# Patient Record
Sex: FEMALE | Race: WHITE | NOT HISPANIC OR LATINO | Employment: OTHER | URBAN - METROPOLITAN AREA
[De-identification: names, ages, dates, MRNs, and addresses within clinical notes are randomized per-mention and may not be internally consistent; named-entity substitution may affect disease eponyms.]

---

## 2017-01-03 ENCOUNTER — PATIENT MESSAGE (OUTPATIENT)
Dept: GASTROENTEROLOGY | Facility: CLINIC | Age: 82
End: 2017-01-03

## 2017-01-04 ENCOUNTER — OFFICE VISIT (OUTPATIENT)
Dept: PULMONOLOGY | Facility: CLINIC | Age: 82
End: 2017-01-04
Payer: MEDICARE

## 2017-01-04 VITALS
DIASTOLIC BLOOD PRESSURE: 87 MMHG | OXYGEN SATURATION: 95 % | WEIGHT: 158.06 LBS | SYSTOLIC BLOOD PRESSURE: 136 MMHG | HEIGHT: 63 IN | HEART RATE: 81 BPM | BODY MASS INDEX: 28 KG/M2

## 2017-01-04 DIAGNOSIS — J70.1 RADIATION FIBROSIS OF LUNG: ICD-10-CM

## 2017-01-04 DIAGNOSIS — R06.09 DOE (DYSPNEA ON EXERTION): Primary | ICD-10-CM

## 2017-01-04 DIAGNOSIS — J47.9 BRONCHIECTASIS WITHOUT COMPLICATION: ICD-10-CM

## 2017-01-04 DIAGNOSIS — J44.89 COPD (CHRONIC OBSTRUCTIVE PULMONARY DISEASE) WITH CHRONIC BRONCHITIS: ICD-10-CM

## 2017-01-04 DIAGNOSIS — J45.20 INTERMITTENT ASTHMA, UNCOMPLICATED: ICD-10-CM

## 2017-01-04 DIAGNOSIS — R91.8 MULTIPLE LUNG NODULES: ICD-10-CM

## 2017-01-04 PROCEDURE — 99499 UNLISTED E&M SERVICE: CPT | Mod: S$GLB,,, | Performed by: INTERNAL MEDICINE

## 2017-01-04 PROCEDURE — 99999 PR PBB SHADOW E&M-EST. PATIENT-LVL III: CPT | Mod: PBBFAC,,, | Performed by: INTERNAL MEDICINE

## 2017-01-04 PROCEDURE — 1159F MED LIST DOCD IN RCRD: CPT | Mod: S$GLB,,, | Performed by: INTERNAL MEDICINE

## 2017-01-04 PROCEDURE — 1160F RVW MEDS BY RX/DR IN RCRD: CPT | Mod: S$GLB,,, | Performed by: INTERNAL MEDICINE

## 2017-01-04 PROCEDURE — 3075F SYST BP GE 130 - 139MM HG: CPT | Mod: S$GLB,,, | Performed by: INTERNAL MEDICINE

## 2017-01-04 PROCEDURE — 1125F AMNT PAIN NOTED PAIN PRSNT: CPT | Mod: S$GLB,,, | Performed by: INTERNAL MEDICINE

## 2017-01-04 PROCEDURE — 3079F DIAST BP 80-89 MM HG: CPT | Mod: S$GLB,,, | Performed by: INTERNAL MEDICINE

## 2017-01-04 PROCEDURE — 99214 OFFICE O/P EST MOD 30 MIN: CPT | Mod: S$GLB,,, | Performed by: INTERNAL MEDICINE

## 2017-01-04 PROCEDURE — 1157F ADVNC CARE PLAN IN RCRD: CPT | Mod: S$GLB,,, | Performed by: INTERNAL MEDICINE

## 2017-01-04 RX ORDER — FLUTICASONE FUROATE AND VILANTEROL 200; 25 UG/1; UG/1
1 POWDER RESPIRATORY (INHALATION) DAILY
Qty: 1 EACH | Refills: 11 | Status: SHIPPED | OUTPATIENT
Start: 2017-01-04 | End: 2017-01-04 | Stop reason: SDUPTHER

## 2017-01-04 RX ORDER — FLUTICASONE FUROATE AND VILANTEROL 200; 25 UG/1; UG/1
1 POWDER RESPIRATORY (INHALATION) DAILY
Qty: 3 EACH | Refills: 3 | Status: SHIPPED | OUTPATIENT
Start: 2017-01-04 | End: 2018-03-28 | Stop reason: SDUPTHER

## 2017-01-04 RX ORDER — PREDNISONE 20 MG/1
TABLET ORAL
Qty: 12 TABLET | Refills: 2 | Status: SHIPPED | OUTPATIENT
Start: 2017-01-04 | End: 2017-04-07 | Stop reason: SDUPTHER

## 2017-01-04 RX ORDER — ALBUTEROL SULFATE 90 UG/1
1-2 AEROSOL, METERED RESPIRATORY (INHALATION) EVERY 4 HOURS PRN
Qty: 3 INHALER | Refills: 3 | Status: SHIPPED | OUTPATIENT
Start: 2017-01-04 | End: 2017-01-23 | Stop reason: CLARIF

## 2017-01-04 NOTE — PROGRESS NOTES
1/4/2017    Olga Aguiar  office    Chief Complaint   Patient presents with    Follow-up     3 month     Asthma     Jan 4, 2017, cough is better, less mucous, wheezes with stairs and night air, took prednisone last month and after 3 days improved a bit, uses singulair only on regular basis, advair used til out- was causing a hoarseness with reg use.  Albuterol ppt nervousness.      Sept 28, 2016HPI: minimal ex smoker with h/o breast ca rx xrt to anterior right chest with chr vol loss- ct with fibrotic right upper with bronchiectasis.    Chr cough and no mucous progressively worse,     Had had cough and sob related irratents.  occ wheezes last yrs,     Clarke flight of stairs varies. Inhalers no help.    Will have occasoion of cough and mucous chr , copious.    Yrs back would have cough and occ wheeze withe irratents.    The chief compliant  problem is new to me   PFSH:  Past Medical History   Diagnosis Date    Adrenal adenoma: 2.8 X 2.1CM 2010 5/16/2014    Anticoagulant long-term use       mg    Aortic atherosclerosis: see CT scan 2016 10/28/2016    Aortic stenosis 8/19/2014    Asthma     Cancer      breast, both sides, right arm restriction    Cataracts, both eyes     Chronic back pain     Chronic bronchitis     CKD (chronic kidney disease) stage 3, GFR 30-59 ml/min 8/19/2014     no dialysis    COPD (chronic obstructive pulmonary disease)      occasional inhaler use, no oxygen    DDD (degenerative disc disease), lumbar 7/30/2013    Diabetes mellitus      diet controlled    Ectatic abdominal aorta: see CT scan 10/16 10/28/2016    Gallbladder polyp 8/19/2014    GERD (gastroesophageal reflux disease)     Gout     Herpes simplex      Fever Blisters and Styes in right eye    High blood cholesterol     Hypertension      holding medication when B/P low    Insomnia     Lumbar spinal stenosis     Lymphedema of arm      Right    Multiple lung nodules 9/21/2015    Renal cyst, right:  stable per CT 2016 10/28/2016    Stroke 2/2012     TIA, no residual effects    Stye 12/5/2013    Thyroid disease      hypothyroidism    Unspecified hypothyroidism 7/20/2015         Past Surgical History   Procedure Laterality Date    Hysterectomy      Cataract extraction       bilateral PHACO with IOL    Tonsillectomy      Axillary node dissection       both sides    Epidural steroid injection       Pain mangement    Rectal surgery       Fissure repair    Nerve block injection       Pain management, Times 2    Radiofrequency ablation       Nerve, Pain management    Hysterectomy      Breast surgery       Bilateral lumpectomy     Social History   Substance Use Topics    Smoking status: Former Smoker     Packs/day: 1.00     Years: 18.00     Start date: 2/20/1952     Quit date: 8/6/1970    Smokeless tobacco: Never Used    Alcohol use Yes      Comment: rare     Family History   Problem Relation Age of Onset    Cancer Mother      Breast    COPD Mother     Hearing loss Mother     Hypertension Mother     Diabetes Father     Heart disease Father     Heart attack Father     Cancer Daughter      Breast    Cancer Sister      Breast    Amblyopia Neg Hx     Blindness Neg Hx     Cataracts Neg Hx     Glaucoma Neg Hx     Macular degeneration Neg Hx     Retinal detachment Neg Hx     Strabismus Neg Hx     Stroke Neg Hx     Thyroid disease Neg Hx      Review of patient's allergies indicates:   Allergen Reactions    Allopurinol analogues Other (See Comments)     Headache.  This is more of a side effect than it is an allergic reaction.       Performance Status:The patient's activity level is functions out of house.      Review of Systems:  a review of eleven systems covering constitutional, Eye, HEENT, Psych, Respiratory, Cardiac, GI, , Musculoskeletal, Endocrine, Dermatologic was negative except for pertinent findings as listed ABOVE and below: as, no swallow problems with xrt, no clear dm,  "    Exam:Comprehensive exam done.   Visit Vitals    /87 (BP Location: Left arm, Patient Position: Sitting, BP Method: Automatic)    Pulse 81    Ht 5' 3" (1.6 m)    Wt 71.7 kg (158 lb 1.1 oz)    SpO2 95%    BMI 28 kg/m2     Exam included Vitals as listed, and patient's appearance and affect and alertness and mood, oral exam for yeast and hygiene and pharynx lesions and Mallapatti (M) score, neck with inspection for jvd and masses and thyroid abnormalities and lymph nodes (supraclavicular and infraclavicular nodes and axillary also examined and noted if abn), chest exam included symmetry and effort and fremitus and percussion and auscultation, cardiac exam included rhythm and gallops and murmur and rubs and jvd and edema, abdominal exam for mass and hepatosplenomegaly and tenderness and hernias and bowel sounds, Musculoskeletal exam with muscle tone and posture and mobility/gait and  strength, and skin for rashes and cyanosis and pallor and turgor, extremity for clubbing.  Findings were normal except for pertinent findings listed below:  M2, good bs, looks good    Radiographs reviewed: view by direct vision focal fibrotic dx right upper with mainly bronchiectasis.    Labs reviewed      PFT  Pulmonary Functions, including spirometry and bronchodilator response and lung volumes and diffusion, study was done oct 7, 2016.  Spirometry shows severe obstruction, loss vital capacity and obstruction and bronchodilator response.   FEV1 is 42% irma 0.81 liters liters.  Lung volumes show  loss of TLC with restriction 63%.  Diffusion shows reduced to 38% uncorrected for anemia if present..     Pulmonary functions show severe restriction and good bronchodilator response and low dlco. Clinical correlation recommended.      Saul Heller M.D.      Plan:  Clinical impression is resonably certain and repeated evaluation prn +/- follow up will be needed as below.    Olga was seen today for follow-up and " asthma.    Diagnoses and all orders for this visit:    FISCHER (dyspnea on exertion)    Multiple lung nodules: stable CT 10/2016    COPD (chronic obstructive pulmonary disease) with chronic bronchitis, 2013  -     Discontinue: fluticasone-vilanterol (BREO ELLIPTA) 200-25 mcg/dose DsDv diskus inhaler; Inhale 1 puff into the lungs once daily.  -     Discontinue: umeclidinium (INCRUSE ELLIPTA) 62.5 mcg/actuation DsDv; Inhale 1 Inhaler into the lungs once daily.  -     albuterol 90 mcg/actuation inhaler; Inhale 1-2 puffs into the lungs every 4 (four) hours as needed.  -     fluticasone-vilanterol (BREO ELLIPTA) 200-25 mcg/dose DsDv diskus inhaler; Inhale 1 puff into the lungs once daily.  -     umeclidinium (INCRUSE ELLIPTA) 62.5 mcg/actuation DsDv; Inhale 1 Inhaler into the lungs once daily.    Intermittent asthma, uncomplicated  -     Discontinue: fluticasone-vilanterol (BREO ELLIPTA) 200-25 mcg/dose DsDv diskus inhaler; Inhale 1 puff into the lungs once daily.  -     Discontinue: umeclidinium (INCRUSE ELLIPTA) 62.5 mcg/actuation DsDv; Inhale 1 Inhaler into the lungs once daily.  -     albuterol 90 mcg/actuation inhaler; Inhale 1-2 puffs into the lungs every 4 (four) hours as needed.  -     predniSONE (DELTASONE) 20 MG tablet; One daily for   3 days and repeat as needed.  -     fluticasone-vilanterol (BREO ELLIPTA) 200-25 mcg/dose DsDv diskus inhaler; Inhale 1 puff into the lungs once daily.  -     umeclidinium (INCRUSE ELLIPTA) 62.5 mcg/actuation DsDv; Inhale 1 Inhaler into the lungs once daily.    Radiation fibrosis of lung    Bronchiectasis without complication  -     Discontinue: fluticasone-vilanterol (BREO ELLIPTA) 200-25 mcg/dose DsDv diskus inhaler; Inhale 1 puff into the lungs once daily.  -     Discontinue: umeclidinium (INCRUSE ELLIPTA) 62.5 mcg/actuation DsDv; Inhale 1 Inhaler into the lungs once daily.  -     albuterol 90 mcg/actuation inhaler; Inhale 1-2 puffs into the lungs every 4 (four) hours as  needed.  -     predniSONE (DELTASONE) 20 MG tablet; One daily for   3 days and repeat as needed.  -     fluticasone-vilanterol (BREO ELLIPTA) 200-25 mcg/dose DsDv diskus inhaler; Inhale 1 puff into the lungs once daily.  -     umeclidinium (INCRUSE ELLIPTA) 62.5 mcg/actuation DsDv; Inhale 1 Inhaler into the lungs once daily.        Return in about 3 months (around 4/4/2017).    Discussed with patient above for education the following:        Asthma and copd with radiation damage (fibrosis and bronchiectasis)     Preventive breo one a day, and singulair    Try incruse one a day- continue if dramatic response     Rescue     Albuterol as needed - use as little or as much as needed.  Makes jitters.     Action plan will optimize    One a day for 3 or 6 or 9 or 12 days?

## 2017-01-04 NOTE — MR AVS SNAPSHOT
[place] [facility]  [address]  [place] [address]  Phone: [phone]                  Olga Aguiar   2017 10:00 AM   Office Visit    Description:  Female : 1935   Provider:  Saul Heller MD   Department:  [place] [facility]           Reason for Visit     Follow-up     Asthma           Diagnoses this Visit        Comments    FISCHER (dyspnea on exertion)    -  Primary     Multiple lung nodules         COPD (chronic obstructive pulmonary disease) with chronic bronchitis         Intermittent asthma, uncomplicated         Radiation fibrosis of lung         Bronchiectasis without complication                To Do List           Future Appointments        Provider Department Dept Phone    2017 10:15 AM [facility] [phone]    2017 2:30 PM MD [place] [facility] - Cardiology [phone]    3/1/2017 11:30 AM [facility] [place] - Pain Management [phone]    3/29/2017 8:00 AM [facility], [facility] [phone]    2017 1:20 PM [facility] [phone]      Goals (5 Years of Data)     None      Follow-Up and Disposition     Return in about 3 months (around 2017).    Follow-up and Disposition History       These Medications        Disp Refills Start End    albuterol 90 mcg/actuation inhaler 3 Inhaler 3 2017     Inhale 1-2 puffs into the lungs every 4 (four) hours as needed. - Inhalation    Pharmacy: [facility] [place] [address]. Ph #: [phone]       predniSONE (DELTASONE) 20 MG tablet 12 tablet 2 2017     One daily for   3 days and repeat as needed.    Pharmacy: [facility] [place] [address]. Ph #: [phone]       fluticasone-vilanterol (BREO ELLIPTA) 200-25 mcg/dose DsDv diskus inhaler 3 each 3 2017     Inhale 1 puff into the lungs once daily. - Inhalation     Pharmacy: [facility] [address] [place] [address]. Ph #: [phone]       umeclidinium (INCRUSE ELLIPTA) 62.5 mcg/actuation DsDv 3 each 3 1/4/2017     Inhale 1 Inhaler into the lungs once daily. - Inhalation    Pharmacy: [facility] [address] [place] [address]. Ph #: [phone]         [facility]     [facility] On Call Nurse [facility] - 24/7 Assistance  Registered nurses in the [facility] On Call Center provide clinical advisement, health education, appointment booking, and other advisory services.  Call for this free service at [phone].             Medications           Message regarding Medications     Verify the changes and/or additions to your medication regime listed below are the same as discussed with your clinician today.  If any of these changes or additions are incorrect, please notify your healthcare provider.        START taking these NEW medications        Refills    albuterol 90 mcg/actuation inhaler 3    Sig: Inhale 1-2 puffs into the lungs every 4 (four) hours as needed.    Class: Normal    Route: Inhalation    fluticasone-vilanterol (BREO ELLIPTA) 200-25 mcg/dose DsDv diskus inhaler 3    Sig: Inhale 1 puff into the lungs once daily.    Class: Normal    Route: Inhalation    umeclidinium (INCRUSE ELLIPTA) 62.5 mcg/actuation DsDv 3    Sig: Inhale 1 Inhaler into the lungs once daily.    Class: Print    Route: Inhalation      STOP taking these medications     fluticasone-salmeterol 250-50 mcg/dose (ADVAIR) 250-50 mcg/dose diskus inhaler Inhale 1 puff into the lungs 2 (two) times daily.    ALBUTEROL INHL Inhale into the lungs.           Verify that the below list of medications is an accurate representation of the medications you are currently taking.  If none reported, the list may be blank. If incorrect, please contact your healthcare provider. Carry this list with you in case of emergency.           Current Medications     acetaminophen (TYLENOL) 500  "MG tablet Take 1,000 mg by mouth every 6 (six) hours as needed.    albuterol 90 mcg/actuation inhaler Inhale 1-2 puffs into the lungs every 4 (four) hours as needed.    aspirin 325 MG tablet Take 1 tablet by mouth Daily.     cholecalciferol, vitamin D3, 2,000 unit Cap Take 2,000 Units by mouth Daily.     levothyroxine (SYNTHROID) 75 MCG tablet Take 75 mcg by mouth once daily.    montelukast (SINGULAIR) 10 mg tablet Take 1 tablet (10 mg total) by mouth every evening.    omeprazole (PRILOSEC) 40 MG capsule Take 1 capsule (40 mg total) by mouth every morning.    simvastatin (ZOCOR) 40 MG tablet Take 1 tablet (40 mg total) by mouth every evening.    sucralfate (CARAFATE) 1 gram tablet Take 1 tablet (1 g total) by mouth 2 (two) times daily.    tramadol (ULTRAM) 50 mg tablet Take 1 tablet (50 mg total) by mouth 2 (two) times daily as needed for Pain.    triamterene-hydrochlorothiazide 37.5-25 mg (DYAZIDE) 37.5-25 mg per capsule Take 1 capsule by mouth every morning.    zolpidem (AMBIEN) 10 mg Tab Take 1 tablet (10 mg total) by mouth nightly as needed.    fluticasone-vilanterol (BREO ELLIPTA) 200-25 mcg/dose DsDv diskus inhaler Inhale 1 puff into the lungs once daily.    predniSONE (DELTASONE) 20 MG tablet One daily for   3 days and repeat as needed.    umeclidinium (INCRUSE ELLIPTA) 62.5 mcg/actuation DsDv Inhale 1 Inhaler into the lungs once daily.           Clinical Reference Information           Vital Signs - Last Recorded  Most recent update: 1/4/2017 10:14 AM by Miya Gutierrez MA    BP Pulse Ht Wt SpO2 BMI    136/87 (BP Location: Left arm, Patient Position: Sitting, BP Method: Automatic) 81 5' 3" (1.6 m) 71.7 kg (158 lb 1.1 oz) 95% 28 kg/m2      Blood Pressure          Most Recent Value    BP  136/87      Allergies as of 1/4/2017     Allopurinol Analogues      Immunizations Administered on Date of Encounter - 1/4/2017     None      Instructions    Asthma and copd with radiation damage (fibrosis and " bronchiectasis)     Preventive breo one a day, and singulair    Try incruse one a day- continue if dramatic response     Rescue     Albuterol as needed - use as little or as much as needed.  Makes jitters.     Action plan will optimize    One a day for 3 or 6 or 9 or 12 days?

## 2017-01-04 NOTE — PATIENT INSTRUCTIONS
Asthma and copd with radiation damage (fibrosis and bronchiectasis)     Preventive breo one a day, and singulair    Try incruse one a day- continue if dramatic response     Rescue     Albuterol as needed - use as little or as much as needed.  Makes jitters.     Action plan will optimize    One a day for 3 or 6 or 9 or 12 days?

## 2017-01-05 ENCOUNTER — PATIENT MESSAGE (OUTPATIENT)
Dept: CARDIOLOGY | Facility: CLINIC | Age: 82
End: 2017-01-05

## 2017-01-16 ENCOUNTER — OFFICE VISIT (OUTPATIENT)
Dept: OPTOMETRY | Facility: CLINIC | Age: 82
End: 2017-01-16
Payer: MEDICARE

## 2017-01-16 DIAGNOSIS — E11.9 DIABETES MELLITUS WITHOUT OPHTHALMIC MANIFESTATIONS: Primary | ICD-10-CM

## 2017-01-16 DIAGNOSIS — Z96.1 PSEUDOPHAKIA OF BOTH EYES: ICD-10-CM

## 2017-01-16 DIAGNOSIS — H52.7 REFRACTIVE ERROR: ICD-10-CM

## 2017-01-16 DIAGNOSIS — D31.32 CHOROIDAL NEVUS OF LEFT EYE: ICD-10-CM

## 2017-01-16 PROCEDURE — 99999 PR PBB SHADOW E&M-EST. PATIENT-LVL II: CPT | Mod: PBBFAC,,, | Performed by: OPTOMETRIST

## 2017-01-16 PROCEDURE — 92014 COMPRE OPH EXAM EST PT 1/>: CPT | Mod: S$GLB,,, | Performed by: OPTOMETRIST

## 2017-01-16 NOTE — PROGRESS NOTES
HPI     CC: Pt is here today for her annual diabetic eye exam.  She states that   although she has not had any major vision changes since her last exam, she   had a fall about 6 wks ago and her right eye was badly bruised and later   developed a stye which she feels is still a little swollen.  She says that   she doesn't know what her last blood sugar was.    (-) pain / (-) discomfort  (-) headaches  (-) diplopia   (-) flashes / (-) floaters    Hemoglobin A1C       Date                     Value               Ref Range             Status                09/21/2016               6.6 (H)             4.5 - 6.2 %           Final                 03/16/2016               6.8 (H)             4.5 - 6.2 %           Final                 12/09/2015               6.9 (H)             4.5 - 6.2 %           Final            ----------       Last edited by Ashwin Snowden, OD on 1/16/2017 10:31 AM.     ROS     Positive for: Endocrine (DM type 2, controlled with diet and exercise),   Cardiovascular (HTN), Eyes    Negative for: Constitutional, Gastrointestinal, Neurological, Skin,   Genitourinary, Musculoskeletal, HENT, Respiratory, Psychiatric,   Allergic/Imm, Heme/Lymph    Last edited by Ashwin Snowden, OD on 1/16/2017 10:39 AM. (History)        Assessment /Plan     For exam results, see Encounter Report.    Diabetes mellitus without ophthalmic manifestations    Choroidal nevus of left eye    Pseudophakia of both eyes    Refractive error      Borderline DM type 2, controlled with diet and exercise, without ocular retinopathy ou. Discussed possible ocular affects of uncontrolled blood sugar with patient. Recommended continued strong blood sugar control and continued care with PCP. Monitor yearly.     Choroidal nevus temporal OS, stable from previous. Flat, distinct borders, negative SRF. Monitor yearly.    S/p cataract extraction with good results. Pt happy with uncorrected distance and readers prn for near, denies refraction. Return prn  for spec Rx.       RTC in 1 year for comprehensive eye exam, or sooner prn.

## 2017-01-23 ENCOUNTER — TELEPHONE (OUTPATIENT)
Dept: ADMINISTRATIVE | Facility: HOSPITAL | Age: 82
End: 2017-01-23

## 2017-01-23 RX ORDER — LEVALBUTEROL TARTRATE 45 UG/1
2 AEROSOL, METERED ORAL EVERY 8 HOURS PRN
Qty: 1 INHALER | Refills: 12 | Status: SHIPPED | OUTPATIENT
Start: 2017-01-23 | End: 2018-03-28

## 2017-01-23 NOTE — TELEPHONE ENCOUNTER
This patient has a prescription for Proventil. Per [facility] navigator- Proventil is no longer on their formulary effective 1/1/17. Suggested alternatives to prescribe are Ventolin & Xopenex. Thanks.

## 2017-01-30 ENCOUNTER — TELEPHONE (OUTPATIENT)
Dept: PULMONOLOGY | Facility: CLINIC | Age: 82
End: 2017-01-30

## 2017-02-06 ENCOUNTER — OFFICE VISIT (OUTPATIENT)
Dept: CARDIOLOGY | Facility: CLINIC | Age: 82
End: 2017-02-06
Payer: MEDICARE

## 2017-02-06 VITALS
BODY MASS INDEX: 28.32 KG/M2 | WEIGHT: 159.81 LBS | RESPIRATION RATE: 18 BRPM | HEART RATE: 74 BPM | HEIGHT: 63 IN | OXYGEN SATURATION: 97 %

## 2017-02-06 DIAGNOSIS — R06.09 DOE (DYSPNEA ON EXERTION): Primary | ICD-10-CM

## 2017-02-06 DIAGNOSIS — I51.7 LVH (LEFT VENTRICULAR HYPERTROPHY): ICD-10-CM

## 2017-02-06 DIAGNOSIS — R94.31 ABNORMAL ECG: ICD-10-CM

## 2017-02-06 DIAGNOSIS — E11.59 HYPERTENSION ASSOCIATED WITH DIABETES: ICD-10-CM

## 2017-02-06 DIAGNOSIS — I35.0 AORTIC VALVE STENOSIS, MODERATE: ICD-10-CM

## 2017-02-06 DIAGNOSIS — I15.2 HYPERTENSION ASSOCIATED WITH DIABETES: ICD-10-CM

## 2017-02-06 DIAGNOSIS — J44.89 COPD (CHRONIC OBSTRUCTIVE PULMONARY DISEASE) WITH CHRONIC BRONCHITIS: ICD-10-CM

## 2017-02-06 PROCEDURE — 99999 PR PBB SHADOW E&M-EST. PATIENT-LVL II: CPT | Mod: PBBFAC,,, | Performed by: INTERNAL MEDICINE

## 2017-02-06 PROCEDURE — 99499 UNLISTED E&M SERVICE: CPT | Mod: S$GLB,,, | Performed by: INTERNAL MEDICINE

## 2017-02-06 PROCEDURE — 1159F MED LIST DOCD IN RCRD: CPT | Mod: S$GLB,,, | Performed by: INTERNAL MEDICINE

## 2017-02-06 PROCEDURE — 99214 OFFICE O/P EST MOD 30 MIN: CPT | Mod: S$GLB,,, | Performed by: INTERNAL MEDICINE

## 2017-02-06 PROCEDURE — 1160F RVW MEDS BY RX/DR IN RCRD: CPT | Mod: S$GLB,,, | Performed by: INTERNAL MEDICINE

## 2017-02-06 PROCEDURE — 1125F AMNT PAIN NOTED PAIN PRSNT: CPT | Mod: S$GLB,,, | Performed by: INTERNAL MEDICINE

## 2017-02-06 PROCEDURE — 1157F ADVNC CARE PLAN IN RCRD: CPT | Mod: S$GLB,,, | Performed by: INTERNAL MEDICINE

## 2017-02-06 RX ORDER — TRAMADOL HYDROCHLORIDE 50 MG/1
50 TABLET ORAL 2 TIMES DAILY PRN
Qty: 60 TABLET | Refills: 2 | Status: SHIPPED | OUTPATIENT
Start: 2017-02-06 | End: 2017-05-02 | Stop reason: SDUPTHER

## 2017-02-06 NOTE — MR AVS SNAPSHOT
[place] [facility] - Cardiology  [address]  [place] [address]  Phone: [phone]                  Olga Aguiar   2017 11:00 AM   Office Visit    Description:  Female : 1935   Provider:  Harley Lucas MD   Department:  [place] [facility]           Diagnoses this Visit        Comments    FISCHER (dyspnea on exertion)    -  Primary     Hypertension associated with diabetes         BMI 29.0-29.9,adult         Aortic valve stenosis, moderate         LVH (left ventricular hypertrophy)         COPD (chronic obstructive pulmonary disease) with chronic bronchitis         Abnormal ECG                To Do List           Future Appointments        Provider Department Dept Phone    3/1/2017 11:30 AM [facility] [place] - Pain Management [phone]    3/29/2017 8[facility]00 [facility] [phone]    2017 1:20 PM MD [place] - Pulmonary [phone]    2017 1[facility]30 [facility] [phone]      Goals (5 Years of Data)     None      Follow-Up and Disposition     Return in about 1 year (around 2018).    Follow-up and Disposition History      [facility]     [facility] On Call Nurse Care Line -  Assistance  Registered nurses in the [facility] On Call Center provide clinical advisement, health education, appointment booking, and other advisory services.  Call for this free service at [phone].             Medications           Message regarding Medications     Verify the changes and/or additions to your medication regime listed below are the same as discussed with your clinician today.  If any of these changes or additions are incorrect, please notify your healthcare provider.             Verify that the below list of medications is an accurate representation of the medications you are currently taking.  If none reported, the list may be blank. If incorrect, please contact  "your healthcare provider. Carry this list with you in case of emergency.           Current Medications     acetaminophen (TYLENOL) 500 MG tablet Take 1,000 mg by mouth every 6 (six) hours as needed.    aspirin 325 MG tablet Take 1 tablet by mouth Daily.     cholecalciferol, vitamin D3, 2,000 unit Cap Take 2,000 Units by mouth Daily.     fluticasone-vilanterol (BREO ELLIPTA) 200-25 mcg/dose DsDv diskus inhaler Inhale 1 puff into the lungs once daily.    levalbuterol (XOPENEX HFA) 45 mcg/actuation inhaler Inhale 2 puffs into the lungs every 8 (eight) hours as needed for Wheezing.    levothyroxine (SYNTHROID) 75 MCG tablet Take 75 mcg by mouth once daily.    montelukast (SINGULAIR) 10 mg tablet Take 1 tablet (10 mg total) by mouth every evening.    omeprazole (PRILOSEC) 40 MG capsule Take 1 capsule (40 mg total) by mouth every morning.    predniSONE (DELTASONE) 20 MG tablet One daily for   3 days and repeat as needed.    simvastatin (ZOCOR) 40 MG tablet Take 1 tablet (40 mg total) by mouth every evening.    sucralfate (CARAFATE) 1 gram tablet Take 1 tablet (1 g total) by mouth 2 (two) times daily.    tramadol (ULTRAM) 50 mg tablet Take 1 tablet (50 mg total) by mouth 2 (two) times daily as needed for Pain.    triamterene-hydrochlorothiazide 37.5-25 mg (DYAZIDE) 37.5-25 mg per capsule Take 1 capsule by mouth every morning.    umeclidinium (INCRUSE ELLIPTA) 62.5 mcg/actuation DsDv Inhale 1 Inhaler into the lungs once daily.    zolpidem (AMBIEN) 10 mg Tab Take 1 tablet (10 mg total) by mouth nightly as needed.           Clinical Reference Information           Your Vitals Were     Pulse Resp Height Weight SpO2 BMI    74 18 5' 3" (1.6 m) 72.5 kg (159 lb 13.3 oz) 97% 28.31 kg/m2      Blood Pressure          Most Recent Value    Left Arm BP - Sitting  146/74    Reason for not completing BP on both arms  mastectomy      Allergies as of 2/6/2017     Allopurinol Analogues      Immunizations Administered on Date of Encounter - " 2/6/2017     None      Orders Placed During Today's Visit     Future Labs/Procedures Expected by Expires    IN OFFICE EKG 12-LEAD (to Muse)  As directed 2/6/2018    Pharmacological stress test w/ Color Haris  As directed 2/6/2018      Language Assistance Services     ATTENTION: Language assistance services are available, free of charge. Please call [phone].      ATENCIÓN: Si habla español, tiene a butt disposición servicios gratuitos de asistencia lingüística. Llame al [phone].     CHÚ Ý: N?u b?n nói Ti?ng Vi?t, có các d?ch v? h? tr? ngôn ng? mi?n phí dành cho b?n. G?i s? [phone].         [facility] complies with applicable Federal civil rights laws and does not discriminate on the basis of race, color, national origin, age, disability, or sex.

## 2017-02-06 NOTE — PROGRESS NOTES
Subjective:    Patient ID:  Olga Aguiar is a 81 y.o. female who presents for evaluation of No chief complaint on file.  For recurrent cold sweat with near syncope related to anxiety, aortic stenosis, HTN  PCP and referred by: Dr. Barger  Podiatrist: Dr. Rivero  Pain: Dr. Ware  GI: Dr. Castro  Pulmonary: Dr. Heller  Eye: Dr. Snowden  Lives with , Pino, here with patient, non-smoker  Housewife    HPI Comments: White female with recurrent bouts of sudden onset cold sweat, with nausea, no vomiting, and drop in BP to as low as 80 SBP with near-syncope. Denies any feeling of pain prior to onset.  noted that several times occurred about 40 minutes after meal, always with some nausea or feeling of weakness. Started about 6 months ago, at one time it was several times daily, up to 4 times. Can last up to 5 minutes. No fall but have to sit down. No change in medications, or diet or exercise. Had acute gouty attack also over the past 6 months. Now the third bout. No able to tolerate colchicine with severe upset stomach. Eventually had to be placed on steroid pack and have completed one round of Rx with improvement in the stomach, back pains, and these cold spells. ECG on 7/24/2014 was normal, rate of 71. No prior history of cardiac problem except for TIA in 2012. No recurrence. Have several cardiac risk factors - see Problems List.    Since visit of 8/18/2014, continue with night and day sweats, weakness with standing, easy fatigue, and FISCHER especially with stair climbing. Seems progressive over the past 2 years. Try to do housework, avoid vacuuming and feels very limited due to chronic back pains. Sitting a lot of time. Have not been on any regular exercise routine.  Holter, 8/2014: PREDOMINANT RHYTHM  1. Sinus rhythm with heart rates varying between 44 and 145 bpm with an average of 85 bpm.     2. Some sinus arrhythmia noted.    VENTRICULAR ARRHYTHMIAS  1. There were very rare mostly monomorphic PVCs  recorded totalling 17 and averaging less than 1 per hour.     2. There were no episodes of ventricular tachycardia.    SUPRA VENTRICULAR ARRHYTHMIAS  1. There were very rare PACs totalling 57 and averaging 2 per hour.     2. There was a single (11 beat) run of EAT. The high rate was 140 bpm.     SINUS NODE FUNCTION  1. There was no evidence of high grade SA kylie block.     AV CONDUCTION  1. There was no evidence of high grade AV block.     DIARY  1. The diary was returned, but not completed    MISCELLANEOUS  1. This was a tape of adequate length (24 hrs).     ECHO CONCLUSIONS     1 - Concentric hypertrophy. Septal wall thickness is upper limit of normal in size, and posterior wall thickness is increased, with the septum measuring 1.0 cm and the posterior wall measuring 1.2 cm across.    2 - Normal left ventricular systolic function (EF 60-65%).     3 - Moderate aortic stenosis, MARI = 1.13 cm2.     4 - Normal left ventricular diastolic function.     5 - Normal right ventricular systolic function .     6 - The aortic valve now appears stenotic, change from Echo on 2/8/2012.    Since visit of 12/8/2014, no new problem, less of dizziness. More active to gym with , twice weekly for 1:15. No problem, denies any CP, near-syncope, but does have FISCHER, due to chronic bronchitis. No recent change. ECG shows NSR, rate 71, low voltage. Recent labs reviewed from 9/2014 - CMP showed , eGFR 38.8, A1C 7.1%, Lipid with LDL 87, non-, normal CBC. Reviewed TIA in 1/2012 with transient dysphasia, left sided weakness, leg first, then fingers, numbness of the left side of tongue and left facial droop. All resolved over an hour. Carotid US at the time - No significant carotid artery stenosis bilaterally    In 2/2017, still with FISCHER, fairly limiting, reviewed with Dr. Heller, being treated with intermittent steroid for 3-12 days per month for the past 2 months with benefit. No CP. Compliant with medications. ECG in 4/2016  suggest prior anterior MI.     Review of Systems   Constitution: Positive for diaphoresis and malaise/fatigue. Negative for fever, weakness, night sweats and have weight loss due to decrease calories.   HENT: Negative for headaches, nosebleeds and tinnitus.    Eyes: Negative for visual disturbance.   Cardiovascular: Positive for dyspnea on exertion and near-syncope. Negative for chest pain, claudication, cyanosis, irregular heartbeat, leg swelling, orthopnea, palpitations and paroxysmal nocturnal dyspnia.   Respiratory: Positive for shortness of breath. Negative for cough, sleep disturbances due to breathing, snoring and wheezing. Argos score 3 down to 2   Endocrine: Positive for cold intolerance and heat intolerance. Negative for polydipsia and polyuria.   Hematologic/Lymphatic: Does not bruise/bleed easily.   Skin: Positive for dry skin. Negative for nail changes, color change, flushing, poor wound healing and suspicious lesions.   Musculoskeletal: Positive for arthritis, back pain, gout, joint pain, joint swelling and stiffness. Negative for falls, muscle cramps, muscle weakness and myalgias.   Gastrointestinal: Positive for bloating, diarrhea, heartburn, nausea and vomiting during meals. Negative for hematemesis, hematochezia and melena.   Neurological: Positive for loss of balance. Negative for disturbances in coordination, excessive daytime sleepiness, dizziness, focal weakness, light-headedness, numbness and vertigo.   Psychiatric/Behavioral: Negative for depression and substance abuse. The patient has insomnia. The patient is not nervous/anxious.         Objective:    Physical Exam   Constitutional: She is oriented to person, place, and time. She appears well-developed and well-nourished.   HENT:   Head: Normocephalic.   Eyes: Conjunctivae and EOM are normal. Pupils are equal, round, and reactive to light.   Neck: Normal range of motion. Neck supple. No JVD present. No thyromegaly present. Circumference  "16.75"  Cardiovascular: Normal rate, regular rhythm and intact distal pulses.  Exam reveals distant heart sounds. Exam reveals no gallop and no friction rub.    Soft 2/6 murmur heard.  Pulses:       Posterior tibial pulses are 1+ on the right side, and 1+ on the left side.   Lymph edema of the right arm   Pulmonary/Chest: Effort normal and breath sounds normal. She has no rales. She exhibits no tenderness.   Abdominal: Soft. Bowel sounds are normal. There is no tenderness.   Waist 38", hip 42.5"   Musculoskeletal: Normal range of motion. She exhibits no edema.   Lymphadenopathy:     She has no cervical adenopathy.   Neurological: She is alert and oriented to person, place, and time.   Skin: Skin is warm and dry. No rash noted.         Assessment:       1. FISCHER (dyspnea on exertion)    2. Hypertension associated with diabetes    3. BMI 29.9,adult down to 28.3    4. Aortic valve stenosis, mild- moderate, MARI 1.44 1/16    5. LVH (left ventricular hypertrophy)    6. COPD (chronic obstructive pulmonary disease) with chronic bronchitis, 2013    7. Abnormal ECG         Plan:       Olga was seen today for consult.    Diagnoses and associated orders for this visit:    FISCHER (dyspnea on exertion)  -     IN OFFICE EKG 12-LEAD (to Muse); Future  -     Pharmacological stress test w/ [facility]; Future    Hypertension associated with diabetes    BMI 29.9,adult down to 28.3    Aortic valve stenosis, mild- moderate, MARI 1.44 1/16  -     Pharmacological stress test w/ [facility]; Future    LVH (left ventricular hypertrophy)  -     Pharmacological stress test w/ [facility]; Future    COPD (chronic obstructive pulmonary disease) with chronic bronchitis, 2013    Abnormal ECG  -     Pharmacological stress test w/ [facility]; Future    - Doing well from CV standpoint, continue current RX.  - Will recheck Echo, phone review / [facility]  - Need good exercise program, 4 key elements: 1. Aerobic (walking, swimming, dancing, jogging, biking, etc, " 2. Muscle strengthening / resistance exercise, need to do 2-3 times weekly, 3. Stretching daily, good stretch takes a whole  total minute. 4. Balance exercise daily.  - Recommend at least annual cardiovascular evaluation in view of her significant risk factors.    Chronic pain syndrome - improved    Fatigue - improved    DM type 2 (diabetes mellitus, type 2)    Near syncope - greatly improved    CKD (chronic kidney disease) stage 3, GFR 30-59 ml/min    Near syncope - suspect vasovagal, improved after steroid Rx  - Maintain good hydration, especially while on diuretics   - Demonstrated the hugging manuvers     Abdominal obesity    Patient Active Problem List   Diagnosis    Hypertension associated with diabetes    Mixed hyperlipidemia, baseline     Lumbar stenosis    Chronic pain syndrome    Lumbosacral spondylosis without myelopathy    Adrenal adenoma: 2.8 X 2.1CM 2010; stable 2016    Gout attack - Right Foot    Intercritical gout - Left Foot    BMI 29.9,adult down to 28.3    Abdominal obesity    Lymph edema, right arm    Peripheral edema    Gallbladder polyp    CKD (chronic kidney disease) stage 3, GFR 30-59 ml/min    Aortic valve stenosis, mild- moderate, MARI 1.44 1/16    LVH (left ventricular hypertrophy)    Pancreatic cyst    FISCHER (dyspnea on exertion)    Degeneration of lumbar or lumbosacral intervertebral disc    DDD (degenerative disc disease), cervical    Cervical spinal stenosis    Type 2 diabetes mellitus without complication, without long-term current use of insulin    Acquired hypothyroidism    Transient cerebral ischemia: 2012    Primary insomnia    Gastroesophageal reflux disease without esophagitis    Multiple lung nodules: stable CT 10/2016    Former smoker: 1 pack daily for < 20 years, quit 1973    COPD (chronic obstructive pulmonary disease) with chronic bronchitis, 2013    Diverticulitis of large intestine    Essential hypertension    Hypokalemia     Tachycardia    Facet hypertrophy of lumbar region    History of breast cancer    Intermittent asthma    Radiation fibrosis of lung    Bronchiectasis without complication    Ectatic abdominal aorta: see CT scan 10/16    Aortic atherosclerosis: see CT scan 2016    Renal cyst, right: stable per CT 2016    Lumbar radiculopathy    Abnormal ECG     Total face-to-face time with the patient was 25 minutes and greater than 50% was spent in counseling and coordination of care. The above assessment and plan have been discussed at length. Referring physician's note reviewed. Labs and procedure over the last 6 months reviewed. Problem List updated. Asked to bring in all active medications / pills bottles with next visit.

## 2017-02-13 ENCOUNTER — OFFICE VISIT (OUTPATIENT)
Dept: FAMILY MEDICINE | Facility: CLINIC | Age: 82
End: 2017-02-13
Payer: MEDICARE

## 2017-02-13 VITALS
BODY MASS INDEX: 28.39 KG/M2 | OXYGEN SATURATION: 96 % | TEMPERATURE: 98 F | WEIGHT: 160.25 LBS | DIASTOLIC BLOOD PRESSURE: 92 MMHG | HEIGHT: 63 IN | SYSTOLIC BLOOD PRESSURE: 152 MMHG | HEART RATE: 83 BPM

## 2017-02-13 DIAGNOSIS — B02.9 HERPES ZOSTER WITHOUT COMPLICATION: Primary | ICD-10-CM

## 2017-02-13 PROCEDURE — 3080F DIAST BP >= 90 MM HG: CPT | Mod: S$GLB,,, | Performed by: FAMILY MEDICINE

## 2017-02-13 PROCEDURE — 99214 OFFICE O/P EST MOD 30 MIN: CPT | Mod: S$GLB,,, | Performed by: FAMILY MEDICINE

## 2017-02-13 PROCEDURE — 1157F ADVNC CARE PLAN IN RCRD: CPT | Mod: S$GLB,,, | Performed by: FAMILY MEDICINE

## 2017-02-13 PROCEDURE — 99999 PR PBB SHADOW E&M-EST. PATIENT-LVL III: CPT | Mod: PBBFAC,,, | Performed by: FAMILY MEDICINE

## 2017-02-13 PROCEDURE — 1160F RVW MEDS BY RX/DR IN RCRD: CPT | Mod: S$GLB,,, | Performed by: FAMILY MEDICINE

## 2017-02-13 PROCEDURE — 1159F MED LIST DOCD IN RCRD: CPT | Mod: S$GLB,,, | Performed by: FAMILY MEDICINE

## 2017-02-13 PROCEDURE — 3077F SYST BP >= 140 MM HG: CPT | Mod: S$GLB,,, | Performed by: FAMILY MEDICINE

## 2017-02-13 PROCEDURE — 1126F AMNT PAIN NOTED NONE PRSNT: CPT | Mod: S$GLB,,, | Performed by: FAMILY MEDICINE

## 2017-02-13 RX ORDER — VALACYCLOVIR HYDROCHLORIDE 1 G/1
1000 TABLET, FILM COATED ORAL 3 TIMES DAILY
Qty: 21 TABLET | Refills: 0 | Status: SHIPPED | OUTPATIENT
Start: 2017-02-13 | End: 2017-08-01

## 2017-02-13 NOTE — PROGRESS NOTES
Subjective:       Patient ID: Olga Aguiar is a 81 y.o. female.    Chief Complaint: Rash    Rash   This is a new problem. Episode onset: 2 days ago. The problem is unchanged. The affected locations include the abdomen. The rash is characterized by blistering, pain and redness. Pertinent negatives include no cough, fever or shortness of breath. Past treatments include nothing. Her past medical history is significant for varicella.     Review of Systems   Constitutional: Negative for fever.   Respiratory: Negative for cough and shortness of breath.    Cardiovascular: Negative for chest pain.   Gastrointestinal: Negative for abdominal pain and nausea.   Skin: Positive for rash.   All other systems reviewed and are negative.      Objective:      Physical Exam   Constitutional: She appears well-developed. No distress.   Eyes: Conjunctivae are normal. Pupils are equal, round, and reactive to light.   Cardiovascular: Normal rate and regular rhythm.    No murmur heard.  Pulmonary/Chest: Effort normal and breath sounds normal.   Neurological: She is alert.   Interactive   Skin: Skin is warm and dry.            Assessment:       1. Herpes zoster without complication        Plan:         Olga was seen today for rash.    Diagnoses and all orders for this visit:    Herpes zoster without complication  -     valacyclovir (VALTREX) 1000 MG tablet; Take 1 tablet (1,000 mg total) by mouth 3 (three) times daily.

## 2017-03-01 ENCOUNTER — OFFICE VISIT (OUTPATIENT)
Dept: PAIN MEDICINE | Facility: CLINIC | Age: 82
End: 2017-03-01
Payer: MEDICARE

## 2017-03-01 ENCOUNTER — TELEPHONE (OUTPATIENT)
Dept: PAIN MEDICINE | Facility: CLINIC | Age: 82
End: 2017-03-01

## 2017-03-01 VITALS
BODY MASS INDEX: 28.94 KG/M2 | HEART RATE: 74 BPM | RESPIRATION RATE: 18 BRPM | WEIGHT: 163.38 LBS | SYSTOLIC BLOOD PRESSURE: 130 MMHG | DIASTOLIC BLOOD PRESSURE: 70 MMHG | TEMPERATURE: 98 F

## 2017-03-01 DIAGNOSIS — R26.81 GAIT INSTABILITY: ICD-10-CM

## 2017-03-01 DIAGNOSIS — M47.816 FACET HYPERTROPHY OF LUMBAR REGION: ICD-10-CM

## 2017-03-01 DIAGNOSIS — M54.16 LUMBAR RADICULOPATHY: Primary | ICD-10-CM

## 2017-03-01 DIAGNOSIS — M54.16 LUMBAR RADICULOPATHY: ICD-10-CM

## 2017-03-01 DIAGNOSIS — M48.061 LUMBAR STENOSIS: Primary | ICD-10-CM

## 2017-03-01 DIAGNOSIS — M51.36 DDD (DEGENERATIVE DISC DISEASE), LUMBAR: ICD-10-CM

## 2017-03-01 PROCEDURE — 1159F MED LIST DOCD IN RCRD: CPT | Mod: S$GLB,,, | Performed by: PHYSICIAN ASSISTANT

## 2017-03-01 PROCEDURE — 99999 PR PBB SHADOW E&M-EST. PATIENT-LVL IV: CPT | Mod: PBBFAC,,, | Performed by: PHYSICIAN ASSISTANT

## 2017-03-01 PROCEDURE — 3078F DIAST BP <80 MM HG: CPT | Mod: S$GLB,,, | Performed by: PHYSICIAN ASSISTANT

## 2017-03-01 PROCEDURE — 1157F ADVNC CARE PLAN IN RCRD: CPT | Mod: S$GLB,,, | Performed by: PHYSICIAN ASSISTANT

## 2017-03-01 PROCEDURE — 3075F SYST BP GE 130 - 139MM HG: CPT | Mod: S$GLB,,, | Performed by: PHYSICIAN ASSISTANT

## 2017-03-01 PROCEDURE — 1125F AMNT PAIN NOTED PAIN PRSNT: CPT | Mod: S$GLB,,, | Performed by: PHYSICIAN ASSISTANT

## 2017-03-01 PROCEDURE — 99214 OFFICE O/P EST MOD 30 MIN: CPT | Mod: S$GLB,,, | Performed by: PHYSICIAN ASSISTANT

## 2017-03-01 PROCEDURE — 1160F RVW MEDS BY RX/DR IN RCRD: CPT | Mod: S$GLB,,, | Performed by: PHYSICIAN ASSISTANT

## 2017-03-01 RX ORDER — ALPRAZOLAM 0.5 MG/1
1 TABLET, ORALLY DISINTEGRATING ORAL ONCE AS NEEDED
Status: CANCELLED | OUTPATIENT
Start: 2017-03-22 | End: 2017-03-22

## 2017-03-01 NOTE — TELEPHONE ENCOUNTER
Patient is scheduled for Transforaminal epidural steroid injection on 3/22 and will need to stop aspirin 7 days prior. Please advise.

## 2017-03-03 NOTE — PROGRESS NOTES
CC: Back and leg pain    HPI: The patient is a 81-year-old woman with a history of diabetes, previous breast CA, CVA and spinal stenosis who presents in referral from her primary care physician, Dr. Barger for back pain and leg pain.  She returns in follow-up today with worsening pain.  She states that she fell on December and feels like this has made her last injections wear off more quickly.  She complains of right greater than left low back pain, worse with activity and improved with rest.  She reports numbness in her right lateral thigh.  She denies weakness, bladder or bowel incontinence.  She reports that her pulmonologist has now put her on prednisone and she is taking anywhere from  mg per month.    Pain intervention history: She tried physical therapy about 1 year ago and it seemed to make her pain worse. Patient is status post right L3 and L4 transforaminal injection on 8/9/13 with 50% relief of low back and right leg pain.  She is status post bilateral L4 transforaminal epidural steroid injections on 9/27/13 and 11/1/13 with 10-20% relief.  She is status post radiofrequency ablation of the left L3, 4, 5 medial branch nerves on 5/5/14 with 50% relief of her left low back pain.  She is status post radiofrequency ablation of the right L3, 4 and 5 medial branch nerves on 6/16/14 with mild relief, however she reported significant more relief at a later visit.  She is status post bilateral L4 transforaminal epidural steroid injections on 9/24/14 with moderate relief.  She is status post bilateral L4 transforaminal epidural steroid injections on 1/7/15 with complete relief of her posterior thigh pain, moderate relief of her low back pain and minimal relief of her right lateral hip pain.  She is status post C7-T1 cervical interlaminar epidural steroid injection on 2/27/15 with greater than 50% relief.  She is status post bilateral L3, 4 and 5 medial branch radio frequency ablation on 7/29/16 with 0% relief,  however in the past it has taken longer than one month for this procedure to provide relief.  She is status post bilateral L4 transforaminal epidural steroid injections on 11/11/16 with not quite 50% relief.    ROS:She reports easy bruising, back pain and difficulty sleeping.  Balance of review of systems is negative.    Medical, surgical, family and social history reviewed elsewhere in record.    Medications/Allergies: See med card    Vitals:    03/01/17 1113   BP: 130/70   Pulse: 74   Resp: 18   Temp: 98.1 °F (36.7 °C)   TempSrc: Oral   Weight: 74.1 kg (163 lb 5.8 oz)   PainSc:   4   PainLoc: Back         Physical exam:  Gen: A and O x3, pleasant, well-groomed  Skin: No rashes or obvious lesions  HEENT: PERRLA  CVS: Regular rate and rhythm, normal S1 and S2, no murmurs.  Resp: Clear to auscultation bilaterally, no wheezes or rales.  Abdomen: Soft, NT/ND, normal bowel sounds present.  Musculoskeletal: able to stand and walk short distances without assistance, however uses a walker when out of the house.  She has disuse atrophy of the lumbar paraspinous muscles.    Neuro:  Upper extremities: 5/5 strength bilaterally   Lower extremities: 5/5 strength bilaterally  Reflexes: Brachioradialis 2+, Bicep 2+, Tricep 2+. Patellar 2+, Achilles 2+.  Sensory: Intact and symmetrical to light touch and pinprick in C2-T1 dermatomes bilaterally.  Intact and symmetrical to light touch and pinprick in L2-S1 dermatomes bilaterally, except for a small patch over her left lateral shin decreased with light touch and pinprick.    Lumbar spine:  Lumbar spine: ROM is moderately reduced with flexion extension and oblique extension with mild increased pain in low back with extension.  Terry's test is deferred.  Supine straight leg raise is negative bilaterally.   Internal and external rotation of the hip causes no increased pain on either side.  Myofascial exam: No tenderness to palpation across lumbar paraspinous  muscles.      Imagin13 MRI L-spine  T10-T11: There is severe disk desiccation and a moderate diffuse disk bulge and ligamentous hypertrophy. This is not complete evaluation of the thoracic spine, but there does appear to be mild central canal stenosis.  T11-T12: Moderate disk desiccation and mild diffuse disk bulge. Mild narrowing of the thecal sac. Neural foraminal regions difficult to assess due to the scoliosis.  T12-L1: Severe disk degenerative disease with marked desiccation, diffuse disk bulge, and spurring of vertebral bodies. Mild central canal stenosis. Neural foraminal narrowing bilaterally, difficult to grade due to scoliosis.  L1-L2: Advanced disk desiccation with moderate diffuse disk bulge and mild spurring of vertebral bodies. Small superimposed central to right paracentral disk extrusion. Mild central canal stenosis. Mild to moderate facet arthropathy and ligamentous hypertrophy. Bilateral foraminal stenosis.  L2 - L3: There is also disk desiccation and diffuse disk bulge and small annular fissure posteriorly. facet arthropathy and hypertrophy ligamentum flavum. Mild central canal stenosis. Moderate right and mild left neuroforaminal stenosis.  L3-L4: Disk desiccation and moderate diffuse disk bulge. Small right paracentral ascending disk extrusion and moderate facet arthropathy present bilaterally and hypertrophy of ligamentum flavum. In combination, the findings result in severe central canal stenosis. For example see axial image 22, central image 7. There is mild to moderate neural foraminal stenosis bilaterally.  L4 - L5: Mild anterolisthesis with disk desiccation and uncovering of the disk, with moderate right and severe left facet arthropathy and hypertrophy of ligamentum flavum. Severe central canal stenosis. Mild neural foraminal narrowing, right worse than left.  L5-S1: Disk desiccation and mild diffuse disk bulge. A small ascending disk extrusion centrally in the midline. No  significant central canal stenosis. Advanced facet arthropathy bilaterally, left worse than right. Mild left neural foraminal narrowing. No significant right neural foraminal stenosis.      Assessment:   The patient is a 81-year-old woman with a history of diabetes, previous breast CA, CVA and spinal stenosis who presents in referral from her primary care physician, Dr. Barger for back pain and leg pain.     1. Lumbar stenosis     2. Lumbar radiculopathy     3. Facet hypertrophy of lumbar region     4. DDD (degenerative disc disease), lumbar     5. Gait instability         Plan:  1.  Since her pain has been getting worse, I will schedule her to repeat bilateral L4 TF ANUEL's.  We discussed that her fall likely affected the longevity of the previous injections.  We had a discussion regarding steroids and she is now taking anywhere from  mg of steroids per month from her pulmonologist.  We discussed that an additional 80 mg of steroid with the injections is relatively a small amount compared to the total amount that she will have for the year.  She is aware of the risks of large amount of steroids and would like to have the injections.  2.  She will continue to take tramadol, currently does not need a prescription.  She also takes Tylenol with relief.  3.  Follow-up in 4 weeks post procedure or sooner as needed.

## 2017-03-15 ENCOUNTER — HOSPITAL ENCOUNTER (OUTPATIENT)
Dept: CARDIOLOGY | Facility: HOSPITAL | Age: 82
Discharge: HOME | End: 2017-03-15
Attending: INTERNAL MEDICINE
Payer: MEDICARE

## 2017-03-15 DIAGNOSIS — I35.0 AORTIC VALVE STENOSIS, MODERATE: ICD-10-CM

## 2017-03-15 DIAGNOSIS — R94.31 ABNORMAL ECG: ICD-10-CM

## 2017-03-15 DIAGNOSIS — I51.7 LVH (LEFT VENTRICULAR HYPERTROPHY): ICD-10-CM

## 2017-03-15 DIAGNOSIS — R06.09 DOE (DYSPNEA ON EXERTION): ICD-10-CM

## 2017-03-15 LAB
AORTIC VALVE REGURGITATION: ABNORMAL
DIASTOLIC DYSFUNCTION: NO
ESTIMATED PA SYSTOLIC PRESSURE: 11.56
GLOBAL PERICARDIAL EFFUSION: ABNORMAL
RETIRED EF AND QEF - SEE NOTES: 62 (ref 55–65)
TRICUSPID VALVE REGURGITATION: ABNORMAL

## 2017-03-15 PROCEDURE — 93320 DOPPLER ECHO COMPLETE: CPT | Mod: 26,,, | Performed by: INTERNAL MEDICINE

## 2017-03-15 PROCEDURE — 93351 STRESS TTE COMPLETE: CPT

## 2017-03-15 PROCEDURE — 93351 STRESS TTE COMPLETE: CPT | Mod: 26,,, | Performed by: INTERNAL MEDICINE

## 2017-03-15 PROCEDURE — 93325 DOPPLER ECHO COLOR FLOW MAPG: CPT | Mod: 26,,, | Performed by: INTERNAL MEDICINE

## 2017-03-15 PROCEDURE — 25000003 PHARM REV CODE 250

## 2017-03-15 PROCEDURE — 63600175 PHARM REV CODE 636 W HCPCS

## 2017-03-15 RX ORDER — ATROPINE SULFATE 0.1 MG/ML
INJECTION INTRAVENOUS
Status: DISCONTINUED
Start: 2017-03-15 | End: 2017-03-15 | Stop reason: WASHOUT

## 2017-03-15 RX ORDER — DOBUTAMINE HYDROCHLORIDE 200 MG/100ML
INJECTION INTRAVENOUS
Status: DISPENSED
Start: 2017-03-15 | End: 2017-03-15

## 2017-03-15 RX ORDER — METOPROLOL TARTRATE 1 MG/ML
INJECTION, SOLUTION INTRAVENOUS
Status: DISPENSED
Start: 2017-03-15 | End: 2017-03-16

## 2017-03-20 ENCOUNTER — TELEPHONE (OUTPATIENT)
Dept: PAIN MEDICINE | Facility: CLINIC | Age: 82
End: 2017-03-20

## 2017-03-20 NOTE — TELEPHONE ENCOUNTER
Spoke with patient's . He stated patient thought her procedure was next month. Last dose of  mg was 3/17. Procedure date is 3/22. Please advise if patient needs to reschedule.

## 2017-03-20 NOTE — TELEPHONE ENCOUNTER
Spoke with patient's . Patient rescheduled to 4/4 with 4 week f/u rescheduled. Patient advised to stop asa on 3/27.

## 2017-03-20 NOTE — TELEPHONE ENCOUNTER
----- Message from Allyson Gonzales sent at 3/20/2017 11:55 AM CDT -----  Contact: isabel Villagran on dates of injections  Stopped Asprin  Friday 03 17 2017 PM.   Please advise   Call back  or her cell    [phone]

## 2017-03-29 ENCOUNTER — LAB VISIT (OUTPATIENT)
Dept: LAB | Facility: HOSPITAL | Age: 82
End: 2017-03-29
Attending: INTERNAL MEDICINE
Payer: MEDICARE

## 2017-03-29 DIAGNOSIS — E03.9 ACQUIRED HYPOTHYROIDISM: ICD-10-CM

## 2017-03-29 DIAGNOSIS — E11.9 TYPE 2 DIABETES MELLITUS WITHOUT COMPLICATION, WITHOUT LONG-TERM CURRENT USE OF INSULIN: ICD-10-CM

## 2017-03-29 DIAGNOSIS — N18.30 CKD (CHRONIC KIDNEY DISEASE) STAGE 3, GFR 30-59 ML/MIN: ICD-10-CM

## 2017-03-29 LAB
ALBUMIN SERPL BCP-MCNC: 3.4 G/DL
ALP SERPL-CCNC: 67 U/L
ALT SERPL W/O P-5'-P-CCNC: 23 U/L
ANION GAP SERPL CALC-SCNC: 12 MMOL/L
AST SERPL-CCNC: 27 U/L
BILIRUB SERPL-MCNC: 0.4 MG/DL
BUN SERPL-MCNC: 23 MG/DL
CALCIUM SERPL-MCNC: 9.7 MG/DL
CHLORIDE SERPL-SCNC: 100 MMOL/L
CHOLEST/HDLC SERPL: 4.5 {RATIO}
CO2 SERPL-SCNC: 27 MMOL/L
CREAT SERPL-MCNC: 1.3 MG/DL
EST. GFR  (AFRICAN AMERICAN): 44.1 ML/MIN/1.73 M^2
EST. GFR  (NON AFRICAN AMERICAN): 38.3 ML/MIN/1.73 M^2
GLUCOSE SERPL-MCNC: 123 MG/DL
HDL/CHOLESTEROL RATIO: 22.3 %
HDLC SERPL-MCNC: 188 MG/DL
HDLC SERPL-MCNC: 42 MG/DL
LDLC SERPL CALC-MCNC: 100.4 MG/DL
NONHDLC SERPL-MCNC: 146 MG/DL
POTASSIUM SERPL-SCNC: 3.9 MMOL/L
PROT SERPL-MCNC: 6.7 G/DL
SODIUM SERPL-SCNC: 139 MMOL/L
TRIGL SERPL-MCNC: 228 MG/DL
TSH SERPL DL<=0.005 MIU/L-ACNC: 1.7 UIU/ML

## 2017-03-29 PROCEDURE — 36415 COLL VENOUS BLD VENIPUNCTURE: CPT | Mod: PO

## 2017-03-29 PROCEDURE — 80053 COMPREHEN METABOLIC PANEL: CPT

## 2017-03-29 PROCEDURE — 80061 LIPID PANEL: CPT

## 2017-03-29 PROCEDURE — 83036 HEMOGLOBIN GLYCOSYLATED A1C: CPT

## 2017-03-29 PROCEDURE — 84443 ASSAY THYROID STIM HORMONE: CPT

## 2017-03-30 LAB
ESTIMATED AVG GLUCOSE: 163 MG/DL
HBA1C MFR BLD HPLC: 7.3 %

## 2017-03-31 ENCOUNTER — TELEPHONE (OUTPATIENT)
Dept: INTERNAL MEDICINE | Facility: CLINIC | Age: 82
End: 2017-03-31

## 2017-03-31 ENCOUNTER — PATIENT MESSAGE (OUTPATIENT)
Dept: INTERNAL MEDICINE | Facility: CLINIC | Age: 82
End: 2017-03-31

## 2017-04-04 ENCOUNTER — HOSPITAL ENCOUNTER (OUTPATIENT)
Facility: HOSPITAL | Age: 82
Discharge: HOME | End: 2017-04-04
Attending: ANESTHESIOLOGY | Admitting: ANESTHESIOLOGY
Payer: MEDICARE

## 2017-04-04 ENCOUNTER — SURGERY (OUTPATIENT)
Age: 82
End: 2017-04-04

## 2017-04-04 ENCOUNTER — HOSPITAL ENCOUNTER (OUTPATIENT)
Dept: RADIOLOGY | Facility: HOSPITAL | Age: 82
Discharge: HOME | End: 2017-04-04
Attending: ANESTHESIOLOGY | Admitting: ANESTHESIOLOGY
Payer: MEDICARE

## 2017-04-04 VITALS
BODY MASS INDEX: 27.46 KG/M2 | DIASTOLIC BLOOD PRESSURE: 65 MMHG | TEMPERATURE: 98 F | HEIGHT: 63 IN | RESPIRATION RATE: 18 BRPM | WEIGHT: 155 LBS | OXYGEN SATURATION: 96 % | SYSTOLIC BLOOD PRESSURE: 133 MMHG | HEART RATE: 60 BPM

## 2017-04-04 DIAGNOSIS — M54.16 LUMBAR RADICULOPATHY: ICD-10-CM

## 2017-04-04 DIAGNOSIS — M51.36 DDD (DEGENERATIVE DISC DISEASE), LUMBAR: ICD-10-CM

## 2017-04-04 PROCEDURE — 25500020 PHARM REV CODE 255: Mod: PO | Performed by: ANESTHESIOLOGY

## 2017-04-04 PROCEDURE — 25000003 PHARM REV CODE 250: Mod: PO | Performed by: ANESTHESIOLOGY

## 2017-04-04 PROCEDURE — 64483 NJX AA&/STRD TFRM EPI L/S 1: CPT | Mod: 50,,, | Performed by: ANESTHESIOLOGY

## 2017-04-04 PROCEDURE — 64483 NJX AA&/STRD TFRM EPI L/S 1: CPT | Mod: 50,PO | Performed by: ANESTHESIOLOGY

## 2017-04-04 PROCEDURE — 63600175 PHARM REV CODE 636 W HCPCS: Mod: PO | Performed by: ANESTHESIOLOGY

## 2017-04-04 RX ORDER — ALPRAZOLAM 0.5 MG/1
1 TABLET, ORALLY DISINTEGRATING ORAL ONCE AS NEEDED
Status: COMPLETED | OUTPATIENT
Start: 2017-04-04 | End: 2017-04-04

## 2017-04-04 RX ORDER — METHYLPREDNISOLONE ACETATE 80 MG/ML
INJECTION, SUSPENSION INTRA-ARTICULAR; INTRALESIONAL; INTRAMUSCULAR; SOFT TISSUE
Status: DISCONTINUED | OUTPATIENT
Start: 2017-04-04 | End: 2017-04-04 | Stop reason: HOSPADM

## 2017-04-04 RX ORDER — BUPIVACAINE HYDROCHLORIDE 2.5 MG/ML
INJECTION, SOLUTION EPIDURAL; INFILTRATION; INTRACAUDAL
Status: DISCONTINUED | OUTPATIENT
Start: 2017-04-04 | End: 2017-04-04 | Stop reason: HOSPADM

## 2017-04-04 RX ORDER — LIDOCAINE HYDROCHLORIDE 10 MG/ML
INJECTION INFILTRATION; PERINEURAL
Status: DISCONTINUED | OUTPATIENT
Start: 2017-04-04 | End: 2017-04-04 | Stop reason: HOSPADM

## 2017-04-04 RX ADMIN — IOHEXOL 3 ML: 300 INJECTION, SOLUTION INTRAVENOUS at 01:04

## 2017-04-04 RX ADMIN — BUPIVACAINE HYDROCHLORIDE 3 ML: 2.5 INJECTION, SOLUTION EPIDURAL; INFILTRATION; INTRACAUDAL; PERINEURAL at 01:04

## 2017-04-04 RX ADMIN — ALPRAZOLAM 1 MG: 0.5 TABLET, ORALLY DISINTEGRATING ORAL at 12:04

## 2017-04-04 RX ADMIN — METHYLPREDNISOLONE ACETATE 80 MG: 80 INJECTION, SUSPENSION INTRA-ARTICULAR; INTRALESIONAL; INTRAMUSCULAR; SOFT TISSUE at 01:04

## 2017-04-04 RX ADMIN — LIDOCAINE HYDROCHLORIDE 10 ML: 10 INJECTION, SOLUTION INFILTRATION; PERINEURAL at 01:04

## 2017-04-04 NOTE — DISCHARGE SUMMARY
[facility]  Discharge Note  Short Stay    Admit Date: 4/4/2017    Discharge Date: 4/4/2017    Attending Physician: Rohan Ware MD     Discharge Provider: Rohan Ware    Diagnoses:  Active Hospital Problems    Diagnosis  POA    *Lumbar radiculopathy [M54.16]  Yes      Resolved Hospital Problems    Diagnosis Date Resolved POA   No resolved problems to display.       Discharged Condition: good    Final Diagnoses: Lumbar radiculopathy [M54.16]    Disposition: Home or Self Care    Hospital Course: no complications, uneventful    Outcome of Hospitalization, Treatment, Procedure, or Surgery:  Patient was admitted for outpatient procedure. The patient underwent procedure without complications and are discharged home    Follow up/Patient Instructions:  Follow up as scheduled/Patient has received instructions and follow up date    Medications:  Continue previous medications      Discharge Procedure Orders  Diet general     Diet general     Activity as tolerated     Call MD for:  temperature >100.4     Call MD for:  severe uncontrolled pain     Call MD for:  redness, tenderness, or signs of infection (pain, swelling, redness, odor or green/yellow discharge around incision site)     Call MD for:  severe persistent headache     No dressing needed     Activity as tolerated     Call MD for:  temperature >100.4     Call MD for:  severe uncontrolled pain     Call MD for:  redness, tenderness, or signs of infection (pain, swelling, redness, odor or green/yellow discharge around incision site)     Call MD for:  severe persistent headache     No dressing needed           Discharge Procedure Orders (must include Diet, Follow-up, Activity):    Discharge Procedure Orders (must include Diet, Follow-up, Activity)  Diet general     Diet general     Activity as tolerated     Call MD for:  temperature >100.4     Call MD for:  severe uncontrolled pain     Call MD for:  redness, tenderness, or signs of infection (pain,  swelling, redness, odor or green/yellow discharge around incision site)     Call MD for:  severe persistent headache     No dressing needed     Activity as tolerated     Call MD for:  temperature >100.4     Call MD for:  severe uncontrolled pain     Call MD for:  redness, tenderness, or signs of infection (pain, swelling, redness, odor or green/yellow discharge around incision site)     Call MD for:  severe persistent headache     No dressing needed

## 2017-04-04 NOTE — PLAN OF CARE
Problem: Patient Care Overview  Goal: Individualization & Mutuality  Outcome: Outcome(s) achieved Date Met:  04/04/17  Has met unit/department guidelines for discharge from each phase of the post procedure continuum

## 2017-04-04 NOTE — OP NOTE
PROCEDURE DATE: 4/4/2017    PROCEDURE: Bilateral L4/5 transforaminal epidural steroid injection under fluoroscopy    DIAGNOSIS: Lumbar  Radiculopathy    Post op diagnosis: Same    PHYSICIAN: Rohan Ware MD    MEDICATIONS INJECTED:  Methylprednisolone 40mg (0.5ml) and 1.5ml 0.25% bupivicaine at each nerve root.     LOCAL ANESTHETIC INJECTED:  Lidocaine 1%. 4 ml per site.    SEDATION MEDICATIONS: none    ESTIMATED BLOOD LOSS:  none    COMPLICATIONS:  none    TECHNIQUE:   A time-out was taken to identify patient and procedure side prior to starting the procedure. The patient was placed in a prone position, prepped and draped in the usual sterile fashion using ChloraPrep and sterile towels.  The area to be injected was determined under fluoroscopic guidance in AP and oblique view.  Local anesthetic was given by raising a wheal and going down to the hub of a 25-gauge 1.5 inch needle.  In oblique view, a 5 inch 22-gauge bent-tip spinal needle was introduced towards 6 oclock position of the pedicle of each above named nerve root level.  The needle was walked medially then hinged into the neural foramen and position was confirmed in AP and lateral views.  Omnipaque contrast dye was injected to confirm appropriate placement and that there was no vascular uptake.  After negative aspiration for blood or CSF, the medication was then injected. This was performed at the right and then left L4/5 level(s). The patient tolerated the procedure well.    The patient was monitored after the procedure.  Patient was given post procedure and discharge instructions to follow at home. The patient was discharged in a stable condition.

## 2017-04-04 NOTE — IP AVS SNAPSHOT
[facility]  [address]  [place] [address]  Phone: [phone]           Patient Discharge Instructions   Our goal is to set you up for success. This packet includes information on your condition, medications, and your home care.  It will help you care for yourself to prevent having to return to the hospital.     Please ask your nurse if you have any questions.      There are many details to remember when preparing to leave the hospital. Here is what you will need to do:    1. Take your medicine. If you are prescribed medications, review your Medication List on the following pages. You may have new medications to  at the pharmacy and others that you'll need to stop taking. Review the instructions for how and when to take your medications. Talk with your doctor or nurses if you are unsure of what to do.     2. Go to your follow-up appointments. Specific follow-up information is listed in the following pages. Your may be contacted by a nurse or clinical provider about future appointments. Be sure we have all of the phone numbers to reach you. Please contact your provider's office if you are unable to make an appointment.     3. Watch for warning signs. Your doctor or nurse will give you detailed warning signs to watch for and when to call for assistance. These instructions may also include educational information about your condition. If you experience any of warning signs to your health, call your doctor.           [facility]  Unless otherwise directed by your provider, please   contact [facility], our nurse care line   that is available for 24/7 assistance.     [phone] (toll-free)     Registered nurses in the [facility]   provide: appointment scheduling, clinical advisement, health education, and other advisory services.                  ** Verify the list of medication(s) below is accurate and up to date. Carry this with you in case of  emergency. If your medications have changed, please notify your healthcare provider.             Medication List      CONTINUE taking these medications        Additional Info                      acetaminophen 500 MG tablet   Commonly known as:  TYLENOL   Refills:  0   Dose:  1000 mg    Instructions:  Take 1,000 mg by mouth every 6 (six) hours as needed.     Begin Date    AM    Noon    PM    Bedtime       aspirin 325 MG tablet   Refills:  0   Dose:  1 tablet    Instructions:  Take 1 tablet by mouth Daily.     Begin Date    AM    Noon    PM    Bedtime       cholecalciferol (vitamin D3) 2,000 unit Cap   Refills:  0   Dose:  2000 Units    Instructions:  Take 2,000 Units by mouth Daily.     Begin Date    AM    Noon    PM    Bedtime       fluticasone-vilanterol 200-25 mcg/dose Dsdv diskus inhaler   Commonly known as:  BREO ELLIPTA   Quantity:  3 each   Refills:  3   Dose:  1 puff    Instructions:  Inhale 1 puff into the lungs once daily.     Begin Date    AM    Noon    PM    Bedtime       levalbuterol 45 mcg/actuation inhaler   Commonly known as:  XOPENEX HFA   Quantity:  1 Inhaler   Refills:  12   Dose:  2 puff    Instructions:  Inhale 2 puffs into the lungs every 8 (eight) hours as needed for Wheezing.     Begin Date    AM    Noon    PM    Bedtime       levothyroxine 75 MCG tablet   Commonly known as:  SYNTHROID   Refills:  0   Dose:  75 mcg    Instructions:  Take 75 mcg by mouth once daily.     Begin Date    AM    Noon    PM    Bedtime       montelukast 10 mg tablet   Commonly known as:  SINGULAIR   Quantity:  30 tablet   Refills:  5   Dose:  10 mg    Instructions:  Take 1 tablet (10 mg total) by mouth every evening.     Begin Date    AM    Noon    PM    Bedtime       omeprazole 40 MG capsule   Commonly known as:  PRILOSEC   Quantity:  90 capsule   Refills:  3   Dose:  40 mg    Instructions:  Take 1 capsule (40 mg total) by mouth every morning.     Begin Date    AM    Noon    PM    Bedtime       predniSONE 20 MG  tablet   Commonly known as:  DELTASONE   Quantity:  12 tablet   Refills:  2    Instructions:  One daily for   3 days and repeat as needed.     Begin Date    AM    Noon    PM    Bedtime       simvastatin 40 MG tablet   Commonly known as:  ZOCOR   Quantity:  90 tablet   Refills:  3   Dose:  40 mg    Instructions:  Take 1 tablet (40 mg total) by mouth every evening.     Begin Date    AM    Noon    PM    Bedtime       sucralfate 1 gram tablet   Commonly known as:  CARAFATE   Quantity:  180 tablet   Refills:  3   Dose:  1 g    Instructions:  Take 1 tablet (1 g total) by mouth 2 (two) times daily.     Begin Date    AM    Noon    PM    Bedtime       tramadol 50 mg tablet   Commonly known as:  ULTRAM   Quantity:  60 tablet   Refills:  2   Dose:  50 mg    Instructions:  Take 1 tablet (50 mg total) by mouth 2 (two) times daily as needed for Pain.     Begin Date    AM    Noon    PM    Bedtime       triamterene-hydrochlorothiazide 37.5-25 mg 37.5-25 mg per capsule   Commonly known as:  DYAZIDE   Refills:  0   Dose:  1 capsule    Instructions:  Take 1 capsule by mouth every morning.     Begin Date    AM    Noon    PM    Bedtime       umeclidinium 62.5 mcg/actuation Dsdv   Commonly known as:  INCRUSE ELLIPTA   Quantity:  3 each   Refills:  3   Dose:  1 Inhaler    Instructions:  Inhale 1 Inhaler into the lungs once daily.     Begin Date    AM    Noon    PM    Bedtime       valacyclovir 1000 MG tablet   Commonly known as:  VALTREX   Quantity:  21 tablet   Refills:  0   Dose:  1000 mg    Instructions:  Take 1 tablet (1,000 mg total) by mouth 3 (three) times daily.     Begin Date    AM    Noon    PM    Bedtime       zolpidem 10 mg Tab   Commonly known as:  AMBIEN   Quantity:  90 tablet   Refills:  0   Dose:  10 mg    Instructions:  Take 1 tablet (10 mg total) by mouth nightly as needed.     Begin Date    AM    Noon    PM    Bedtime                  Please bring to all follow up appointments:    1. A copy of your discharge  instructions.  2. All medicines you are currently taking in their original bottles.  3. Identification and insurance card.    Please arrive 15 minutes ahead of scheduled appointment time.    Please call 24 hours in advance if you must reschedule your appointment and/or time.        Your Scheduled Appointments     Apr 04, 2017  3:00 PM CDT   Fl For Pain Management with [facility]   [facility] [facility] [place]    [address]  [place] [address]   [phone]            Apr 07, 2017  1:20 PM CDT   Established Patient Visit with MD Carl England - Pulmonary ([facility] [place] [facility])    [address]  [place] [address]   [phone]            Apr 12, 2017  1:30 PM CDT   Physical with MD Carl Bey - Gastroenterology ([facility] [place] [facility])    [address]  [place] [address]   [phone]            May 02, 2017 11:30 AM CDT   Established Patient Visit with EVERETT Lott   [place] - Pain Management ([facility] [place])    [address] [place] [address]   [phone]                Discharge Instructions     Future Orders    Activity as tolerated     Activity as tolerated     Call MD for:  redness, tenderness, or signs of infection (pain, swelling, redness, odor or green/yellow discharge around incision site)     Call MD for:  redness, tenderness, or signs of infection (pain, swelling, redness, odor or green/yellow discharge around incision site)     Call MD for:  severe persistent headache     Call MD for:  severe persistent headache     Call MD for:  severe uncontrolled pain     Call MD for:  severe uncontrolled pain     Call MD for:  temperature >100.4     Call MD for:  temperature >100.4     Diet general     Questions:    Total calories:      Fat restriction, if any:      Protein restriction, if any:      Na restriction, if any:      Fluid restriction:      Additional restrictions:       "Diet general     Questions:    Total calories:      Fat restriction, if any:      Protein restriction, if any:      Na restriction, if any:      Fluid restriction:      Additional restrictions:      No dressing needed     No dressing needed         Discharge Instructions       Home care instructions  Apply ice pack to the injection site for 20 minutes periods for the first 24 hrs for soreness/discomfort at injection site DO NOT USE HEAT FOR 24 HOURS  Keep site clean and dry for 24 hours, remove bandaid when desired  Do not drive until tomorrow  Take care when walking after a lumbar injection  Avoid strenuous activities for 2 days  Make take 2 weeks to feel the full effects   Resume home medication as prescribed today  Resume Aspirin, Plavix, or Coumadin the day after the procedure unless otherwise instructed.    SEE IMMEDIATE MEDICAL HELP FOR:  Severe increase in your usual pain or appearance of new pain  Prolonged or increasing weakness or numbness in the legs or arms  Drainage, redness, active bleeding, or increased swelling at the injection site  Temperature over 100.0 degrees F.  Headache that increases when your head is upright and decreases when you lie flat    CALL 911 OR GO DIRECTLY TO EMERGENCY DEPARTMENT FOR:  Shortness of breath, chest pain, or problems breathing      Primary Diagnosis     Your primary diagnosis was:  Lumbar Nerve Root Disorder      Admission Information     Date & Time Provider Department [facility]    4/4/2017 12:16 PM Rohan Ware MD [facility] [id number]      Care Providers     Provider Role Specialty Primary office phone    Rohan Ware MD Attending Provider Pain Medicine [phone]    Rohan Ware MD Surgeon  Pain Medicine [phone]      Your Vitals Were     BP Pulse Temp Resp Height Weight    133/65 (BP Location: Left arm, Patient Position: Lying, BP Method: Automatic) 60 97.8 °F (36.6 °C) (Skin) 18 5' 3" (1.6 m) 70.3 kg (155 lb)    SpO2 BMI    "          96% 27.46 kg/m2         Recent Lab Values        9/30/2013 3/31/2014 7/23/2014 9/2/2015 12/9/2015 3/16/2016 9/21/2016 3/29/2017      9:20 AM  8:57 AM  9:47 AM  9:42 AM  9:13 AM  8:57 AM 11:28 AM  9:15 AM    A1C 6.6 (H) 6.9 (H) 6.6 (H) 7.1 (H) 6.9 (H) 6.8 (H) 6.6 (H) 7.3 (H)        7.1 (H)        Comment for A1C at 11:28 AM on 9/21/2016:  According to ADA guidelines, hemoglobin A1C <7.0% represents  optimal control in non-pregnant diabetic patients.  Different  metrics may apply to specific populations.   Standards of Medical Care in Diabetes - 2016.  For the purpose of screening for the presence of diabetes:  <5.7%     Consistent with the absence of diabetes  5.7-6.4%  Consistent with increasing risk for diabetes   (prediabetes)  >or=6.5%  Consistent with diabetes  Currently no consensus exists for use of hemoglobin A1C  for diagnosis of diabetes for children.      Comment for A1C at  9:15 AM on 3/29/2017:  According to ADA guidelines, hemoglobin A1C <7.0% represents  optimal control in non-pregnant diabetic patients.  Different  metrics may apply to specific populations.   Standards of Medical Care in Diabetes - 2016.  For the purpose of screening for the presence of diabetes:  <5.7%     Consistent with the absence of diabetes  5.7-6.4%  Consistent with increasing risk for diabetes   (prediabetes)  >or=6.5%  Consistent with diabetes  Currently no consensus exists for use of hemoglobin A1C  for diagnosis of diabetes for children.        Allergies as of 4/4/2017        Reactions    Allopurinol Analogues Other (See Comments)    Headache.  This is more of a side effect than it is an allergic reaction.      Advance Directives     An advance directive is a document which, in the event you are no longer able to make decisions for yourself, tells your healthcare team what kind of treatment you do or do not want to receive, or who you would like to make those decisions for you.  If you do not currently have an  advance directive, [facility] encourages you to create one.  For more information call:  [phone]), [phone] ([phone]),  or log on to [email or web address].        Smoking Cessation     If you would like to quit smoking:   You may be eligible for free services if you are a [place] resident and started smoking cigarettes before September 1, 1988.  Call the Smoking Cessation Trust (SCT) toll free at (076) 788-6362 or [phone].   Call 0-759-QUIT-NOW if you do not meet the above criteria.   Contact us via email: [email or web address]   View our website for more information: [email or web address]        Language Assistance Services     ATTENTION: Language assistance services are available, free of charge. Please call [phone].      ATENCIÓN: Si habla español, tiene a butt disposición servicios gratuitos de asistencia lingüística. Llame al [phone].     CHÚ Ý: N?u b?n nói Ti?ng Vi?t, có các d?ch v? h? tr? ngôn ng? mi?n phí dành cho b?n. G?i s? [phone].         [facility] complies with applicable Federal civil rights laws and does not discriminate on the basis of race, color, national origin, age, disability, or sex.

## 2017-04-04 NOTE — H&P
CC: Back and leg apin    HPI: The patient is a 81yo with a history of bilateral lumbar radiculopathy here for bilateral L4 TFESI. There are no major changes in history and physical from 3/1/17.    Past Medical History:   Diagnosis Date    Adrenal adenoma: 2.8 X 2.1CM 2010 5/16/2014    Anticoagulant long-term use      mg    Aortic atherosclerosis: see CT scan 2016 10/28/2016    Aortic stenosis 8/19/2014    Asthma     Cancer     breast, both sides, right arm restriction    Cataracts, both eyes     Chronic back pain     Chronic bronchitis     CKD (chronic kidney disease) stage 3, GFR 30-59 ml/min 8/19/2014    no dialysis    COPD (chronic obstructive pulmonary disease)     occasional inhaler use, no oxygen    DDD (degenerative disc disease), lumbar 7/30/2013    Diabetes mellitus     diet controlled    Ectatic abdominal aorta: see CT scan 10/16 10/28/2016    Gallbladder polyp 8/19/2014    GERD (gastroesophageal reflux disease)     Gout     Herpes simplex     Fever Blisters and Styes in right eye    High blood cholesterol     Hypertension     holding medication when B/P low    Insomnia     Lumbar spinal stenosis     Lymphedema of arm     Right    Multiple lung nodules 9/21/2015    Renal cyst, right: stable per CT 2016 10/28/2016    Stroke 2/2012    TIA, no residual effects    Stye 12/5/2013    Thyroid disease     hypothyroidism    Unspecified hypothyroidism 7/20/2015       Past Surgical History:   Procedure Laterality Date    AXILLARY NODE DISSECTION      both sides    BREAST SURGERY      Bilateral lumpectomy    CATARACT EXTRACTION      bilateral PHACO with IOL    Epidural steroid injection      Pain mangement    HYSTERECTOMY      HYSTERECTOMY      Nerve Block Injection      Pain management, Times 2    RADIOFREQUENCY ABLATION      Nerve, Pain management    RECTAL SURGERY      Fissure repair    TONSILLECTOMY         Family History   Problem Relation Age of Onset    Cancer  "Mother      Breast    COPD Mother     Hearing loss Mother     Hypertension Mother     Diabetes Father     Heart disease Father     Heart attack Father     Cancer Daughter      Breast    Cancer Sister      Breast    Amblyopia Neg Hx     Blindness Neg Hx     Cataracts Neg Hx     Glaucoma Neg Hx     Macular degeneration Neg Hx     Retinal detachment Neg Hx     Strabismus Neg Hx     Stroke Neg Hx     Thyroid disease Neg Hx        Social History     Social History    Marital status:      Spouse name: N/A    Number of children: N/A    Years of education: N/A     Social History Main Topics    Smoking status: Former Smoker     Packs/day: 1.00     Years: 18.00     Start date: 2/20/1952     Quit date: 8/6/1970    Smokeless tobacco: Never Used    Alcohol use Yes      Comment: rare    Drug use: No    Sexual activity: Not Currently     Other Topics Concern    None     Social History Narrative       No current facility-administered medications for this encounter.        Review of patient's allergies indicates:   Allergen Reactions    Allopurinol analogues Other (See Comments)     Headache.  This is more of a side effect than it is an allergic reaction.       Vitals:    03/30/17 1624   Weight: 70.3 kg (155 lb)   Height: 5' 3" (1.6 m)       REVIEW OF SYSTEMS:     GENERAL: No weight loss, malaise or fevers.  HEENT:  No recent changes in vision or hearing  NECK: Negative for lumps, no difficulty with swallowing.  RESPIRATORY: Negative for cough, wheezing or shortness of breath, patient denies any recent URI.  CARDIOVASCULAR: Negative for chest pain, leg swelling or palpitations.  GI: Negative for abdominal discomfort, blood in stools or black stools or change in bowel habits.  MUSCULOSKELETAL: See HPI.  SKIN: Negative for lesions, rash, and itching.  PSYCH: No suicidal or homicidal ideations, no current mood disturbances.  HEMATOLOGY/LYMPHOLOGY: Negative for prolonged bleeding, bruising easily or " swollen nodes. Patient is not currently taking any anti-coagulants  ENDO: No history of diabetes or thyroid dysfunction  NEURO: No history of syncope, paralysis, seizures or tremors.All other reviewed and negative other than HPI.    Physical exam:  Gen: A and O x3, pleasant, well-groomed  Skin: No rashes or obvious lesions  HEENT: PERRLA, no obvious deformities on ears or in canals. No thyroid masses, trachea midline, no palpable lymph nodes in neck, axilla.  CVS: Regular rate and rhythm, normal S1 and S2, no murmurs.  Resp: Clear to auscultation bilaterally.  Abdomen: Soft, NT/ND, normal bowel sounds present.  Musculoskeletal/Neuro: Moving all extremities    Assessment:  Lumbar radiculopathy  -     Case Request Operating Room: ANUEL-TRANSFORAMINAL L4  -     Activity as tolerated; Standing  -     Place in Outpatient; Standing  -     Diet NPO; Standing  -     alprazolam ODT dissolvable tablet 1 mg; Take 2 tablets (1 mg total) by mouth once as needed for Anxiety.  -     Notify physician ; Standing  -     Notify physician ; Standing  -     Notify physician (specify); Standing  -     Verify informed consent; Standing  -     Vital signs; Standing

## 2017-04-07 ENCOUNTER — OFFICE VISIT (OUTPATIENT)
Dept: PULMONOLOGY | Facility: CLINIC | Age: 82
End: 2017-04-07
Payer: MEDICARE

## 2017-04-07 VITALS
HEIGHT: 63 IN | OXYGEN SATURATION: 96 % | SYSTOLIC BLOOD PRESSURE: 156 MMHG | DIASTOLIC BLOOD PRESSURE: 84 MMHG | BODY MASS INDEX: 28.82 KG/M2 | WEIGHT: 162.69 LBS | HEART RATE: 67 BPM

## 2017-04-07 DIAGNOSIS — J47.9 BRONCHIECTASIS WITHOUT COMPLICATION: ICD-10-CM

## 2017-04-07 DIAGNOSIS — J45.20 INTERMITTENT ASTHMA, UNCOMPLICATED: ICD-10-CM

## 2017-04-07 PROCEDURE — 99499 UNLISTED E&M SERVICE: CPT | Mod: S$GLB,,, | Performed by: INTERNAL MEDICINE

## 2017-04-07 PROCEDURE — 99213 OFFICE O/P EST LOW 20 MIN: CPT | Mod: S$GLB,,, | Performed by: INTERNAL MEDICINE

## 2017-04-07 PROCEDURE — 1160F RVW MEDS BY RX/DR IN RCRD: CPT | Mod: S$GLB,,, | Performed by: INTERNAL MEDICINE

## 2017-04-07 PROCEDURE — 1157F ADVNC CARE PLAN IN RCRD: CPT | Mod: S$GLB,,, | Performed by: INTERNAL MEDICINE

## 2017-04-07 PROCEDURE — 1159F MED LIST DOCD IN RCRD: CPT | Mod: S$GLB,,, | Performed by: INTERNAL MEDICINE

## 2017-04-07 PROCEDURE — 1126F AMNT PAIN NOTED NONE PRSNT: CPT | Mod: S$GLB,,, | Performed by: INTERNAL MEDICINE

## 2017-04-07 PROCEDURE — 99999 PR PBB SHADOW E&M-EST. PATIENT-LVL III: CPT | Mod: PBBFAC,,, | Performed by: INTERNAL MEDICINE

## 2017-04-07 PROCEDURE — 3077F SYST BP >= 140 MM HG: CPT | Mod: S$GLB,,, | Performed by: INTERNAL MEDICINE

## 2017-04-07 PROCEDURE — 3079F DIAST BP 80-89 MM HG: CPT | Mod: S$GLB,,, | Performed by: INTERNAL MEDICINE

## 2017-04-07 RX ORDER — PREDNISONE 20 MG/1
TABLET ORAL
Qty: 12 TABLET | Refills: 2 | Status: SHIPPED | OUTPATIENT
Start: 2017-04-07 | End: 2018-03-28

## 2017-04-07 NOTE — MR AVS SNAPSHOT
[place] [facility]  [address]  [place] [address]  Phone: [phone]                  Olga Aguiar   2017 1:20 PM   Office Visit    Description:  Female : 1935   Provider:  Saul Heller MD   Department:  [place] [facility]           Reason for Visit     Follow-up     Asthma     Shortness of Breath           Diagnoses this Visit        Comments    Intermittent asthma, uncomplicated         Bronchiectasis without complication                To Do List           Future Appointments        Provider Department Dept Phone    2017 1:30 PM [facility] [phone]    2017 11:30 AM [facility] - Pain Management [phone]      Goals (5 Years of Data)     None      Follow-Up and Disposition     Return in about 1 year (around 2018).    Follow-up and Disposition History       These Medications        Disp Refills Start End    predniSONE (DELTASONE) 20 MG tablet 12 tablet 2 2017     One daily for   3 days and repeat as needed.    Pharmacy: [facility] [id number] Ph #: [phone]         [facility]     [facility] On Call Nurse Care Line -  Assistance  Unless otherwise directed by your provider, please contact [facility], our nurse care line that is available for  assistance.     Registered nurses in the [facility] On Call Center provide: appointment scheduling, clinical advisement, health education, and other advisory services.  Call: [phone] (toll free)               Medications           Message regarding Medications     Verify the changes and/or additions to your medication regime listed below are the same as discussed with your clinician today.  If any of these changes or additions are incorrect, please notify your healthcare provider.        STOP taking these medications     umeclidinium (INCRUSE ELLIPTA) 62.5  mcg/actuation DsDv Inhale 1 Inhaler into the lungs once daily.           Verify that the below list of medications is an accurate representation of the medications you are currently taking.  If none reported, the list may be blank. If incorrect, please contact your healthcare provider. Carry this list with you in case of emergency.           Current Medications     acetaminophen (TYLENOL) 500 MG tablet Take 1,000 mg by mouth every 6 (six) hours as needed.    aspirin 325 MG tablet Take 1 tablet by mouth Daily.     cholecalciferol, vitamin D3, 2,000 unit Cap Take 2,000 Units by mouth Daily.     fluticasone-vilanterol (BREO ELLIPTA) 200-25 mcg/dose DsDv diskus inhaler Inhale 1 puff into the lungs once daily.    levalbuterol (XOPENEX HFA) 45 mcg/actuation inhaler Inhale 2 puffs into the lungs every 8 (eight) hours as needed for Wheezing.    levothyroxine (SYNTHROID) 75 MCG tablet Take 75 mcg by mouth once daily.    montelukast (SINGULAIR) 10 mg tablet Take 1 tablet (10 mg total) by mouth every evening.    omeprazole (PRILOSEC) 40 MG capsule Take 1 capsule (40 mg total) by mouth every morning.    simvastatin (ZOCOR) 40 MG tablet Take 1 tablet (40 mg total) by mouth every evening.    sucralfate (CARAFATE) 1 gram tablet Take 1 tablet (1 g total) by mouth 2 (two) times daily.    tramadol (ULTRAM) 50 mg tablet Take 1 tablet (50 mg total) by mouth 2 (two) times daily as needed for Pain.    triamterene-hydrochlorothiazide 37.5-25 mg (DYAZIDE) 37.5-25 mg per capsule Take 1 capsule by mouth every morning.    zolpidem (AMBIEN) 10 mg Tab Take 1 tablet (10 mg total) by mouth nightly as needed.    predniSONE (DELTASONE) 20 MG tablet One daily for   3 days and repeat as needed.    valacyclovir (VALTREX) 1000 MG tablet Take 1 tablet (1,000 mg total) by mouth 3 (three) times daily.           Clinical Reference Information           Your Vitals Were     BP Pulse Height Weight SpO2 BMI    156/84 (BP Location: Left arm, Patient Position:  "Sitting, BP Method: Automatic) 67 5' 3" (1.6 m) 73.8 kg (162 lb 11.2 oz) 96% 28.82 kg/m2      Blood Pressure          Most Recent Value    BP  (!)  156/84      Allergies as of 4/7/2017     Allopurinol Analogues      Immunizations Administered on Date of Encounter - 4/7/2017     None      Instructions    All seems to be going well.  Continue as you have been, use prednisone minimally and as needed. Use breo and singulair daily, call if worsens, recheck yr, ct reviewed - i do not see where you need more ct chest.       Language Assistance Services     ATTENTION: Language assistance services are available, free of charge. Please call [phone].      ATENCIÓN: Si habla kierra, tiene a butt disposición servicios gratuitos de asistencia lingüística. Llame al [phone].     BRIDGET Ý: N?u b?n nói Ti?ng Vi?t, có các d?ch v? h? tr? ngôn ng? mi?n phí dành cho b?n. G?i s? [phone].         [facility] complies with applicable Federal civil rights laws and does not discriminate on the basis of race, color, national origin, age, disability, or sex.        "

## 2017-04-07 NOTE — PROGRESS NOTES
4/7/2017    Ol[place] Aguiar  office    Chief Complaint   Patient presents with    Follow-up     3 month    Asthma    Shortness of Breath   April 7, 2017 - steroids help breathing and mobility- used mainly 4 times, once for 6 days.  Mucous and breathing doing well and does even better with steroids.        Jan 4, 2017, cough is better, less mucous, wheezes with stairs and night air, took prednisone last month and after 3 days improved a bit, uses singulair only on regular basis, advair used til out- was causing a hoarseness with reg use.  Albuterol ppt nervousness.      Sept 28, 2016HPI: minimal ex smoker with h/o breast ca rx xrt to anterior right chest with chr vol loss- ct with fibrotic right upper with bronchiectasis.    Chr cough and no mucous progressively worse,     Had had cough and sob related irratents.  occ wheezes last yrs,     [place]ke flight of stairs varies. Inhalers no help.    Will have occasoion of cough and mucous chr , copious.    Yrs back would have cough and occ wheeze withe irratents.    The chief compliant  problem is new to me   PFSH:  Past Medical History:   Diagnosis Date    Adrenal adenoma: 2.8 X 2.1CM 2010 5/16/2014    Anticoagulant long-term use      mg    Aortic atherosclerosis: see CT scan 2016 10/28/2016    Aortic stenosis 8/19/2014    Asthma     Cancer     breast, both sides, right arm restriction    Cataracts, both eyes     Chronic back pain     Chronic bronchitis     CKD (chronic kidney disease) stage 3, GFR 30-59 ml/min 8/19/2014    no dialysis    COPD (chronic obstructive pulmonary disease)     occasional inhaler use, no oxygen    DDD (degenerative disc disease), lumbar 7/30/2013    Diabetes mellitus     diet controlled    Ectatic abdominal aorta: see CT scan 10/16 10/28/2016    Gallbladder polyp 8/19/2014    GERD (gastroesophageal reflux disease)     Gout     Herpes simplex     Fever Blisters and Styes in right eye    High blood cholesterol      Hypertension     holding medication when B/P low    Insomnia     Lumbar spinal stenosis     Lymphedema of arm     Right    Multiple lung nodules 9/21/2015    Renal cyst, right: stable per CT 2016 10/28/2016    Stroke 2/2012    TIA, no residual effects    Stye 12/5/2013    Thyroid disease     hypothyroidism    Unspecified hypothyroidism 7/20/2015         Past Surgical History:   Procedure Laterality Date    AXILLARY NODE DISSECTION      both sides    BREAST SURGERY      Bilateral lumpectomy    CATARACT EXTRACTION      bilateral PHACO with IOL    Epidural steroid injection      Pain mangement    HYSTERECTOMY      HYSTERECTOMY      Nerve Block Injection      Pain management, Times 2    RADIOFREQUENCY ABLATION      Nerve, Pain management    RECTAL SURGERY      Fissure repair    TONSILLECTOMY       Social History   Substance Use Topics    Smoking status: Former Smoker     Packs/day: 1.00     Years: 18.00     Start date: 2/20/1952     Quit date: 8/6/1970    Smokeless tobacco: Never Used    Alcohol use Yes      Comment: rare     Family History   Problem Relation Age of Onset    Cancer Mother      Breast    COPD Mother     Hearing loss Mother     Hypertension Mother     Diabetes Father     Heart disease Father     Heart attack Father     Cancer Daughter      Breast    Cancer Sister      Breast    Amblyopia Neg Hx     Blindness Neg Hx     Cataracts Neg Hx     Glaucoma Neg Hx     Macular degeneration Neg Hx     Retinal detachment Neg Hx     Strabismus Neg Hx     Stroke Neg Hx     Thyroid disease Neg Hx      Review of patient's allergies indicates:   Allergen Reactions    Allopurinol analogues Other (See Comments)     Headache.  This is more of a side effect than it is an allergic reaction.       Performance Status:The patient's activity level is functions out of house.      Review of Systems:  a review of eleven systems covering constitutional, Eye, HEENT, Psych, Respiratory, Cardiac,  "GI, , Musculoskeletal, Endocrine, Dermatologic was negative except for pertinent findings as listed ABOVE and below: as, no swallow problems with xrt, no clear dm,     Exam:Comprehensive exam done.   BP (!) 156/84 (BP Location: Left arm, Patient Position: Sitting, BP Method: Automatic)  Pulse 67  Ht 5' 3" (1.6 m)  Wt 73.8 kg (162 lb 11.2 oz)  SpO2 96%  BMI 28.82 kg/m2  Exam included Vitals as listed, and patient's appearance and affect and alertness and mood, oral exam for yeast and hygiene and pharynx lesions and Mallapatti (M) score, neck with inspection for jvd and masses and thyroid abnormalities and lymph nodes (supraclavicular and infraclavicular nodes and axillary also examined and noted if abn), chest exam included symmetry and effort and fremitus and percussion and auscultation, cardiac exam included rhythm and gallops and murmur and rubs and jvd and edema, abdominal exam for mass and hepatosplenomegaly and tenderness and hernias and bowel sounds, Musculoskeletal exam with muscle tone and posture and mobility/gait and  strength, and skin for rashes and cyanosis and pallor and turgor, extremity for clubbing.  Findings were normal except for pertinent findings listed below:  M2, good bs, looks good    Radiographs reviewed: view by direct vision focal fibrotic dx right upper with mainly bronchiectasis.    10 mm nodule in the lingula not significant change compared to earlier study dated back to 1/5/2010.  Few scattered 4 mm or smaller nodules although not definitely seen on the 2010 exam the difference could be technical do not appear significantly change compared to 1/13/2016.  Mildly prominent lymph node anterior mediastinal fat representing a change compared to 2010 but not significantly change compared to 1/13/2016.  Fibrotic change right lung consistent with radiation change and also not appearing significantly changed compared to the prior exam 1/13/ 2016      Additional findings as detailed " above including stable hypodense masses in the liver consistent with cysts, stable left adrenal nodule, right renal cyst    ______________________________________     Electronically signed by: MINE BURROUGHS MD  Date: 09/23/16  Time: 10:35   Labs reviewed      PFT  Pulmonary Functions, including spirometry and bronchodilator response and lung volumes and diffusion, study was done oct 7, 2016.  Spirometry shows severe obstruction, loss vital capacity and obstruction and bronchodilator response.   FEV1 is 42% irma 0.81 liters liters.  Lung volumes show  loss of TLC with restriction 63%.  Diffusion shows reduced to 38% uncorrected for anemia if present..     Pulmonary functions show severe restriction and good bronchodilator response and low dlco. Clinical correlation recommended.      Saul Heller M.D.      Plan:  Clinical impression is resonably certain and repeated evaluation prn +/- follow up will be needed as below.    Olga was seen today for follow-up, asthma and shortness of breath.    Diagnoses and all orders for this visit:    Intermittent asthma, uncomplicated  -     predniSONE (DELTASONE) 20 MG tablet; One daily for   3 days and repeat as needed.    Bronchiectasis without complication  -     predniSONE (DELTASONE) 20 MG tablet; One daily for   3 days and repeat as needed.      Return in about 1 year (around 4/7/2018).    Discussed with patient above for education the following:       All seems to be going well.  Continue as you have been, use prednisone minimally and as needed. Use breo and singulair daily, call if worsens, recheck yr, ct reviewed - i do not see where you need more ct chest.

## 2017-04-07 NOTE — PATIENT INSTRUCTIONS
All seems to be going well.  Continue as you have been, use prednisone minimally and as needed. Use breo and singulair daily, call if worsens, recheck yr, ct reviewed - i do not see where you need more ct chest.

## 2017-04-10 DIAGNOSIS — J45.20 INTERMITTENT ASTHMA, UNCOMPLICATED: ICD-10-CM

## 2017-04-10 RX ORDER — MONTELUKAST SODIUM 10 MG/1
10 TABLET ORAL NIGHTLY
Qty: 30 TABLET | Refills: 5 | Status: ON HOLD | OUTPATIENT
Start: 2017-04-10 | End: 2017-08-23

## 2017-04-24 DIAGNOSIS — E11.9 TYPE 2 DIABETES MELLITUS WITHOUT COMPLICATION: ICD-10-CM

## 2017-05-01 ENCOUNTER — PATIENT MESSAGE (OUTPATIENT)
Dept: INTERNAL MEDICINE | Facility: CLINIC | Age: 82
End: 2017-05-01

## 2017-05-01 DIAGNOSIS — M81.8 OTHER OSTEOPOROSIS: Primary | ICD-10-CM

## 2017-05-01 RX ORDER — TRIAMTERENE AND HYDROCHLOROTHIAZIDE 37.5; 25 MG/1; MG/1
1 CAPSULE ORAL EVERY MORNING
Qty: 90 CAPSULE | Refills: 1 | Status: SHIPPED | OUTPATIENT
Start: 2017-05-01 | End: 2017-08-01 | Stop reason: SDUPTHER

## 2017-05-01 NOTE — TELEPHONE ENCOUNTER
Please contact her    I did send her prescription in.  She needs to do a urine and her bone density.  Orders are in for both.  Thank you

## 2017-05-02 ENCOUNTER — OFFICE VISIT (OUTPATIENT)
Dept: PAIN MEDICINE | Facility: CLINIC | Age: 82
End: 2017-05-02
Payer: MEDICARE

## 2017-05-02 VITALS
WEIGHT: 164.25 LBS | TEMPERATURE: 98 F | SYSTOLIC BLOOD PRESSURE: 128 MMHG | BODY MASS INDEX: 29.09 KG/M2 | HEART RATE: 70 BPM | RESPIRATION RATE: 18 BRPM | DIASTOLIC BLOOD PRESSURE: 74 MMHG

## 2017-05-02 DIAGNOSIS — M54.16 LUMBAR RADICULOPATHY: Primary | ICD-10-CM

## 2017-05-02 DIAGNOSIS — M51.36 DDD (DEGENERATIVE DISC DISEASE), LUMBAR: ICD-10-CM

## 2017-05-02 DIAGNOSIS — M48.061 LUMBAR STENOSIS: ICD-10-CM

## 2017-05-02 DIAGNOSIS — R26.81 GAIT INSTABILITY: ICD-10-CM

## 2017-05-02 DIAGNOSIS — M47.816 FACET HYPERTROPHY OF LUMBAR REGION: ICD-10-CM

## 2017-05-02 PROCEDURE — 1160F RVW MEDS BY RX/DR IN RCRD: CPT | Mod: S$GLB,,, | Performed by: PHYSICIAN ASSISTANT

## 2017-05-02 PROCEDURE — 3074F SYST BP LT 130 MM HG: CPT | Mod: S$GLB,,, | Performed by: PHYSICIAN ASSISTANT

## 2017-05-02 PROCEDURE — 99213 OFFICE O/P EST LOW 20 MIN: CPT | Mod: S$GLB,,, | Performed by: PHYSICIAN ASSISTANT

## 2017-05-02 PROCEDURE — 1125F AMNT PAIN NOTED PAIN PRSNT: CPT | Mod: S$GLB,,, | Performed by: PHYSICIAN ASSISTANT

## 2017-05-02 PROCEDURE — 3078F DIAST BP <80 MM HG: CPT | Mod: S$GLB,,, | Performed by: PHYSICIAN ASSISTANT

## 2017-05-02 PROCEDURE — 99999 PR PBB SHADOW E&M-EST. PATIENT-LVL IV: CPT | Mod: PBBFAC,,, | Performed by: PHYSICIAN ASSISTANT

## 2017-05-02 PROCEDURE — 1159F MED LIST DOCD IN RCRD: CPT | Mod: S$GLB,,, | Performed by: PHYSICIAN ASSISTANT

## 2017-05-02 RX ORDER — TRAMADOL HYDROCHLORIDE 50 MG/1
50 TABLET ORAL 2 TIMES DAILY PRN
Qty: 60 TABLET | Refills: 2 | Status: SHIPPED | OUTPATIENT
Start: 2017-05-02 | End: 2017-08-02 | Stop reason: SDUPTHER

## 2017-05-02 RX ORDER — GABAPENTIN 300 MG/1
300 CAPSULE ORAL NIGHTLY
Qty: 30 CAPSULE | Refills: 2 | Status: SHIPPED | OUTPATIENT
Start: 2017-05-02 | End: 2017-07-31 | Stop reason: SDUPTHER

## 2017-05-03 NOTE — PROGRESS NOTES
CC: Back and leg pain    HPI: The patient is a 82-year-old woman with a history of diabetes, previous breast CA, CVA and spinal stenosis who presents in referral from her primary care physician, Dr. Barger for back pain and leg pain.  She is status post bilateral L4 transforaminal epidural steroid injections on 4/4/17 with 50% relief.  She continues to have low back pain that is worse with standing or walking greater than a half hour.  She has improvement with sitting.  She has very bothersome numbness throughout her right lateral thigh, lateral shin and foot.  This is worse towards the end of the day.  She denies weakness, bladder or bowel incontinence.    Pain intervention history: She tried physical therapy about 1 year ago and it seemed to make her pain worse. Patient is status post right L3 and L4 transforaminal injection on 8/9/13 with 50% relief of low back and right leg pain.  She is status post bilateral L4 transforaminal epidural steroid injections on 9/27/13 and 11/1/13 with 10-20% relief.  She is status post radiofrequency ablation of the left L3, 4, 5 medial branch nerves on 5/5/14 with 50% relief of her left low back pain.  She is status post radiofrequency ablation of the right L3, 4 and 5 medial branch nerves on 6/16/14 with mild relief, however she reported significant more relief at a later visit.  She is status post bilateral L4 transforaminal epidural steroid injections on 9/24/14 with moderate relief.  She is status post bilateral L4 transforaminal epidural steroid injections on 1/7/15 with complete relief of her posterior thigh pain, moderate relief of her low back pain and minimal relief of her right lateral hip pain.  She is status post C7-T1 cervical interlaminar epidural steroid injection on 2/27/15 with greater than 50% relief.  She is status post bilateral L3, 4 and 5 medial branch radio frequency ablation on 7/29/16 with 0% relief, however in the past it has taken longer than one month  for this procedure to provide relief.  She is status post bilateral L4 transforaminal epidural steroid injections on 11/11/16 with not quite 50% relief.  She is status post bilateral L4 transforaminal epidural steroid injections on 4/4/17 with 50% relief.    ROS:She reports easy bruising, back pain and difficulty sleeping.  Balance of review of systems is negative.    Medical, surgical, family and social history reviewed elsewhere in record.    Medications/Allergies: See med card    Vitals:    05/02/17 1126   BP: 128/74   Pulse: 70   Resp: 18   Temp: 97.8 °F (36.6 °C)   TempSrc: Oral   Weight: 74.5 kg (164 lb 3.9 oz)   PainSc:   4   PainLoc: Back         Physical exam:  Gen: A and O x3, pleasant, well-groomed  Skin: No rashes or obvious lesions  HEENT: PERRLA  CVS: Regular rate and rhythm, normal S1 and S2, no murmurs.  Resp: Clear to auscultation bilaterally, no wheezes or rales.  Abdomen: Soft, NT/ND, normal bowel sounds present.  Musculoskeletal: able to stand and walk short distances without assistance, however uses a walker when out of the house.  She has disuse atrophy of the lumbar paraspinous muscles.    Neuro:  Upper extremities: 5/5 strength bilaterally   Lower extremities: 5/5 strength bilaterally  Reflexes: Brachioradialis 2+, Bicep 2+, Tricep 2+. Patellar 2+, Achilles 2+.  Sensory: Intact and symmetrical to light touch and pinprick in C2-T1 dermatomes bilaterally.  Intact and symmetrical to light touch and pinprick in L2-S1 dermatomes bilaterally, except for a small patch over her left lateral shin decreased with light touch and pinprick.    Lumbar spine:  Lumbar spine: ROM is moderately reduced with flexion extension and oblique extension with mild increased pain in low back with extension.  Terry's test is deferred.  Supine straight leg raise is negative bilaterally.   Internal and external rotation of the hip causes no increased pain on either side.  Myofascial exam: No tenderness to palpation  across lumbar paraspinous muscles.      Imagin13 MRI L-spine  T10-T11: There is severe disk desiccation and a moderate diffuse disk bulge and ligamentous hypertrophy. This is not complete evaluation of the thoracic spine, but there does appear to be mild central canal stenosis.  T11-T12: Moderate disk desiccation and mild diffuse disk bulge. Mild narrowing of the thecal sac. Neural foraminal regions difficult to assess due to the scoliosis.  T12-L1: Severe disk degenerative disease with marked desiccation, diffuse disk bulge, and spurring of vertebral bodies. Mild central canal stenosis. Neural foraminal narrowing bilaterally, difficult to grade due to scoliosis.  L1-L2: Advanced disk desiccation with moderate diffuse disk bulge and mild spurring of vertebral bodies. Small superimposed central to right paracentral disk extrusion. Mild central canal stenosis. Mild to moderate facet arthropathy and ligamentous hypertrophy. Bilateral foraminal stenosis.  L2 - L3: There is also disk desiccation and diffuse disk bulge and small annular fissure posteriorly. facet arthropathy and hypertrophy ligamentum flavum. Mild central canal stenosis. Moderate right and mild left neuroforaminal stenosis.  L3-L4: Disk desiccation and moderate diffuse disk bulge. Small right paracentral ascending disk extrusion and moderate facet arthropathy present bilaterally and hypertrophy of ligamentum flavum. In combination, the findings result in severe central canal stenosis. For example see axial image 22, central image 7. There is mild to moderate neural foraminal stenosis bilaterally.  L4 - L5: Mild anterolisthesis with disk desiccation and uncovering of the disk, with moderate right and severe left facet arthropathy and hypertrophy of ligamentum flavum. Severe central canal stenosis. Mild neural foraminal narrowing, right worse than left.  L5-S1: Disk desiccation and mild diffuse disk bulge. A small ascending disk extrusion centrally  in the midline. No significant central canal stenosis. Advanced facet arthropathy bilaterally, left worse than right. Mild left neural foraminal narrowing. No significant right neural foraminal stenosis.      Assessment:   The patient is a 82-year-old woman with a history of diabetes, previous breast CA, CVA and spinal stenosis who presents in referral from her primary care physician, Dr. Barger for back pain and leg pain.     1. Lumbar radiculopathy     2. Lumbar stenosis     3. Facet hypertrophy of lumbar region     4. DDD (degenerative disc disease), lumbar     5. Gait instability         Plan:  1.  We discussed options to help with her pain and numbness.  She reports that overall she feels she is doing well following the epidural steroid injection and this can be repeated in the future if necessary.  Her biggest concern is the numbness down her right leg and she would like to try gabapentin again.  I provided a prescription for gabapentin 300 mg nightly, increasing to twice a day if tolerated.  We briefly discussed  neurosurgery as a last resort if she is not having sufficient relief with injections and medicine.  2.  Dr. Ware provided prescriptions for tramadol 50 mg twice a day as needed for pain.  3.  Follow-up in 3 months or sooner as needed.

## 2017-05-10 DIAGNOSIS — F51.01 PRIMARY INSOMNIA: ICD-10-CM

## 2017-05-10 RX ORDER — ZOLPIDEM TARTRATE 10 MG/1
10 TABLET ORAL NIGHTLY PRN
Qty: 90 TABLET | Refills: 0 | Status: SHIPPED | OUTPATIENT
Start: 2017-05-10 | End: 2017-08-01 | Stop reason: SDUPTHER

## 2017-05-10 NOTE — TELEPHONE ENCOUNTER
Please contact her.    She needs to do a urine for microalbumin as well as a DEXA scan.  Orders are in for both.  I did refill her medication today.  Thank you    She will need EPP October (hold list)

## 2017-05-17 ENCOUNTER — HOSPITAL ENCOUNTER (OUTPATIENT)
Dept: RADIOLOGY | Facility: HOSPITAL | Age: 82
Discharge: HOME | End: 2017-05-17
Attending: INTERNAL MEDICINE
Payer: MEDICARE

## 2017-05-17 ENCOUNTER — PATIENT MESSAGE (OUTPATIENT)
Dept: INTERNAL MEDICINE | Facility: CLINIC | Age: 82
End: 2017-05-17

## 2017-05-17 DIAGNOSIS — M81.8 OTHER OSTEOPOROSIS: ICD-10-CM

## 2017-05-17 PROCEDURE — 77080 DXA BONE DENSITY AXIAL: CPT | Mod: TC

## 2017-05-17 PROCEDURE — 77080 DXA BONE DENSITY AXIAL: CPT | Mod: 26,,, | Performed by: RADIOLOGY

## 2017-05-18 ENCOUNTER — PATIENT MESSAGE (OUTPATIENT)
Dept: INTERNAL MEDICINE | Facility: CLINIC | Age: 82
End: 2017-05-18

## 2017-05-18 ENCOUNTER — TELEPHONE (OUTPATIENT)
Dept: INTERNAL MEDICINE | Facility: CLINIC | Age: 82
End: 2017-05-18

## 2017-05-18 DIAGNOSIS — E78.2 MIXED HYPERLIPIDEMIA: ICD-10-CM

## 2017-05-18 DIAGNOSIS — E11.9 TYPE 2 DIABETES MELLITUS WITHOUT COMPLICATION, WITHOUT LONG-TERM CURRENT USE OF INSULIN: ICD-10-CM

## 2017-05-18 DIAGNOSIS — M10.9 INTERCRITICAL GOUT: ICD-10-CM

## 2017-05-18 DIAGNOSIS — Z79.899 ENCOUNTER FOR MONITORING PROTON PUMP INHIBITOR THERAPY: ICD-10-CM

## 2017-05-18 DIAGNOSIS — K21.9 GASTROESOPHAGEAL REFLUX DISEASE WITHOUT ESOPHAGITIS: ICD-10-CM

## 2017-05-18 DIAGNOSIS — N18.30 CKD (CHRONIC KIDNEY DISEASE) STAGE 3, GFR 30-59 ML/MIN: Primary | ICD-10-CM

## 2017-05-18 DIAGNOSIS — Z51.81 ENCOUNTER FOR MONITORING PROTON PUMP INHIBITOR THERAPY: ICD-10-CM

## 2017-05-18 DIAGNOSIS — E03.9 ACQUIRED HYPOTHYROIDISM: ICD-10-CM

## 2017-05-23 ENCOUNTER — PATIENT MESSAGE (OUTPATIENT)
Dept: INTERNAL MEDICINE | Facility: CLINIC | Age: 82
End: 2017-05-23

## 2017-05-23 DIAGNOSIS — K21.9 GASTROESOPHAGEAL REFLUX DISEASE WITHOUT ESOPHAGITIS: ICD-10-CM

## 2017-05-23 DIAGNOSIS — E78.5 HYPERLIPIDEMIA, UNSPECIFIED HYPERLIPIDEMIA TYPE: ICD-10-CM

## 2017-05-24 RX ORDER — SUCRALFATE 1 G/1
1 TABLET ORAL 2 TIMES DAILY
Qty: 180 TABLET | Refills: 0 | Status: SHIPPED | OUTPATIENT
Start: 2017-05-24 | End: 2017-08-25 | Stop reason: SDUPTHER

## 2017-05-24 RX ORDER — LEVOTHYROXINE SODIUM 75 UG/1
75 TABLET ORAL DAILY
Qty: 30 TABLET | Refills: 0 | Status: SHIPPED | OUTPATIENT
Start: 2017-05-24 | End: 2017-06-20 | Stop reason: SDUPTHER

## 2017-05-24 RX ORDER — SIMVASTATIN 40 MG/1
40 TABLET, FILM COATED ORAL NIGHTLY
Qty: 90 TABLET | Refills: 0 | Status: SHIPPED | OUTPATIENT
Start: 2017-05-24 | End: 2017-08-01 | Stop reason: SDUPTHER

## 2017-05-25 ENCOUNTER — PATIENT MESSAGE (OUTPATIENT)
Dept: INTERNAL MEDICINE | Facility: CLINIC | Age: 82
End: 2017-05-25

## 2017-06-20 ENCOUNTER — PATIENT MESSAGE (OUTPATIENT)
Dept: INTERNAL MEDICINE | Facility: CLINIC | Age: 82
End: 2017-06-20

## 2017-06-20 RX ORDER — LEVOTHYROXINE SODIUM 75 UG/1
75 TABLET ORAL DAILY
Qty: 90 TABLET | Refills: 0 | Status: SHIPPED | OUTPATIENT
Start: 2017-06-20 | End: 2017-08-01 | Stop reason: SDUPTHER

## 2017-06-27 ENCOUNTER — PATIENT MESSAGE (OUTPATIENT)
Dept: INTERNAL MEDICINE | Facility: CLINIC | Age: 82
End: 2017-06-27

## 2017-06-27 DIAGNOSIS — Z85.3 HISTORY OF BREAST CANCER: Primary | ICD-10-CM

## 2017-07-25 ENCOUNTER — LAB VISIT (OUTPATIENT)
Dept: LAB | Facility: HOSPITAL | Age: 82
End: 2017-07-25
Attending: INTERNAL MEDICINE
Payer: MEDICARE

## 2017-07-25 DIAGNOSIS — Z51.81 ENCOUNTER FOR MONITORING PROTON PUMP INHIBITOR THERAPY: ICD-10-CM

## 2017-07-25 DIAGNOSIS — Z79.899 ENCOUNTER FOR MONITORING PROTON PUMP INHIBITOR THERAPY: ICD-10-CM

## 2017-07-25 DIAGNOSIS — E03.9 ACQUIRED HYPOTHYROIDISM: ICD-10-CM

## 2017-07-25 DIAGNOSIS — E11.9 TYPE 2 DIABETES MELLITUS WITHOUT COMPLICATION, WITHOUT LONG-TERM CURRENT USE OF INSULIN: ICD-10-CM

## 2017-07-25 DIAGNOSIS — E78.2 MIXED HYPERLIPIDEMIA: ICD-10-CM

## 2017-07-25 DIAGNOSIS — M10.9 INTERCRITICAL GOUT: ICD-10-CM

## 2017-07-25 DIAGNOSIS — N18.30 CKD (CHRONIC KIDNEY DISEASE) STAGE 3, GFR 30-59 ML/MIN: ICD-10-CM

## 2017-07-25 LAB
25(OH)D3+25(OH)D2 SERPL-MCNC: 37 NG/ML
ALBUMIN SERPL BCP-MCNC: 3.3 G/DL
ALP SERPL-CCNC: 62 U/L
ALT SERPL W/O P-5'-P-CCNC: 18 U/L
ANION GAP SERPL CALC-SCNC: 10 MMOL/L
AST SERPL-CCNC: 20 U/L
BASOPHILS # BLD AUTO: 0.02 K/UL
BASOPHILS NFR BLD: 0.4 %
BILIRUB SERPL-MCNC: 0.5 MG/DL
BUN SERPL-MCNC: 14 MG/DL
CALCIUM SERPL-MCNC: 9.6 MG/DL
CHLORIDE SERPL-SCNC: 100 MMOL/L
CHOLEST/HDLC SERPL: 4.3 {RATIO}
CO2 SERPL-SCNC: 28 MMOL/L
CREAT SERPL-MCNC: 1.2 MG/DL
DIFFERENTIAL METHOD: NORMAL
EOSINOPHIL # BLD AUTO: 0.2 K/UL
EOSINOPHIL NFR BLD: 3.4 %
ERYTHROCYTE [DISTWIDTH] IN BLOOD BY AUTOMATED COUNT: 12.7 %
EST. GFR  (AFRICAN AMERICAN): 48.6 ML/MIN/1.73 M^2
EST. GFR  (NON AFRICAN AMERICAN): 42.2 ML/MIN/1.73 M^2
ESTIMATED AVG GLUCOSE: 163 MG/DL
FERRITIN SERPL-MCNC: 58 NG/ML
GLUCOSE SERPL-MCNC: 145 MG/DL
HBA1C MFR BLD HPLC: 7.3 %
HCT VFR BLD AUTO: 41.9 %
HDL/CHOLESTEROL RATIO: 23.1 %
HDLC SERPL-MCNC: 182 MG/DL
HDLC SERPL-MCNC: 42 MG/DL
HGB BLD-MCNC: 13.9 G/DL
IRON SERPL-MCNC: 69 UG/DL
LDLC SERPL CALC-MCNC: 92 MG/DL
LYMPHOCYTES # BLD AUTO: 1.3 K/UL
LYMPHOCYTES NFR BLD: 28.2 %
MAGNESIUM SERPL-MCNC: 1.3 MG/DL
MCH RBC QN AUTO: 29.4 PG
MCHC RBC AUTO-ENTMCNC: 33.2 G/DL
MCV RBC AUTO: 89 FL
MONOCYTES # BLD AUTO: 0.5 K/UL
MONOCYTES NFR BLD: 10.1 %
NEUTROPHILS # BLD AUTO: 2.7 K/UL
NEUTROPHILS NFR BLD: 57.7 %
NONHDLC SERPL-MCNC: 140 MG/DL
PLATELET # BLD AUTO: 194 K/UL
PMV BLD AUTO: 10.1 FL
POTASSIUM SERPL-SCNC: 4.3 MMOL/L
PROT SERPL-MCNC: 6.4 G/DL
RBC # BLD AUTO: 4.73 M/UL
SATURATED IRON: 17 %
SODIUM SERPL-SCNC: 138 MMOL/L
TOTAL IRON BINDING CAPACITY: 401 UG/DL
TRANSFERRIN SERPL-MCNC: 271 MG/DL
TRIGL SERPL-MCNC: 240 MG/DL
TSH SERPL DL<=0.005 MIU/L-ACNC: 1.15 UIU/ML
URATE SERPL-MCNC: 7.2 MG/DL
VIT B12 SERPL-MCNC: >2000 PG/ML
WBC # BLD AUTO: 4.75 K/UL

## 2017-07-25 PROCEDURE — 85025 COMPLETE CBC W/AUTO DIFF WBC: CPT

## 2017-07-25 PROCEDURE — 80061 LIPID PANEL: CPT

## 2017-07-25 PROCEDURE — 83036 HEMOGLOBIN GLYCOSYLATED A1C: CPT

## 2017-07-25 PROCEDURE — 83735 ASSAY OF MAGNESIUM: CPT

## 2017-07-25 PROCEDURE — 36415 COLL VENOUS BLD VENIPUNCTURE: CPT | Mod: PO

## 2017-07-25 PROCEDURE — 82306 VITAMIN D 25 HYDROXY: CPT

## 2017-07-25 PROCEDURE — 84443 ASSAY THYROID STIM HORMONE: CPT

## 2017-07-25 PROCEDURE — 83540 ASSAY OF IRON: CPT

## 2017-07-25 PROCEDURE — 80053 COMPREHEN METABOLIC PANEL: CPT

## 2017-07-25 PROCEDURE — 84550 ASSAY OF BLOOD/URIC ACID: CPT

## 2017-07-25 PROCEDURE — 82607 VITAMIN B-12: CPT

## 2017-07-25 PROCEDURE — 82728 ASSAY OF FERRITIN: CPT

## 2017-07-26 ENCOUNTER — PATIENT MESSAGE (OUTPATIENT)
Dept: INTERNAL MEDICINE | Facility: CLINIC | Age: 82
End: 2017-07-26

## 2017-07-31 RX ORDER — GABAPENTIN 300 MG/1
CAPSULE ORAL
Qty: 30 CAPSULE | Refills: 0 | Status: SHIPPED | OUTPATIENT
Start: 2017-07-31 | End: 2017-08-02 | Stop reason: SDUPTHER

## 2017-08-01 ENCOUNTER — TELEPHONE (OUTPATIENT)
Dept: INTERNAL MEDICINE | Facility: CLINIC | Age: 82
End: 2017-08-01

## 2017-08-01 ENCOUNTER — OFFICE VISIT (OUTPATIENT)
Dept: INTERNAL MEDICINE | Facility: CLINIC | Age: 82
End: 2017-08-01
Payer: MEDICARE

## 2017-08-01 VITALS — WEIGHT: 167 LBS | BODY MASS INDEX: 29.58 KG/M2 | SYSTOLIC BLOOD PRESSURE: 120 MMHG | DIASTOLIC BLOOD PRESSURE: 60 MMHG

## 2017-08-01 DIAGNOSIS — J44.89 COPD (CHRONIC OBSTRUCTIVE PULMONARY DISEASE) WITH CHRONIC BRONCHITIS: ICD-10-CM

## 2017-08-01 DIAGNOSIS — I15.2 HYPERTENSION ASSOCIATED WITH DIABETES: ICD-10-CM

## 2017-08-01 DIAGNOSIS — K21.9 GASTROESOPHAGEAL REFLUX DISEASE WITHOUT ESOPHAGITIS: ICD-10-CM

## 2017-08-01 DIAGNOSIS — J47.9 BRONCHIECTASIS WITHOUT COMPLICATION: ICD-10-CM

## 2017-08-01 DIAGNOSIS — K57.30 DIVERTICULOSIS OF LARGE INTESTINE WITHOUT HEMORRHAGE: ICD-10-CM

## 2017-08-01 DIAGNOSIS — E11.9 TYPE 2 DIABETES MELLITUS WITHOUT COMPLICATION, WITHOUT LONG-TERM CURRENT USE OF INSULIN: Primary | ICD-10-CM

## 2017-08-01 DIAGNOSIS — I89.0 LYMPH EDEMA: ICD-10-CM

## 2017-08-01 DIAGNOSIS — I10 ESSENTIAL HYPERTENSION: ICD-10-CM

## 2017-08-01 DIAGNOSIS — Z51.81 ENCOUNTER FOR MONITORING PROTON PUMP INHIBITOR THERAPY: ICD-10-CM

## 2017-08-01 DIAGNOSIS — E03.9 ACQUIRED HYPOTHYROIDISM: ICD-10-CM

## 2017-08-01 DIAGNOSIS — E78.5 HYPERLIPIDEMIA, UNSPECIFIED HYPERLIPIDEMIA TYPE: ICD-10-CM

## 2017-08-01 DIAGNOSIS — Z23 NEED FOR DIPHTHERIA-TETANUS-PERTUSSIS (TDAP) VACCINE: ICD-10-CM

## 2017-08-01 DIAGNOSIS — E11.59 HYPERTENSION ASSOCIATED WITH DIABETES: ICD-10-CM

## 2017-08-01 DIAGNOSIS — R91.8 MULTIPLE LUNG NODULES: ICD-10-CM

## 2017-08-01 DIAGNOSIS — I35.0 AORTIC VALVE STENOSIS, MODERATE: ICD-10-CM

## 2017-08-01 DIAGNOSIS — Z85.3 HISTORY OF BREAST CANCER: ICD-10-CM

## 2017-08-01 DIAGNOSIS — N18.30 CKD (CHRONIC KIDNEY DISEASE) STAGE 3, GFR 30-59 ML/MIN: ICD-10-CM

## 2017-08-01 DIAGNOSIS — Z79.899 ENCOUNTER FOR MONITORING PROTON PUMP INHIBITOR THERAPY: ICD-10-CM

## 2017-08-01 DIAGNOSIS — F51.01 PRIMARY INSOMNIA: ICD-10-CM

## 2017-08-01 DIAGNOSIS — I77.811 ECTATIC ABDOMINAL AORTA: ICD-10-CM

## 2017-08-01 DIAGNOSIS — K86.2 PANCREATIC CYST: ICD-10-CM

## 2017-08-01 PROCEDURE — 99999 PR PBB SHADOW E&M-EST. PATIENT-LVL III: CPT | Mod: PBBFAC,,, | Performed by: INTERNAL MEDICINE

## 2017-08-01 PROCEDURE — 90471 IMMUNIZATION ADMIN: CPT | Mod: S$GLB,,, | Performed by: INTERNAL MEDICINE

## 2017-08-01 PROCEDURE — 99214 OFFICE O/P EST MOD 30 MIN: CPT | Mod: 25,S$GLB,, | Performed by: INTERNAL MEDICINE

## 2017-08-01 PROCEDURE — 99499 UNLISTED E&M SERVICE: CPT | Mod: S$GLB,,, | Performed by: INTERNAL MEDICINE

## 2017-08-01 PROCEDURE — 90715 TDAP VACCINE 7 YRS/> IM: CPT | Mod: S$GLB,,, | Performed by: INTERNAL MEDICINE

## 2017-08-01 RX ORDER — TRIAMTERENE AND HYDROCHLOROTHIAZIDE 37.5; 25 MG/1; MG/1
1 CAPSULE ORAL EVERY MORNING
Qty: 90 CAPSULE | Refills: 1 | Status: SHIPPED | OUTPATIENT
Start: 2017-08-01 | End: 2018-04-30

## 2017-08-01 RX ORDER — LEVOTHYROXINE SODIUM 75 UG/1
75 TABLET ORAL DAILY
Qty: 90 TABLET | Refills: 0 | Status: SHIPPED | OUTPATIENT
Start: 2017-08-01 | End: 2017-12-14 | Stop reason: SDUPTHER

## 2017-08-01 RX ORDER — SIMVASTATIN 40 MG/1
40 TABLET, FILM COATED ORAL NIGHTLY
Qty: 90 TABLET | Refills: 0 | Status: SHIPPED | OUTPATIENT
Start: 2017-08-01 | End: 2017-11-21 | Stop reason: SDUPTHER

## 2017-08-01 RX ORDER — ZOLPIDEM TARTRATE 10 MG/1
10 TABLET ORAL NIGHTLY PRN
Qty: 90 TABLET | Refills: 0 | Status: SHIPPED | OUTPATIENT
Start: 2017-08-01 | End: 2018-02-14 | Stop reason: SDUPTHER

## 2017-08-01 RX ORDER — GLIMEPIRIDE 2 MG/1
2 TABLET ORAL
Qty: 90 TABLET | Refills: 3 | Status: SHIPPED | OUTPATIENT
Start: 2017-08-01 | End: 2018-04-27

## 2017-08-01 RX ORDER — ZOLPIDEM TARTRATE 10 MG/1
10 TABLET ORAL NIGHTLY PRN
Qty: 90 TABLET | Refills: 0 | Status: CANCELLED | OUTPATIENT
Start: 2017-08-01

## 2017-08-01 NOTE — TELEPHONE ENCOUNTER
----- Message from [place] sent at 8/1/2017  2:36 PM CDT -----  Contact: [facility] [phone]  Requesting a call back in regards to the order you send over, advice    Thanks

## 2017-08-01 NOTE — PATIENT INSTRUCTIONS
Glimepiride tablets  What is this medicine?  GLIMEPIRIDE (GLYE me joseph ride) helps to treat type 2 diabetes. Treatment is combined with diet and exercise. This medicine helps your body use insulin better.  How should I use this medicine?  Take this medicine by mouth. Swallow with a drink of water. Follow the directions on the prescription label. Take your dose at the same time each day, with breakfast or your first large meal. Do not take more often than directed.  Talk to your pediatrician regarding the use of this medicine in children. Special care may be needed.  Elderly patients over 65 years old can have a stronger reaction and need a smaller dose.  What side effects may I notice from receiving this medicine?  Side effects that you should report to your doctor or health care professional as soon as possible:  · allergic reactions like skin rash, itching or hives, swelling of the face, lips, or tongue  · breathing problems  · dark urine  · fever, chills, sore throat  · signs and symptoms of low blood sugar such as feeling anxious, confusion, dizziness, increased hunger, unusually weak or tired, sweating, shakiness, cold, irritable, headache, blurred vision, fast heartbeat, loss of consciousness  · unusual bleeding or bruising  · yellowing of the eyes or skin  Side effects that usually do not require medical attention (report to your doctor or health care professional if they continue or are bothersome):  · diarrhea  · dizziness  · headache  · heartburn  · nausea  · stomach gas  What may interact with this medicine?  · bosentan  · chloramphenicol  · cisapride  · clarithromycin  · medicines for fungal or yeast infections  · metoclopramide  · probenecid  · warfarin  Many medications may cause an increase or decrease in blood sugar, these include:  · alcohol containing beverages  · aspirin and aspirin-like drugs  · chloramphenicol  · chromium  · female hormones, like estrogens or progestins and birth control  pills  · fluoxetine  · heart medicines like disopyramide  · isoniazid  · male hormones or anabolic steroids  · medicines called MAO Inhibitors like Nardil, Parnate, Marplan, Eldepryl  · medicines for allergies, asthma, cold, or cough  · medicines for mental problems  · medicines for weight loss  · niacin  · NSAIDs, medicines for pain and inflammation, like ibuprofen or naproxen  · pentamidine  · phenytoin  · probenecid  · quinolone antibiotics like ciprofloxacin, levofloxacin, ofloxacin  · some herbal dietary supplements  · steroid medicines like prednisone or cortisone  · thyroid medicine  · water pills or diuretics  What if I miss a dose?  If you miss a dose, take it as soon as you can. If it is almost time for your next dose, take only that dose. Do not take double or extra doses.  Where should I keep my medicine?  Keep out of the reach of children.  Store at room temperature below 30 degrees C (86 degrees F). Throw away any unused medicine after the expiration date.  What should I tell my health care provider before I take this medicine?  They need to know if you have any of these conditions:  · diabetic ketoacidosis  · glucose-6-phosphate dehydrogenase deficiency  · heart disease  · kidney disease  · liver disease  · severe infection or injury  · thyroid disease  · an unusual or allergic reaction to glimepiride, sulfa drugs, other medicines, foods, dyes, or preservatives  · pregnancy or recent attempts to get pregnant  · breast-feeding  What should I watch for while using this medicine?  Visit your doctor or health care professional for regular checks on your progress.  A test called the HbA1C (A1C) will be monitored. This is a simple blood test. It measures your blood sugar control over the last 2 to 3 months. You will receive this test every 3 to 6 months.  Learn how to check your blood sugar. Learn the symptoms of low and high blood sugar and how to manage them.  Always carry a quick-source of sugar with you  in case you have symptoms of low blood sugar. Examples include hard sugar candy or glucose tablets. Make sure others know that you can choke if you eat or drink when you develop serious symptoms of low blood sugar, such as seizures or unconsciousness. They must get medical help at once.  Tell your doctor or health care professional if you have high blood sugar. You might need to change the dose of your medicine. If you are sick or exercising more than usual, you might need to change the dose of your medicine.  Do not skip meals. Ask your doctor or health care professional if you should avoid alcohol. Many nonprescription cough and cold products contain sugar or alcohol. These can affect blood sugar.  This medicine can make you more sensitive to the sun. Keep out of the sun. If you cannot avoid being in the sun, wear protective clothing and use sunscreen. Do not use sun lamps or tanning beds/booths.  Wear a medical ID bracelet or chain, and carry a card that describes your disease and details of your medicine and dosage times.  Date Last Reviewed:   NOTE:This sheet is a summary. It may not cover all possible information. If you have questions about this medicine, talk to your doctor, pharmacist, or health care provider. Copyright© 2016 [employer]

## 2017-08-01 NOTE — PROGRESS NOTES
Subjective:       Patient ID: Olga Aguiar is a 82 y.o. female.    Chief Complaint: Follow-up    Multiple issues,  with her.    Worsening lymphedema R arm, now even arm is swollen, hand too.  Used to get fitted for pump in [facility] and sleeve, now no help.  Would like to get a lymphedema pump.    Recall that she has seen pulmonary.  She has COPD and medications have been adjusted per she's had to take stair with a couple of times.  She still has cough and a little bit of shortness of breath but overall symptoms are slightly better stable.  Lung nodule- stable on CT 9/16.     Blood pressure is stable.  Labs are also stable.  She has diabetes but is not taking any medications currently, and A1c now above 7 again- reviewed; will start tx.     She has some CKD- stable.  Her gastroenterologist had her do an MRI of the abdomen 11/16- all stable, 2 year follow up recommended (pancreatic cyst).     She has mild stable GERD symptoms.     She has seen her cardiologist 2/17.  She has aortic stenosis.  She denies syncope, chest pain, pressure, tightness.  Chest chronic stable shortness of breath.  She has trace chronic edema, but no PND or orthopnea.    DEXA 5/17 osteopenia- reviewed.    Patient Active Problem List:     Hypertension associated with diabetes     Mixed hyperlipidemia, baseline      Lumbar stenosis     Chronic pain syndrome     Lumbosacral spondylosis without myelopathy     Adrenal adenoma: 2.8 X 2.1CM 2010; stable 2016     Gout attack - Right Foot     Intercritical gout - Left Foot     BMI 29.9,adult down to 28.3     Abdominal obesity     Lymph edema, right arm     Peripheral edema     Gallbladder polyp     CKD (chronic kidney disease) stage 3, GFR 30-59 ml/min     Aortic valve stenosis, mild- moderate, MARI 1.44 1/16     LVH (left ventricular hypertrophy)     Pancreatic cyst     FISCHER (dyspnea on exertion)     Degeneration of lumbar or lumbosacral intervertebral disc     DDD (degenerative  disc disease), cervical     Cervical spinal stenosis     Type 2 diabetes mellitus without complication, without long-term current use of insulin     Acquired hypothyroidism     Transient cerebral ischemia: 2012     Primary insomnia     Gastroesophageal reflux disease without esophagitis     Multiple lung nodules: stable CT 10/2016     Former smoker: 1 pack daily for < 20 years, quit 1973     COPD (chronic obstructive pulmonary disease) with chronic bronchitis, 2013     Essential hypertension     Hypokalemia     Tachycardia     Facet hypertrophy of lumbar region     History of breast cancer     Intermittent asthma     Radiation fibrosis of lung     Bronchiectasis without complication     Ectatic abdominal aorta: see CT scan 10/16     Aortic atherosclerosis: see CT scan 2016     Renal cyst, right: stable per CT 2016     Lumbar radiculopathy     Abnormal ECG     Encounter for monitoring proton pump inhibitor therapy     Diverticulosis of large intestine without hemorrhage: see CT 10/14           Review of Systems   Constitutional: Negative for activity change, appetite change, chills, fatigue and fever.   HENT: Negative for congestion, hearing loss, sinus pressure and sore throat.    Eyes: Negative for visual disturbance.   Respiratory: Positive for shortness of breath. Negative for apnea, cough and wheezing.    Cardiovascular: Negative for chest pain, palpitations and leg swelling.   Gastrointestinal: Negative for abdominal distention, abdominal pain, constipation, diarrhea, nausea and vomiting.        Minimal if any GERD   Endocrine: Negative for polydipsia, polyphagia and polyuria.   Genitourinary: Negative for dysuria, frequency, hematuria and vaginal bleeding.   Musculoskeletal: Positive for arthralgias and back pain. Negative for gait problem, joint swelling and myalgias.        Lymphedema R arm   Skin: Negative for rash.   Neurological: Negative for dizziness, weakness, light-headedness and headaches.    Hematological: Negative for adenopathy. Does not bruise/bleed easily.   Psychiatric/Behavioral: Negative for confusion, hallucinations, sleep disturbance and suicidal ideas.       Objective:      Physical Exam   Constitutional: She is oriented to person, place, and time. She appears well-developed and well-nourished.   HENT:   Head: Normocephalic and atraumatic.   Right Ear: External ear normal.   Left Ear: External ear normal.   Nose: Nose normal.   Mouth/Throat: Oropharynx is clear and moist. No oropharyngeal exudate.   Eyes: Conjunctivae and EOM are normal. No scleral icterus.   Neck: Normal range of motion. Neck supple. No JVD present. No thyromegaly present.   Cardiovascular: Normal rate, regular rhythm and intact distal pulses.  Exam reveals no gallop.    Murmur heard.  Pulses:       Dorsalis pedis pulses are 2+ on the right side, and 2+ on the left side.        Posterior tibial pulses are 2+ on the right side, and 2+ on the left side.   Pulmonary/Chest: Effort normal and breath sounds normal. No respiratory distress. She has no wheezes.   Abdominal: Soft. Bowel sounds are normal. She exhibits no distension and no mass. There is no tenderness. There is no rebound and no guarding.   Musculoskeletal: Normal range of motion. She exhibits edema. She exhibits no tenderness.        Right foot: There is normal range of motion and no deformity.        Left foot: There is normal range of motion and no deformity.   Lymphedema R arm  Slight edema both LE   Feet:   Right Foot:   Protective Sensation: 6 sites tested. 6 sites sensed.   Skin Integrity: Negative for ulcer, blister, skin breakdown, erythema or warmth.   Left Foot:   Protective Sensation: 6 sites tested. 6 sites sensed.   Skin Integrity: Negative for ulcer, blister, skin breakdown, erythema or warmth.   Lymphadenopathy:     She has no cervical adenopathy.   Neurological: She is alert and oriented to person, place, and time. She displays normal reflexes. No  cranial nerve deficit. Coordination normal.   Skin: Skin is warm. No rash noted. No erythema.   Psychiatric: She has a normal mood and affect. Her behavior is normal. Judgment and thought content normal.   Nursing note and vitals reviewed.      Assessment:       1. Type 2 diabetes mellitus without complication, without long-term current use of insulin    2. Hyperlipidemia, unspecified hyperlipidemia type    3. Primary insomnia    4. COPD (chronic obstructive pulmonary disease) with chronic bronchitis, 2013    5. Hypertension associated with diabetes    6. Essential hypertension    7. Ectatic abdominal aorta: see CT scan 10/16    8. Aortic valve stenosis, mild- moderate, MARI 1.44 1/16    9. Acquired hypothyroidism    10. Gastroesophageal reflux disease without esophagitis    11. History of breast cancer    12. Encounter for monitoring proton pump inhibitor therapy    13. Lymph edema, right arm    14. CKD (chronic kidney disease) stage 3, GFR 30-59 ml/min    15. Diverticulosis of large intestine without hemorrhage: see CT 10/14    16. Need for diphtheria-tetanus-pertussis (Tdap) vaccine    17. Bronchiectasis without complication    18. Multiple lung nodules: stable CT 10/2016    19. Pancreatic cyst: stable on MRI 11/16 per GI repeat 2018        Plan:         Olga was seen today for follow-up.    Diagnoses and all orders for this visit:    Type 2 diabetes mellitus without complication, without long-term current use of insulin: trial of amaryl.  Diet and exercise.  S/e and cautions reviewed.    -     Comprehensive metabolic panel; Future  -     CBC auto differential; Future  -     Hemoglobin A1c; Future    Hyperlipidemia, unspecified hyperlipidemia type  -     simvastatin (ZOCOR) 40 MG tablet; Take 1 tablet (40 mg total) by mouth every evening.    Primary insomnia  -     zolpidem (AMBIEN) 10 mg Tab; Take 1 tablet (10 mg total) by mouth nightly as needed.    COPD (chronic obstructive pulmonary disease) with chronic  bronchitis, 2013    Hypertension associated with diabetes    Essential hypertension    Ectatic abdominal aorta: see CT scan 10/16    Aortic valve stenosis, mild- moderate, MARI 1.44 1/16: no issues or sx; keep Cardiology folow up    Acquired hypothyroidism: continue meds    Gastroesophageal reflux disease without esophagitis: no lalarm sx    History of breast cancer  -     PNEUMATIC PUMP FOR HOME USE  -     COMPRESSION SLEEVE/SOCK FOR HOME USE    Encounter for monitoring proton pump inhibitor therapy    Lymph edema, right arm  -     PNEUMATIC PUMP FOR HOME USE  -     COMPRESSION SLEEVE/SOCK FOR HOME USE    CKD (chronic kidney disease) stage 3, GFR 30-59 ml/min: hydration; avoid nephrotoxins    Diverticulosis of large intestine without hemorrhage: see CT 10/14    Need for diphtheria-tetanus-pertussis (Tdap) vaccine  -     Tdap Vaccine    Bronchiectasis without complication: no alarm sx    Multiple lung nodules: stable CT 10/2016- to see Pulmonary 2018; last seen 4/17- not thought to need further CTs    Pancreatic cyst: stable on MRI 11/16 per GI repeat 2018    Other orders  -     triamterene-hydrochlorothiazide 37.5-25 mg (DYAZIDE) 37.5-25 mg per capsule; Take 1 capsule by mouth every morning.  -     Zolpidem (AMBIEN) 10 mg Tab; Take 1 tablet (10 mg total) by mouth nightly as needed.  SHE ACTUALLY ONLY USES 1/2 SEVERAL TIMES WEEKLY  -     levothyroxine (SYNTHROID) 75 MCG tablet; Take 1 tablet (75 mcg total) by mouth once daily.  -     glimepiride (AMARYL) 2 MG tablet; Take 1 tablet (2 mg total) by mouth before breakfast.    I will review all studies and determine further tx depending on findings

## 2017-08-02 ENCOUNTER — TELEPHONE (OUTPATIENT)
Dept: PAIN MEDICINE | Facility: CLINIC | Age: 82
End: 2017-08-02

## 2017-08-02 ENCOUNTER — OFFICE VISIT (OUTPATIENT)
Dept: PAIN MEDICINE | Facility: CLINIC | Age: 82
End: 2017-08-02
Payer: MEDICARE

## 2017-08-02 VITALS
HEART RATE: 78 BPM | TEMPERATURE: 98 F | DIASTOLIC BLOOD PRESSURE: 80 MMHG | SYSTOLIC BLOOD PRESSURE: 130 MMHG | BODY MASS INDEX: 30.11 KG/M2 | RESPIRATION RATE: 18 BRPM | WEIGHT: 170 LBS

## 2017-08-02 DIAGNOSIS — M54.16 LUMBAR RADICULOPATHY: Primary | ICD-10-CM

## 2017-08-02 DIAGNOSIS — R26.81 GAIT INSTABILITY: ICD-10-CM

## 2017-08-02 DIAGNOSIS — M48.061 LUMBAR STENOSIS: ICD-10-CM

## 2017-08-02 DIAGNOSIS — M51.36 DDD (DEGENERATIVE DISC DISEASE), LUMBAR: ICD-10-CM

## 2017-08-02 DIAGNOSIS — M47.816 FACET HYPERTROPHY OF LUMBAR REGION: ICD-10-CM

## 2017-08-02 PROCEDURE — 99999 PR PBB SHADOW E&M-EST. PATIENT-LVL V: CPT | Mod: PBBFAC,,, | Performed by: PHYSICIAN ASSISTANT

## 2017-08-02 PROCEDURE — 1125F AMNT PAIN NOTED PAIN PRSNT: CPT | Mod: S$GLB,,, | Performed by: PHYSICIAN ASSISTANT

## 2017-08-02 PROCEDURE — 1159F MED LIST DOCD IN RCRD: CPT | Mod: S$GLB,,, | Performed by: PHYSICIAN ASSISTANT

## 2017-08-02 PROCEDURE — 99213 OFFICE O/P EST LOW 20 MIN: CPT | Mod: S$GLB,,, | Performed by: PHYSICIAN ASSISTANT

## 2017-08-02 RX ORDER — TRAMADOL HYDROCHLORIDE 50 MG/1
50 TABLET ORAL 2 TIMES DAILY PRN
Qty: 60 TABLET | Refills: 2 | Status: SHIPPED | OUTPATIENT
Start: 2017-08-02 | End: 2018-01-03 | Stop reason: SDUPTHER

## 2017-08-02 RX ORDER — ALPRAZOLAM 0.5 MG/1
1 TABLET, ORALLY DISINTEGRATING ORAL ONCE AS NEEDED
Status: CANCELLED | OUTPATIENT
Start: 2017-08-23 | End: 2017-08-23

## 2017-08-02 RX ORDER — GABAPENTIN 300 MG/1
300 CAPSULE ORAL NIGHTLY
Qty: 90 CAPSULE | Refills: 3 | Status: SHIPPED | OUTPATIENT
Start: 2017-08-02 | End: 2017-08-25 | Stop reason: SDUPTHER

## 2017-08-02 NOTE — TELEPHONE ENCOUNTER
MD Anastasiia Muñoz LPN Cc: Olga Foley MA; Marialuisa Jerez LPN   Caller: Unspecified (Today,  1:32 PM)             Saw patient in 2/2017, can hold ASA for 7 days if clinically asymptomatic from cardiovascular standpoint.     Dr. Lucas

## 2017-08-02 NOTE — TELEPHONE ENCOUNTER
Patient saw Alexey Dupont this morning and has been scheduled for a lumbar steroid injection. The patient takes aspirin and will need to stop 7 days before the injection. Injection is tentatively scheduled for 8/23, pending medication approval. Please advise

## 2017-08-06 ENCOUNTER — PATIENT MESSAGE (OUTPATIENT)
Dept: INTERNAL MEDICINE | Facility: CLINIC | Age: 82
End: 2017-08-06

## 2017-08-06 DIAGNOSIS — E79.0 ELEVATED URIC ACID IN BLOOD: Primary | ICD-10-CM

## 2017-08-06 NOTE — PROGRESS NOTES
CC: Back and leg pain    HPI: The patient is a 82-year-old woman with a history of diabetes, previous breast CA, CVA and spinal stenosis who presents in referral from her primary care physician, Dr. Barger for back pain and leg pain.  She returns in follow-up today with back pain.  She complains of bilateral low back pain radiating to the upper gluteal regions.  This is worse with sitting and walking, improved at night.  She no longer has numbness in her legs ever since taking gabapentin 300 mg nightly and is very happy with these results.  She denies weakness, bladder or bowel incontinence.    Pain intervention history: She tried physical therapy about 1 year ago and it seemed to make her pain worse. Patient is status post right L3 and L4 transforaminal injection on 8/9/13 with 50% relief of low back and right leg pain.  She is status post bilateral L4 transforaminal epidural steroid injections on 9/27/13 and 11/1/13 with 10-20% relief.  She is status post radiofrequency ablation of the left L3, 4, 5 medial branch nerves on 5/5/14 with 50% relief of her left low back pain.  She is status post radiofrequency ablation of the right L3, 4 and 5 medial branch nerves on 6/16/14 with mild relief, however she reported significant more relief at a later visit.  She is status post bilateral L4 transforaminal epidural steroid injections on 9/24/14 with moderate relief.  She is status post bilateral L4 transforaminal epidural steroid injections on 1/7/15 with complete relief of her posterior thigh pain, moderate relief of her low back pain and minimal relief of her right lateral hip pain.  She is status post C7-T1 cervical interlaminar epidural steroid injection on 2/27/15 with greater than 50% relief.  She is status post bilateral L3, 4 and 5 medial branch radio frequency ablation on 7/29/16 with 0% relief, however in the past it has taken longer than one month for this procedure to provide relief.  She is status post  bilateral L4 transforaminal epidural steroid injections on 16 with not quite 50% relief.  She is status post bilateral L4 transforaminal epidural steroid injections on 17 with 50% relief.    ROS:She reports easy bruising, back pain and difficulty sleeping.  Balance of review of systems is negative.    Medical, surgical, family and social history reviewed elsewhere in record.    Medications/Allergies: See med card    Vitals:    17 1119   BP: 130/80   Pulse: 78   Resp: 18   Temp: 98.2 °F (36.8 °C)   TempSrc: Oral   Weight: 77.1 kg (170 lb)   PainSc:   4   PainLoc: Back         Physical exam:  Gen: A and O x3, pleasant, well-groomed  Skin: No rashes or obvious lesions  HEENT: PERRLA  CVS: Regular rate and rhythm, normal S1 and S2, no murmurs.  Resp: Clear to auscultation bilaterally, no wheezes or rales.  Abdomen: Soft, NT/ND, normal bowel sounds present.  Musculoskeletal: able to stand and walk short distances without assistance, however uses a walker when out of the house.  She has disuse atrophy of the lumbar paraspinous muscles.    Neuro:  Upper extremities: 5/5 strength bilaterally   Lower extremities: 5/5 strength bilaterally  Reflexes: Brachioradialis 2+, Bicep 2+, Tricep 2+. Patellar 2+, Achilles 2+.  Sensory: Intact and symmetrical to light touch and pinprick in C2-T1 dermatomes bilaterally.  Intact and symmetrical to light touch and pinprick in L2-S1 dermatomes bilaterally, except for a small patch over her left lateral shin decreased with light touch and pinprick.    Lumbar spine:  Lumbar spine: ROM is moderately reduced with flexion extension and oblique extension with mild increased pain in low back with extension.  Terry's test is deferred.  Supine straight leg raise is negative bilaterally.   Internal and external rotation of the hip causes no increased pain on either side.  Myofascial exam: No tenderness to palpation across lumbar paraspinous muscles.      Imagin13 MRI  L-spine  T10-T11: There is severe disk desiccation and a moderate diffuse disk bulge and ligamentous hypertrophy. This is not complete evaluation of the thoracic spine, but there does appear to be mild central canal stenosis.  T11-T12: Moderate disk desiccation and mild diffuse disk bulge. Mild narrowing of the thecal sac. Neural foraminal regions difficult to assess due to the scoliosis.  T12-L1: Severe disk degenerative disease with marked desiccation, diffuse disk bulge, and spurring of vertebral bodies. Mild central canal stenosis. Neural foraminal narrowing bilaterally, difficult to grade due to scoliosis.  L1-L2: Advanced disk desiccation with moderate diffuse disk bulge and mild spurring of vertebral bodies. Small superimposed central to right paracentral disk extrusion. Mild central canal stenosis. Mild to moderate facet arthropathy and ligamentous hypertrophy. Bilateral foraminal stenosis.  L2 - L3: There is also disk desiccation and diffuse disk bulge and small annular fissure posteriorly. facet arthropathy and hypertrophy ligamentum flavum. Mild central canal stenosis. Moderate right and mild left neuroforaminal stenosis.  L3-L4: Disk desiccation and moderate diffuse disk bulge. Small right paracentral ascending disk extrusion and moderate facet arthropathy present bilaterally and hypertrophy of ligamentum flavum. In combination, the findings result in severe central canal stenosis. For example see axial image 22, central image 7. There is mild to moderate neural foraminal stenosis bilaterally.  L4 - L5: Mild anterolisthesis with disk desiccation and uncovering of the disk, with moderate right and severe left facet arthropathy and hypertrophy of ligamentum flavum. Severe central canal stenosis. Mild neural foraminal narrowing, right worse than left.  L5-S1: Disk desiccation and mild diffuse disk bulge. A small ascending disk extrusion centrally in the midline. No significant central canal stenosis.  Advanced facet arthropathy bilaterally, left worse than right. Mild left neural foraminal narrowing. No significant right neural foraminal stenosis.      Assessment:   The patient is a 82-year-old woman with a history of diabetes, previous breast CA, CVA and spinal stenosis who presents in referral from her primary care physician, Dr. Barger for back pain and leg pain.     1. Lumbar radiculopathy  Place in Outpatient    Vital signs    Activity as tolerated    Verify informed consent    Notify physician     Notify physician     Notify physician (specify)    Diet NPO    alprazolam ODT dissolvable tablet 1 mg    Case Request Operating Room: ANUEL-TRANSFORAMINAL L4   2. Lumbar stenosis     3. Facet hypertrophy of lumbar region     4. DDD (degenerative disc disease), lumbar     5. Gait instability         Plan:  1.  I will set the patient up to repeat bilateral L4 transforaminal epidural steroid injections.  We also discussed possibly repeating RFA in the future if necessary.  2.  Dr. Ware provided a prescription for tramadol 50 mg up to 2 times a day as needed for pain.  She typically takes this once or twice a day.  3.  Follow-up in 4 weeks post procedure or sooner as needed.

## 2017-08-14 ENCOUNTER — PATIENT MESSAGE (OUTPATIENT)
Dept: INTERNAL MEDICINE | Facility: CLINIC | Age: 82
End: 2017-08-14

## 2017-08-14 NOTE — TELEPHONE ENCOUNTER
Sherlyn, can you forward that information to [facility]?  I know you were helping with this when we sent in the order a couple of weeks ago    Please let me (and her) know thanks

## 2017-08-15 ENCOUNTER — TELEPHONE (OUTPATIENT)
Dept: INTERNAL MEDICINE | Facility: CLINIC | Age: 82
End: 2017-08-15

## 2017-08-15 NOTE — TELEPHONE ENCOUNTER
----- Message from Carolina Gleason sent at 8/15/2017  2:20 PM CDT -----  Contact: call pt [phone]  Patient is calling to let you know that she cannot access the message that was sent to her on her [facility] account and would like to receive a phone call to let her know about message

## 2017-08-21 DIAGNOSIS — M51.36 DDD (DEGENERATIVE DISC DISEASE), LUMBAR: Primary | ICD-10-CM

## 2017-08-22 ENCOUNTER — TELEPHONE (OUTPATIENT)
Dept: INTERNAL MEDICINE | Facility: CLINIC | Age: 82
End: 2017-08-22

## 2017-08-22 NOTE — TELEPHONE ENCOUNTER
----- Message from Roseann Kumar sent at 8/22/2017  4:42 PM CDT -----  Contact: [facility]- [phone]  [facility] would like a call from the nurse in regards to the pt,they need more information for the order that was sent in regards to the pumps for the pt arms,please advise

## 2017-08-22 NOTE — TELEPHONE ENCOUNTER
----- Message from Candice Mcmillan sent at 8/22/2017  4:38 PM CDT -----  Contact: Pino/ [place]/ [phone]   Pino is calling to receive a referral. Pt want to speak with someone to give them the information. Please call and advise     Thank you

## 2017-08-22 NOTE — TELEPHONE ENCOUNTER
Spoke to pt's . They would like a referral placed for pt to be seen by lymphedema clinic in [place].     [facility] ([facility])  Lymphedema Clinic  Phone# [phone]  Fax# [phone]    Pino advised that if anything further was needed he can be reached at [phone] (they will not be home tomorrow)

## 2017-08-23 ENCOUNTER — SURGERY (OUTPATIENT)
Age: 82
End: 2017-08-23

## 2017-08-23 ENCOUNTER — HOSPITAL ENCOUNTER (OUTPATIENT)
Facility: HOSPITAL | Age: 82
Discharge: HOME | End: 2017-08-23
Attending: ANESTHESIOLOGY | Admitting: ANESTHESIOLOGY
Payer: MEDICARE

## 2017-08-23 ENCOUNTER — HOSPITAL ENCOUNTER (OUTPATIENT)
Dept: RADIOLOGY | Facility: HOSPITAL | Age: 82
Discharge: HOME | End: 2017-08-23
Attending: ANESTHESIOLOGY | Admitting: ANESTHESIOLOGY
Payer: MEDICARE

## 2017-08-23 VITALS
RESPIRATION RATE: 18 BRPM | TEMPERATURE: 97 F | HEART RATE: 62 BPM | OXYGEN SATURATION: 95 % | SYSTOLIC BLOOD PRESSURE: 137 MMHG | HEIGHT: 63 IN | DIASTOLIC BLOOD PRESSURE: 70 MMHG | WEIGHT: 165 LBS | BODY MASS INDEX: 29.23 KG/M2

## 2017-08-23 DIAGNOSIS — M54.16 LUMBAR RADICULOPATHY: Primary | ICD-10-CM

## 2017-08-23 DIAGNOSIS — M51.36 DDD (DEGENERATIVE DISC DISEASE), LUMBAR: ICD-10-CM

## 2017-08-23 PROCEDURE — 63600175 PHARM REV CODE 636 W HCPCS: Mod: PO | Performed by: ANESTHESIOLOGY

## 2017-08-23 PROCEDURE — 76000 FLUOROSCOPY <1 HR PHYS/QHP: CPT | Mod: TC,PO

## 2017-08-23 PROCEDURE — 64483 NJX AA&/STRD TFRM EPI L/S 1: CPT | Mod: 50,PO | Performed by: ANESTHESIOLOGY

## 2017-08-23 PROCEDURE — 25500020 PHARM REV CODE 255: Mod: PO | Performed by: ANESTHESIOLOGY

## 2017-08-23 PROCEDURE — 64483 NJX AA&/STRD TFRM EPI L/S 1: CPT | Mod: 50,,, | Performed by: ANESTHESIOLOGY

## 2017-08-23 PROCEDURE — 25000003 PHARM REV CODE 250: Mod: PO | Performed by: ANESTHESIOLOGY

## 2017-08-23 RX ORDER — BUPIVACAINE HYDROCHLORIDE 2.5 MG/ML
INJECTION, SOLUTION EPIDURAL; INFILTRATION; INTRACAUDAL
Status: DISCONTINUED | OUTPATIENT
Start: 2017-08-23 | End: 2017-08-23 | Stop reason: HOSPADM

## 2017-08-23 RX ORDER — LIDOCAINE HYDROCHLORIDE 10 MG/ML
INJECTION, SOLUTION EPIDURAL; INFILTRATION; INTRACAUDAL; PERINEURAL
Status: DISCONTINUED | OUTPATIENT
Start: 2017-08-23 | End: 2017-08-23 | Stop reason: HOSPADM

## 2017-08-23 RX ORDER — METHYLPREDNISOLONE ACETATE 80 MG/ML
INJECTION, SUSPENSION INTRA-ARTICULAR; INTRALESIONAL; INTRAMUSCULAR; SOFT TISSUE
Status: DISCONTINUED | OUTPATIENT
Start: 2017-08-23 | End: 2017-08-23 | Stop reason: HOSPADM

## 2017-08-23 RX ORDER — ALPRAZOLAM 0.5 MG/1
1 TABLET, ORALLY DISINTEGRATING ORAL ONCE AS NEEDED
Status: COMPLETED | OUTPATIENT
Start: 2017-08-23 | End: 2017-08-23

## 2017-08-23 RX ORDER — MONTELUKAST SODIUM 10 MG/1
10 TABLET ORAL NIGHTLY
COMMUNITY
End: 2018-03-28 | Stop reason: SDUPTHER

## 2017-08-23 RX ADMIN — LIDOCAINE HYDROCHLORIDE 10 ML: 10 INJECTION, SOLUTION EPIDURAL; INFILTRATION; INTRACAUDAL; PERINEURAL at 02:08

## 2017-08-23 RX ADMIN — BUPIVACAINE HYDROCHLORIDE 3 ML: 2.5 INJECTION, SOLUTION EPIDURAL; INFILTRATION; INTRACAUDAL; PERINEURAL at 02:08

## 2017-08-23 RX ADMIN — IOHEXOL 3 ML: 300 INJECTION, SOLUTION INTRAVENOUS at 02:08

## 2017-08-23 RX ADMIN — METHYLPREDNISOLONE ACETATE 80 MG: 80 INJECTION, SUSPENSION INTRA-ARTICULAR; INTRALESIONAL; INTRAMUSCULAR; SOFT TISSUE at 02:08

## 2017-08-23 RX ADMIN — ALPRAZOLAM 0.5 MG: 0.5 TABLET, ORALLY DISINTEGRATING ORAL at 01:08

## 2017-08-23 NOTE — OP NOTE
PROCEDURE DATE: 8/23/2017    PROCEDURE: Bilateral L4/5 transforaminal epidural steroid injection under fluoroscopy    DIAGNOSIS: Lumbar  Radiculopathy    Post op diagnosis: Same    PHYSICIAN: Rohan Ware MD    MEDICATIONS INJECTED:  Methylprednisolone 40mg (0.5ml) and 1.5ml 0.25% bupivicaine at each nerve root.     LOCAL ANESTHETIC INJECTED:  Lidocaine 1%. 4 ml per site.    SEDATION MEDICATIONS: none    ESTIMATED BLOOD LOSS:  none    COMPLICATIONS:  none    TECHNIQUE:   A time-out was taken to identify patient and procedure side prior to starting the procedure. The patient was placed in a prone position, prepped and draped in the usual sterile fashion using ChloraPrep and sterile towels.  The area to be injected was determined under fluoroscopic guidance in AP and oblique view.  Local anesthetic was given by raising a wheal and going down to the hub of a 25-gauge 1.5 inch needle.  In oblique view, a 3.5 inch 22-gauge bent-tip spinal needle was introduced towards 6 oclock position of the pedicle of each above named nerve root level.  The needle was walked medially then hinged into the neural foramen and position was confirmed in AP and lateral views.  Omnipaque contrast dye was injected to confirm appropriate placement and that there was no vascular uptake.  After negative aspiration for blood or CSF, the medication was then injected. This was performed at the right and then left L4/5 level(s). The patient tolerated the procedure well.    The patient was monitored after the procedure.  Patient was given post procedure and discharge instructions to follow at home. The patient was discharged in a stable condition.

## 2017-08-23 NOTE — DISCHARGE SUMMARY
[facility]  Discharge Note  Short Stay    Admit Date: 8/23/2017    Discharge Date: 8/23/2017    Attending Physician: Rohan Ware MD     Discharge Provider: Rohan Ware    Diagnoses:  Active Hospital Problems    Diagnosis  POA    *Lumbar radiculopathy [M54.16]  Yes      Resolved Hospital Problems    Diagnosis Date Resolved POA   No resolved problems to display.       Discharged Condition: good    Final Diagnoses: Lumbar radiculopathy [M54.16]    Disposition: Home or Self Care    Hospital Course: no complications, uneventful    Outcome of Hospitalization, Treatment, Procedure, or Surgery:  Patient was admitted for outpatient procedure. The patient underwent procedure without complications and are discharged home    Follow up/Patient Instructions:  Follow up as scheduled/Patient has received instructions and follow up date    Medications:  Continue previous medications      Discharge Procedure Orders  Diet general     Activity as tolerated     Call MD for:  temperature >100.4     Call MD for:  severe uncontrolled pain     Call MD for:  redness, tenderness, or signs of infection (pain, swelling, redness, odor or green/yellow discharge around incision site)     Call MD for:  severe persistent headache     No dressing needed           Discharge Procedure Orders (must include Diet, Follow-up, Activity):    Discharge Procedure Orders (must include Diet, Follow-up, Activity)  Diet general     Activity as tolerated     Call MD for:  temperature >100.4     Call MD for:  severe uncontrolled pain     Call MD for:  redness, tenderness, or signs of infection (pain, swelling, redness, odor or green/yellow discharge around incision site)     Call MD for:  severe persistent headache     No dressing needed

## 2017-08-24 ENCOUNTER — PATIENT MESSAGE (OUTPATIENT)
Dept: INTERNAL MEDICINE | Facility: CLINIC | Age: 82
End: 2017-08-24

## 2017-08-25 ENCOUNTER — PATIENT MESSAGE (OUTPATIENT)
Dept: INTERNAL MEDICINE | Facility: CLINIC | Age: 82
End: 2017-08-25

## 2017-08-25 DIAGNOSIS — I89.0 LYMPH EDEMA: Primary | ICD-10-CM

## 2017-08-25 DIAGNOSIS — K21.9 GASTROESOPHAGEAL REFLUX DISEASE WITHOUT ESOPHAGITIS: ICD-10-CM

## 2017-08-25 RX ORDER — GABAPENTIN 300 MG/1
300 CAPSULE ORAL NIGHTLY
Qty: 90 CAPSULE | Refills: 3 | Status: SHIPPED | OUTPATIENT
Start: 2017-08-25 | End: 2018-11-21 | Stop reason: SDUPTHER

## 2017-08-25 RX ORDER — SUCRALFATE 1 G/1
1 TABLET ORAL 2 TIMES DAILY
Qty: 180 TABLET | Refills: 0 | Status: SHIPPED | OUTPATIENT
Start: 2017-08-25 | End: 2017-08-28 | Stop reason: SDUPTHER

## 2017-08-26 ENCOUNTER — PATIENT MESSAGE (OUTPATIENT)
Dept: INTERNAL MEDICINE | Facility: CLINIC | Age: 82
End: 2017-08-26

## 2017-08-28 RX ORDER — SUCRALFATE 1 G/1
1 TABLET ORAL 2 TIMES DAILY
Qty: 180 TABLET | Refills: 0 | Status: SHIPPED | OUTPATIENT
Start: 2017-08-28 | End: 2018-01-11 | Stop reason: SDUPTHER

## 2017-09-11 ENCOUNTER — TELEPHONE (OUTPATIENT)
Dept: PAIN MEDICINE | Facility: CLINIC | Age: 82
End: 2017-09-11

## 2017-09-11 NOTE — TELEPHONE ENCOUNTER
Spoke with the . Follow up appointment date changed.    Is This A New Presentation, Or A Follow-Up?: Follow Up Skin Lesions How Severe Is Your Skin Lesion?: moderate Have Your Skin Lesions Been Treated?: been treated

## 2017-09-11 NOTE — TELEPHONE ENCOUNTER
----- Message from Allyson Gonzales sent at 9/11/2017 10:42 AM CDT -----  Contact: , kyung   Wants to reschedule to later, same day   Call back

## 2017-09-16 ENCOUNTER — PATIENT MESSAGE (OUTPATIENT)
Dept: INTERNAL MEDICINE | Facility: CLINIC | Age: 82
End: 2017-09-16

## 2017-09-25 ENCOUNTER — OFFICE VISIT (OUTPATIENT)
Dept: PAIN MEDICINE | Facility: CLINIC | Age: 82
End: 2017-09-25
Payer: MEDICARE

## 2017-09-25 VITALS
RESPIRATION RATE: 20 BRPM | BODY MASS INDEX: 29.6 KG/M2 | DIASTOLIC BLOOD PRESSURE: 64 MMHG | HEART RATE: 74 BPM | WEIGHT: 167.13 LBS | SYSTOLIC BLOOD PRESSURE: 132 MMHG | TEMPERATURE: 97 F

## 2017-09-25 DIAGNOSIS — R26.81 GAIT INSTABILITY: ICD-10-CM

## 2017-09-25 DIAGNOSIS — M51.36 DDD (DEGENERATIVE DISC DISEASE), LUMBAR: ICD-10-CM

## 2017-09-25 DIAGNOSIS — M48.061 LUMBAR STENOSIS: ICD-10-CM

## 2017-09-25 DIAGNOSIS — M47.816 FACET HYPERTROPHY OF LUMBAR REGION: Primary | ICD-10-CM

## 2017-09-25 PROCEDURE — 99213 OFFICE O/P EST LOW 20 MIN: CPT | Mod: S$GLB,,, | Performed by: PHYSICIAN ASSISTANT

## 2017-09-25 PROCEDURE — 3008F BODY MASS INDEX DOCD: CPT | Mod: S$GLB,,, | Performed by: PHYSICIAN ASSISTANT

## 2017-09-25 PROCEDURE — 1125F AMNT PAIN NOTED PAIN PRSNT: CPT | Mod: S$GLB,,, | Performed by: PHYSICIAN ASSISTANT

## 2017-09-25 PROCEDURE — 1159F MED LIST DOCD IN RCRD: CPT | Mod: S$GLB,,, | Performed by: PHYSICIAN ASSISTANT

## 2017-09-25 PROCEDURE — 99999 PR PBB SHADOW E&M-EST. PATIENT-LVL IV: CPT | Mod: PBBFAC,,, | Performed by: PHYSICIAN ASSISTANT

## 2017-09-25 PROCEDURE — 3078F DIAST BP <80 MM HG: CPT | Mod: S$GLB,,, | Performed by: PHYSICIAN ASSISTANT

## 2017-09-25 PROCEDURE — 3075F SYST BP GE 130 - 139MM HG: CPT | Mod: S$GLB,,, | Performed by: PHYSICIAN ASSISTANT

## 2017-09-26 NOTE — PROGRESS NOTES
CC: Back and leg pain    HPI: The patient is a 82-year-old woman with a history of diabetes, previous breast CA, CVA and spinal stenosis who presents in referral from her primary care physician, Dr. Barger for back pain and leg pain.  She is status post bilateral L4 transforaminal epidural steroid injections on 8/23/17 with greater than 80% relief.  She states that this was the best epidural steroid injections that she had.  Her pain is now a little higher located across the lower lumbar region.  She feels that the pain was mainly in the buttocks prior to the epidurals.  Her current pain is described as aching, stiff, worse with walking and improved with tramadol once or twice a day.  She continues to use a walker to ambulate.  She denies numbness, weakness, bladder or bowel incontinence.    Pain intervention history: She tried physical therapy about 1 year ago and it seemed to make her pain worse. Patient is status post right L3 and L4 transforaminal injection on 8/9/13 with 50% relief of low back and right leg pain.  She is status post bilateral L4 transforaminal epidural steroid injections on 9/27/13 and 11/1/13 with 10-20% relief.  She is status post radiofrequency ablation of the left L3, 4, 5 medial branch nerves on 5/5/14 with 50% relief of her left low back pain.  She is status post radiofrequency ablation of the right L3, 4 and 5 medial branch nerves on 6/16/14 with mild relief, however she reported significant more relief at a later visit.  She is status post bilateral L4 transforaminal epidural steroid injections on 9/24/14 with moderate relief.  She is status post bilateral L4 transforaminal epidural steroid injections on 1/7/15 with complete relief of her posterior thigh pain, moderate relief of her low back pain and minimal relief of her right lateral hip pain.  She is status post C7-T1 cervical interlaminar epidural steroid injection on 2/27/15 with greater than 50% relief.  She is status post  bilateral L3, 4 and 5 medial branch radio frequency ablation on 7/29/16 with 0% relief, however in the past it has taken longer than one month for this procedure to provide relief.  She is status post bilateral L4 transforaminal epidural steroid injections on 11/11/16 with not quite 50% relief.  She is status post bilateral L4 transforaminal epidural steroid injections on 4/4/17 with 50% relief.  She is status post bilateral L4 transforaminal epidural steroid injections on 8/23/17 with greater than 80% relief.    ROS:She reports easy bruising, back pain and difficulty sleeping.  Balance of review of systems is negative.    Medical, surgical, family and social history reviewed elsewhere in record.    Medications/Allergies: See med card    Vitals:    09/25/17 1426   BP: 132/64   Pulse: 74   Resp: 20   Temp: 97.4 °F (36.3 °C)   TempSrc: Oral   Weight: 75.8 kg (167 lb 1.7 oz)   PainSc:   6   PainLoc: Back         Physical exam:  Gen: A and O x3, pleasant, well-groomed  Skin: No rashes or obvious lesions  HEENT: PERRLA  CVS: Regular rate and rhythm, normal S1 and S2, no murmurs.  Resp: Clear to auscultation bilaterally, no wheezes or rales.  Abdomen: Soft, NT/ND, normal bowel sounds present.  Musculoskeletal: able to stand and walk short distances without assistance, however uses a walker when out of the house.  She has disuse atrophy of the lumbar paraspinous muscles.    Neuro:  Upper extremities: 5/5 strength bilaterally   Lower extremities: 5/5 strength bilaterally  Reflexes: Brachioradialis 2+, Bicep 2+, Tricep 2+. Patellar 2+, Achilles 2+.  Sensory: Intact and symmetrical to light touch and pinprick in C2-T1 dermatomes bilaterally.  Intact and symmetrical to light touch and pinprick in L2-S1 dermatomes bilaterally, except for a small patch over her left lateral shin decreased with light touch and pinprick.    Lumbar spine:  Lumbar spine: ROM is moderately reduced with flexion extension and oblique extension with  mild increased pain in low back with extension.  Terry's test is deferred.  Supine straight leg raise is negative bilaterally.   Internal and external rotation of the hip causes no increased pain on either side.  Myofascial exam: No tenderness to palpation across lumbar paraspinous muscles.      Imagin13 MRI L-spine  T10-T11: There is severe disk desiccation and a moderate diffuse disk bulge and ligamentous hypertrophy. This is not complete evaluation of the thoracic spine, but there does appear to be mild central canal stenosis.  T11-T12: Moderate disk desiccation and mild diffuse disk bulge. Mild narrowing of the thecal sac. Neural foraminal regions difficult to assess due to the scoliosis.  T12-L1: Severe disk degenerative disease with marked desiccation, diffuse disk bulge, and spurring of vertebral bodies. Mild central canal stenosis. Neural foraminal narrowing bilaterally, difficult to grade due to scoliosis.  L1-L2: Advanced disk desiccation with moderate diffuse disk bulge and mild spurring of vertebral bodies. Small superimposed central to right paracentral disk extrusion. Mild central canal stenosis. Mild to moderate facet arthropathy and ligamentous hypertrophy. Bilateral foraminal stenosis.  L2 - L3: There is also disk desiccation and diffuse disk bulge and small annular fissure posteriorly. facet arthropathy and hypertrophy ligamentum flavum. Mild central canal stenosis. Moderate right and mild left neuroforaminal stenosis.  L3-L4: Disk desiccation and moderate diffuse disk bulge. Small right paracentral ascending disk extrusion and moderate facet arthropathy present bilaterally and hypertrophy of ligamentum flavum. In combination, the findings result in severe central canal stenosis. For example see axial image 22, central image 7. There is mild to moderate neural foraminal stenosis bilaterally.  L4 - L5: Mild anterolisthesis with disk desiccation and uncovering of the disk, with moderate  right and severe left facet arthropathy and hypertrophy of ligamentum flavum. Severe central canal stenosis. Mild neural foraminal narrowing, right worse than left.  L5-S1: Disk desiccation and mild diffuse disk bulge. A small ascending disk extrusion centrally in the midline. No significant central canal stenosis. Advanced facet arthropathy bilaterally, left worse than right. Mild left neural foraminal narrowing. No significant right neural foraminal stenosis.      Assessment:   The patient is a 82-year-old woman with a history of diabetes, previous breast CA, CVA and spinal stenosis who presents in referral from her primary care physician, Dr. Barger for back pain and leg pain.     1. Facet hypertrophy of lumbar region     2. Lumbar stenosis     3. DDD (degenerative disc disease), lumbar     4. Gait instability         Plan:  1.  The patient well following the bilateral L4 transforaminal epidural steroid injections.  This can be repeated in the future if necessary.  I believe her remaining pain is likely due to facet arthropathy and we can repeat bilateral L3, 4 and 5 medial branch RFA in the future if necessary.  2.  She'll continue to take tramadol and will call when she needs refills.  She is currently taking this once or twice a day.  I have reviewed the [place] [employer] website and there are no abberancies.    3.  Follow-up in 6 months or sooner as needed.

## 2017-10-10 ENCOUNTER — LAB VISIT (OUTPATIENT)
Dept: LAB | Facility: HOSPITAL | Age: 82
End: 2017-10-10
Attending: INTERNAL MEDICINE
Payer: MEDICARE

## 2017-10-10 ENCOUNTER — PATIENT MESSAGE (OUTPATIENT)
Dept: INTERNAL MEDICINE | Facility: CLINIC | Age: 82
End: 2017-10-10

## 2017-10-10 DIAGNOSIS — E03.9 ACQUIRED HYPOTHYROIDISM: ICD-10-CM

## 2017-10-10 DIAGNOSIS — E79.0 ELEVATED URIC ACID IN BLOOD: ICD-10-CM

## 2017-10-10 DIAGNOSIS — E11.9 TYPE 2 DIABETES MELLITUS WITHOUT COMPLICATION: ICD-10-CM

## 2017-10-10 DIAGNOSIS — E11.9 TYPE 2 DIABETES MELLITUS WITHOUT COMPLICATION, WITHOUT LONG-TERM CURRENT USE OF INSULIN: ICD-10-CM

## 2017-10-10 LAB
ALBUMIN SERPL BCP-MCNC: 3.2 G/DL
ALP SERPL-CCNC: 63 U/L
ALT SERPL W/O P-5'-P-CCNC: 25 U/L
ANION GAP SERPL CALC-SCNC: 11 MMOL/L
AST SERPL-CCNC: 22 U/L
BASOPHILS # BLD AUTO: 0.03 K/UL
BASOPHILS NFR BLD: 0.6 %
BILIRUB SERPL-MCNC: 0.5 MG/DL
BUN SERPL-MCNC: 17 MG/DL
CALCIUM SERPL-MCNC: 9.7 MG/DL
CHLORIDE SERPL-SCNC: 98 MMOL/L
CHOLEST SERPL-MCNC: 166 MG/DL
CHOLEST/HDLC SERPL: 3.3 {RATIO}
CO2 SERPL-SCNC: 31 MMOL/L
CREAT SERPL-MCNC: 1.2 MG/DL
DIFFERENTIAL METHOD: NORMAL
EOSINOPHIL # BLD AUTO: 0.1 K/UL
EOSINOPHIL NFR BLD: 1.4 %
ERYTHROCYTE [DISTWIDTH] IN BLOOD BY AUTOMATED COUNT: 13.5 %
EST. GFR  (AFRICAN AMERICAN): 48.6 ML/MIN/1.73 M^2
EST. GFR  (NON AFRICAN AMERICAN): 42.2 ML/MIN/1.73 M^2
ESTIMATED AVG GLUCOSE: 128 MG/DL
GLUCOSE SERPL-MCNC: 76 MG/DL
HBA1C MFR BLD HPLC: 6.1 %
HCT VFR BLD AUTO: 42.8 %
HDLC SERPL-MCNC: 50 MG/DL
HDLC SERPL: 30.1 %
HGB BLD-MCNC: 14.2 G/DL
LDLC SERPL CALC-MCNC: 82 MG/DL
LYMPHOCYTES # BLD AUTO: 2 K/UL
LYMPHOCYTES NFR BLD: 41.6 %
MCH RBC QN AUTO: 28.6 PG
MCHC RBC AUTO-ENTMCNC: 33.2 G/DL
MCV RBC AUTO: 86 FL
MONOCYTES # BLD AUTO: 0.4 K/UL
MONOCYTES NFR BLD: 8.8 %
NEUTROPHILS # BLD AUTO: 2.3 K/UL
NEUTROPHILS NFR BLD: 47.2 %
NONHDLC SERPL-MCNC: 116 MG/DL
PLATELET # BLD AUTO: 179 K/UL
PMV BLD AUTO: 9.6 FL
POTASSIUM SERPL-SCNC: 4.1 MMOL/L
PROT SERPL-MCNC: 6.4 G/DL
RBC # BLD AUTO: 4.96 M/UL
SODIUM SERPL-SCNC: 140 MMOL/L
TRIGL SERPL-MCNC: 170 MG/DL
TSH SERPL DL<=0.005 MIU/L-ACNC: 1.05 UIU/ML
URATE SERPL-MCNC: 7.8 MG/DL
WBC # BLD AUTO: 4.9 K/UL

## 2017-10-10 PROCEDURE — 83036 HEMOGLOBIN GLYCOSYLATED A1C: CPT

## 2017-10-10 PROCEDURE — 85025 COMPLETE CBC W/AUTO DIFF WBC: CPT

## 2017-10-10 PROCEDURE — 84550 ASSAY OF BLOOD/URIC ACID: CPT

## 2017-10-10 PROCEDURE — 80061 LIPID PANEL: CPT

## 2017-10-10 PROCEDURE — 80053 COMPREHEN METABOLIC PANEL: CPT

## 2017-10-10 PROCEDURE — 36415 COLL VENOUS BLD VENIPUNCTURE: CPT | Mod: PO

## 2017-10-10 PROCEDURE — 84443 ASSAY THYROID STIM HORMONE: CPT

## 2017-10-11 ENCOUNTER — PATIENT MESSAGE (OUTPATIENT)
Dept: PULMONOLOGY | Facility: CLINIC | Age: 82
End: 2017-10-11

## 2017-10-17 ENCOUNTER — TELEPHONE (OUTPATIENT)
Dept: INTERNAL MEDICINE | Facility: CLINIC | Age: 82
End: 2017-10-17

## 2017-10-17 ENCOUNTER — IMMUNIZATION (OUTPATIENT)
Dept: INTERNAL MEDICINE | Facility: CLINIC | Age: 82
End: 2017-10-17
Payer: MEDICARE

## 2017-10-17 ENCOUNTER — OFFICE VISIT (OUTPATIENT)
Dept: INTERNAL MEDICINE | Facility: CLINIC | Age: 82
End: 2017-10-17
Payer: MEDICARE

## 2017-10-17 VITALS
DIASTOLIC BLOOD PRESSURE: 80 MMHG | WEIGHT: 165.38 LBS | SYSTOLIC BLOOD PRESSURE: 125 MMHG | BODY MASS INDEX: 29.29 KG/M2

## 2017-10-17 DIAGNOSIS — E03.9 ACQUIRED HYPOTHYROIDISM: ICD-10-CM

## 2017-10-17 DIAGNOSIS — J70.1 RADIATION FIBROSIS OF LUNG: ICD-10-CM

## 2017-10-17 DIAGNOSIS — E78.2 MIXED HYPERLIPIDEMIA: ICD-10-CM

## 2017-10-17 DIAGNOSIS — M47.817 LUMBOSACRAL SPONDYLOSIS WITHOUT MYELOPATHY: ICD-10-CM

## 2017-10-17 DIAGNOSIS — J44.89 COPD (CHRONIC OBSTRUCTIVE PULMONARY DISEASE) WITH CHRONIC BRONCHITIS: ICD-10-CM

## 2017-10-17 DIAGNOSIS — K82.4 GALLBLADDER POLYP: ICD-10-CM

## 2017-10-17 DIAGNOSIS — Z00.00 ANNUAL PHYSICAL EXAM: Primary | ICD-10-CM

## 2017-10-17 DIAGNOSIS — M10.00 IDIOPATHIC GOUT, UNSPECIFIED CHRONICITY, UNSPECIFIED SITE: ICD-10-CM

## 2017-10-17 DIAGNOSIS — R60.0 LOCALIZED EDEMA: ICD-10-CM

## 2017-10-17 DIAGNOSIS — I77.811 ECTATIC ABDOMINAL AORTA: ICD-10-CM

## 2017-10-17 DIAGNOSIS — E11.59 HYPERTENSION ASSOCIATED WITH DIABETES: ICD-10-CM

## 2017-10-17 DIAGNOSIS — I35.0 AORTIC VALVE STENOSIS, MODERATE: ICD-10-CM

## 2017-10-17 DIAGNOSIS — N18.30 CKD (CHRONIC KIDNEY DISEASE) STAGE 3, GFR 30-59 ML/MIN: ICD-10-CM

## 2017-10-17 DIAGNOSIS — K21.9 GASTROESOPHAGEAL REFLUX DISEASE WITHOUT ESOPHAGITIS: ICD-10-CM

## 2017-10-17 DIAGNOSIS — R91.8 MULTIPLE LUNG NODULES: ICD-10-CM

## 2017-10-17 DIAGNOSIS — K86.2 PANCREATIC CYST: ICD-10-CM

## 2017-10-17 DIAGNOSIS — I15.2 HYPERTENSION ASSOCIATED WITH DIABETES: ICD-10-CM

## 2017-10-17 DIAGNOSIS — E11.9 TYPE 2 DIABETES MELLITUS WITHOUT COMPLICATION, WITHOUT LONG-TERM CURRENT USE OF INSULIN: ICD-10-CM

## 2017-10-17 DIAGNOSIS — Z85.3 HISTORY OF BREAST CANCER: ICD-10-CM

## 2017-10-17 DIAGNOSIS — I89.0 LYMPH EDEMA: ICD-10-CM

## 2017-10-17 PROCEDURE — 99499 UNLISTED E&M SERVICE: CPT | Mod: S$GLB,,, | Performed by: INTERNAL MEDICINE

## 2017-10-17 PROCEDURE — 99397 PER PM REEVAL EST PAT 65+ YR: CPT | Mod: S$GLB,,, | Performed by: INTERNAL MEDICINE

## 2017-10-17 PROCEDURE — G0008 ADMIN INFLUENZA VIRUS VAC: HCPCS | Mod: S$GLB,,, | Performed by: INTERNAL MEDICINE

## 2017-10-17 PROCEDURE — 90662 IIV NO PRSV INCREASED AG IM: CPT | Mod: S$GLB,,, | Performed by: INTERNAL MEDICINE

## 2017-10-17 PROCEDURE — 99999 PR PBB SHADOW E&M-EST. PATIENT-LVL III: CPT | Mod: PBBFAC,,, | Performed by: INTERNAL MEDICINE

## 2017-10-17 RX ORDER — ALLOPURINOL 100 MG/1
100 TABLET ORAL DAILY
Qty: 30 TABLET | Refills: 6 | Status: SHIPPED | OUTPATIENT
Start: 2017-10-17 | End: 2018-05-14 | Stop reason: SDUPTHER

## 2017-10-17 RX ORDER — FEBUXOSTAT 40 MG/1
40 TABLET, FILM COATED ORAL DAILY
Qty: 90 TABLET | Refills: 3 | Status: SHIPPED | OUTPATIENT
Start: 2017-10-17 | End: 2017-10-17

## 2017-10-17 NOTE — PATIENT INSTRUCTIONS
Gout    Gout is an inflammation of a joint due to a build-up of gout crystals in the joint fluid. This occurs when there is an excess of uric acid (a normal waste product) in the body. Uric acid builds up in the body when the kidneys are unable to filter enough of it from the blood. This may occur with age. It is also associated with kidney disease. Gout occurs more often in people with obesity, diabetes, high blood pressure, or high levels of fats in the blood. It may run in families. Gout tends to come and go. A flare up of gout is called an attack. Drinking alcohol or eating certain foods (such as shellfish or foods with additives such as high-fructose corn syrup) may increase uric acid levels in the blood and cause a gout attack.  During a gout attack, the affected joint may become a hot, red, swollen and painful. If you have had one attack of gout, you are likely to have another. An attack of gout can be treated with medicine. If these attacks become frequent, a daily medicine may be prescribed to help the kidneys remove uric acid from the body.  Home care  During a gout attack:  · Rest painful joints. If gout affects the joints of your foot or leg, you may want to use crutches for the first few days to keep from bearing weight on the affected joint.  · When sitting or lying down, raise the painful joint to a level higher than your heart.  · Apply an ice pack (ice cubes in a plastic bag wrapped in a thin towel) over the injured area for 20 minutes every 1 to 2 hours the first day for pain relief. Continue this 3 to 4 times a day for swelling and pain.  · Avoid alcohol and foods listed below (see Preventing attacks) during a gout attack. Drink extra fluid to help flush the uric acid through your kidneys.  · If you were prescribed a medicine to treat gout, take it as your healthcare provider has instructed. Don't skip doses.  · Take anti-inflammatory medicine as directed.   · If pain medicines have been  prescribed, take them exactly as directed.    Preventing attacks  · Minimize or avoid alcohol use. Excess alcohol intake can cause a gout attack.  · Limit these foods and beverages:  ¨ Organ meats, such as kidneys and liver  ¨ Certain seafoods (anchovies, sardines, shrimp, scallops, herring, mackerel)  ¨ Wild game, meat extracts and meat gravies  ¨ Foods and beverages sweetened with high-fructose corn syrup, such as sodas  · Eat a healthy diet including low-fat and nonfat dairy, whole grains, and vegetables.  · If you are overweight, talk to your healthcare provider about a weight reduction plan. Avoid fasting or extreme low calorie diets (less than 900 calories per day). This will increase uric acid levels in the body.  · If you have diabetes or high blood pressure, work with your doctor to manage these conditions.  · Protect the joint from injury. Trauma can trigger a gout attack.  Follow-up care  Follow up with your healthcare provider, or as advised.   When to seek medical advice  Call your healthcare provider if you have any of the following:  · Fever over 100.4°F (38.ºC) with worsening joint pain  · Increasing redness around the joint  · Pain developing in another joint  · Repeated vomiting, abdominal pain, or blood in the vomit or stool (black or red color)  Date Last Reviewed: 3/1/2017  © 2982-1521 [employer]. [address] [place] [address]. All rights reserved. This information is not intended as a substitute for professional medical care. Always follow your healthcare professional's instructions.        Gout Diet  Gout is a painful condition caused by an excess of uric acid, a waste product made by the body. Uric acid forms crystals that collect in the joints. The immune response to these crystals brings on symptoms of joint pain and swelling. This is called a gout attack. Often, medications and diet changes are combined to manage gout. Below are some guidelines for changing your  diet to help you manage gout and prevent attacks. Your health care provider will help you determine the best eating plan for you.     Eating to manage gout  Weight loss for those who are overweight may help reduce gout attacks.  Eat less of these foods  Eating too many foods containing purines may raise the levels of uric acid in your body. This raises your risk for a gout attack. Try to limit these foods and drinks:  · Alcohol, such as beer and red wine. You may be told to avoid alcohol completely.  · Soft drinks that contain sugar or high fructose corn syrup  · Certain fish, including anchovies, sardines, fish eggs, and herring  · Shellfish  · Certain meats, such as red meat, hot dogs, luncheon meats, and turkey  · Organ meats, such as liver, kidneys, and sweetbreads  · Legumes, such as dried beans and peas  · Other high fat foods such as gravy, whole milk, and high fat cheeses  · Vegetables such as asparagus, cauliflower, spinach, and mushrooms used to be thought to contribute to an increased risk for a gout attack, but recent studies show that high purine vegetables don't increase the risk for a gout attack.  Eat more of these foods  Other foods may be helpful for people with gout. Add some of these foods to your diet:  · Cherries contain chemicals that may lower uric acid.  · Omega fatty acids. These are found in some fatty fish such as salmon, certain oils (flax, olive, or nut), and nuts themselves. Omega fatty acids may help prevent inflammation due to gout.  · Dairy products that are low-fat or fat-free, such as cheese and yogurt  · Complex carbohydrate foods, including whole grains, brown rice, oats, and beans  · Coffee, in moderation  · Water, approximately 64 ounces per day  Follow-up care  Follow up with your healthcare provider as advised.  When to seek medical advice  Call your healthcare provider right away if any of these occur:  · Return of gout symptoms, usually at night:  · Severe pain, swelling,  and heat in a joint, especially the base of the big toe  · Affected joint is hard to move  · Skin of the affected joint is purple or red  · Fever of 100.4°F (38°C) or higher  · Pain that doesn't get better even with prescribed medicine   Date Last Reviewed: 1/12/2016  © 2279-7713 [employer]. [address] [place] [address]. All rights reserved. This information is not intended as a substitute for professional medical care. Always follow your healthcare professional's instructions.        Eating to Prevent Gout  Gout is a painful form of arthritis caused by an excess of uric acid. This is a waste product made by the body. It builds up in the body and forms crystals that collect in the joints, bringing on a gout attack. Alcohol and certain foods can trigger a gout attack. Below are some guidelines for changing your diet to help you manage gout. Your healthcare provider can work with you to determine the best eating plan for you. Know that diet is only one part of managing gout. Take your medicines as prescribed and follow the other guidelines your healthcare provider has given you.  Foods to limit  Eating too many foods containing purines may increase the levels of uric acid in your body and increase your risk for a gout attack. It may be best to limit these high-purine foods:  · Alcohol (beer, red wine). You may be told to avoid alcohol completely.  · Certain fish (anchovies, sardines, fish roes, herring, tuna, mussels, codfish, scallops, trout, and ozzy)  · Certain meats (red meat, processed meat, villalta, turkey, wild game, and goose)  · Sauces and gravies made with meat  · Organ meats (such as liver, kidneys, sweetbreads, and tripe)  · Legumes (such as dried beans, peas)  · Mushrooms, spinach, asparagus, and cauliflower  · Yeast and yeast extract supplements  Foods to try  Some foods may be helpful for people with gout. You may want to try adding some of the following foods to your  diet:  · Dark berries: These include blueberries, blackberries, and cherries. These berries contain chemicals that may lower uric acid.  · Tofu: Tofu, which is made from soy, is a good source of protein. Studies have shown that it may be a better choice than meat for people with gout.  · Omega fatty acids: These acids are found in fatty fish (such as salmon), certain oils (such as flax, olive, or nut oils), or nuts. They may help prevent inflammation due to gout.  The following guidelines are recommended by the American Medical Association for people with gout. Your diet should be:  · High in fiber, whole grains, fruits, and vegetables.  · Low in protein (15% of calories should come from protein. Choose lean sources such as soy, lean meats, and poultry).  · Low in fat (no more than 30% of calories should come from fat, with only 10% coming from animal fat).   Date Last Reviewed: 6/17/2015 © 2000-2017 [employer]. [address] [place] [address]. All rights reserved. This information is not intended as a substitute for professional medical care. Always follow your healthcare professional's instructions.        What Is Gout?  Gout is a disease that affects the joints. Left untreated, it can lead to painful foot and joint deformities and even kidney problems. But, by treating gout early, you can relieve pain and help prevent future problems. Gout can usually be treated with medication and proper diet. In severe cases, surgery may be needed.  What causes gout?  Gout is caused by an excess of uric acid (a waste product made by the body). Uric acid is excreted by the kidneys. If the uric acid level in your blood rises too high, the uric acid may form crystals that collect in the joints, bringing on a gout attack. If you have many gout attacks, crystals may form large deposits called tophi. Tophi can damage joints and cause deformity.  Who is at risk for gout?  Men are more likely to have gout than  "women. But women can also be affected, mostly after menopause. Some health problems, such as obesity and high cholesterol, make gout more likely. And some medications, such as diuretics (water pills), can trigger a gout attack. People who drink a lot of alcohol are at high risk for gout. Certain foods can also trigger a gout attack.  Substances that may trigger a gout attack  To help prevent a gout attack, avoid these foods:  · Alcohol (particularly beer, but also red wine and spirits)  · Certain meats (red meat, processed meat, turkey)  · Organ meats (kidney, liver, sweetbread)  · Shellfish (lobster, crab, shrimp, scallop, mussel)  · Certain fish (anchovy, sardine, herring, mackerel)   Treatment  · Lifestyle changes, including weight loss, exercise, and quitting tobacco use  · Reducing consumption of the food groups above as well as high fructose corn syrup, found in many foods including sodas and energy drinks  · Changing non-essential medications that may contribute to gout (such as thiazide diuretics--"water pills")  · Medications to reduce the amount of uric acid in the blood, such as allopurinol or others  · Medications to treat acute gout attacks, including NSAIDs (nonsteroidal anti-inflammatory medicines), steroids, and colchicine  Date Last Reviewed: 2/1/2016  © 2606-5430 [employer]. [address] [place] [address], [place] [address]. All rights reserved. This information is not intended as a substitute for professional medical care. Always follow your healthcare professional's instructions.        Uric Acid (Blood)  Does this test have other names?  Serum uric acid  What is this test?  This test measures the amount of uric acid in your blood.  Uric acid is a normal bodily waste product. It forms when chemicals called purines break down. Purines are a natural substance found in the body and are also found in many foods such as liver, shellfish, and alcohol. They can also be formed in the body when " DNA is broken down.   When purines are broken down to uric acid in the blood, the body gets rid of it when you urinate or have a bowel movement. But if your body makes too much uric acid, or if your kidneys aren't working properly, uric acid can build up in the blood. Uric acid levels can also increase when you eat too many high-purine foods or take certain medicines like diuretics, aspirin, and niacin. Then crystals of uric acid can form and collect in the joints, causing painful inflammation. This condition is called gout.  Why do I need this test?  You might need this test if your healthcare provider wants to see whether you have high levels of uric acid in your blood. Your provider may recommend this test if you have symptoms of gout, although most people with hyperuricemia don't develop gout. Symptoms of gout include:  · Joint pain or tenderness  · Swelling in a joint or reddened skin around a joint  · Swelling and pain in the big toe, ankle, or knee  · Joints that are hot to the touch  · Swelling and pain that affects only one joint in the body  · Skin that looks shiny and is red or purple  You may also need this test if you have symptoms of kidney stones. Symptoms include:  · Severe pain along your lower back. This may repeatedly get worse and then ease up. The pain may also travel to your genitals.  · Nausea  · Vomiting  · Urgent need to urinate  · Blood in your urine  What other tests might I have along with this test?  Your healthcare provider may also order other tests to diagnose gout, include looking at a sample of joint fluid drawn out with a needle.  Your provider may also order a urinalysis if he or she suspects that you have a kidney stone. The urinalysis looks for blood, white blood cells, and crystals.  Your provider may also order tests of your blood and urine to find out what's causing the high levels uric acid.  What do my test results mean?  Many things may affect your lab test results. These  include the method each lab uses to do the test. Even if your test results are different from the normal value, you may not have a problem. To learn what the results mean for you, talk with your healthcare provider.  Results are given in milligrams per deciliter (mg/dL). Here are results that may mean you have hyperuricemia:  · For females: higher than 6 mg/dL  · For males: higher than 7 mg/dL  Many health conditions can cause high levels of uric acid. These include cancer, kidney disease, hypothyroidism, hyperparathyroidism, and sarcoidosis.  Your uric acid levels may be high if you eat foods high in purines, such as organ meats, dried beans and peas, and certain fish - anchovies, herring, sardines, and mackerel. High levels can also be caused by a low-salt diet.  How is this test done?  The test requires a blood sample, which is drawn through a needle from a vein in your arm.  Does this test pose any risks?  Taking a blood sample with a needle carries risks that include bleeding, infection, bruising, or feeling dizzy. When the needle pricks your arm, you may feel a slight stinging sensation or pain. Afterward, the site may be slightly sore.  What might affect my test results?  Certain medicines may affect your test results. These include:  · Aspirin and other medicines that contain salicylate  · Cyclosporine, a medicine sometimes used for autoimmune diseases  · Levodopa, a medicine used to treat Parkinson disease  · Certain diuretic medicines such as hydrochlorothiazide  · Vitamin B-3 (niacin)  Other things that may affect your test results include:  · Vigorous exercise  · Chemotherapy or radiation therapy to treat cancer  · Foods high in purines, including organ meats, mushrooms, some types of fish and seafood, and dried peas and beans   How do I get ready for this test?  Ask your healthcare provider if you should avoid any foods, beverages, or medications before the test.  Be sure your provider knows about all  medicines, herbs, vitamins, and supplements you are taking. This includes medicines that don't need a prescription and any illicit drugs you may use.      © 3159-6771 [employer]. [address] [place] [address], [place] [address]. All rights reserved. This information is not intended as a substitute for professional medical care. Always follow your healthcare professional's instructions.

## 2017-10-17 NOTE — TELEPHONE ENCOUNTER
Pt wants to know if  she can jocelynn back to allopurinol instead of uloric because it will be cheaper for her to get it , she will give it a try again to see if it works for her this time   Please advise

## 2017-10-17 NOTE — TELEPHONE ENCOUNTER
Okay, I sent the allopurinol in    I also recommend that she make an appointment to see a nephrologist for her kidneys, we had briefly mentioned it but I did not schedule a before she left.  Referral is in.  Thank you

## 2017-10-17 NOTE — PROGRESS NOTES
Subjective:       Patient ID: Olga Aguiar is a 82 y.o. female.    Chief Complaint: Annual Exam    Annual exam, short follow up    Multiple issues,  with her.    DM doing a lot better.  A1c 6.1.     Worsening lymphedema R arm, now even arm is swollen, hand too.  Used to get fitted for pump in [facility] and sleeve, now no help.  Would like to get a lymphedema pump.  Has been doing PT and this is better.     Recall that she has seen pulmonary 4/17.  She has COPD and medications have been adjusted per she's had to take steroids a couple of times, but not recently.  She still has cough and a little bit of shortness of breath but overall symptoms are slightly better stable.  Lung nodule- stable on CT 9/16.  Follow-up in April 2018 was recommended.     Blood pressure is stable.  Labs are also stable.       She has some CKD- stable.  Recall that she was supposed to see nephrology in 2015 but declined.  I still recommend this.  She will consider.      Her gastroenterologist had her do an MRI of the abdomen 11/16- all stable, 2 year follow up recommended (pancreatic cyst).     She has mild stable GERD symptoms.     She has seen her cardiologist 2/17.  She has aortic stenosis.  She denies syncope, chest pain, pressure, tightness.  Chest chronic stable shortness of breath.  She has trace chronic edema, but no PND or orthopnea.     DEXA 5/17 osteopenia- reviewed.    Due for mammogram.      Gout - elevated uric acid.   No new sx.  Used to take uloric; apparently did not tolerate allopurinol; will resume.    Ectatic aorta seen on CT 2016. Abdominal- will get u/s.    Some swelling both legs L > R, worse at end of day.    Patient Active Problem List:     Hypertension associated with diabetes     Mixed hyperlipidemia, baseline      Chronic pain syndrome     Lumbosacral spondylosis without myelopathy     Adrenal adenoma: 2.8 X 2.1CM 2010; stable 2016     Gout attack - Right Foot     Intercritical gout - Left  Foot     Abdominal obesity     Lymph edema, right arm     Peripheral edema     Gallbladder polyp     CKD (chronic kidney disease) stage 3, GFR 30-59 ml/min     Aortic valve stenosis, mild- moderate, MARI 1.44 1/16     LVH (left ventricular hypertrophy)     Pancreatic cyst: stable on MRI 11/16 per GI repeat 2018     Cervical spinal stenosis     Type 2 diabetes mellitus without complication, without long-term current use of insulin     Acquired hypothyroidism     Transient cerebral ischemia: 2012     Primary insomnia     Gastroesophageal reflux disease without esophagitis     Multiple lung nodules: stable CT 10/2016     Former smoker: 1 pack daily for < 20 years, quit 1973     COPD (chronic obstructive pulmonary disease) with chronic bronchitis, 2013     Hypokalemia     Facet hypertrophy of lumbar region     History of breast cancer     Intermittent asthma     Radiation fibrosis of lung     Bronchiectasis without complication     Ectatic abdominal aorta: see CT scan 10/16     Aortic atherosclerosis: see CT scan 2016     Renal cyst, right: stable per CT 2016     Lumbar radiculopathy     Abnormal ECG     Encounter for monitoring proton pump inhibitor therapy     Diverticulosis of large intestine without hemorrhage: see CT 10/14        Review of Systems   Constitutional: Negative for activity change, appetite change, chills, fatigue and fever.   HENT: Negative for congestion, hearing loss, sinus pressure and sore throat.    Eyes: Negative for visual disturbance.   Respiratory: Positive for shortness of breath. Negative for apnea, cough and wheezing.    Cardiovascular: Positive for leg swelling. Negative for chest pain and palpitations.   Gastrointestinal: Negative for abdominal distention, abdominal pain, constipation, diarrhea, nausea and vomiting.   Genitourinary: Negative for dysuria, frequency, hematuria and vaginal bleeding.   Musculoskeletal: Positive for arthralgias and back pain. Negative for gait problem, joint  swelling and myalgias.        Lymphedema   Skin: Negative for rash.   Neurological: Negative for dizziness, weakness, light-headedness and headaches.   Hematological: Negative for adenopathy. Does not bruise/bleed easily.   Psychiatric/Behavioral: Negative for confusion, hallucinations, sleep disturbance and suicidal ideas.       Objective:      Physical Exam   Constitutional: She is oriented to person, place, and time. She appears well-developed and well-nourished.   HENT:   Head: Normocephalic and atraumatic.   Right Ear: External ear normal.   Left Ear: External ear normal.   Nose: Nose normal.   Mouth/Throat: Oropharynx is clear and moist. No oropharyngeal exudate.   Eyes: Conjunctivae and EOM are normal. No scleral icterus.   Neck: Normal range of motion. Neck supple. No JVD present. No thyromegaly present.   Cardiovascular: Normal rate, regular rhythm and intact distal pulses.  Exam reveals no gallop.    Murmur heard.  Pulmonary/Chest: Effort normal and breath sounds normal. No respiratory distress. She has no wheezes.   Abdominal: Soft. Bowel sounds are normal. She exhibits no distension and no mass. There is no tenderness. There is no rebound and no guarding.   Musculoskeletal: Normal range of motion. She exhibits no edema or tenderness.   Lymphedema R arm  Trace edema L > R  No cords or Homans sign   Lymphadenopathy:     She has no cervical adenopathy.   Neurological: She is alert and oriented to person, place, and time. She displays normal reflexes. No cranial nerve deficit. Coordination normal.   Skin: Skin is warm. No rash noted. No erythema.   Psychiatric: She has a normal mood and affect. Her behavior is normal. Judgment and thought content normal.   Nursing note and vitals reviewed.      Assessment:       1. Annual physical exam    2. Lumbosacral spondylosis without myelopathy    3. COPD (chronic obstructive pulmonary disease) with chronic bronchitis, 2013    4. Radiation fibrosis of lung    5.  Multiple lung nodules: stable CT 10/2016    6. Hypertension associated with diabetes    7. Ectatic abdominal aorta: see CT scan 10/16    8. Mixed hyperlipidemia, baseline     9. Aortic valve stenosis, mild- moderate, MARI 1.44 1/16    10. CKD (chronic kidney disease) stage 3, GFR 30-59 ml/min    11. History of breast cancer    12. Type 2 diabetes mellitus without complication, without long-term current use of insulin    13. Acquired hypothyroidism    14. Gallbladder polyp    15. Pancreatic cyst: stable on MRI 11/16 per GI repeat 2018    16. Lymph edema, right arm    17. Ectatic abdominal aorta    18. Idiopathic gout, unspecified chronicity, unspecified site    19. Localized edema    20. Gastroesophageal reflux disease without esophagitis        Plan:         Annual physical exam    Lumbosacral spondylosis without myelopathy    COPD (chronic obstructive pulmonary disease) with chronic bronchitis, 2013: keep Pulmonary follow up    Radiation fibrosis of lung: keep Pulmonary follow up    Multiple lung nodules: stable CT 10/2016: keep Pulmonary follow up    Hypertension associated with diabetes: continue on meds    Ectatic abdominal aorta: see CT scan 10/16  -     Cardiology Lab Abdominal Aorta Evaluation; Future    Mixed hyperlipidemia, baseline : improved on tx    Aortic valve stenosis, mild- moderate, MARI 1.44 1/16    CKD (chronic kidney disease) stage 3, GFR 30-59 ml/min: Hydration, avoid nephrotoxins.  Recommended nephrology follow-up; she will consider    History of breast cancer  -     Mammo Digital Diagnostic Bilateral With CAD; Future; Expected date: 10/17/2017    Type 2 diabetes mellitus without complication, without long-term current use of insulin: Improved on treatment  -     Comprehensive metabolic panel; Future; Expected date: 10/17/2017  -     Hemoglobin A1c; Future; Expected date: 10/17/2017    Acquired hypothyroidism: Stable on regimen    Gallbladder polyp: Discussed, anticipate follow-up  in the next year    Pancreatic cyst: stable on MRI 11/16 per GI repeat 2018    Lymph edema, right arm: Continue with treatment and PT    Ectatic abdominal aorta  -     Cardiology Lab Abdominal Aorta Evaluation; Future    Idiopathic gout, unspecified chronicity, unspecified site: Diet information and handouts given; repeat labs in 2 months  -     febuxostat (ULORIC) 40 mg Tab; Take 1 tablet (40 mg total) by mouth once daily.  Dispense: 90 tablet; Refill: 3  -     Uric acid; Future; Expected date: 10/17/2017    Localized edema  -     CAR Ultrasound doppler venous legs bilat; Future    Gastroesophageal reflux disease without esophagitis: No alarm symptoms    Flu vaccine today    I will review all studies and determine further tx depending on findings    Screenings up to date  No falls, no fall risk  No dementia or depression  Immunizations up to date  Advance directives to be discussed

## 2017-10-25 ENCOUNTER — PATIENT MESSAGE (OUTPATIENT)
Dept: INTERNAL MEDICINE | Facility: CLINIC | Age: 82
End: 2017-10-25

## 2017-10-25 ENCOUNTER — CLINICAL SUPPORT (OUTPATIENT)
Dept: CARDIOLOGY | Facility: CLINIC | Age: 82
End: 2017-10-25
Payer: MEDICARE

## 2017-10-25 DIAGNOSIS — R60.0 LOCALIZED EDEMA: ICD-10-CM

## 2017-10-25 DIAGNOSIS — I77.811 ECTATIC ABDOMINAL AORTA: ICD-10-CM

## 2017-10-25 LAB — VASCULAR ABDOMINAL AORTIC ANEURYSM (AAA): 2.22

## 2017-10-25 PROCEDURE — 93978 VASCULAR STUDY: CPT | Mod: S$GLB,,, | Performed by: INTERNAL MEDICINE

## 2017-10-25 PROCEDURE — 93970 EXTREMITY STUDY: CPT | Mod: S$GLB,,, | Performed by: INTERNAL MEDICINE

## 2017-11-21 DIAGNOSIS — E78.5 HYPERLIPIDEMIA, UNSPECIFIED HYPERLIPIDEMIA TYPE: ICD-10-CM

## 2017-11-21 RX ORDER — SIMVASTATIN 40 MG/1
TABLET, FILM COATED ORAL
Qty: 90 TABLET | Refills: 0 | Status: SHIPPED | OUTPATIENT
Start: 2017-11-21 | End: 2018-02-14 | Stop reason: SDUPTHER

## 2017-12-11 ENCOUNTER — DOCUMENTATION ONLY (OUTPATIENT)
Dept: FAMILY MEDICINE | Facility: CLINIC | Age: 82
End: 2017-12-11

## 2017-12-11 ENCOUNTER — OFFICE VISIT (OUTPATIENT)
Dept: FAMILY MEDICINE | Facility: CLINIC | Age: 82
End: 2017-12-11
Payer: MEDICARE

## 2017-12-11 VITALS
WEIGHT: 166.25 LBS | BODY MASS INDEX: 29.46 KG/M2 | SYSTOLIC BLOOD PRESSURE: 126 MMHG | HEART RATE: 94 BPM | TEMPERATURE: 98 F | DIASTOLIC BLOOD PRESSURE: 73 MMHG | HEIGHT: 63 IN

## 2017-12-11 DIAGNOSIS — I15.2 HYPERTENSION ASSOCIATED WITH DIABETES: ICD-10-CM

## 2017-12-11 DIAGNOSIS — E11.59 HYPERTENSION ASSOCIATED WITH DIABETES: ICD-10-CM

## 2017-12-11 DIAGNOSIS — J44.89 COPD (CHRONIC OBSTRUCTIVE PULMONARY DISEASE) WITH CHRONIC BRONCHITIS: Primary | ICD-10-CM

## 2017-12-11 DIAGNOSIS — E11.9 TYPE 2 DIABETES MELLITUS WITHOUT COMPLICATION, WITHOUT LONG-TERM CURRENT USE OF INSULIN: ICD-10-CM

## 2017-12-11 PROCEDURE — 99214 OFFICE O/P EST MOD 30 MIN: CPT | Mod: S$GLB,,, | Performed by: PHYSICIAN ASSISTANT

## 2017-12-11 PROCEDURE — 99999 PR PBB SHADOW E&M-EST. PATIENT-LVL III: CPT | Mod: PBBFAC,,, | Performed by: PHYSICIAN ASSISTANT

## 2017-12-11 PROCEDURE — 99499 UNLISTED E&M SERVICE: CPT | Mod: S$GLB,,, | Performed by: PHYSICIAN ASSISTANT

## 2017-12-11 RX ORDER — OMEPRAZOLE 20 MG/1
20 CAPSULE, DELAYED RELEASE ORAL DAILY
COMMUNITY
Start: 2017-10-30 | End: 2018-05-29 | Stop reason: SDUPTHER

## 2017-12-11 RX ORDER — DOXYCYCLINE HYCLATE 100 MG
100 TABLET ORAL 2 TIMES DAILY
Qty: 20 TABLET | Refills: 0 | Status: SHIPPED | OUTPATIENT
Start: 2017-12-11 | End: 2018-03-05

## 2017-12-11 NOTE — PROGRESS NOTES
Pre-Visit Chart Review  For Appointment Scheduled on   //  Health Maintenance Due   Topic Date Due    Mammogram  10/28/2017

## 2017-12-11 NOTE — PROGRESS NOTES
Subjective:       Patient ID: Olga Aguiar is a 82 y.o. female.    Chief Complaint: Cough and Otalgia    URI    This is a new problem. The current episode started in the past 7 days. The problem has been waxing and waning. Maximum temperature: subjective fever. Associated symptoms include congestion, coughing, ear pain (left sided), joint pain, a plugged ear sensation, rhinorrhea, a sore throat and wheezing. Pertinent negatives include no abdominal pain, chest pain, diarrhea, dysuria, joint swelling, nausea, neck pain, sinus pain, sneezing, swollen glands or vomiting. She has tried acetaminophen (coricidin) for the symptoms. The treatment provided no relief.     Review of Systems   HENT: Positive for congestion, ear pain (left sided), rhinorrhea and sore throat. Negative for ear discharge, nosebleeds, postnasal drip, sinus pain, sinus pressure, sneezing and trouble swallowing.    Respiratory: Positive for cough, shortness of breath and wheezing. Negative for choking and stridor.    Cardiovascular: Negative for chest pain, palpitations and leg swelling.   Gastrointestinal: Negative for abdominal pain, diarrhea, nausea and vomiting.   Genitourinary: Negative for dysuria.   Musculoskeletal: Positive for joint pain. Negative for neck pain.       Objective:      Physical Exam   Constitutional: Vital signs are normal. She appears well-developed and well-nourished. No distress.   HENT:   Head: Normocephalic and atraumatic.   Right Ear: Hearing, tympanic membrane, external ear and ear canal normal.   Left Ear: Hearing, tympanic membrane, external ear and ear canal normal.   Nose: Mucosal edema and rhinorrhea present.   Mouth/Throat: Uvula is midline, oropharynx is clear and moist and mucous membranes are normal.   Cardiovascular: Normal rate, regular rhythm, S1 normal, S2 normal and normal heart sounds.  Exam reveals no gallop.    No murmur heard.  Pulses:       Radial pulses are 2+ on the right side, and 2+ on the  left side.   <2sec cap refill fingers bilat     Pulmonary/Chest: Effort normal and breath sounds normal. No respiratory distress. She has no wheezes. She has no rhonchi.   Lymphadenopathy:        Head (right side): No submental, no submandibular, no tonsillar, no preauricular, no posterior auricular and no occipital adenopathy present.        Head (left side): No submental, no submandibular, no tonsillar, no preauricular, no posterior auricular and no occipital adenopathy present.   Skin: Skin is warm and dry. She is not diaphoretic.   Appropriate skin turgor   Psychiatric: She has a normal mood and affect. Her speech is normal and behavior is normal. Judgment and thought content normal. Cognition and memory are normal.       Assessment:       1. COPD (chronic obstructive pulmonary disease) with chronic bronchitis, 2013    2. Hypertension associated with diabetes    3. Type 2 diabetes mellitus without complication, without long-term current use of insulin        Plan:   Olga was seen today for cough and otalgia.    Diagnoses and all orders for this visit:    COPD (chronic obstructive pulmonary disease) with chronic bronchitis, 2013  Current exacerbation  Start -     doxycycline (VIBRA-TABS) 100 MG tablet; Take 1 tablet (100 mg total) by mouth 2 (two) times daily.  Start short course of prednisone for 3-6 days prescribed by Dr. Heller.  Amanda to use OTC Coricidin as directed for cold symptoms  Take antibiotics with food.  Increase fluid intake.  Call the clinic if symptoms worsen, new symptoms develop or if you are not any better after completion of your antibiotics.        Hypertension associated with diabetes  Well controlled; no changes needed    Type 2 diabetes mellitus without complication, without long-term current use of insulin  Well controlled  No changes needed    Patient readiness: acceptance and barriers:none    During the course of the visit the patient was educated and counseled about the following:      Diabetes:  Discussed general issues about diabetes pathophysiology and management.  Hypertension:   Medication: no change.    Goals: Diabetes: Maintain Hemoglobin A1C below 7 and Hypertension: Reduce Blood Pressure    Did patient meet goals/outcomes: No    The following self management tools provided: declined    Patient Instructions (the written plan) was given to the patient/family.     Time spent with patient: 20  minutes    Barriers to medications present (no )    Adverse reactions to current medications (no)    Over the counter medications reviewed (Yes)

## 2017-12-14 RX ORDER — LEVOTHYROXINE SODIUM 75 UG/1
TABLET ORAL
Qty: 90 TABLET | Refills: 0 | Status: SHIPPED | OUTPATIENT
Start: 2017-12-14 | End: 2018-03-20 | Stop reason: SDUPTHER

## 2017-12-22 ENCOUNTER — DOCUMENTATION ONLY (OUTPATIENT)
Dept: FAMILY MEDICINE | Facility: CLINIC | Age: 82
End: 2017-12-22

## 2017-12-22 NOTE — PROGRESS NOTES
Pre-Visit Chart Review  For Appointment Scheduled on (date)    Health Maintenance Due   Topic Date Due    Mammogram  10/28/2017    Eye Exam  01/16/2018

## 2017-12-27 DIAGNOSIS — L03.113 CELLULITIS OF RIGHT UPPER EXTREMITY: ICD-10-CM

## 2017-12-27 RX ORDER — CEPHALEXIN 500 MG/1
CAPSULE ORAL
Qty: 30 CAPSULE | Refills: 3 | Status: SHIPPED | OUTPATIENT
Start: 2017-12-27 | End: 2018-03-05

## 2018-01-03 RX ORDER — TRAMADOL HYDROCHLORIDE 50 MG/1
TABLET ORAL
Qty: 60 TABLET | Refills: 2 | Status: SHIPPED | OUTPATIENT
Start: 2018-01-03 | End: 2018-06-04 | Stop reason: SDUPTHER

## 2018-01-10 ENCOUNTER — HOSPITAL ENCOUNTER (OUTPATIENT)
Dept: RADIOLOGY | Facility: HOSPITAL | Age: 83
Discharge: HOME | End: 2018-01-10
Attending: INTERNAL MEDICINE
Payer: MEDICARE

## 2018-01-10 VITALS — WEIGHT: 166 LBS | HEIGHT: 63 IN | BODY MASS INDEX: 29.41 KG/M2

## 2018-01-10 DIAGNOSIS — Z85.3 HISTORY OF BREAST CANCER: ICD-10-CM

## 2018-01-10 DIAGNOSIS — K21.9 GASTROESOPHAGEAL REFLUX DISEASE WITHOUT ESOPHAGITIS: ICD-10-CM

## 2018-01-10 PROCEDURE — 77062 BREAST TOMOSYNTHESIS BI: CPT | Mod: 26,,, | Performed by: RADIOLOGY

## 2018-01-10 PROCEDURE — 77066 DX MAMMO INCL CAD BI: CPT | Mod: TC,PO

## 2018-01-10 PROCEDURE — 77066 DX MAMMO INCL CAD BI: CPT | Mod: 26,,, | Performed by: RADIOLOGY

## 2018-01-10 NOTE — TELEPHONE ENCOUNTER
Spoke to pt regarding dm eye exam. She would like to schedule with the same provider she saw last year in [place]. Please schedule and advise pt. Thanks.

## 2018-01-11 RX ORDER — SUCRALFATE 1 G/1
1 TABLET ORAL 2 TIMES DAILY
Qty: 180 TABLET | Refills: 0 | Status: SHIPPED | OUTPATIENT
Start: 2018-01-11 | End: 2018-05-29 | Stop reason: SDUPTHER

## 2018-01-24 ENCOUNTER — PATIENT MESSAGE (OUTPATIENT)
Dept: INTERNAL MEDICINE | Facility: CLINIC | Age: 83
End: 2018-01-24

## 2018-01-24 ENCOUNTER — LAB VISIT (OUTPATIENT)
Dept: LAB | Facility: HOSPITAL | Age: 83
End: 2018-01-24
Attending: INTERNAL MEDICINE
Payer: MEDICARE

## 2018-01-24 DIAGNOSIS — E11.9 TYPE 2 DIABETES MELLITUS WITHOUT COMPLICATION, WITHOUT LONG-TERM CURRENT USE OF INSULIN: ICD-10-CM

## 2018-01-24 DIAGNOSIS — M10.00 IDIOPATHIC GOUT, UNSPECIFIED CHRONICITY, UNSPECIFIED SITE: ICD-10-CM

## 2018-01-24 LAB
ALBUMIN SERPL BCP-MCNC: 3.2 G/DL
ALP SERPL-CCNC: 76 U/L
ALT SERPL W/O P-5'-P-CCNC: 18 U/L
ANION GAP SERPL CALC-SCNC: 8 MMOL/L
AST SERPL-CCNC: 22 U/L
BILIRUB SERPL-MCNC: 0.5 MG/DL
BUN SERPL-MCNC: 14 MG/DL
CALCIUM SERPL-MCNC: 9.5 MG/DL
CHLORIDE SERPL-SCNC: 101 MMOL/L
CO2 SERPL-SCNC: 31 MMOL/L
CREAT SERPL-MCNC: 1.1 MG/DL
EST. GFR  (AFRICAN AMERICAN): 54 ML/MIN/1.73 M^2
EST. GFR  (NON AFRICAN AMERICAN): 46.9 ML/MIN/1.73 M^2
ESTIMATED AVG GLUCOSE: 114 MG/DL
GLUCOSE SERPL-MCNC: 89 MG/DL
HBA1C MFR BLD HPLC: 5.6 %
POTASSIUM SERPL-SCNC: 3.4 MMOL/L
PROT SERPL-MCNC: 6.7 G/DL
SODIUM SERPL-SCNC: 140 MMOL/L
URATE SERPL-MCNC: 5.6 MG/DL

## 2018-01-24 PROCEDURE — 80053 COMPREHEN METABOLIC PANEL: CPT

## 2018-01-24 PROCEDURE — 83036 HEMOGLOBIN GLYCOSYLATED A1C: CPT

## 2018-01-24 PROCEDURE — 84550 ASSAY OF BLOOD/URIC ACID: CPT

## 2018-01-24 PROCEDURE — 36415 COLL VENOUS BLD VENIPUNCTURE: CPT | Mod: PO

## 2018-01-27 ENCOUNTER — PATIENT MESSAGE (OUTPATIENT)
Dept: PAIN MEDICINE | Facility: CLINIC | Age: 83
End: 2018-01-27

## 2018-01-29 ENCOUNTER — PATIENT MESSAGE (OUTPATIENT)
Dept: OPTOMETRY | Facility: CLINIC | Age: 83
End: 2018-01-29

## 2018-01-29 NOTE — TELEPHONE ENCOUNTER
Spoke with the patient and follow up appointment scheduled with a reminder mailed to home address.

## 2018-02-09 ENCOUNTER — PATIENT MESSAGE (OUTPATIENT)
Dept: INTERNAL MEDICINE | Facility: CLINIC | Age: 83
End: 2018-02-09

## 2018-02-09 DIAGNOSIS — Z00.00 PHYSICAL EXAM, ANNUAL: Primary | ICD-10-CM

## 2018-02-12 NOTE — TELEPHONE ENCOUNTER
OK labs signed thanks    She is also due for an eye exam, please let her know and ask her if she wants us to schedule that for her.  Thank you

## 2018-02-14 DIAGNOSIS — E78.5 HYPERLIPIDEMIA, UNSPECIFIED HYPERLIPIDEMIA TYPE: ICD-10-CM

## 2018-02-14 DIAGNOSIS — F51.01 PRIMARY INSOMNIA: ICD-10-CM

## 2018-02-14 RX ORDER — SIMVASTATIN 40 MG/1
40 TABLET, FILM COATED ORAL NIGHTLY
Qty: 90 TABLET | Refills: 0 | Status: SHIPPED | OUTPATIENT
Start: 2018-02-14 | End: 2018-05-29 | Stop reason: SDUPTHER

## 2018-02-14 RX ORDER — ZOLPIDEM TARTRATE 5 MG/1
5 TABLET ORAL NIGHTLY PRN
Qty: 30 TABLET | Refills: 0 | Status: SHIPPED | OUTPATIENT
Start: 2018-02-14 | End: 2018-03-20 | Stop reason: SDUPTHER

## 2018-02-16 ENCOUNTER — OFFICE VISIT (OUTPATIENT)
Dept: PAIN MEDICINE | Facility: CLINIC | Age: 83
End: 2018-02-16
Payer: MEDICARE

## 2018-02-16 VITALS
WEIGHT: 169 LBS | BODY MASS INDEX: 29.93 KG/M2 | DIASTOLIC BLOOD PRESSURE: 75 MMHG | TEMPERATURE: 98 F | HEART RATE: 77 BPM | SYSTOLIC BLOOD PRESSURE: 169 MMHG | OXYGEN SATURATION: 95 % | RESPIRATION RATE: 18 BRPM

## 2018-02-16 DIAGNOSIS — M51.36 DDD (DEGENERATIVE DISC DISEASE), LUMBAR: ICD-10-CM

## 2018-02-16 DIAGNOSIS — M54.16 LUMBAR RADICULOPATHY: ICD-10-CM

## 2018-02-16 DIAGNOSIS — M47.816 LUMBAR SPONDYLOSIS: Primary | ICD-10-CM

## 2018-02-16 PROCEDURE — 1159F MED LIST DOCD IN RCRD: CPT | Mod: S$GLB,,, | Performed by: PHYSICIAN ASSISTANT

## 2018-02-16 PROCEDURE — 3008F BODY MASS INDEX DOCD: CPT | Mod: S$GLB,,, | Performed by: PHYSICIAN ASSISTANT

## 2018-02-16 PROCEDURE — 99213 OFFICE O/P EST LOW 20 MIN: CPT | Mod: S$GLB,,, | Performed by: PHYSICIAN ASSISTANT

## 2018-02-16 PROCEDURE — 99999 PR PBB SHADOW E&M-EST. PATIENT-LVL V: CPT | Mod: PBBFAC,,, | Performed by: PHYSICIAN ASSISTANT

## 2018-02-16 PROCEDURE — 1125F AMNT PAIN NOTED PAIN PRSNT: CPT | Mod: S$GLB,,, | Performed by: PHYSICIAN ASSISTANT

## 2018-02-16 RX ORDER — SODIUM CHLORIDE, SODIUM LACTATE, POTASSIUM CHLORIDE, CALCIUM CHLORIDE 600; 310; 30; 20 MG/100ML; MG/100ML; MG/100ML; MG/100ML
INJECTION, SOLUTION INTRAVENOUS CONTINUOUS
Status: CANCELLED | OUTPATIENT
Start: 2018-03-06

## 2018-02-20 NOTE — PROGRESS NOTES
CC: Back and leg pain    HPI: The patient is a 82-year-old woman with a history of diabetes, previous breast CA, CVA and spinal stenosis who presents in referral from her primary care physician, Dr. Barger for back pain and leg pain.  She returns in follow-up today with back pain with intermittent radiation into her bilateral buttocks.  She describes her back pain as aching, worse with standing and improved with sitting.  She also has some improvement when she takes oral steroids typically once a month for her lungs.  She leans of intermittent numbness in her legs but not on a daily basis.  She reports weakness in her legs.  She denies bladder or bowel incontinence.    Pain intervention history: She tried physical therapy about 1 year ago and it seemed to make her pain worse. Patient is status post right L3 and L4 transforaminal injection on 8/9/13 with 50% relief of low back and right leg pain.  She is status post bilateral L4 transforaminal epidural steroid injections on 9/27/13 and 11/1/13 with 10-20% relief.  She is status post radiofrequency ablation of the left L3, 4, 5 medial branch nerves on 5/5/14 with 50% relief of her left low back pain.  She is status post radiofrequency ablation of the right L3, 4 and 5 medial branch nerves on 6/16/14 with mild relief, however she reported significant more relief at a later visit.  She is status post bilateral L4 transforaminal epidural steroid injections on 9/24/14 with moderate relief.  She is status post bilateral L4 transforaminal epidural steroid injections on 1/7/15 with complete relief of her posterior thigh pain, moderate relief of her low back pain and minimal relief of her right lateral hip pain.  She is status post C7-T1 cervical interlaminar epidural steroid injection on 2/27/15 with greater than 50% relief.  She is status post bilateral L3, 4 and 5 medial branch radio frequency ablation on 7/29/16 reporting 0% relief initially at 4 weeks but then reporting  90% relief after 6-7 weeks.  She is status post bilateral L4 transforaminal epidural steroid injections on 11/11/16 with not quite 50% relief.  She is status post bilateral L4 transforaminal epidural steroid injections on 4/4/17 with 50% relief.  She is status post bilateral L4 transforaminal epidural steroid injections on 8/23/17 with greater than 80% relief.    ROS:She reports easy bruising, back pain and difficulty sleeping.  Balance of review of systems is negative.    Medical, surgical, family and social history reviewed elsewhere in record.    Medications/Allergies: See med card    Vitals:    02/16/18 1457   BP: (!) 169/75   Pulse: 77   Resp: 18   Temp: 98.1 °F (36.7 °C)   TempSrc: Oral   SpO2: 95%   Weight: 76.6 kg (168 lb 15.7 oz)   PainSc:   6   PainLoc: Back         Physical exam:  Gen: A and O x3, pleasant, well-groomed  Skin: No rashes or obvious lesions  HEENT: PERRLA  CVS: Regular rate and rhythm, normal S1 and S2, no murmurs.  Resp: Clear to auscultation bilaterally, no wheezes or rales.  Abdomen: Soft, NT/ND, normal bowel sounds present.  Musculoskeletal: able to stand and walk short distances without assistance, however uses a walker when out of the house.  She has disuse atrophy of the lumbar paraspinous muscles.    Neuro:  Upper extremities: 5/5 strength bilaterally   Lower extremities: 5/5 strength bilaterally  Reflexes: Brachioradialis 2+, Bicep 2+, Tricep 2+. Patellar 2+, Achilles 2+.  Sensory: Intact and symmetrical to light touch and pinprick in C2-T1 dermatomes bilaterally.  Intact and symmetrical to light touch and pinprick in L2-S1 dermatomes bilaterally, except for a small patch over her left lateral shin decreased with light touch and pinprick.    Lumbar spine:  Lumbar spine: ROM is moderately reduced with flexion extension and oblique extension with mild increased pain in the low back with extension.  Terry's test is deferred.  Supine straight leg raise is negative bilaterally.    Internal and external rotation of the hip causes no increased pain on either side.  Myofascial exam: No tenderness to palpation across lumbar paraspinous muscles.      Imagin13 MRI L-spine  T10-T11: There is severe disk desiccation and a moderate diffuse disk bulge and ligamentous hypertrophy. This is not complete evaluation of the thoracic spine, but there does appear to be mild central canal stenosis.  T11-T12: Moderate disk desiccation and mild diffuse disk bulge. Mild narrowing of the thecal sac. Neural foraminal regions difficult to assess due to the scoliosis.  T12-L1: Severe disk degenerative disease with marked desiccation, diffuse disk bulge, and spurring of vertebral bodies. Mild central canal stenosis. Neural foraminal narrowing bilaterally, difficult to grade due to scoliosis.  L1-L2: Advanced disk desiccation with moderate diffuse disk bulge and mild spurring of vertebral bodies. Small superimposed central to right paracentral disk extrusion. Mild central canal stenosis. Mild to moderate facet arthropathy and ligamentous hypertrophy. Bilateral foraminal stenosis.  L2 - L3: There is also disk desiccation and diffuse disk bulge and small annular fissure posteriorly. facet arthropathy and hypertrophy ligamentum flavum. Mild central canal stenosis. Moderate right and mild left neuroforaminal stenosis.  L3-L4: Disk desiccation and moderate diffuse disk bulge. Small right paracentral ascending disk extrusion and moderate facet arthropathy present bilaterally and hypertrophy of ligamentum flavum. In combination, the findings result in severe central canal stenosis. For example see axial image 22, central image 7. There is mild to moderate neural foraminal stenosis bilaterally.  L4 - L5: Mild anterolisthesis with disk desiccation and uncovering of the disk, with moderate right and severe left facet arthropathy and hypertrophy of ligamentum flavum. Severe central canal stenosis. Mild neural foraminal  narrowing, right worse than left.  L5-S1: Disk desiccation and mild diffuse disk bulge. A small ascending disk extrusion centrally in the midline. No significant central canal stenosis. Advanced facet arthropathy bilaterally, left worse than right. Mild left neural foraminal narrowing. No significant right neural foraminal stenosis.      Assessment:   The patient is a 82-year-old woman with a history of diabetes, previous breast CA, CVA and spinal stenosis who presents in referral from her primary care physician, Dr. Barger for back pain and leg pain.     1. Lumbar spondylosis  Vital signs    Activity as tolerated    Place 18-22 [facility] [place]     Verify informed consent    Notify physician     Notify physician     Notify physician (specify)    Diet NPO    Case Request Operating Room: ABLATION, RADIOFREQUENCY L3,4,5    Place in Outpatient    lactated ringers infusion   2. Lumbar radiculopathy     3. DDD (degenerative disc disease), lumbar         Plan:  1.  I will set the patient up the repeat bilateral L3, 4, 5 medial branch RFA.  She has done well with this in the past.  It typically takes about 6 weeks for her RFA to take full effect so we will have her follow-up 6 weeks after.  She reports receiving 90% relief with her most recent RFA.  This will also be done without steroids because she takes oral steroids usually once a month from her pulmonologist.  We may consider repeating bilateral L4/5 TF ANUEL's in the future if necessary.

## 2018-03-03 DIAGNOSIS — M51.36 DDD (DEGENERATIVE DISC DISEASE), LUMBAR: Primary | ICD-10-CM

## 2018-03-05 RX ORDER — ACYCLOVIR 400 MG/1
400 TABLET ORAL 2 TIMES DAILY PRN
Status: ON HOLD | COMMUNITY
End: 2023-11-30 | Stop reason: HOSPADM

## 2018-03-06 ENCOUNTER — HOSPITAL ENCOUNTER (OUTPATIENT)
Facility: HOSPITAL | Age: 83
Discharge: HOME | End: 2018-03-06
Attending: ANESTHESIOLOGY | Admitting: ANESTHESIOLOGY
Payer: MEDICARE

## 2018-03-06 ENCOUNTER — SURGERY (OUTPATIENT)
Age: 83
End: 2018-03-06

## 2018-03-06 ENCOUNTER — HOSPITAL ENCOUNTER (OUTPATIENT)
Dept: RADIOLOGY | Facility: HOSPITAL | Age: 83
Discharge: HOME | End: 2018-03-06
Attending: ANESTHESIOLOGY | Admitting: ANESTHESIOLOGY
Payer: MEDICARE

## 2018-03-06 VITALS
RESPIRATION RATE: 18 BRPM | WEIGHT: 160 LBS | DIASTOLIC BLOOD PRESSURE: 74 MMHG | HEART RATE: 68 BPM | TEMPERATURE: 97 F | BODY MASS INDEX: 28.35 KG/M2 | HEIGHT: 63 IN | SYSTOLIC BLOOD PRESSURE: 168 MMHG | OXYGEN SATURATION: 96 %

## 2018-03-06 DIAGNOSIS — M51.36 DDD (DEGENERATIVE DISC DISEASE), LUMBAR: ICD-10-CM

## 2018-03-06 DIAGNOSIS — M47.816 LUMBAR SPONDYLOSIS: Primary | ICD-10-CM

## 2018-03-06 LAB
GLUCOSE SERPL-MCNC: 60 MG/DL (ref 70–110)
GLUCOSE SERPL-MCNC: 60 MG/DL (ref 70–110)

## 2018-03-06 PROCEDURE — 63600175 PHARM REV CODE 636 W HCPCS: Mod: PO | Performed by: ANESTHESIOLOGY

## 2018-03-06 PROCEDURE — 76000 FLUOROSCOPY <1 HR PHYS/QHP: CPT | Mod: TC,PO

## 2018-03-06 PROCEDURE — 64636 DESTROY L/S FACET JNT ADDL: CPT | Mod: 50,PO | Performed by: ANESTHESIOLOGY

## 2018-03-06 PROCEDURE — 64635 DESTROY LUMB/SAC FACET JNT: CPT | Mod: 50,,, | Performed by: ANESTHESIOLOGY

## 2018-03-06 PROCEDURE — 99152 MOD SED SAME PHYS/QHP 5/>YRS: CPT | Mod: ,,, | Performed by: ANESTHESIOLOGY

## 2018-03-06 PROCEDURE — 64636 DESTROY L/S FACET JNT ADDL: CPT | Mod: 50,,, | Performed by: ANESTHESIOLOGY

## 2018-03-06 PROCEDURE — 82962 GLUCOSE BLOOD TEST: CPT | Mod: PO | Performed by: ANESTHESIOLOGY

## 2018-03-06 PROCEDURE — 64635 DESTROY LUMB/SAC FACET JNT: CPT | Mod: 50,PO | Performed by: ANESTHESIOLOGY

## 2018-03-06 PROCEDURE — 25000003 PHARM REV CODE 250: Mod: PO | Performed by: ANESTHESIOLOGY

## 2018-03-06 RX ORDER — SODIUM CHLORIDE, SODIUM LACTATE, POTASSIUM CHLORIDE, CALCIUM CHLORIDE 600; 310; 30; 20 MG/100ML; MG/100ML; MG/100ML; MG/100ML
INJECTION, SOLUTION INTRAVENOUS CONTINUOUS
Status: DISCONTINUED | OUTPATIENT
Start: 2018-03-06 | End: 2018-03-06 | Stop reason: HOSPADM

## 2018-03-06 RX ORDER — MIDAZOLAM HYDROCHLORIDE 2 MG/2ML
INJECTION, SOLUTION INTRAMUSCULAR; INTRAVENOUS
Status: DISCONTINUED | OUTPATIENT
Start: 2018-03-06 | End: 2018-03-06 | Stop reason: HOSPADM

## 2018-03-06 RX ORDER — FENTANYL CITRATE 50 UG/ML
INJECTION, SOLUTION INTRAMUSCULAR; INTRAVENOUS
Status: DISCONTINUED | OUTPATIENT
Start: 2018-03-06 | End: 2018-03-06 | Stop reason: HOSPADM

## 2018-03-06 RX ORDER — DEXTROSE MONOHYDRATE 50 MG/ML
INJECTION, SOLUTION INTRAVENOUS CONTINUOUS
Status: DISCONTINUED | OUTPATIENT
Start: 2018-03-06 | End: 2018-03-06 | Stop reason: HOSPADM

## 2018-03-06 RX ORDER — LIDOCAINE HYDROCHLORIDE 20 MG/ML
INJECTION, SOLUTION INFILTRATION; PERINEURAL
Status: DISCONTINUED | OUTPATIENT
Start: 2018-03-06 | End: 2018-03-06 | Stop reason: HOSPADM

## 2018-03-06 RX ORDER — LIDOCAINE HYDROCHLORIDE 10 MG/ML
INJECTION INFILTRATION; PERINEURAL
Status: DISCONTINUED | OUTPATIENT
Start: 2018-03-06 | End: 2018-03-06 | Stop reason: HOSPADM

## 2018-03-06 RX ADMIN — LIDOCAINE HYDROCHLORIDE 4 ML: 20 INJECTION, SOLUTION INFILTRATION; PERINEURAL at 03:03

## 2018-03-06 RX ADMIN — FENTANYL CITRATE 25 MCG: 50 INJECTION, SOLUTION INTRAMUSCULAR; INTRAVENOUS at 03:03

## 2018-03-06 RX ADMIN — SODIUM CHLORIDE, SODIUM LACTATE, POTASSIUM CHLORIDE, CALCIUM CHLORIDE: 600; 310; 30; 20 INJECTION, SOLUTION INTRAVENOUS at 02:03

## 2018-03-06 RX ADMIN — MIDAZOLAM HYDROCHLORIDE 1 MG: 1 INJECTION, SOLUTION INTRAMUSCULAR; INTRAVENOUS at 03:03

## 2018-03-06 RX ADMIN — DEXTROSE: 5 SOLUTION INTRAVENOUS at 02:03

## 2018-03-06 RX ADMIN — LIDOCAINE HYDROCHLORIDE 6.5 ML: 10 INJECTION, SOLUTION INFILTRATION; PERINEURAL at 03:03

## 2018-03-06 NOTE — DISCHARGE INSTRUCTIONS
Home care instructions  Apply ice pack to the injection site for 20 minutes periods for the first 24 hrs for soreness/discomfort at injection site DO NOT USE HEAT FOR 24 HOURS  Keep site clean and dry for 24 hours, remove bandaid when desired  Do not drive until tomorrow  Take care when walking after a lumbar injection  Avoid strenuous activities for 2 days  Make take 2 weeks to feel the full effects   Resume home medication as prescribed today  Resume Aspirin, Plavix, or Coumadin the day after the procedure unless otherwise instructed.    SEE IMMEDIATE MEDICAL HELP FOR:  Severe increase in your usual pain or appearance of new pain  Prolonged or increasing weakness or numbness in the legs or arms  Drainage, redness, active bleeding, or increased swelling at the injection site  Temperature over 100.0 degrees F.  Headache that increases when your head is upright and decreases when you lie flat    CALL 911 OR GO DIRECTLY TO EMERGENCY DEPARTMENT FOR:  Shortness of breath, chest pain, or problems breathing      Recovery After Procedural Sedation (Adult)  You have been given medicine by vein to make you sleep during your surgery. This may have included both a pain medicine and sleeping medicine. Most of the effects have worn off. But you may still have some drowsiness for the next 6 to 8 hours.  Home care  Follow these guidelines when you get home:  · For the next 8 hours, you should be watched by a responsible adult. This person should make sure your condition is not getting worse.  · Don't drink any alcohol for the next 24 hours.  · Don't drive, operate dangerous machinery, or make important business or personal decisions during the next 24 hours.  Note: Your healthcare provider may tell you not to take any medicine by mouth for pain or sleep in the next 4 hours. These medicines may react with the medicines you were given in the hospital. This could cause a much stronger response than usual.  Follow-up care  Follow up  with your healthcare provider if you are not alert and back to your usual level of activity within 12 hours.  When to seek medical advice  Call your healthcare provider right away if any of these occur:  · Drowsiness gets worse  · Weakness or dizziness gets worse  · Repeated vomiting  · You can't be awakened   Date Last Reviewed: 10/18/2016  © 5350-0401 [employer]. [address] [place] [address], [place] [address]. All rights reserved. This information is not intended as a substitute for professional medical care. Always follow your healthcare professional's instructions.

## 2018-03-06 NOTE — OP NOTE
PROCEDURE DATE: 3/6/2018    PROCEDURE:  Radiofrequency ablation of the bilateral L3,4,5 medial branch nerves on the bilateral-side utilizing fluoroscopy    DIAGNOSIS:  Lumbar spondylosis    Post op Diagnosis: Same    PHYSICIAN: Rohan Ware MD    MEDICATIONS INJECTED:  From a mixture of 4ml of 2% lidocaine and 40mg of methylprednisone, 1ml of this solution was injected at each level.    LOCAL ANESTHETIC USED: Lidocaine 1%, 2 ml given at each site.    SEDATION MEDICATIONS: 2mg versed, 25mcg fentanyl    ESTIMATED BLOOD LOSS:  none    COMPLICATIONS:  none    TECHNIQUE:  A time out was taken to identify patient and procedure side prior to starting the procedure. Laying in a prone position, the patient was prepped and draped in the usual sterile fashion using ChloraPrep and sterile towels.  The levels were determined under fluoroscopic guidance and then marked.  Local anesthetic was given by raising a wheal at the skin over each site and then infiltrated approximately 2cm deeper.  A 20-gauge  100 mm [employer] RF needle was introduced to the anatomic location of the right and then left L3,4,5 medial branch nerves.  Motor stimulation up to 2 Volts at each level confirmed no motor nerve involvement.  Impedance was less than 800 ohms at each level. The above noted medication was then injected slowly.  Ablation was performed per level utilizing Marley radiofrequency generator 80°C for 90 seconds. The patient tolerated the procedure well.     The patient was monitored after the procedure.  Patient was given post procedure and discharge instructions to follow at home.  The patient was discharged in a stable condition

## 2018-03-06 NOTE — H&P (VIEW-ONLY)
CC: Back and leg pain    HPI: The patient is a 82-year-old woman with a history of diabetes, previous breast CA, CVA and spinal stenosis who presents in referral from her primary care physician, Dr. Barger for back pain and leg pain.  She returns in follow-up today with back pain with intermittent radiation into her bilateral buttocks.  She describes her back pain as aching, worse with standing and improved with sitting.  She also has some improvement when she takes oral steroids typically once a month for her lungs.  She leans of intermittent numbness in her legs but not on a daily basis.  She reports weakness in her legs.  She denies bladder or bowel incontinence.    Pain intervention history: She tried physical therapy about 1 year ago and it seemed to make her pain worse. Patient is status post right L3 and L4 transforaminal injection on 8/9/13 with 50% relief of low back and right leg pain.  She is status post bilateral L4 transforaminal epidural steroid injections on 9/27/13 and 11/1/13 with 10-20% relief.  She is status post radiofrequency ablation of the left L3, 4, 5 medial branch nerves on 5/5/14 with 50% relief of her left low back pain.  She is status post radiofrequency ablation of the right L3, 4 and 5 medial branch nerves on 6/16/14 with mild relief, however she reported significant more relief at a later visit.  She is status post bilateral L4 transforaminal epidural steroid injections on 9/24/14 with moderate relief.  She is status post bilateral L4 transforaminal epidural steroid injections on 1/7/15 with complete relief of her posterior thigh pain, moderate relief of her low back pain and minimal relief of her right lateral hip pain.  She is status post C7-T1 cervical interlaminar epidural steroid injection on 2/27/15 with greater than 50% relief.  She is status post bilateral L3, 4 and 5 medial branch radio frequency ablation on 7/29/16 reporting 0% relief initially at 4 weeks but then reporting  90% relief after 6-7 weeks.  She is status post bilateral L4 transforaminal epidural steroid injections on 11/11/16 with not quite 50% relief.  She is status post bilateral L4 transforaminal epidural steroid injections on 4/4/17 with 50% relief.  She is status post bilateral L4 transforaminal epidural steroid injections on 8/23/17 with greater than 80% relief.    ROS:She reports easy bruising, back pain and difficulty sleeping.  Balance of review of systems is negative.    Medical, surgical, family and social history reviewed elsewhere in record.    Medications/Allergies: See med card    Vitals:    02/16/18 1457   BP: (!) 169/75   Pulse: 77   Resp: 18   Temp: 98.1 °F (36.7 °C)   TempSrc: Oral   SpO2: 95%   Weight: 76.6 kg (168 lb 15.7 oz)   PainSc:   6   PainLoc: Back         Physical exam:  Gen: A and O x3, pleasant, well-groomed  Skin: No rashes or obvious lesions  HEENT: PERRLA  CVS: Regular rate and rhythm, normal S1 and S2, no murmurs.  Resp: Clear to auscultation bilaterally, no wheezes or rales.  Abdomen: Soft, NT/ND, normal bowel sounds present.  Musculoskeletal: able to stand and walk short distances without assistance, however uses a walker when out of the house.  She has disuse atrophy of the lumbar paraspinous muscles.    Neuro:  Upper extremities: 5/5 strength bilaterally   Lower extremities: 5/5 strength bilaterally  Reflexes: Brachioradialis 2+, Bicep 2+, Tricep 2+. Patellar 2+, Achilles 2+.  Sensory: Intact and symmetrical to light touch and pinprick in C2-T1 dermatomes bilaterally.  Intact and symmetrical to light touch and pinprick in L2-S1 dermatomes bilaterally, except for a small patch over her left lateral shin decreased with light touch and pinprick.    Lumbar spine:  Lumbar spine: ROM is moderately reduced with flexion extension and oblique extension with mild increased pain in the low back with extension.  Terry's test is deferred.  Supine straight leg raise is negative bilaterally.    Internal and external rotation of the hip causes no increased pain on either side.  Myofascial exam: No tenderness to palpation across lumbar paraspinous muscles.      Imagin13 MRI L-spine  T10-T11: There is severe disk desiccation and a moderate diffuse disk bulge and ligamentous hypertrophy. This is not complete evaluation of the thoracic spine, but there does appear to be mild central canal stenosis.  T11-T12: Moderate disk desiccation and mild diffuse disk bulge. Mild narrowing of the thecal sac. Neural foraminal regions difficult to assess due to the scoliosis.  T12-L1: Severe disk degenerative disease with marked desiccation, diffuse disk bulge, and spurring of vertebral bodies. Mild central canal stenosis. Neural foraminal narrowing bilaterally, difficult to grade due to scoliosis.  L1-L2: Advanced disk desiccation with moderate diffuse disk bulge and mild spurring of vertebral bodies. Small superimposed central to right paracentral disk extrusion. Mild central canal stenosis. Mild to moderate facet arthropathy and ligamentous hypertrophy. Bilateral foraminal stenosis.  L2 - L3: There is also disk desiccation and diffuse disk bulge and small annular fissure posteriorly. facet arthropathy and hypertrophy ligamentum flavum. Mild central canal stenosis. Moderate right and mild left neuroforaminal stenosis.  L3-L4: Disk desiccation and moderate diffuse disk bulge. Small right paracentral ascending disk extrusion and moderate facet arthropathy present bilaterally and hypertrophy of ligamentum flavum. In combination, the findings result in severe central canal stenosis. For example see axial image 22, central image 7. There is mild to moderate neural foraminal stenosis bilaterally.  L4 - L5: Mild anterolisthesis with disk desiccation and uncovering of the disk, with moderate right and severe left facet arthropathy and hypertrophy of ligamentum flavum. Severe central canal stenosis. Mild neural foraminal  narrowing, right worse than left.  L5-S1: Disk desiccation and mild diffuse disk bulge. A small ascending disk extrusion centrally in the midline. No significant central canal stenosis. Advanced facet arthropathy bilaterally, left worse than right. Mild left neural foraminal narrowing. No significant right neural foraminal stenosis.      Assessment:   The patient is a 82-year-old woman with a history of diabetes, previous breast CA, CVA and spinal stenosis who presents in referral from her primary care physician, Dr. Barger for back pain and leg pain.     1. Lumbar spondylosis  Vital signs    Activity as tolerated    Place 18-22 [facility] peripheral IV     Verify informed consent    Notify physician     Notify physician     Notify physician (specify)    Diet NPO    Case Request Operating Room: ABLATION, RADIOFREQUENCY L3,4,5    Place in Outpatient    lactated ringers infusion   2. Lumbar radiculopathy     3. DDD (degenerative disc disease), lumbar         Plan:  1.  I will set the patient up the repeat bilateral L3, 4, 5 medial branch RFA.  She has done well with this in the past.  It typically takes about 6 weeks for her RFA to take full effect so we will have her follow-up 6 weeks after.  She reports receiving 90% relief with her most recent RFA.  This will also be done without steroids because she takes oral steroids usually once a month from her pulmonologist.  We may consider repeating bilateral L4/5 TF ANUEL's in the future if necessary.

## 2018-03-06 NOTE — PLAN OF CARE
Patient tolerating oral liquids without difficulty. No apparent s&s of distress noted at this time, no complaints voiced at this time. Injection site free of drainage.  Discharge instructions reviewed with patient/family/friend with good verbal feedback received. Patient ready for discharge

## 2018-03-06 NOTE — DISCHARGE SUMMARY
[facility]  Discharge Note  Short Stay    Admit Date: 3/6/2018    Discharge Date: 3/6/2018    Attending Physician: Rohan Ware MD     Discharge Provider: Rohan Ware    Diagnoses:  Active Hospital Problems    Diagnosis  POA    *Lumbar spondylosis [M47.816]  Yes      Resolved Hospital Problems    Diagnosis Date Resolved POA   No resolved problems to display.       Discharged Condition: good    Final Diagnoses: Lumbar spondylosis [M47.816]    Disposition: Home or Self Care    Hospital Course: no complications, uneventful    Outcome of Hospitalization, Treatment, Procedure, or Surgery:  Patient was admitted for outpatient procedure. The patient underwent procedure without complications and are discharged home    Follow up/Patient Instructions:  Follow up as scheduled/Patient has received instructions and follow up date    Medications:  Continue previous medications      Discharge Procedure Orders  Call MD for:  temperature >100.4     Call MD for:  severe uncontrolled pain     Call MD for:  redness, tenderness, or signs of infection (pain, swelling, redness, odor or green/yellow discharge around incision site)     Call MD for:  severe persistent headache     No dressing needed           Discharge Procedure Orders (must include Diet, Follow-up, Activity):    Discharge Procedure Orders (must include Diet, Follow-up, Activity)  Call MD for:  temperature >100.4     Call MD for:  severe uncontrolled pain     Call MD for:  redness, tenderness, or signs of infection (pain, swelling, redness, odor or green/yellow discharge around incision site)     Call MD for:  severe persistent headache     No dressing needed

## 2018-03-20 DIAGNOSIS — F51.01 PRIMARY INSOMNIA: ICD-10-CM

## 2018-03-20 RX ORDER — ZOLPIDEM TARTRATE 5 MG/1
TABLET ORAL
Qty: 30 TABLET | Refills: 0 | Status: SHIPPED | OUTPATIENT
Start: 2018-03-20 | End: 2018-04-30 | Stop reason: SDUPTHER

## 2018-03-20 RX ORDER — LEVOTHYROXINE SODIUM 75 UG/1
TABLET ORAL
Qty: 90 TABLET | Refills: 0 | Status: SHIPPED | OUTPATIENT
Start: 2018-03-20 | End: 2018-06-18 | Stop reason: SDUPTHER

## 2018-03-28 ENCOUNTER — OFFICE VISIT (OUTPATIENT)
Dept: PULMONOLOGY | Facility: CLINIC | Age: 83
End: 2018-03-28
Payer: MEDICARE

## 2018-03-28 VITALS
HEIGHT: 63 IN | BODY MASS INDEX: 29.06 KG/M2 | HEART RATE: 83 BPM | SYSTOLIC BLOOD PRESSURE: 143 MMHG | WEIGHT: 164 LBS | OXYGEN SATURATION: 97 % | DIASTOLIC BLOOD PRESSURE: 84 MMHG

## 2018-03-28 DIAGNOSIS — J44.89 COPD (CHRONIC OBSTRUCTIVE PULMONARY DISEASE) WITH CHRONIC BRONCHITIS: ICD-10-CM

## 2018-03-28 DIAGNOSIS — J45.20 INTERMITTENT ASTHMA, UNCOMPLICATED: ICD-10-CM

## 2018-03-28 DIAGNOSIS — J47.9 BRONCHIECTASIS WITHOUT COMPLICATION: ICD-10-CM

## 2018-03-28 PROCEDURE — 99499 UNLISTED E&M SERVICE: CPT | Mod: S$GLB,,, | Performed by: INTERNAL MEDICINE

## 2018-03-28 PROCEDURE — 99999 PR PBB SHADOW E&M-EST. PATIENT-LVL IV: CPT | Mod: PBBFAC,,, | Performed by: INTERNAL MEDICINE

## 2018-03-28 PROCEDURE — 3077F SYST BP >= 140 MM HG: CPT | Mod: CPTII,S$GLB,, | Performed by: INTERNAL MEDICINE

## 2018-03-28 PROCEDURE — 3079F DIAST BP 80-89 MM HG: CPT | Mod: CPTII,S$GLB,, | Performed by: INTERNAL MEDICINE

## 2018-03-28 PROCEDURE — 99214 OFFICE O/P EST MOD 30 MIN: CPT | Mod: S$GLB,,, | Performed by: INTERNAL MEDICINE

## 2018-03-28 RX ORDER — PREDNISONE 10 MG/1
20 TABLET ORAL DAILY
Qty: 36 TABLET | Refills: 2 | Status: SHIPPED | OUTPATIENT
Start: 2018-03-28 | End: 2019-01-29 | Stop reason: ALTCHOICE

## 2018-03-28 RX ORDER — MONTELUKAST SODIUM 10 MG/1
10 TABLET ORAL NIGHTLY
Qty: 90 TABLET | Refills: 3 | Status: SHIPPED | OUTPATIENT
Start: 2018-03-28 | End: 2019-04-01 | Stop reason: SDUPTHER

## 2018-03-28 RX ORDER — FLUTICASONE FUROATE AND VILANTEROL 200; 25 UG/1; UG/1
1 POWDER RESPIRATORY (INHALATION) DAILY
Qty: 3 EACH | Refills: 3 | Status: SHIPPED | OUTPATIENT
Start: 2018-03-28 | End: 2019-06-10 | Stop reason: SDUPTHER

## 2018-03-28 RX ORDER — LEVALBUTEROL TARTRATE 45 UG/1
1-2 AEROSOL, METERED ORAL EVERY 4 HOURS PRN
Qty: 3 INHALER | Refills: 3 | Status: SHIPPED | OUTPATIENT
Start: 2018-03-28 | End: 2020-06-26 | Stop reason: SDUPTHER

## 2018-03-28 NOTE — PROGRESS NOTES
3/28/2018    Olga Aguiar  office    Chief Complaint   Patient presents with    Follow-up    Shortness of Breath    Asthma   March 28, 2018-- moving to [facility], fell 2-4 over last yr- has gotten  Therapy.  Breathing still an issue. No cough or wheezes, took prednisone for breathing 5x/yr- usually for 3 days.  Very clarke.  Was in gym but now out due to back pain issues.  April 7, 2017 - steroids help breathing and mobility- used mainly 4 times, once for 6 days.  Mucous and breathing doing well and does even better with steroids.  Jan 4, 2017, cough is better, less mucous, wheezes with stairs and night air, took prednisone last month and after 3 days improved a bit, uses singulair only on regular basis, advair used til out- was causing a hoarseness with reg use.  Albuterol ppt nervousness.  Sept 28, 2016HPI: minimal ex smoker with h/o breast ca rx xrt to anterior right chest with chr vol loss- ct with fibrotic right upper with bronchiectasis.  Chr cough and no mucous progressively worse,   Had had cough and sob related irratents.  occ wheezes last yrs,   [place]ke flight of stairs varies. Inhalers no help.  Will have occasoion of cough and mucous chr , copious.  Yrs back would have cough and occ wheeze withe irratents.    The chief compliant  problem is new to me   PFSH:  Past Medical History:   Diagnosis Date    Adrenal adenoma: 2.8 X 2.1CM 2010 5/16/2014    Anticoagulant long-term use      mg    Aortic atherosclerosis: see CT scan 2016 10/28/2016    Aortic stenosis 8/19/2014    Asthma     Breast cancer     Cancer     breast, both sides, right arm restriction    Cataracts, both eyes     Chronic back pain     Chronic bronchitis     CKD (chronic kidney disease) stage 3, GFR 30-59 ml/min 8/19/2014    no dialysis    COPD (chronic obstructive pulmonary disease)     occasional inhaler use, no oxygen    DDD (degenerative disc disease), lumbar 7/30/2013    Diabetes mellitus     diet controlled     Ectatic abdominal aorta: see CT scan 10/16 10/28/2016    Gallbladder polyp 8/19/2014    GERD (gastroesophageal reflux disease)     Gout     Herpes simplex     Fever Blisters and Styes in right eye    High blood cholesterol     Hypertension     holding medication when B/P low    Insomnia     Lumbar spinal stenosis     Lymphedema of arm     Right    Multiple lung nodules 9/21/2015    Renal cyst, right: stable per CT 2016 10/28/2016    Stroke 2/2012    TIA, no residual effects    Stye 12/5/2013    Thyroid disease     hypothyroidism    Unspecified hypothyroidism 7/20/2015         Past Surgical History:   Procedure Laterality Date    AXILLARY NODE DISSECTION      both sides    BREAST BIOPSY      BREAST LUMPECTOMY Bilateral     lymph nodes on right    CATARACT EXTRACTION      bilateral PHACO with IOL    Epidural steroid injection      Pain mangement    HYSTERECTOMY      HYSTERECTOMY      Nerve Block Injection      Pain management, Times 2    RADIOFREQUENCY ABLATION      Nerve, Pain management    RECTAL SURGERY      Fissure repair    TONSILLECTOMY       Social History   Substance Use Topics    Smoking status: Former Smoker     Packs/day: 1.00     Years: 18.00     Start date: 2/20/1952     Quit date: 8/6/1970    Smokeless tobacco: Never Used    Alcohol use Yes      Comment: rare     Family History   Problem Relation Age of Onset    Cancer Mother      Breast    COPD Mother     Hearing loss Mother     Hypertension Mother     Breast cancer Mother     Diabetes Father     Heart disease Father     Heart attack Father     Cancer Daughter      Breast    Breast cancer Daughter     Cancer Sister      Breast    Breast cancer Sister     Amblyopia Neg Hx     Blindness Neg Hx     Cataracts Neg Hx     Glaucoma Neg Hx     Macular degeneration Neg Hx     Retinal detachment Neg Hx     Strabismus Neg Hx     Stroke Neg Hx     Thyroid disease Neg Hx      Review of patient's allergies indicates:  "  Allergen Reactions    Allopurinol analogues Other (See Comments)     Headache.  This is more of a side effect than it is an allergic reaction.       Performance Status:The patient's activity level is functions out of house.      Review of Systems:  a review of eleven systems covering constitutional, Eye, HEENT, Psych, Respiratory, Cardiac, GI, , Musculoskeletal, Endocrine, Dermatologic was negative except for pertinent findings as listed ABOVE and below: as, no swallow problems with xrt, no clear dm,     Exam:Comprehensive exam done.   BP (!) 143/84 (BP Location: Left arm, Patient Position: Sitting)   Pulse 83   Ht 5' 3" (1.6 m)   Wt 74.4 kg (164 lb 0.4 oz)   SpO2 97%   BMI 29.06 kg/m²   Exam included Vitals as listed, and patient's appearance and affect and alertness and mood, oral exam for yeast and hygiene and pharynx lesions and Mallapatti (M) score, neck with inspection for jvd and masses and thyroid abnormalities and lymph nodes (supraclavicular and infraclavicular nodes and axillary also examined and noted if abn), chest exam included symmetry and effort and fremitus and percussion and auscultation, cardiac exam included rhythm and gallops and murmur and rubs and jvd and edema, abdominal exam for mass and hepatosplenomegaly and tenderness and hernias and bowel sounds, Musculoskeletal exam with muscle tone and posture and mobility/gait and  strength, and skin for rashes and cyanosis and pallor and turgor, extremity for clubbing.  Findings were normal except for pertinent findings listed below:  M2, good bs, looks good    Radiographs reviewed: view by direct vision focal fibrotic dx right upper with mainly bronchiectasis.  Reviewed 3/28    10 mm nodule in the lingula not significant change compared to earlier study dated back to 1/5/2010.  Few scattered 4 mm or smaller nodules although not definitely seen on the 2010 exam the difference could be technical do not appear significantly change " compared to 1/13/2016.  Mildly prominent lymph node anterior mediastinal fat representing a change compared to 2010 but not significantly change compared to 1/13/2016.  Fibrotic change right lung consistent with radiation change and also not appearing significantly changed compared to the prior exam 1/13/ 2016      Additional findings as detailed above including stable hypodense masses in the liver consistent with cysts, stable left adrenal nodule, right renal cyst    ______________________________________     Electronically signed by: MINE BURROUGHS MD  Date: 09/23/16  Time: 10:35   Labs reviewed      PFT  Pulmonary Functions, including spirometry and bronchodilator response and lung volumes and diffusion, study was done oct 7, 2016.  Spirometry shows severe obstruction, loss vital capacity and obstruction and bronchodilator response.   FEV1 is 42% irma 0.81 liters liters.  Lung volumes show  loss of TLC with restriction 63%.  Diffusion shows reduced to 38% uncorrected for anemia if present..     Pulmonary functions show severe restriction and good bronchodilator response and low dlco. Clinical correlation recommended.      Saul Heller M.D.      Plan:  Clinical impression is resonably certain and repeated evaluation prn +/- follow up will be needed as below.    Olga was seen today for follow-up, shortness of breath and asthma.    Diagnoses and all orders for this visit:    COPD (chronic obstructive pulmonary disease) with chronic bronchitis, 2013  -     fluticasone-vilanterol (BREO ELLIPTA) 200-25 mcg/dose DsDv diskus inhaler; Inhale 1 puff into the lungs once daily.    Intermittent asthma, uncomplicated  -     fluticasone-vilanterol (BREO ELLIPTA) 200-25 mcg/dose DsDv diskus inhaler; Inhale 1 puff into the lungs once daily.    Bronchiectasis without complication  -     fluticasone-vilanterol (BREO ELLIPTA) 200-25 mcg/dose DsDv diskus inhaler; Inhale 1 puff into the lungs once daily.    Other orders  -      predniSONE (DELTASONE) 10 MG tablet; Take 2 tablets (20 mg total) by mouth once daily. Take 2 daily for 3 days and repeat for short breath.  -     fluticasone-umeclidin-vilanter (TRELEGY ELLIPTA) 100-62.5-25 mcg DsDv; Inhale 1 puff into the lungs once daily.  -     montelukast (SINGULAIR) 10 mg tablet; Take 1 tablet (10 mg total) by mouth every evening.  -     levalbuterol (XOPENEX HFA) 45 mcg/actuation inhaler; Inhale 1-2 puffs into the lungs every 4 (four) hours as needed for Wheezing or Shortness of Breath.        Follow-up in about 1 year (around 3/28/2019), or if symptoms worsen or fail to improve.    Discussed with patient above for education the following:    Use breo or trelegy at once daily- use most effective but not both.    Use xopenex or albuterol before activity - may be reasonable to use before activity or more to assure optimal breathing.    Skip singulair/montelukast- resume for sinuses or worse lungs- if helps clearly would use regular.      May use prednisone 10 mg 2 daily for 3 days and repeat if needed.    Exercise program would be good once settled with recent falls and likely deconditioning.

## 2018-04-18 ENCOUNTER — TELEPHONE (OUTPATIENT)
Dept: PAIN MEDICINE | Facility: CLINIC | Age: 83
End: 2018-04-18

## 2018-04-18 ENCOUNTER — LAB VISIT (OUTPATIENT)
Dept: LAB | Facility: HOSPITAL | Age: 83
End: 2018-04-18
Attending: INTERNAL MEDICINE
Payer: MEDICARE

## 2018-04-18 ENCOUNTER — OFFICE VISIT (OUTPATIENT)
Dept: PAIN MEDICINE | Facility: CLINIC | Age: 83
End: 2018-04-18
Payer: MEDICARE

## 2018-04-18 VITALS
DIASTOLIC BLOOD PRESSURE: 76 MMHG | BODY MASS INDEX: 29.15 KG/M2 | HEART RATE: 74 BPM | SYSTOLIC BLOOD PRESSURE: 128 MMHG | TEMPERATURE: 98 F | RESPIRATION RATE: 18 BRPM | WEIGHT: 164.56 LBS

## 2018-04-18 DIAGNOSIS — Z00.00 PHYSICAL EXAM, ANNUAL: ICD-10-CM

## 2018-04-18 DIAGNOSIS — M54.16 LUMBAR RADICULOPATHY: Primary | ICD-10-CM

## 2018-04-18 DIAGNOSIS — M51.36 DDD (DEGENERATIVE DISC DISEASE), LUMBAR: ICD-10-CM

## 2018-04-18 DIAGNOSIS — M47.816 LUMBAR SPONDYLOSIS: ICD-10-CM

## 2018-04-18 LAB
ALBUMIN SERPL BCP-MCNC: 3.4 G/DL
ALP SERPL-CCNC: 68 U/L
ALT SERPL W/O P-5'-P-CCNC: 19 U/L
ANION GAP SERPL CALC-SCNC: 11 MMOL/L
AST SERPL-CCNC: 22 U/L
BASOPHILS # BLD AUTO: 0.05 K/UL
BASOPHILS NFR BLD: 1.2 %
BILIRUB SERPL-MCNC: 0.4 MG/DL
BUN SERPL-MCNC: 15 MG/DL
CALCIUM SERPL-MCNC: 9.9 MG/DL
CHLORIDE SERPL-SCNC: 98 MMOL/L
CHOLEST SERPL-MCNC: 173 MG/DL
CHOLEST/HDLC SERPL: 3.3 {RATIO}
CO2 SERPL-SCNC: 31 MMOL/L
CREAT SERPL-MCNC: 1.1 MG/DL
DIFFERENTIAL METHOD: NORMAL
EOSINOPHIL # BLD AUTO: 0.1 K/UL
EOSINOPHIL NFR BLD: 2.2 %
ERYTHROCYTE [DISTWIDTH] IN BLOOD BY AUTOMATED COUNT: 13.8 %
EST. GFR  (AFRICAN AMERICAN): 53.7 ML/MIN/1.73 M^2
EST. GFR  (NON AFRICAN AMERICAN): 46.5 ML/MIN/1.73 M^2
ESTIMATED AVG GLUCOSE: 131 MG/DL
GLUCOSE SERPL-MCNC: 111 MG/DL
HBA1C MFR BLD HPLC: 6.2 %
HCT VFR BLD AUTO: 42.4 %
HDLC SERPL-MCNC: 53 MG/DL
HDLC SERPL: 30.6 %
HGB BLD-MCNC: 13.8 G/DL
IMM GRANULOCYTES # BLD AUTO: 0.01 K/UL
IMM GRANULOCYTES NFR BLD AUTO: 0.2 %
LDLC SERPL CALC-MCNC: 90.6 MG/DL
LYMPHOCYTES # BLD AUTO: 1.3 K/UL
LYMPHOCYTES NFR BLD: 30.5 %
MCH RBC QN AUTO: 29.4 PG
MCHC RBC AUTO-ENTMCNC: 32.5 G/DL
MCV RBC AUTO: 90 FL
MONOCYTES # BLD AUTO: 0.4 K/UL
MONOCYTES NFR BLD: 9.6 %
NEUTROPHILS # BLD AUTO: 2.3 K/UL
NEUTROPHILS NFR BLD: 56.3 %
NONHDLC SERPL-MCNC: 120 MG/DL
NRBC BLD-RTO: 0 /100 WBC
PLATELET # BLD AUTO: 226 K/UL
PMV BLD AUTO: 10.2 FL
POTASSIUM SERPL-SCNC: 4 MMOL/L
PROT SERPL-MCNC: 6.6 G/DL
RBC # BLD AUTO: 4.7 M/UL
SODIUM SERPL-SCNC: 140 MMOL/L
TRIGL SERPL-MCNC: 147 MG/DL
TSH SERPL DL<=0.005 MIU/L-ACNC: 1.43 UIU/ML
WBC # BLD AUTO: 4.16 K/UL

## 2018-04-18 PROCEDURE — 84443 ASSAY THYROID STIM HORMONE: CPT

## 2018-04-18 PROCEDURE — 99999 PR PBB SHADOW E&M-EST. PATIENT-LVL IV: CPT | Mod: PBBFAC,,, | Performed by: PHYSICIAN ASSISTANT

## 2018-04-18 PROCEDURE — 85025 COMPLETE CBC W/AUTO DIFF WBC: CPT

## 2018-04-18 PROCEDURE — 99213 OFFICE O/P EST LOW 20 MIN: CPT | Mod: S$GLB,,, | Performed by: PHYSICIAN ASSISTANT

## 2018-04-18 PROCEDURE — 83036 HEMOGLOBIN GLYCOSYLATED A1C: CPT

## 2018-04-18 PROCEDURE — 36415 COLL VENOUS BLD VENIPUNCTURE: CPT | Mod: PO

## 2018-04-18 PROCEDURE — 80061 LIPID PANEL: CPT

## 2018-04-18 PROCEDURE — 3078F DIAST BP <80 MM HG: CPT | Mod: CPTII,S$GLB,, | Performed by: PHYSICIAN ASSISTANT

## 2018-04-18 PROCEDURE — 80053 COMPREHEN METABOLIC PANEL: CPT

## 2018-04-18 PROCEDURE — 3074F SYST BP LT 130 MM HG: CPT | Mod: CPTII,S$GLB,, | Performed by: PHYSICIAN ASSISTANT

## 2018-04-18 RX ORDER — ALPRAZOLAM 0.5 MG/1
1 TABLET, ORALLY DISINTEGRATING ORAL ONCE AS NEEDED
Status: CANCELLED | OUTPATIENT
Start: 2018-06-25 | End: 2018-06-25

## 2018-04-18 NOTE — PROGRESS NOTES
CC: Back and leg pain    HPI: The patient is a 83-year-old woman with a history of diabetes, previous breast CA, CVA and spinal stenosis who presents in referral from her primary care physician, Dr. Barger for back pain and leg pain.  She is status post bilateral L3, 4 and 5 medial branch radiofrequency ablation on 3/6/18 with moderate relief.  She complains of low back pain radiating to the right buttock and lateral hip while walking.  She gets relief with sitting.  She currently denies numbness but does report some intermittent weakness in her legs.  She denies bladder or bowel incontinence.    Pain intervention history: She tried physical therapy about 1 year ago and it seemed to make her pain worse. Patient is status post right L3 and L4 transforaminal injection on 8/9/13 with 50% relief of low back and right leg pain.  She is status post bilateral L4 transforaminal epidural steroid injections on 9/27/13 and 11/1/13 with 10-20% relief.  She is status post radiofrequency ablation of the left L3, 4, 5 medial branch nerves on 5/5/14 with 50% relief of her left low back pain.  She is status post radiofrequency ablation of the right L3, 4 and 5 medial branch nerves on 6/16/14 with mild relief, however she reported significant more relief at a later visit.  She is status post bilateral L4 transforaminal epidural steroid injections on 9/24/14 with moderate relief.  She is status post bilateral L4 transforaminal epidural steroid injections on 1/7/15 with complete relief of her posterior thigh pain, moderate relief of her low back pain and minimal relief of her right lateral hip pain.  She is status post C7-T1 cervical interlaminar epidural steroid injection on 2/27/15 with greater than 50% relief.  She is status post bilateral L3, 4 and 5 medial branch radio frequency ablation on 7/29/16 reporting 0% relief initially at 4 weeks but then reporting 90% relief after 6-7 weeks.  She is status post bilateral L4 transforaminal  epidural steroid injections on 11/11/16 with not quite 50% relief.  She is status post bilateral L4 transforaminal epidural steroid injections on 4/4/17 with 50% relief.  She is status post bilateral L4 transforaminal epidural steroid injections on 8/23/17 with greater than 80% relief.  She is status post bilateral L3, 4 and 5 medial branch radiofrequency ablation on 3/6/18 with moderate relief.     ROS:She reports easy bruising, back pain and difficulty sleeping.  Balance of review of systems is negative.    Medical, surgical, family and social history reviewed elsewhere in record.    Medications/Allergies: See med card    Vitals:    04/18/18 1303   BP: 128/76   Pulse: 74   Resp: 18   Temp: 97.8 °F (36.6 °C)   TempSrc: Oral   Weight: 74.6 kg (164 lb 9.2 oz)   PainSc:   4   PainLoc: Back         Physical exam:  Gen: A and O x3, pleasant, well-groomed  Skin: No rashes or obvious lesions  HEENT: PERRLA  CVS: Regular rate and rhythm, normal S1 and S2, no murmurs.  Resp: Clear to auscultation bilaterally, no wheezes or rales.  Abdomen: Soft, NT/ND, normal bowel sounds present.  Musculoskeletal: able to stand and walk short distances without assistance, however uses a walker when out of the house.  She has disuse atrophy of the lumbar paraspinous muscles.    Neuro:  Upper extremities: 5/5 strength bilaterally   Lower extremities: 5/5 strength bilaterally  Reflexes: Brachioradialis 2+, Bicep 2+, Tricep 2+. Patellar 2+, Achilles 2+.  Sensory: Intact and symmetrical to light touch and pinprick in C2-T1 dermatomes bilaterally.  Intact and symmetrical to light touch and pinprick in L2-S1 dermatomes bilaterally, except for a small patch over her left lateral shin decreased with light touch and pinprick.    Lumbar spine:  Lumbar spine: ROM is moderately reduced with flexion extension and oblique extension with mild increased pain in the low back with extension.  Terry's test is deferred.  Supine straight leg raise is negative  bilaterally.   Internal and external rotation of the hip causes no increased pain on either side.  Myofascial exam: No tenderness to palpation across lumbar paraspinous muscles.      Imagin13 MRI L-spine  T10-T11: There is severe disk desiccation and a moderate diffuse disk bulge and ligamentous hypertrophy. This is not complete evaluation of the thoracic spine, but there does appear to be mild central canal stenosis.  T11-T12: Moderate disk desiccation and mild diffuse disk bulge. Mild narrowing of the thecal sac. Neural foraminal regions difficult to assess due to the scoliosis.  T12-L1: Severe disk degenerative disease with marked desiccation, diffuse disk bulge, and spurring of vertebral bodies. Mild central canal stenosis. Neural foraminal narrowing bilaterally, difficult to grade due to scoliosis.  L1-L2: Advanced disk desiccation with moderate diffuse disk bulge and mild spurring of vertebral bodies. Small superimposed central to right paracentral disk extrusion. Mild central canal stenosis. Mild to moderate facet arthropathy and ligamentous hypertrophy. Bilateral foraminal stenosis.  L2 - L3: There is also disk desiccation and diffuse disk bulge and small annular fissure posteriorly. facet arthropathy and hypertrophy ligamentum flavum. Mild central canal stenosis. Moderate right and mild left neuroforaminal stenosis.  L3-L4: Disk desiccation and moderate diffuse disk bulge. Small right paracentral ascending disk extrusion and moderate facet arthropathy present bilaterally and hypertrophy of ligamentum flavum. In combination, the findings result in severe central canal stenosis. For example see axial image 22, central image 7. There is mild to moderate neural foraminal stenosis bilaterally.  L4 - L5: Mild anterolisthesis with disk desiccation and uncovering of the disk, with moderate right and severe left facet arthropathy and hypertrophy of ligamentum flavum. Severe central canal stenosis. Mild  neural foraminal narrowing, right worse than left.  L5-S1: Disk desiccation and mild diffuse disk bulge. A small ascending disk extrusion centrally in the midline. No significant central canal stenosis. Advanced facet arthropathy bilaterally, left worse than right. Mild left neural foraminal narrowing. No significant right neural foraminal stenosis.      Assessment:   The patient is a 83-year-old woman with a history of diabetes, previous breast CA, CVA and spinal stenosis who presents in referral from her primary care physician, Dr. Barger for back pain and leg pain.     1. Lumbar radiculopathy     2. Lumbar spondylosis     3. DDD (degenerative disc disease), lumbar         Plan:  1.  The patient did well following the bilateral L3, 4 and 5 medial branch RFA.  She is currently in the process of moving and still has some pain.  I will set her up to repeat bilateral L4/5 TF ANUEL at the end of June.  2.  Follow-up in 4 weeks post procedure sooner as needed.

## 2018-04-18 NOTE — TELEPHONE ENCOUNTER
Patient is scheduled for a Transforaminal epidural steroid injection on June 25th and will need to stop aspirin 7 days before. Please advise if this is okay. Thanks.

## 2018-04-26 ENCOUNTER — TELEPHONE (OUTPATIENT)
Dept: PAIN MEDICINE | Facility: CLINIC | Age: 83
End: 2018-04-26

## 2018-04-26 NOTE — TELEPHONE ENCOUNTER
----- Message from Marialuisa Connolly sent at 4/25/2018 12:04 PM CDT -----  Contact: Naveed [facility] [employer] [phone] is calling/has questions on Tramadol that was prescribed/please call

## 2018-04-27 ENCOUNTER — OFFICE VISIT (OUTPATIENT)
Dept: INTERNAL MEDICINE | Facility: CLINIC | Age: 83
End: 2018-04-27
Payer: MEDICARE

## 2018-04-27 VITALS
HEART RATE: 64 BPM | DIASTOLIC BLOOD PRESSURE: 80 MMHG | SYSTOLIC BLOOD PRESSURE: 134 MMHG | OXYGEN SATURATION: 98 % | HEIGHT: 63 IN | WEIGHT: 161.63 LBS | BODY MASS INDEX: 28.64 KG/M2

## 2018-04-27 DIAGNOSIS — G45.8 OTHER SPECIFIED TRANSIENT CEREBRAL ISCHEMIAS: ICD-10-CM

## 2018-04-27 DIAGNOSIS — D35.00 ADRENAL ADENOMA, UNSPECIFIED LATERALITY: ICD-10-CM

## 2018-04-27 DIAGNOSIS — I70.0 AORTIC ATHEROSCLEROSIS: ICD-10-CM

## 2018-04-27 DIAGNOSIS — E11.59 HYPERTENSION ASSOCIATED WITH DIABETES: ICD-10-CM

## 2018-04-27 DIAGNOSIS — K21.9 GASTROESOPHAGEAL REFLUX DISEASE WITHOUT ESOPHAGITIS: ICD-10-CM

## 2018-04-27 DIAGNOSIS — E03.9 ACQUIRED HYPOTHYROIDISM: ICD-10-CM

## 2018-04-27 DIAGNOSIS — N18.30 CKD (CHRONIC KIDNEY DISEASE) STAGE 3, GFR 30-59 ML/MIN: ICD-10-CM

## 2018-04-27 DIAGNOSIS — I35.0 AORTIC VALVE STENOSIS, MODERATE: ICD-10-CM

## 2018-04-27 DIAGNOSIS — I77.811 ECTATIC ABDOMINAL AORTA: ICD-10-CM

## 2018-04-27 DIAGNOSIS — Z00.00 ANNUAL PHYSICAL EXAM: Primary | ICD-10-CM

## 2018-04-27 DIAGNOSIS — N28.1 RENAL CYST, RIGHT: ICD-10-CM

## 2018-04-27 DIAGNOSIS — E78.2 MIXED HYPERLIPIDEMIA: ICD-10-CM

## 2018-04-27 DIAGNOSIS — J47.9 BRONCHIECTASIS WITHOUT COMPLICATION: ICD-10-CM

## 2018-04-27 DIAGNOSIS — R91.8 MULTIPLE LUNG NODULES: ICD-10-CM

## 2018-04-27 DIAGNOSIS — K86.2 PANCREATIC CYST: ICD-10-CM

## 2018-04-27 DIAGNOSIS — M10.00 IDIOPATHIC GOUT, UNSPECIFIED CHRONICITY, UNSPECIFIED SITE: ICD-10-CM

## 2018-04-27 DIAGNOSIS — E11.9 TYPE 2 DIABETES MELLITUS WITHOUT COMPLICATION, WITHOUT LONG-TERM CURRENT USE OF INSULIN: ICD-10-CM

## 2018-04-27 DIAGNOSIS — J44.89 COPD (CHRONIC OBSTRUCTIVE PULMONARY DISEASE) WITH CHRONIC BRONCHITIS: ICD-10-CM

## 2018-04-27 DIAGNOSIS — I15.2 HYPERTENSION ASSOCIATED WITH DIABETES: ICD-10-CM

## 2018-04-27 LAB
CREAT UR-MCNC: 180 MG/DL
MICROALBUMIN UR DL<=1MG/L-MCNC: 15 UG/ML
MICROALBUMIN/CREATININE RATIO: 8.3 UG/MG

## 2018-04-27 PROCEDURE — 99499 UNLISTED E&M SERVICE: CPT | Mod: S$GLB,,, | Performed by: INTERNAL MEDICINE

## 2018-04-27 PROCEDURE — 99999 PR PBB SHADOW E&M-EST. PATIENT-LVL V: CPT | Mod: PBBFAC,,, | Performed by: INTERNAL MEDICINE

## 2018-04-27 PROCEDURE — 82043 UR ALBUMIN QUANTITATIVE: CPT

## 2018-04-27 PROCEDURE — 3075F SYST BP GE 130 - 139MM HG: CPT | Mod: CPTII,S$GLB,, | Performed by: INTERNAL MEDICINE

## 2018-04-27 PROCEDURE — 99397 PER PM REEVAL EST PAT 65+ YR: CPT | Mod: S$GLB,,, | Performed by: INTERNAL MEDICINE

## 2018-04-27 PROCEDURE — 3079F DIAST BP 80-89 MM HG: CPT | Mod: CPTII,S$GLB,, | Performed by: INTERNAL MEDICINE

## 2018-04-27 RX ORDER — BUSPIRONE HYDROCHLORIDE 5 MG/1
5 TABLET ORAL 2 TIMES DAILY PRN
Qty: 60 TABLET | Refills: 2 | Status: SHIPPED | OUTPATIENT
Start: 2018-04-27 | End: 2020-04-15 | Stop reason: SDUPTHER

## 2018-04-27 NOTE — PROGRESS NOTES
Subjective:       Patient ID: Olga Aguiar is a 83 y.o. female.    Chief Complaint: Annual Exam    Annual exam?  Just seen for same 6 months ago.  Follow up.     Multiple issues,  with her.     DM doing a lot better.  A1c 6.2.  Had to stop medication due to low readings.     Chronic lymphedema R arm, stable.  Has been doing PT and this is better.     Recall that she has seen pulmonary 4/17 then again 2/18.  Meds adjusted- stable.  She has COPD and medications have been adjusted per she's had to take steroids a couple of times, but not recently.  She still has cough and a little bit of shortness of breath but overall symptoms are slightly better stable.  Lung nodule- stable on CT 9/16.  Follow-up in 1 year was recommended.     Blood pressure is stable.  Labs are also stable.    She will see Cardiology later this month.     She has some CKD- stable.  Actually better.  Recall that she was supposed to see nephrology in 2015 but declined.  She will consider.       Her gastroenterologist had her do an MRI of the abdomen 11/16- all stable, 2 year follow up recommended (pancreatic cyst).     She has mild stable GERD symptoms.     She has seen her cardiologist 2/17.  She has aortic stenosis.  She denies syncope, chest pain, pressure, tightness.  Chest chronic stable shortness of breath.  She has trace chronic edema, but no PND or orthopnea.     DEXA 5/17 osteopenia- reviewed.     She had mammogram 1/18.     Gout - elevated uric acid; stable in January.   No new sx.  Used to take uloric; aow doing well on allopurinol.     Ectatic aorta seen on CT 2016. Abdominal u/s 10/17 showed no aneurysm.    Patient Active Problem List:     Hypertension associated with diabetes     Mixed hyperlipidemia, baseline      Chronic pain syndrome     Lumbosacral spondylosis without myelopathy     Adrenal adenoma: 2.8 X 2.1CM 2010; stable 2016     Abdominal obesity     Lymph edema, right arm     Peripheral edema     Gallbladder  polyp     CKD (chronic kidney disease) stage 3, GFR 30-59 ml/min     Aortic valve stenosis, mild- moderate, MARI 1.44 1/16     LVH (left ventricular hypertrophy)     Pancreatic cyst: stable on MRI 11/16 per GI repeat 2018     Cervical spinal stenosis     Type 2 diabetes mellitus without complication, without long-term current use of insulin     Acquired hypothyroidism     Transient cerebral ischemia: 2012     Primary insomnia     Gastroesophageal reflux disease without esophagitis     Multiple lung nodules: stable CT 10/2016     Former smoker: 1 pack daily for < 20 years, quit 1973     COPD (chronic obstructive pulmonary disease) with chronic bronchitis, 2013     Hypokalemia     Facet hypertrophy of lumbar region     History of breast cancer     Intermittent asthma     Radiation fibrosis of lung     Bronchiectasis without complication     Ectatic abdominal aorta: see CT scan 10/16; no aneurysm 10/17     Aortic atherosclerosis: see CT scan 2016     Renal cyst, right: stable per CT 2016     Lumbar radiculopathy     Abnormal ECG     Encounter for monitoring proton pump inhibitor therapy     Diverticulosis of large intestine without hemorrhage: see CT 10/14     Localized edema     Idiopathic gout     Lumbar spondylosis         Review of Systems   Constitutional: Negative for activity change and unexpected weight change.   HENT: Negative for hearing loss, rhinorrhea and trouble swallowing.    Eyes: Negative for discharge and visual disturbance.   Respiratory: Negative for chest tightness and wheezing.    Cardiovascular: Negative for chest pain and palpitations.   Gastrointestinal: Negative for blood in stool, constipation, diarrhea and vomiting.   Endocrine: Negative for polydipsia and polyuria.   Genitourinary: Negative for difficulty urinating, dysuria, hematuria and menstrual problem.   Musculoskeletal: Positive for back pain. Negative for arthralgias, joint swelling and neck pain.   Neurological: Negative for weakness  and headaches.   Psychiatric/Behavioral: Negative for confusion and dysphoric mood.        Anxiety when her  has issues/illness  No depression       Objective:      Physical Exam   Constitutional: She is oriented to person, place, and time. She appears well-developed and well-nourished.   HENT:   Head: Normocephalic and atraumatic.   Right Ear: External ear normal.   Left Ear: External ear normal.   Nose: Nose normal.   Mouth/Throat: Oropharynx is clear and moist. No oropharyngeal exudate.   Eyes: Conjunctivae and EOM are normal. No scleral icterus.   Neck: Normal range of motion. Neck supple. No JVD present. No thyromegaly present.   Cardiovascular: Normal rate, regular rhythm and intact distal pulses.  Exam reveals no gallop.    Murmur heard.  Pulmonary/Chest: Effort normal and breath sounds normal. No respiratory distress. She has no wheezes.   Abdominal: Soft. Bowel sounds are normal. She exhibits no distension and no mass. There is no tenderness. There is no rebound and no guarding.   Musculoskeletal: Normal range of motion. She exhibits edema. She exhibits no tenderness.   Lymphedema R arm   Lymphadenopathy:     She has no cervical adenopathy.   Neurological: She is alert and oriented to person, place, and time. She displays normal reflexes. No cranial nerve deficit. Coordination normal.   Skin: Skin is warm. No rash noted. No erythema.   Psychiatric: She has a normal mood and affect. Her behavior is normal. Judgment and thought content normal.   Nursing note and vitals reviewed.      Assessment:       1. Annual physical exam    2. Bronchiectasis without complication    3. COPD (chronic obstructive pulmonary disease) with chronic bronchitis, 2013    4. Multiple lung nodules: stable CT 10/2016    5. Aortic atherosclerosis: see CT scan 2016    6. Aortic valve stenosis, mild- moderate, MARI 1.44 1/16    7. Ectatic abdominal aorta: see CT scan 10/16; no aneurysm 10/17    8. Hypertension associated with  diabetes    9. CKD (chronic kidney disease) stage 3, GFR 30-59 ml/min    10. Renal cyst, right: stable per CT 2016    11. Adrenal adenoma, unspecified laterality    12. Type 2 diabetes mellitus without complication, without long-term current use of insulin    13. Acquired hypothyroidism    14. Idiopathic gout, unspecified chronicity, unspecified site        Plan:         Annual physical exam    Bronchiectasis without complication  -     Cancel: Ambulatory referral to Pulmonology    COPD (chronic obstructive pulmonary disease) with chronic bronchitis, 2013  -     Cancel: Ambulatory referral to Pulmonology    Multiple lung nodules: stable CT 10/2016  -     Keep Pulmonology follow up; sx minimal at this time    Aortic atherosclerosis: see CT scan 2016: Continue regimen.  Keep cardiology follow-up    Aortic valve stenosis, mild- moderate, MARI 1.44 1/16: Continue regimen.  Keep cardiology follow-up    Ectatic abdominal aorta: see CT scan 10/16; no aneurysm 10/17    Hypertension associated with diabetes: Continue regimen.  Keep cardiology follow-up    -     CBC auto differential; Future; Expected date: 10/28/2018    CKD (chronic kidney disease) stage 3, GFR 30-59 ml/min  -     Vitamin D; Future; Expected date: 10/24/2018    Renal cyst, right: stable per CT 2016    Adrenal adenoma, unspecified laterality: stable x years without sx    Type 2 diabetes mellitus without complication, without long-term current use of insulin  -     Microalbumin/creatinine urine ratio  -     Ambulatory consult to Optometry  -     Hemoglobin A1c; Future; Expected date: 10/24/2018  -     Comprehensive metabolic panel; Future; Expected date: 10/28/2018    Acquired hypothyroidism  -     TSH; Future; Expected date: 10/28/2018    Idiopathic gout, unspecified chronicity, unspecified site  -     Uric acid; Future; Expected date: 10/28/2018    Pancreatic cyst: stable on MRI 11/16 per GI repeat 2018  -     Ambulatory consult to Gastroenterology  -     MRI  Abdomen W WO Contrast; Future; Expected date: 10/27/2018    Gastroesophageal reflux disease without esophagitis  -     Ambulatory consult to Gastroenterology    Mixed hyperlipidemia  -     Lipid panel; Future; Expected date: 10/28/2018    Other specified transient cerebral ischemias    Other orders  -     busPIRone (BUSPAR) 5 MG Tab; Take 1 tablet (5 mg total) by mouth 2 (two) times daily as needed (anxiety).  Dispense: 60 tablet; Refill: 2.  Exercise, stress reduction reviewed.  Cautions and side effects discussed  -     (In Office Administered) Zoster Recombinant Vaccine    I will review all studies and determine further tx depending on findings

## 2018-04-27 NOTE — PATIENT INSTRUCTIONS
Buspirone tablets  What is this medicine?  BUSPIRONE (bykyle KAT[place]) is used to treat anxiety disorders.  How should I use this medicine?  Take this medicine by mouth with a glass of water. Follow the directions on the prescription label. You may take this medicine with or without food. To ensure that this medicine always works the same way for you, you should take it either always with or always without food. Take your doses at regular intervals. Do not take your medicine more often than directed. Do not stop taking except on the advice of your doctor or health care professional.  Talk to your pediatrician regarding the use of this medicine in children. Special care may be needed.  What side effects may I notice from receiving this medicine?  Side effects that you should report to your doctor or health care professional as soon as possible:  · blurred vision or other vision changes  · chest pain  · confusion  · difficulty breathing  · feelings of hostility or anger  · muscle aches and pains  · numbness or tingling in hands or feet  · ringing in the ears  · skin rash and itching  · vomiting  · weakness  Side effects that usually do not require medical attention (report to your doctor or health care professional if they continue or are bothersome):  · disturbed dreams, nightmares  · headache  · nausea  · restlessness or nervousness  · sore throat and nasal congestion  · stomach upset  What may interact with this medicine?  Do not take this medicine with any of the following medications:  · linezolid  · MAOIs like Carbex, Eldepryl, Marplan, Nardil, and Parnate  · methylene blue  · procarbazine  This medicine may also interact with the following medications:  · diazepam  · digoxin  · diltiazem  · erythromycin  · grapefruit juice  · haloperidol  · medicines for mental depression or mood problems  · medicines for seizures like carbamazepine, phenobarbital and phenytoin  · nefazodone  · other medications for  anxiety  · rifampin  · ritonavir  · some antifungal medicines like itraconazole, ketoconazole, and voriconazole  · verapamil  · warfarin  What if I miss a dose?  If you miss a dose, take it as soon as you can. If it is almost time for your next dose, take only that dose. Do not take double or extra doses.  Where should I keep my medicine?  Keep out of the reach of children.  Store at room temperature below 30 degrees C (86 degrees F). Protect from light. Keep container tightly closed. Throw away any unused medicine after the expiration date.  What should I tell my health care provider before I take this medicine?  They need to know if you have any of these conditions:  · kidney or liver disease  · an unusual or allergic reaction to buspirone, other medicines, foods, dyes, or preservatives  · pregnant or trying to get pregnant  · breast-feeding  What should I watch for while using this medicine?  Visit your doctor or health care professional for regular checks on your progress. It may take 1 to 2 weeks before your anxiety gets better.  You may get drowsy or dizzy. Do not drive, use machinery, or do anything that needs mental alertness until you know how this drug affects you. Do not stand or sit up quickly, especially if you are an older patient. This reduces the risk of dizzy or fainting spells. Alcohol can make you more drowsy and dizzy. Avoid alcoholic drinks.  NOTE:This sheet is a summary. It may not cover all possible information. If you have questions about this medicine, talk to your doctor, pharmacist, or health care provider. Copyright© 2017 [employer]

## 2018-04-29 ENCOUNTER — PATIENT MESSAGE (OUTPATIENT)
Dept: INTERNAL MEDICINE | Facility: CLINIC | Age: 83
End: 2018-04-29

## 2018-04-30 DIAGNOSIS — F51.01 PRIMARY INSOMNIA: ICD-10-CM

## 2018-04-30 RX ORDER — TRIAMTERENE AND HYDROCHLOROTHIAZIDE 37.5; 25 MG/1; MG/1
CAPSULE ORAL
Qty: 90 CAPSULE | Refills: 1 | Status: SHIPPED | OUTPATIENT
Start: 2018-04-30 | End: 2018-10-26 | Stop reason: SDUPTHER

## 2018-04-30 RX ORDER — ZOLPIDEM TARTRATE 5 MG/1
TABLET ORAL
Qty: 30 TABLET | Refills: 0 | Status: SHIPPED | OUTPATIENT
Start: 2018-04-30 | End: 2018-05-29 | Stop reason: SDUPTHER

## 2018-05-08 ENCOUNTER — TELEPHONE (OUTPATIENT)
Dept: PAIN MEDICINE | Facility: CLINIC | Age: 83
End: 2018-05-08

## 2018-05-09 ENCOUNTER — PATIENT MESSAGE (OUTPATIENT)
Dept: ADMINISTRATIVE | Facility: OTHER | Age: 83
End: 2018-05-09

## 2018-05-10 ENCOUNTER — TELEPHONE (OUTPATIENT)
Dept: PAIN MEDICINE | Facility: CLINIC | Age: 83
End: 2018-05-10

## 2018-05-14 RX ORDER — ALLOPURINOL 100 MG/1
TABLET ORAL
Qty: 30 TABLET | Refills: 6 | Status: SHIPPED | OUTPATIENT
Start: 2018-05-14 | End: 2019-01-05 | Stop reason: SDUPTHER

## 2018-05-21 ENCOUNTER — PATIENT MESSAGE (OUTPATIENT)
Dept: PAIN MEDICINE | Facility: CLINIC | Age: 83
End: 2018-05-21

## 2018-05-22 ENCOUNTER — OFFICE VISIT (OUTPATIENT)
Dept: CARDIOLOGY | Facility: CLINIC | Age: 83
End: 2018-05-22
Payer: MEDICARE

## 2018-05-22 VITALS
BODY MASS INDEX: 28.44 KG/M2 | OXYGEN SATURATION: 96 % | HEART RATE: 72 BPM | SYSTOLIC BLOOD PRESSURE: 138 MMHG | HEIGHT: 63 IN | DIASTOLIC BLOOD PRESSURE: 82 MMHG | WEIGHT: 160.5 LBS

## 2018-05-22 DIAGNOSIS — I15.2 HYPERTENSION ASSOCIATED WITH DIABETES: Primary | ICD-10-CM

## 2018-05-22 DIAGNOSIS — N18.30 CKD (CHRONIC KIDNEY DISEASE) STAGE 3, GFR 30-59 ML/MIN: ICD-10-CM

## 2018-05-22 DIAGNOSIS — R60.0 PERIPHERAL EDEMA: ICD-10-CM

## 2018-05-22 DIAGNOSIS — G45.8 OTHER SPECIFIED TRANSIENT CEREBRAL ISCHEMIAS: ICD-10-CM

## 2018-05-22 DIAGNOSIS — I35.0 AORTIC VALVE STENOSIS, MODERATE: ICD-10-CM

## 2018-05-22 DIAGNOSIS — E11.9 TYPE 2 DIABETES MELLITUS WITHOUT COMPLICATION, WITHOUT LONG-TERM CURRENT USE OF INSULIN: ICD-10-CM

## 2018-05-22 DIAGNOSIS — I51.7 LVH (LEFT VENTRICULAR HYPERTROPHY): ICD-10-CM

## 2018-05-22 DIAGNOSIS — E78.2 MIXED HYPERLIPIDEMIA: ICD-10-CM

## 2018-05-22 DIAGNOSIS — I10 HYPERTENSION, UNSPECIFIED TYPE: ICD-10-CM

## 2018-05-22 DIAGNOSIS — I10 HYPERTENSION, UNSPECIFIED TYPE: Primary | ICD-10-CM

## 2018-05-22 DIAGNOSIS — J44.89 COPD (CHRONIC OBSTRUCTIVE PULMONARY DISEASE) WITH CHRONIC BRONCHITIS: ICD-10-CM

## 2018-05-22 DIAGNOSIS — M54.16 LUMBAR RADICULOPATHY: ICD-10-CM

## 2018-05-22 DIAGNOSIS — J70.1 RADIATION FIBROSIS OF LUNG: ICD-10-CM

## 2018-05-22 DIAGNOSIS — E11.59 HYPERTENSION ASSOCIATED WITH DIABETES: Primary | ICD-10-CM

## 2018-05-22 PROCEDURE — 99999 PR PBB SHADOW E&M-EST. PATIENT-LVL IV: CPT | Mod: PBBFAC,,, | Performed by: INTERNAL MEDICINE

## 2018-05-22 PROCEDURE — 99215 OFFICE O/P EST HI 40 MIN: CPT | Mod: 25,S$GLB,, | Performed by: INTERNAL MEDICINE

## 2018-05-22 PROCEDURE — 3075F SYST BP GE 130 - 139MM HG: CPT | Mod: CPTII,S$GLB,, | Performed by: INTERNAL MEDICINE

## 2018-05-22 PROCEDURE — 93000 ELECTROCARDIOGRAM COMPLETE: CPT | Mod: S$GLB,,, | Performed by: INTERNAL MEDICINE

## 2018-05-22 PROCEDURE — 99499 UNLISTED E&M SERVICE: CPT | Mod: S$GLB,,, | Performed by: INTERNAL MEDICINE

## 2018-05-22 PROCEDURE — 3079F DIAST BP 80-89 MM HG: CPT | Mod: CPTII,S$GLB,, | Performed by: INTERNAL MEDICINE

## 2018-05-22 RX ORDER — HYDROCODONE BITARTRATE AND ACETAMINOPHEN 5; 325 MG/1; MG/1
1 TABLET ORAL EVERY 8 HOURS PRN
Qty: 20 TABLET | Refills: 0 | Status: SHIPPED | OUTPATIENT
Start: 2018-05-22 | End: 2018-06-01

## 2018-05-22 NOTE — TELEPHONE ENCOUNTER
Spoke with the patient and she said she has in the past but has been a long time ago. Patient aware that we are sending to the pharmacy.

## 2018-05-22 NOTE — PROGRESS NOTES
Subjective:    Patient ID:  Olga Aguiar is a 83 y.o. female who presents for evaluation of Annual Exam (LOV 2/2017)  For recurrent cold sweat with near syncope related to anxiety, aortic stenosis, HTN  PCP and referred by: Dr. Barger  Podiatrist: Dr. Rivero  Pain: Dr. Ware, was to hold ASA for 7 days prior to epidural injection.  GI: Dr. Castro  Pulmonary: Dr. Heller  Eye: Dr. Snowden  Lives with , Pino, here with patient, non-smoker  Housewife    White female with recurrent bouts of sudden onset cold sweat, with nausea, no vomiting, and drop in BP to as low as 80 SBP with near-syncope. Denies any feeling of pain prior to onset.  noted that several times occurred about 40 minutes after meal, always with some nausea or feeling of weakness. Started about 6 months ago, at one time it was several times daily, up to 4 times. Can last up to 5 minutes. No fall but have to sit down. No change in medications, or diet or exercise. Had acute gouty attack also over the past 6 months. Now the third bout. No able to tolerate colchicine with severe upset stomach. Eventually had to be placed on steroid pack and have completed one round of Rx with improvement in the stomach, back pains, and these cold spells. ECG on 7/24/2014 was normal, rate of 71. No prior history of cardiac problem except for TIA in 2012. No recurrence. Have several cardiac risk factors - see Problems List.    Since visit of 8/18/2014, continue with night and day sweats, weakness with standing, easy fatigue, and FISCHER especially with stair climbing. Seems progressive over the past 2 years. Try to do housework, avoid vacuuming and feels very limited due to chronic back pains. Sitting a lot of time. Have not been on any regular exercise routine.  Holter, 8/2014: PREDOMINANT RHYTHM  1. Sinus rhythm with heart rates varying between 44 and 145 bpm with an average of 85 bpm.     2. Some sinus arrhythmia noted.    VENTRICULAR ARRHYTHMIAS  1. There  were very rare mostly monomorphic PVCs recorded totalling 17 and averaging less than 1 per hour.     2. There were no episodes of ventricular tachycardia.    SUPRA VENTRICULAR ARRHYTHMIAS  1. There were very rare PACs totalling 57 and averaging 2 per hour.     2. There was a single (11 beat) run of EAT. The high rate was 140 bpm.     SINUS NODE FUNCTION  1. There was no evidence of high grade SA kylie block.     AV CONDUCTION  1. There was no evidence of high grade AV block.     DIARY  1. The diary was returned, but not completed    MISCELLANEOUS  1. This was a tape of adequate length (24 hrs).     ECHO CONCLUSIONS     1 - Concentric hypertrophy. Septal wall thickness is upper limit of normal in size, and posterior wall thickness is increased, with the septum measuring 1.0 cm and the posterior wall measuring 1.2 cm across.    2 - Normal left ventricular systolic function (EF 60-65%).     3 - Moderate aortic stenosis, MARI = 1.13 cm2.     4 - Normal left ventricular diastolic function.     5 - Normal right ventricular systolic function .     6 - The aortic valve now appears stenotic, change from Echo on 2/8/2012.    Since visit of 12/8/2014, no new problem, less of dizziness. More active to gym with , twice weekly for 1:15. No problem, denies any CP, near-syncope, but does have FISCHER, due to chronic bronchitis. No recent change. ECG shows NSR, rate 71, low voltage. Recent labs reviewed from 9/2014 - CMP showed , eGFR 38.8, A1C 7.1%, Lipid with LDL 87, non-, normal CBC. Reviewed TIA in 1/2012 with transient dysphasia, left sided weakness, leg first, then fingers, numbness of the left side of tongue and left facial droop. All resolved over an hour. Carotid US at the time - No significant carotid artery stenosis bilaterally    In 2/2017, still with FISCHER, fairly limiting, reviewed with Dr. Heller, being treated with intermittent steroid for 3-12 days per month for the past 2 months with benefit. No CP.  Compliant with medications. ECG in 4/2016 suggest prior anterior MI.     HPI Comments: in 5/2018, annual visit also anticipating epidural steroid injection. Otherwise no heart worries, no cardiac symptoms of CP nor SOB. Limited due to CLBP, able to do own housework and plays cards. Discuss symptoms of severe AS. LDL in 4/2018 90.6, baseline 166.  DSE 3/2017 - EKG Conclusions:    1. The EKG portion of this study is negative for ischemia at a peak heart rate of 129 bpm (96% of predicted).   2. Blood pressure remained stable throughout the protocol  (Presenting BP: 112/88 Peak BP: 185/83).   3. The following arrhythmias were present: PVCs.   4. The patient reported significant dyspnea during the protocol which resolved in recovery.     Echo - CONCLUSIONS     1 - Concentric remodeling.     2 - Normal left ventricular systolic function (EF 60-65%).     3 - Mild to moderate aortic stenosis, MARI = 1.44 cm2, mean gradient = 7.45 mmHg.     4 - Normal left ventricular diastolic function.     5 - Normal right ventricular systolic function .     6 - The estimated PA systolic pressure is 15 mmHg.     7 - Small pericardial effusion.     8 - No significant change from Echo on 8/2014..     Venous US 10/2017 per Dr. Craven - RIGHT:  No evidence of Right lower extremity DVT.      LEFT:  No evidence of Left lower extremity DVT.      US of abdominal aorta - There is no evidence of an Abdominal Aortic Aneurysm.    Review of Systems   Constitution: Positive for malaise/fatigue and no further diaphoresis. Negative for fever, weakness, night sweats and have weight stability.  HENT: Negative for headaches, nosebleeds and tinnitus.    Eyes: Negative for visual disturbance.   Cardiovascular: Positive for dyspnea on exertion and no near-syncope. Negative for chest pain, claudication, cyanosis, irregular heartbeat, leg swelling, orthopnea, palpitations and paroxysmal nocturnal dyspnia.   Respiratory: Positive for shortness of breath and some  "wheezing. Negative for cough, sleep disturbances due to breathing, snoring. [place] score 3   Endocrine: Positive for cold intolerance and heat intolerance. Negative for polydipsia and polyuria.   Hematologic/Lymphatic: Does not bruise/bleed easily.   Skin: Positive for dry skin. Negative for nail changes, color change, flushing, poor wound healing and suspicious lesions.   Musculoskeletal: Positive for arthritis, back pain, gout, joint pain, joint swelling and stiffness. Negative for falls, muscle cramps, muscle weakness and myalgias.   Gastrointestinal: Positive for bloating, diarrhea, heartburn, nausea and vomiting during meals. Negative for hematemesis, hematochezia and melena.   Neurological: Positive for loss of balance and memory loss. Negative for disturbances in coordination, excessive daytime sleepiness, dizziness, focal weakness, light-headedness, numbness and vertigo.   Psychiatric/Behavioral: Negative for depression and substance abuse. The patient has insomnia. The patient is not nervous/anxious.         Objective:    Physical Exam   Constitutional: She is oriented to person, place, and time. She appears well-developed and well-nourished.   HENT:   Head: Normocephalic.   Eyes: Conjunctivae and EOM are normal. Pupils are equal, round, and reactive to light.   Neck: Normal range of motion. Neck supple. No JVD present. No thyromegaly present. Circumference 16.75", deep carotid pulses  Cardiovascular: Normal rate, regular rhythm and intact distal pulses.  Exam reveals distant heart sounds. Exam reveals no gallop and no friction rub.    Soft 2/6 murmur with preserved S2 heard.  Pulses:       Posterior tibial pulses are 1+ on the right side, and 1+ on the left side.   Lymph edema of the right arm   Pulmonary/Chest: Effort normal and breath sounds normal. She has no rales. She exhibits no tenderness.   Abdominal: Soft. Bowel sounds are normal. There is no tenderness.   Waist 39.5", hip 42.5" "   Musculoskeletal: Normal range of motion. She exhibits no edema.   Lymphadenopathy:     She has no cervical adenopathy.   Neurological: She is alert and oriented to person, place, and time.   Skin: Skin is warm and dry. No rash noted.         Assessment:       1. Hypertension associated with diabetes    2. Mixed hyperlipidemia, baseline     3. Peripheral edema    4. CKD (chronic kidney disease) stage 3, GFR 30-59 ml/min    5. Aortic valve stenosis, mild- moderate, MARI 1.44 1/16    6. LVH (left ventricular hypertrophy)    7. Type 2 diabetes mellitus without complication, without long-term current use of insulin    8. Other specified transient cerebral ischemias    9. COPD (chronic obstructive pulmonary disease) with chronic bronchitis, 2013    10. Radiation fibrosis of lung    11. Lumbar radiculopathy         Plan:       Olga was seen today for consult.    Diagnoses and associated orders for this visit:    Hypertension associated with diabetes    Mixed hyperlipidemia, baseline     Peripheral edema    CKD (chronic kidney disease) stage 3, GFR 30-59 ml/min    Aortic valve stenosis, mild- moderate, MARI 1.44 1/16    LVH (left ventricular hypertrophy)    Type 2 diabetes mellitus without complication, without long-term current use of insulin    Other specified transient cerebral ischemias    COPD (chronic obstructive pulmonary disease) with chronic bronchitis, 2013    Radiation fibrosis of lung    Lumbar radiculopathy    - All medical issues reviewed, continue current Rx.  - Doing well from CV standpoint, continue current RX.  - Need good exercise program, 4 key elements: 1. Aerobic (walking, swimming, dancing, jogging, biking, etc, 2. Muscle strengthening / resistance exercise, need to do 2-3 times weekly, 3. Stretching daily, good stretch takes a whole  total minute. 4. Balance exercise daily.  - Highly recommend 30 minutes of exercise daily, can have Sunday off, with 2-3 sessions of muscle  strengthening weekly. A  would be very helpful.  - Check home blood pressure, 2 days weekly, do 2 readings within 5 minutes in AM and PM, keep log for review.  - Instruction for Mediterranean diet and heart healthy exercise given.  - Recommend at least annual cardiovascular evaluation in view of her significant risk factors.    Chronic pain syndrome - improved    Fatigue - improved    Near syncope - suspect vasovagal, improved after steroid Rx  - Maintain good hydration, especially while on diuretics   - Demonstrated the hugging manuvers     Abdominal obesity    Patient Active Problem List   Diagnosis    Hypertension associated with diabetes    Mixed hyperlipidemia, baseline     Chronic pain syndrome    Lumbosacral spondylosis without myelopathy    Adrenal adenoma: 2.8 X 2.1CM 2010; stable 2016    Abdominal obesity    Lymph edema, right arm    Peripheral edema    Gallbladder polyp    CKD (chronic kidney disease) stage 3, GFR 30-59 ml/min    Aortic valve stenosis, mild- moderate, MARI 1.44 1/16    LVH (left ventricular hypertrophy)    Pancreatic cyst: stable on MRI 11/16 per GI repeat 2018    Cervical spinal stenosis    Type 2 diabetes mellitus without complication, without long-term current use of insulin    Acquired hypothyroidism    Transient cerebral ischemia: 2012    Primary insomnia    Gastroesophageal reflux disease without esophagitis    Multiple lung nodules: stable CT 10/2016    Former smoker: 1 pack daily for < 20 years, quit 1973    COPD (chronic obstructive pulmonary disease) with chronic bronchitis, 2013    Hypokalemia    Facet hypertrophy of lumbar region    History of breast cancer    Intermittent asthma    Radiation fibrosis of lung    Bronchiectasis without complication    Ectatic abdominal aorta: see CT scan 10/16; no aneurysm 10/17    Aortic atherosclerosis: see CT scan 2016    Renal cyst, right: stable per CT 2016    Lumbar radiculopathy     Abnormal ECG    Encounter for monitoring proton pump inhibitor therapy    Diverticulosis of large intestine without hemorrhage: see CT 10/14    Localized edema    Idiopathic gout    Lumbar spondylosis     Total face-to-face time with the patient was 20 minutes and greater than 50% was spent in counseling and coordination of care. The above assessment and plan have been discussed at length. Labs and procedure over the last 6 months reviewed. Problem List updated. Asked to bring in all active medications / pills bottles with next visit.

## 2018-05-28 ENCOUNTER — OFFICE VISIT (OUTPATIENT)
Dept: PAIN MEDICINE | Facility: CLINIC | Age: 83
End: 2018-05-28
Payer: MEDICARE

## 2018-05-28 ENCOUNTER — TELEPHONE (OUTPATIENT)
Dept: PAIN MEDICINE | Facility: CLINIC | Age: 83
End: 2018-05-28

## 2018-05-28 ENCOUNTER — HOSPITAL ENCOUNTER (OUTPATIENT)
Dept: RADIOLOGY | Facility: HOSPITAL | Age: 83
Discharge: HOME | End: 2018-05-28
Attending: PHYSICIAN ASSISTANT
Payer: MEDICARE

## 2018-05-28 VITALS
DIASTOLIC BLOOD PRESSURE: 67 MMHG | SYSTOLIC BLOOD PRESSURE: 151 MMHG | TEMPERATURE: 97 F | RESPIRATION RATE: 20 BRPM | BODY MASS INDEX: 28.55 KG/M2 | OXYGEN SATURATION: 97 % | WEIGHT: 161.19 LBS | HEART RATE: 75 BPM

## 2018-05-28 DIAGNOSIS — M51.36 DDD (DEGENERATIVE DISC DISEASE), LUMBAR: ICD-10-CM

## 2018-05-28 DIAGNOSIS — R26.81 GAIT INSTABILITY: ICD-10-CM

## 2018-05-28 DIAGNOSIS — M25.551 PAIN OF RIGHT HIP JOINT: ICD-10-CM

## 2018-05-28 DIAGNOSIS — M47.816 LUMBAR SPONDYLOSIS: ICD-10-CM

## 2018-05-28 DIAGNOSIS — M54.16 LUMBAR RADICULOPATHY: Primary | ICD-10-CM

## 2018-05-28 DIAGNOSIS — M48.062 SPINAL STENOSIS OF LUMBAR REGION WITH NEUROGENIC CLAUDICATION: ICD-10-CM

## 2018-05-28 PROCEDURE — 73521 X-RAY EXAM HIPS BI 2 VIEWS: CPT | Mod: 26,,, | Performed by: RADIOLOGY

## 2018-05-28 PROCEDURE — 99999 PR PBB SHADOW E&M-EST. PATIENT-LVL V: CPT | Mod: PBBFAC,,, | Performed by: PHYSICIAN ASSISTANT

## 2018-05-28 PROCEDURE — 73521 X-RAY EXAM HIPS BI 2 VIEWS: CPT | Mod: TC,PO

## 2018-05-28 PROCEDURE — 3077F SYST BP >= 140 MM HG: CPT | Mod: CPTII,S$GLB,, | Performed by: PHYSICIAN ASSISTANT

## 2018-05-28 PROCEDURE — 3078F DIAST BP <80 MM HG: CPT | Mod: CPTII,S$GLB,, | Performed by: PHYSICIAN ASSISTANT

## 2018-05-28 PROCEDURE — 72148 MRI LUMBAR SPINE W/O DYE: CPT | Mod: 26,,, | Performed by: RADIOLOGY

## 2018-05-28 PROCEDURE — 72148 MRI LUMBAR SPINE W/O DYE: CPT | Mod: TC

## 2018-05-28 PROCEDURE — 99213 OFFICE O/P EST LOW 20 MIN: CPT | Mod: S$GLB,,, | Performed by: PHYSICIAN ASSISTANT

## 2018-05-28 RX ORDER — ALPRAZOLAM 1 MG/1
1 TABLET ORAL
Qty: 1 TABLET | Refills: 0 | Status: ON HOLD | OUTPATIENT
Start: 2018-05-28 | End: 2018-06-04 | Stop reason: HOSPADM

## 2018-05-28 NOTE — PROGRESS NOTES
CC: Back and leg pain    HPI: The patient is a 83-year-old woman with a history of diabetes, previous breast CA, CVA and spinal stenosis who presents in referral from her primary care physician, Dr. Barger for back pain and leg pain.  She returns for an early follow-up today due to increasing low back pain and worsening weakness.  She is currently scheduled for epidural steroid injections on 6/4/18.  She states that about 2-3 weeks ago she had difficulty walking when she got out of bed in the morning.  She attributes this to worsening pain and weakness.  This has been consistently worse for the past 2-3 weeks.  Her pain is located in the bilateral buttocks, right lateral hip and lateral thighs.  She describes her pain as burning, worse with walking and improved with lying down in bed.  She denies bladder or bowel incontinence.    Pain intervention history: She tried physical therapy about 1 year ago and it seemed to make her pain worse. Patient is status post right L3 and L4 transforaminal injection on 8/9/13 with 50% relief of low back and right leg pain.  She is status post bilateral L4 transforaminal epidural steroid injections on 9/27/13 and 11/1/13 with 10-20% relief.  She is status post radiofrequency ablation of the left L3, 4, 5 medial branch nerves on 5/5/14 with 50% relief of her left low back pain.  She is status post radiofrequency ablation of the right L3, 4 and 5 medial branch nerves on 6/16/14 with mild relief, however she reported significant more relief at a later visit.  She is status post bilateral L4 transforaminal epidural steroid injections on 9/24/14 with moderate relief.  She is status post bilateral L4 transforaminal epidural steroid injections on 1/7/15 with complete relief of her posterior thigh pain, moderate relief of her low back pain and minimal relief of her right lateral hip pain.  She is status post C7-T1 cervical interlaminar epidural steroid injection on 2/27/15 with greater than  50% relief.  She is status post bilateral L3, 4 and 5 medial branch radio frequency ablation on 7/29/16 reporting 0% relief initially at 4 weeks but then reporting 90% relief after 6-7 weeks.  She is status post bilateral L4 transforaminal epidural steroid injections on 11/11/16 with not quite 50% relief.  She is status post bilateral L4 transforaminal epidural steroid injections on 4/4/17 with 50% relief.  She is status post bilateral L4 transforaminal epidural steroid injections on 8/23/17 with greater than 80% relief.  She is status post bilateral L3, 4 and 5 medial branch radiofrequency ablation on 3/6/18 with moderate relief.     ROS:She reports easy bruising, back pain and difficulty sleeping.  Balance of review of systems is negative.    Medical, surgical, family and social history reviewed elsewhere in record.    Medications/Allergies: See med card    Vitals:    05/28/18 0851   BP: (!) 151/67   Pulse: 75   Resp: 20   Temp: 96.5 °F (35.8 °C)   TempSrc: Oral   SpO2: 97%   Weight: 73.1 kg (161 lb 2.5 oz)   PainSc:   4   PainLoc: Back         Physical exam:  Gen: A and O x3, pleasant, well-groomed  Skin: No rashes or obvious lesions  HEENT: PERRLA  CVS: Regular rate and rhythm, normal S1 and S2, no murmurs.  Resp: Clear to auscultation bilaterally, no wheezes or rales.  Abdomen: Soft, NT/ND, normal bowel sounds present.  Musculoskeletal: Able to stand and walk short distances without assistance, however uses a walker when out of the house.  She has disuse atrophy of the lumbar paraspinous muscles.  Slow to go from seated to standing position.    Neuro:  Upper extremities: 5/5 strength bilaterally   Lower extremities: 5/5 strength bilaterally  Reflexes: Brachioradialis 2+, Bicep 2+, Tricep 2+. Patellar 2+, Achilles 2+.  Sensory: Intact and symmetrical to light touch and pinprick in C2-T1 dermatomes bilaterally.  Intact and symmetrical to light touch and pinprick in L2-S1 dermatomes bilaterally, except for a small  patch over her left lateral shin decreased with light touch and pinprick.    Lumbar spine:  Lumbar spine: ROM is moderately reduced with flexion extension and oblique extension with mild increased pain in the bilateral buttocks and right lateral hip with extension.  Terry's test is deferred.  Supine straight leg raise causes right buttock and lateral hip pain.  Internal and external rotation of the hip causes no increased pain on either side.  Myofascial exam: No tenderness to palpation across lumbar paraspinous muscles.      Imagin13 MRI L-spine  T10-T11: There is severe disk desiccation and a moderate diffuse disk bulge and ligamentous hypertrophy. This is not complete evaluation of the thoracic spine, but there does appear to be mild central canal stenosis.  T11-T12: Moderate disk desiccation and mild diffuse disk bulge. Mild narrowing of the thecal sac. Neural foraminal regions difficult to assess due to the scoliosis.  T12-L1: Severe disk degenerative disease with marked desiccation, diffuse disk bulge, and spurring of vertebral bodies. Mild central canal stenosis. Neural foraminal narrowing bilaterally, difficult to grade due to scoliosis.  L1-L2: Advanced disk desiccation with moderate diffuse disk bulge and mild spurring of vertebral bodies. Small superimposed central to right paracentral disk extrusion. Mild central canal stenosis. Mild to moderate facet arthropathy and ligamentous hypertrophy. Bilateral foraminal stenosis.  L2 - L3: There is also disk desiccation and diffuse disk bulge and small annular fissure posteriorly. facet arthropathy and hypertrophy ligamentum flavum. Mild central canal stenosis. Moderate right and mild left neuroforaminal stenosis.  L3-L4: Disk desiccation and moderate diffuse disk bulge. Small right paracentral ascending disk extrusion and moderate facet arthropathy present bilaterally and hypertrophy of ligamentum flavum. In combination, the findings result in severe  central canal stenosis. For example see axial image 22, central image 7. There is mild to moderate neural foraminal stenosis bilaterally.  L4 - L5: Mild anterolisthesis with disk desiccation and uncovering of the disk, with moderate right and severe left facet arthropathy and hypertrophy of ligamentum flavum. Severe central canal stenosis. Mild neural foraminal narrowing, right worse than left.  L5-S1: Disk desiccation and mild diffuse disk bulge. A small ascending disk extrusion centrally in the midline. No significant central canal stenosis. Advanced facet arthropathy bilaterally, left worse than right. Mild left neural foraminal narrowing. No significant right neural foraminal stenosis.      Assessment:   The patient is a 83-year-old woman with a history of diabetes, previous breast CA, CVA and spinal stenosis who presents in referral from her primary care physician, Dr. Barger for back pain and leg pain.     1. Lumbar radiculopathy     2. DDD (degenerative disc disease), lumbar  MRI Lumbar Spine Without Contrast   3. Pain of right hip joint  X-Ray Hips Bilateral 2 View Incl AP Pelvis   4. Spinal stenosis of lumbar region with neurogenic claudication     5. Lumbar spondylosis     6. Gait instability         Plan:  1.  We discussed her worsening symptoms and I will update her lumbar spine MRI and obtain hip x-rays prior to her injection on 6/4.  I suspect that she may have worsening stenosis at L4/5.  We will call her with results and keep her scheduled for her bilateral L4/5 TF ANUEL's on 6/4/18.

## 2018-05-28 NOTE — TELEPHONE ENCOUNTER
Xanax called in as ordered but called to [facility] pharmacy for  on the way out. MRI scheduled for 12:30 PM today.

## 2018-05-28 NOTE — TELEPHONE ENCOUNTER
Please let the patient know I reviewed her x-ray results and she has some mild degeneration in her hips but I believe her symptoms are coming from her back.  We will call her once we have the MRI results.

## 2018-05-28 NOTE — TELEPHONE ENCOUNTER
Spoke with the . Had the MRI and is back home sleeping now. Advised we will contact them as soon as we get the report.

## 2018-05-29 ENCOUNTER — OFFICE VISIT (OUTPATIENT)
Dept: GASTROENTEROLOGY | Facility: CLINIC | Age: 83
End: 2018-05-29
Payer: MEDICARE

## 2018-05-29 ENCOUNTER — TELEPHONE (OUTPATIENT)
Dept: PAIN MEDICINE | Facility: CLINIC | Age: 83
End: 2018-05-29

## 2018-05-29 VITALS
BODY MASS INDEX: 28.44 KG/M2 | HEIGHT: 63 IN | DIASTOLIC BLOOD PRESSURE: 67 MMHG | HEART RATE: 75 BPM | WEIGHT: 160.5 LBS | SYSTOLIC BLOOD PRESSURE: 146 MMHG

## 2018-05-29 DIAGNOSIS — K59.00 CONSTIPATION, UNSPECIFIED CONSTIPATION TYPE: ICD-10-CM

## 2018-05-29 DIAGNOSIS — R11.2 NON-INTRACTABLE VOMITING WITH NAUSEA, UNSPECIFIED VOMITING TYPE: ICD-10-CM

## 2018-05-29 DIAGNOSIS — K21.9 GASTROESOPHAGEAL REFLUX DISEASE WITHOUT ESOPHAGITIS: ICD-10-CM

## 2018-05-29 DIAGNOSIS — K44.9 HIATAL HERNIA: ICD-10-CM

## 2018-05-29 DIAGNOSIS — Z51.81 ENCOUNTER FOR MONITORING LONG-TERM PROTON PUMP INHIBITOR THERAPY: Primary | ICD-10-CM

## 2018-05-29 DIAGNOSIS — E78.5 HYPERLIPIDEMIA, UNSPECIFIED HYPERLIPIDEMIA TYPE: ICD-10-CM

## 2018-05-29 DIAGNOSIS — Z79.899 ENCOUNTER FOR MONITORING LONG-TERM PROTON PUMP INHIBITOR THERAPY: Primary | ICD-10-CM

## 2018-05-29 DIAGNOSIS — M54.16 LUMBAR RADICULOPATHY: Primary | ICD-10-CM

## 2018-05-29 DIAGNOSIS — F51.01 PRIMARY INSOMNIA: ICD-10-CM

## 2018-05-29 DIAGNOSIS — K86.2 PANCREATIC CYST: ICD-10-CM

## 2018-05-29 PROCEDURE — 99999 PR PBB SHADOW E&M-EST. PATIENT-LVL V: CPT | Mod: PBBFAC,,, | Performed by: NURSE PRACTITIONER

## 2018-05-29 PROCEDURE — 3077F SYST BP >= 140 MM HG: CPT | Mod: CPTII,S$GLB,, | Performed by: NURSE PRACTITIONER

## 2018-05-29 PROCEDURE — 99214 OFFICE O/P EST MOD 30 MIN: CPT | Mod: S$GLB,,, | Performed by: NURSE PRACTITIONER

## 2018-05-29 PROCEDURE — 3078F DIAST BP <80 MM HG: CPT | Mod: CPTII,S$GLB,, | Performed by: NURSE PRACTITIONER

## 2018-05-29 RX ORDER — ASPIRIN 81 MG/1
81 TABLET ORAL DAILY
Status: ON HOLD | COMMUNITY
End: 2023-11-30 | Stop reason: SDUPTHER

## 2018-05-29 RX ORDER — SUCRALFATE 1 G/1
1 TABLET ORAL 2 TIMES DAILY
Qty: 180 TABLET | Refills: 3 | Status: SHIPPED | OUTPATIENT
Start: 2018-05-29 | End: 2018-10-24

## 2018-05-29 RX ORDER — ZOLPIDEM TARTRATE 5 MG/1
TABLET ORAL
Qty: 30 TABLET | Refills: 0 | Status: SHIPPED | OUTPATIENT
Start: 2018-05-29 | End: 2018-06-29 | Stop reason: SDUPTHER

## 2018-05-29 RX ORDER — OMEPRAZOLE 40 MG/1
40 CAPSULE, DELAYED RELEASE ORAL
Qty: 90 CAPSULE | Refills: 3 | Status: SHIPPED | OUTPATIENT
Start: 2018-05-29 | End: 2018-08-31

## 2018-05-29 RX ORDER — ALPRAZOLAM 0.5 MG/1
1 TABLET, ORALLY DISINTEGRATING ORAL ONCE AS NEEDED
Status: CANCELLED | OUTPATIENT
Start: 2018-06-04 | End: 2018-06-04

## 2018-05-29 RX ORDER — SIMVASTATIN 40 MG/1
TABLET, FILM COATED ORAL
Qty: 90 TABLET | Refills: 0 | Status: SHIPPED | OUTPATIENT
Start: 2018-05-29 | End: 2018-08-20 | Stop reason: SDUPTHER

## 2018-05-29 NOTE — TELEPHONE ENCOUNTER
Spoke with the patient and procedure changed. She was also advised to contact her PCP to review the kidney cyst.

## 2018-05-29 NOTE — LETTER
May 31, 2018      Sera Barger MD  [address]  [facility] [address]           [facility] - Gastroenterology  [address]  [facility] [address]  Phone: [phone]          Patient: Olga Aguiar   MR Number: [id number]   YOB: 1935   Date of Visit: 5/29/2018       Dear Dr. Sera Barger:    Thank you for referring Olga Aguiar to me for evaluation. Attached you will find relevant portions of my assessment and plan of care.    If you have questions, please do not hesitate to call me. I look forward to following Olga Aguiar along with you.    Sincerely,    Judi Lozano, [facility]    Enclosure  CC:  No Recipients    If you would like to receive this communication electronically, please contact [email or web address] or [phone] to request more information on [employer] access.    For providers and/or their staff who would like to refer a patient to [facility], please contact us through our one-stop-shop provider referral line, [facility], at [phone].    If you feel you have received this communication in error or would no longer like to receive these types of communications, please e-mail [email or web address]

## 2018-05-29 NOTE — TELEPHONE ENCOUNTER
Please let the patient know that I reviewed her lumbar spine MRI results and she has some progression of stenosis at several levels.  The most severe areas of stenosis are L3-4 and L4-5.  She is currently scheduled for transforaminal injections on 6/4.  Please change this to an L5/S1 interlaminar ANUEL to cover both levels.  Also, an incidental finding was an increase in size of a right kidney cyst from 1.5 cm to 2.2 cm.  Please have her discuss this with her primary care physician.

## 2018-05-29 NOTE — PROGRESS NOTES
Subjective:       Patient ID: Olga Aguiar is a 83 y.o. female Body mass index is 28.43 kg/m².    Chief Complaint: Follow-up ( med refills)    This patient is new to me.  Referring Provider:  Dr. Barger for pancreatic cyst and GERD.  Established patient of Dr. Baltazar & Dr. Castro.    Gastroesophageal Reflux   She complains of belching (occasional), early satiety, heartburn (rarely), a hoarse voice (occasional when she is fatigued) and nausea (occasional). She reports no abdominal pain, no chest pain, no choking, no coughing, no dysphagia, no globus sensation or no sore throat. This is a chronic problem. The current episode started more than 1 year ago. The problem occurs rarely. The problem has been rapidly improving. Exacerbated by: overeating. Pertinent negatives include no fatigue, melena or weight loss. Risk factors include hiatal hernia. She has tried a PPI (prilosec 20 mg once daily; carafate 1 gram bid) for the symptoms. The treatment provided significant relief. Past procedures include an EGD.     Review of Systems   Constitutional: Negative for appetite change, chills, fatigue, fever, unexpected weight change and weight loss.   HENT: Positive for hoarse voice (occasional when she is fatigued). Negative for sore throat and trouble swallowing.    Respiratory: Negative for cough, choking and shortness of breath.    Cardiovascular: Negative for chest pain.   Gastrointestinal: Positive for constipation (since taking ultram BID; bowel movements once every 2 days), heartburn (rarely), nausea (occasional) and vomiting (has episodes of 2-3 times of emesis every 3-4 months; emesis of food; denies bright red blood or coffee ground emesis; last occurred 3 weeks ago). Negative for abdominal pain, anal bleeding, blood in stool, diarrhea, dysphagia, melena and rectal pain.   Genitourinary: Negative for difficulty urinating, dysuria and flank pain.   Musculoskeletal: Positive for back pain (takes ultram bid; norco  prn which is rare).   Neurological: Negative for weakness.       Past Medical History:   Diagnosis Date    Adrenal adenoma: 2.8 X 2.1CM 2010 5/16/2014    Anticoagulant long-term use      mg    Aortic atherosclerosis: see CT scan 2016 10/28/2016    Aortic stenosis 8/19/2014    Asthma     Breast cancer     Cancer     breast, both sides, right arm restriction    Cataracts, both eyes     Chronic back pain     Chronic bronchitis     CKD (chronic kidney disease) stage 3, GFR 30-59 ml/min 8/19/2014    no dialysis    COPD (chronic obstructive pulmonary disease)     occasional inhaler use, no oxygen    DDD (degenerative disc disease), lumbar 7/30/2013    Diabetes mellitus     diet controlled    Diverticulosis     Ectatic abdominal aorta: see CT scan 10/16 10/28/2016    Gallbladder polyp 8/19/2014    GERD (gastroesophageal reflux disease)     Gout     Herpes simplex     Fever Blisters and Styes in right eye    High blood cholesterol     Hypertension     holding medication when B/P low    Insomnia     Lumbar spinal stenosis     Lymphedema of arm     Right    Multiple lung nodules 9/21/2015    Renal cyst, right: stable per CT 2016 10/28/2016    Stroke 2/2012    TIA, no residual effects    Stye 12/5/2013    Thyroid disease     hypothyroidism    Unspecified hypothyroidism 7/20/2015     Past Surgical History:   Procedure Laterality Date    AXILLARY NODE DISSECTION      both sides    BREAST BIOPSY      BREAST LUMPECTOMY Bilateral     lymph nodes on right    CATARACT EXTRACTION      bilateral PHACO with IOL    COLONOSCOPY  01/12/2010    in legacy    Epidural steroid injection      Pain mangement    HYSTERECTOMY      HYSTERECTOMY      Nerve Block Injection      Pain management, Times 2    RADIOFREQUENCY ABLATION      Nerve, Pain management    RECTAL SURGERY      Fissure repair    TONSILLECTOMY      UPPER GASTROINTESTINAL ENDOSCOPY  08/29/2014    Dr. Castro     Family History    Problem Relation Age of Onset    Cancer Mother         Breast    COPD Mother     Hearing loss Mother     Hypertension Mother     Breast cancer Mother     Diabetes Father     Heart disease Father     Heart attack Father     Cancer Daughter         Breast    Breast cancer Daughter     Cancer Sister         Breast    Breast cancer Sister     Amblyopia Neg Hx     Blindness Neg Hx     Cataracts Neg Hx     Glaucoma Neg Hx     Macular degeneration Neg Hx     Retinal detachment Neg Hx     Strabismus Neg Hx     Stroke Neg Hx     Thyroid disease Neg Hx     Colon cancer Neg Hx     Stomach cancer Neg Hx     Esophageal cancer Neg Hx      Wt Readings from Last 10 Encounters:   05/29/18 72.8 kg (160 lb 7.9 oz)   05/28/18 73.1 kg (161 lb 2.5 oz)   05/22/18 72.8 kg (160 lb 7.9 oz)   04/27/18 73.3 kg (161 lb 9.6 oz)   04/18/18 74.6 kg (164 lb 9.2 oz)   03/28/18 74.4 kg (164 lb 0.4 oz)   03/05/18 72.6 kg (160 lb)   02/16/18 76.6 kg (168 lb 15.7 oz)   01/10/18 75.3 kg (166 lb)   12/11/17 75.4 kg (166 lb 3.6 oz)     Lab Results   Component Value Date    WBC 4.16 04/18/2018    HGB 13.8 04/18/2018    HCT 42.4 04/18/2018    MCV 90 04/18/2018     04/18/2018     CMP  Sodium   Date Value Ref Range Status   04/18/2018 140 136 - 145 mmol/L Final     Potassium   Date Value Ref Range Status   04/18/2018 4.0 3.5 - 5.1 mmol/L Final     Chloride   Date Value Ref Range Status   04/18/2018 98 95 - 110 mmol/L Final     CO2   Date Value Ref Range Status   04/18/2018 31 (H) 23 - 29 mmol/L Final     Glucose   Date Value Ref Range Status   04/18/2018 111 (H) 70 - 110 mg/dL Final     BUN, Bld   Date Value Ref Range Status   04/18/2018 15 8 - 23 mg/dL Final     Creatinine   Date Value Ref Range Status   04/18/2018 1.1 0.5 - 1.4 mg/dL Final   06/07/2013 1.3 0.5 - 1.4 mg/dL Final     Calcium   Date Value Ref Range Status   04/18/2018 9.9 8.7 - 10.5 mg/dL Final   06/07/2013 10.0 8.7 - 10.5 mg/dL Final     Total Protein   Date  "Value Ref Range Status   04/18/2018 6.6 6.0 - 8.4 g/dL Final     Albumin   Date Value Ref Range Status   04/18/2018 3.4 (L) 3.5 - 5.2 g/dL Final     Total Bilirubin   Date Value Ref Range Status   04/18/2018 0.4 0.1 - 1.0 mg/dL Final     Comment:     For infants and newborns, interpretation of results should be based  on gestational age, weight and in agreement with clinical  observations.  Premature Infant recommended reference ranges:  Up to 24 hours.............<8.0 mg/dL  Up to 48 hours............<12.0 mg/dL  3-5 days..................<15.0 mg/dL  6-29 days.................<15.0 mg/dL       Alkaline Phosphatase   Date Value Ref Range Status   04/18/2018 68 55 - 135 U/L Final   02/07/2012 76 23 - 119 UNIT/L Final     AST   Date Value Ref Range Status   04/18/2018 22 10 - 40 U/L Final   02/07/2012 26 10 - 30 UNIT/L Final     ALT   Date Value Ref Range Status   04/18/2018 19 10 - 44 U/L Final     Anion Gap   Date Value Ref Range Status   04/18/2018 11 8 - 16 mmol/L Final   06/07/2013 13 5 - 15 meq/L Final     eGFR if    Date Value Ref Range Status   04/18/2018 53.7 (A) >60 mL/min/1.73 m^2 Final     eGFR if non    Date Value Ref Range Status   04/18/2018 46.5 (A) >60 mL/min/1.73 m^2 Final     Comment:     Calculation used to obtain the estimated glomerular filtration  rate (eGFR) is the CKD-EPI equation.        Lab Results   Component Value Date    AMYLASE 100 10/10/2014     Lab Results   Component Value Date    LIPASE 20 10/10/2014     Lab Results   Component Value Date    TSH 1.429 04/18/2018     Reviewed prior medical records including radiology report of 11/9/2016 MRI abdomen; 10/14/14 CT ABDOMEN PELVIS; & endoscopy history (see surgical history).    10/23/14 EUS was reviewed and procedure report states:   " Impression:           - A 16 mm by 11 mm cystic lesion was seen in the                         genu of the pancreas and in the uncinate process of                         the " "pancreas.                        - There was no evidence of significant pathology in                         the entire examined liver.                        - There was no sign of significant pathology in the                         common bile duct and in the gallbladder.                        - The cystic lesion reveal no worrisome features                         and therefore continued surveillance is recommended                         in 2 years with MRI.  Recommendation:       - Discharge patient to home (ambulatory).                        - Continue present medications.                        - Observe patient's clinical course.                        - Return to referring physician.                        - The findings and recommendations were discussed                         with the patient.                        - The findings and recommendations were discussed                         with the patient's family.                        - Perform magnetic resonance ".    8/29/14 EGD was reviewed and procedure report states:   " Findings:       A small hiatus hernia was present. Biopsies were taken with a cold        forceps for histology. Estimated blood loss was minimal.       The Z-line was irregular and was found 35 cm from the incisors.       Patchy mildly erythematous mucosa without bleeding was found in the        stomach. Biopsies were taken with a cold forceps for histology.        Estimated blood loss was minimal.       Patchy mildly erythematous mucosa without active bleeding and with        no stigmata of bleeding was found in the duodenal bulb.  Impression:          - Hiatus hernia. Biopsied.                       - Z-line irregular, 35 cm from the incisors.                       - Erythematous mucosa in the stomach. Biopsied.                       - Erythematous duodenopathy.  Recommendation:      - Follow an antireflux regimen indefinitely.                       - Use Prilosec " "(omeprazole) 40 mg PO daily.                       - Await pathology results.".  Biopsy results:   "FINAL PATHOLOGIC DIAGNOSIS  1. Antrum and body; biopsy:  Mild chronic non-active gastritis  No Helicobacter on H&E stain  2. Distal esophagus; biopsy:  Squamous mucosa with mild reactive change  No glandular components sampled  No eosinophils"    Objective:      Physical Exam   Constitutional: She is oriented to person, place, and time. She appears well-developed and well-nourished. No distress.   HENT:   Mouth/Throat: Oropharynx is clear and moist and mucous membranes are normal. No oral lesions. No oropharyngeal exudate.   Eyes: Conjunctivae are normal. Pupils are equal, round, and reactive to light. No scleral icterus.   Cardiovascular: Normal rate.    Pulmonary/Chest: Effort normal and breath sounds normal. No respiratory distress. She has no wheezes.   Abdominal: Soft. Normal appearance and bowel sounds are normal. She exhibits no distension, no abdominal bruit and no mass. There is no hepatosplenomegaly. There is no tenderness. There is no rigidity, no rebound, no guarding, no tenderness at McBurney's point and negative Palumbo's sign. No hernia.   Neurological: She is alert and oriented to person, place, and time.   Skin: Skin is warm and dry. No rash noted. She is not diaphoretic. No erythema. No pallor.   Non-jaundiced   Psychiatric: She has a normal mood and affect. Her behavior is normal. Judgment and thought content normal.   Nursing note and vitals reviewed.      Assessment:       1. Encounter for monitoring long-term proton pump inhibitor therapy    2. Pancreatic cyst    3. Non-intractable vomiting with nausea, unspecified vomiting type    4. Hiatal hernia    5. Gastroesophageal reflux disease without esophagitis    6. Constipation, unspecified constipation type        Plan:       Encounter for monitoring long-term proton pump inhibitor therapy  -     Magnesium; Future; Expected date: 05/29/2018  -     " Vitamin B12; Future; Expected date: 05/29/2018  - discussed with patient about long term use of reflux medications (preference to use lowest effective dose or discontinuing if possible), the risk and benefits of using these medications long term, the risk of untreated GERD such as guaman's esophagus, and recommend a diet high in calcium and/or taking OTC calcium and vitamin d supplements as directed (such as Citracal +D), pt verbalized understanding.  - recommend annual monitoring with blood work to include CMP, CBC, vitamin B12, and magnesium.    Pancreatic cyst  - CONTINUE WITH MRI ABDOMEN AS ORDERED BY DR. GARDNER TO BE DONE IN 10/2018    Non-intractable vomiting with nausea, unspecified vomiting type  - schedule EGD, discussed procedure with patient, patient verbalized understanding but patient declined EGD    Hiatal hernia  - schedule EGD, discussed procedure with patient, patient verbalized understanding but patient declined EGD  -discussed diagnosis with patient & that it is usually managed by controlling reflux symptoms, surgery is an option, but usually performed if reflux is uncontrolled by medication management and lifestyle/dietary modifications; if symptoms persist despite medication management and lifestyle/dietary modifications, we can refer to general surgery to consult about surgical options, patient verbalized understanding    Gastroesophageal reflux disease without esophagitis  -  REFILL   sucralfate (CARAFATE) 1 gram tablet; Take 1 tablet (1 g total) by mouth 2 (two) times daily.  Dispense: 180 tablet; Refill: 3  -  INCREASE TO   omeprazole (PRILOSEC) 40 MG capsule; Take 1 capsule (40 mg total) by mouth before breakfast.  Dispense: 90 capsule; Refill: 3, - take in the morning 30-60 minutes before breakfast, discussed about possible long term use of medication (prefer to use lowest effective dose or discontinuing if possible) and discussed the risks & benefits with taking a reflux medication long  term, and to take OTC calcium and vitamin d supplements as directed (such as Citracal +D), pt verbalized understanding  -discussed about the different types of medications used to treat reflux and how to use them, antacids can be used PRN for breakthrough heartburn symptoms by reducing stomach acid that is already produced, H2 blockers (zantac) work by limiting the amount acid production, & PPI's work to block acid production and are taken daily, patient verbalized understanding.  -Educated patient on lifestyle modifications to help control/reduce reflux/abdominal pain including: avoid large meals, avoid eating within 2-3 hours of bedtime (avoid late night eating & lying down soon after eating), elevate head of bed if nocturnal symptoms are present, smoking cessation (if current smoker), & weight loss (if overweight).   -Educated to avoid known foods which trigger reflux symptoms & to minimize/avoid high-fat foods, chocolate, caffeine, citrus, alcohol, & tomato products.  -Advised to avoid/limit use of NSAID's, since they can cause GI upset, bleeding, and/or ulcers. If needed, take with food.   - schedule EGD, discussed procedure with patient, patient verbalized understanding but patient declined EGD    Constipation, unspecified constipation type  Recommended daily exercise as tolerated, adequate water intake (six 8-oz glasses of water daily), and high fiber diet. OTC fiber supplements are recommended if diet does not reach daily fiber goal (25 grams daily), such as Metamucil, Citrucel, or FiberCon (take as directed, separate from other oral medications by >2 hours).  -Recommend taking an OTC stool softener such as Colace as directed to avoid hard stools and straining with bowel movements PRN  -Recommend trying OTC MiraLax once daily (17g PO) as directed  - If no improvement with above recommendations, try intermittently dosed Dulcolax OTC as directed (every 3-4  days) PRN to facilitate bowel movements  -If still no  improvement with these measures, call/follow-up  - schedule Colonoscopy, discussed procedure with the patient, patient verbalized understanding but patient declined colonoscopy    Follow-up in about 1 month (around 6/29/2018), or if symptoms worsen or fail to improve.      If no improvement in symptoms or symptoms worsen, call/follow-up at clinic or go to ER.

## 2018-05-29 NOTE — PATIENT INSTRUCTIONS

## 2018-06-01 DIAGNOSIS — M51.36 DDD (DEGENERATIVE DISC DISEASE), LUMBAR: Primary | ICD-10-CM

## 2018-06-04 ENCOUNTER — HOSPITAL ENCOUNTER (OUTPATIENT)
Dept: RADIOLOGY | Facility: HOSPITAL | Age: 83
Discharge: HOME | End: 2018-06-04
Attending: ANESTHESIOLOGY | Admitting: ANESTHESIOLOGY
Payer: MEDICARE

## 2018-06-04 ENCOUNTER — HOSPITAL ENCOUNTER (OUTPATIENT)
Facility: HOSPITAL | Age: 83
Discharge: HOME | End: 2018-06-04
Attending: ANESTHESIOLOGY | Admitting: ANESTHESIOLOGY
Payer: MEDICARE

## 2018-06-04 ENCOUNTER — SURGERY (OUTPATIENT)
Age: 83
End: 2018-06-04

## 2018-06-04 VITALS
DIASTOLIC BLOOD PRESSURE: 65 MMHG | WEIGHT: 160 LBS | RESPIRATION RATE: 16 BRPM | TEMPERATURE: 98 F | BODY MASS INDEX: 29.44 KG/M2 | OXYGEN SATURATION: 95 % | HEART RATE: 63 BPM | HEIGHT: 62 IN | SYSTOLIC BLOOD PRESSURE: 141 MMHG

## 2018-06-04 DIAGNOSIS — M54.16 LUMBAR RADICULOPATHY: Primary | ICD-10-CM

## 2018-06-04 DIAGNOSIS — M51.36 DDD (DEGENERATIVE DISC DISEASE), LUMBAR: ICD-10-CM

## 2018-06-04 PROCEDURE — 63600175 PHARM REV CODE 636 W HCPCS: Mod: PO | Performed by: ANESTHESIOLOGY

## 2018-06-04 PROCEDURE — 62323 NJX INTERLAMINAR LMBR/SAC: CPT | Mod: PO | Performed by: ANESTHESIOLOGY

## 2018-06-04 PROCEDURE — 25000003 PHARM REV CODE 250: Mod: PO | Performed by: ANESTHESIOLOGY

## 2018-06-04 PROCEDURE — A4216 STERILE WATER/SALINE, 10 ML: HCPCS | Mod: PO | Performed by: ANESTHESIOLOGY

## 2018-06-04 PROCEDURE — 25500020 PHARM REV CODE 255: Mod: PO | Performed by: ANESTHESIOLOGY

## 2018-06-04 PROCEDURE — 62323 NJX INTERLAMINAR LMBR/SAC: CPT | Mod: ,,, | Performed by: ANESTHESIOLOGY

## 2018-06-04 PROCEDURE — 76000 FLUOROSCOPY <1 HR PHYS/QHP: CPT | Mod: TC,PO

## 2018-06-04 RX ORDER — METHYLPREDNISOLONE ACETATE 80 MG/ML
INJECTION, SUSPENSION INTRA-ARTICULAR; INTRALESIONAL; INTRAMUSCULAR; SOFT TISSUE
Status: DISCONTINUED | OUTPATIENT
Start: 2018-06-04 | End: 2018-06-04 | Stop reason: HOSPADM

## 2018-06-04 RX ORDER — ALPRAZOLAM 0.5 MG/1
0.5 TABLET, ORALLY DISINTEGRATING ORAL ONCE AS NEEDED
Status: COMPLETED | OUTPATIENT
Start: 2018-06-04 | End: 2018-06-04

## 2018-06-04 RX ORDER — TRAMADOL HYDROCHLORIDE 50 MG/1
TABLET ORAL
Qty: 60 TABLET | Refills: 2 | Status: SHIPPED | OUTPATIENT
Start: 2018-06-04 | End: 2018-08-01 | Stop reason: SDUPTHER

## 2018-06-04 RX ORDER — LIDOCAINE HYDROCHLORIDE 10 MG/ML
INJECTION, SOLUTION EPIDURAL; INFILTRATION; INTRACAUDAL; PERINEURAL
Status: DISCONTINUED | OUTPATIENT
Start: 2018-06-04 | End: 2018-06-04 | Stop reason: HOSPADM

## 2018-06-04 RX ORDER — SODIUM CHLORIDE 9 MG/ML
INJECTION, SOLUTION INTRAMUSCULAR; INTRAVENOUS; SUBCUTANEOUS
Status: DISCONTINUED | OUTPATIENT
Start: 2018-06-04 | End: 2018-06-04 | Stop reason: HOSPADM

## 2018-06-04 RX ORDER — ALPRAZOLAM 0.5 MG/1
1 TABLET, ORALLY DISINTEGRATING ORAL ONCE AS NEEDED
Status: DISCONTINUED | OUTPATIENT
Start: 2018-06-04 | End: 2018-06-04

## 2018-06-04 RX ADMIN — METHYLPREDNISOLONE ACETATE 80 MG: 80 INJECTION, SUSPENSION INTRA-ARTICULAR; INTRALESIONAL; INTRAMUSCULAR; SOFT TISSUE at 12:06

## 2018-06-04 RX ADMIN — IOHEXOL 3 ML: 300 INJECTION, SOLUTION INTRAVENOUS at 12:06

## 2018-06-04 RX ADMIN — ALPRAZOLAM 0.5 MG: 0.5 TABLET, ORALLY DISINTEGRATING ORAL at 11:06

## 2018-06-04 RX ADMIN — LIDOCAINE HYDROCHLORIDE 5 ML: 10 INJECTION, SOLUTION EPIDURAL; INFILTRATION; INTRACAUDAL; PERINEURAL at 12:06

## 2018-06-04 RX ADMIN — SODIUM CHLORIDE 4 ML: 9 INJECTION INTRAMUSCULAR; INTRAVENOUS; SUBCUTANEOUS at 12:06

## 2018-06-04 NOTE — DISCHARGE SUMMARY
[facility]  Discharge Note  Short Stay    Admit Date: 6/4/2018    Discharge Date: 6/4/2018    Attending Physician: Rohan Ware MD     Discharge Provider: Rohan Ware    Diagnoses:  Active Hospital Problems    Diagnosis  POA    *Lumbar radiculopathy [M54.16]  Yes      Resolved Hospital Problems    Diagnosis Date Resolved POA   No resolved problems to display.       Discharged Condition: good    Final Diagnoses: Lumbar radiculopathy [M54.16]    Disposition: Home or Self Care    Hospital Course: no complications, uneventful    Outcome of Hospitalization, Treatment, Procedure, or Surgery:  Patient was admitted for outpatient procedure. The patient underwent procedure without complications and are discharged home    Follow up/Patient Instructions:  Follow up as scheduled in Pain Management clinic in 3-4 weeks/Patient has received instructions and follow up date and time    Medications:  Continue previous medications      Discharge Procedure Orders  Call MD for:  temperature >100.4     Call MD for:  severe uncontrolled pain     Call MD for:  redness, tenderness, or signs of infection (pain, swelling, redness, odor or green/yellow discharge around incision site)     Call MD for:  severe persistent headache     No dressing needed           Discharge Procedure Orders (must include Diet, Follow-up, Activity):    Discharge Procedure Orders (must include Diet, Follow-up, Activity)  Call MD for:  temperature >100.4     Call MD for:  severe uncontrolled pain     Call MD for:  redness, tenderness, or signs of infection (pain, swelling, redness, odor or green/yellow discharge around incision site)     Call MD for:  severe persistent headache     No dressing needed

## 2018-06-04 NOTE — DISCHARGE INSTRUCTIONS
Home care instructions  Apply ice pack to the injection site for 20 minutes periods for the first 24 hrs for soreness/discomfort at injection site DO NOT USE HEAT FOR 24 HOURS  Keep site clean and dry for 24 hours, remove bandaid when desired  Do not drive until tomorrow  Take care when walking after a lumbar injection  Avoid strenuous activities for 2 days  Make take 2 weeks to feel the full effects   Resume home medication as prescribed today  Resume Aspirin, Plavix, or Coumadin the day after the procedure unless otherwise instructed.    SEE IMMEDIATE MEDICAL HELP FOR:  Severe increase in your usual pain or appearance of new pain  Prolonged or increasing weakness or numbness in the legs or arms  Drainage, redness, active bleeding, or increased swelling at the injection site  Temperature over 100.0 degrees F.  Headache that increases when your head is upright and decreases when you lie flat    CALL 911 OR GO DIRECTLY TO EMERGENCY DEPARTMENT FOR:  Shortness of breath, chest pain, or problems breathing      Recovery After Procedural Sedation (Adult)  You have been given medicine by vein to make you sleep during your surgery. This may have included both a pain medicine and sleeping medicine. Most of the effects have worn off. But you may still have some drowsiness for the next 6 to 8 hours.  Home care  Follow these guidelines when you get home:  · For the next 8 hours, you should be watched by a responsible adult. This person should make sure your condition is not getting worse.  · Don't drink any alcohol for the next 24 hours.  · Don't drive, operate dangerous machinery, or make important business or personal decisions during the next 24 hours.  Note: Your healthcare provider may tell you not to take any medicine by mouth for pain or sleep in the next 4 hours. These medicines may react with the medicines you were given in the hospital. This could cause a much stronger response than usual.  Follow-up care  Follow up  with your healthcare provider if you are not alert and back to your usual level of activity within 12 hours.  When to seek medical advice  Call your healthcare provider right away if any of these occur:  · Drowsiness gets worse  · Weakness or dizziness gets worse  · Repeated vomiting  · You can't be awakened   Date Last Reviewed: 10/18/2016  © 1392-6589 [employer]. [address] [place] [address], [place] [address]. All rights reserved. This information is not intended as a substitute for professional medical care. Always follow your healthcare professional's instructions.

## 2018-06-04 NOTE — OP NOTE

## 2018-06-11 ENCOUNTER — LAB VISIT (OUTPATIENT)
Dept: LAB | Facility: HOSPITAL | Age: 83
End: 2018-06-11
Attending: NURSE PRACTITIONER
Payer: MEDICARE

## 2018-06-11 ENCOUNTER — OFFICE VISIT (OUTPATIENT)
Dept: OPTOMETRY | Facility: CLINIC | Age: 83
End: 2018-06-11
Payer: MEDICARE

## 2018-06-11 DIAGNOSIS — H52.7 REFRACTIVE ERROR: ICD-10-CM

## 2018-06-11 DIAGNOSIS — Z51.81 ENCOUNTER FOR MONITORING LONG-TERM PROTON PUMP INHIBITOR THERAPY: ICD-10-CM

## 2018-06-11 DIAGNOSIS — Z96.1 PSEUDOPHAKIA: ICD-10-CM

## 2018-06-11 DIAGNOSIS — Z79.899 ENCOUNTER FOR MONITORING LONG-TERM PROTON PUMP INHIBITOR THERAPY: ICD-10-CM

## 2018-06-11 DIAGNOSIS — E11.9 DIABETES MELLITUS WITHOUT OPHTHALMIC MANIFESTATIONS: Primary | ICD-10-CM

## 2018-06-11 LAB
MAGNESIUM SERPL-MCNC: 1.9 MG/DL
VIT B12 SERPL-MCNC: 548 PG/ML

## 2018-06-11 PROCEDURE — 83735 ASSAY OF MAGNESIUM: CPT

## 2018-06-11 PROCEDURE — 36415 COLL VENOUS BLD VENIPUNCTURE: CPT | Mod: PO

## 2018-06-11 PROCEDURE — 99499 UNLISTED E&M SERVICE: CPT | Mod: S$GLB,,, | Performed by: OPTOMETRIST

## 2018-06-11 PROCEDURE — 99999 PR PBB SHADOW E&M-EST. PATIENT-LVL II: CPT | Mod: PBBFAC,,, | Performed by: OPTOMETRIST

## 2018-06-11 PROCEDURE — 82607 VITAMIN B-12: CPT

## 2018-06-11 PROCEDURE — 92014 COMPRE OPH EXAM EST PT 1/>: CPT | Mod: S$GLB,,, | Performed by: OPTOMETRIST

## 2018-06-11 NOTE — PROGRESS NOTES
HPI     Presenting Complaint: Pt here today for yearly diabetic eye exam. Pt   states distance vision has been stable. Pt uses OTC readers for small   print.     Hemoglobin A1C       Date                     Value               Ref Range             Status                04/18/2018               6.2 (H)             4.0 - 5.6 %           Final                 01/24/2018               5.6                 4.0 - 5.6 %           Final                  10/10/2017               6.1 (H)             4.0 - 5.6 %           Final              Ophthalmic medication / drops: None    (-) headaches  (-) diplopia   (-) flashes / (-) floaters      Last edited by Ashwin Snowden, OD on 6/11/2018  2:49 PM. (History)            Assessment /Plan     For exam results, see Encounter Report.    Diabetes mellitus without ophthalmic manifestations    Pseudophakia    Refractive error      DM type 2 controlled well with diet and exercise w/o ocular retinopathy OU. Discussed possible ocular affects of uncontrolled blood sugar with patient. Recommended continued strong blood sugar control and continued care with PCP. Monitor yearly.     S/p cataract extraction with good results. Patient happy with uncorrected distance vision and OTC readers as needed for near. Denies refraction. Return as needed for spec Rx.      RTC in 1 year for comprehensive eye exam, or sooner prn.

## 2018-06-18 RX ORDER — LEVOTHYROXINE SODIUM 75 UG/1
TABLET ORAL
Qty: 90 TABLET | Refills: 0 | Status: SHIPPED | OUTPATIENT
Start: 2018-06-18 | End: 2018-09-17 | Stop reason: SDUPTHER

## 2018-06-29 DIAGNOSIS — F51.01 PRIMARY INSOMNIA: ICD-10-CM

## 2018-06-29 RX ORDER — ZOLPIDEM TARTRATE 5 MG/1
TABLET ORAL
Qty: 30 TABLET | Refills: 3 | Status: SHIPPED | OUTPATIENT
Start: 2018-06-29 | End: 2018-10-26 | Stop reason: SDUPTHER

## 2018-08-01 ENCOUNTER — TELEPHONE (OUTPATIENT)
Dept: PAIN MEDICINE | Facility: CLINIC | Age: 83
End: 2018-08-01

## 2018-08-01 ENCOUNTER — OFFICE VISIT (OUTPATIENT)
Dept: PAIN MEDICINE | Facility: CLINIC | Age: 83
End: 2018-08-01
Payer: MEDICARE

## 2018-08-01 VITALS
BODY MASS INDEX: 29.46 KG/M2 | TEMPERATURE: 96 F | HEART RATE: 73 BPM | DIASTOLIC BLOOD PRESSURE: 76 MMHG | OXYGEN SATURATION: 96 % | SYSTOLIC BLOOD PRESSURE: 142 MMHG | WEIGHT: 161.06 LBS | RESPIRATION RATE: 20 BRPM

## 2018-08-01 DIAGNOSIS — M48.062 SPINAL STENOSIS OF LUMBAR REGION WITH NEUROGENIC CLAUDICATION: ICD-10-CM

## 2018-08-01 DIAGNOSIS — M51.36 DDD (DEGENERATIVE DISC DISEASE), LUMBAR: Primary | ICD-10-CM

## 2018-08-01 DIAGNOSIS — R26.81 GAIT INSTABILITY: ICD-10-CM

## 2018-08-01 DIAGNOSIS — M54.16 LUMBAR RADICULOPATHY: ICD-10-CM

## 2018-08-01 PROCEDURE — 3078F DIAST BP <80 MM HG: CPT | Mod: CPTII,S$GLB,, | Performed by: PHYSICIAN ASSISTANT

## 2018-08-01 PROCEDURE — 3077F SYST BP >= 140 MM HG: CPT | Mod: CPTII,S$GLB,, | Performed by: PHYSICIAN ASSISTANT

## 2018-08-01 PROCEDURE — 99214 OFFICE O/P EST MOD 30 MIN: CPT | Mod: S$GLB,,, | Performed by: PHYSICIAN ASSISTANT

## 2018-08-01 PROCEDURE — 99999 PR PBB SHADOW E&M-EST. PATIENT-LVL V: CPT | Mod: PBBFAC,,, | Performed by: PHYSICIAN ASSISTANT

## 2018-08-01 RX ORDER — ALPRAZOLAM 0.5 MG/1
1 TABLET, ORALLY DISINTEGRATING ORAL ONCE AS NEEDED
Status: CANCELLED | OUTPATIENT
Start: 2018-09-04 | End: 2018-09-04

## 2018-08-01 RX ORDER — TRAMADOL HYDROCHLORIDE 50 MG/1
TABLET ORAL
Qty: 90 TABLET | Refills: 2 | Status: SHIPPED | OUTPATIENT
Start: 2018-08-01 | End: 2018-11-24 | Stop reason: SDUPTHER

## 2018-08-01 NOTE — PROGRESS NOTES
CC: Back and leg pain    HPI: The patient is a 83-year-old woman with a history of diabetes, previous breast CA, CVA and spinal stenosis who presents in referral from her primary care physician, Dr. Barger for back pain and leg pain.  She is status post L5/S1 interlaminar epidural steroid injection on 06/04/2018 with almost 100% relief lasting 1 month.  She does have some ongoing improvement in her weakness but her pain has returned.  She states that this is the best she has felt following an injection. She complains of bilateral buttock pain that she describes as burning.  This shoots down her legs when she is standing and walking.  She has some improvement with sitting but her pain is constant.  She has been taking tramadol 50 mg twice a day with relief at night but no relief in the mornings.  She denies having any weakness, numbness, bladder or bowel incontinence.    Pain intervention history: She tried physical therapy about 1 year ago and it seemed to make her pain worse. Patient is status post right L3 and L4 transforaminal injection on 8/9/13 with 50% relief of low back and right leg pain.  She is status post bilateral L4 transforaminal epidural steroid injections on 9/27/13 and 11/1/13 with 10-20% relief.  She is status post radiofrequency ablation of the left L3, 4, 5 medial branch nerves on 5/5/14 with 50% relief of her left low back pain.  She is status post radiofrequency ablation of the right L3, 4 and 5 medial branch nerves on 6/16/14 with mild relief, however she reported significant more relief at a later visit.  She is status post bilateral L4 transforaminal epidural steroid injections on 9/24/14 with moderate relief.  She is status post bilateral L4 transforaminal epidural steroid injections on 1/7/15 with complete relief of her posterior thigh pain, moderate relief of her low back pain and minimal relief of her right lateral hip pain.  She is status post C7-T1 cervical interlaminar epidural steroid  injection on 2/27/15 with greater than 50% relief.  She is status post bilateral L3, 4 and 5 medial branch radio frequency ablation on 7/29/16 reporting 0% relief initially at 4 weeks but then reporting 90% relief after 6-7 weeks.  She is status post bilateral L4 transforaminal epidural steroid injections on 11/11/16 with not quite 50% relief.  She is status post bilateral L4 transforaminal epidural steroid injections on 4/4/17 with 50% relief.  She is status post bilateral L4 transforaminal epidural steroid injections on 8/23/17 with greater than 80% relief.  She is status post bilateral L3, 4 and 5 medial branch radiofrequency ablation on 3/6/18 with moderate relief.  She is status post L5/S1 interlaminar epidural steroid injection on 06/04/2018 with almost 100% relief lasting 1 month.    ROS:She reports easy bruising, back pain and difficulty sleeping.  Balance of review of systems is negative.    Medical, surgical, family and social history reviewed elsewhere in record.    Medications/Allergies: See med card    Vitals:    08/01/18 1320   BP: (!) 142/76   Pulse: 73   Resp: 20   Temp: 96.2 °F (35.7 °C)   TempSrc: Oral   SpO2: 96%   Weight: 73.1 kg (161 lb 0.7 oz)   PainSc:   6   PainLoc: Back         Physical exam:  Gen: A and O x3, pleasant, well-groomed  Skin: No rashes or obvious lesions  HEENT: PERRLA  CVS: Regular rate and rhythm, normal S1 and S2, no murmurs.  Resp: Clear to auscultation bilaterally, no wheezes or rales.  Abdomen: Soft, NT/ND, normal bowel sounds present.  Musculoskeletal: Able to stand and walk short distances without assistance, however uses a walker when out of the house.  She has disuse atrophy of the lumbar paraspinous muscles.  Slow to go from seated to standing position.    Neuro:  Upper extremities: 5/5 strength bilaterally   Lower extremities: 5/5 strength bilaterally  Reflexes: Brachioradialis 2+, Bicep 2+, Tricep 2+. Patellar 2+, Achilles 2+.  Sensory: Intact and symmetrical to  light touch and pinprick in C2-T1 dermatomes bilaterally.  Intact and symmetrical to light touch and pinprick in L2-S1 dermatomes bilaterally, except for a small patch over her left lateral shin decreased with light touch and pinprick.    Lumbar spine:  Lumbar spine: ROM is moderately reduced with flexion extension and oblique extension with mild increased pain in the bilateral buttocks with extension.  Terry's test is deferred.  Supine straight leg raise causes right greater than left buttock pain.  Internal and external rotation of the hip causes no increased pain on either side.  Myofascial exam: No tenderness to palpation across lumbar paraspinous muscles.      Imagin13 MRI L-spine  T10-T11: There is severe disk desiccation and a moderate diffuse disk bulge and ligamentous hypertrophy. This is not complete evaluation of the thoracic spine, but there does appear to be mild central canal stenosis.  T11-T12: Moderate disk desiccation and mild diffuse disk bulge. Mild narrowing of the thecal sac. Neural foraminal regions difficult to assess due to the scoliosis.  T12-L1: Severe disk degenerative disease with marked desiccation, diffuse disk bulge, and spurring of vertebral bodies. Mild central canal stenosis. Neural foraminal narrowing bilaterally, difficult to grade due to scoliosis.  L1-L2: Advanced disk desiccation with moderate diffuse disk bulge and mild spurring of vertebral bodies. Small superimposed central to right paracentral disk extrusion. Mild central canal stenosis. Mild to moderate facet arthropathy and ligamentous hypertrophy. Bilateral foraminal stenosis.  L2 - L3: There is also disk desiccation and diffuse disk bulge and small annular fissure posteriorly. facet arthropathy and hypertrophy ligamentum flavum. Mild central canal stenosis. Moderate right and mild left neuroforaminal stenosis.  L3-L4: Disk desiccation and moderate diffuse disk bulge. Small right paracentral ascending disk  extrusion and moderate facet arthropathy present bilaterally and hypertrophy of ligamentum flavum. In combination, the findings result in severe central canal stenosis. For example see axial image 22, central image 7. There is mild to moderate neural foraminal stenosis bilaterally.  L4 - L5: Mild anterolisthesis with disk desiccation and uncovering of the disk, with moderate right and severe left facet arthropathy and hypertrophy of ligamentum flavum. Severe central canal stenosis. Mild neural foraminal narrowing, right worse than left.  L5-S1: Disk desiccation and mild diffuse disk bulge. A small ascending disk extrusion centrally in the midline. No significant central canal stenosis. Advanced facet arthropathy bilaterally, left worse than right. Mild left neural foraminal narrowing. No significant right neural foraminal stenosis.    5/28/2018 MRI lumbar spine  T12-L1: There is marked disc space narrowing, trace retrolisthesis, unroofing of a moderate disc bulge.  There is right greater than left facet joint arthropathy.  There is mild spinal stenosis with moderate left and moderate-to-marked right foraminal stenosis.  These findings have mildly progressed.  L1-2: There is trace retrolisthesis of L1 on L2, there is inferior unroofing of a moderate diffuse disc bulge with small superimposed central disc protrusion.  There is right greater than left facet joint arthropathy.  There is borderline spinal stenosis.  There is crowding of right lateral recess.  There is marked right and moderate-to-marked left foraminal stenosis with very slight progression.  L2-3: There is trace retrolisthesis of L2 on L3 where there is marked disc space narrowing.  There is a moderate diffuse disc bulge with osteophytic ridging and superimposed right foraminal disc protrusion.  There is moderate-to-marked bilateral facet joint arthropathy with ligamentum flavum thickening, right greater than.  There is moderate central spinal stenosis  and right lateral recess stenosis.  There is mild left lateral recess stenosis.  There is marked right and moderate-to-marked left foraminal stenosis with progression.  L3-4: There is disc space narrowing.  There is marked facet joint arthropathy with ligamentum flavum thickening.  There is a small 4 mm left synovial cyst.  There is unroofing of a moderate to marked diffuse disc bulge.  There is severe spinal canal, bilateral lateral recess and right foraminal stenosis with moderate to marked left foraminal stenosis and with slight progression.  L4-5: There is stable grade 1 spondylolisthesis with 4 mm anterolisthesis of L4 on L5.  There is marked facet joint arthropathy.  There is unroofing of a moderate to marked diffuse disc bulge.  There is severe spinal canal, bilateral lateral recess and foraminal stenosis with slight progression.  L5-S1: There is disc space narrowing at the L5-S1 level, worse towards the left.  There is a disc bulge with small superimposed central disc extrusion with 5 mm cephalad extension.  This was present previously but is slightly more conspicuous.  There is marked facet joint arthropathy.  There is moderate central spinal stenosis with mild crowding of the left lateral recess.  There is mild right and moderate-to-marked left foraminal stenosis with progression.      Assessment:   The patient is a 83-year-old woman with a history of diabetes, previous breast CA, CVA and spinal stenosis who presents in referral from her primary care physician, Dr. Barger for back pain and leg pain.     1. DDD (degenerative disc disease), lumbar  Ambulatory referral to Neurosurgery   2. Lumbar radiculopathy  Vital signs    Verify informed consent    Notify physician     Notify physician     Notify physician (specify)    Diet NPO    Case Request Operating Room: Injection-steroid-epidural-lumbar    Place in Outpatient    alprazolam ODT dissolvable tablet 1 mg   3. Spinal stenosis of lumbar region with  neurogenic claudication     4. Gait instability         Plan:  1.  The patient had almost complete relief of her symptoms lasting 1 month following the L5/S1 interlaminar ANUEL.  She states that this was the best she has felt following an injection but unfortunately she did not have lasting relief.  We discussed repeating this once more to see if she has longer lasting relief.  We also discussed her most recent lumbar spine MRI results which demonstrate progressive multilevel canal stenosis most significantly at L3-4 and L4-5.  I would like her to be evaluated by 1 of our neurosurgeons.  We also spent some time discussing spinal cord stimulation as an option if she does not have lasting relief from injections and if an operation is not recommended.  2.  Dr. Ware provided a prescription for tramadol 100 mg in the morning and 50 mg at night.  3.  Follow-up in 4 weeks postprocedure or sooner as needed.    1.  We discussed her worsening symptoms and I will update her lumbar spine MRI and obtain hip x-rays prior to her injection on 6/4.  I suspect that she may have worsening stenosis at L4/5.  We will call her with results and keep her scheduled for her bilateral L4/5 TF ANUEL's on 6/4/18.

## 2018-08-01 NOTE — TELEPHONE ENCOUNTER
Dr. Ware has given a verbal order for tramadol 100 mg in the morning and 50 mg at night.  Please phone this into the pharmacy.  See order.  Please notify the patient.

## 2018-08-01 NOTE — TELEPHONE ENCOUNTER
Patient is scheduled for a lumber epidural steroid injection on 9/4 and will need to stop aspirin 7 days prior. Please advise.

## 2018-08-02 NOTE — TELEPHONE ENCOUNTER
Spoke with nurse alee regarding this. She will be checking with Dr. Lucas for clarification on this.

## 2018-08-03 ENCOUNTER — TELEPHONE (OUTPATIENT)
Dept: PAIN MEDICINE | Facility: CLINIC | Age: 83
End: 2018-08-03

## 2018-08-03 NOTE — TELEPHONE ENCOUNTER
----- Message from Cira Villalpando LPN sent at 8/3/2018  7:58 AM CDT -----  His office note from may is a good clearance  ----- Message -----  From: Harley Lucas MD  Sent: 8/3/2018   5:24 AM  To: Cira Villalpando LPN    Yes, not sure what is unclear about my note!    ----- Message -----  From: Cira Villalpando LPN  Sent: 8/2/2018   9:19 AM  To: MD Dr. Lance Muñoz,     Mrs. Aguiar was seen in May 2018, she is due to have an epidural injection on 9/4/18. I see in your note that she is to hold her ASA 7 day prior. Does that mean she is okay to hold her ASA. She is asymptomatic.    Thanks,  Cira

## 2018-08-13 ENCOUNTER — DOCUMENTATION ONLY (OUTPATIENT)
Dept: FAMILY MEDICINE | Facility: CLINIC | Age: 83
End: 2018-08-13

## 2018-08-13 ENCOUNTER — OFFICE VISIT (OUTPATIENT)
Dept: FAMILY MEDICINE | Facility: CLINIC | Age: 83
End: 2018-08-13
Payer: MEDICARE

## 2018-08-13 VITALS
TEMPERATURE: 98 F | WEIGHT: 155 LBS | DIASTOLIC BLOOD PRESSURE: 76 MMHG | HEART RATE: 79 BPM | SYSTOLIC BLOOD PRESSURE: 158 MMHG | BODY MASS INDEX: 28.52 KG/M2 | HEIGHT: 62 IN

## 2018-08-13 DIAGNOSIS — J32.9 SINUSITIS, UNSPECIFIED CHRONICITY, UNSPECIFIED LOCATION: Primary | ICD-10-CM

## 2018-08-13 PROCEDURE — 3077F SYST BP >= 140 MM HG: CPT | Mod: CPTII,S$GLB,, | Performed by: PHYSICIAN ASSISTANT

## 2018-08-13 PROCEDURE — 99999 PR PBB SHADOW E&M-EST. PATIENT-LVL III: CPT | Mod: PBBFAC,,, | Performed by: PHYSICIAN ASSISTANT

## 2018-08-13 PROCEDURE — 3078F DIAST BP <80 MM HG: CPT | Mod: CPTII,S$GLB,, | Performed by: PHYSICIAN ASSISTANT

## 2018-08-13 PROCEDURE — 99213 OFFICE O/P EST LOW 20 MIN: CPT | Mod: S$GLB,,, | Performed by: PHYSICIAN ASSISTANT

## 2018-08-13 RX ORDER — FLUTICASONE PROPIONATE 50 MCG
2 SPRAY, SUSPENSION (ML) NASAL DAILY
Qty: 16 G | Refills: 0 | Status: SHIPPED | OUTPATIENT
Start: 2018-08-13 | End: 2018-08-31 | Stop reason: ALTCHOICE

## 2018-08-13 RX ORDER — DOXYCYCLINE 100 MG/1
100 CAPSULE ORAL 2 TIMES DAILY
Qty: 20 CAPSULE | Refills: 0 | Status: SHIPPED | OUTPATIENT
Start: 2018-08-13 | End: 2018-08-24

## 2018-08-13 NOTE — PROGRESS NOTES
Subjective:       Patient ID: Olga Aguiar is a 83 y.o. female.    Chief Complaint: Sore Throat    Sore Throat    This is a new problem. The current episode started today. The problem has been gradually worsening. Neither side of throat is experiencing more pain than the other. The maximum temperature recorded prior to her arrival was 101 - 101.9 F. The pain is at a severity of 8/10. The pain is severe. Associated symptoms include congestion, coughing, a hoarse voice, shortness of breath and trouble swallowing. Pertinent negatives include no abdominal pain, diarrhea, drooling, ear discharge, ear pain, headaches, neck pain, stridor, swollen glands or vomiting. She has had no exposure to strep or mono. She has tried nothing for the symptoms. The treatment provided no relief.     Review of Systems   Constitutional: Negative for activity change, appetite change, chills, fatigue and fever.   HENT: Positive for congestion, hoarse voice, postnasal drip, rhinorrhea, sore throat and trouble swallowing. Negative for drooling, ear discharge, ear pain, hearing loss, nosebleeds and voice change.    Eyes: Negative for discharge, redness and visual disturbance.   Respiratory: Positive for cough and shortness of breath. Negative for chest tightness, wheezing and stridor.    Cardiovascular: Negative for chest pain and leg swelling.   Gastrointestinal: Negative.  Negative for abdominal pain, diarrhea and vomiting.   Musculoskeletal: Negative for neck pain.   Neurological: Negative for headaches.       Objective:      Physical Exam   Constitutional: She appears well-developed and well-nourished. No distress.   HENT:   Head: Normocephalic and atraumatic.   Right Ear: External ear normal.   Left Ear: External ear normal.   Mouth/Throat: Uvula is midline, oropharynx is clear and moist and mucous membranes are normal. No oral lesions. No uvula swelling. No oropharyngeal exudate, posterior oropharyngeal edema or posterior  oropharyngeal erythema.   Eyes: Conjunctivae and EOM are normal. Pupils are equal, round, and reactive to light.   Neck: Normal range of motion. Neck supple. No thyromegaly present.   Cardiovascular: Normal rate, regular rhythm and normal heart sounds. Exam reveals no gallop and no friction rub.   No murmur heard.  Pulmonary/Chest: Effort normal and breath sounds normal. No respiratory distress. She has no wheezes. She has no rales.   Abdominal: Soft. Bowel sounds are normal. There is no tenderness.   Musculoskeletal: She exhibits no edema or tenderness.   Skin: She is not diaphoretic.       Assessment:       1. Sinusitis, unspecified chronicity, unspecified location        Plan:       Olga was seen today for sore throat.    Diagnoses and all orders for this visit:    Sinusitis, unspecified chronicity, unspecified location  -     fluticasone (FLONASE) 50 mcg/actuation nasal spray; 2 sprays (100 mcg total) by Each Nare route once daily.  -     doxycycline (MONODOX) 100 MG capsule; Take 1 capsule (100 mg total) by mouth 2 (two) times daily. for 10 days

## 2018-08-13 NOTE — PROGRESS NOTES
Pre-Visit Chart Review  For Appointment Scheduled on 08/13/2018    Health Maintenance Due   Topic Date Due    Influenza Vaccine  08/01/2018    Foot Exam  08/01/2018

## 2018-08-15 ENCOUNTER — TELEPHONE (OUTPATIENT)
Dept: INTERNAL MEDICINE | Facility: CLINIC | Age: 83
End: 2018-08-15

## 2018-08-15 ENCOUNTER — TELEPHONE (OUTPATIENT)
Dept: FAMILY MEDICINE | Facility: CLINIC | Age: 83
End: 2018-08-15

## 2018-08-15 NOTE — TELEPHONE ENCOUNTER
----- Message from Gina Pérez sent at 8/15/2018  9:54 AM CDT -----  Contact: [phone]  Patient's  is requesting a call back from the doctor/nurse today in regards to patient.    Patient has fever, not feeling well at all. Patient condition has gotten worst her  stated and they really want to speak with Dr. Charbel chiu.    Please advise, thank you

## 2018-08-15 NOTE — TELEPHONE ENCOUNTER
If he is worse, with his heart condition and other medical problems I would recommend she take him to the closest emergency room

## 2018-08-15 NOTE — TELEPHONE ENCOUNTER
"Patient's Daughter Marialuisa only wants to speak with Dr. Hassan or "her nurse" as she feels her mother (the patient) is too ill to see a doctor that she does not know. Patient's ddauter sees available appointments for herself, however, the patient does not meet criteria for these appointments. Please advise  "

## 2018-08-15 NOTE — TELEPHONE ENCOUNTER
Spoke to pt's . He stated that pt was weak, low-grade fever, and unable to keep anything down. He will take pt to ER. Also, scheduled appt to see Dr. Barger on 8/24/18

## 2018-08-15 NOTE — TELEPHONE ENCOUNTER
Pt  stated that pt very lethargic,  Weak , not eating or drinking much , mild fever, pt saw someone for UC on Monday and was diagnose with a sinus infection but she is not feeling better at all   Pt  wants to know what they should do , he is very concern about her health   Please advise

## 2018-08-15 NOTE — TELEPHONE ENCOUNTER
----- Message from Gina Pérez sent at 8/15/2018  9:54 AM CDT -----  Contact: [phone]  Patient's  is requesting a call back from the doctor/nurse today in regards to patient.    Patient has fever, not feeling well at all. Patient condition has gotten worst her  stated and they really want to speak with Dr. Charbel chiu.    Please advise, thank you

## 2018-08-15 NOTE — TELEPHONE ENCOUNTER
Attempted to contact patient's daughter. Left message to return call to schedule first available appt if needed.

## 2018-08-15 NOTE — TELEPHONE ENCOUNTER
I do not have any availability today to fit this patient in. I have also not ever seen her before, so she would be a new patient to me as well.

## 2018-08-15 NOTE — TELEPHONE ENCOUNTER
----- Message from Marialuisa Pjs sent at 8/15/2018  9:28 AM CDT -----  Contact: daughter  Daughter - Marialuisa Aguiar [phone] is calling/patient's doctor is on the [place] with [facility] but is with daughter today in [place]/requesting an appt with Dr Hassan only for today as patient is running fever (per patient - but she does not know how much)/is shaky and feels weak/patient was seen on 08 13 18 with a physician's assistant in [place] for this but she is not any better/please advise

## 2018-08-16 ENCOUNTER — TELEPHONE (OUTPATIENT)
Dept: INTERNAL MEDICINE | Facility: CLINIC | Age: 83
End: 2018-08-16

## 2018-08-16 DIAGNOSIS — E87.6 HYPOKALEMIA: Primary | ICD-10-CM

## 2018-08-16 NOTE — TELEPHONE ENCOUNTER
Please let her know I reviewed her ER visit    Low potassium please review a high potassium diet    Repeat labs day before appt if possible (I ordered) LAUREL thanks

## 2018-08-17 ENCOUNTER — PATIENT MESSAGE (OUTPATIENT)
Dept: INTERNAL MEDICINE | Facility: CLINIC | Age: 83
End: 2018-08-17

## 2018-08-17 NOTE — TELEPHONE ENCOUNTER
Spoke to pt's . He said she is doing better but pt is still not able to keep food down. She is able to eat but shortly after she vomits it up. He was thinking maybe this has something to do with her hiatal hernia.     Pt is scheduled for labs on 8/23/18. She is taking a potassium supplement and also eating bananas. I will send Diet into through [facility]

## 2018-08-17 NOTE — TELEPHONE ENCOUNTER
Second e-mail sent as well with additional information about reflux encouraging patient to go back to urgent care or ER if symptoms persist or worsen over the weekend.

## 2018-08-20 DIAGNOSIS — E78.5 HYPERLIPIDEMIA, UNSPECIFIED HYPERLIPIDEMIA TYPE: ICD-10-CM

## 2018-08-20 RX ORDER — SIMVASTATIN 40 MG/1
TABLET, FILM COATED ORAL
Qty: 90 TABLET | Refills: 0 | Status: SHIPPED | OUTPATIENT
Start: 2018-08-20 | End: 2018-11-21 | Stop reason: SDUPTHER

## 2018-08-23 ENCOUNTER — OFFICE VISIT (OUTPATIENT)
Dept: NEUROSURGERY | Facility: CLINIC | Age: 83
End: 2018-08-23
Payer: MEDICARE

## 2018-08-23 ENCOUNTER — LAB VISIT (OUTPATIENT)
Dept: LAB | Facility: HOSPITAL | Age: 83
End: 2018-08-23
Attending: INTERNAL MEDICINE
Payer: MEDICARE

## 2018-08-23 VITALS
DIASTOLIC BLOOD PRESSURE: 86 MMHG | HEIGHT: 63 IN | HEART RATE: 72 BPM | BODY MASS INDEX: 27.62 KG/M2 | WEIGHT: 155.88 LBS | SYSTOLIC BLOOD PRESSURE: 137 MMHG

## 2018-08-23 DIAGNOSIS — M41.9 SCOLIOSIS OF THORACOLUMBAR SPINE, UNSPECIFIED SCOLIOSIS TYPE: ICD-10-CM

## 2018-08-23 DIAGNOSIS — E87.6 HYPOKALEMIA: ICD-10-CM

## 2018-08-23 DIAGNOSIS — M54.16 RADICULOPATHY OF LUMBAR REGION: Primary | ICD-10-CM

## 2018-08-23 LAB
ANION GAP SERPL CALC-SCNC: 8 MMOL/L
BUN SERPL-MCNC: 22 MG/DL
CALCIUM SERPL-MCNC: 10 MG/DL
CHLORIDE SERPL-SCNC: 96 MMOL/L
CO2 SERPL-SCNC: 31 MMOL/L
CREAT SERPL-MCNC: 1.4 MG/DL
EST. GFR  (AFRICAN AMERICAN): 40.1 ML/MIN/1.73 M^2
EST. GFR  (NON AFRICAN AMERICAN): 34.8 ML/MIN/1.73 M^2
GLUCOSE SERPL-MCNC: 184 MG/DL
POTASSIUM SERPL-SCNC: 4.2 MMOL/L
SODIUM SERPL-SCNC: 135 MMOL/L

## 2018-08-23 PROCEDURE — 99214 OFFICE O/P EST MOD 30 MIN: CPT | Mod: S$PBB,,, | Performed by: PHYSICIAN ASSISTANT

## 2018-08-23 PROCEDURE — 36415 COLL VENOUS BLD VENIPUNCTURE: CPT | Mod: PO

## 2018-08-23 PROCEDURE — 3079F DIAST BP 80-89 MM HG: CPT | Mod: CPTII,,, | Performed by: PHYSICIAN ASSISTANT

## 2018-08-23 PROCEDURE — 99499 UNLISTED E&M SERVICE: CPT | Mod: S$GLB,,, | Performed by: PHYSICIAN ASSISTANT

## 2018-08-23 PROCEDURE — 80048 BASIC METABOLIC PNL TOTAL CA: CPT

## 2018-08-23 PROCEDURE — 1101F PT FALLS ASSESS-DOCD LE1/YR: CPT | Mod: CPTII,,, | Performed by: PHYSICIAN ASSISTANT

## 2018-08-23 PROCEDURE — 99999 PR PBB SHADOW E&M-EST. PATIENT-LVL III: CPT | Mod: PBBFAC,,, | Performed by: PHYSICIAN ASSISTANT

## 2018-08-23 PROCEDURE — 99213 OFFICE O/P EST LOW 20 MIN: CPT | Mod: PBBFAC,PN | Performed by: PHYSICIAN ASSISTANT

## 2018-08-23 PROCEDURE — 3075F SYST BP GE 130 - 139MM HG: CPT | Mod: CPTII,,, | Performed by: PHYSICIAN ASSISTANT

## 2018-08-23 NOTE — LETTER
September 6, 2018      EVERET[facility] Lott  [address] [facility] [address] [place] [address]           [place] [facility]  [address] [facility] [address] [place] [address]  Phone: [phone]  Fax: [phone]          Patient: Olga Aguiar   MR Number: [id number]   YOB: 1935   Date of Visit: 8/23/2018       Dear Daniel Dupont:    Thank you for referring Olga Aguiar to me for evaluation. Attached you will find relevant portions of my assessment and plan of care.    If you have questions, please do not hesitate to call me. I look forward to following Olga Aguiar along with you.    Sincerely,    Stewart Ott  CC:  No Recipients    If you would like to receive this communication electronically, please contact [email or web address] or [phone] to request more information on [employer] access.    For providers and/or their staff who would like to refer a patient to [facility], please contact us through our one-stop-shop provider referral line, [facility], at [phone].    If you feel you have received this communication in error or would no longer like to receive these types of communications, please e-mail [email or web address]

## 2018-08-23 NOTE — PROGRESS NOTES
Neurosurgery History & Physical    Patient ID: Olga Aguiar is a 83 y.o. female.    Chief Complaint   Patient presents with    Lumbar Spine Pain (L-Spine)     chronic low back pain; Lumbar DDD; pain more in mid/lower back to right hip and thigh but does radiate into left side also; denies numbness and tingling; denies bowel and bladder issues       Review of Systems   Constitutional: Negative for activity change, chills, fever and unexpected weight change.   HENT: Negative for hearing loss, tinnitus, trouble swallowing and voice change.    Eyes: Negative.  Negative for visual disturbance.   Respiratory: Negative for apnea, chest tightness and shortness of breath.    Cardiovascular: Negative.  Negative for chest pain and palpitations.   Gastrointestinal: Negative.  Negative for abdominal pain, constipation, diarrhea, nausea and vomiting.   Genitourinary: Negative.  Negative for difficulty urinating, dysuria and frequency.   Musculoskeletal: Positive for arthralgias, back pain and gait problem. Negative for neck pain and neck stiffness.   Skin: Negative for wound.   Allergic/Immunologic: Negative for immunocompromised state.   Neurological: Positive for numbness. Negative for dizziness, tremors, seizures, facial asymmetry, speech difficulty, weakness, light-headedness and headaches.   Psychiatric/Behavioral: Negative for confusion and decreased concentration.       Past Medical History:   Diagnosis Date    Adrenal adenoma: 2.8 X 2.1CM 2010 5/16/2014    Anticoagulant long-term use      mg    Aortic atherosclerosis: see CT scan 2016 10/28/2016    Aortic stenosis 8/19/2014    Asthma     Breast cancer     Cancer     breast, both sides, right arm restriction    Cataracts, both eyes     Chronic back pain     Chronic bronchitis     CKD (chronic kidney disease) stage 3, GFR 30-59 ml/min 8/19/2014    no dialysis    COPD (chronic obstructive pulmonary disease)     occasional inhaler use, no oxygen     DDD (degenerative disc disease), lumbar 2013    Diabetes mellitus     diet controlled    Diverticulosis     Ectatic abdominal aorta: see CT scan 10/16 10/28/2016    Gallbladder polyp 2014    GERD (gastroesophageal reflux disease)     Gout     Herpes simplex     Fever Blisters and Styes in right eye    High blood cholesterol     Hypertension     holding medication when B/P low    Insomnia     Lumbar spinal stenosis     Lymphedema of arm     Right    Multiple lung nodules 2015    Renal cyst, right: stable per CT 2016 10/28/2016    Stroke 2012    TIA, no residual effects    Stye 2013    Thyroid disease     hypothyroidism    Unspecified hypothyroidism 2015     Social History     Socioeconomic History    Marital status:      Spouse name: Not on file    Number of children: Not on file    Years of education: Not on file    Highest education level: Not on file   Social Needs    Financial resource strain: Not on file    Food insecurity - worry: Not on file    Food insecurity - inability: Not on file    Transportation needs - medical: Not on file    Transportation needs - non-medical: Not on file   Occupational History    Not on file   Tobacco Use    Smoking status: Former Smoker     Packs/day: 1.00     Years: 18.00     Pack years: 18.00     Start date: 1952     Last attempt to quit: 1970     Years since quittin.0    Smokeless tobacco: Never Used   Substance and Sexual Activity    Alcohol use: Yes     Comment: rare    Drug use: No    Sexual activity: Not Currently   Other Topics Concern    Not on file   Social History Narrative    Not on file     Family History   Problem Relation Age of Onset    Cancer Mother         Breast    COPD Mother     Hearing loss Mother     Hypertension Mother     Breast cancer Mother     Diabetes Father     Heart disease Father     Heart attack Father     Cancer Daughter         Breast    Breast cancer  Daughter     Cancer Sister         Breast    Breast cancer Sister     Amblyopia Neg Hx     Blindness Neg Hx     Cataracts Neg Hx     Glaucoma Neg Hx     Macular degeneration Neg Hx     Retinal detachment Neg Hx     Strabismus Neg Hx     Stroke Neg Hx     Thyroid disease Neg Hx     Colon cancer Neg Hx     Stomach cancer Neg Hx     Esophageal cancer Neg Hx      Review of patient's allergies indicates:  No Known Allergies    Current Outpatient Medications:     acetaminophen (TYLENOL) 500 MG tablet, Take 1,000 mg by mouth every 6 (six) hours as needed., Disp: , Rfl:     acyclovir (ZOVIRAX) 400 MG tablet, Take 400 mg by mouth 2 (two) times daily as needed., Disp: , Rfl:     allopurinol (ZYLOPRIM) 100 MG tablet, TAKE ONE TABLET BY MOUTH ONCE DAILY, Disp: 30 tablet, Rfl: 6    aspirin (ECOTRIN) 81 MG EC tablet, Take 81 mg by mouth once daily., Disp: , Rfl:     aspirin 325 MG tablet, Take 1 tablet by mouth Daily. , Disp: , Rfl:     busPIRone (BUSPAR) 5 MG Tab, Take 1 tablet (5 mg total) by mouth 2 (two) times daily as needed (anxiety)., Disp: 60 tablet, Rfl: 2    cholecalciferol, vitamin D3, 2,000 unit Cap, Take 2,000 Units by mouth Daily. , Disp: , Rfl:     doxycycline (MONODOX) 100 MG capsule, Take 1 capsule (100 mg total) by mouth 2 (two) times daily. for 10 days, Disp: 20 capsule, Rfl: 0    fluticasone (FLONASE) 50 mcg/actuation nasal spray, 2 sprays (100 mcg total) by Each Nare route once daily., Disp: 16 g, Rfl: 0    fluticasone-vilanterol (BREO ELLIPTA) 200-25 mcg/dose DsDv diskus inhaler, Inhale 1 puff into the lungs once daily., Disp: 3 each, Rfl: 3    gabapentin (NEURONTIN) 300 MG capsule, Take 1 capsule (300 mg total) by mouth every evening., Disp: 90 capsule, Rfl: 3    levalbuterol (XOPENEX HFA) 45 mcg/actuation inhaler, Inhale 1-2 puffs into the lungs every 4 (four) hours as needed for Wheezing or Shortness of Breath., Disp: 3 Inhaler, Rfl: 3    levothyroxine (SYNTHROID) 75 MCG tablet,  "TAKE 1 TABLET BY MOUTH ONCE DAILY, Disp: 90 tablet, Rfl: 0    montelukast (SINGULAIR) 10 mg tablet, Take 1 tablet (10 mg total) by mouth every evening., Disp: 90 tablet, Rfl: 3    omeprazole (PRILOSEC) 40 MG capsule, Take 1 capsule (40 mg total) by mouth before breakfast., Disp: 90 capsule, Rfl: 3    predniSONE (DELTASONE) 10 MG tablet, Take 2 tablets (20 mg total) by mouth once daily. Take 2 daily for 3 days and repeat for short breath., Disp: 36 tablet, Rfl: 2    simvastatin (ZOCOR) 40 MG tablet, TAKE 1 TABLET BY MOUTH IN THE EVENING, Disp: 90 tablet, Rfl: 0    sucralfate (CARAFATE) 1 gram tablet, Take 1 tablet (1 g total) by mouth 2 (two) times daily., Disp: 180 tablet, Rfl: 3    traMADol (ULTRAM) 50 mg tablet, Take 2 tablets by mouth in the morning (100mg) and 1 tablet by mouth at night (50mg) as needed for pain., Disp: 90 tablet, Rfl: 2    triamterene-hydrochlorothiazide 37.5-25 mg (DYAZIDE) 37.5-25 mg per capsule, TAKE ONE CAPSULE BY MOUTH IN THE MORNING, Disp: 90 capsule, Rfl: 1    zolpidem (AMBIEN) 5 MG Tab, TAKE 1 TABLET BY MOUTH AT BEDTIME AS NEEDED, Disp: 30 tablet, Rfl: 3    Vitals:    08/23/18 1156   BP: 137/86   Pulse: 72   Weight: 70.7 kg (155 lb 13.8 oz)   Height: 5' 3" (1.6 m)       Physical Exam   Constitutional: She is oriented to person, place, and time. Vital signs are normal. She appears well-developed and well-nourished.   HENT:   Head: Normocephalic and atraumatic.   Right Ear: Hearing normal.   Left Ear: Hearing normal.   Nose: Nose normal.   Mouth/Throat: Uvula is midline.   Eyes: Conjunctivae, EOM and lids are normal. Pupils are equal, round, and reactive to light.   Neck: Trachea normal, normal range of motion, full passive range of motion without pain and phonation normal. Neck supple.   Cardiovascular: Normal rate, regular rhythm, intact distal pulses and normal pulses.   Pulmonary/Chest: Effort normal and breath sounds normal.   Abdominal: Soft. Normal appearance. "   Musculoskeletal: Normal range of motion.   Feet:   Right Foot:   Protective Sensation: 4 sites tested. 4 sites sensed.   Skin Integrity: Negative for ulcer.   Left Foot:   Protective Sensation: 4 sites tested. 4 sites sensed.   Skin Integrity: Negative for ulcer.   Neurological: She is alert and oriented to person, place, and time. She has normal strength. No cranial nerve deficit or sensory deficit. She has an abnormal Heel to Shin Test and an abnormal Tandem Gait Test. She has a normal Finger-Nose-Finger Test and a normal Romberg Test. She displays a negative Romberg sign.   Reflex Scores:       Tricep reflexes are 2+ on the right side and 2+ on the left side.       Bicep reflexes are 2+ on the right side and 2+ on the left side.       Brachioradialis reflexes are 2+ on the right side and 2+ on the left side.       Patellar reflexes are 0 on the right side and 0 on the left side.       Achilles reflexes are 0 on the right side and 0 on the left side.  Skin: Skin is warm, dry and intact. Capillary refill takes less than 2 seconds.   Psychiatric: She has a normal mood and affect. Her speech is normal and behavior is normal. Judgment and thought content normal. Cognition and memory are normal.   Nursing note and vitals reviewed.      Neurologic Exam     Mental Status   Oriented to person, place, and time.   Follows 3 step commands.   Attention: normal. Concentration: normal.   Speech: speech is normal   Level of consciousness: alert  Knowledge: good.   Able to name object.     Cranial Nerves     CN II   Visual fields full to confrontation.   Visual acuity: normal  Right visual field deficit: none  Left visual field deficit: none     CN III, IV, VI   Pupils are equal, round, and reactive to light.  Extraocular motions are normal.   CN III: no CN III palsy  CN VI: no CN VI palsy    CN V   Facial sensation intact.   Right facial sensation deficit: none  Left facial sensation deficit: none    CN VII   Facial expression  full, symmetric.   Right facial weakness: none  Left facial weakness: none    CN VIII   CN VIII normal.   Hearing: intact    CN IX, X   CN IX normal.   CN X normal.     CN XI   CN XI normal.     CN XII   CN XII normal.     Motor Exam   Muscle bulk: normal  Overall muscle tone: normal  Right arm tone: normal  Left arm tone: normal  Right arm pronator drift: absent  Left arm pronator drift: absent  Right leg tone: normal  Left leg tone: normal    Strength   Strength 5/5 throughout.   Right neck flexion: 5/5  Left neck flexion: 5/5  Right neck extension: 5/5  Left neck extension: 5/5  Right deltoid: 5/5  Left deltoid: 5/5  Right biceps: 5/5  Left biceps: 5/5  Right triceps: 5/5  Left triceps: 5/5  Right wrist flexion: 5/5  Left wrist flexion: 5/5  Right wrist extension: 5/5  Left wrist extension: 5/5  Right interossei: 5/5  Left interossei: 5/5  Right abdominals: 5/5  Left abdominals: 5/5  Right iliopsoas: 5/5  Left iliopsoas: 5/5  Right quadriceps: 5/5  Left quadriceps: 5/5  Right hamstrin/5  Left hamstrin/5  Right glutei: 5/5  Left glutei: 5/5  Right anterior tibial: 5/5  Left anterior tibial: 5/5  Right posterior tibial: 5/5  Left posterior tibial: 5/5  Right peroneal: 5/5  Left peroneal: 5/5  Right gastroc: 5/5  Left gastroc: 5/5    Sensory Exam   Light touch normal.   Right arm light touch: normal  Left arm light touch: normal  Right leg light touch: normal  Left leg light touch: normal  Vibration normal.     Gait, Coordination, and Reflexes     Gait  Gait: wide-based    Coordination   Romberg: negative  Finger to nose coordination: normal  Heel to shin coordination: abnormal  Tandem walking coordination: abnormal    Tremor   Resting tremor: absent  Intention tremor: absent  Action tremor: absent    Reflexes   Right brachioradialis: 2+  Left brachioradialis: 2+  Right biceps: 2+  Left biceps: 2+  Right triceps: 2+  Left triceps: 2+  Right patellar: 0  Left patellar: 0  Right achilles: 0  Left achilles:  0  Right : 2+  Left : 2+  Right plantar: normal  Left plantar: normal  Right Huerta: absent  Left Huerta: absent  Right ankle clonus: absent  Left ankle clonus: absent      Provider dictation:  Oswestry score: 42%  PHQ:  4    Visit Diagnosis:  Radiculopathy of lumbar region  -     EMG W/ ULTRASOUND AND NERVE CONDUCTION TEST 2 Extremities; Future  -     X-Ray Scoliosis Complete Including Supine And Erect; Future; Expected date: 08/23/2018    Scoliosis of thoracolumbar spine, unspecified scoliosis type  -     EMG W/ ULTRASOUND AND NERVE CONDUCTION TEST 2 Extremities; Future  -     X-Ray Scoliosis Complete Including Supine And Erect; Future; Expected date: 08/23/2018      The patient is a very pleasant 83-year-old  female presenting today for chronic back pain and mild leg pain.  She reports her pain is worse in the morning.  She reports that she has pain only with motion.  She occasionally has some burning in her right hip.  She occasionally has pain in the back of her legs.  She reports that the leg pain is bearable but her back pain becomes intolerable at some points.  She is unable to walk for any significant distances.  She does report that she has difficulty with breathing secondary to radiation from breast cancer.  She uses a walker for any significant distance.  She is unable to stand for any significant length of time.  She reports that her back is out.  She has right upper extremity lymphedema.  She occasionally has numbness and tingling in her feet gabapentin helps.  She reports the thing that bothers her the most is her back pain in her right hip pain.  She occasionally has left hip pain.      She has had ANUEL in the past that helped about 3 weeks.  She has not done physical therapy recently.  She does see Dr. Ware in pain management.  She is status post lumpectomy chemo radiation in 2000.  She does report that she has approximately 42% lung capacity secondary to the radiation.        On physical examination, she is an elderly appearing female in no acute distress.  Her strength is maintained throughout her lower extremities.  She has diminished muscle stretch reflexes.  Dermatomal sensation intact.  She does have diminished stocking distribution to her mid shin with neuropathy.  She walks with a slow antalgic gait.    MRI from May of 2018 independently reviewed.  There is she has significant levoscoliosis of the lumbar spine.  She has multilevel spondylosis worse at T12 through L2.  At T12-L1 there is significant facet arthropathy on the right causing moderate foraminal stenosis on the right.  At L1-L2 there is severe right foraminal stenosis secondary to disc herniation and facet arthropathy the disc touch is multiple nerve roots in the central canal.  At L2-L3 there is severe bilateral foraminal stenosis and facet arthropathy moderate central canal stenosis.  Her crowding of the nerve roots.  At L3-L4 there is severe central canal and foraminal stenosis with severe right worse than left facet arthropathy.  There is anterior listhesis of L4 on L5 there is severe facet arthropathy and severe bilateral foraminal and central canal stenosis    The patient has significant levoscoliosis with severe degenerative changes.  She has multilevel stenosis.  Given her age but mostly her pulmonary status, she would not be a candidate for a long segment fusion.  We will order an EMG to evaluate for active denervation as she may be a candidate for a small decompression if this is what is causing her hip pain.  We may also discuss in the future spinal cord stimulator.  She will follow-up with me with scoliosis films as well as EMG.

## 2018-08-24 ENCOUNTER — OFFICE VISIT (OUTPATIENT)
Dept: INTERNAL MEDICINE | Facility: CLINIC | Age: 83
End: 2018-08-24
Payer: MEDICARE

## 2018-08-24 VITALS
BODY MASS INDEX: 27.73 KG/M2 | HEIGHT: 63 IN | SYSTOLIC BLOOD PRESSURE: 130 MMHG | HEART RATE: 64 BPM | WEIGHT: 156.5 LBS | DIASTOLIC BLOOD PRESSURE: 72 MMHG | TEMPERATURE: 98 F

## 2018-08-24 DIAGNOSIS — E11.59 HYPERTENSION ASSOCIATED WITH DIABETES: ICD-10-CM

## 2018-08-24 DIAGNOSIS — I15.2 HYPERTENSION ASSOCIATED WITH DIABETES: ICD-10-CM

## 2018-08-24 DIAGNOSIS — J44.89 COPD (CHRONIC OBSTRUCTIVE PULMONARY DISEASE) WITH CHRONIC BRONCHITIS: ICD-10-CM

## 2018-08-24 DIAGNOSIS — E11.9 TYPE 2 DIABETES MELLITUS WITHOUT COMPLICATION, WITHOUT LONG-TERM CURRENT USE OF INSULIN: ICD-10-CM

## 2018-08-24 DIAGNOSIS — J01.90 ACUTE BACTERIAL SINUSITIS: Primary | ICD-10-CM

## 2018-08-24 DIAGNOSIS — N18.30 CKD (CHRONIC KIDNEY DISEASE) STAGE 3, GFR 30-59 ML/MIN: ICD-10-CM

## 2018-08-24 DIAGNOSIS — B96.89 ACUTE BACTERIAL SINUSITIS: Primary | ICD-10-CM

## 2018-08-24 DIAGNOSIS — E03.9 ACQUIRED HYPOTHYROIDISM: ICD-10-CM

## 2018-08-24 PROCEDURE — 99214 OFFICE O/P EST MOD 30 MIN: CPT | Mod: S$GLB,,, | Performed by: INTERNAL MEDICINE

## 2018-08-24 PROCEDURE — 99499 UNLISTED E&M SERVICE: CPT | Mod: S$GLB,,, | Performed by: INTERNAL MEDICINE

## 2018-08-24 PROCEDURE — 3075F SYST BP GE 130 - 139MM HG: CPT | Mod: CPTII,S$GLB,, | Performed by: INTERNAL MEDICINE

## 2018-08-24 PROCEDURE — 3078F DIAST BP <80 MM HG: CPT | Mod: CPTII,S$GLB,, | Performed by: INTERNAL MEDICINE

## 2018-08-24 PROCEDURE — 99999 PR PBB SHADOW E&M-EST. PATIENT-LVL V: CPT | Mod: PBBFAC,,, | Performed by: INTERNAL MEDICINE

## 2018-08-24 NOTE — PROGRESS NOTES
Subjective:       Patient ID: Olga Aguiar is a 83 y.o. female.    Chief Complaint: Sinusitis and Follow-up      ER follow-up     She was seen in last few weeks first in UC then ER, very weak and dehydrated in the context of a sinus infection.  In the ER, no evidence for acute coronary syndrome.   She was given hydration and discharged.     Lab work today reveals improved potassium but still evidence of acute on chronic renal failure.    Last seen by me in April 2018.  Recall that she has seen pulmonary 4/17 then again 2/18.  Meds adjusted- stable.  She has COPD and medications have been adjusted per she's had to take steroids a couple of times, but not recently.  She still has cough and a little bit of shortness of breath but overall symptoms are slightly better stable.  Lung nodule- stable on CT 9/16.  Follow-up in 1 year was recommended.     Blood pressure is stable.  Labs are also stable.    She has seen Cardiology recently, (May 2018)  She has aortic stenosis.  She denies syncope, chest pain, pressure, tightness.  Chest chronic stable shortness of breath.  She has trace chronic edema, but no PND or orthopnea.     She has some CKD- stable.  Recall that she was supposed to see nephrology in 2015 but declined.  She told me she would consider.       Her gastroenterologist had her do an MRI of the abdomen 11/16- all stable, 2 year follow up recommended (pancreatic cyst).   She has mild stable GERD symptoms.    Patient Active Problem List:     Hypertension associated with diabetes     Mixed hyperlipidemia, baseline      Chronic pain syndrome     Lumbosacral spondylosis without myelopathy     Adrenal adenoma: 2.8 X 2.1CM 2010; stable 2016     Abdominal obesity     Lymph edema, right arm     Peripheral edema     Gallbladder polyp     CKD (chronic kidney disease) stage 3, GFR 30-59 ml/min     Aortic valve stenosis, mild- moderate, MARI 1.44 1/16     LVH (left ventricular hypertrophy)     Pancreatic cyst:  stable on MRI 11/16 per GI repeat 2018     Cervical spinal stenosis     Type 2 diabetes mellitus without complication, without long-term current use of insulin     Acquired hypothyroidism     Transient cerebral ischemia: 2012     Primary insomnia     Gastroesophageal reflux disease without esophagitis     Multiple lung nodules: stable CT 10/2016     Former smoker: 1 pack daily for < 20 years, quit 1973     COPD (chronic obstructive pulmonary disease) with chronic bronchitis, 2013     Facet hypertrophy of lumbar region     History of breast cancer     Intermittent asthma     Radiation fibrosis of lung     Bronchiectasis without complication     Ectatic abdominal aorta: see CT scan 10/16; no aneurysm 10/17     Aortic atherosclerosis: see CT scan 2016     Renal cyst, right: stable per CT 2016     Lumbar radiculopathy     Abnormal ECG     Encounter for monitoring proton pump inhibitor therapy     Diverticulosis of large intestine without hemorrhage: see CT 10/14     Localized edema     Idiopathic gout     Lumbar spondylosis             Review of Systems   Constitutional: Positive for fatigue. Negative for chills and fever.   HENT: Positive for congestion and postnasal drip. Negative for ear pain.    Eyes: Negative.    Respiratory: Positive for cough. Negative for chest tightness, shortness of breath and wheezing.    Cardiovascular: Negative.    Gastrointestinal: Negative.        Objective:      Physical Exam   Constitutional: She is oriented to person, place, and time. She appears well-developed and well-nourished.   HENT:   Head: Normocephalic and atraumatic.   Right Ear: External ear normal.   Left Ear: External ear normal.   Mouth/Throat: Oropharynx is clear and moist.   Eyes: Conjunctivae are normal. Pupils are equal, round, and reactive to light.   Neck: Normal range of motion. Neck supple. No thyromegaly present.   Cardiovascular: Normal rate, regular rhythm and normal heart sounds.   Pulmonary/Chest: No  respiratory distress. She has no wheezes. She has no rales.   Abdominal: Soft. Bowel sounds are normal.   Musculoskeletal: She exhibits no edema.   Lymphadenopathy:     She has no cervical adenopathy.   Neurological: She is alert and oriented to person, place, and time.   Skin: Skin is warm and dry. No rash noted. No erythema.       Assessment:       1. Acute bacterial sinusitis    2. Hypertension associated with diabetes    3. CKD (chronic kidney disease) stage 3, GFR 30-59 ml/min    4. Type 2 diabetes mellitus without complication, without long-term current use of insulin    5. Acquired hypothyroidism    6. COPD (chronic obstructive pulmonary disease) with chronic bronchitis, 2013        Plan:         Acute bacterial sinusitis: improved at this time.  Rest, fluids, acetaminophen, mucinex and follow up poor results.    Hypertension associated with diabetes  -     CBC auto differential; Future; Expected date: 08/24/2018  -     Comprehensive metabolic panel; Future; Expected date: 08/24/2018    CKD (chronic kidney disease) stage 3, GFR 30-59 ml/min: hydration; avoid nephrotoxins  -     Ambulatory Referral to Nephrology  -     Vitamin D; Future; Expected date: 08/24/2018    Type 2 diabetes mellitus without complication, without long-term current use of insulin: sik day rules reviewed.  Continue regimen  -     Hemoglobin A1c; Future; Expected date: 08/24/2018    Acquired hypothyroidism  -     TSH; Future; Expected date: 08/24/2018    COPD (chronic obstructive pulmonary disease) with chronic bronchitis, 2013: keep Pulmonary follow up    EP 3 months  RTC sooner with problems prior

## 2018-08-24 NOTE — PATIENT INSTRUCTIONS

## 2018-08-29 DIAGNOSIS — M47.817 LUMBOSACRAL SPONDYLOSIS WITHOUT MYELOPATHY: Primary | ICD-10-CM

## 2018-08-30 ENCOUNTER — PATIENT MESSAGE (OUTPATIENT)
Dept: NEPHROLOGY | Facility: CLINIC | Age: 83
End: 2018-08-30

## 2018-08-31 ENCOUNTER — PATIENT MESSAGE (OUTPATIENT)
Dept: NEPHROLOGY | Facility: CLINIC | Age: 83
End: 2018-08-31

## 2018-08-31 ENCOUNTER — OFFICE VISIT (OUTPATIENT)
Dept: NEPHROLOGY | Facility: CLINIC | Age: 83
End: 2018-08-31
Payer: MEDICARE

## 2018-08-31 VITALS
HEIGHT: 63 IN | HEART RATE: 71 BPM | BODY MASS INDEX: 25.6 KG/M2 | WEIGHT: 144.5 LBS | OXYGEN SATURATION: 97 % | DIASTOLIC BLOOD PRESSURE: 66 MMHG | SYSTOLIC BLOOD PRESSURE: 118 MMHG

## 2018-08-31 DIAGNOSIS — M51.36 DDD (DEGENERATIVE DISC DISEASE), LUMBAR: Primary | ICD-10-CM

## 2018-08-31 DIAGNOSIS — N17.9 ACUTE RENAL FAILURE, UNSPECIFIED ACUTE RENAL FAILURE TYPE: Primary | ICD-10-CM

## 2018-08-31 DIAGNOSIS — N28.1 ACQUIRED CYST OF KIDNEY: ICD-10-CM

## 2018-08-31 DIAGNOSIS — E11.29 TYPE 2 DIABETES MELLITUS WITH OTHER KIDNEY COMPLICATION, UNSPECIFIED WHETHER LONG TERM INSULIN USE: ICD-10-CM

## 2018-08-31 DIAGNOSIS — N18.2 CHRONIC KIDNEY DISEASE, STAGE II (MILD): ICD-10-CM

## 2018-08-31 PROCEDURE — 3078F DIAST BP <80 MM HG: CPT | Mod: CPTII,S$GLB,, | Performed by: INTERNAL MEDICINE

## 2018-08-31 PROCEDURE — 99499 UNLISTED E&M SERVICE: CPT | Mod: S$GLB,,, | Performed by: INTERNAL MEDICINE

## 2018-08-31 PROCEDURE — 99204 OFFICE O/P NEW MOD 45 MIN: CPT | Mod: S$GLB,,, | Performed by: INTERNAL MEDICINE

## 2018-08-31 PROCEDURE — 99999 PR PBB SHADOW E&M-EST. PATIENT-LVL III: CPT | Mod: PBBFAC,,, | Performed by: INTERNAL MEDICINE

## 2018-08-31 PROCEDURE — 3074F SYST BP LT 130 MM HG: CPT | Mod: CPTII,S$GLB,, | Performed by: INTERNAL MEDICINE

## 2018-08-31 NOTE — PROGRESS NOTES
"Subjective:       Patient ID: Olga Aguiar is a 83 y.o. White female who presents for new patient evaluation for chronic renal failure.    Olga Aguiar is referred by Sera Barger MD to be evaluated for chronic renal failure.  She reports that she moved recently and got "run down" and "dehydrated".  She was seen in the ER with low potassium and weakness.  She does not take NSAIDs.  She has no uremic or urinary symptoms and is in her usual state of health since then.  Her baseline creatinine is 1.1-1.3 but she recently had a creatinine of 1.4.      Review of Systems   Constitutional: Negative for appetite change, chills and fever.   Eyes: Negative for visual disturbance.   Respiratory: Positive for shortness of breath. Negative for cough.    Cardiovascular: Positive for leg swelling (L>R). Negative for chest pain.   Gastrointestinal: Positive for nausea and vomiting. Negative for diarrhea.   Genitourinary: Negative for difficulty urinating, dysuria and hematuria.   Musculoskeletal: Positive for back pain and joint swelling (R arm lymphedema). Negative for myalgias.   Skin: Negative for rash.   Neurological: Negative for headaches.   Psychiatric/Behavioral: Negative for sleep disturbance.       The past medical, family and social histories were reviewed for this encounter.     Past Medical History:   Diagnosis Date    Adrenal adenoma: 2.8 X 2.1CM 2010 5/16/2014    Anticoagulant long-term use      mg    Aortic atherosclerosis: see CT scan 2016 10/28/2016    Aortic stenosis 8/19/2014    Asthma     Breast cancer     Cancer     breast, both sides, right arm restriction    Cataracts, both eyes     Chronic back pain     Chronic bronchitis     CKD (chronic kidney disease) stage 3, GFR 30-59 ml/min 8/19/2014    no dialysis    COPD (chronic obstructive pulmonary disease)     occasional inhaler use, no oxygen    DDD (degenerative disc disease), lumbar 7/30/2013    Diabetes mellitus     " diet controlled    Diverticulosis     Ectatic abdominal aorta: see CT scan 10/16 10/28/2016    Gallbladder polyp 2014    GERD (gastroesophageal reflux disease)     Gout     Herpes simplex     Fever Blisters and Styes in right eye    High blood cholesterol     Hypertension     holding medication when B/P low    Insomnia     Lumbar spinal stenosis     Lymphedema of arm     Right    Multiple lung nodules 2015    Renal cyst, right: stable per CT 2016 10/28/2016    Stroke 2012    TIA, no residual effects    Stye 2013    Thyroid disease     hypothyroidism    Unspecified hypothyroidism 2015     Past Surgical History:   Procedure Laterality Date    AXILLARY NODE DISSECTION      both sides    BREAST BIOPSY      BREAST LUMPECTOMY Bilateral     lymph nodes on right    CATARACT EXTRACTION      bilateral PHACO with IOL    COLONOSCOPY  2010    in legacy    Epidural steroid injection      Pain mangement    HYSTERECTOMY      HYSTERECTOMY      Nerve Block Injection      Pain management, Times 2    RADIOFREQUENCY ABLATION      Nerve, Pain management    RECTAL SURGERY      Fissure repair    TONSILLECTOMY      UPPER GASTROINTESTINAL ENDOSCOPY  2014    Dr. Castro     Social History     Socioeconomic History    Marital status:      Spouse name: Not on file    Number of children: Not on file    Years of education: Not on file    Highest education level: Not on file   Social Needs    Financial resource strain: Not on file    Food insecurity - worry: Not on file    Food insecurity - inability: Not on file    Transportation needs - medical: Not on file    Transportation needs - non-medical: Not on file   Occupational History    Not on file   Tobacco Use    Smoking status: Former Smoker     Packs/day: 1.00     Years: 18.00     Pack years: 18.00     Start date: 1952     Last attempt to quit: 1970     Years since quittin.1    Smokeless tobacco:  Never Used   Substance and Sexual Activity    Alcohol use: Yes     Comment: rare    Drug use: No    Sexual activity: Not Currently   Other Topics Concern    Not on file   Social History Narrative    Not on file     Current Outpatient Medications   Medication Sig    acetaminophen (TYLENOL) 500 MG tablet Take 500 mg by mouth every 6 (six) hours as needed.    acyclovir (ZOVIRAX) 400 MG tablet Take 400 mg by mouth 2 (two) times daily as needed.    allopurinol (ZYLOPRIM) 100 MG tablet TAKE ONE TABLET BY MOUTH ONCE DAILY    aspirin (ECOTRIN) 81 MG EC tablet Take 81 mg by mouth once daily.    busPIRone (BUSPAR) 5 MG Tab Take 1 tablet (5 mg total) by mouth 2 (two) times daily as needed (anxiety).    cholecalciferol, vitamin D3, 2,000 unit Cap Take 2,000 Units by mouth Daily.     fluticasone (FLONASE) 50 mcg/actuation nasal spray 2 sprays (100 mcg total) by Each Nare route once daily.    fluticasone-vilanterol (BREO ELLIPTA) 200-25 mcg/dose DsDv diskus inhaler Inhale 1 puff into the lungs once daily.    gabapentin (NEURONTIN) 300 MG capsule Take 1 capsule (300 mg total) by mouth every evening.    levalbuterol (XOPENEX HFA) 45 mcg/actuation inhaler Inhale 1-2 puffs into the lungs every 4 (four) hours as needed for Wheezing or Shortness of Breath.    levothyroxine (SYNTHROID) 75 MCG tablet TAKE 1 TABLET BY MOUTH ONCE DAILY    montelukast (SINGULAIR) 10 mg tablet Take 1 tablet (10 mg total) by mouth every evening.    omeprazole (PRILOSEC) 40 MG capsule Take 1 capsule (40 mg total) by mouth before breakfast.    predniSONE (DELTASONE) 10 MG tablet Take 2 tablets (20 mg total) by mouth once daily. Take 2 daily for 3 days and repeat for short breath.    simvastatin (ZOCOR) 40 MG tablet TAKE 1 TABLET BY MOUTH IN THE EVENING    sucralfate (CARAFATE) 1 gram tablet Take 1 tablet (1 g total) by mouth 2 (two) times daily.    traMADol (ULTRAM) 50 mg tablet Take 2 tablets by mouth in the morning (100mg) and 1 tablet  by mouth at night (50mg) as needed for pain.    triamterene-hydrochlorothiazide 37.5-25 mg (DYAZIDE) 37.5-25 mg per capsule TAKE ONE CAPSULE BY MOUTH IN THE MORNING    zolpidem (AMBIEN) 5 MG Tab TAKE 1 TABLET BY MOUTH AT BEDTIME AS NEEDED     No current facility-administered medications for this visit.        There were no vitals taken for this visit.    Objective:      Physical Exam   Constitutional: She is oriented to person, place, and time. She appears well-developed and well-nourished. No distress.   HENT:   Head: Normocephalic and atraumatic.   Eyes: Conjunctivae are normal.   Neck: Neck supple. No JVD present.   Cardiovascular: Normal rate, regular rhythm and normal heart sounds. Exam reveals no gallop and no friction rub.   No murmur heard.  Pulmonary/Chest: Effort normal and breath sounds normal. No respiratory distress. She has no wheezes. She has no rales.   Abdominal: Soft. Bowel sounds are normal. She exhibits no distension. There is no tenderness.   Musculoskeletal: She exhibits edema (trace-1+ LLE).   Neurological: She is alert and oriented to person, place, and time.   Skin: Skin is warm and dry. No rash noted.   Psychiatric: She has a normal mood and affect.   Vitals reviewed.      Assessment:       1. Acute renal failure, unspecified acute renal failure type    2. Chronic kidney disease, stage II (mild)    3. Acquired cyst of kidney    4. Type 2 diabetes mellitus with other kidney complication, unspecified whether long term insulin use        Plan:   Return to clinic in 4 months.  Labs for next visit include rp, ua, renal US.  Baseline creatinine is 1.1-1.3 since 2004.  Renal US shows R 9.2 cm, L 9.3 cm.  I can explain her recent bump in creatinine which is something she has done a couple of times in the past.  She otherwise appears stable and declined to recheck her labs today to see that she is back to her baseline.  She is asymptomatic.

## 2018-08-31 NOTE — LETTER
August 31, 2018      Sera Barger MD  [address]  [facility] [address]           [facility]  [address] [facility] [address] [place] [address]  Phone: [phone]          Patient: Olga Aguiar   MR Number: [id number]   YOB: 1935   Date of Visit: 8/31/2018       Dear Dr. Sera Barger:    Thank you for referring Olga Aguiar to me for evaluation. Attached you will find relevant portions of my assessment and plan of care.    If you have questions, please do not hesitate to call me. I look forward to following Olga Aguiar along with you.    Sincerely,    Magdy Kumar MD    Enclosure  CC:  No Recipients    If you would like to receive this communication electronically, please contact [email or web address] or [phone] to request more information on [facility] access.    For providers and/or their staff who would like to refer a patient to [facility], please contact us through our one-stop-shop provider referral line, [facility], at [phone].    If you feel you have received this communication in error or would no longer like to receive these types of communications, please e-mail [email or web address]

## 2018-09-04 ENCOUNTER — HOSPITAL ENCOUNTER (OUTPATIENT)
Dept: RADIOLOGY | Facility: HOSPITAL | Age: 83
Discharge: HOME | End: 2018-09-04
Attending: ANESTHESIOLOGY | Admitting: ANESTHESIOLOGY
Payer: MEDICARE

## 2018-09-04 ENCOUNTER — HOSPITAL ENCOUNTER (OUTPATIENT)
Facility: HOSPITAL | Age: 83
Discharge: HOME | End: 2018-09-04
Attending: ANESTHESIOLOGY | Admitting: ANESTHESIOLOGY
Payer: MEDICARE

## 2018-09-04 VITALS
WEIGHT: 150 LBS | HEIGHT: 63 IN | TEMPERATURE: 98 F | SYSTOLIC BLOOD PRESSURE: 141 MMHG | HEART RATE: 65 BPM | BODY MASS INDEX: 26.58 KG/M2 | DIASTOLIC BLOOD PRESSURE: 66 MMHG | OXYGEN SATURATION: 95 % | RESPIRATION RATE: 16 BRPM

## 2018-09-04 DIAGNOSIS — M51.36 DDD (DEGENERATIVE DISC DISEASE), LUMBAR: ICD-10-CM

## 2018-09-04 DIAGNOSIS — M54.16 LUMBAR RADICULOPATHY: ICD-10-CM

## 2018-09-04 DIAGNOSIS — M51.36 DDD (DEGENERATIVE DISC DISEASE), LUMBAR: Primary | ICD-10-CM

## 2018-09-04 PROBLEM — M51.369 DDD (DEGENERATIVE DISC DISEASE), LUMBAR: Status: ACTIVE | Noted: 2018-09-04

## 2018-09-04 PROCEDURE — 76000 FLUOROSCOPY <1 HR PHYS/QHP: CPT | Mod: TC,PO

## 2018-09-04 PROCEDURE — 25500020 PHARM REV CODE 255: Mod: PO | Performed by: ANESTHESIOLOGY

## 2018-09-04 PROCEDURE — 62323 NJX INTERLAMINAR LMBR/SAC: CPT | Mod: PO | Performed by: ANESTHESIOLOGY

## 2018-09-04 PROCEDURE — 63600175 PHARM REV CODE 636 W HCPCS: Mod: PO | Performed by: ANESTHESIOLOGY

## 2018-09-04 PROCEDURE — 25000003 PHARM REV CODE 250: Mod: PO | Performed by: ANESTHESIOLOGY

## 2018-09-04 PROCEDURE — 62323 NJX INTERLAMINAR LMBR/SAC: CPT | Mod: ,,, | Performed by: ANESTHESIOLOGY

## 2018-09-04 RX ORDER — METHYLPREDNISOLONE ACETATE 80 MG/ML
INJECTION, SUSPENSION INTRA-ARTICULAR; INTRALESIONAL; INTRAMUSCULAR; SOFT TISSUE
Status: DISCONTINUED | OUTPATIENT
Start: 2018-09-04 | End: 2018-09-04 | Stop reason: HOSPADM

## 2018-09-04 RX ORDER — LIDOCAINE HYDROCHLORIDE 20 MG/ML
INJECTION, SOLUTION EPIDURAL; INFILTRATION; INTRACAUDAL; PERINEURAL
Status: DISCONTINUED | OUTPATIENT
Start: 2018-09-04 | End: 2018-09-04 | Stop reason: HOSPADM

## 2018-09-04 RX ORDER — ALPRAZOLAM 0.5 MG/1
1 TABLET, ORALLY DISINTEGRATING ORAL ONCE AS NEEDED
Status: COMPLETED | OUTPATIENT
Start: 2018-09-04 | End: 2018-09-04

## 2018-09-04 RX ADMIN — ALPRAZOLAM 0.5 MG: 0.5 TABLET, ORALLY DISINTEGRATING ORAL at 12:09

## 2018-09-04 NOTE — OP NOTE

## 2018-09-04 NOTE — H&P
CC: Back pain    HPI: The patient is a 82yo woman with a history of lumbar radiculopathy here for L5/S1 ANUEL. There are no major changes in history and physical from 8/1/18.    Past Medical History:   Diagnosis Date    Adrenal adenoma: 2.8 X 2.1CM 2010 5/16/2014    Anticoagulant long-term use      mg    Aortic atherosclerosis: see CT scan 2016 10/28/2016    Aortic stenosis 8/19/2014    Asthma     Breast cancer     Cancer     breast, both sides, right arm restriction    Cataracts, both eyes     Chronic back pain     Chronic bronchitis     CKD (chronic kidney disease) stage 3, GFR 30-59 ml/min 8/19/2014    no dialysis    COPD (chronic obstructive pulmonary disease)     occasional inhaler use, no oxygen    DDD (degenerative disc disease), lumbar 7/30/2013    Diabetes mellitus     diet controlled    Diverticulosis     Ectatic abdominal aorta: see CT scan 10/16 10/28/2016    Gallbladder polyp 8/19/2014    GERD (gastroesophageal reflux disease)     Gout     Herpes simplex     Fever Blisters and Styes in right eye    High blood cholesterol     Hypertension     holding medication when B/P low    Hypertension associated with diabetes 2/7/2012    Insomnia     Lumbar spinal stenosis     Lymphedema of arm     Right    Multiple lung nodules 9/21/2015    Renal cyst, right: stable per CT 2016 10/28/2016    Stroke 2/2012    TIA, no residual effects    Stye 12/5/2013    Thyroid disease     hypothyroidism    Type 2 diabetes mellitus without complication, without long-term current use of insulin 7/20/2015    Unspecified hypothyroidism 7/20/2015       Past Surgical History:   Procedure Laterality Date    AXILLARY NODE DISSECTION      both sides    BREAST BIOPSY      BREAST LUMPECTOMY Bilateral     lymph nodes on right    CATARACT EXTRACTION      bilateral PHACO with IOL    COLONOSCOPY  01/12/2010    in legacy    Epidural steroid injection      Pain mangement    HYSTERECTOMY       HYSTERECTOMY      Nerve Block Injection      Pain management, Times 2    RADIOFREQUENCY ABLATION      Nerve, Pain management    RECTAL SURGERY      Fissure repair    TONSILLECTOMY      UPPER GASTROINTESTINAL ENDOSCOPY  2014    Dr. Castro       Family History   Problem Relation Age of Onset    Cancer Mother         Breast    COPD Mother     Hearing loss Mother     Hypertension Mother     Breast cancer Mother     Diabetes Father     Heart disease Father     Heart attack Father     Cancer Daughter         Breast    Breast cancer Daughter     Cancer Sister         Breast    Breast cancer Sister     Amblyopia Neg Hx     Blindness Neg Hx     Cataracts Neg Hx     Glaucoma Neg Hx     Macular degeneration Neg Hx     Retinal detachment Neg Hx     Strabismus Neg Hx     Stroke Neg Hx     Thyroid disease Neg Hx     Colon cancer Neg Hx     Stomach cancer Neg Hx     Esophageal cancer Neg Hx        Social History     Socioeconomic History    Marital status:      Spouse name: None    Number of children: None    Years of education: None    Highest education level: None   Social Needs    Financial resource strain: None    Food insecurity - worry: None    Food insecurity - inability: None    Transportation needs - medical: None    Transportation needs - non-medical: None   Occupational History    None   Tobacco Use    Smoking status: Former Smoker     Packs/day: 1.00     Years: 18.00     Pack years: 18.00     Start date: 1952     Last attempt to quit: 1970     Years since quittin.1    Smokeless tobacco: Never Used   Substance and Sexual Activity    Alcohol use: Yes     Comment: rare    Drug use: No    Sexual activity: Not Currently   Other Topics Concern    None   Social History Narrative    None       Current Facility-Administered Medications   Medication Dose Route Frequency Provider Last Rate Last Dose    alprazolam ODT dissolvable tablet 1 mg  1 mg Oral Once  "BRADEN Ware MD           Review of patient's allergies indicates:  No Known Allergies    Vitals:    09/04/18 1219 09/04/18 1224   BP:  125/77   Pulse:  68   Resp:  17   Temp:  98.1 °F (36.7 °C)   TempSrc:  Skin   SpO2:  95%   Weight: 68 kg (150 lb)    Height: 5' 3" (1.6 m)        REVIEW OF SYSTEMS:     GENERAL: No weight loss, malaise or fevers.  HEENT:  No recent changes in vision or hearing  NECK: Negative for lumps, no difficulty with swallowing.  RESPIRATORY: Negative for cough, wheezing or shortness of breath, patient denies any recent URI.  CARDIOVASCULAR: Negative for chest pain, leg swelling or palpitations.  GI: Negative for abdominal discomfort, blood in stools or black stools or change in bowel habits.  MUSCULOSKELETAL: See HPI.  SKIN: Negative for lesions, rash, and itching.  PSYCH: No suicidal or homicidal ideations, no current mood disturbances.  HEMATOLOGY/LYMPHOLOGY: Negative for prolonged bleeding, bruising easily or swollen nodes. Patient is not currently taking any anti-coagulants  ENDO: No history of diabetes or thyroid dysfunction  NEURO: No history of syncope, paralysis, seizures or tremors.All other reviewed and negative other than HPI.    Physical exam:  Gen: A and O x3, pleasant, well-groomed  Skin: No rashes or obvious lesions  HEENT: PERRLA, no obvious deformities on ears or in canals. No thyroid masses, trachea midline, no palpable lymph nodes in neck, axilla.  CVS: Regular rate and rhythm, normal S1 and S2, no murmurs.  Resp: Clear to auscultation bilaterally.  Abdomen: Soft, NT/ND, normal bowel sounds present.  Musculoskeletal/Neuro: Moving all extremities    Assessment:  Lumbar radiculopathy  -     Case Request Operating Room: Injection-steroid-epidural-lumbar  -     Place in Outpatient; Standing  -     Diet NPO; Standing  -     alprazolam ODT dissolvable tablet 1 mg; Take 2 tablets (1 mg total) by mouth once as needed for Anxiety.  -     Notify physician ; Standing  -     " Notify physician ; Standing  -     Notify physician (specify); Standing  -     Verify informed consent; Standing  -     Vital signs; Standing    DDD (degenerative disc disease), lumbar

## 2018-09-04 NOTE — DISCHARGE SUMMARY
[facility]  Discharge Note  Short Stay    Admit Date: 9/4/2018    Discharge Date: 9/4/2018    Attending Physician: Rohan Ware MD     Discharge Provider: Rohan Ware    Diagnoses:  Active Hospital Problems    Diagnosis  POA    *DDD (degenerative disc disease), lumbar [M51.36]  Yes      Resolved Hospital Problems   No resolved problems to display.       Discharged Condition: good    Final Diagnoses: Lumbar radiculopathy [M54.16]    Disposition: Home or Self Care    Hospital Course: no complications, uneventful    Outcome of Hospitalization, Treatment, Procedure, or Surgery:  Patient was admitted for outpatient procedure. The patient underwent procedure without complications and are discharged home    Follow up/Patient Instructions:  Follow up as scheduled in Pain Management clinic in 3-4 weeks/Patient has received instructions and follow up date and time    Medications:  Continue previous medications    Discharge Procedure Orders   Call MD for:  temperature >100.4     Call MD for:  severe uncontrolled pain     Call MD for:  redness, tenderness, or signs of infection (pain, swelling, redness, odor or green/yellow discharge around incision site)     Call MD for:  severe persistent headache     No dressing needed         Discharge Procedure Orders (must include Diet, Follow-up, Activity):   Discharge Procedure Orders (must include Diet, Follow-up, Activity)   Call MD for:  temperature >100.4     Call MD for:  severe uncontrolled pain     Call MD for:  redness, tenderness, or signs of infection (pain, swelling, redness, odor or green/yellow discharge around incision site)     Call MD for:  severe persistent headache     No dressing needed

## 2018-09-04 NOTE — DISCHARGE INSTRUCTIONS
Home care instructions  Apply ice pack to the injection site for 20 minutes periods for the first 24 hrs for soreness/discomfort at injection site DO NOT USE HEAT FOR 24 HOURS  Keep site clean and dry for 24 hours, remove bandaid when desired  Do not drive until tomorrow  Take care when walking after a lumbar injection  Avoid strenuous activities for 2 days  Make take 2 weeks to feel the full effects   Resume home medication as prescribed today  Resume Aspirin, Plavix, or Coumadin the day after the procedure unless otherwise instructed.    SEE IMMEDIATE MEDICAL HELP FOR:  Severe increase in your usual pain or appearance of new pain  Prolonged or increasing weakness or numbness in the legs or arms  Drainage, redness, active bleeding, or increased swelling at the injection site  Temperature over 100.0 degrees F.  Headache that increases when your head is upright and decreases when you lie flat    CALL 911 OR GO DIRECTLY TO EMERGENCY DEPARTMENT FOR:  Shortness of breath, chest pain, or problems breathing            Recovery After Procedural Sedation (Adult)  You have been given medicine by vein to make you sleep during your surgery. This may have included both a pain medicine and sleeping medicine. Most of the effects have worn off. But you may still have some drowsiness for the next 6 to 8 hours.  Home care  Follow these guidelines when you get home:  · For the next 8 hours, you should be watched by a responsible adult. This person should make sure your condition is not getting worse.  · Don't drink any alcohol for the next 24 hours.  · Don't drive, operate dangerous machinery, or make important business or personal decisions during the next 24 hours.  Note: Your healthcare provider may tell you not to take any medicine by mouth for pain or sleep in the next 4 hours. These medicines may react with the medicines you were given in the hospital. This could cause a much stronger response than usual.  Follow-up  care  Follow up with your healthcare provider if you are not alert and back to your usual level of activity within 12 hours.  When to seek medical advice  Call your healthcare provider right away if any of these occur:  · Drowsiness gets worse  · Weakness or dizziness gets worse  · Repeated vomiting  · You can't be awakened   Date Last Reviewed: 10/18/2016  © [phone] [employer]. [address] [place] [address], [place] [address]. All rights reserved. This information is not intended as a substitute for professional medical care. Always follow your healthcare professional's instructions.

## 2018-09-06 ENCOUNTER — PATIENT MESSAGE (OUTPATIENT)
Dept: NEUROSURGERY | Facility: CLINIC | Age: 83
End: 2018-09-06

## 2018-09-10 NOTE — TELEPHONE ENCOUNTER
Called pt to advise that her EMG is scheduled with Dr. Gaston, not Dr. Zhong. Also, informed the patient that she will need to have xrays done prior to her follow up appt with Carly. Pt verbalized understanding.

## 2018-09-17 ENCOUNTER — TELEPHONE (OUTPATIENT)
Dept: NEUROSURGERY | Facility: CLINIC | Age: 83
End: 2018-09-17

## 2018-09-17 RX ORDER — LEVOTHYROXINE SODIUM 75 UG/1
75 TABLET ORAL DAILY
Qty: 90 TABLET | Refills: 0 | Status: SHIPPED | OUTPATIENT
Start: 2018-09-17 | End: 2018-12-24 | Stop reason: SDUPTHER

## 2018-09-17 NOTE — TELEPHONE ENCOUNTER
Pt called concerned about her EMG with  (Alex doesn't take [facility] Medicare). Pt states that she is very concerned about this test. She doesn't want to have it done unless absolutely necessary. She wants to know if she can come in with just the xray or does she have to get the EMG done?

## 2018-09-17 NOTE — TELEPHONE ENCOUNTER
Called pt to inform that she needs to have the EMG done per RAHEL Yuan. Pt states she will call back to reschedule.

## 2018-10-02 ENCOUNTER — OFFICE VISIT (OUTPATIENT)
Dept: PAIN MEDICINE | Facility: CLINIC | Age: 83
End: 2018-10-02
Payer: MEDICARE

## 2018-10-02 VITALS
DIASTOLIC BLOOD PRESSURE: 68 MMHG | BODY MASS INDEX: 27.81 KG/M2 | WEIGHT: 156.94 LBS | TEMPERATURE: 98 F | HEART RATE: 90 BPM | SYSTOLIC BLOOD PRESSURE: 128 MMHG | RESPIRATION RATE: 20 BRPM | OXYGEN SATURATION: 98 %

## 2018-10-02 DIAGNOSIS — M51.36 DDD (DEGENERATIVE DISC DISEASE), LUMBAR: Primary | ICD-10-CM

## 2018-10-02 DIAGNOSIS — M54.16 LUMBAR RADICULOPATHY: ICD-10-CM

## 2018-10-02 DIAGNOSIS — R26.81 GAIT INSTABILITY: ICD-10-CM

## 2018-10-02 DIAGNOSIS — M48.062 SPINAL STENOSIS OF LUMBAR REGION WITH NEUROGENIC CLAUDICATION: ICD-10-CM

## 2018-10-02 PROCEDURE — 99999 PR PBB SHADOW E&M-EST. PATIENT-LVL IV: CPT | Mod: PBBFAC,,, | Performed by: PHYSICIAN ASSISTANT

## 2018-10-02 PROCEDURE — 99499 UNLISTED E&M SERVICE: CPT | Mod: S$GLB,,, | Performed by: PHYSICIAN ASSISTANT

## 2018-10-02 PROCEDURE — 1101F PT FALLS ASSESS-DOCD LE1/YR: CPT | Mod: CPTII,,, | Performed by: PHYSICIAN ASSISTANT

## 2018-10-02 PROCEDURE — 3074F SYST BP LT 130 MM HG: CPT | Mod: CPTII,,, | Performed by: PHYSICIAN ASSISTANT

## 2018-10-02 PROCEDURE — 99214 OFFICE O/P EST MOD 30 MIN: CPT | Mod: S$PBB,,, | Performed by: PHYSICIAN ASSISTANT

## 2018-10-02 PROCEDURE — 99214 OFFICE O/P EST MOD 30 MIN: CPT | Mod: PBBFAC,PN | Performed by: PHYSICIAN ASSISTANT

## 2018-10-02 PROCEDURE — 3078F DIAST BP <80 MM HG: CPT | Mod: CPTII,,, | Performed by: PHYSICIAN ASSISTANT

## 2018-10-02 NOTE — PROGRESS NOTES
CC: Back and leg pain    HPI: The patient is a 83-year-old woman with a history of diabetes, previous breast CA, CVA and spinal stenosis who presents in referral from her primary care physician, Dr. Barger for back pain and leg pain.  She is status post L5/S1 interlaminar epidural steroid injection on 09/04/2018 with almost 100% relief.  She states that she has very mild low back pain but this is well controlled with taking tramadol twice a day.  She no longer has to take 2 in the mornings.  Since her last visit, she has seen Neurosurgery and an EMG was ordered.  She states that she went to the providers office outside of [facility] and did not feel the conditions were sanitary so she opted not to have the study done.  She currently denies weakness, numbness, bladder or bowel incontinence.    Pain intervention history: She tried physical therapy about 1 year ago and it seemed to make her pain worse. Patient is status post right L3 and L4 transforaminal injection on 8/9/13 with 50% relief of low back and right leg pain.  She is status post bilateral L4 transforaminal epidural steroid injections on 9/27/13 and 11/1/13 with 10-20% relief.  She is status post radiofrequency ablation of the left L3, 4, 5 medial branch nerves on 5/5/14 with 50% relief of her left low back pain.  She is status post radiofrequency ablation of the right L3, 4 and 5 medial branch nerves on 6/16/14 with mild relief, however she reported significant more relief at a later visit.  She is status post bilateral L4 transforaminal epidural steroid injections on 9/24/14 with moderate relief.  She is status post bilateral L4 transforaminal epidural steroid injections on 1/7/15 with complete relief of her posterior thigh pain, moderate relief of her low back pain and minimal relief of her right lateral hip pain.  She is status post C7-T1 cervical interlaminar epidural steroid injection on 2/27/15 with greater than 50% relief.  She is status post bilateral  L3, 4 and 5 medial branch radio frequency ablation on 7/29/16 reporting 0% relief initially at 4 weeks but then reporting 90% relief after 6-7 weeks.  She is status post bilateral L4 transforaminal epidural steroid injections on 11/11/16 with not quite 50% relief.  She is status post bilateral L4 transforaminal epidural steroid injections on 4/4/17 with 50% relief.  She is status post bilateral L4 transforaminal epidural steroid injections on 8/23/17 with greater than 80% relief.  She is status post bilateral L3, 4 and 5 medial branch radiofrequency ablation on 3/6/18 with moderate relief.  She is status post L5/S1 interlaminar epidural steroid injection on 06/04/2018 with almost 100% relief lasting 1 month.  She is status post L5/S1 interlaminar epidural steroid injection on 09/04/2018 with almost 100% relief.      ROS:She reports easy bruising, back pain and difficulty sleeping.  Balance of review of systems is negative.    Medical, surgical, family and social history reviewed elsewhere in record.    Medications/Allergies: See med card    Vitals:    10/02/18 1258   BP: 128/68   Pulse: 90   Resp: 20   Temp: 98 °F (36.7 °C)   TempSrc: Oral   SpO2: 98%   Weight: 71.2 kg (156 lb 15.5 oz)   PainSc:   2   PainLoc: Back         Physical exam:  Gen: A and O x3, pleasant, well-groomed  Skin: No rashes or obvious lesions  HEENT: PERRLA  CVS: Regular rate and rhythm, normal S1 and S2, no murmurs.  Resp: Clear to auscultation bilaterally, no wheezes or rales.  Abdomen: Soft, NT/ND, normal bowel sounds present.  Musculoskeletal: Able to stand and walk short distances without assistance, however uses a walker when out of the house.  She has disuse atrophy of the lumbar paraspinous muscles.  Slow to go from seated to standing position.    Neuro:  Upper extremities: 5/5 strength bilaterally   Lower extremities: 5/5 strength bilaterally  Reflexes: Brachioradialis 2+, Bicep 2+, Tricep 2+. Patellar 2+, Achilles 2+.  Sensory: Intact  and symmetrical to light touch and pinprick in C2-T1 dermatomes bilaterally.  Intact and symmetrical to light touch and pinprick in L2-S1 dermatomes bilaterally, except for a small patch over her left lateral shin decreased with light touch and pinprick.    Lumbar spine:  Lumbar spine: ROM is moderately reduced with flexion extension and oblique extension without increased pain.  Terry's test is deferred.  Supine straight leg raise causes right greater than left buttock pain.  Internal and external rotation of the hip causes no increased pain on either side.  Myofascial exam: No tenderness to palpation across lumbar paraspinous muscles.      Imagin13 MRI L-spine  T10-T11: There is severe disk desiccation and a moderate diffuse disk bulge and ligamentous hypertrophy. This is not complete evaluation of the thoracic spine, but there does appear to be mild central canal stenosis.  T11-T12: Moderate disk desiccation and mild diffuse disk bulge. Mild narrowing of the thecal sac. Neural foraminal regions difficult to assess due to the scoliosis.  T12-L1: Severe disk degenerative disease with marked desiccation, diffuse disk bulge, and spurring of vertebral bodies. Mild central canal stenosis. Neural foraminal narrowing bilaterally, difficult to grade due to scoliosis.  L1-L2: Advanced disk desiccation with moderate diffuse disk bulge and mild spurring of vertebral bodies. Small superimposed central to right paracentral disk extrusion. Mild central canal stenosis. Mild to moderate facet arthropathy and ligamentous hypertrophy. Bilateral foraminal stenosis.  L2 - L3: There is also disk desiccation and diffuse disk bulge and small annular fissure posteriorly. facet arthropathy and hypertrophy ligamentum flavum. Mild central canal stenosis. Moderate right and mild left neuroforaminal stenosis.  L3-L4: Disk desiccation and moderate diffuse disk bulge. Small right paracentral ascending disk extrusion and moderate  facet arthropathy present bilaterally and hypertrophy of ligamentum flavum. In combination, the findings result in severe central canal stenosis. For example see axial image 22, central image 7. There is mild to moderate neural foraminal stenosis bilaterally.  L4 - L5: Mild anterolisthesis with disk desiccation and uncovering of the disk, with moderate right and severe left facet arthropathy and hypertrophy of ligamentum flavum. Severe central canal stenosis. Mild neural foraminal narrowing, right worse than left.  L5-S1: Disk desiccation and mild diffuse disk bulge. A small ascending disk extrusion centrally in the midline. No significant central canal stenosis. Advanced facet arthropathy bilaterally, left worse than right. Mild left neural foraminal narrowing. No significant right neural foraminal stenosis.    5/28/2018 MRI lumbar spine  T12-L1: There is marked disc space narrowing, trace retrolisthesis, unroofing of a moderate disc bulge.  There is right greater than left facet joint arthropathy.  There is mild spinal stenosis with moderate left and moderate-to-marked right foraminal stenosis.  These findings have mildly progressed.  L1-2: There is trace retrolisthesis of L1 on L2, there is inferior unroofing of a moderate diffuse disc bulge with small superimposed central disc protrusion.  There is right greater than left facet joint arthropathy.  There is borderline spinal stenosis.  There is crowding of right lateral recess.  There is marked right and moderate-to-marked left foraminal stenosis with very slight progression.  L2-3: There is trace retrolisthesis of L2 on L3 where there is marked disc space narrowing.  There is a moderate diffuse disc bulge with osteophytic ridging and superimposed right foraminal disc protrusion.  There is moderate-to-marked bilateral facet joint arthropathy with ligamentum flavum thickening, right greater than.  There is moderate central spinal stenosis and right lateral recess  stenosis.  There is mild left lateral recess stenosis.  There is marked right and moderate-to-marked left foraminal stenosis with progression.  L3-4: There is disc space narrowing.  There is marked facet joint arthropathy with ligamentum flavum thickening.  There is a small 4 mm left synovial cyst.  There is unroofing of a moderate to marked diffuse disc bulge.  There is severe spinal canal, bilateral lateral recess and right foraminal stenosis with moderate to marked left foraminal stenosis and with slight progression.  L4-5: There is stable grade 1 spondylolisthesis with 4 mm anterolisthesis of L4 on L5.  There is marked facet joint arthropathy.  There is unroofing of a moderate to marked diffuse disc bulge.  There is severe spinal canal, bilateral lateral recess and foraminal stenosis with slight progression.  L5-S1: There is disc space narrowing at the L5-S1 level, worse towards the left.  There is a disc bulge with small superimposed central disc extrusion with 5 mm cephalad extension.  This was present previously but is slightly more conspicuous.  There is marked facet joint arthropathy.  There is moderate central spinal stenosis with mild crowding of the left lateral recess.  There is mild right and moderate-to-marked left foraminal stenosis with progression.      Assessment:   The patient is a 83-year-old woman with a history of diabetes, previous breast CA, CVA and spinal stenosis who presents in referral from her primary care physician, Dr. Barger for back pain and leg pain.     1. DDD (degenerative disc disease), lumbar     2. Lumbar radiculopathy     3. Spinal stenosis of lumbar region with neurogenic claudication     4. Gait instability         Plan:  1.  Patient had excellent relief following the L5/S1 interlaminar ANUEL.  If she has several months of relief with the injection, this can be repeated as necessary.  However, if she is not having lasting relief with injections, I will encourage her to have  the EMG done ordered by Neurosurgery and to follow-up there.  We will set her up with the EMG at our facility here if necessary.  If she follows up with Neurosurgery and an operation is not recommended, we may consider spinal cord stimulation.  2.  She continues to take tramadol but is no longer taking 2 in the morning and 1 at night, she is taking 1 twice a day.  3.  Follow-up in 3 months or sooner as needed.    Greater than 50% of this 25 min visit was spent on counseling the patient.

## 2018-10-03 ENCOUNTER — PATIENT MESSAGE (OUTPATIENT)
Dept: INTERNAL MEDICINE | Facility: CLINIC | Age: 83
End: 2018-10-03

## 2018-10-04 RX ORDER — LORAZEPAM 0.5 MG/1
0.5 TABLET ORAL
Qty: 1 TABLET | Refills: 0 | Status: SHIPPED | OUTPATIENT
Start: 2018-10-04 | End: 2018-12-19 | Stop reason: CLARIF

## 2018-10-09 DIAGNOSIS — K86.2 PANCREATIC CYST: Primary | ICD-10-CM

## 2018-10-10 ENCOUNTER — HOSPITAL ENCOUNTER (OUTPATIENT)
Dept: RADIOLOGY | Facility: HOSPITAL | Age: 83
Discharge: HOME | End: 2018-10-10
Attending: INTERNAL MEDICINE
Payer: MEDICARE

## 2018-10-10 DIAGNOSIS — K86.2 PANCREATIC CYST: ICD-10-CM

## 2018-10-10 PROCEDURE — 74181 MRI ABDOMEN W/O CONTRAST: CPT | Mod: TC,PO

## 2018-10-10 PROCEDURE — 74181 MRI ABDOMEN W/O CONTRAST: CPT | Mod: 26,,, | Performed by: RADIOLOGY

## 2018-10-23 ENCOUNTER — TELEPHONE (OUTPATIENT)
Dept: ENDOSCOPY | Facility: HOSPITAL | Age: 83
End: 2018-10-23

## 2018-10-23 NOTE — TELEPHONE ENCOUNTER
----- Message from Trinity Romo MD sent at 10/22/2018  1:03 PM CDT -----  Reviewed. Repeat MRI in 2 years.     ----- Message -----  From: Hafsa Muller MA  Sent: 10/22/2018  11:05 AM  To: Trinity Romo MD    It was done on 10/10. Ordered by Dr Barger  ----- Message -----  From: Trinity Romo MD  Sent: 10/22/2018  10:09 AM  To: Hafsa Muller MA    Where is the recent MRI ?    ----- Message -----  From: Hafsa Muller MA  Sent: 10/21/2018   4:11 PM  To: Trinity Romo MD    Please review recent MRI and advise if any pancreatic follow up

## 2018-10-24 ENCOUNTER — TELEPHONE (OUTPATIENT)
Dept: INTERNAL MEDICINE | Facility: CLINIC | Age: 83
End: 2018-10-24

## 2018-10-24 ENCOUNTER — IMMUNIZATION (OUTPATIENT)
Dept: INTERNAL MEDICINE | Facility: CLINIC | Age: 83
End: 2018-10-24
Payer: MEDICARE

## 2018-10-24 ENCOUNTER — OFFICE VISIT (OUTPATIENT)
Dept: INTERNAL MEDICINE | Facility: CLINIC | Age: 83
End: 2018-10-24
Payer: MEDICARE

## 2018-10-24 VITALS — BODY MASS INDEX: 27.73 KG/M2 | WEIGHT: 156.5 LBS | SYSTOLIC BLOOD PRESSURE: 125 MMHG | DIASTOLIC BLOOD PRESSURE: 80 MMHG

## 2018-10-24 DIAGNOSIS — R91.8 MULTIPLE LUNG NODULES: ICD-10-CM

## 2018-10-24 DIAGNOSIS — R13.10 DYSPHAGIA, UNSPECIFIED TYPE: ICD-10-CM

## 2018-10-24 DIAGNOSIS — J47.9 BRONCHIECTASIS WITHOUT COMPLICATION: Primary | ICD-10-CM

## 2018-10-24 DIAGNOSIS — N18.30 CKD (CHRONIC KIDNEY DISEASE) STAGE 3, GFR 30-59 ML/MIN: ICD-10-CM

## 2018-10-24 DIAGNOSIS — E03.9 ACQUIRED HYPOTHYROIDISM: ICD-10-CM

## 2018-10-24 DIAGNOSIS — N28.1 RENAL CYSTS, ACQUIRED, BILATERAL: ICD-10-CM

## 2018-10-24 DIAGNOSIS — E11.8 CONTROLLED TYPE 2 DIABETES MELLITUS WITH COMPLICATION, WITHOUT LONG-TERM CURRENT USE OF INSULIN: ICD-10-CM

## 2018-10-24 DIAGNOSIS — M10.00 IDIOPATHIC GOUT, UNSPECIFIED CHRONICITY, UNSPECIFIED SITE: ICD-10-CM

## 2018-10-24 DIAGNOSIS — D35.00 ADRENAL ADENOMA, UNSPECIFIED LATERALITY: ICD-10-CM

## 2018-10-24 DIAGNOSIS — K76.89 LIVER CYST: ICD-10-CM

## 2018-10-24 DIAGNOSIS — K86.2 PANCREATIC CYST: ICD-10-CM

## 2018-10-24 DIAGNOSIS — I35.0 AORTIC VALVE STENOSIS, MODERATE: ICD-10-CM

## 2018-10-24 DIAGNOSIS — Z85.3 HISTORY OF BREAST CANCER: ICD-10-CM

## 2018-10-24 PROCEDURE — 90662 IIV NO PRSV INCREASED AG IM: CPT | Mod: PBBFAC

## 2018-10-24 PROCEDURE — 3079F DIAST BP 80-89 MM HG: CPT | Mod: CPTII,,, | Performed by: INTERNAL MEDICINE

## 2018-10-24 PROCEDURE — 99214 OFFICE O/P EST MOD 30 MIN: CPT | Mod: S$PBB,,, | Performed by: INTERNAL MEDICINE

## 2018-10-24 PROCEDURE — 99999 PR PBB SHADOW E&M-EST. PATIENT-LVL IV: CPT | Mod: PBBFAC,,, | Performed by: INTERNAL MEDICINE

## 2018-10-24 PROCEDURE — 1101F PT FALLS ASSESS-DOCD LE1/YR: CPT | Mod: CPTII,,, | Performed by: INTERNAL MEDICINE

## 2018-10-24 PROCEDURE — 3074F SYST BP LT 130 MM HG: CPT | Mod: CPTII,,, | Performed by: INTERNAL MEDICINE

## 2018-10-24 PROCEDURE — 99214 OFFICE O/P EST MOD 30 MIN: CPT | Mod: PBBFAC,25 | Performed by: INTERNAL MEDICINE

## 2018-10-24 RX ORDER — SUCRALFATE 1 G/10ML
1 SUSPENSION ORAL 2 TIMES DAILY
Qty: 414 ML | Refills: 12 | Status: SHIPPED | OUTPATIENT
Start: 2018-10-24 | End: 2018-12-19 | Stop reason: CLARIF

## 2018-10-24 NOTE — TELEPHONE ENCOUNTER
----- Message from Tosha Fritz sent at 10/24/2018 12:55 PM CDT -----  Contact: [facility] [phone]  Pharmacy is calling to inform the office that the pt's medication sucralfate (CARAFATE) 100 mg/mL suspension is not covered by her insurance and wants to know if Dr. Barger wants to send in something in place of it. Please call back and advise.      Thanks

## 2018-10-24 NOTE — PATIENT INSTRUCTIONS
Prevention Guidelines, Women Ages 65 and Older  Screening tests and vaccines are an important part of managing your health. Health counseling is essential, too. Below are guidelines for these, for women ages 65 and older. Talk with your healthcare provider to make sure youre up to date on what you need.  Screening Who needs it How often   Type 2 diabetes or prediabetes All adults beginning at age 45 and adults without symptoms at any age who are overweight or obese and have 1 or more additional risk factors for diabetes At least every 3 years   Alcohol misuse All women in this age group At routine exams   Blood pressure All women in this age group Every 2 years if your blood pressure is less than 120/80 mm Hg; yearly if your systolic blood pressure is 120 to 139 mm Hg, or your diastolic blood pressure reading is 80 to 89 mm Hg   Breast cancer All women in this age group Yearly mammogram and clinical breast exam1   Cervical cancer Only women who had abnormal screening results before age 65 Talk with your healthcare provider   Chlamydia Women at increased risk for infection At routine exams   Colorectal cancer All women in this age group1 Flexible sigmoidoscopy every 5 years, or colonoscopy every 10 years, or double-contrast barium enema every 5 years; yearly fecal occult blood test or fecal immunochemical test; or a stool DNA test as often as your healthcare provider advises; talk with your healthcare provider about which tests are best for you   Depression All women in this age group At routine exams   Gonorrhea Sexually active women at increased risk for infection At routine exams   Hepatitis C Anyone at increased risk; 1 time for those born between 1945 and 1965 At routine exams   High cholesterol or triglycerides All women in this age group who are at risk for coronary artery disease At least every 5 years   HIV Women at increased risk for infection - talk with your healthcare provider At routine exams   Lung  cancer Adults age 55 to 80 who have smoked Yearly screening in smokers with 30 pack-year history of smoking or who quit within 15 years   Obesity All women in this age group At routine exams   Osteoporosis All women in this age group Bone density test at age 65, then follow-up as advised by your healthcare provider   Syphilis Women at increased risk for infection - talk with your healthcare provider At routine exams   Thyroid-Stimulating Hormone (TSH) All women in this age group Every 5 years   Tuberculosis Women at increased risk for infection - talk with your healthcare provider Ask your healthcare provider   Vision All women in this age group Every 1 to 2 years; if you have a chronic health condition, ask your healthcare provider if you need exams more often   Vaccine Who needs it How often   Chickenpox (varicella) All women in this age group who have no record of this infection or vaccine 2 doses; second dose should be given at least 4 weeks after the first dose   Hepatitis A Women at increased risk for infection - talk with your healthcare provider 2 doses given 6 months apart   Hepatitis B Women at increased risk for infection - talk with your healthcare provider 3 doses over 6 months; second dose should be given 1 month after the first dose; the third dose should be given at least 2 months after the second dose and at least 4 months after the first dose   Haemophilus influenza Type B (HIB) Women at increased risk for infection - talk with your healthcare provider 1 to 3 doses   Influenza (flu) All women in this age group Once a year   Pneumococcal conjugate vaccine (PCV13) and pneumococcal polysaccharide vaccine (PPSV23) All women in this age group 1 dose of each vaccine   Tetanus/diphtheria/pertussis (Td/Tdap) booster All women in this age group Td every 10 years, or a one-time dose of Tdap instead of a Td booster after age 18, then Td every 10 years   Zoster All women in this age group 1 dose   Counseling  Who needs it How often   Diet and exercise Women who are overweight or obese When diagnosed, and then at routine exams   Fall prevention (exercise and vitamin D supplements) All women in this age group At routine exams   Sexually transmitted infection prevention Women at increased risk for infection - talk with your healthcare provider At routine exams   Use of daily aspirin Women ages 55 and up in this age group who are at risk for cardiovascular health problems such as stroke When your risk is known   Use of tobacco and the health effects it can cause All women in this age group Every exam   1[employer]  Date Last Reviewed: 8/9/2015  © 9925-9142 [employer]. [address] [place] [address], [place] [address]. All rights reserved. This information is not intended as a substitute for professional medical care. Always follow your healthcare professional's instructions.    EUSE[employer]IA

## 2018-10-24 NOTE — PROGRESS NOTES
Subjective:       Patient ID: Olga Aguiar is a 83 y.o. female.    Chief Complaint: Follow-up    Multiple issues,  with her.     DM doing worse with an A1c 7.5.  Recall she had to stop medication due to low readings.  Several steroid shots of late.  May also take steroids once a month by mouth.  She really does not want to take medication- admits to dietary indiscretion.      Recall that she has seen pulmonary 4/17 then again 2/18.  Meds adjusted- stable.  She has COPD and medications have been adjusted; she's had to take steroids a couple of times, but not recently.  She still has cough and a little bit of shortness of breath but overall symptoms are slightly better stable.  Lung nodule- stable on CT 9/16.       She has some CKD- stable.  Actually better.   She was seen in August 2018.     Her gastroenterologist had her do an MRI of the abdomen  back in November of 2016 and then recently repeated, stable and told to follow up in 2 years (pancreatic cyst).     She has mild stable GERD symptoms.     She has seen her cardiologist 2/17 and 5/18.  She has aortic stenosis.  She denies syncope, chest pain, pressure, tightness.  Chest chronic stable shortness of breath.  She has trace chronic edema, but no PND or orthopnea.  Blood pressure is stable.  Labs are also stable.         DEXA 5/17 osteopenia- reviewed.     She had mammogram 1/18.     Gout - elevated uric acid; stable in January.   No new sx.  Used to take uloric; now doing well on allopurinol.     Ectatic aorta seen on CT 2016. Abdominal u/s 10/17 showed no aneurysm.    Patient Active Problem List:     Mixed hyperlipidemia, baseline      Chronic pain syndrome     Lumbosacral spondylosis without myelopathy     Adrenal adenoma: 2.8 X 2.1CM 2010; stable 2016     Abdominal obesity     Lymph edema, right arm     Peripheral edema     Gallbladder polyp     CKD (chronic kidney disease) stage 3, GFR 30-59 ml/min     Aortic valve stenosis, mild-  moderate, MARI 1.44 1/16     LVH (left ventricular hypertrophy)     Pancreatic cyst: stable on MRI 11/16 per GI repeated 2018, do again 2020     Cervical spinal stenosis     Acquired hypothyroidism     Transient cerebral ischemia: 2012     Primary insomnia     Gastroesophageal reflux disease without esophagitis     Multiple lung nodules: stable CT 10/2016     Former smoker: 1 pack daily for < 20 years, quit 1973     COPD (chronic obstructive pulmonary disease) with chronic bronchitis, 2013     Facet hypertrophy of lumbar region     History of breast cancer     Intermittent asthma     Radiation fibrosis of lung     Bronchiectasis without complication     Ectatic abdominal aorta: see CT scan 10/16; no aneurysm 10/17     Aortic atherosclerosis: see CT scan 2016     Renal cyst, right: stable per CT 2016     Lumbar radiculopathy     Abnormal ECG     Encounter for monitoring proton pump inhibitor therapy     Diverticulosis of large intestine without hemorrhage: see CT 10/14     Localized edema     Idiopathic gout     Lumbar spondylosis     DDD (degenerative disc disease), lumbar           Review of Systems   Constitutional: Negative for activity change, appetite change, chills, fatigue and fever.   HENT: Negative for congestion, hearing loss, sinus pressure and sore throat.    Eyes: Negative for visual disturbance.   Respiratory: Negative for apnea, cough, shortness of breath and wheezing.    Cardiovascular: Negative for chest pain, palpitations and leg swelling.   Gastrointestinal: Negative for abdominal distention, abdominal pain, constipation, diarrhea, nausea and vomiting.        Slight dysphagia, no unexplained weight loss   Genitourinary: Negative for dysuria, frequency, hematuria and vaginal bleeding.   Musculoskeletal: Positive for arthralgias. Negative for gait problem, joint swelling and myalgias.   Skin: Negative for rash.   Neurological: Negative for dizziness, weakness, light-headedness and headaches.    Hematological: Negative for adenopathy. Does not bruise/bleed easily.   Psychiatric/Behavioral: Negative for confusion, hallucinations, sleep disturbance and suicidal ideas.       Objective:      Physical Exam   Constitutional: She is oriented to person, place, and time. She appears well-developed and well-nourished.   HENT:   Head: Normocephalic and atraumatic.   Right Ear: External ear normal.   Left Ear: External ear normal.   Nose: Nose normal.   Mouth/Throat: Oropharynx is clear and moist. No oropharyngeal exudate.   Eyes: Conjunctivae and EOM are normal. No scleral icterus.   Neck: Normal range of motion. Neck supple. No JVD present. No thyromegaly present.   Cardiovascular: Normal rate, regular rhythm and intact distal pulses. Exam reveals no gallop.   Murmur heard.  Pulmonary/Chest: Effort normal and breath sounds normal. No respiratory distress. She has no wheezes.   Abdominal: Soft. Bowel sounds are normal. She exhibits no distension and no mass. There is no tenderness. There is no rebound and no guarding.   Musculoskeletal: Normal range of motion. She exhibits no edema or tenderness.   Lymphedema R arm   Lymphadenopathy:     She has no cervical adenopathy.   Neurological: She is alert and oriented to person, place, and time. She displays normal reflexes. No cranial nerve deficit. Coordination normal.   Skin: Skin is warm. No rash noted. No erythema.   Psychiatric: She has a normal mood and affect. Her behavior is normal. Judgment and thought content normal.   Nursing note and vitals reviewed.      Assessment:       1. Bronchiectasis without complication    2. Multiple lung nodules: stable CT 10/2016    3. Aortic valve stenosis, mild- moderate, MARI 1.44 1/16    4. CKD (chronic kidney disease) stage 3, GFR 30-59 ml/min    5. History of breast cancer    6. Acquired hypothyroidism    7. Pancreatic cyst: stable on MRI 11/16 per GI repeated 2018, do again 2020    8. Idiopathic gout, unspecified chronicity,  unspecified site    9. Adrenal adenoma, unspecified laterality    10. Liver cysts: see MRI 2018    11. Renal cysts, acquired, bilateral: stable on MRI 2018    12. Dysphagia, unspecified type    13. Controlled type 2 diabetes mellitus with complication, without long-term current use of insulin        Plan:         Bronchiectasis without complication: keep Pulmonary follow up    Multiple lung nodules: stable CT 10/2016: keep Pulmonary follow up    Aortic valve stenosis, mild- moderate, MARI 1.44 1/16: no alarm sx; seen in Cardiology 5/18 stable    CKD (chronic kidney disease) stage 3, GFR 30-59 ml/min  -     CBC auto differential; Future; Expected date: 02/21/2019    History of breast cancer: keep mammogram and Oncology follow up    Acquired hypothyroidism: stable on regimen    Pancreatic cyst: stable on MRI 11/16 per GI repeated 2018, do again 2020    Idiopathic gout, unspecified chronicity, unspecified site  -     Uric acid; Future; Expected date: 02/21/2019    Adrenal adenoma, unspecified laterality: stable 2016    Liver cysts: see MRI 2018    Renal cysts, acquired, bilateral: stable on MRI 2018    Dysphagia, unspecified type  -     Case request GI: ESOPHAGOGASTRODUODENOSCOPY (EGD)    Controlled type 2 diabetes mellitus with complication, without long-term current use of insulin: she declines tx but admits to dietary indiscretion and will work more on this in next several weeks.  Accucheck ac and hs  -     Comprehensive metabolic panel; Future; Expected date: 02/21/2019  -     Hemoglobin A1c; Future; Expected date: 02/21/2019  -     Lipid panel; Future; Expected date: 02/21/2019    Other orders  -     sucralfate (CARAFATE) 100 mg/mL suspension; Take 10 mLs (1 g total) by mouth 2 (two) times daily.  Dispense: 414 mL; Refill: 12    I will review all studies and determine further tx depending on findings  New shingles vaccine recommended  Flu shot

## 2018-10-24 NOTE — TELEPHONE ENCOUNTER
----- Message from Stephanie Livingston sent at 10/24/2018  3:50 PM CDT -----  Contact: Oziel  Prior Authorization Needed    Medication: sucralfate (CARAFATE) 100 mg/mL suspension    Pharmacy Info: [id number] - [place], [place] - [address] [facility]    Plan does not cover this medication. Please call plan at [phone] to initiate prior authorization or call/fax pharmacy to change medication. Patient ID#[id number] [facility] [id number]    Note chart when prior authorization has been submitted.    Please notify pharmacy when prior authorization has been approved.    Thank You

## 2018-10-25 NOTE — TELEPHONE ENCOUNTER
Please start PA    Pills are too large and she cannot swallow, that is why I am changing to liquid thanks

## 2018-10-26 DIAGNOSIS — F51.01 PRIMARY INSOMNIA: ICD-10-CM

## 2018-10-26 RX ORDER — TRIAMTERENE AND HYDROCHLOROTHIAZIDE 37.5; 25 MG/1; MG/1
1 CAPSULE ORAL EVERY MORNING
Qty: 90 CAPSULE | Refills: 1 | Status: SHIPPED | OUTPATIENT
Start: 2018-10-26 | End: 2019-04-27 | Stop reason: SDUPTHER

## 2018-10-26 RX ORDER — ZOLPIDEM TARTRATE 5 MG/1
TABLET ORAL
Qty: 30 TABLET | Refills: 1 | Status: SHIPPED | OUTPATIENT
Start: 2018-10-26 | End: 2018-12-24 | Stop reason: SDUPTHER

## 2018-11-07 ENCOUNTER — PATIENT MESSAGE (OUTPATIENT)
Dept: INTERNAL MEDICINE | Facility: CLINIC | Age: 83
End: 2018-11-07

## 2018-11-07 ENCOUNTER — TELEPHONE (OUTPATIENT)
Dept: SURGERY | Facility: CLINIC | Age: 83
End: 2018-11-07

## 2018-11-07 NOTE — TELEPHONE ENCOUNTER
Spoke to pt and provided phone number to schedule her Endoscopy   orders are for the [facility]

## 2018-11-07 NOTE — TELEPHONE ENCOUNTER
----- Message from Anahi Santoyo sent at 11/7/2018  3:28 PM CST -----  Contact: Patient's , [place]  Patient's  is calling to speak with someone in order to schedule patient's procedure.  Call Back#[phone]  Thanks

## 2018-11-12 ENCOUNTER — ANESTHESIA (OUTPATIENT)
Dept: ENDOSCOPY | Facility: HOSPITAL | Age: 83
End: 2018-11-12
Payer: MEDICARE

## 2018-11-12 ENCOUNTER — ANESTHESIA EVENT (OUTPATIENT)
Dept: ENDOSCOPY | Facility: HOSPITAL | Age: 83
End: 2018-11-12
Payer: MEDICARE

## 2018-11-12 ENCOUNTER — HOSPITAL ENCOUNTER (OUTPATIENT)
Facility: HOSPITAL | Age: 83
Discharge: HOME | End: 2018-11-12
Attending: INTERNAL MEDICINE | Admitting: INTERNAL MEDICINE
Payer: MEDICARE

## 2018-11-12 VITALS
SYSTOLIC BLOOD PRESSURE: 169 MMHG | OXYGEN SATURATION: 95 % | HEIGHT: 63 IN | BODY MASS INDEX: 27.46 KG/M2 | RESPIRATION RATE: 14 BRPM | WEIGHT: 155 LBS | DIASTOLIC BLOOD PRESSURE: 79 MMHG | TEMPERATURE: 98 F | HEART RATE: 56 BPM

## 2018-11-12 DIAGNOSIS — R13.10 DYSPHAGIA: ICD-10-CM

## 2018-11-12 PROCEDURE — 43248 EGD GUIDE WIRE INSERTION: CPT | Mod: PO | Performed by: INTERNAL MEDICINE

## 2018-11-12 PROCEDURE — 63600175 PHARM REV CODE 636 W HCPCS: Mod: PO | Performed by: NURSE ANESTHETIST, CERTIFIED REGISTERED

## 2018-11-12 PROCEDURE — 25000003 PHARM REV CODE 250: Mod: PO | Performed by: INTERNAL MEDICINE

## 2018-11-12 PROCEDURE — 43239 EGD BIOPSY SINGLE/MULTIPLE: CPT | Mod: PO,59 | Performed by: INTERNAL MEDICINE

## 2018-11-12 PROCEDURE — D9220A PRA ANESTHESIA: Mod: ANES,,, | Performed by: ANESTHESIOLOGY

## 2018-11-12 PROCEDURE — 43239 EGD BIOPSY SINGLE/MULTIPLE: CPT | Mod: 59,,, | Performed by: INTERNAL MEDICINE

## 2018-11-12 PROCEDURE — 43248 EGD GUIDE WIRE INSERTION: CPT | Mod: ,,, | Performed by: INTERNAL MEDICINE

## 2018-11-12 PROCEDURE — D9220A PRA ANESTHESIA: Mod: CRNA,,, | Performed by: NURSE ANESTHETIST, CERTIFIED REGISTERED

## 2018-11-12 PROCEDURE — 88305 TISSUE EXAM BY PATHOLOGIST: CPT | Performed by: PATHOLOGY

## 2018-11-12 PROCEDURE — 27201012 HC FORCEPS, HOT/COLD, DISP: Mod: PO | Performed by: INTERNAL MEDICINE

## 2018-11-12 PROCEDURE — 88305 TISSUE EXAM BY PATHOLOGIST: CPT | Mod: 26,,, | Performed by: PATHOLOGY

## 2018-11-12 PROCEDURE — 37000009 HC ANESTHESIA EA ADD 15 MINS: Mod: PO | Performed by: INTERNAL MEDICINE

## 2018-11-12 PROCEDURE — 37000008 HC ANESTHESIA 1ST 15 MINUTES: Mod: PO | Performed by: INTERNAL MEDICINE

## 2018-11-12 RX ORDER — LIDOCAINE HCL/PF 100 MG/5ML
SYRINGE (ML) INTRAVENOUS
Status: DISCONTINUED | OUTPATIENT
Start: 2018-11-12 | End: 2018-11-12

## 2018-11-12 RX ORDER — SODIUM CHLORIDE, SODIUM LACTATE, POTASSIUM CHLORIDE, CALCIUM CHLORIDE 600; 310; 30; 20 MG/100ML; MG/100ML; MG/100ML; MG/100ML
INJECTION, SOLUTION INTRAVENOUS CONTINUOUS
Status: DISCONTINUED | OUTPATIENT
Start: 2018-11-12 | End: 2018-11-12 | Stop reason: HOSPADM

## 2018-11-12 RX ORDER — SODIUM CHLORIDE 0.9 % (FLUSH) 0.9 %
3 SYRINGE (ML) INJECTION
Status: DISCONTINUED | OUTPATIENT
Start: 2018-11-12 | End: 2018-11-12 | Stop reason: HOSPADM

## 2018-11-12 RX ORDER — LIDOCAINE HYDROCHLORIDE 10 MG/ML
1 INJECTION INFILTRATION; PERINEURAL ONCE
Status: COMPLETED | OUTPATIENT
Start: 2018-11-12 | End: 2018-11-12

## 2018-11-12 RX ORDER — PROPOFOL 10 MG/ML
VIAL (ML) INTRAVENOUS
Status: DISCONTINUED | OUTPATIENT
Start: 2018-11-12 | End: 2018-11-12

## 2018-11-12 RX ADMIN — PROPOFOL 30 MG: 10 INJECTION, EMULSION INTRAVENOUS at 10:11

## 2018-11-12 RX ADMIN — LIDOCAINE HYDROCHLORIDE 1 ML: 10 INJECTION, SOLUTION EPIDURAL; INFILTRATION; INTRACAUDAL; PERINEURAL at 09:11

## 2018-11-12 RX ADMIN — LIDOCAINE HYDROCHLORIDE 100 MG: 20 INJECTION, SOLUTION INTRAVENOUS at 10:11

## 2018-11-12 RX ADMIN — SODIUM CHLORIDE, SODIUM LACTATE, POTASSIUM CHLORIDE, AND CALCIUM CHLORIDE: .6; .31; .03; .02 INJECTION, SOLUTION INTRAVENOUS at 09:11

## 2018-11-12 NOTE — ANESTHESIA POSTPROCEDURE EVALUATION
"Anesthesia Post Evaluation    Patient: Olga Aguiar    Procedure(s) Performed: Procedure(s) (LRB):  EGD (ESOPHAGOGASTRODUODENOSCOPY) (N/A)    Final Anesthesia Type: general  Patient location during evaluation: PACU  Patient participation: Yes- Able to Participate  Level of consciousness: awake and alert  Post-procedure vital signs: reviewed and stable  Pain management: adequate  Airway patency: patent  PONV status at discharge: No PONV  Anesthetic complications: no      Cardiovascular status: hemodynamically stable  Respiratory status: unassisted and room air  Hydration status: euvolemic  Follow-up not needed.        Visit Vitals  BP (!) 116/58 (BP Location: Left arm, Patient Position: Lying)   Pulse 60   Temp 36.5 °C (97.7 °F) (Skin)   Resp 14   Ht 5' 3" (1.6 m)   Wt 70.3 kg (155 lb)   SpO2 95%   Breastfeeding? No   BMI 27.46 kg/m²       Pain/Arturo Score: Pain Assessment Performed: Yes (11/12/2018 10:20 AM)  Presence of Pain: non-verbal indicators absent (11/12/2018 10:20 AM)  Arturo Score: 6 (11/12/2018 10:20 AM)        "

## 2018-11-12 NOTE — DISCHARGE INSTRUCTIONS
Recovery After Procedural Sedation (Adult)  You have been given medicine by vein to make you sleep during your surgery. This may have included both a pain medicine and sleeping medicine. Most of the effects have worn off. But you may still have some drowsiness for the next 6 to 8 hours.  Home care  Follow these guidelines when you get home:  · For the next 8 hours, you should be watched by a responsible adult. This person should make sure your condition is not getting worse.  · Don't drink any alcohol for the next 24 hours.  · Don't drive, operate dangerous machinery, or make important business or personal decisions during the next 24 hours.  Note: Your healthcare provider may tell you not to take any medicine by mouth for pain or sleep in the next 4 hours. These medicines may react with the medicines you were given in the hospital. This could cause a much stronger response than usual.  Follow-up care  Follow up with your healthcare provider if you are not alert and back to your usual level of activity within 12 hours.  When to seek medical advice  Call your healthcare provider right away if any of these occur:  · Drowsiness gets worse  · Weakness or dizziness gets worse  · Repeated vomiting  · You can't be awakened   Date Last Reviewed: 10/18/2016  © 6273-9660 [employer]. [address] [place] [address], [place] [address]. All rights reserved. This information is not intended as a substitute for professional medical care. Always follow your healthcare professional's instructions.

## 2018-11-12 NOTE — DISCHARGE SUMMARY
Discharge Note  Short Stay      SUMMARY     Admit Date: 11/12/2018    Attending Physician: Braydon Becerra MD     Discharge Physician: Braydon Becerra MD    Discharge Date: 11/12/2018 10:20 AM    Final Diagnosis: Dysphagia, unspecified type [R13.10]    Disposition: HOME OR SELF CARE    Patient Instructions:   Current Discharge Medication List      START taking these medications    Details   ranitidine (ZANTAC) 300 MG tablet Take 1 tablet (300 mg total) by mouth every evening.  Qty: 30 tablet, Refills: 2         CONTINUE these medications which have NOT CHANGED    Details   acetaminophen (TYLENOL) 500 MG tablet Take 500 mg by mouth every 6 (six) hours as needed.      allopurinol (ZYLOPRIM) 100 MG tablet TAKE ONE TABLET BY MOUTH ONCE DAILY  Qty: 30 tablet, Refills: 6    Comments: Please consider 90 day supplies to promote better adherence      aspirin (ECOTRIN) 81 MG EC tablet Take 81 mg by mouth once daily.      busPIRone (BUSPAR) 5 MG Tab Take 1 tablet (5 mg total) by mouth 2 (two) times daily as needed (anxiety).  Qty: 60 tablet, Refills: 2      cholecalciferol, vitamin D3, 2,000 unit Cap Take 2,000 Units by mouth Daily.       fluticasone-vilanterol (BREO ELLIPTA) 200-25 mcg/dose DsDv diskus inhaler Inhale 1 puff into the lungs once daily.  Qty: 3 each, Refills: 3    Associated Diagnoses: COPD (chronic obstructive pulmonary disease) with chronic bronchitis; Intermittent asthma, uncomplicated; Bronchiectasis without complication      gabapentin (NEURONTIN) 300 MG capsule Take 1 capsule (300 mg total) by mouth every evening.  Qty: 90 capsule, Refills: 3      levothyroxine (SYNTHROID) 75 MCG tablet Take 1 tablet (75 mcg total) by mouth once daily.  Qty: 90 tablet, Refills: 0      montelukast (SINGULAIR) 10 mg tablet Take 1 tablet (10 mg total) by mouth every evening.  Qty: 90 tablet, Refills: 3      predniSONE (DELTASONE) 10 MG tablet Take 2 tablets (20 mg total) by mouth once daily. Take 2 daily for 3 days  and repeat for short breath.  Qty: 36 tablet, Refills: 2      simvastatin (ZOCOR) 40 MG tablet TAKE 1 TABLET BY MOUTH IN THE EVENING  Qty: 90 tablet, Refills: 0    Associated Diagnoses: Hyperlipidemia, unspecified hyperlipidemia type      sucralfate (CARAFATE) 100 mg/mL suspension Take 10 mLs (1 g total) by mouth 2 (two) times daily.  Qty: 414 mL, Refills: 12      traMADol (ULTRAM) 50 mg tablet Take 2 tablets by mouth in the morning (100mg) and 1 tablet by mouth at night (50mg) as needed for pain.  Qty: 90 tablet, Refills: 2      triamterene-hydrochlorothiazide 37.5-25 mg (DYAZIDE) 37.5-25 mg per capsule Take 1 capsule by mouth every morning.  Qty: 90 capsule, Refills: 1      zolpidem (AMBIEN) 5 MG Tab TAKE 1 TABLET BY MOUTH AT BEDTIME AS NEEDED  Qty: 30 tablet, Refills: 1    Associated Diagnoses: Primary insomnia      acyclovir (ZOVIRAX) 400 MG tablet Take 400 mg by mouth 2 (two) times daily as needed.      levalbuterol (XOPENEX HFA) 45 mcg/actuation inhaler Inhale 1-2 puffs into the lungs every 4 (four) hours as needed for Wheezing or Shortness of Breath.  Qty: 3 Inhaler, Refills: 3      LORazepam (ATIVAN) 0.5 MG tablet Take 1 tablet (0.5 mg total) by mouth as needed for Anxiety (May take 1 tablet 1 hour before MRI).  Qty: 1 tablet, Refills: 0             Discharge Procedure Orders (must include Diet, Follow-up, Activity)    Follow Up:  Follow up with PCP as previously scheduled  Resume routine diet.  Activity as tolerated.    No driving day of procedure.

## 2018-11-12 NOTE — ANESTHESIA PREPROCEDURE EVALUATION
11/12/2018  Olga Aguiar is a 83 y.o., female.    Anesthesia Evaluation    I have reviewed the Patient Summary Reports.    I have reviewed the Nursing Notes.   I have reviewed the Medications.     Review of Systems  Anesthesia Hx:  No problems with previous Anesthesia    Social:  Former Smoker    Hematology/Oncology:  Hematology Normal       -- Cancer in past history:  Breast   Cardiovascular:   Hypertension Valvular problems/Murmurs, AS    Pulmonary:   COPD Asthma    Renal/:   Chronic Renal Disease    Hepatic/GI:   GERD Liver Disease,    Musculoskeletal:   Arthritis   Spine Disorders: lumbar Degenerative disease and Disc disease    Neurological:   CVA Neuromuscular Disease,   Chronic Pain Syndrome   Endocrine:   Diabetes, type 2 Hypothyroidism        Physical Exam  General:  Well nourished    Airway/Jaw/Neck:  Airway Findings: Mouth Opening: Normal Tongue: Normal  General Airway Assessment: Adult  Mallampati: I  TM Distance: Normal, at least 6 cm        Eyes/Ears/Nose:  EYES/EARS/NOSE FINDINGS: Normal   Dental:  DENTAL FINDINGS: Normal   Chest/Lungs:  Chest/Lungs Findings: Clear to auscultation, Normal Respiratory Rate     Heart/Vascular:  Heart Findings: Rate: Normal  Rhythm: Regular Rhythm  Heart Murmur  Systolic  Systolic Heart Murmur Description: L Upper Sternal Border  Systolic Heart Murmur Grade: Grade III        Mental Status:  Mental Status Findings:  Cooperative, Alert and Oriented         Anesthesia Plan  Type of Anesthesia, risks & benefits discussed:  Anesthesia Type:  general  Patient's Preference:   Intra-op Monitoring Plan: standard ASA monitors  Intra-op Monitoring Plan Comments:   Post Op Pain Control Plan:   Post Op Pain Control Plan Comments:   Induction:   IV  Beta Blocker:  Patient is not currently on a Beta-Blocker (No further documentation required).       Informed Consent:  Patient understands risks and agrees with Anesthesia plan.  Questions answered. Anesthesia consent signed with patient.  ASA Score: 3     Day of Surgery Review of History & Physical:    H&P update referred to the surgeon.         Ready For Surgery From Anesthesia Perspective.

## 2018-11-12 NOTE — TRANSFER OF CARE
"Anesthesia Transfer of Care Note    Patient: Olga Aguiar    Procedure(s) Performed: Procedure(s) (LRB):  EGD (ESOPHAGOGASTRODUODENOSCOPY) (N/A)    Patient location: PACU    Anesthesia Type: general    Transport from OR: Transported from OR on room air with adequate spontaneous ventilation    Post pain: adequate analgesia    Post assessment: no apparent anesthetic complications and tolerated procedure well    Post vital signs: stable    Level of consciousness: awake and alert    Nausea/Vomiting: no nausea/vomiting    Complications: none    Transfer of care protocol was followed      Last vitals:   Visit Vitals  BP (!) 173/78 (BP Location: Right arm, Patient Position: Lying)   Pulse 68   Temp 36.2 °C (97.2 °F) (Tympanic)   Resp 16   Ht 5' 3" (1.6 m)   Wt 70.3 kg (155 lb)   SpO2 97%   Breastfeeding? No   BMI 27.46 kg/m²     "

## 2018-11-12 NOTE — H&P
History & Physical - Short Stay  Gastroenterology      SUBJECTIVE:     Procedure: EGD    Chief Complaint/Indication for Procedure: Dysphagia    PTA Medications   Medication Sig    acetaminophen (TYLENOL) 500 MG tablet Take 500 mg by mouth every 6 (six) hours as needed.    allopurinol (ZYLOPRIM) 100 MG tablet TAKE ONE TABLET BY MOUTH ONCE DAILY    aspirin (ECOTRIN) 81 MG EC tablet Take 81 mg by mouth once daily.    busPIRone (BUSPAR) 5 MG Tab Take 1 tablet (5 mg total) by mouth 2 (two) times daily as needed (anxiety).    cholecalciferol, vitamin D3, 2,000 unit Cap Take 2,000 Units by mouth Daily.     fluticasone-vilanterol (BREO ELLIPTA) 200-25 mcg/dose DsDv diskus inhaler Inhale 1 puff into the lungs once daily.    gabapentin (NEURONTIN) 300 MG capsule Take 1 capsule (300 mg total) by mouth every evening.    levothyroxine (SYNTHROID) 75 MCG tablet Take 1 tablet (75 mcg total) by mouth once daily.    montelukast (SINGULAIR) 10 mg tablet Take 1 tablet (10 mg total) by mouth every evening.    predniSONE (DELTASONE) 10 MG tablet Take 2 tablets (20 mg total) by mouth once daily. Take 2 daily for 3 days and repeat for short breath. (Patient taking differently: Take 20 mg by mouth once daily. Doesn't take regularly.  Keeps on hand as needed.)    simvastatin (ZOCOR) 40 MG tablet TAKE 1 TABLET BY MOUTH IN THE EVENING    sucralfate (CARAFATE) 100 mg/mL suspension Take 10 mLs (1 g total) by mouth 2 (two) times daily.    traMADol (ULTRAM) 50 mg tablet Take 2 tablets by mouth in the morning (100mg) and 1 tablet by mouth at night (50mg) as needed for pain.    triamterene-hydrochlorothiazide 37.5-25 mg (DYAZIDE) 37.5-25 mg per capsule Take 1 capsule by mouth every morning.    zolpidem (AMBIEN) 5 MG Tab TAKE 1 TABLET BY MOUTH AT BEDTIME AS NEEDED    acyclovir (ZOVIRAX) 400 MG tablet Take 400 mg by mouth 2 (two) times daily as needed.    levalbuterol (XOPENEX HFA) 45 mcg/actuation inhaler Inhale 1-2 puffs into the  lungs every 4 (four) hours as needed for Wheezing or Shortness of Breath.    LORazepam (ATIVAN) 0.5 MG tablet Take 1 tablet (0.5 mg total) by mouth as needed for Anxiety (May take 1 tablet 1 hour before MRI).       Review of patient's allergies indicates:  No Known Allergies     Past Medical History:   Diagnosis Date    Adrenal adenoma: 2.8 X 2.1CM 2010 5/16/2014    Anticoagulant long-term use      mg    Aortic atherosclerosis: see CT scan 2016 10/28/2016    Aortic stenosis 8/19/2014    Asthma     Breast cancer     Cancer     breast, both sides, right arm restriction    Cataracts, both eyes     Chronic back pain     Chronic bronchitis     CKD (chronic kidney disease) stage 3, GFR 30-59 ml/min 8/19/2014    no dialysis    COPD (chronic obstructive pulmonary disease)     occasional inhaler use, no oxygen    DDD (degenerative disc disease), lumbar 7/30/2013    Diabetes mellitus     diet controlled    Diverticulosis     Ectatic abdominal aorta: see CT scan 10/16 10/28/2016    Gallbladder polyp 8/19/2014    GERD (gastroesophageal reflux disease)     Gout     Herpes simplex     Fever Blisters and Styes in right eye    High blood cholesterol     Hypertension     holding medication when B/P low    Hypertension associated with diabetes 2/7/2012    Insomnia     Lumbar spinal stenosis     Lymphedema of arm     Right    Multiple lung nodules 9/21/2015    Renal cyst, right: stable per CT 2016 10/28/2016    Stroke 2/2012    TIA, no residual effects    Stye 12/5/2013    Thyroid disease     hypothyroidism    Type 2 diabetes mellitus without complication, without long-term current use of insulin 7/20/2015    Unspecified hypothyroidism 7/20/2015     Past Surgical History:   Procedure Laterality Date    ABLATION, RADIOFREQUENCY L3,4,5 Bilateral 3/6/2018    [facility] OR    AXILLARY NODE DISSECTION      both sides    BLOCK, NERVE, FACET JOINT, LUMBAR, MEDIAL  BRANCH Left 4/23/2014    [facility] OR    BLOCK-NERVE-MEDIAL BRANCH-LUMBAR Right 6/9/2014    [facility] at [facility] OR    BREAST BIOPSY      BREAST LUMPECTOMY Bilateral     lymph nodes on right    CATARACT EXTRACTION      bilateral PHACO with IOL    COLONOSCOPY  01/12/2010    in legacy    Epidural steroid injection      Pain mangement    EPIDURAL STEROID INJECTION INTO LUMBAR SPINE N/A 6/4/2018    Procedure: Injection-steroid-epidural-lumbar;  Surgeon: Rohan Ware MD;  Location: [facility] OR;  Service: Pain Management;  Laterality: N/A;  L5/S1 interlaminar    EPIDURAL STEROID INJECTION INTO LUMBAR SPINE N/A 9/4/2018    Procedure: Injection-steroid-epidural-lumbar L5/S1;  Surgeon: Rohan Ware MD;  Location: [facility] OR;  Service: Pain Management;  Laterality: N/A;    ANUEL-TRANSFORAMINAL Bilateral 9/24/2014    [facility]formed [facility] at [facility] OR    ANUEL-TRANSFORAMINAL Bilateral 11/1/2013    Per[facility] by [facility] at [facility] OR    ANUEL-TRANSFORAMINAL Bilateral 9/27/2013    Per[facility] at [facility] OR    ANUEL-TRANSFORAMINAL Right 8/9/2013    [facility]formed [facility] at [facility] OR    ANUEL-TRANSFORAMINAL L4 Bilateral 8/23/2017    Per[facility] at [facility] OR    ANUEL-TRANSFORAMINAL L4 Bilateral 4/4/2017    Performed [facility] at [facility] OR    ANUEL-TRANSFORAMINAL L4 Bilateral 11/11/2016    [facility] at [facility] OR    ANUEL-TRANSFORAMINAL L4 Bilateral 1/7/2015    [facility] by [facility] at [facility] OR    ESOPHAGOGASTRODUODENOSCOPY (EGD) N/A 8/29/2014    [facility]O    HYSTERECTOMY      HYSTERECTOMY      INJECTION-STEROID-EPIDURAL-CERVICAL N/A 2/27/2015    [facility] at [facility] OR    Injection-steroid-epidural-lumbar N/A 6/4/2018    [facility] at [facility] OR     Injection-steroid-epidural-lumbar L5/S1 N/A 2018    [facility] OR    Nerve Block Injection      Pain management, Times 2    RADIOFREQUENCY ABLATION      Nerve, Pain management    RADIOFREQUENCY ABLATION, NERVE, SPINAL, LUMBAR, MEDIAL BRANCH, 1 LEVEL Left 2014    Performed by [facility]    RADIOFREQUENCY THERMOCOAGULATION (RFTC)-NERVE-MEDIAN BRANCH-LUMBAR Right 2014    Performed by [facility] at [facility] OR    RADIOFREQUENCY THERMOCOAGULATION (RFTC)-NERVE-MEDIAN BRANCH-LUMBAR L3,4,5 Bilateral 2016    [facility]formed [facility] at [facility] OR    RECTAL SURGERY      Fissure repair    TONSILLECTOMY      ULTRASOUND-ENDOSCOPIC-UPPER N/A 10/23/2014    [facility] by [facility]ster [facility] at [facility] ([address] [place] [address])    UPPER GASTROINTESTINAL ENDOSCOPY  2014    Dr. Castro     Family History   Problem Relation Age of Onset    Cancer Mother         Breast    COPD Mother     Hearing loss Mother     Hypertension Mother     Breast cancer Mother     Diabetes Father     Heart disease Father     Heart attack Father     Cancer Daughter         Breast    Breast cancer Daughter     Cancer Sister         Breast    Breast cancer Sister     Amblyopia Neg Hx     Blindness Neg Hx     Cataracts Neg Hx     Glaucoma Neg Hx     Macular degeneration Neg Hx     Retinal detachment Neg Hx     Strabismus Neg Hx     Stroke Neg Hx     Thyroid disease Neg Hx     Colon cancer Neg Hx     Stomach cancer Neg Hx     Esophageal cancer Neg Hx      Social History     Tobacco Use    Smoking status: Former Smoker     Packs/day: 1.00     Years: 18.00     Pack years: 18.00     Start date: 1952     Last attempt to quit: 1970     Years since quittin.3    Smokeless tobacco: Never Used   Substance Use Topics    Alcohol use: Yes     Comment: rare    Drug use: No         OBJECTIVE:     Vital Signs (Most Recent)  Temp: 97.2 °F (36.2 °C)  (11/12/18 0924)  Pulse: 68 (11/12/18 0924)  Resp: 16 (11/12/18 0924)  BP: (!) 173/78 (11/12/18 0924)  SpO2: 97 % (11/12/18 0924)    Physical Exam:                                                       GENERAL:  Comfortable, in no acute distress.                                 HEENT EXAM:  Nonicteric.  No adenopathy.  Oropharynx is clear.               NECK:  Supple.                                                               LUNGS:  Clear.                                                               CARDIAC:  Regular rate and rhythm.  S1, S2.  No murmur.                      ABDOMEN:  Soft, positive bowel sounds, nontender.  No hepatosplenomegaly or masses.  No rebound or guarding.                                             EXTREMITIES:  No edema.     MENTAL STATUS:  Normal, alert and oriented.      ASSESSMENT/PLAN:     Assessment: Dysphagia    Plan: EGD    Anesthesia Plan: General    ASA Grade: ASA 2 - Patient with mild systemic disease with no functional limitations    MALLAMPATI SCORE:  I (soft palate, uvula, fauces, and tonsillar pillars visible)

## 2018-11-12 NOTE — PLAN OF CARE
Vss, teena po fluids, denies pain, ambulates easily. IV removed, catheter intact. Discharge instructions provided and states understanding. States ready to go home.  Discharged from facility with family.

## 2018-11-12 NOTE — [employer] PATIENT INSTRUCTIONS
Discharge Summary/Instructions after an Endoscopic Procedure  Patient Name: Olga Lopez  Patient MRN: [id number]  Patient YOB: 1935  Monday, November 12, 2018  Braydon Becerra MD  RESTRICTIONS:  During your procedure today, you received medications for sedation.  These   medications may affect your judgment, balance and coordination.  Therefore,   for 24 hours, you have the following restrictions:   - DO NOT drive a car, operate machinery, make legal/financial decisions,   sign important papers or drink alcohol.    ACTIVITY:  Today: no heavy lifting, straining or running due to procedural   sedation/anesthesia.  The following day: return to full activity including work.  DIET:  Eat and drink normally unless instructed otherwise.     TREATMENT FOR COMMON SIDE EFFECTS:  - Mild abdominal pain, nausea, belching, bloating or excessive gas:  rest,   eat lightly and use a heating pad.  - Sore Throat: treat with throat lozenges and/or gargle with warm salt   water.  - Because air was used during the procedure, expelling large amounts of air   from your rectum or belching is normal.  - If a bowel prep was taken, you may not have a bowel movement for 1-3 days.    This is normal.  SYMPTOMS TO WATCH FOR AND REPORT TO YOUR PHYSICIAN:  1. Abdominal pain or bloating, other than gas cramps.  2. Chest pain.  3. Back pain.  4. Signs of infection such as: chills or fever occurring within 24 hours   after the procedure.  5. Rectal bleeding, which would show as bright red, maroon, or black stools.   (A tablespoon of blood from the rectum is not serious, especially if   hemorrhoids are present.)  6. Vomiting.  7. Weakness or dizziness.  GO DIRECTLY TO THE NEAREST EMERGENCY ROOM IF YOU HAVE ANY OF THE FOLLOWING:      Difficulty breathing              Chills and/or fever over 101 F   Persistent vomiting and/or vomiting blood   Severe abdominal pain   Severe chest pain   Black, tarry stools   Bleeding- more than one  tablespoon   Any other symptom or condition that you feel may need urgent attention  Your doctor recommends these additional instructions:  If any biopsies were taken, your doctors clinic will contact you in 1 to 2   weeks with any results.  We are waiting for your pathology results.   Continue your present medications.   You are being discharged to home.  For questions, problems or results please call your physician - Braydon Becerra MD at Work: [phone].  EMERGENCY PHONE NUMBER: [phone], LAB RESULTS: [phone]  IF A COMPLICATION OR EMERGENCY SITUATION ARISES AND YOU ARE UNABLE TO REACH   YOUR PHYSICIAN - GO DIRECTLY TO THE EMERGENCY ROOM.  ___________________________________________  Nurse Signature  ___________________________________________  Patient/Designated Responsible Party Signature  Braydon Becerra MD  11/12/2018 10:19:02 AM  This report has been verified and signed electronically.  [employer]

## 2018-11-21 DIAGNOSIS — E78.5 HYPERLIPIDEMIA, UNSPECIFIED HYPERLIPIDEMIA TYPE: ICD-10-CM

## 2018-11-22 RX ORDER — SIMVASTATIN 40 MG/1
40 TABLET, FILM COATED ORAL NIGHTLY
Qty: 90 TABLET | Refills: 3 | Status: SHIPPED | OUTPATIENT
Start: 2018-11-22 | End: 2019-11-20 | Stop reason: SDUPTHER

## 2018-11-26 ENCOUNTER — TELEPHONE (OUTPATIENT)
Dept: PAIN MEDICINE | Facility: CLINIC | Age: 83
End: 2018-11-26

## 2018-11-26 RX ORDER — GABAPENTIN 300 MG/1
CAPSULE ORAL
Qty: 90 CAPSULE | Refills: 3 | Status: SHIPPED | OUTPATIENT
Start: 2018-11-26 | End: 2019-11-04 | Stop reason: SDUPTHER

## 2018-11-26 RX ORDER — TRAMADOL HYDROCHLORIDE 50 MG/1
TABLET ORAL
Qty: 90 TABLET | Refills: 1 | Status: SHIPPED | OUTPATIENT
Start: 2018-11-26 | End: 2019-02-18 | Stop reason: SDUPTHER

## 2018-11-26 NOTE — TELEPHONE ENCOUNTER
----- Message from Mabel Coronel sent at 11/26/2018  1:55 PM CST -----  Contact: [facility]  [facility] is requesting to speak with a nurse in regards to pt's medication(traMADol (ULTRAM) 50 mg tablet). [facility] is requesting clarification on pt's diagnosis for the medication.  Please call pharmacy to advise  Call back   Thanks    [id number] - [place] - [address]  [address] [place] [address]  Phone: [phone] Fax: [phone]

## 2018-12-15 ENCOUNTER — PATIENT MESSAGE (OUTPATIENT)
Dept: PAIN MEDICINE | Facility: CLINIC | Age: 83
End: 2018-12-15

## 2018-12-15 ENCOUNTER — PATIENT MESSAGE (OUTPATIENT)
Dept: GASTROENTEROLOGY | Facility: CLINIC | Age: 83
End: 2018-12-15

## 2018-12-17 ENCOUNTER — PATIENT MESSAGE (OUTPATIENT)
Dept: PAIN MEDICINE | Facility: CLINIC | Age: 83
End: 2018-12-17

## 2018-12-17 ENCOUNTER — TELEPHONE (OUTPATIENT)
Dept: CARDIOLOGY | Facility: CLINIC | Age: 83
End: 2018-12-17

## 2018-12-17 DIAGNOSIS — K21.9 GASTROESOPHAGEAL REFLUX DISEASE, ESOPHAGITIS PRESENCE NOT SPECIFIED: Primary | ICD-10-CM

## 2018-12-17 DIAGNOSIS — M54.16 LUMBAR RADICULOPATHY: Primary | ICD-10-CM

## 2018-12-17 RX ORDER — ALPRAZOLAM 0.5 MG/1
1 TABLET, ORALLY DISINTEGRATING ORAL ONCE AS NEEDED
Status: CANCELLED | OUTPATIENT
Start: 2018-12-20 | End: 2018-12-20

## 2018-12-17 NOTE — TELEPHONE ENCOUNTER
Patient is scheduled for a steroid injection on 12/20 and has been off her aspirin. Need to verify that it is ok to stop. Patient stopped the medication on her own in hopes of scheduling a steroid injection. Please advise.

## 2018-12-17 NOTE — TELEPHONE ENCOUNTER
----- Message from Devika Cabrera sent at 12/17/2018  4:21 PM CST -----  Contact: Patient  Type:  Sooner Apoointment Request    Caller is requesting a sooner appointment.  Caller declined first available appointment listed below.  Caller will not accept being placed on the waitlist and is requesting a message be sent to doctor.    Name of Caller:  Patient's - Ray  When is the first available appointment?  na  Symptoms:  Procedure clearance  Best Call Back Number:  [phone] (home)    Additional Information:  Patient states that she was told she would be able to be worked in one day this week  Due to her procedure being this week via my chart. Please call to advise. Thank you!

## 2018-12-17 NOTE — TELEPHONE ENCOUNTER
Called and spoke with Mr. Aguiar, He stated that the pain mngmt clinic told them we would work Ms. Lopez in. I advised him that we had a cancellation for tomorrow at 4p. He stated that they would take it. No further issues noted.

## 2018-12-18 ENCOUNTER — OFFICE VISIT (OUTPATIENT)
Dept: CARDIOLOGY | Facility: CLINIC | Age: 83
End: 2018-12-18
Payer: MEDICARE

## 2018-12-18 VITALS
DIASTOLIC BLOOD PRESSURE: 76 MMHG | OXYGEN SATURATION: 94 % | BODY MASS INDEX: 27.81 KG/M2 | SYSTOLIC BLOOD PRESSURE: 172 MMHG | HEART RATE: 77 BPM | WEIGHT: 156.94 LBS | HEIGHT: 63 IN

## 2018-12-18 DIAGNOSIS — E78.2 MIXED HYPERLIPIDEMIA: ICD-10-CM

## 2018-12-18 DIAGNOSIS — G89.4 CHRONIC PAIN SYNDROME: ICD-10-CM

## 2018-12-18 DIAGNOSIS — Z01.810 PREOP CARDIOVASCULAR EXAM: Primary | ICD-10-CM

## 2018-12-18 DIAGNOSIS — I10 HYPERTENSION, UNSPECIFIED TYPE: ICD-10-CM

## 2018-12-18 DIAGNOSIS — N18.30 CKD (CHRONIC KIDNEY DISEASE) STAGE 3, GFR 30-59 ML/MIN: ICD-10-CM

## 2018-12-18 DIAGNOSIS — I35.0 AORTIC VALVE STENOSIS, MODERATE: ICD-10-CM

## 2018-12-18 DIAGNOSIS — E65 ABDOMINAL OBESITY: ICD-10-CM

## 2018-12-18 PROCEDURE — 3077F SYST BP >= 140 MM HG: CPT | Mod: CPTII,S$GLB,, | Performed by: INTERNAL MEDICINE

## 2018-12-18 PROCEDURE — 3078F DIAST BP <80 MM HG: CPT | Mod: CPTII,S$GLB,, | Performed by: INTERNAL MEDICINE

## 2018-12-18 PROCEDURE — 99214 OFFICE O/P EST MOD 30 MIN: CPT | Mod: S$GLB,,, | Performed by: INTERNAL MEDICINE

## 2018-12-18 PROCEDURE — 1101F PT FALLS ASSESS-DOCD LE1/YR: CPT | Mod: CPTII,S$GLB,, | Performed by: INTERNAL MEDICINE

## 2018-12-18 PROCEDURE — 93000 ELECTROCARDIOGRAM COMPLETE: CPT | Mod: S$GLB,,, | Performed by: INTERNAL MEDICINE

## 2018-12-18 PROCEDURE — 99999 PR PBB SHADOW E&M-EST. PATIENT-LVL IV: CPT | Mod: PBBFAC,,, | Performed by: INTERNAL MEDICINE

## 2018-12-18 RX ORDER — SUCRALFATE 1 G/1
1 TABLET ORAL 2 TIMES DAILY
COMMUNITY
Start: 2018-11-12 | End: 2019-09-25 | Stop reason: SDUPTHER

## 2018-12-18 NOTE — PROGRESS NOTES
Subjective:    Patient ID:  Olga Aguiar is a 83 y.o. female who presents for evaluation of Consult (surgical clearance)  For recurrent cold sweat related to anxiety, aortic stenosis, HTN, pre-op for epidural injection  PCP: Dr. Barger  Podiatrist: Dr. Rivero  Pain and referred by: Dr. Ware, was to hold ASA for 7 days prior to epidural injection.  GI: Dr. Castro  Pulmonary: Dr. Heller  Eye: Dr. Snowden  Lives with , P[place], here with patient, non-smoker  Housewife    White female with recurrent bouts of sudden onset cold sweat, with nausea, no vomiting, and drop in BP to as low as 80 SBP with near-syncope. Denies any feeling of pain prior to onset.  noted that several times occurred about 40 minutes after meal, always with some nausea or feeling of weakness. Started about 6 months ago, at one time it was several times daily, up to 4 times. Can last up to 5 minutes. No fall but have to sit down. No change in medications, or diet or exercise. Had acute gouty attack also over the past 6 months. Now the third bout. No able to tolerate colchicine with severe upset stomach. Eventually had to be placed on steroid pack and have completed one round of Rx with improvement in the stomach, back pains, and these cold spells. ECG on 7/24/2014 was normal, rate of 71. No prior history of cardiac problem except for TIA in 2012. No recurrence. Have several cardiac risk factors - see Problems List.    Since visit of 8/18/2014, continue with night and day sweats, weakness with standing, easy fatigue, and FISCHER especially with stair climbing. Seems progressive over the past 2 years. Try to do housework, avoid vacuuming and feels very limited due to chronic back pains. Sitting a lot of time. Have not been on any regular exercise routine.  Holter, 8/2014: PREDOMINANT RHYTHM  1. Sinus rhythm with heart rates varying between 44 and 145 bpm with an average of 85 bpm.     2. Some sinus arrhythmia noted.    VENTRICULAR  ARRHYTHMIAS  1. There were very rare mostly monomorphic PVCs recorded totalling 17 and averaging less than 1 per hour.     2. There were no episodes of ventricular tachycardia.    SUPRA VENTRICULAR ARRHYTHMIAS  1. There were very rare PACs totalling 57 and averaging 2 per hour.     2. There was a single (11 beat) run of EAT. The high rate was 140 bpm.     SINUS NODE FUNCTION  1. There was no evidence of high grade SA kylie block.     AV CONDUCTION  1. There was no evidence of high grade AV block.     DIARY  1. The diary was returned, but not completed    MISCELLANEOUS  1. This was a tape of adequate length (24 hrs).     ECHO CONCLUSIONS     1 - Concentric hypertrophy. Septal wall thickness is upper limit of normal in size, and posterior wall thickness is increased, with the septum measuring 1.0 cm and the posterior wall measuring 1.2 cm across.    2 - Normal left ventricular systolic function (EF 60-65%).     3 - Moderate aortic stenosis, MARI = 1.13 cm2.     4 - Normal left ventricular diastolic function.     5 - Normal right ventricular systolic function .     6 - The aortic valve now appears stenotic, change from Echo on 2/8/2012.    Since visit of 12/8/2014, no new problem, less of dizziness. More active to gym with , twice weekly for 1:15. No problem, denies any CP, near-syncope, but does have FISCHER, due to chronic bronchitis. No recent change. ECG shows NSR, rate 71, low voltage. Recent labs reviewed from 9/2014 - CMP showed , eGFR 38.8, A1C 7.1%, Lipid with LDL 87, non-, normal CBC. Reviewed TIA in 1/2012 with transient dysphasia, left sided weakness, leg first, then fingers, numbness of the left side of tongue and left facial droop. All resolved over an hour. Carotid US at the time - No significant carotid artery stenosis bilaterally    In 2/2017, still with FISCHER, fairly limiting, reviewed with Dr. Heller, being treated with intermittent steroid for 3-12 days per month for the past 2 months  with benefit. No CP. Compliant with medications. ECG in 4/2016 suggest prior anterior MI.     In 5/2018, annual visit also anticipating epidural steroid injection. Otherwise no heart worries, no cardiac symptoms of CP nor SOB. Limited due to CLBP, able to do own housework and plays cards. Discuss symptoms of severe AS. LDL in 4/2018 90.6, baseline 166.  DSE 3/2017 - EKG Conclusions:    1. The EKG portion of this study is negative for ischemia at a peak heart rate of 129 bpm (96% of predicted).   2. Blood pressure remained stable throughout the protocol  (Presenting BP: 112/88 Peak BP: 185/83).   3. The following arrhythmias were present: PVCs.   4. The patient reported significant dyspnea during the protocol which resolved in recovery.     Echo - CONCLUSIONS     1 - Concentric remodeling.     2 - Normal left ventricular systolic function (EF 60-65%).     3 - Normal left ventricular diastolic function.     4 - Normal right ventricular systolic function .     5 - The estimated PA systolic pressure is greater than 12 mmHg.     6 - Small pericardial effusion.     No evidence of stress induced myocardial ischemia.     Venous US 10/2017 per Dr. Craven - RIGHT:  No evidence of Right lower extremity DVT.      LEFT:  No evidence of Left lower extremity DVT.      US of abdominal aorta - There is no evidence of an Abdominal Aortic Aneurysm.    HPI comments: in 12/2018, here for pre epidural review, started to hold ASA on 12/13, procedure set for 12/20. Denies any heart worries nor symptoms. Limited in activities due to chronic back pain. Epidural effective up to 3 months. ECG in NSR, rate 85, low voltage and QTc 480 msec. Compliant with medications.     Review of Systems   Constitution: Positive for malaise/fatigue and no further diaphoresis. Negative for fever, weakness, night sweats and have weight stability.  HENT: Negative for headaches, nosebleeds and tinnitus.    Eyes: Negative for visual disturbance.   Cardiovascular:  "Positive for dyspnea on exertion and no near-syncope. Negative for chest pain, claudication, cyanosis, irregular heartbeat, leg swelling, orthopnea, palpitations and paroxysmal nocturnal dyspnia.   Respiratory: Positive for shortness of breath and some wheezing. Negative for cough, sleep disturbances due to breathing, snoring. [place] score 3   Endocrine: Positive for cold intolerance and heat intolerance. Negative for polydipsia and polyuria.   Hematologic/Lymphatic: Does not bruise/bleed easily.   Skin: Positive for dry skin. Negative for nail changes, color change, flushing, poor wound healing and suspicious lesions.   Musculoskeletal: Positive for arthritis, back pain, gout, joint pain, joint swelling and stiffness. Negative for falls, muscle cramps, muscle weakness and myalgias.   Gastrointestinal: Positive for bloating, diarrhea, heartburn, nausea and vomiting during meals. Negative for hematemesis, hematochezia and melena.   Neurological: Positive for loss of balance and memory loss. Negative for disturbances in coordination, excessive daytime sleepiness, dizziness, focal weakness, light-headedness, numbness and vertigo.   Psychiatric/Behavioral: Negative for depression and substance abuse. The patient has insomnia. The patient is not nervous/anxious.         Objective:    Physical Exam   Constitutional: She is oriented to person, place, and time. She appears well-developed and well-nourished.   HENT:   Head: Normocephalic.   Eyes: Conjunctivae and EOM are normal. Pupils are equal, round, and reactive to light.   Neck: Normal range of motion. Neck supple. No JVD present. No thyromegaly present. Circumference 16.75", deep carotid pulses  Cardiovascular: Normal rate, regular rhythm and intact distal pulses.  Exam reveals distant heart sounds. Exam reveals no gallop and no friction rub.    Soft 2/6 murmur with preserved S2 heard.  Pulses:       Posterior tibial pulses are 1+ on the right side, and 1+ on the left " "side.   Lymph edema of the right arm   Pulmonary/Chest: Effort normal and breath sounds normal. She has no rales. She exhibits no tenderness.   Abdominal: Soft. Bowel sounds are normal. There is no tenderness.   Waist 39.5" down to 39", hip 42.5"   Musculoskeletal: Normal range of motion. She exhibits no edema.   Lymphadenopathy:     She has no cervical adenopathy.   Neurological: She is alert and oriented to person, place, and time.   Skin: Skin is warm and dry. No rash noted.         Assessment:       1. Preop cardiovascular exam    2. Hypertension, unspecified type    3. Mixed hyperlipidemia, baseline     4. Chronic pain syndrome    5. Abdominal obesity    6. CKD (chronic kidney disease) stage 3, GFR 30-59 ml/min    7. Aortic valve stenosis, mild- moderate, MARI 1.44 1/16         Plan:       Olga was seen today for consult.    Diagnoses and associated orders for this visit:    Preop cardiovascular exam    Hypertension, unspecified type  -     EKG 12-lead    Mixed hyperlipidemia, baseline     Chronic pain syndrome    Abdominal obesity    CKD (chronic kidney disease) stage 3, GFR 30-59 ml/min    Aortic valve stenosis, mild- moderate, MARI 1.44 1/16    - All medical issues reviewed, continue current Rx.  - Clear for Surgery and anesthesia with acceptable risk from the cardiac standpoint.   - Doing well from CV standpoint, continue current RX.  - Need good exercise program, 4 key elements: 1. Aerobic (walking, swimming, dancing, jogging, biking, etc, 2. Muscle strengthening / resistance exercise, need to do 2-3 times weekly, 3. Stretching daily, good stretch takes a whole  total minute. 4. Balance exercise daily.  - Highly recommend 30 minutes of exercise daily, can have Sunday off, with 2-3 sessions of muscle strengthening weekly. A  would be very helpful.  - Check home blood pressure, 2 days weekly, do 2 readings within 5 minutes in AM and PM, keep log for review.  - Instruction for " Mediterranean diet and heart healthy exercise given.  - Recommend at least biannual cardiovascular evaluation in view of her significant risk factors. Patient's preference.    Chronic pain syndrome - improved    Fatigue - improved    Near syncope - suspect vasovagal, improved after steroid Rx  - Maintain good hydration, especially while on diuretics   - Demonstrated the leno montague       Patient Active Problem List   Diagnosis    Mixed hyperlipidemia, baseline     Chronic pain syndrome    Lumbosacral spondylosis without myelopathy    Adrenal adenoma: 2.8 X 2.1CM 2010; stable 2016, also stable 2018    Abdominal obesity    Lymph edema, right arm    Gallbladder polyp    CKD (chronic kidney disease) stage 3, GFR 30-59 ml/min    Aortic valve stenosis, mild- moderate, MARI 1.44 1/16    LVH (left ventricular hypertrophy)    Pancreatic cyst: stable on MRI 11/16 per GI repeated 2018, do again 2020    Cervical spinal stenosis    Controlled type 2 diabetes mellitus with complication, without long-term current use of insulin    Acquired hypothyroidism    Transient cerebral ischemia: 2012    Primary insomnia    Gastroesophageal reflux disease without esophagitis    Multiple lung nodules: stable CT 10/2016    Former smoker: 1 pack daily for < 20 years, quit 1973    COPD (chronic obstructive pulmonary disease) with chronic bronchitis, 2013    Facet hypertrophy of lumbar region    History of breast cancer    Intermittent asthma    Radiation fibrosis of lung    Bronchiectasis without complication    Ectatic abdominal aorta: see CT scan 10/16; no aneurysm 10/17    Aortic atherosclerosis: see CT scan 2016    Renal cysts, acquired, bilateral    Lumbar radiculopathy    Abnormal ECG    Encounter for monitoring proton pump inhibitor therapy    Diverticulosis of large intestine without hemorrhage: see CT 10/14    Localized edema    Idiopathic gout    Lumbar spondylosis    DDD (degenerative  disc disease), lumbar    Liver cysts: see MRI 2018    Dysphagia     Total face-to-face time with the patient was 30 minutes and greater than 50% was spent in counseling and coordination of care. The above assessment and plan have been discussed at length. Labs and procedure over the last 6 months reviewed. Problem List updated. Asked to bring in all active medications / pills bottles with next visit.

## 2018-12-19 DIAGNOSIS — M51.36 DDD (DEGENERATIVE DISC DISEASE), LUMBAR: Primary | ICD-10-CM

## 2018-12-20 ENCOUNTER — HOSPITAL ENCOUNTER (OUTPATIENT)
Facility: HOSPITAL | Age: 83
Discharge: HOME | End: 2018-12-20
Attending: ANESTHESIOLOGY | Admitting: ANESTHESIOLOGY
Payer: MEDICARE

## 2018-12-20 ENCOUNTER — HOSPITAL ENCOUNTER (OUTPATIENT)
Dept: RADIOLOGY | Facility: HOSPITAL | Age: 83
Discharge: HOME | End: 2018-12-20
Attending: ANESTHESIOLOGY | Admitting: ANESTHESIOLOGY
Payer: MEDICARE

## 2018-12-20 DIAGNOSIS — M54.16 LUMBAR RADICULOPATHY: Primary | ICD-10-CM

## 2018-12-20 DIAGNOSIS — M51.36 DDD (DEGENERATIVE DISC DISEASE), LUMBAR: ICD-10-CM

## 2018-12-20 PROCEDURE — 62323 NJX INTERLAMINAR LMBR/SAC: CPT | Mod: ,,, | Performed by: ANESTHESIOLOGY

## 2018-12-20 PROCEDURE — 76000 FLUOROSCOPY <1 HR PHYS/QHP: CPT | Mod: TC,PO

## 2018-12-20 PROCEDURE — 25500020 PHARM REV CODE 255: Mod: PO | Performed by: ANESTHESIOLOGY

## 2018-12-20 PROCEDURE — 62323 NJX INTERLAMINAR LMBR/SAC: CPT | Mod: PO | Performed by: ANESTHESIOLOGY

## 2018-12-20 PROCEDURE — A4216 STERILE WATER/SALINE, 10 ML: HCPCS | Mod: PO | Performed by: ANESTHESIOLOGY

## 2018-12-20 PROCEDURE — 63600175 PHARM REV CODE 636 W HCPCS: Mod: PO | Performed by: ANESTHESIOLOGY

## 2018-12-20 PROCEDURE — 25000003 PHARM REV CODE 250: Mod: PO | Performed by: ANESTHESIOLOGY

## 2018-12-20 RX ORDER — LIDOCAINE HYDROCHLORIDE 10 MG/ML
INJECTION, SOLUTION EPIDURAL; INFILTRATION; INTRACAUDAL; PERINEURAL
Status: DISCONTINUED | OUTPATIENT
Start: 2018-12-20 | End: 2018-12-20 | Stop reason: HOSPADM

## 2018-12-20 RX ORDER — METHYLPREDNISOLONE ACETATE 80 MG/ML
INJECTION, SUSPENSION INTRA-ARTICULAR; INTRALESIONAL; INTRAMUSCULAR; SOFT TISSUE
Status: DISCONTINUED | OUTPATIENT
Start: 2018-12-20 | End: 2018-12-20 | Stop reason: HOSPADM

## 2018-12-20 RX ORDER — SODIUM CHLORIDE 9 MG/ML
INJECTION, SOLUTION INTRAMUSCULAR; INTRAVENOUS; SUBCUTANEOUS
Status: DISCONTINUED | OUTPATIENT
Start: 2018-12-20 | End: 2018-12-20 | Stop reason: HOSPADM

## 2018-12-20 RX ORDER — ALPRAZOLAM 0.5 MG/1
1 TABLET, ORALLY DISINTEGRATING ORAL ONCE AS NEEDED
Status: COMPLETED | OUTPATIENT
Start: 2018-12-20 | End: 2018-12-20

## 2018-12-20 RX ADMIN — ALPRAZOLAM 0.5 MG: 0.5 TABLET, ORALLY DISINTEGRATING ORAL at 02:12

## 2018-12-20 NOTE — OP NOTE

## 2018-12-20 NOTE — H&P
CC: Back pain    HPI: The patient is a 82yo woman with a history of lumbar radiculopathy here for L5/S1 ANUEL. There are no major changes in history and physical from 10/2/18.    Past Medical History:   Diagnosis Date    Adrenal adenoma: 2.8 X 2.1CM 2010 5/16/2014    Anticoagulant long-term use      mg    Aortic atherosclerosis: see CT scan 2016 10/28/2016    Aortic stenosis 8/19/2014    Asthma     Breast cancer     Cancer     breast, both sides, right arm restriction    Cataracts, both eyes     Chronic back pain     Chronic bronchitis     CKD (chronic kidney disease) stage 3, GFR 30-59 ml/min 8/19/2014    no dialysis    COPD (chronic obstructive pulmonary disease)     occasional inhaler use, no oxygen    DDD (degenerative disc disease), lumbar 7/30/2013    Diabetes mellitus     diet controlled    Diverticulosis     Ectatic abdominal aorta: see CT scan 10/16 10/28/2016    Gallbladder polyp 8/19/2014    GERD (gastroesophageal reflux disease)     Gout     Herpes simplex     Fever Blisters and Styes in right eye    High blood cholesterol     Hypertension     holding medication when B/P low    Hypertension associated with diabetes 2/7/2012    Insomnia     Lumbar spinal stenosis     Lymphedema of arm     Right    Multiple lung nodules 9/21/2015    Renal cyst, right: stable per CT 2016 10/28/2016    Stroke 2/2012    TIA, no residual effects    Stye 12/5/2013    Thyroid disease     hypothyroidism    Type 2 diabetes mellitus without complication, without long-term current use of insulin 7/20/2015    Unspecified hypothyroidism 7/20/2015       Past Surgical History:   Procedure Laterality Date    ABLATION, RADIOFREQUENCY L3,4,5 Bilateral 3/6/2018    [facility]    AXILLARY NODE DISSECTION      both sides    BLOCK, NERVE, FACET JOINT, LUMBAR, MEDIAL BRANCH Left 4/23/2014    [facility] at [facility]    BLOCK-NERVE-MEDIAL  BRANCH-LUMBAR Right 6/9/2014    [facility] at [facility]    BREAST BIOPSY      BREAST LUMPECTOMY Bilateral     lymph nodes on right    CATARACT EXTRACTION      bilateral PHACO with IOL    COLONOSCOPY  01/12/2010    in legacy    EGD (ESOPHAGOGASTRODUODENOSCOPY) N/A 11/12/2018    Performed by Braydon Becerra MD at [facility] ENDO    Epidural steroid injection      Pain mangement    EPIDURAL STEROID INJECTION INTO LUMBAR SPINE N/A 6/4/2018    Procedure: Injection-steroid-epidural-lumbar;  Surgeon: Rohan Ware MD;  Location: [facility] OR;  Service: Pain Management;  Laterality: N/A;  L5/S1 interlaminar    EPIDURAL STEROID INJECTION INTO LUMBAR SPINE N/A 9/4/2018    Procedure: Injection-steroid-epidural-lumbar L5/S1;  Surgeon: Rohan Ware MD;  Location: [facility] OR;  Service: Pain Management;  Laterality: N/A;    ANUEL-TRANSFORAMINAL Bilateral 9/24/2014    Performed by [facility] at [facility] OR    ANUEL-TRANSFORAMINAL Bilateral 11/1/2013    Performed by [facility] MD at [facility] OR    ANUEL-TRANSFORAMINAL Bilateral 9/27/2013    Performed by [facility] at [facility]    ANUEL-TRANSFORAMINAL Right 8/9/2013    Performed [facility] at [facility] OR    ANUEL-TRANSFORAMINAL L4 Bilateral 8/23/2017    Performed by [facility] at [facility] OR    ANUEL-TRANSFORAMINAL L4 Bilateral 4/4/2017    Performed by [facility] at [facility] OR    ANUEL-TRANSFORAMINAL L4 Bilateral 11/11/2016    Performed by [facility] at [facility] OR    ANUEL-TRANSFORAMINAL L4 Bilateral 1/7/2015    Performed by [facility] at [facility] OR    ESOPHAGOGASTRODUODENOSCOPY N/A 11/12/2018    Procedure: EGD (ESOPHAGOGASTRODUODENOSCOPY);  Surgeon: Braydon Becerra MD;  Location: [facility] ENDO;  Service: Endoscopy;  Laterality: N/A;    ESOPHAGOGASTRODUODENOSCOPY (EGD) N/A 8/29/2014    [facility]    HYSTERECTOMY      HYSTERECTOMY       INJECTION-STEROID-EPIDURAL-CERVICAL N/A 2/27/2015    [facility]    Injection-steroid-epidural-lumbar N/A 6/4/2018    [facility] at [facility] OR    Injection-steroid-epidural-lumbar L5/S1 N/A 9/4/2018    Performed [facility] at [facility] OR    Nerve Block Injection      Pain management, Times 2    RADIOFREQUENCY ABLATION      Nerve, Pain management    RADIOFREQUENCY ABLATION, NERVE, SPINAL, LUMBAR, MEDIAL BRANCH, 1 LEVEL Left 5/5/2014    [facility] at [facility] OR    RADIOFREQUENCY THERMOCOAGULATION (RFTC)-NERVE-MEDIAN BRANCH-LUMBAR Right 6/16/2014    Performed by [facility]an [facility] at [facility]    RADIOFREQUENCY THERMOCOAGULATION ([facility])-NERVE-MEDIAN BRANCH-LUMBAR L3,4,5 Bilateral 7/29/2016    Per[facility] at [facility] OR    RECTAL SURGERY      Fissure repair    TONSILLECTOMY      ULTRASOUND-ENDOSCOPIC-UPPER N/A 10/23/2014    Performed by Florian Winchester [facility] at [facility] ENDO (2ND FLR)    UPPER GASTROINTESTINAL ENDOSCOPY  08/29/2014    Dr. Castro       Family History   Problem Relation Age of Onset    Cancer Mother         Breast    COPD Mother     Hearing loss Mother     Hypertension Mother     Breast cancer Mother     Diabetes Father     Heart disease Father     Heart attack Father     Cancer Daughter         Breast    Breast cancer Daughter     Cancer Sister         Breast    Breast cancer Sister     Amblyopia Neg Hx     Blindness Neg Hx     Cataracts Neg Hx     Glaucoma Neg Hx     Macular degeneration Neg Hx     Retinal detachment Neg Hx     Strabismus Neg Hx     Stroke Neg Hx     Thyroid disease Neg Hx     Colon cancer Neg Hx     Stomach cancer Neg Hx     Esophageal cancer Neg Hx        Social History     Socioeconomic History    Marital status:      Spouse name: None    Number of children: None    Years of education: None    Highest education level: None   Social  "Needs    Financial resource strain: None    Food insecurity - worry: None    Food insecurity - inability: None    Transportation needs - medical: None    Transportation needs - non-medical: None   Occupational History    None   Tobacco Use    Smoking status: Former Smoker     Packs/day: 1.00     Years: 18.00     Pack years: 18.00     Start date: 1952     Last attempt to quit: 1970     Years since quittin.4    Smokeless tobacco: Never Used   Substance and Sexual Activity    Alcohol use: Yes     Comment: rare    Drug use: No    Sexual activity: Not Currently   Other Topics Concern    None   Social History Narrative    None       No current facility-administered medications for this encounter.        Review of patient's allergies indicates:  No Known Allergies    Vitals:    18 1322 18 1441 18 1442   BP:  (!) 171/83 (!) 171/83   Pulse:  87    Resp:  16    Temp:  97.7 °F (36.5 °C)    TempSrc:  Skin    SpO2:  96%    Weight: 68 kg (150 lb) 68 kg (150 lb)    Height: 5' 3" (1.6 m) 5' 3" (1.6 m)        REVIEW OF SYSTEMS:     GENERAL: No weight loss, malaise or fevers.  HEENT:  No recent changes in vision or hearing  NECK: Negative for lumps, no difficulty with swallowing.  RESPIRATORY: Negative for cough, wheezing or shortness of breath, patient denies any recent URI.  CARDIOVASCULAR: Negative for chest pain, leg swelling or palpitations.  GI: Negative for abdominal discomfort, blood in stools or black stools or change in bowel habits.  MUSCULOSKELETAL: See HPI.  SKIN: Negative for lesions, rash, and itching.  PSYCH: No suicidal or homicidal ideations, no current mood disturbances.  HEMATOLOGY/LYMPHOLOGY: Negative for prolonged bleeding, bruising easily or swollen nodes. Patient is not currently taking any anti-coagulants  ENDO: No history of diabetes or thyroid dysfunction  NEURO: No history of syncope, paralysis, seizures or tremors.All other reviewed and negative other than " HPI.    Physical exam:  Gen: A and O x3, pleasant, well-groomed  Skin: No rashes or obvious lesions  HEENT: PERRLA, no obvious deformities on ears or in canals. No thyroid masses, trachea midline, no palpable lymph nodes in neck, axilla.  CVS: Regular rate and rhythm, normal S1 and S2, no murmurs.  Resp: Clear to auscultation bilaterally.  Abdomen: Soft, NT/ND, normal bowel sounds present.  Musculoskeletal/Neuro: Moving all extremities    Assessment:  Lumbar radiculopathy  -     Case Request Operating Room: Injection-steroid-epidural-lumbar  -     Place in Outpatient; Standing  -     Diet NPO; Standing  -     alprazolam ODT dissolvable tablet 1 mg  -     Notify physician ; Standing  -     Notify physician ; Standing  -     Notify physician (specify); Standing  -     Verify informed consent; Standing  -     Vital signs; Standing

## 2018-12-20 NOTE — DISCHARGE SUMMARY
[facility]  Discharge Note  Short Stay    Admit Date: 12/20/2018    Discharge Date: 12/20/2018    Attending Physician: Rohan Ware MD     Discharge Provider: Rohan Ware    Diagnoses:  Active Hospital Problems    Diagnosis  POA    *Lumbar radiculopathy [M54.16]  Yes      Resolved Hospital Problems   No resolved problems to display.       Discharged Condition: good    Final Diagnoses: Lumbar radiculopathy [M54.16]    Disposition: Home or Self Care    Hospital Course: no complications, uneventful    Outcome of Hospitalization, Treatment, Procedure, or Surgery:  Patient was admitted for outpatient procedure. The patient underwent procedure without complications and are discharged home    Follow up/Patient Instructions:  Follow up as scheduled in Pain Management clinic in 3-4 weeks/Patient has received instructions and follow up date and time    Medications:  Continue previous medications    Discharge Procedure Orders   Call MD for:  temperature >100.4     Call MD for:  severe uncontrolled pain     Call MD for:  redness, tenderness, or signs of infection (pain, swelling, redness, odor or green/yellow discharge around incision site)     Call MD for:  severe persistent headache     No dressing needed         Discharge Procedure Orders (must include Diet, Follow-up, Activity):   Discharge Procedure Orders (must include Diet, Follow-up, Activity)   Call MD for:  temperature >100.4     Call MD for:  severe uncontrolled pain     Call MD for:  redness, tenderness, or signs of infection (pain, swelling, redness, odor or green/yellow discharge around incision site)     Call MD for:  severe persistent headache     No dressing needed

## 2018-12-21 VITALS
RESPIRATION RATE: 15 BRPM | HEART RATE: 70 BPM | DIASTOLIC BLOOD PRESSURE: 72 MMHG | BODY MASS INDEX: 26.58 KG/M2 | HEIGHT: 63 IN | TEMPERATURE: 98 F | SYSTOLIC BLOOD PRESSURE: 155 MMHG | WEIGHT: 150 LBS | OXYGEN SATURATION: 97 %

## 2018-12-24 DIAGNOSIS — F51.01 PRIMARY INSOMNIA: ICD-10-CM

## 2018-12-24 RX ORDER — ZOLPIDEM TARTRATE 5 MG/1
TABLET ORAL
Qty: 30 TABLET | Refills: 1 | Status: SHIPPED | OUTPATIENT
Start: 2018-12-24 | End: 2019-02-18 | Stop reason: SDUPTHER

## 2018-12-24 RX ORDER — LEVOTHYROXINE SODIUM 75 UG/1
TABLET ORAL
Qty: 90 TABLET | Refills: 0 | Status: SHIPPED | OUTPATIENT
Start: 2018-12-24 | End: 2019-03-16 | Stop reason: SDUPTHER

## 2019-01-07 RX ORDER — ALLOPURINOL 100 MG/1
TABLET ORAL
Qty: 30 TABLET | Refills: 6 | Status: SHIPPED | OUTPATIENT
Start: 2019-01-07 | End: 2019-06-07 | Stop reason: SDUPTHER

## 2019-01-22 ENCOUNTER — OFFICE VISIT (OUTPATIENT)
Dept: PAIN MEDICINE | Facility: CLINIC | Age: 84
End: 2019-01-22
Payer: MEDICARE

## 2019-01-22 ENCOUNTER — HOSPITAL ENCOUNTER (OUTPATIENT)
Dept: RADIOLOGY | Facility: HOSPITAL | Age: 84
Discharge: HOME | End: 2019-01-22
Attending: INTERNAL MEDICINE
Payer: MEDICARE

## 2019-01-22 VITALS
SYSTOLIC BLOOD PRESSURE: 155 MMHG | RESPIRATION RATE: 18 BRPM | BODY MASS INDEX: 27.75 KG/M2 | HEART RATE: 94 BPM | TEMPERATURE: 97 F | WEIGHT: 156.63 LBS | DIASTOLIC BLOOD PRESSURE: 80 MMHG | OXYGEN SATURATION: 98 %

## 2019-01-22 DIAGNOSIS — N17.9 ACUTE RENAL FAILURE, UNSPECIFIED ACUTE RENAL FAILURE TYPE: ICD-10-CM

## 2019-01-22 DIAGNOSIS — M48.062 SPINAL STENOSIS OF LUMBAR REGION WITH NEUROGENIC CLAUDICATION: ICD-10-CM

## 2019-01-22 DIAGNOSIS — N18.2 CHRONIC KIDNEY DISEASE, STAGE II (MILD): ICD-10-CM

## 2019-01-22 DIAGNOSIS — R26.81 GAIT INSTABILITY: ICD-10-CM

## 2019-01-22 DIAGNOSIS — M51.36 DDD (DEGENERATIVE DISC DISEASE), LUMBAR: Primary | ICD-10-CM

## 2019-01-22 DIAGNOSIS — M54.16 LUMBAR RADICULOPATHY: ICD-10-CM

## 2019-01-22 PROCEDURE — 3288F FALL RISK ASSESSMENT DOCD: CPT | Mod: CPTII,S$GLB,, | Performed by: PHYSICIAN ASSISTANT

## 2019-01-22 PROCEDURE — 76770 US RETROPERITONEAL COMPLETE: ICD-10-PCS | Mod: 26,,, | Performed by: RADIOLOGY

## 2019-01-22 PROCEDURE — 3079F DIAST BP 80-89 MM HG: CPT | Mod: CPTII,S$GLB,, | Performed by: PHYSICIAN ASSISTANT

## 2019-01-22 PROCEDURE — 76770 US EXAM ABDO BACK WALL COMP: CPT | Mod: TC,PO

## 2019-01-22 PROCEDURE — 76770 US EXAM ABDO BACK WALL COMP: CPT | Mod: 26,,, | Performed by: RADIOLOGY

## 2019-01-22 PROCEDURE — 99999 PR PBB SHADOW E&M-EST. PATIENT-LVL V: CPT | Mod: PBBFAC,,, | Performed by: PHYSICIAN ASSISTANT

## 2019-01-22 PROCEDURE — 1100F PR PT FALLS ASSESS DOC 2+ FALLS/FALL W/INJURY/YR: ICD-10-PCS | Mod: CPTII,S$GLB,, | Performed by: PHYSICIAN ASSISTANT

## 2019-01-22 PROCEDURE — 99214 PR OFFICE/OUTPT VISIT, EST, LEVL IV, 30-39 MIN: ICD-10-PCS | Mod: S$GLB,,, | Performed by: PHYSICIAN ASSISTANT

## 2019-01-22 PROCEDURE — 99214 OFFICE O/P EST MOD 30 MIN: CPT | Mod: S$GLB,,, | Performed by: PHYSICIAN ASSISTANT

## 2019-01-22 PROCEDURE — 99499 UNLISTED E&M SERVICE: CPT | Mod: S$GLB,,, | Performed by: PHYSICIAN ASSISTANT

## 2019-01-22 PROCEDURE — 3077F SYST BP >= 140 MM HG: CPT | Mod: CPTII,S$GLB,, | Performed by: PHYSICIAN ASSISTANT

## 2019-01-22 PROCEDURE — 3079F PR MOST RECENT DIASTOLIC BLOOD PRESSURE 80-89 MM HG: ICD-10-PCS | Mod: CPTII,S$GLB,, | Performed by: PHYSICIAN ASSISTANT

## 2019-01-22 PROCEDURE — 1100F PTFALLS ASSESS-DOCD GE2>/YR: CPT | Mod: CPTII,S$GLB,, | Performed by: PHYSICIAN ASSISTANT

## 2019-01-22 PROCEDURE — 3288F PR FALLS RISK ASSESSMENT DOCUMENTED: ICD-10-PCS | Mod: CPTII,S$GLB,, | Performed by: PHYSICIAN ASSISTANT

## 2019-01-22 PROCEDURE — 3077F PR MOST RECENT SYSTOLIC BLOOD PRESSURE >= 140 MM HG: ICD-10-PCS | Mod: CPTII,S$GLB,, | Performed by: PHYSICIAN ASSISTANT

## 2019-01-22 PROCEDURE — 99499 RISK ADDL DX/OHS AUDIT: ICD-10-PCS | Mod: S$GLB,,, | Performed by: PHYSICIAN ASSISTANT

## 2019-01-22 PROCEDURE — 99999 PR PBB SHADOW E&M-EST. PATIENT-LVL V: ICD-10-PCS | Mod: PBBFAC,,, | Performed by: PHYSICIAN ASSISTANT

## 2019-01-22 NOTE — PROGRESS NOTES
CC: Back and leg pain    HPI: The patient is a 83-year-old woman with a history of diabetes, previous breast CA, CVA and spinal stenosis who presents in referral from her primary care physician, Dr. Barger for back pain and leg pain.  She is status post L5/S1 interlaminar epidural steroid injection on 12/20/2018 with greater than 50% relief.  She has some intermittent low back pain but currently denies radiation to her legs.  She takes tramadol with relief.  She feels that her balance as improved some and typically uses her walker outside of her home but does not need this at home.  She denies weakness, numbness, bladder or bowel incontinence.    Pain intervention history: She tried physical therapy about 1 year ago and it seemed to make her pain worse. Patient is status post right L3 and L4 transforaminal injection on 8/9/13 with 50% relief of low back and right leg pain.  She is status post bilateral L4 transforaminal epidural steroid injections on 9/27/13 and 11/1/13 with 10-20% relief.  She is status post radiofrequency ablation of the left L3, 4, 5 medial branch nerves on 5/5/14 with 50% relief of her left low back pain.  She is status post radiofrequency ablation of the right L3, 4 and 5 medial branch nerves on 6/16/14 with mild relief, however she reported significant more relief at a later visit.  She is status post bilateral L4 transforaminal epidural steroid injections on 9/24/14 with moderate relief.  She is status post bilateral L4 transforaminal epidural steroid injections on 1/7/15 with complete relief of her posterior thigh pain, moderate relief of her low back pain and minimal relief of her right lateral hip pain.  She is status post C7-T1 cervical interlaminar epidural steroid injection on 2/27/15 with greater than 50% relief.  She is status post bilateral L3, 4 and 5 medial branch radio frequency ablation on 7/29/16 reporting 0% relief initially at 4 weeks but then reporting 90% relief after 6-7  weeks.  She is status post bilateral L4 transforaminal epidural steroid injections on 11/11/16 with not quite 50% relief.  She is status post bilateral L4 transforaminal epidural steroid injections on 4/4/17 with 50% relief.  She is status post bilateral L4 transforaminal epidural steroid injections on 8/23/17 with greater than 80% relief.  She is status post bilateral L3, 4 and 5 medial branch radiofrequency ablation on 3/6/18 with moderate relief.  She is status post L5/S1 interlaminar epidural steroid injection on 06/04/2018 with almost 100% relief lasting 1 month.  She is status post L5/S1 interlaminar epidural steroid injection on 09/04/2018 with almost 100% relief.  She is status post L5/S1 interlaminar epidural steroid injection on 12/20/2018 with greater than 50% relief.    ROS:She reports easy bruising, back pain and difficulty sleeping.  Balance of review of systems is negative.    Medical, surgical, family and social history reviewed elsewhere in record.    Medications/Allergies: See med card    Vitals:    01/22/19 1353   BP: (!) 155/80   Pulse: 94   Resp: 18   Temp: 97.1 °F (36.2 °C)   TempSrc: Oral   SpO2: 98%   Weight: 71.1 kg (156 lb 10.2 oz)   PainSc:   2   PainLoc: Back         Physical exam:  Gen: A and O x3, pleasant, well-groomed  Skin: No rashes or obvious lesions  HEENT: PERRLA  CVS: Regular rate and rhythm, normal S1 and S2, no murmurs.  Resp: Clear to auscultation bilaterally, no wheezes or rales.  Abdomen: Soft, NT/ND, normal bowel sounds present.  Musculoskeletal: Able to stand and walk short distances without assistance, however uses a walker when out of the house.  She has disuse atrophy of the lumbar paraspinous muscles.  Slow to go from seated to standing position.    Neuro:  Upper extremities: 5/5 strength bilaterally   Lower extremities: 5/5 strength bilaterally  Reflexes: Brachioradialis 2+, Bicep 2+, Tricep 2+. Patellar 2+, Achilles 2+.  Sensory: Intact and symmetrical to light  touch and pinprick in C2-T1 dermatomes bilaterally.  Intact and symmetrical to light touch and pinprick in L2-S1 dermatomes bilaterally, except for a small patch over her left lateral shin decreased with light touch and pinprick.    Lumbar spine:  Lumbar spine: ROM is moderately reduced with flexion extension and oblique extension without increased pain.  Terry's test is deferred.  Supine straight leg raise causes right greater than left buttock pain.  Internal and external rotation of the hip causes no increased pain on either side.  Myofascial exam: No tenderness to palpation across lumbar paraspinous muscles.      Imagin13 MRI L-spine  T10-T11: There is severe disk desiccation and a moderate diffuse disk bulge and ligamentous hypertrophy. This is not complete evaluation of the thoracic spine, but there does appear to be mild central canal stenosis.  T11-T12: Moderate disk desiccation and mild diffuse disk bulge. Mild narrowing of the thecal sac. Neural foraminal regions difficult to assess due to the scoliosis.  T12-L1: Severe disk degenerative disease with marked desiccation, diffuse disk bulge, and spurring of vertebral bodies. Mild central canal stenosis. Neural foraminal narrowing bilaterally, difficult to grade due to scoliosis.  L1-L2: Advanced disk desiccation with moderate diffuse disk bulge and mild spurring of vertebral bodies. Small superimposed central to right paracentral disk extrusion. Mild central canal stenosis. Mild to moderate facet arthropathy and ligamentous hypertrophy. Bilateral foraminal stenosis.  L2 - L3: There is also disk desiccation and diffuse disk bulge and small annular fissure posteriorly. facet arthropathy and hypertrophy ligamentum flavum. Mild central canal stenosis. Moderate right and mild left neuroforaminal stenosis.  L3-L4: Disk desiccation and moderate diffuse disk bulge. Small right paracentral ascending disk extrusion and moderate facet arthropathy present  bilaterally and hypertrophy of ligamentum flavum. In combination, the findings result in severe central canal stenosis. For example see axial image 22, central image 7. There is mild to moderate neural foraminal stenosis bilaterally.  L4 - L5: Mild anterolisthesis with disk desiccation and uncovering of the disk, with moderate right and severe left facet arthropathy and hypertrophy of ligamentum flavum. Severe central canal stenosis. Mild neural foraminal narrowing, right worse than left.  L5-S1: Disk desiccation and mild diffuse disk bulge. A small ascending disk extrusion centrally in the midline. No significant central canal stenosis. Advanced facet arthropathy bilaterally, left worse than right. Mild left neural foraminal narrowing. No significant right neural foraminal stenosis.    5/28/2018 MRI lumbar spine  T12-L1: There is marked disc space narrowing, trace retrolisthesis, unroofing of a moderate disc bulge.  There is right greater than left facet joint arthropathy.  There is mild spinal stenosis with moderate left and moderate-to-marked right foraminal stenosis.  These findings have mildly progressed.  L1-2: There is trace retrolisthesis of L1 on L2, there is inferior unroofing of a moderate diffuse disc bulge with small superimposed central disc protrusion.  There is right greater than left facet joint arthropathy.  There is borderline spinal stenosis.  There is crowding of right lateral recess.  There is marked right and moderate-to-marked left foraminal stenosis with very slight progression.  L2-3: There is trace retrolisthesis of L2 on L3 where there is marked disc space narrowing.  There is a moderate diffuse disc bulge with osteophytic ridging and superimposed right foraminal disc protrusion.  There is moderate-to-marked bilateral facet joint arthropathy with ligamentum flavum thickening, right greater than.  There is moderate central spinal stenosis and right lateral recess stenosis.  There is mild  left lateral recess stenosis.  There is marked right and moderate-to-marked left foraminal stenosis with progression.  L3-4: There is disc space narrowing.  There is marked facet joint arthropathy with ligamentum flavum thickening.  There is a small 4 mm left synovial cyst.  There is unroofing of a moderate to marked diffuse disc bulge.  There is severe spinal canal, bilateral lateral recess and right foraminal stenosis with moderate to marked left foraminal stenosis and with slight progression.  L4-5: There is stable grade 1 spondylolisthesis with 4 mm anterolisthesis of L4 on L5.  There is marked facet joint arthropathy.  There is unroofing of a moderate to marked diffuse disc bulge.  There is severe spinal canal, bilateral lateral recess and foraminal stenosis with slight progression.  L5-S1: There is disc space narrowing at the L5-S1 level, worse towards the left.  There is a disc bulge with small superimposed central disc extrusion with 5 mm cephalad extension.  This was present previously but is slightly more conspicuous.  There is marked facet joint arthropathy.  There is moderate central spinal stenosis with mild crowding of the left lateral recess.  There is mild right and moderate-to-marked left foraminal stenosis with progression.      Assessment:   The patient is a 83-year-old woman with a history of diabetes, previous breast CA, CVA and spinal stenosis who presents in referral from her primary care physician, Dr. Barger for back pain and leg pain.     1. DDD (degenerative disc disease), lumbar  EMG W/ ULTRASOUND AND NERVE CONDUCTION TEST 2 Extremities   2. Lumbar radiculopathy     3. Spinal stenosis of lumbar region with neurogenic claudication     4. Gait instability         Plan:  1.  The patient had relief following the L5/S1 interlaminar ANUEL but she is concerned that these are not lasting long enough.  An EMG was ordered by Neurosurgery a few months ago so I encouraged her to have this done.  They  are going to avoid a fusion but they will consider possible surgery at 1 level if the EMG findings support this.  If not, we can continue to do injections but if she is not having lasting relief, we will consider spinal cord stimulation.  2.  She continues to take tramadol and currently does not need a refill.  3.  Follow-up in 3 months or sooner as needed.    Greater than 50% of this 25 min visit was spent on counseling the patient.

## 2019-01-23 ENCOUNTER — TELEPHONE (OUTPATIENT)
Dept: CARDIOLOGY | Facility: CLINIC | Age: 84
End: 2019-01-23

## 2019-01-23 NOTE — TELEPHONE ENCOUNTER
----- Message from Jesica Solorio sent at 1/23/2019  4:49 PM CST -----  Contact: Pino Aguiar (Spouse) [phone]  Pino Aguiar (Spouse)   Returning phone call [phone]

## 2019-01-23 NOTE — TELEPHONE ENCOUNTER
Spoke with  Stefania, I advised him no one from our office called, and I don't see where another doctors office charted that they called. He stated that's okay, maybe they will call back tomorrow. No further issues noted.

## 2019-01-28 ENCOUNTER — OFFICE VISIT (OUTPATIENT)
Dept: PRIMARY CARE CLINIC | Facility: CLINIC | Age: 84
End: 2019-01-28
Payer: MEDICARE

## 2019-01-28 VITALS
WEIGHT: 156.31 LBS | OXYGEN SATURATION: 94 % | HEIGHT: 63 IN | DIASTOLIC BLOOD PRESSURE: 70 MMHG | SYSTOLIC BLOOD PRESSURE: 138 MMHG | BODY MASS INDEX: 27.7 KG/M2 | TEMPERATURE: 99 F | HEART RATE: 87 BPM

## 2019-01-28 DIAGNOSIS — K12.2 UVULITIS: Primary | ICD-10-CM

## 2019-01-28 DIAGNOSIS — J02.9 PHARYNGITIS, UNSPECIFIED ETIOLOGY: ICD-10-CM

## 2019-01-28 PROCEDURE — 1101F PR PT FALLS ASSESS DOC 0-1 FALLS W/OUT INJ PAST YR: ICD-10-PCS | Mod: CPTII,S$GLB,, | Performed by: NURSE PRACTITIONER

## 2019-01-28 PROCEDURE — 1101F PT FALLS ASSESS-DOCD LE1/YR: CPT | Mod: CPTII,S$GLB,, | Performed by: NURSE PRACTITIONER

## 2019-01-28 PROCEDURE — 99999 PR PBB SHADOW E&M-EST. PATIENT-LVL V: ICD-10-PCS | Mod: PBBFAC,,, | Performed by: NURSE PRACTITIONER

## 2019-01-28 PROCEDURE — 99214 PR OFFICE/OUTPT VISIT, EST, LEVL IV, 30-39 MIN: ICD-10-PCS | Mod: S$GLB,,, | Performed by: NURSE PRACTITIONER

## 2019-01-28 PROCEDURE — 99999 PR PBB SHADOW E&M-EST. PATIENT-LVL V: CPT | Mod: PBBFAC,,, | Performed by: NURSE PRACTITIONER

## 2019-01-28 PROCEDURE — 3075F PR MOST RECENT SYSTOLIC BLOOD PRESS GE 130-139MM HG: ICD-10-PCS | Mod: CPTII,S$GLB,, | Performed by: NURSE PRACTITIONER

## 2019-01-28 PROCEDURE — 3078F DIAST BP <80 MM HG: CPT | Mod: CPTII,S$GLB,, | Performed by: NURSE PRACTITIONER

## 2019-01-28 PROCEDURE — 3078F PR MOST RECENT DIASTOLIC BLOOD PRESSURE < 80 MM HG: ICD-10-PCS | Mod: CPTII,S$GLB,, | Performed by: NURSE PRACTITIONER

## 2019-01-28 PROCEDURE — 3075F SYST BP GE 130 - 139MM HG: CPT | Mod: CPTII,S$GLB,, | Performed by: NURSE PRACTITIONER

## 2019-01-28 PROCEDURE — 99214 OFFICE O/P EST MOD 30 MIN: CPT | Mod: S$GLB,,, | Performed by: NURSE PRACTITIONER

## 2019-01-28 NOTE — PATIENT INSTRUCTIONS
The sore throat is not a typical sore throat--there's swelling on the right side and your uvula is swollen..  Continue the cepacol and see ENT tomorrow at 9 a.m.     Thank you for using the [facility]!    JOVANNY Tucker, CNP, FNP-BC  [facility]    To rate your experience with NAUN Tucker, click on the link below:        [email or web address]

## 2019-01-28 NOTE — PROGRESS NOTES
Olga Aguiar is a 83 y.o. female patient of Sera Barger MD who presents to the clinic today for        Chief Complaint   Patient presents with    Sore Throat     started 2 nights ago, troublesome mostly at night    .   HPI   Pt, who is known to me, reports a new problem to me: sore throat. Started to nights ago with sore throat and felt like she could barely swallow. Patient was sleeping and the pain woke her up around 3 a.m.. She took Cepacol lozenge and it helped but did completely go away. Still had a little nagging in the daytime. . Pain occurred again the last night and Cepacol helped again. During the day, the pain again is much less.Patient states that it almost seems like a muscular problem.   Patient states she had a similar problems last August and was told she had sinusitis. She was prescribe abx and flonase and improved.  Review of that note shows she also had sinus/respiratory sxs.  She has none of those now.  These symptoms began 2 ago and status is continuing.   Symptoms are + acute,  Pt denies the following symptoms: Nausea, heart burn, reflux, running nose or HEENT.   Aggravating factors include worse at night.   Relieving factors include cepacol .   OTC Medications tried are Cepacol lozenge.   Prescription medications taken for symptoms: Patient takes Sucralfate and Zantac for GERD.   Pertinent medical history: COPD, GERD.   Smoking status: Quit around 1970  ROS   Constitutional: No fever, no fatigue, no change in appetite.   Head: No headache   Ears: No pain.   Eyes: No sxs   Nose: No sinus pain, no congestion, no runny nose, no post nasal drip.   Throat: + ST pain, no exudate, no difficulty swallowing.   Heart: No palpitations, chest pain.   Lungs: + difficulty breathing, hx of breast cancer and damage to lungs from radiation, no coughing, + clear sputum production in the mornings, occasional wheezing.   GI/: No sxs   MS: No new bone, joint or muscle problems.   Skin: No rashes,  itching.   PAST MEDICAL HISTORY:        Past Medical History:   Diagnosis Date    Adrenal adenoma: 2.8 X 2.1CM 2010 5/16/2014    Anticoagulant long-term use      mg    Aortic atherosclerosis: see CT scan 2016 10/28/2016    Aortic stenosis 8/19/2014    Asthma     Breast cancer     Cancer     breast, both sides, right arm restriction    Cataracts, both eyes     Chronic back pain     Chronic bronchitis     CKD (chronic kidney disease) stage 3, GFR 30-59 ml/min 8/19/2014    no dialysis    COPD (chronic obstructive pulmonary disease)     occasional inhaler use, no oxygen    DDD (degenerative disc disease), lumbar 7/30/2013    Diabetes mellitus     diet controlled    Diverticulosis     Ectatic abdominal aorta: see CT scan 10/16 10/28/2016    Gallbladder polyp 8/19/2014    GERD (gastroesophageal reflux disease)     Gout     Herpes simplex     Fever Blisters and Styes in right eye    High blood cholesterol     Hypertension     holding medication when B/P low    Hypertension associated with diabetes 2/7/2012    Insomnia     Lumbar spinal stenosis     Lymphedema of arm     Right    Multiple lung nodules 9/21/2015    Renal cyst, right: stable per CT 2016 10/28/2016    Stroke 2/2012    TIA, no residual effects    Stye 12/5/2013    Thyroid disease     hypothyroidism    Type 2 diabetes mellitus without complication, without long-term current use of insulin 7/20/2015    Unspecified hypothyroidism 7/20/2015     PAST SURGICAL HISTORY:         Past Surgical History:   Procedure Laterality Date    ABLATION, RADIOFREQUENCY L3,4,5 Bilateral 3/6/2018    [facility]    AXILLARY NODE DISSECTION      both sides    BLOCK, NERVE, FACET JOINT, LUMBAR, MEDIAL BRANCH Left 4/23/2014    [facility]    BLOCK-NERVE-MEDIAL BRANCH-LUMBAR Right 6/9/2014    [facility]    BREAST BIOPSY      BREAST  LUMPECTOMY Bilateral     lymph nodes on right    CATARACT EXTRACTION      bilateral PHACO with IOL    COLONOSCOPY  01/12/2010    in legacy    EGD (ESOPHAGOGASTRODUODENOSCOPY) N/A 11/12/2018    Per[facility] at [facility]    Epidural steroid injection      Pain mangement    ANUEL-TRANSFORAMINAL Bilateral 9/24/2014    [facility] at [facility]    ANUEL-TRANSFORAMINAL Bilateral 11/1/2013    [facility] at [facility]    ANUEL-TRANSFORAMINAL Bilateral 9/27/2013    [facility] at [facility]    ANUEL-TRANSFORAMINAL Right 8/9/2013    [facility]    ANUEL-TRANSFORAMINAL L4 Bilateral 8/23/2017    [facility]    ANUEL-TRANSFORAMINAL L4 Bilateral 4/4/2017    [facility] at [facility]    ANUEL-TRANSFORAMINAL L4 Bilateral 11/11/2016    [facility] at [facility]    ANUEL-TRANSFORAMINAL L4 Bilateral 1/7/2015    Per[facility] at [facility]    ESOPHAGOGASTRODUODENOSCOPY (EGD) N/A 8/29/2014    [facility]    HYSTERECTOMY      HYSTERECTOMY      INJECTION-STEROID-EPIDURAL-CERVICAL N/A 2/27/2015    Performed by [facility] at [facility] OR    Injection-steroid-epidural-lumbar N/A 12/20/2018    Performed by [facility] at [facility] OR    Injection-steroid-epidural-lumbar N/A 6/4/2018    Performed by [facility] at [facility] OR    Injection-steroid-epidural-lumbar L5/S1 N/A 9/4/2018    Performed by [facility] at [facility] OR    Nerve Block Injection      Pain management, Times 2    RADIOFREQUENCY ABLATION      Nerve, Pain management    RADIOFREQUENCY ABLATION, NERVE, SPINAL, LUMBAR, MEDIAL BRANCH, 1 LEVEL Left 5/5/2014    Performed by [facility] at [facility] OR    RADIOFREQUENCY THERMOCOAGULATION ([facility]C)-NERVE-MEDIAN BRANCH-LUMBAR Right 6/16/2014     Performed by [facility] at [facility] OR    RADIOFREQUENCY THERMOCOAGULATION ([facility]FTC)-NERVE-MEDIAN BRANCH-LUMBAR L3,4,5 Bilateral 7/29/2016    Performed by [facility] at [facility] OR    RECTAL SURGERY      Fissure repair    TONSILLECTOMY      ULTRASOUND-ENDOSCOPIC-UPPER N/A 10/23/2014    Performed by [facility]ian [facility]ster [facility] at [facility] ENDO (2ND FLR)    UPPER GASTROINTESTINAL ENDOSCOPY  08/29/2014    Dr. Castro     SOCIAL HISTORY:   Social History   Smoker [id number]      FAMILY HISTORY:         Family History   Problem Relation Age of Onset    Cancer Mother     Breast    COPD Mother     Hearing loss Mother     Hypertension Mother     Breast cancer Mother     Diabetes Father     Heart disease Father     Heart attack Father     Cancer Daughter     Breast    Breast cancer Daughter     Cancer Sister     Breast    Breast cancer Sister     Amblyopia Neg Hx     Blindness Neg Hx     Cataracts Neg Hx     Glaucoma Neg Hx     Macular degeneration Neg Hx     Retinal detachment Neg Hx     Strabismus Neg Hx     Stroke Neg Hx     Thyroid disease Neg Hx     Colon cancer Neg Hx     Stomach cancer Neg Hx     Esophageal cancer Neg Hx      ALLERGIES AND MEDICATIONS: updated and reviewed.   Review of patient's allergies indicates:   No Known Allergies   Current Medications     See medication list     PHYSICAL EXAM   Alert, coop 83 y.o. female patient in no distress.       Vitals:    01/28/19 1134   BP: 138/70   Pulse: 87   Temp: 98.6 °F (37 °C)     VS reviewed. VSS. CC, nursing note, medications & PMH all reviewed today.   Head: Normocephalic, atraumatic.   EENT: EACs clear. TMs no erythema, + LR, no effusions (suppurative; nonsupurative), no TM perforation.   Eye lids normal, no discharge present.   No sinus tenderness to palp.   Nares--no edema, no d/c present.   Pharynx + erythematous, edematous area to right side of the pharynx.  No exudate.  Tonsils + absent   Uvula edematous and erythematous.  +  anterior cervical lymph nodes palpable.   + Submandibular lymph nodes palp.   Resp: Respirations even and unlabored   Lungs clear, vesicular bilat. No wheezing. No crackles. Clear air to bases bilat.   Heart: RRR, no MRG.   MS: Ambulates steady, uses walker for assistance.   NEURO: Alert and oriented x 4. Responds appropriately during interaction.   Skin: Warm, dry, color good.     Uvulitis  -     Ambulatory referral to ENT    Pharyngitis, unspecified etiology  -     Ambulatory referral to ENT      Pt today presents with report of recurrence of very sore throat she had in August.  Throat is very sore (8/10) during the night but much less during the daytime.  She had a similar episode in August 2018, during which she was also had sinus/resp sxs.  She doesn't have these sxs today--just the very sore throat.  She also has a h/o reflux.  Uses carafate regularly to control.  Not having any reflux sxs.  PMH significant for breast cancer with radiation that caused scarring.  On exam her sinuses are clear, TMs normal, lungs CTA.  Uvula and right throat swollen.  (s/p tonsillectomy)  Cervical lymph nodes palp.    This is a new problem to me.  No work up is planned.       Pt advised to perform comfort measures recommended on patient instruction sheet .    ENT appt tomorrow at 9 a.m.  Explained exam findings, diagnosis and treatment plan to patient and her , with her during the visit.  Questions answered and patient states understanding.

## 2019-01-28 NOTE — Clinical Note
Sera Barger MD,  I saw your patient today in the [facility].  If you have any questions, please do not hesitate to contact me.  Thank you!  NAUN Tucker

## 2019-01-28 NOTE — MEDICAL/APP STUDENT
Olga Aguiar is a 83 y.o. female patient of Sera Barger MD who presents to the clinic today for   Chief Complaint   Patient presents with    Sore Throat     started 2 nights ago, troublesome mostly at night    .    HPI    Pt, who is not known to me, reports a new problem to me: sore throat. Started to nights ago with sore throat and felt like she could barely swallow. Patient was sleeping and the pain woke her up. She took Cepacol lozenge and it helped but did completely go away. Still had a little nagging. Pain occurred again the next night and Cepacol helped again. Patient states that it almost seems like a muscular problem. Patient states she had a similar problems last August and was told she had sinusitis. She was prescribe abx and flonase and improved.     These symptoms began 2  ago and status is continuing.     Symptoms are + acute      Pt denies the following symptoms:  Nausea, heart burn, reflux, running nose or HEENT.     Aggravating factors include worse at night.    Relieving factors include cepacol .    OTC Medications tried are Cepacol lozenge.    Prescription medications taken for symptoms: Patient takes Sucralfate and Zantac for GERD.   Pertinent medical history:  COPD, GERD.    Smoking status:  Quit 1960s    ROS    Constitutional:   No  fever, no fatigue, no change in appetite.    Head:   No headache  Ears:   No pain.  Eyes:  No sxs  Nose:   No sinus pain, no congestion, no runny nose, no post nasal drip.  Throat:  + ST pain, no exudate, no difficulty swallowing.    Heart:  No palpitations, chest pain.    Lungs:  + difficulty breathing, hx of breast cancer and damage to lungs from radiation, no coughing, + clear sputum production in the mornings, occasional wheezing.    GI/:  No sxs    MS:  No new bone, joint or muscle problems.    Skin:  No rashes, itching.      PAST MEDICAL HISTORY:  Past Medical History:   Diagnosis Date    Adrenal adenoma: 2.8 X 2.1CM 2010 5/16/2014     Anticoagulant long-term use      mg    Aortic atherosclerosis: see CT scan 2016 10/28/2016    Aortic stenosis 8/19/2014    Asthma     Breast cancer     Cancer     breast, both sides, right arm restriction    Cataracts, both eyes     Chronic back pain     Chronic bronchitis     CKD (chronic kidney disease) stage 3, GFR 30-59 ml/min 8/19/2014    no dialysis    COPD (chronic obstructive pulmonary disease)     occasional inhaler use, no oxygen    DDD (degenerative disc disease), lumbar 7/30/2013    Diabetes mellitus     diet controlled    Diverticulosis     Ectatic abdominal aorta: see CT scan 10/16 10/28/2016    Gallbladder polyp 8/19/2014    GERD (gastroesophageal reflux disease)     Gout     Herpes simplex     Fever Blisters and Styes in right eye    High blood cholesterol     Hypertension     holding medication when B/P low    Hypertension associated with diabetes 2/7/2012    Insomnia     Lumbar spinal stenosis     Lymphedema of arm     Right    Multiple lung nodules 9/21/2015    Renal cyst, right: stable per CT 2016 10/28/2016    Stroke 2/2012    TIA, no residual effects    Stye 12/5/2013    Thyroid disease     hypothyroidism    Type 2 diabetes mellitus without complication, without long-term current use of insulin 7/20/2015    Unspecified hypothyroidism 7/20/2015       PAST SURGICAL HISTORY:  Past Surgical History:   Procedure Laterality Date    ABLATION, RADIOFREQUENCY L3,4,5 Bilateral 3/6/2018    [facility]    AXILLARY NODE DISSECTION      both sides    BLOCK, NERVE, FACET JOINT, LUMBAR, MEDIAL BRANCH Left 4/23/2014    [facility] at [facility] OR    BLOCK-NERVE-MEDIAL BRANCH-LUMBAR Right 6/9/2014    [facility] at [facility]    BREAST BIOPSY      BREAST LUMPECTOMY Bilateral     lymph nodes on right    CATARACT EXTRACTION      bilateral PHACO with IOL    COLONOSCOPY  01/12/2010    in legacy     EGD (ESOPHAGOGASTRODUODENOSCOPY) N/A 11/12/2018    [facility]    Epidural steroid injection      Pain mangement    ANUEL-TRANSFORAMINAL Bilateral 9/24/2014    [facility]    ANUEL-TRANSFORAMINAL Bilateral 11/1/2013    [facility]    ANUEL-TRANSFORAMINAL Bilateral 9/27/2013    [facility]    ANUEL-TRANSFORAMINAL Right 8/9/2013    [facility]    ANUEL-TRANSFORAMINAL L4 Bilateral 8/23/2017    [facility]    ANUEL-TRANSFORAMINAL L4 Bilateral 4/4/2017    [facility]    ANUEL-TRANSFORAMINAL L4 Bilateral 11/11/2016    [facility]    ANUEL-TRANSFORAMINAL L4 Bilateral 1/7/2015    [facility]    ESOPHAGOGASTRODUODENOSCOPY (EGD) N/A 8/29/2014    [facility]    HYSTERECTOMY      HYSTERECTOMY      INJECTION-STEROID-EPIDURAL-CERVICAL N/A 2/27/2015    [facility]    Injection-steroid-epidural-lumbar N/A 12/20/2018    [facility]    Injection-steroid-epidural-lumbar N/A 6/4/2018    [facility]    Injection-steroid-epidural-lumbar L5/S1 N/A 9/4/2018    [facility]    Nerve Block Injection      Pain management, Times 2    RADIOFREQUENCY ABLATION      Nerve, Pain management    RADIOFREQUENCY ABLATION, NERVE, SPINAL, LUMBAR, MEDIAL BRANCH, 1 LEVEL Left 5/5/2014    [facility]    RADIOFREQUENCY THERMOCOAGULATION (RFTC)-NERVE-MEDIAN BRANCH-LUMBAR Right 6/16/2014    [facility]    RADIOFREQUENCY THERMOCOAGULATION ([facility]FT[facility])-NERVE-MEDIAN BRANCH-LUMBAR L3,4,5 Bilateral 7/29/2016     [facility]    RECTAL SURGERY      Fissure repair    TONSILLECTOMY      ULTRASOUND-ENDOSCOPIC-UPPER N/A 10/23/2014    Performed by Florian Winchester [facility] at [facility] ([address] [place] [address])    UPPER GASTROINTESTINAL ENDOSCOPY  2014    Dr. Castro       SOCIAL HISTORY:  Social History     Socioeconomic History    Marital status:      Spouse name: Not on file    Number of children: Not on file    Years of education: Not on file    Highest education level: Not on file   Social Needs    Financial resource strain: Not on file    Food insecurity - worry: Not on file    Food insecurity - inability: Not on file    Transportation needs - medical: Not on file    Transportation needs - non-medical: Not on file   Occupational History    Not on file   Tobacco Use    Smoking status: Former Smoker     Packs/day: 1.00     Years: 18.00     Pack years: 18.00     Start date: 1952     Last attempt to quit: 1970     Years since quittin.5    Smokeless tobacco: Never Used   Substance and Sexual Activity    Alcohol use: Yes     Comment: rare    Drug use: No    Sexual activity: Not Currently   Other Topics Concern    Not on file   Social History Narrative    Not on file       FAMILY HISTORY:  Family History   Problem Relation Age of Onset    Cancer Mother         Breast    COPD Mother     Hearing loss Mother     Hypertension Mother     Breast cancer Mother     Diabetes Father     Heart disease Father     Heart attack Father     Cancer Daughter         Breast    Breast cancer Daughter     Cancer Sister         Breast    Breast cancer Sister     Amblyopia Neg Hx     Blindness Neg Hx     Cataracts Neg Hx     Glaucoma Neg Hx     Macular degeneration Neg Hx     Retinal detachment Neg Hx     Strabismus Neg Hx     Stroke Neg Hx     Thyroid disease Neg Hx     Colon cancer Neg Hx     Stomach cancer Neg Hx     Esophageal cancer Neg Hx        ALLERGIES AND  MEDICATIONS: updated and reviewed.  Review of patient's allergies indicates:  No Known Allergies  Current Outpatient Medications   Medication Sig Dispense Refill    acetaminophen (TYLENOL) 500 MG tablet Take 500 mg by mouth every 6 (six) hours as needed.      acyclovir (ZOVIRAX) 400 MG tablet Take 400 mg by mouth 2 (two) times daily as needed.      allopurinol (ZYLOPRIM) 100 MG tablet TAKE 1 TABLET BY MOUTH ONCE DAILY 30 tablet 6    aspirin (ECOTRIN) 81 MG EC tablet Take 81 mg by mouth once daily.      busPIRone (BUSPAR) 5 MG Tab Take 1 tablet (5 mg total) by mouth 2 (two) times daily as needed (anxiety). 60 tablet 2    cholecalciferol, vitamin D3, 2,000 unit Cap Take 2,000 Units by mouth Daily.       fluticasone-vilanterol (BREO ELLIPTA) 200-25 mcg/dose DsDv diskus inhaler Inhale 1 puff into the lungs once daily. 3 each 3    gabapentin (NEURONTIN) 300 MG capsule TAKE ONE CAPSULE BY MOUTH IN THE EVENING 90 capsule 3    levalbuterol (XOPENEX HFA) 45 mcg/actuation inhaler Inhale 1-2 puffs into the lungs every 4 (four) hours as needed for Wheezing or Shortness of Breath. 3 Inhaler 3    levothyroxine (SYNTHROID) 75 MCG tablet TAKE 1 TABLET BY MOUTH ONCE DAILY 90 tablet 0    montelukast (SINGULAIR) 10 mg tablet Take 1 tablet (10 mg total) by mouth every evening. 90 tablet 3    predniSONE (DELTASONE) 10 MG tablet Take 2 tablets (20 mg total) by mouth once daily. Take 2 daily for 3 days and repeat for short breath. (Patient taking differently: Take 20 mg by mouth once daily. Doesn't take regularly.  Keeps on hand as needed.) 36 tablet 2    ranitidine (ZANTAC) 300 MG tablet Take 1 tablet (300 mg total) by mouth every evening. 90 tablet 3    simvastatin (ZOCOR) 40 MG tablet Take 1 tablet (40 mg total) by mouth every evening. 90 tablet 3    sucralfate (CARAFATE) 1 gram tablet Take 1 tablet by mouth 2 (two) times daily.      traMADol (ULTRAM) 50 mg tablet TAKE 2 TABLETS BY MOUTH IN THE MORNING AND TAKE 1 TABLET  IN THE EVENING AS DIRECTED 90 tablet 1    triamterene-hydrochlorothiazide 37.5-25 mg (DYAZIDE) 37.5-25 mg per capsule Take 1 capsule by mouth every morning. 90 capsule 1    zolpidem (AMBIEN) 5 MG Tab TAKE 1 TABLET BY MOUTH AT BEDTIME AS NEEDED 30 tablet 1     No current facility-administered medications for this visit.              PHYSICAL EXAM    Alert, coop 83 y.o. female patient in no distress.    Vitals:    01/28/19 1134   BP: 138/70   Pulse: 87   Temp: 98.6 °F (37 °C)     VS reviewed.  VSS.  CC, nursing note, medications & PMH all reviewed today.    Head:  Normocephalic, atraumatic.    EENT:  EACs clear.  TMs no erythema, + LR, no effusions (suppurative; nonsupurative), no TM perforation.                              Eye lids normal, no discharge present.       No sinus tenderness to palp.               Nares--no edema, no d/c present.    Pharynx + injected erythematous, edematous area to right side               Tonsils + absent    + anterior cervical lymph nodes palpable.    +  Submandibular lymph nodes palp.             Resp:  Respirations even and unlabored   Lungs clear, vesicular bilat.  no wheezing.  no crackles.  clear air to bases bilat.    Heart:  RRR, no MRG.        MS:  Ambulates steady, uses walker for assistance.             NEURO:  Alert and oriented x 4.  Responds appropriately during interaction.    Skin:  Warm, dry, color good.

## 2019-01-29 ENCOUNTER — OFFICE VISIT (OUTPATIENT)
Dept: OTOLARYNGOLOGY | Facility: CLINIC | Age: 84
End: 2019-01-29
Payer: MEDICARE

## 2019-01-29 VITALS
HEIGHT: 63 IN | WEIGHT: 143.31 LBS | SYSTOLIC BLOOD PRESSURE: 144 MMHG | DIASTOLIC BLOOD PRESSURE: 78 MMHG | BODY MASS INDEX: 25.39 KG/M2

## 2019-01-29 DIAGNOSIS — R13.10 ODYNOPHAGIA: ICD-10-CM

## 2019-01-29 DIAGNOSIS — J02.9 PHARYNGITIS, UNSPECIFIED ETIOLOGY: Primary | ICD-10-CM

## 2019-01-29 DIAGNOSIS — J02.9 SORE THROAT: ICD-10-CM

## 2019-01-29 PROCEDURE — 99999 PR PBB SHADOW E&M-EST. PATIENT-LVL III: CPT | Mod: PBBFAC,,, | Performed by: OTOLARYNGOLOGY

## 2019-01-29 PROCEDURE — 99204 PR OFFICE/OUTPT VISIT, NEW, LEVL IV, 45-59 MIN: ICD-10-PCS | Mod: 25,S$GLB,, | Performed by: OTOLARYNGOLOGY

## 2019-01-29 PROCEDURE — 31575 PR LARYNGOSCOPY, FLEXIBLE; DIAGNOSTIC: ICD-10-PCS | Mod: S$GLB,,, | Performed by: OTOLARYNGOLOGY

## 2019-01-29 PROCEDURE — 99999 PR PBB SHADOW E&M-EST. PATIENT-LVL III: ICD-10-PCS | Mod: PBBFAC,,, | Performed by: OTOLARYNGOLOGY

## 2019-01-29 PROCEDURE — 99204 OFFICE O/P NEW MOD 45 MIN: CPT | Mod: 25,S$GLB,, | Performed by: OTOLARYNGOLOGY

## 2019-01-29 PROCEDURE — 31575 DIAGNOSTIC LARYNGOSCOPY: CPT | Mod: S$GLB,,, | Performed by: OTOLARYNGOLOGY

## 2019-01-29 PROCEDURE — 1101F PR PT FALLS ASSESS DOC 0-1 FALLS W/OUT INJ PAST YR: ICD-10-PCS | Mod: CPTII,S$GLB,, | Performed by: OTOLARYNGOLOGY

## 2019-01-29 PROCEDURE — 1101F PT FALLS ASSESS-DOCD LE1/YR: CPT | Mod: CPTII,S$GLB,, | Performed by: OTOLARYNGOLOGY

## 2019-01-29 RX ORDER — PREDNISONE 10 MG/1
TABLET ORAL
Qty: 11 TABLET | Refills: 0 | Status: SHIPPED | OUTPATIENT
Start: 2019-01-29 | End: 2020-11-23

## 2019-01-29 RX ORDER — CLINDAMYCIN HYDROCHLORIDE 300 MG/1
300 CAPSULE ORAL 3 TIMES DAILY
Qty: 30 CAPSULE | Refills: 0 | Status: SHIPPED | OUTPATIENT
Start: 2019-01-29 | End: 2019-02-08

## 2019-01-29 NOTE — LETTER
January 29, 2019      Olga Meek, JUDAH  [address] [facility] [address] [place] [address]           [facility]  [address] [facility] [address] [place] [address]  Phone: [phone]  Fax: [phone]          Patient: Olga Aguiar   MR Number: [id number]   YOB: 1935   Date of Visit: 1/29/2019       Dear Olga Meek:    Thank you for referring Olga Aguiar to me for evaluation. Attached you will find relevant portions of my assessment and plan of care.    If you have questions, please do not hesitate to call me. I look forward to following Olga Aguiar along with you.    Sincerely,    Ursula Lopez MD    Enclosure  CC:  No Recipients    If you would like to receive this communication electronically, please contact [email or web address] or [phone] to request more information on [employer] Link access.    For providers and/or their staff who would like to refer a patient to [facility], please contact us through our one-stop-shop provider referral line, [facility], at [phone].    If you feel you have received this communication in error or would no longer like to receive these types of communications, please e-mail [email or web address]

## 2019-01-29 NOTE — PROGRESS NOTES
Patient Name:  Date of Encounter: 1/29/2019  Provider: Ursula Lopez MD  Referring MD:  PCP:  Rad Onc:  Med Onc:  Derm:  Cardiology:    CC:  Odynophagia; dysphagia    HPI:    Patient is an 83-year-old female who presented yesterday complaining of a sore throat which makes it difficult to swallow.  She was noted to have a unilateral edematous uvula and tonsil.  Pain was 8/10 during the night but less during the day for the past 3 days. It wakes her from sleep. Pain is worse on the right compared to the left.  No trouble breathing due to this. She does have a hx of pulmonary issues due to breast radiation.   She reports that she had an episode similar to this in August, 2018.  However at that time she also had sinusitis. She was treated with antibiotics with resolution    Hx of tobacco use--quit in 1969.    ROS:  Constitutional: Negative for activity change and appetite change, weight loss.   Eyes: Negative for discharge, visual changes.   Positive for dysphagia and odynophagia  Respiratory: Negative for difficulty breathing and wheezing   Cardiovascular: Negative for chest pain.   Gastrointestinal: Negative for abdominal distention and abdominal pain.   Endocrine: Negative for cold intolerance and heat intolerance.   Genitourinary: Negative for dysuria.   Musculoskeletal: Negative for gait problem, muscle pain and joint swelling.   Skin: Negative for color change and pallor; negative for skin lesions.   Neurological: Negative for syncope and weakness; no numbness face.   Psychiatric/Behavioral: Negative for agitation and confusion; negative for depression.    Physical Exam:      Constitutional  · General Appearance: well nourished, well-developed, alert, oriented, in no acute distress  · Communication: ability, understanding, normal  Head and Face  · Inspection: normocephalic, atraumatic, no scars, lesions or masses   · Palpation: no stepoffs, sinus tenderness or masses  · Parotid glands: no masses, stones, swelling  or tenderness  Eyes  · Ocular Motility / Alignment: normal alignment, motility, no proptosis, enophthalmus or nystagmus  · Conjunctiva: not injected  · Eyelids: no hooding, lag, entropion, or ectropion  Ears  · Hearing: speech reception thresholds grossly normal  · External Ears: no auricle lesions, non-tender, mobile to palpation  · Otoscopy:  · Right Ear: no tympanic membrane lesions, perforations, or effusion, normal EAC  · Left Ear: no tympanic membrane lesions, perforations, or effusion, normal EAC  Nose  · External Nose: no lesions, tenderness, trauma or deformity  · Intranasal Exam: no edema, erythema, discharge, mass or obstruction; right septal spur  Oral Cavity / Oropharynx  · Lips: upper and lower lips pink and moist  · Teeth: good dentition  · Gingiva: healthy   · Oral Mucosa: moist, no mucosal lesions  · Floor of Mouth: normal, no lesions, salivary ducts patent  · Tongue: moist, normal mobility, no lesions  · Palate: right soft palate edematous and extends down right lateral pharyngeal wall--tender to palpation;  hard palates without lesions or ulcers  Oropharynx: tonsils absent, base of tongue normal  Nasopharynx, Hypopharynx, and Larynx  Indirect: could not perform exam due to intolerance by patient--strong gag  Neck  · Inspection and Palpation: no erythema, induration, emphysema, tenderness or masses  · Larynx and Trachea: normal position; normal crepitus  · Thyroid: no tenderness, enlargement or nodules  · Submandibular Glands: no masses or tenderness  Lymphatic:  · Anterior, Posterior, Submandibular, Submental, Supraclavicular: no lymphadenopathy present  Chest / Respiratory  · Chest: no stridor or retractions, normal effort and expansion  Cardiovascular:  · Pulses: 2+ carotid pulses bilaterally  · Auscultation: deferred  Neurological  · Cranial Nerves: grossly intact  · General: no focal deficits  Psychiatric  · Orientation: oriented to time, place and person  · Mood and Affect: no depression,  anxiety or agitation  Extremities  · Inspection: moves all extremities well  Donor site  Chest, Back, Abdomen, Arms, Legs: N/A    PROCEDURES:   -------------------- LARYNGOSCOPY / NASOPHARYNGOSCOPY -------------------------     Pre-op DX: odynophagia  Post-op DX: same  Anesthesia: Topical Neosynephrine / Tetracaine  Indications: to look at pharynx and larynx  Adverse Events: None        Procedure in Detail:     Flexible endoscopy with video was performed through the nasal passages. The nasal cavity, nasopharynx, oropharynx, hypopharynx and larynx were adequately visualized. The true vocal cords and arytenoids were examined during phonation and repose.        Operative Findings:     Nasal Cavity: Within normal limits  Nasopharynx: Within normal limits  Tongue Base:  Within normal limits  Pharynx:  Right-sided edema extending from the superior oropharynx to the level of the epiglottis; no obvious lesions seen  Epiglottis / Aryepiglottic Folds: Within normal limits with mild erythema on the laryngeal surface  Pyriform Sinus: Within normal limits  Vocal Cords: Within normal limits  Arytenoids: Within normal limits; mild IA edema    Records reviewed: previous notes in Epic as summarized above     Assessment:   Right-sided pharyngitis-no obvious lesions seen  Odynophagia  Airway patent    Plan:  Clindamycin 300 mg t.i.d. for 10 days  Prednisone 20 mg  tapering down to 5 mg over 9 days  Follow-up in 2 weeks for [id number] edema is still present, a CT scan of the neck with contrast will be ordered to evaluate for mass in the parapharyngeal space

## 2019-02-12 ENCOUNTER — OFFICE VISIT (OUTPATIENT)
Dept: PHYSICAL MEDICINE AND REHAB | Facility: CLINIC | Age: 84
End: 2019-02-12
Payer: MEDICARE

## 2019-02-12 ENCOUNTER — OFFICE VISIT (OUTPATIENT)
Dept: OTOLARYNGOLOGY | Facility: CLINIC | Age: 84
End: 2019-02-12
Payer: MEDICARE

## 2019-02-12 VITALS
SYSTOLIC BLOOD PRESSURE: 155 MMHG | DIASTOLIC BLOOD PRESSURE: 95 MMHG | HEART RATE: 102 BPM | WEIGHT: 139.88 LBS | HEIGHT: 63 IN | BODY MASS INDEX: 24.79 KG/M2 | RESPIRATION RATE: 16 BRPM

## 2019-02-12 DIAGNOSIS — M51.36 DDD (DEGENERATIVE DISC DISEASE), LUMBAR: ICD-10-CM

## 2019-02-12 DIAGNOSIS — M54.16 LUMBAR RADICULOPATHY: Primary | ICD-10-CM

## 2019-02-12 DIAGNOSIS — R13.10 ODYNOPHAGIA: Primary | ICD-10-CM

## 2019-02-12 PROCEDURE — 99213 OFFICE O/P EST LOW 20 MIN: CPT | Mod: S$GLB,,, | Performed by: OTOLARYNGOLOGY

## 2019-02-12 PROCEDURE — 1101F PT FALLS ASSESS-DOCD LE1/YR: CPT | Mod: CPTII,S$GLB,, | Performed by: OTOLARYNGOLOGY

## 2019-02-12 PROCEDURE — 99213 PR OFFICE/OUTPT VISIT, EST, LEVL III, 20-29 MIN: ICD-10-PCS | Mod: S$GLB,,, | Performed by: OTOLARYNGOLOGY

## 2019-02-12 PROCEDURE — 99999 PR PBB SHADOW E&M-EST. PATIENT-LVL II: CPT | Mod: PBBFAC,,, | Performed by: PHYSICAL MEDICINE & REHABILITATION

## 2019-02-12 PROCEDURE — 95886 PR EMG COMPLETE, W/ NERVE CONDUCTION STUDIES, 5+ MUSCLES: ICD-10-PCS | Mod: S$GLB,,, | Performed by: PHYSICAL MEDICINE & REHABILITATION

## 2019-02-12 PROCEDURE — 99499 NO LOS: ICD-10-PCS | Mod: S$GLB,,, | Performed by: PHYSICAL MEDICINE & REHABILITATION

## 2019-02-12 PROCEDURE — 1101F PR PT FALLS ASSESS DOC 0-1 FALLS W/OUT INJ PAST YR: ICD-10-PCS | Mod: CPTII,S$GLB,, | Performed by: OTOLARYNGOLOGY

## 2019-02-12 PROCEDURE — 99999 PR PBB SHADOW E&M-EST. PATIENT-LVL II: ICD-10-PCS | Mod: PBBFAC,,, | Performed by: PHYSICAL MEDICINE & REHABILITATION

## 2019-02-12 PROCEDURE — 99999 PR PBB SHADOW E&M-EST. PATIENT-LVL III: CPT | Mod: PBBFAC,,, | Performed by: OTOLARYNGOLOGY

## 2019-02-12 PROCEDURE — 99999 PR PBB SHADOW E&M-EST. PATIENT-LVL III: ICD-10-PCS | Mod: PBBFAC,,, | Performed by: OTOLARYNGOLOGY

## 2019-02-12 PROCEDURE — 95909 NRV CNDJ TST 5-6 STUDIES: CPT | Mod: S$GLB,,, | Performed by: PHYSICAL MEDICINE & REHABILITATION

## 2019-02-12 PROCEDURE — 99499 UNLISTED E&M SERVICE: CPT | Mod: S$GLB,,, | Performed by: PHYSICAL MEDICINE & REHABILITATION

## 2019-02-12 PROCEDURE — 95909 PR NERVE CONDUCTION STUDY; 5-6 STUDIES: ICD-10-PCS | Mod: S$GLB,,, | Performed by: PHYSICAL MEDICINE & REHABILITATION

## 2019-02-12 PROCEDURE — 95886 MUSC TEST DONE W/N TEST COMP: CPT | Mod: S$GLB,,, | Performed by: PHYSICAL MEDICINE & REHABILITATION

## 2019-02-12 NOTE — LETTER
February 14, 2019      Olga Meek, JUDAH  [address] [facility] [address] [place] [address]           [facility]  [address] [facility] [place] [address]  Phone: [phone]  Fax: [phone]          Patient: Olga Aguiar   MR Number: [id number]   YOB: 1935   Date of Visit: 2/12/2019       Dear Olga Meek:    Thank you for referring Olga Aguiar to me for evaluation. Attached you will find relevant portions of my assessment and plan of care.    If you have questions, please do not hesitate to call me. I look forward to following Olga Aguiar along with you.    Sincerely,    Ursula Lopez MD    Enclosure  CC:  No Recipients    If you would like to receive this communication electronically, please contact [email or web address] or [phone] to request more information on [employer] Link access.    For providers and/or their staff who would like to refer a patient to [facility], please contact us through our one-stop-shop provider referral line, [facility], at [phone].    If you feel you have received this communication in error or would no longer like to receive these types of communications, please e-mail [email or web address]

## 2019-02-12 NOTE — LETTER
February 12, 2019      EVERETT Lott  [address] [facility] [place] [address]           [facility]  [address] [facility] [place] [address]  Phone: [phone]  Fax: [phone]          Patient: Olga Aguiar   MR Number: [id number]   YOB: 1935   Date of Visit: 2/12/2019       Dear Daniel Dupont:    Thank you for referring Olga Aguiar to me for evaluation. Attached you will find relevant portions of my assessment and plan of care.    If you have questions, please do not hesitate to call me. I look forward to following Olga Aguiar along with you.    Sincerely,    David Rome MD    Enclosure  CC:  No Recipients    If you would like to receive this communication electronically, please contact [email or web address] or [phone] to request more information on [employer] Link access.    For providers and/or their staff who would like to refer a patient to [facility], please contact us through our one-stop-shop provider referral line, [facility], at [phone].    If you feel you have received this communication in error or would no longer like to receive these types of communications, please e-mail [email or web address]

## 2019-02-12 NOTE — PROGRESS NOTES
Patient Name:  Date of Encounter: 1/29/2019  Provider: Ursula Lopez MD  Referring MD:  PCP:  Rad Onc:  Med Onc:  Derm:  Cardiology:    CC:  Odynophagia; dysphagia    HPI:    Patient is an 83-year-old female who presented yesterday complaining of a sore throat which makes it difficult to swallow.  She was noted to have a unilateral edematous uvula and tonsil.  Pain was 8/10 during the night but less during the day for the past 3 days. It wakes her from sleep. Pain is worse on the right compared to the left.  No trouble breathing due to this. She does have a hx of pulmonary issues due to breast radiation.   She reports that she had an episode similar to this in August, 2018.  However at that time she also had sinusitis. She was treated with antibiotics with resolution    Hx of tobacco use--quit in 1969.    2/12/2019  Symptoms of odynophagia and dysphagia have resolved with antibiotics.  She did however recently develop a URI which has almost completely resolved    ROS:  Constitutional: Negative for activity change and appetite change, weight loss.   Eyes: Negative for discharge, visual changes.   Dysphagia and odynophagia--resolved  Respiratory: Negative for difficulty breathing and wheezing   Cardiovascular: Negative for chest pain.   Gastrointestinal: Negative for abdominal distention and abdominal pain.   Endocrine: Negative for cold intolerance and heat intolerance.   Genitourinary: Negative for dysuria.   Musculoskeletal: Negative for gait problem, muscle pain and joint swelling.   Skin: Negative for color change and pallor; negative for skin lesions.   Neurological: Negative for syncope and weakness; no numbness face.   Psychiatric/Behavioral: Negative for agitation and confusion; negative for depression.    Physical Exam:      Constitutional  · General Appearance: well nourished, well-developed, alert, oriented, in no acute distress  · Communication: ability, understanding, normal  Oral Cavity /  Oropharynx  · Lips: upper and lower lips pink and moist  · Teeth: good dentition  · Gingiva: healthy   · Oral Mucosa: moist, no mucosal lesions  · Floor of Mouth: normal, no lesions, salivary ducts patent  · Tongue: moist, normal mobility, no lesions  · Palate: right soft palate edematous and extends down right lateral pharyngeal wall--non tender to palpation today;  hard palates without lesions or ulcers  Lymphatic:  · Anterior, Posterior, Submandibular, Submental, Supraclavicular: no lymphadenopathy present  Chest / Respiratory  · Chest: no stridor or retractions, normal effort and expansion  Neurological  · Cranial Nerves: grossly intact  · General: no focal deficits  Psychiatric  · Orientation: oriented to time, place and person       Assessment:   Recurrent Right-sided pharyngitis with odynophagia-no obvious lesions seen except for edema  Odynophagia resolved with antibiotics    Plan:  Due to the fact that this is her 2nd episode and edema persists, a CT scan of the neck with contrast will be ordered to evaluate for submucosal mass  F/U after CT

## 2019-02-13 ENCOUNTER — TELEPHONE (OUTPATIENT)
Dept: PAIN MEDICINE | Facility: CLINIC | Age: 84
End: 2019-02-13

## 2019-02-13 NOTE — TELEPHONE ENCOUNTER
Please let the patient know I reviewed her EMG results and she has evidence suggestive of active in chronic right L5 and bilateral S1 radiculopathy.  I suggest following up with Neurosurgery to discuss this further.

## 2019-02-13 NOTE — PROGRESS NOTES
[facility]  [address]  [place] [address]             Full Name: Olga Aguiar Gender: Female  Patient ID: [id number]  History: Pt with chronic low back pain with right hip pain. She notes a history of leg paresthesias in the past, but this resolved. She denies any significant leg weakness.      Visit Date: 2/12/2019 16:27  Examining Physician: Wild  Referring Physician: Cresencio      Sensory NCS      Nerve / Sites Rec. Site Onset Lat Peak Lat NP Amp PP Amp Segments Distance Velocity     ms ms µV µV  cm m/s   L Sural - Ankle (Calf)      Calf Ankle 3.28 3.91 7.9 0.84 Calf - Ankle 14 43      2 Ankle 3.07 3.80 10.8 1.8      R Sural - Ankle (Calf)      Calf Ankle 3.33 4.11 8.9 3.0 Calf - Ankle 14 42      2 Ankle 3.23 3.96 6.4 1.1          Motor NCS      Nerve / Sites Muscle Latency Amplitude Amp % Duration Segments Distance Lat Diff Velocity     ms mV % ms  cm ms m/s   L Peroneal - EDB      Ankle EDB 7.71 0.7 100 5.16 Ankle - EDB 8        Fib head EDB 14.38 0.6 80  Fib head - Ankle 24 6.67 36   R Peroneal - EDB      Ankle EDB 9.90 0.4 100 4.11 Ankle - EDB 8        Fib head EDB NR NR NR NR Fib head - Ankle NR     L Tibial - AH      Ankle AH 5.99 7.2 100 8.65 Ankle - AH 8        Pop fossa AH 15.10 5.5 76 9.48 Pop fossa - Ankle 36 9.11 39   R Tibial - AH      Ankle AH 5.94 5.2 100 8.49 Ankle - AH 8        Pop fossa AH 14.90 6.2 120 10.05 Pop fossa - Ankle 35 8.96 39       EMG         EMG Summary Table     Spontaneous MUAP Recruitment   Muscle IA Fib PSW Fasc H.F. Amp Dur. PPP Pattern   R. Vastus medialis None None None None None N N N N   R. Tibialis anterior 1+ 1+ 1+ None None N N 1+ N   R. Peroneus longus 1+CRD 1+ 1+ None None N N N N   R. Gastrocnemius (Medial head) 2+ None 2+ None None N N N N   R. Gluteus medius N None None None None N N N N   R. Gluteus kamille N None None None None       R. Lumbar paraspinals 2+ None 2+ None None       L. Vastus medialis N None None None None N N N N   L.  Tibialis anterior N None None None None N N N N   L. Peroneus longus N None None None None N N N N   L. Gastrocnemius (Medial head) 1+CRD None 1+ None None N N N N   L. Gluteus medius N None None None None N N N N   L. Gluteus kamille N None None None None       L. Lumbar paraspinals 1+ 1+ 1+ None None           Summary    The motor conduction test was performed on 4 nerve(s). The results were normal in 2 nerve(s): L Tibial - AH, R Tibial - AH. Results outside the specified normal range were found in 2 nerve(s), as follows:   In the L Peroneal - EDB study  o the take off latency result was increased for Ankle stimulation  o the peak amplitude result was reduced for Ankle stimulation  o the take off velocity result was reduced for Fib head - Ankle segment   In the R Peroneal - EDB study  o the take off latency result was increased for Ankle stimulation  o the peak amplitude result was reduced for Ankle stimulation    The sensory conduction test was normal in all 2 of the tested nerves: L Sural - Ankle (Calf), R Sural - Ankle (Calf).    The needle EMG examination was performed in 14 muscles. It was normal in 8 muscle(s): R. Vastus medialis, R. Gluteus medius, R. Gluteus kamille, L. Vastus medialis, L. Tibialis anterior, L. Peroneus longus, L. Gluteus medius, L. Gluteus kamille. The study was abnormal in 6 muscle(s), with the following distribution:   Abnormal spontaneous/insertional activity was found in R. Tibialis anterior, R. Peroneus longus, R. Gastrocnemius (Medial head), R. Lumbar paraspinals, L. Gastrocnemius (Medial head), L. Lumbar paraspinals.   The MUP waveform abnormality was found in R. Tibialis anterior.      Electrodiagnostic Impression:  1. There is electrodiagnostic evidence suggestive of active and chronic axonal left S1 radiculopathy.  2. There is also evidence suggestive of active and chronic axonal right L5 and S1 radiculopathy.  3. 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    Today's test results will be sent to her referring providers for further review and direction in her treatment.    Thank you very much for the referral. Please call if you have any questions regarding this study or the report.       -------------------------------  David Rome M.D.

## 2019-02-13 NOTE — TELEPHONE ENCOUNTER
Spoke with patient's  and patient regarding results. They verbalized understanding and will call neurosurgery.

## 2019-02-18 DIAGNOSIS — R13.10 ODYNOPHAGIA: Primary | ICD-10-CM

## 2019-02-18 DIAGNOSIS — F51.01 PRIMARY INSOMNIA: ICD-10-CM

## 2019-02-18 RX ORDER — ZOLPIDEM TARTRATE 5 MG/1
TABLET ORAL
Qty: 30 TABLET | Refills: 1 | Status: SHIPPED | OUTPATIENT
Start: 2019-02-18 | End: 2019-04-12 | Stop reason: SDUPTHER

## 2019-02-18 RX ORDER — TRAMADOL HYDROCHLORIDE 50 MG/1
TABLET ORAL
Qty: 90 TABLET | Refills: 1 | Status: SHIPPED | OUTPATIENT
Start: 2019-02-18 | End: 2019-05-03 | Stop reason: SDUPTHER

## 2019-02-19 ENCOUNTER — HOSPITAL ENCOUNTER (OUTPATIENT)
Dept: RADIOLOGY | Facility: HOSPITAL | Age: 84
Discharge: HOME | End: 2019-02-19
Attending: OTOLARYNGOLOGY
Payer: MEDICARE

## 2019-02-19 DIAGNOSIS — R13.10 ODYNOPHAGIA: ICD-10-CM

## 2019-02-19 PROCEDURE — 70491 CT SOFT TISSUE NECK W/DYE: CPT | Mod: TC,PO

## 2019-02-19 PROCEDURE — 70491 CT SOFT TISSUE NECK W/DYE: CPT | Mod: 26,,, | Performed by: RADIOLOGY

## 2019-02-19 PROCEDURE — 70491 CT SOFT TISSUE NECK WITH CONTRAST: ICD-10-PCS | Mod: 26,,, | Performed by: RADIOLOGY

## 2019-02-19 PROCEDURE — 25500020 PHARM REV CODE 255: Mod: PO | Performed by: OTOLARYNGOLOGY

## 2019-02-19 RX ADMIN — IOHEXOL 75 ML: 350 INJECTION, SOLUTION INTRAVENOUS at 12:02

## 2019-02-27 ENCOUNTER — PATIENT MESSAGE (OUTPATIENT)
Dept: NEUROSURGERY | Facility: CLINIC | Age: 84
End: 2019-02-27

## 2019-03-14 ENCOUNTER — HOSPITAL ENCOUNTER (OUTPATIENT)
Dept: RADIOLOGY | Facility: HOSPITAL | Age: 84
Discharge: HOME | End: 2019-03-14
Attending: PHYSICIAN ASSISTANT
Payer: MEDICARE

## 2019-03-14 ENCOUNTER — OFFICE VISIT (OUTPATIENT)
Dept: NEUROSURGERY | Facility: CLINIC | Age: 84
End: 2019-03-14
Payer: MEDICARE

## 2019-03-14 VITALS
BODY MASS INDEX: 24.8 KG/M2 | SYSTOLIC BLOOD PRESSURE: 146 MMHG | DIASTOLIC BLOOD PRESSURE: 77 MMHG | HEART RATE: 86 BPM | HEIGHT: 63 IN | TEMPERATURE: 99 F | WEIGHT: 140 LBS

## 2019-03-14 DIAGNOSIS — M41.9 SCOLIOSIS OF THORACOLUMBAR SPINE, UNSPECIFIED SCOLIOSIS TYPE: ICD-10-CM

## 2019-03-14 DIAGNOSIS — M47.817 LUMBOSACRAL SPONDYLOSIS WITHOUT MYELOPATHY: Primary | ICD-10-CM

## 2019-03-14 DIAGNOSIS — M54.16 RADICULOPATHY OF LUMBAR REGION: ICD-10-CM

## 2019-03-14 PROCEDURE — 1101F PT FALLS ASSESS-DOCD LE1/YR: CPT | Mod: CPTII,S$GLB,, | Performed by: PHYSICIAN ASSISTANT

## 2019-03-14 PROCEDURE — 3078F DIAST BP <80 MM HG: CPT | Mod: CPTII,S$GLB,, | Performed by: PHYSICIAN ASSISTANT

## 2019-03-14 PROCEDURE — 99214 PR OFFICE/OUTPT VISIT, EST, LEVL IV, 30-39 MIN: ICD-10-PCS | Mod: S$GLB,,, | Performed by: PHYSICIAN ASSISTANT

## 2019-03-14 PROCEDURE — 99999 PR PBB SHADOW E&M-EST. PATIENT-LVL IV: ICD-10-PCS | Mod: PBBFAC,,, | Performed by: PHYSICIAN ASSISTANT

## 2019-03-14 PROCEDURE — 3077F PR MOST RECENT SYSTOLIC BLOOD PRESSURE >= 140 MM HG: ICD-10-PCS | Mod: CPTII,S$GLB,, | Performed by: PHYSICIAN ASSISTANT

## 2019-03-14 PROCEDURE — 3078F PR MOST RECENT DIASTOLIC BLOOD PRESSURE < 80 MM HG: ICD-10-PCS | Mod: CPTII,S$GLB,, | Performed by: PHYSICIAN ASSISTANT

## 2019-03-14 PROCEDURE — 99214 OFFICE O/P EST MOD 30 MIN: CPT | Mod: S$GLB,,, | Performed by: PHYSICIAN ASSISTANT

## 2019-03-14 PROCEDURE — 72082 XR SPINE SURVEY AP AND LATERAL: ICD-10-PCS | Mod: 26,,, | Performed by: RADIOLOGY

## 2019-03-14 PROCEDURE — 72082 X-RAY EXAM ENTIRE SPI 2/3 VW: CPT | Mod: TC,FY,PO

## 2019-03-14 PROCEDURE — 99499 UNLISTED E&M SERVICE: CPT | Mod: S$GLB,,, | Performed by: PHYSICIAN ASSISTANT

## 2019-03-14 PROCEDURE — 99999 PR PBB SHADOW E&M-EST. PATIENT-LVL IV: CPT | Mod: PBBFAC,,, | Performed by: PHYSICIAN ASSISTANT

## 2019-03-14 PROCEDURE — 99499 RISK ADDL DX/OHS AUDIT: ICD-10-PCS | Mod: S$GLB,,, | Performed by: PHYSICIAN ASSISTANT

## 2019-03-14 PROCEDURE — 72082 X-RAY EXAM ENTIRE SPI 2/3 VW: CPT | Mod: 26,,, | Performed by: RADIOLOGY

## 2019-03-14 PROCEDURE — 3077F SYST BP >= 140 MM HG: CPT | Mod: CPTII,S$GLB,, | Performed by: PHYSICIAN ASSISTANT

## 2019-03-14 PROCEDURE — 1101F PR PT FALLS ASSESS DOC 0-1 FALLS W/OUT INJ PAST YR: ICD-10-PCS | Mod: CPTII,S$GLB,, | Performed by: PHYSICIAN ASSISTANT

## 2019-03-17 RX ORDER — LEVOTHYROXINE SODIUM 75 UG/1
TABLET ORAL
Qty: 90 TABLET | Refills: 0 | Status: SHIPPED | OUTPATIENT
Start: 2019-03-17 | End: 2019-07-05 | Stop reason: SDUPTHER

## 2019-03-18 ENCOUNTER — PATIENT MESSAGE (OUTPATIENT)
Dept: INTERNAL MEDICINE | Facility: CLINIC | Age: 84
End: 2019-03-18

## 2019-03-18 RX ORDER — NITROFURANTOIN 25; 75 MG/1; MG/1
100 CAPSULE ORAL 2 TIMES DAILY
Qty: 10 CAPSULE | Refills: 0 | Status: SHIPPED | OUTPATIENT
Start: 2019-03-18 | End: 2019-08-12

## 2019-03-19 ENCOUNTER — OFFICE VISIT (OUTPATIENT)
Dept: OTOLARYNGOLOGY | Facility: CLINIC | Age: 84
End: 2019-03-19
Payer: MEDICARE

## 2019-03-19 VITALS
SYSTOLIC BLOOD PRESSURE: 134 MMHG | WEIGHT: 141.13 LBS | BODY MASS INDEX: 25.01 KG/M2 | HEIGHT: 63 IN | HEART RATE: 84 BPM | RESPIRATION RATE: 16 BRPM | DIASTOLIC BLOOD PRESSURE: 77 MMHG

## 2019-03-19 DIAGNOSIS — J02.9 PHARYNGITIS, UNSPECIFIED ETIOLOGY: Primary | ICD-10-CM

## 2019-03-19 PROCEDURE — 99999 PR PBB SHADOW E&M-EST. PATIENT-LVL III: CPT | Mod: PBBFAC,,, | Performed by: OTOLARYNGOLOGY

## 2019-03-19 PROCEDURE — 3078F PR MOST RECENT DIASTOLIC BLOOD PRESSURE < 80 MM HG: ICD-10-PCS | Mod: CPTII,S$GLB,, | Performed by: OTOLARYNGOLOGY

## 2019-03-19 PROCEDURE — 99213 OFFICE O/P EST LOW 20 MIN: CPT | Mod: S$GLB,,, | Performed by: OTOLARYNGOLOGY

## 2019-03-19 PROCEDURE — 99999 PR PBB SHADOW E&M-EST. PATIENT-LVL III: ICD-10-PCS | Mod: PBBFAC,,, | Performed by: OTOLARYNGOLOGY

## 2019-03-19 PROCEDURE — 3078F DIAST BP <80 MM HG: CPT | Mod: CPTII,S$GLB,, | Performed by: OTOLARYNGOLOGY

## 2019-03-19 PROCEDURE — 99213 PR OFFICE/OUTPT VISIT, EST, LEVL III, 20-29 MIN: ICD-10-PCS | Mod: S$GLB,,, | Performed by: OTOLARYNGOLOGY

## 2019-03-19 PROCEDURE — 1101F PR PT FALLS ASSESS DOC 0-1 FALLS W/OUT INJ PAST YR: ICD-10-PCS | Mod: CPTII,S$GLB,, | Performed by: OTOLARYNGOLOGY

## 2019-03-19 PROCEDURE — 3075F SYST BP GE 130 - 139MM HG: CPT | Mod: CPTII,S$GLB,, | Performed by: OTOLARYNGOLOGY

## 2019-03-19 PROCEDURE — 1101F PT FALLS ASSESS-DOCD LE1/YR: CPT | Mod: CPTII,S$GLB,, | Performed by: OTOLARYNGOLOGY

## 2019-03-19 PROCEDURE — 3075F PR MOST RECENT SYSTOLIC BLOOD PRESS GE 130-139MM HG: ICD-10-PCS | Mod: CPTII,S$GLB,, | Performed by: OTOLARYNGOLOGY

## 2019-03-19 NOTE — PROGRESS NOTES
Patient Name:  Date of Encounter: 1/29/2019  Provider: Ursula Lopez MD  Referring MD:  PCP:  Rad Onc:  Med Onc:  Derm:  Cardiology:    CC:  Odynophagia; dysphagia    HPI:    Patient is an 83-year-old female who presented yesterday complaining of a sore throat which makes it difficult to swallow.  She was noted to have a unilateral edematous uvula and tonsil.  Pain was 8/10 during the night but less during the day for the past 3 days. It wakes her from sleep. Pain is worse on the right compared to the left.  No trouble breathing due to this. She does have a hx of pulmonary issues due to breast radiation.   She reports that she had an episode similar to this in August, 2018.  However at that time she also had sinusitis. She was treated with antibiotics with resolution    Hx of tobacco use--quit in 1969.    2/12/2019  Symptoms of odynophagia and dysphagia have resolved with antibiotics.  She did however recently develop a URI which has almost completely resolved    3/19/2019    Patient is here today for follow-up and for CT scan results.  She reports that she has remained asymptomatic since her last course of antibiotics.  She has no new complaints    ROS:  Constitutional: Negative for activity change and appetite change, weight loss.   Eyes: Negative for discharge, visual changes.   Dysphagia and odynophagia--resolved; asymptomatic  Respiratory: Negative for difficulty breathing and wheezing   Cardiovascular: Negative for chest pain.   Gastrointestinal: Negative for abdominal distention and abdominal pain.   Endocrine: Negative for cold intolerance and heat intolerance.   Genitourinary: Negative for dysuria.   Musculoskeletal: Negative for gait problem, muscle pain and joint swelling.   Skin: Negative for color change and pallor; negative for skin lesions.   Neurological: Negative for syncope and weakness; no numbness face.   Psychiatric/Behavioral: Negative for agitation and confusion; negative for  depression.    Physical Exam:      Constitutional  · General Appearance: well nourished, well-developed, alert, oriented, in no acute distress  · Communication: ability, understanding, normal  Oral Cavity / Oropharynx  · Lips: upper and lower lips pink and moist  · Teeth: good dentition  · Gingiva: healthy   · Oral Mucosa: moist, no mucosal lesions  · Floor of Mouth: normal, no lesions, salivary ducts patent  · Tongue: moist, normal mobility, no lesions  · Palate: right soft palate edematous and extends down right lateral pharyngeal wall--non tender to palpation today and no edema;  hard palates without lesions or ulcers  Lymphatic:  · Anterior, Posterior, Submandibular, Submental, Supraclavicular: no lymphadenopathy present  Chest / Respiratory  · Chest: no stridor or retractions, normal effort and expansion  Neurological  · Cranial Nerves: grossly intact  · General: no focal deficits  Psychiatric  · Orientation: oriented to time, place and person    CT neck with contrast 2/2019  FINDINGS:  Included intracranial structures are unremarkable.  Atherosclerosis.  Mild mucosal thickening in the ethmoid sinuses.  Mild mucosal thickening in the bilateral sphenoid sinuses.  There are 2 adjacent 1.5 cm mucous retention cysts or polyps in the right maxillary sinus.  Mastoid air cells are clear.  Thyroid gland is atrophied.  Remaining glandular tissue in the neck unremarkable.    There is mucosal and submucosal thickening along the right oropharynx in the region of the right tonsillar pillar extending to the base of the tongue.  This can be seen series 6, image 28, series 602, image 87, series 601, image 75.  There is no nodular or masslike enhancement in the region.  Tissue in this region enhances similar to other pharyngeal mucosa.  There is is subtle fat stranding within the prevertebral fat.  Remaining aerodigestive tract unremarkable.    There is medialized course of the bilateral carotid arteries between the C3 and C5  vertebral levels.  No cervical adenopathy.  There is a pulmonary nodule in the right lung apex measuring 4 mm series 2, image 37.  There is a pulmonary nodule in the left lung apex measuring 4 mm series 2, image 24.  Pleuroparenchymal thickening along the right lung apex is linear in orientation and presumably reflective of scar.  There is partially imaged joint space loss and effusion of the right shoulder.  Multilevel cervical degenerative changes including prominent disc at the C5-6 level which could reflect herniation..      Impression       1. Soft tissue thickening along the right pharyngeal wall without a discrete mass.  This could reflect infectious or inflammatory process although infiltrative lesion such as lymphoma difficult to exclude.  Consider tissue sampling, especially if symptoms do not resolve.  2. Bilateral pulmonary nodules.  In a low risk patient, no further follow-up is recommended.  In a high-risk patient, 12 month follow-up CT could be considered.  3. Partially imaged right shoulder arthropathy.  Consider dedicated imaging if there are referable symptoms.  This report was flagged in Epic as abnormal.     I reviewed CT neck myself\     Assessment:   Recurrent Right-sided pharyngitis with odynophagia-no obvious lesions seen except for edema--no obvious lesion on CT neck and patient is now asymptomatic and exam normal/unchanged  Odynophagia resolved with antibiotics    Plan:  F/U PRN

## 2019-04-01 ENCOUNTER — PATIENT MESSAGE (OUTPATIENT)
Dept: NEUROSURGERY | Facility: CLINIC | Age: 84
End: 2019-04-01

## 2019-04-01 RX ORDER — MONTELUKAST SODIUM 10 MG/1
10 TABLET ORAL NIGHTLY
Qty: 90 TABLET | Refills: 3 | Status: SHIPPED | OUTPATIENT
Start: 2019-04-01 | End: 2020-04-02 | Stop reason: SDUPTHER

## 2019-04-01 NOTE — PROGRESS NOTES
Neurosurgery History & Physical    Patient ID: Olga Aguiar is a 84 y.o. female.    Chief Complaint   Patient presents with    Follow-up     Low back pain follow up to review EMG, XR. Denies changes since previous visit.        Review of Systems   Constitutional: Negative for activity change, chills, fever and unexpected weight change.   HENT: Negative for hearing loss, tinnitus, trouble swallowing and voice change.    Eyes: Negative.  Negative for visual disturbance.   Respiratory: Negative for apnea, chest tightness and shortness of breath.    Cardiovascular: Negative.  Negative for chest pain and palpitations.   Gastrointestinal: Negative.  Negative for abdominal pain, constipation, diarrhea, nausea and vomiting.   Genitourinary: Negative.  Negative for difficulty urinating, dysuria and frequency.   Musculoskeletal: Positive for arthralgias, back pain and gait problem. Negative for neck pain and neck stiffness.   Skin: Negative for wound.   Allergic/Immunologic: Negative for immunocompromised state.   Neurological: Positive for numbness. Negative for dizziness, tremors, seizures, facial asymmetry, speech difficulty, weakness, light-headedness and headaches.   Psychiatric/Behavioral: Negative for confusion and decreased concentration.       Past Medical History:   Diagnosis Date    Adrenal adenoma: 2.8 X 2.1CM 2010 5/16/2014    Anticoagulant long-term use      mg    Aortic atherosclerosis: see CT scan 2016 10/28/2016    Aortic stenosis 8/19/2014    Asthma     Breast cancer     Cancer     breast, both sides, right arm restriction    Cataracts, both eyes     Chronic back pain     Chronic bronchitis     CKD (chronic kidney disease) stage 3, GFR 30-59 ml/min 8/19/2014    no dialysis    COPD (chronic obstructive pulmonary disease)     occasional inhaler use, no oxygen    DDD (degenerative disc disease), lumbar 7/30/2013    Diabetes mellitus     diet controlled    Diverticulosis      Ectatic abdominal aorta: see CT scan 10/16 10/28/2016    Gallbladder polyp 2014    GERD (gastroesophageal reflux disease)     Gout     Herpes simplex     Fever Blisters and Styes in right eye    High blood cholesterol     Hypertension     holding medication when B/P low    Hypertension associated with diabetes 2012    Insomnia     Lumbar spinal stenosis     Lymphedema of arm     Right    Multiple lung nodules 2015    Renal cyst, right: stable per CT 2016 10/28/2016    Stroke 2012    TIA, no residual effects    Stye 2013    Thyroid disease     hypothyroidism    Type 2 diabetes mellitus without complication, without long-term current use of insulin 2015    Unspecified hypothyroidism 2015     Social History     Socioeconomic History    Marital status:      Spouse name: Not on file    Number of children: Not on file    Years of education: Not on file    Highest education level: Not on file   Occupational History    Not on file   Social Needs    Financial resource strain: Not on file    Food insecurity:     Worry: Not on file     Inability: Not on file    Transportation needs:     Medical: Not on file     Non-medical: Not on file   Tobacco Use    Smoking status: Former Smoker     Packs/day: 1.00     Years: 18.00     Pack years: 18.00     Start date: 1952     Last attempt to quit: 1970     Years since quittin.6    Smokeless tobacco: Never Used   Substance and Sexual Activity    Alcohol use: Yes     Comment: rare    Drug use: No    Sexual activity: Not Currently   Lifestyle    Physical activity:     Days per week: Not on file     Minutes per session: Not on file    Stress: Not on file   Relationships    Social connections:     Talks on phone: Not on file     Gets together: Not on file     Attends Rastafari service: Not on file     Active member of club or organization: Not on file     Attends meetings of clubs or organizations: Not on file      Relationship status: Not on file    Intimate partner violence:     Fear of current or ex partner: Not on file     Emotionally abused: Not on file     Physically abused: Not on file     Forced sexual activity: Not on file   Other Topics Concern    Not on file   Social History Narrative    Not on file     Family History   Problem Relation Age of Onset    Cancer Mother         Breast    COPD Mother     Hearing loss Mother     Hypertension Mother     Breast cancer Mother     Diabetes Father     Heart disease Father     Heart attack Father     Cancer Daughter         Breast    Breast cancer Daughter     Cancer Sister         Breast    Breast cancer Sister     Amblyopia Neg Hx     Blindness Neg Hx     Cataracts Neg Hx     Glaucoma Neg Hx     Macular degeneration Neg Hx     Retinal detachment Neg Hx     Strabismus Neg Hx     Stroke Neg Hx     Thyroid disease Neg Hx     Colon cancer Neg Hx     Stomach cancer Neg Hx     Esophageal cancer Neg Hx      Review of patient's allergies indicates:  No Known Allergies    Current Outpatient Medications:     acetaminophen (TYLENOL) 500 MG tablet, Take 500 mg by mouth every 6 (six) hours as needed., Disp: , Rfl:     acyclovir (ZOVIRAX) 400 MG tablet, Take 400 mg by mouth 2 (two) times daily as needed., Disp: , Rfl:     allopurinol (ZYLOPRIM) 100 MG tablet, TAKE 1 TABLET BY MOUTH ONCE DAILY, Disp: 30 tablet, Rfl: 6    aspirin (ECOTRIN) 81 MG EC tablet, Take 81 mg by mouth once daily., Disp: , Rfl:     busPIRone (BUSPAR) 5 MG Tab, Take 1 tablet (5 mg total) by mouth 2 (two) times daily as needed (anxiety)., Disp: 60 tablet, Rfl: 2    cholecalciferol, vitamin D3, 2,000 unit Cap, Take 2,000 Units by mouth Daily. , Disp: , Rfl:     fluticasone-vilanterol (BREO ELLIPTA) 200-25 mcg/dose DsDv diskus inhaler, Inhale 1 puff into the lungs once daily., Disp: 3 each, Rfl: 3    gabapentin (NEURONTIN) 300 MG capsule, TAKE ONE CAPSULE BY MOUTH IN THE EVENING,  "Disp: 90 capsule, Rfl: 3    levalbuterol (XOPENEX HFA) 45 mcg/actuation inhaler, Inhale 1-2 puffs into the lungs every 4 (four) hours as needed for Wheezing or Shortness of Breath., Disp: 3 Inhaler, Rfl: 3    montelukast (SINGULAIR) 10 mg tablet, Take 1 tablet (10 mg total) by mouth every evening., Disp: 90 tablet, Rfl: 3    predniSONE (DELTASONE) 10 MG tablet, Take 2 pills/day X 3 days, then 1 pills/day X 3 days then 1/2 pill/day X 3 days then stop, Disp: 11 tablet, Rfl: 0    ranitidine (ZANTAC) 300 MG tablet, Take 1 tablet (300 mg total) by mouth every evening., Disp: 90 tablet, Rfl: 3    simvastatin (ZOCOR) 40 MG tablet, Take 1 tablet (40 mg total) by mouth every evening., Disp: 90 tablet, Rfl: 3    sucralfate (CARAFATE) 1 gram tablet, Take 1 tablet by mouth 2 (two) times daily., Disp: , Rfl:     traMADol (ULTRAM) 50 mg tablet, TAKE 2 TABLETS BY MOUTH IN THE MORNING AND TAKE 1 TABLET IN THE EVENING AS DIRECTED, Disp: 90 tablet, Rfl: 1    triamterene-hydrochlorothiazide 37.5-25 mg (DYAZIDE) 37.5-25 mg per capsule, Take 1 capsule by mouth every morning., Disp: 90 capsule, Rfl: 1    zolpidem (AMBIEN) 5 MG Tab, TAKE 1 TABLET BY MOUTH AT BEDTIME AS NEEDED, Disp: 30 tablet, Rfl: 1    levothyroxine (SYNTHROID) 75 MCG tablet, TAKE 1 TABLET BY MOUTH ONCE DAILY, Disp: 90 tablet, Rfl: 0    nitrofurantoin, macrocrystal-monohydrate, (MACROBID) 100 MG capsule, Take 1 capsule (100 mg total) by mouth 2 (two) times daily., Disp: 10 capsule, Rfl: 0    Vitals:    03/14/19 1257   BP: (!) 146/77   Pulse: 86   Temp: 98.5 °F (36.9 °C)   TempSrc: Oral   Weight: 63.5 kg (139 lb 15.9 oz)   Height: 5' 3" (1.6 m)       Physical Exam   Constitutional: She is oriented to person, place, and time. Vital signs are normal. She appears well-developed and well-nourished.   HENT:   Head: Normocephalic and atraumatic.   Right Ear: Hearing normal.   Left Ear: Hearing normal.   Nose: Nose normal.   Mouth/Throat: Uvula is midline.   Eyes: " Pupils are equal, round, and reactive to light. Conjunctivae, EOM and lids are normal.   Neck: Trachea normal, normal range of motion, full passive range of motion without pain and phonation normal. Neck supple.   Cardiovascular: Normal rate, regular rhythm, intact distal pulses and normal pulses.   Pulmonary/Chest: Effort normal and breath sounds normal.   Abdominal: Soft. Normal appearance.   Musculoskeletal: Normal range of motion.   Feet:   Right Foot:   Protective Sensation: 4 sites tested. 4 sites sensed.   Skin Integrity: Negative for ulcer.   Left Foot:   Protective Sensation: 4 sites tested. 4 sites sensed.   Skin Integrity: Negative for ulcer.   Neurological: She is alert and oriented to person, place, and time. She has normal strength. No cranial nerve deficit or sensory deficit. She has an abnormal Heel to Shin Test and an abnormal Tandem Gait Test. She has a normal Finger-Nose-Finger Test and a normal Romberg Test. She displays a negative Romberg sign.   Reflex Scores:       Tricep reflexes are 2+ on the right side and 2+ on the left side.       Bicep reflexes are 2+ on the right side and 2+ on the left side.       Brachioradialis reflexes are 2+ on the right side and 2+ on the left side.       Patellar reflexes are 0 on the right side and 0 on the left side.       Achilles reflexes are 0 on the right side and 0 on the left side.  Skin: Skin is warm, dry and intact. Capillary refill takes less than 2 seconds.   Psychiatric: She has a normal mood and affect. Her speech is normal and behavior is normal. Judgment and thought content normal. Cognition and memory are normal.   Nursing note and vitals reviewed.      Neurologic Exam     Mental Status   Oriented to person, place, and time.   Follows 3 step commands.   Attention: normal. Concentration: normal.   Speech: speech is normal   Level of consciousness: alert  Knowledge: good.   Able to name object.     Cranial Nerves     CN II   Visual fields full to  confrontation.   Visual acuity: normal  Right visual field deficit: none  Left visual field deficit: none     CN III, IV, VI   Pupils are equal, round, and reactive to light.  Extraocular motions are normal.   CN III: no CN III palsy  CN VI: no CN VI palsy    CN V   Facial sensation intact.   Right facial sensation deficit: none  Left facial sensation deficit: none    CN VII   Facial expression full, symmetric.   Right facial weakness: none  Left facial weakness: none    CN VIII   CN VIII normal.   Hearing: intact    CN IX, X   CN IX normal.   CN X normal.     CN XI   CN XI normal.     CN XII   CN XII normal.     Motor Exam   Muscle bulk: normal  Overall muscle tone: normal  Right arm tone: normal  Left arm tone: normal  Right arm pronator drift: absent  Left arm pronator drift: absent  Right leg tone: normal  Left leg tone: normal    Strength   Strength 5/5 throughout.   Right neck flexion: 5/5  Left neck flexion: 5/5  Right neck extension: 5/5  Left neck extension: 5/5  Right deltoid: 5/5  Left deltoid: 5/5  Right biceps: 5/5  Left biceps: 5/5  Right triceps: 5/5  Left triceps: 5/5  Right wrist flexion: 5/5  Left wrist flexion: 5/5  Right wrist extension: 5/5  Left wrist extension: 5/5  Right interossei: 5/5  Left interossei: 5/5  Right abdominals: 5/5  Left abdominals: 5/5  Right iliopsoas: 5/5  Left iliopsoas: 5/5  Right quadriceps: 5/5  Left quadriceps: 5/5  Right hamstrin/5  Left hamstrin/5  Right glutei: 5/5  Left glutei: 5/5  Right anterior tibial: 5/5  Left anterior tibial: 5/5  Right posterior tibial: 5/5  Left posterior tibial: 5/5  Right peroneal: 5/5  Left peroneal: 5/5  Right gastroc: 5/5  Left gastroc: 5/5    Sensory Exam   Light touch normal.   Right arm light touch: normal  Left arm light touch: normal  Right leg light touch: normal  Left leg light touch: normal  Vibration normal.     Gait, Coordination, and Reflexes     Gait  Gait: wide-based    Coordination   Romberg: negative  Finger to  nose coordination: normal  Heel to shin coordination: abnormal  Tandem walking coordination: abnormal    Tremor   Resting tremor: absent  Intention tremor: absent  Action tremor: absent    Reflexes   Right brachioradialis: 2+  Left brachioradialis: 2+  Right biceps: 2+  Left biceps: 2+  Right triceps: 2+  Left triceps: 2+  Right patellar: 0  Left patellar: 0  Right achilles: 0  Left achilles: 0  Right : 2+  Left : 2+  Right plantar: normal  Left plantar: normal  Right Huerta: absent  Left Huerta: absent  Right ankle clonus: absent  Left ankle clonus: absent      Provider dictation:  Oswestry score: 42%  PHQ:  4    Visit Diagnosis:  There are no diagnoses linked to this encounter.  The patient is a very pleasant 84-year-old  female presenting today for follow-up evaluation after EMG and scoliosis films.  She continues to have chronic back pain and mild right leg pain.  She reports her pain is worse in the morning.  She reports that she has pain only with motion.  She occasionally has some burning in her right hip.  She occasionally has pain in the back of her legs.  She reports that the leg pain is bearable but her back pain becomes intolerable at some points.  She is unable to walk for any significant distances.  She does report that she has difficulty with breathing secondary to radiation from breast cancer.  She uses a walker for any significant distance.  She is unable to stand for any significant length of time.  She reports that her back is out.  She has right upper extremity lymphedema.  She occasionally has numbness and tingling in her feet gabapentin helps.  She reports the thing that bothers her the most is her back pain in her right hip pain.  She occasionally has left hip pain.      She has had ANUEL in the past that helped about 3 weeks.  She has not done physical therapy recently.  She does see Dr. Ware in pain management.  She is status post lumpectomy chemo radiation in 2000.  She  does report that she has approximately 42% lung capacity secondary to the radiation.       On physical examination, she is an elderly appearing female in no acute distress.  Her strength is maintained throughout her lower extremities.  She has diminished muscle stretch reflexes.  Dermatomal sensation intact.  She does have diminished stocking distribution to her mid shin with neuropathy.  She walks with a slow antalgic gait.    MRI from May of 2018 independently reviewed.  There is she has significant levoscoliosis of the lumbar spine.  She has multilevel spondylosis worse at T12 through L2.  At T12-L1 there is significant facet arthropathy on the right causing moderate foraminal stenosis on the right.  At L1-L2 there is severe right foraminal stenosis secondary to disc herniation and facet arthropathy the disc touch is multiple nerve roots in the central canal.  At L2-L3 there is severe bilateral foraminal stenosis and facet arthropathy moderate central canal stenosis.  Her crowding of the nerve roots.  At L3-L4 there is severe central canal and foraminal stenosis with severe right worse than left facet arthropathy.  There is anterior listhesis of L4 on L5 there is severe facet arthropathy and severe bilateral foraminal and central canal stenosis    EMG demonstrates left S1 acute on chronic radiculopathy and right L5-S1 acute on chronic radiculopathy    X-ray for scoliosis films incomplete and will be repeated for her levoscoliosis    The patient has significant levoscoliosis with severe degenerative changes.  She has multilevel stenosis.  Given her age but mostly her pulmonary status, she would not be a candidate for a long segment fusion.  I will have her follow-up with Dr. Amaro and [facility] she likely will be a candidate for the L5-S1 foraminotomies

## 2019-04-11 ENCOUNTER — NURSE TRIAGE (OUTPATIENT)
Dept: ADMINISTRATIVE | Facility: CLINIC | Age: 84
End: 2019-04-11

## 2019-04-12 DIAGNOSIS — F51.01 PRIMARY INSOMNIA: ICD-10-CM

## 2019-04-12 NOTE — TELEPHONE ENCOUNTER
Can you call to check on her, apparently, she went to the emergency room yesterday.  Please see how she is doing and if she needs a follow-up appointment.  Okay to schedule with Kimmie.  Thank you

## 2019-04-12 NOTE — TELEPHONE ENCOUNTER
Spoke to pt---she did not go to ER yesterday. Pt stated that when she was putting her clothes on pain went away and have not returned. Pt stated that she is doing great.     Advised pt to call if pain returns.

## 2019-04-12 NOTE — TELEPHONE ENCOUNTER
Reason for Disposition   RN needs further essential information from caller in order to complete triage    Additional Information   Commented on: [1] SEVERE pain AND [2] age > 60     Off to the left    Protocols used: INFORMATION ONLY CALL-A-AH, ABDOMINAL PAIN - FEMALE-A-    Patient with left side pain right below armpit and before the waist. The pain comes ahd goes and has been going on since 3p.    Patient has taken pain medication and it has not helped  SO patient is sent o the ER without further observation

## 2019-04-15 RX ORDER — ZOLPIDEM TARTRATE 5 MG/1
TABLET ORAL
Qty: 30 TABLET | Refills: 1 | Status: SHIPPED | OUTPATIENT
Start: 2019-04-15 | End: 2019-06-20 | Stop reason: SDUPTHER

## 2019-04-16 ENCOUNTER — TELEPHONE (OUTPATIENT)
Dept: INTERNAL MEDICINE | Facility: CLINIC | Age: 84
End: 2019-04-16

## 2019-04-16 ENCOUNTER — OFFICE VISIT (OUTPATIENT)
Dept: PAIN MEDICINE | Facility: CLINIC | Age: 84
End: 2019-04-16
Payer: MEDICARE

## 2019-04-16 VITALS
OXYGEN SATURATION: 95 % | HEART RATE: 79 BPM | RESPIRATION RATE: 20 BRPM | BODY MASS INDEX: 27.59 KG/M2 | SYSTOLIC BLOOD PRESSURE: 142 MMHG | DIASTOLIC BLOOD PRESSURE: 67 MMHG | WEIGHT: 155.75 LBS | TEMPERATURE: 98 F

## 2019-04-16 DIAGNOSIS — R26.81 GAIT INSTABILITY: ICD-10-CM

## 2019-04-16 DIAGNOSIS — M48.061 SPINAL STENOSIS OF LUMBAR REGION WITHOUT NEUROGENIC CLAUDICATION: ICD-10-CM

## 2019-04-16 DIAGNOSIS — M51.36 DDD (DEGENERATIVE DISC DISEASE), LUMBAR: ICD-10-CM

## 2019-04-16 DIAGNOSIS — M54.16 LUMBAR RADICULOPATHY: Primary | ICD-10-CM

## 2019-04-16 DIAGNOSIS — M54.16 LUMBAR RADICULITIS: ICD-10-CM

## 2019-04-16 DIAGNOSIS — G89.4 CHRONIC PAIN DISORDER: ICD-10-CM

## 2019-04-16 PROCEDURE — 3078F DIAST BP <80 MM HG: CPT | Mod: CPTII,S$GLB,, | Performed by: PHYSICIAN ASSISTANT

## 2019-04-16 PROCEDURE — 99215 OFFICE O/P EST HI 40 MIN: CPT | Mod: S$GLB,,, | Performed by: PHYSICIAN ASSISTANT

## 2019-04-16 PROCEDURE — 99999 PR PBB SHADOW E&M-EST. PATIENT-LVL V: CPT | Mod: PBBFAC,,, | Performed by: PHYSICIAN ASSISTANT

## 2019-04-16 PROCEDURE — 1100F PTFALLS ASSESS-DOCD GE2>/YR: CPT | Mod: CPTII,S$GLB,, | Performed by: PHYSICIAN ASSISTANT

## 2019-04-16 PROCEDURE — 3078F PR MOST RECENT DIASTOLIC BLOOD PRESSURE < 80 MM HG: ICD-10-PCS | Mod: CPTII,S$GLB,, | Performed by: PHYSICIAN ASSISTANT

## 2019-04-16 PROCEDURE — 3077F SYST BP >= 140 MM HG: CPT | Mod: CPTII,S$GLB,, | Performed by: PHYSICIAN ASSISTANT

## 2019-04-16 PROCEDURE — 3288F FALL RISK ASSESSMENT DOCD: CPT | Mod: CPTII,S$GLB,, | Performed by: PHYSICIAN ASSISTANT

## 2019-04-16 PROCEDURE — 1100F PR PT FALLS ASSESS DOC 2+ FALLS/FALL W/INJURY/YR: ICD-10-PCS | Mod: CPTII,S$GLB,, | Performed by: PHYSICIAN ASSISTANT

## 2019-04-16 PROCEDURE — 3077F PR MOST RECENT SYSTOLIC BLOOD PRESSURE >= 140 MM HG: ICD-10-PCS | Mod: CPTII,S$GLB,, | Performed by: PHYSICIAN ASSISTANT

## 2019-04-16 PROCEDURE — 99999 PR PBB SHADOW E&M-EST. PATIENT-LVL V: ICD-10-PCS | Mod: PBBFAC,,, | Performed by: PHYSICIAN ASSISTANT

## 2019-04-16 PROCEDURE — 99215 PR OFFICE/OUTPT VISIT, EST, LEVL V, 40-54 MIN: ICD-10-PCS | Mod: S$GLB,,, | Performed by: PHYSICIAN ASSISTANT

## 2019-04-16 PROCEDURE — 3288F PR FALLS RISK ASSESSMENT DOCUMENTED: ICD-10-PCS | Mod: CPTII,S$GLB,, | Performed by: PHYSICIAN ASSISTANT

## 2019-04-16 RX ORDER — ALPRAZOLAM 0.5 MG/1
1 TABLET, ORALLY DISINTEGRATING ORAL ONCE AS NEEDED
Status: CANCELLED | OUTPATIENT
Start: 2019-05-07 | End: 2030-10-02

## 2019-04-16 NOTE — TELEPHONE ENCOUNTER
----- Message from Alma Rosa Hopkins sent at 4/16/2019  3:55 PM CDT -----  Prior Authorization Needed    Medication: Ambien 5 mg    Pharmacy Info:     Plan does not cover this medication. Please call plan at [phone]      to initiate prior authorization or call/fax pharmacy to change medication. Patient ID#  [id number]  Note chart when prior authorization has been submitted.    Please notify pharmacy when prior authorization has been approved.    Thank You

## 2019-04-17 NOTE — H&P (VIEW-ONLY)
CC: Back and leg pain    HPI: The patient is a 84-year-old woman with a history of diabetes, previous breast CA, CVA and spinal stenosis who presents in referral from her primary care physician, Dr. Barger for back pain and leg pain. She returns in follow-up today with back pain. This is located across the low back without current radiation into her legs.  She has recently seen Neurosurgery and is going to see Dr. King on 04/29.  Her back pain is very severe with standing and walking and improved with sitting and tramadol.  She continues to use her walker to ambulate and feels off balance if she has to go any distance without it.  She denies weakness, numbness, bladder or bowel incontinence.    Pain intervention history: She tried physical therapy about 1 year ago and it seemed to make her pain worse. Patient is status post right L3 and L4 transforaminal injection on 8/9/13 with 50% relief of low back and right leg pain.  She is status post bilateral L4 transforaminal epidural steroid injections on 9/27/13 and 11/1/13 with 10-20% relief.  She is status post radiofrequency ablation of the left L3, 4, 5 medial branch nerves on 5/5/14 with 50% relief of her left low back pain.  She is status post radiofrequency ablation of the right L3, 4 and 5 medial branch nerves on 6/16/14 with mild relief, however she reported significant more relief at a later visit.  She is status post bilateral L4 transforaminal epidural steroid injections on 9/24/14 with moderate relief.  She is status post bilateral L4 transforaminal epidural steroid injections on 1/7/15 with complete relief of her posterior thigh pain, moderate relief of her low back pain and minimal relief of her right lateral hip pain.  She is status post C7-T1 cervical interlaminar epidural steroid injection on 2/27/15 with greater than 50% relief.  She is status post bilateral L3, 4 and 5 medial branch radio frequency ablation on 7/29/16 reporting 0% relief initially at  4 weeks but then reporting 90% relief after 6-7 weeks.  She is status post bilateral L4 transforaminal epidural steroid injections on 11/11/16 with not quite 50% relief.  She is status post bilateral L4 transforaminal epidural steroid injections on 4/4/17 with 50% relief.  She is status post bilateral L4 transforaminal epidural steroid injections on 8/23/17 with greater than 80% relief.  She is status post bilateral L3, 4 and 5 medial branch radiofrequency ablation on 3/6/18 with moderate relief.  She is status post L5/S1 interlaminar epidural steroid injection on 06/04/2018 with almost 100% relief lasting 1 month.  She is status post L5/S1 interlaminar epidural steroid injection on 09/04/2018 with almost 100% relief.  She is status post L5/S1 interlaminar epidural steroid injection on 12/20/2018 with greater than 50% relief.    ROS:She reports easy bruising, back pain and difficulty sleeping.  Balance of review of systems is negative.    Medical, surgical, family and social history reviewed elsewhere in record.    Medications/Allergies: See med card    Vitals:    04/16/19 1304   BP: (!) 142/67   Pulse: 79   Resp: 20   Temp: 97.6 °F (36.4 °C)   TempSrc: Oral   SpO2: 95%   Weight: 70.7 kg (155 lb 12.1 oz)   PainSc:   4   PainLoc: Back         Physical exam:  Gen: A and O x3, pleasant, well-groomed  Skin: No rashes or obvious lesions  HEENT: PERRLA  CVS: Regular rate and rhythm, normal S1 and S2, no murmurs.  Resp: Clear to auscultation bilaterally, no wheezes or rales.  Abdomen: Soft, NT/ND, normal bowel sounds present.  Musculoskeletal: Able to stand and walk short distances without assistance, however uses a walker when out of the house.  She has disuse atrophy of the lumbar paraspinous muscles.  Slow to go from seated to standing position.    Neuro:  Upper extremities: 5/5 strength bilaterally   Lower extremities: 5/5 strength bilaterally  Reflexes: Brachioradialis 2+, Bicep 2+, Tricep 2+. Patellar 2+, Achilles  2+.  Sensory: Intact and symmetrical to light touch and pinprick in C2-T1 dermatomes bilaterally.  Intact and symmetrical to light touch and pinprick in L2-S1 dermatomes bilaterally, except for a small patch over her left lateral shin decreased with light touch and pinprick.    Lumbar spine:  Lumbar spine: ROM is moderately reduced with flexion extension and oblique extension without increased pain.  Terry's test is deferred.  Supine straight leg raise causes right greater than left buttock pain.  Internal and external rotation of the hip causes no increased pain on either side.  Myofascial exam: No tenderness to palpation across lumbar paraspinous muscles.      Imagin13 MRI L-spine  T10-T11: There is severe disk desiccation and a moderate diffuse disk bulge and ligamentous hypertrophy. This is not complete evaluation of the thoracic spine, but there does appear to be mild central canal stenosis.  T11-T12: Moderate disk desiccation and mild diffuse disk bulge. Mild narrowing of the thecal sac. Neural foraminal regions difficult to assess due to the scoliosis.  T12-L1: Severe disk degenerative disease with marked desiccation, diffuse disk bulge, and spurring of vertebral bodies. Mild central canal stenosis. Neural foraminal narrowing bilaterally, difficult to grade due to scoliosis.  L1-L2: Advanced disk desiccation with moderate diffuse disk bulge and mild spurring of vertebral bodies. Small superimposed central to right paracentral disk extrusion. Mild central canal stenosis. Mild to moderate facet arthropathy and ligamentous hypertrophy. Bilateral foraminal stenosis.  L2 - L3: There is also disk desiccation and diffuse disk bulge and small annular fissure posteriorly. facet arthropathy and hypertrophy ligamentum flavum. Mild central canal stenosis. Moderate right and mild left neuroforaminal stenosis.  L3-L4: Disk desiccation and moderate diffuse disk bulge. Small right paracentral ascending disk  extrusion and moderate facet arthropathy present bilaterally and hypertrophy of ligamentum flavum. In combination, the findings result in severe central canal stenosis. For example see axial image 22, central image 7. There is mild to moderate neural foraminal stenosis bilaterally.  L4 - L5: Mild anterolisthesis with disk desiccation and uncovering of the disk, with moderate right and severe left facet arthropathy and hypertrophy of ligamentum flavum. Severe central canal stenosis. Mild neural foraminal narrowing, right worse than left.  L5-S1: Disk desiccation and mild diffuse disk bulge. A small ascending disk extrusion centrally in the midline. No significant central canal stenosis. Advanced facet arthropathy bilaterally, left worse than right. Mild left neural foraminal narrowing. No significant right neural foraminal stenosis.    5/28/2018 MRI lumbar spine  T12-L1: There is marked disc space narrowing, trace retrolisthesis, unroofing of a moderate disc bulge.  There is right greater than left facet joint arthropathy.  There is mild spinal stenosis with moderate left and moderate-to-marked right foraminal stenosis.  These findings have mildly progressed.  L1-2: There is trace retrolisthesis of L1 on L2, there is inferior unroofing of a moderate diffuse disc bulge with small superimposed central disc protrusion.  There is right greater than left facet joint arthropathy.  There is borderline spinal stenosis.  There is crowding of right lateral recess.  There is marked right and moderate-to-marked left foraminal stenosis with very slight progression.  L2-3: There is trace retrolisthesis of L2 on L3 where there is marked disc space narrowing.  There is a moderate diffuse disc bulge with osteophytic ridging and superimposed right foraminal disc protrusion.  There is moderate-to-marked bilateral facet joint arthropathy with ligamentum flavum thickening, right greater than.  There is moderate central spinal stenosis  and right lateral recess stenosis.  There is mild left lateral recess stenosis.  There is marked right and moderate-to-marked left foraminal stenosis with progression.  L3-4: There is disc space narrowing.  There is marked facet joint arthropathy with ligamentum flavum thickening.  There is a small 4 mm left synovial cyst.  There is unroofing of a moderate to marked diffuse disc bulge.  There is severe spinal canal, bilateral lateral recess and right foraminal stenosis with moderate to marked left foraminal stenosis and with slight progression.  L4-5: There is stable grade 1 spondylolisthesis with 4 mm anterolisthesis of L4 on L5.  There is marked facet joint arthropathy.  There is unroofing of a moderate to marked diffuse disc bulge.  There is severe spinal canal, bilateral lateral recess and foraminal stenosis with slight progression.  L5-S1: There is disc space narrowing at the L5-S1 level, worse towards the left.  There is a disc bulge with small superimposed central disc extrusion with 5 mm cephalad extension.  This was present previously but is slightly more conspicuous.  There is marked facet joint arthropathy.  There is moderate central spinal stenosis with mild crowding of the left lateral recess.  There is mild right and moderate-to-marked left foraminal stenosis with progression.      Assessment:   The patient is a 84-year-old woman with a history of diabetes, previous breast CA, CVA and spinal stenosis who presents in referral from her primary care physician, Dr. Barger for back pain and leg pain.     1. Lumbar radiculopathy  Vital signs    Verify informed consent    Notify physician     Notify physician     Notify physician (specify)    Diet NPO    Case Request Operating Room: Injection-steroid-epidural-lumbar L5/S1    Place in Outpatient    alprazolam ODT dissolvable tablet 1 mg   2. DDD (degenerative disc disease), lumbar     3. Gait instability     4. Chronic pain disorder     5. Lumbar radiculitis      6. Spinal stenosis of lumbar region without neurogenic claudication         Plan:  1.  She is going to see Dr. King on 04/29.  If an operation is not suggested she would like to repeat and L5/S1 interlaminar ANUEL so we will set this up for her next month.  If an operation is suggested she will call and cancel the procedure. We also discussed spinal cord stimulation in detail if an operation is not suggested.  I have given her information from [employer].  If we schedule a trial I will order a back brace with lateral support.  We discussed the risks involved.  In the meantime she will continue to take tramadol with relief and call us after she has her visit with Neurosurgery.    Greater than 50% of this 40 min visit was spent on counseling the patient.

## 2019-04-17 NOTE — TELEPHONE ENCOUNTER
"----- Message from Stephanie Modestakanika Livingston sent at 4/17/2019  1:22 PM CDT -----  Contact: [id number] [phone]  Prior Authorization Needed    Rx: zolpidem (AMBIEN) 5 MG Tab    To submit the PA:    1: Go to " [email or web address] " and click "Enter a Key"    2. Enter the patient's last name and date of birth and the key.      KEY: [id number]    3. Complete the forms and click "send to Plan"    Note chart when prior authorization has been submitted.    Please notify pharmacy when prior authorization has been approved.    Thank You    "

## 2019-04-17 NOTE — TELEPHONE ENCOUNTER
Your PA has been faxed to the plan as a paper copy. Please contact the plan directly if you haven't received a determination in a typical timeframe.    You will be notified of the determination via fax.

## 2019-04-17 NOTE — PROGRESS NOTES
CC: Back and leg pain    HPI: The patient is a 84-year-old woman with a history of diabetes, previous breast CA, CVA and spinal stenosis who presents in referral from her primary care physician, Dr. Barger for back pain and leg pain. She returns in follow-up today with back pain. This is located across the low back without current radiation into her legs.  She has recently seen Neurosurgery and is going to see Dr. King on 04/29.  Her back pain is very severe with standing and walking and improved with sitting and tramadol.  She continues to use her walker to ambulate and feels off balance if she has to go any distance without it.  She denies weakness, numbness, bladder or bowel incontinence.    Pain intervention history: She tried physical therapy about 1 year ago and it seemed to make her pain worse. Patient is status post right L3 and L4 transforaminal injection on 8/9/13 with 50% relief of low back and right leg pain.  She is status post bilateral L4 transforaminal epidural steroid injections on 9/27/13 and 11/1/13 with 10-20% relief.  She is status post radiofrequency ablation of the left L3, 4, 5 medial branch nerves on 5/5/14 with 50% relief of her left low back pain.  She is status post radiofrequency ablation of the right L3, 4 and 5 medial branch nerves on 6/16/14 with mild relief, however she reported significant more relief at a later visit.  She is status post bilateral L4 transforaminal epidural steroid injections on 9/24/14 with moderate relief.  She is status post bilateral L4 transforaminal epidural steroid injections on 1/7/15 with complete relief of her posterior thigh pain, moderate relief of her low back pain and minimal relief of her right lateral hip pain.  She is status post C7-T1 cervical interlaminar epidural steroid injection on 2/27/15 with greater than 50% relief.  She is status post bilateral L3, 4 and 5 medial branch radio frequency ablation on 7/29/16 reporting 0% relief initially at  4 weeks but then reporting 90% relief after 6-7 weeks.  She is status post bilateral L4 transforaminal epidural steroid injections on 11/11/16 with not quite 50% relief.  She is status post bilateral L4 transforaminal epidural steroid injections on 4/4/17 with 50% relief.  She is status post bilateral L4 transforaminal epidural steroid injections on 8/23/17 with greater than 80% relief.  She is status post bilateral L3, 4 and 5 medial branch radiofrequency ablation on 3/6/18 with moderate relief.  She is status post L5/S1 interlaminar epidural steroid injection on 06/04/2018 with almost 100% relief lasting 1 month.  She is status post L5/S1 interlaminar epidural steroid injection on 09/04/2018 with almost 100% relief.  She is status post L5/S1 interlaminar epidural steroid injection on 12/20/2018 with greater than 50% relief.    ROS:She reports easy bruising, back pain and difficulty sleeping.  Balance of review of systems is negative.    Medical, surgical, family and social history reviewed elsewhere in record.    Medications/Allergies: See med card    Vitals:    04/16/19 1304   BP: (!) 142/67   Pulse: 79   Resp: 20   Temp: 97.6 °F (36.4 °C)   TempSrc: Oral   SpO2: 95%   Weight: 70.7 kg (155 lb 12.1 oz)   PainSc:   4   PainLoc: Back         Physical exam:  Gen: A and O x3, pleasant, well-groomed  Skin: No rashes or obvious lesions  HEENT: PERRLA  CVS: Regular rate and rhythm, normal S1 and S2, no murmurs.  Resp: Clear to auscultation bilaterally, no wheezes or rales.  Abdomen: Soft, NT/ND, normal bowel sounds present.  Musculoskeletal: Able to stand and walk short distances without assistance, however uses a walker when out of the house.  She has disuse atrophy of the lumbar paraspinous muscles.  Slow to go from seated to standing position.    Neuro:  Upper extremities: 5/5 strength bilaterally   Lower extremities: 5/5 strength bilaterally  Reflexes: Brachioradialis 2+, Bicep 2+, Tricep 2+. Patellar 2+, Achilles  2+.  Sensory: Intact and symmetrical to light touch and pinprick in C2-T1 dermatomes bilaterally.  Intact and symmetrical to light touch and pinprick in L2-S1 dermatomes bilaterally, except for a small patch over her left lateral shin decreased with light touch and pinprick.    Lumbar spine:  Lumbar spine: ROM is moderately reduced with flexion extension and oblique extension without increased pain.  Terry's test is deferred.  Supine straight leg raise causes right greater than left buttock pain.  Internal and external rotation of the hip causes no increased pain on either side.  Myofascial exam: No tenderness to palpation across lumbar paraspinous muscles.      Imagin13 MRI L-spine  T10-T11: There is severe disk desiccation and a moderate diffuse disk bulge and ligamentous hypertrophy. This is not complete evaluation of the thoracic spine, but there does appear to be mild central canal stenosis.  T11-T12: Moderate disk desiccation and mild diffuse disk bulge. Mild narrowing of the thecal sac. Neural foraminal regions difficult to assess due to the scoliosis.  T12-L1: Severe disk degenerative disease with marked desiccation, diffuse disk bulge, and spurring of vertebral bodies. Mild central canal stenosis. Neural foraminal narrowing bilaterally, difficult to grade due to scoliosis.  L1-L2: Advanced disk desiccation with moderate diffuse disk bulge and mild spurring of vertebral bodies. Small superimposed central to right paracentral disk extrusion. Mild central canal stenosis. Mild to moderate facet arthropathy and ligamentous hypertrophy. Bilateral foraminal stenosis.  L2 - L3: There is also disk desiccation and diffuse disk bulge and small annular fissure posteriorly. facet arthropathy and hypertrophy ligamentum flavum. Mild central canal stenosis. Moderate right and mild left neuroforaminal stenosis.  L3-L4: Disk desiccation and moderate diffuse disk bulge. Small right paracentral ascending disk  extrusion and moderate facet arthropathy present bilaterally and hypertrophy of ligamentum flavum. In combination, the findings result in severe central canal stenosis. For example see axial image 22, central image 7. There is mild to moderate neural foraminal stenosis bilaterally.  L4 - L5: Mild anterolisthesis with disk desiccation and uncovering of the disk, with moderate right and severe left facet arthropathy and hypertrophy of ligamentum flavum. Severe central canal stenosis. Mild neural foraminal narrowing, right worse than left.  L5-S1: Disk desiccation and mild diffuse disk bulge. A small ascending disk extrusion centrally in the midline. No significant central canal stenosis. Advanced facet arthropathy bilaterally, left worse than right. Mild left neural foraminal narrowing. No significant right neural foraminal stenosis.    5/28/2018 MRI lumbar spine  T12-L1: There is marked disc space narrowing, trace retrolisthesis, unroofing of a moderate disc bulge.  There is right greater than left facet joint arthropathy.  There is mild spinal stenosis with moderate left and moderate-to-marked right foraminal stenosis.  These findings have mildly progressed.  L1-2: There is trace retrolisthesis of L1 on L2, there is inferior unroofing of a moderate diffuse disc bulge with small superimposed central disc protrusion.  There is right greater than left facet joint arthropathy.  There is borderline spinal stenosis.  There is crowding of right lateral recess.  There is marked right and moderate-to-marked left foraminal stenosis with very slight progression.  L2-3: There is trace retrolisthesis of L2 on L3 where there is marked disc space narrowing.  There is a moderate diffuse disc bulge with osteophytic ridging and superimposed right foraminal disc protrusion.  There is moderate-to-marked bilateral facet joint arthropathy with ligamentum flavum thickening, right greater than.  There is moderate central spinal stenosis  and right lateral recess stenosis.  There is mild left lateral recess stenosis.  There is marked right and moderate-to-marked left foraminal stenosis with progression.  L3-4: There is disc space narrowing.  There is marked facet joint arthropathy with ligamentum flavum thickening.  There is a small 4 mm left synovial cyst.  There is unroofing of a moderate to marked diffuse disc bulge.  There is severe spinal canal, bilateral lateral recess and right foraminal stenosis with moderate to marked left foraminal stenosis and with slight progression.  L4-5: There is stable grade 1 spondylolisthesis with 4 mm anterolisthesis of L4 on L5.  There is marked facet joint arthropathy.  There is unroofing of a moderate to marked diffuse disc bulge.  There is severe spinal canal, bilateral lateral recess and foraminal stenosis with slight progression.  L5-S1: There is disc space narrowing at the L5-S1 level, worse towards the left.  There is a disc bulge with small superimposed central disc extrusion with 5 mm cephalad extension.  This was present previously but is slightly more conspicuous.  There is marked facet joint arthropathy.  There is moderate central spinal stenosis with mild crowding of the left lateral recess.  There is mild right and moderate-to-marked left foraminal stenosis with progression.      Assessment:   The patient is a 84-year-old woman with a history of diabetes, previous breast CA, CVA and spinal stenosis who presents in referral from her primary care physician, Dr. Barger for back pain and leg pain.     1. Lumbar radiculopathy  Vital signs    Verify informed consent    Notify physician     Notify physician     Notify physician (specify)    Diet NPO    Case Request Operating Room: Injection-steroid-epidural-lumbar L5/S1    Place in Outpatient    alprazolam ODT dissolvable tablet 1 mg   2. DDD (degenerative disc disease), lumbar     3. Gait instability     4. Chronic pain disorder     5. Lumbar radiculitis      6. Spinal stenosis of lumbar region without neurogenic claudication         Plan:  1.  She is going to see Dr. King on 04/29.  If an operation is not suggested she would like to repeat and L5/S1 interlaminar ANUEL so we will set this up for her next month.  If an operation is suggested she will call and cancel the procedure. We also discussed spinal cord stimulation in detail if an operation is not suggested.  I have given her information from [employer].  If we schedule a trial I will order a back brace with lateral support.  We discussed the risks involved.  In the meantime she will continue to take tramadol with relief and call us after she has her visit with Neurosurgery.    Greater than 50% of this 40 min visit was spent on counseling the patient.

## 2019-04-22 ENCOUNTER — TELEPHONE (OUTPATIENT)
Dept: INTERNAL MEDICINE | Facility: CLINIC | Age: 84
End: 2019-04-22

## 2019-04-22 NOTE — TELEPHONE ENCOUNTER
----- Message from Maria T Ortiz sent at 4/18/2019  1:15 PM CDT -----  Contact: [employer]/[phone]  Rep called in regard to having question about the prior authorization that was sent over for the zolpidem (AMBIEN) 5 MG Tab 30 tablet 1 4/15/2019  No  TAKE 1 TABLET BY MOUTH AT BEDTIME AS NEEDED    [facility]  Please advise

## 2019-04-27 RX ORDER — TRIAMTERENE AND HYDROCHLOROTHIAZIDE 37.5; 25 MG/1; MG/1
CAPSULE ORAL
Qty: 90 CAPSULE | Refills: 1 | Status: SHIPPED | OUTPATIENT
Start: 2019-04-27 | End: 2019-11-04 | Stop reason: SDUPTHER

## 2019-04-29 ENCOUNTER — PATIENT MESSAGE (OUTPATIENT)
Dept: PAIN MEDICINE | Facility: CLINIC | Age: 84
End: 2019-04-29

## 2019-04-29 ENCOUNTER — OFFICE VISIT (OUTPATIENT)
Dept: SPINE | Facility: CLINIC | Age: 84
End: 2019-04-29
Payer: MEDICARE

## 2019-04-29 VITALS
DIASTOLIC BLOOD PRESSURE: 77 MMHG | TEMPERATURE: 98 F | SYSTOLIC BLOOD PRESSURE: 160 MMHG | BODY MASS INDEX: 27.81 KG/M2 | HEART RATE: 70 BPM | WEIGHT: 157 LBS

## 2019-04-29 DIAGNOSIS — M41.50 DEGENERATIVE SCOLIOSIS IN ADULT PATIENT: Primary | ICD-10-CM

## 2019-04-29 PROCEDURE — 3078F PR MOST RECENT DIASTOLIC BLOOD PRESSURE < 80 MM HG: ICD-10-PCS | Mod: CPTII,S$GLB,, | Performed by: NEUROLOGICAL SURGERY

## 2019-04-29 PROCEDURE — 99214 PR OFFICE/OUTPT VISIT, EST, LEVL IV, 30-39 MIN: ICD-10-PCS | Mod: S$GLB,,, | Performed by: NEUROLOGICAL SURGERY

## 2019-04-29 PROCEDURE — 1101F PR PT FALLS ASSESS DOC 0-1 FALLS W/OUT INJ PAST YR: ICD-10-PCS | Mod: CPTII,S$GLB,, | Performed by: NEUROLOGICAL SURGERY

## 2019-04-29 PROCEDURE — 99999 PR PBB SHADOW E&M-EST. PATIENT-LVL III: ICD-10-PCS | Mod: PBBFAC,,, | Performed by: NEUROLOGICAL SURGERY

## 2019-04-29 PROCEDURE — 3077F PR MOST RECENT SYSTOLIC BLOOD PRESSURE >= 140 MM HG: ICD-10-PCS | Mod: CPTII,S$GLB,, | Performed by: NEUROLOGICAL SURGERY

## 2019-04-29 PROCEDURE — 3078F DIAST BP <80 MM HG: CPT | Mod: CPTII,S$GLB,, | Performed by: NEUROLOGICAL SURGERY

## 2019-04-29 PROCEDURE — 3077F SYST BP >= 140 MM HG: CPT | Mod: CPTII,S$GLB,, | Performed by: NEUROLOGICAL SURGERY

## 2019-04-29 PROCEDURE — 1101F PT FALLS ASSESS-DOCD LE1/YR: CPT | Mod: CPTII,S$GLB,, | Performed by: NEUROLOGICAL SURGERY

## 2019-04-29 PROCEDURE — 99999 PR PBB SHADOW E&M-EST. PATIENT-LVL III: CPT | Mod: PBBFAC,,, | Performed by: NEUROLOGICAL SURGERY

## 2019-04-29 PROCEDURE — 99214 OFFICE O/P EST MOD 30 MIN: CPT | Mod: S$GLB,,, | Performed by: NEUROLOGICAL SURGERY

## 2019-04-29 NOTE — PROGRESS NOTES
Dear Dr. Amaro,     Thank you for referring this patient to my clinic. The full details of my evaluation will also be forthcoming to you by letter.    CHIEF COMPLAINT:  Consult for low back pain    I, Brice Israel, attest that this documentation has been prepared under the direction and in the presence of Harley King MD.    HPI:  Olga Aguiar is a 84 y.o.  female with type 2 diabetes, COPD, stage 3 CKD, and history of stroke and breast cancer, who is referred to me by Dr. Amaro for evaluation of low back pain. Pt reports primarily low back pain radiating into her bilateral buttocks, right worse than left. Pt was previously experiencing shooting BLE pain and numbness, but that has since resolved. Pt has received several injections with only temporary relief. Pt participated in physical therapy 1-2 years ago with not improvement of her back pain, although she did feel stronger overall. Pt presents today using a walker. She ambulates very well when using her walker. She has not tried wearing a back brace. She notices forward leaning posture, and states that it alleviates the pain. Pt has 42% lung capacity s/p radiation for breast cancer. She notes some trouble breathing.        Review of patient's allergies indicates:  No Known Allergies    Past Medical History:   Diagnosis Date    Adrenal adenoma: 2.8 X 2.1CM 2010 5/16/2014    Anticoagulant long-term use      mg    Aortic atherosclerosis: see CT scan 2016 10/28/2016    Aortic stenosis 8/19/2014    Asthma     Breast cancer     Cancer     breast, both sides, right arm restriction    Cataracts, both eyes     Chronic back pain     Chronic bronchitis     CKD (chronic kidney disease) stage 3, GFR 30-59 ml/min 8/19/2014    no dialysis    COPD (chronic obstructive pulmonary disease)     occasional inhaler use, no oxygen    DDD (degenerative disc disease), lumbar 7/30/2013    Diabetes mellitus     diet controlled    Diverticulosis     Ectatic  abdominal aorta: see CT scan 10/16 10/28/2016    Gallbladder polyp 8/19/2014    GERD (gastroesophageal reflux disease)     Gout     Herpes simplex     Fever Blisters and Styes in right eye    High blood cholesterol     Hypertension     holding medication when B/P low    Hypertension associated with diabetes 2/7/2012    Insomnia     Lumbar spinal stenosis     Lymphedema of arm     Right    Multiple lung nodules 9/21/2015    Renal cyst, right: stable per CT 2016 10/28/2016    Stroke 2/2012    TIA, no residual effects    Stye 12/5/2013    Thyroid disease     hypothyroidism    Type 2 diabetes mellitus without complication, without long-term current use of insulin 7/20/2015    Unspecified hypothyroidism 7/20/2015     Past Surgical History:   Procedure Laterality Date    ABLATION, RADIOFREQUENCY L3,4,5 Bilateral 3/6/2018    [facility] at [facility]    AXILLARY NODE DISSECTION      both sides    BLOCK, NERVE, FACET JOINT, LUMBAR, MEDIAL BRANCH Left 4/23/2014    [facility] at [facility] OR    BLOCK-NERVE-MEDIAL BRANCH-LUMBAR Right 6/9/2014    [facility] at [facility]    BREAST BIOPSY      BREAST LUMPECTOMY Bilateral     lymph nodes on right    CATARACT EXTRACTION      bilateral PHACO with IOL    COLONOSCOPY  01/12/2010    in legacy    EGD (ESOPHAGOGASTRODUODENOSCOPY) N/A 11/12/2018    Performed by Braydon Becerra MD at [facility] ENDO    Epidural steroid injection      Pain mangement    ANUEL-TRANSFORAMINAL Bilateral 9/24/2014    [facility] by [facility] OR    ANUEL-TRANSFORAMINAL Bilateral 11/1/2013    [facility] by [facility]    ANUEL-TRANSFORAMINAL Bilateral 9/27/2013    [facility]    ANUEL-TRANSFORAMINAL Right 8/9/2013    [facility]    ANUEL-TRANSFORAMINAL L4 Bilateral 8/23/2017    [facility] OR     ANUEL-TRANSFORAMINAL L4 Bilateral 4/4/2017    [facility]    ANUEL-TRANSFORAMINAL L4 Bilateral 11/11/2016    [facility] OR    ANUEL-TRANSFORAMINAL L4 Bilateral 1/7/2015    [facility]    ESOPHAGOGASTRODUODENOSCOPY (EGD) N/A 8/29/2014    [facility]    HYSTERECTOMY      HYSTERECTOMY      INJECTION-STEROID-EPIDURAL-CERVICAL N/A 2/27/2015    [facility] at [facility] OR    Injection-steroid-epidural-lumbar N/A 12/20/2018    [facility]formed [facility] at [facility] OR    Injection-steroid-epidural-lumbar N/A 6/4/2018    [facility] at [facility]    Injection-steroid-epidural-lumbar L5/S1 N/A 9/4/2018    [facility] OR    Nerve Block Injection      Pain management, Times 2    RADIOFREQUENCY ABLATION      Nerve, Pain management    RADIOFREQUENCY ABLATION, NERVE, SPINAL, LUMBAR, MEDIAL BRANCH, 1 LEVEL Left 5/5/2014    [facility]    RADIOFREQUENCY THERMOCOAGULATION (RFTC)-NERVE-MEDIAN BRANCH-LUMBAR Right 6/16/2014    [facility] OR    RADIOFREQUENCY THERMOCOAGULATION (RFTC)-NERVE-MEDIAN BRANCH-LUMBAR L3,4,5 Bilateral 7/29/2016    [facility] at [facility] OR    RECTAL SURGERY      Fissure repair    TONSILLECTOMY      ULTRASOUND-ENDOSCOPIC-UPPER N/A 10/23/2014    Performed by Florian Winchester MD at [facility] ENDO (2ND FLR)    UPPER GASTROINTESTINAL ENDOSCOPY  08/29/2014    Dr. Castro     Family History   Problem Relation Age of Onset    Cancer Mother         Breast    COPD Mother     Hearing loss Mother     Hypertension Mother     Breast cancer Mother     Diabetes Father     Heart disease Father     Heart attack Father     Cancer Daughter         Breast    Breast cancer Daughter     Cancer Sister          Breast    Breast cancer Sister     Amblyopia Neg Hx     Blindness Neg Hx     Cataracts Neg Hx     Glaucoma Neg Hx     Macular degeneration Neg Hx     Retinal detachment Neg Hx     Strabismus Neg Hx     Stroke Neg Hx     Thyroid disease Neg Hx     Colon cancer Neg Hx     Stomach cancer Neg Hx     Esophageal cancer Neg Hx      Social History     Tobacco Use    Smoking status: Former Smoker     Packs/day: 1.00     Years: 18.00     Pack years: 18.00     Start date: 1952     Last attempt to quit: 1970     Years since quittin.7    Smokeless tobacco: Never Used   Substance Use Topics    Alcohol use: Yes     Comment: rare    Drug use: No        Review of Systems   Constitutional: Negative.    HENT: Negative.    Eyes: Negative.    Respiratory: Negative.    Cardiovascular: Negative.    Gastrointestinal: Negative.    Endocrine: Negative.    Genitourinary: Negative.    Musculoskeletal: Positive for back pain (low back pain), gait problem (walker) and myalgias (bilateral buttocks). Negative for neck pain.   Skin: Negative.    Allergic/Immunologic: Negative.    Neurological: Negative for weakness, light-headedness, numbness and headaches.   Hematological: Negative.    Psychiatric/Behavioral: Negative.        OBJECTIVE:   Vital Signs:  Temp: 97.8 °F (36.6 °C) (19 1040)  Pulse: 70 (19 1040)  BP: (!) 160/77 (19 1040)    Physical Exam:    Vital signs: All nursing notes and vital signs reviewed -- afebrile, vital signs stable.  Constitutional: Patient sitting comfortably in chair. Appears well developed and well nourished.  Skin: Exposed areas are intact without abnormal markings, rashes or other lesions.  HEENT: Normocephalic. Normal conjunctivae.  Cardiovascular: Normal rate and regular rhythm.  Respiratory: Chest wall rises and falls symmetrically, without signs of respiratory distress.  Abdomen: Soft and non-tender.  Extremities: Warm and without edema. Calves supple,  non-tender.  Psych/Behavior: Normal affect.    Neurological:    Mental status: Alert and oriented. Conversational and appropriate.       Cranial Nerves: Grossly intact.     Motor:    Upper:  Deltoids Triceps Biceps WE WF     R 5/5 5/5 5/5 5/5 5/5 5/5    L 5/5 5/5 5/5 5/5 5/5 5/5      Lower:  HF KE KF DF PF EHL    R 5/5 5/5 5/5 5/5 5/5 5/5    L 5/5 5/5 5/5 5/5 5/5 5/5     Sensory: Intact sensation to light touch in all extremities. Romberg negative.    Reflexes:          DTR: 2+ symmetrically throughout.     Huerta's: Negative.     Babinski's: Negative.     Clonus: Negative.    Cerebellar: Finger-to-nose and rapid alternating movements normal. Gait stable, fluid.    Spine:    Posture: Head well aligned over pelvis in front and side views.  No focal or global spinal deformity visible on inspection. Shoulders and hips even. No obvious leg length discrepancy. No scapula winging.    Bending: Full ROM with forward, back and lateral bending. No rib prominence with forward bend.    Cervical:      ROM: Full with flexion, extension, lateral rotation and ear-to-shoulder bend.      Midline TTP: Negative.     Spurling's test: Negative.     Lhermitte's: Negative.    Thoracic:     Midline TTP: Negative    Lumbar:     Midline TTP: Positive, mild on left side of lumbar curve.     Straight Leg Test: Negative     Crossed Straight Leg Test: Negative     Sciatic notch tenderness: Negative.    Other:     SI joint TTP: Negative.     Greater trochanter TTP: Negative.     Tenderness with external/internal hip rotation: Negative.    Diagnostic Results:  All imaging was independently reviewed by me.    DXA Bone Scan, dated 5/17/2017:   1. Osteopenia    EMG/Nerve conduction study, dated 2/12/2019:  1. Acute on chronic bilateral L5/S1 radiculopathy    MRI L-spine, dated 5/28/2018:  1. Diffuse spondylosis and scoliosis  2. Spondylolisthesis at L4/5   3. Severe central stenosis at L3/4 and L4/5   4. Severe neuroforaminal stenosis at right  L1/2, L2/3, and L3/4  5. Severe neuroforaminal stenosis at left L3/4   6. Moderate neuroforaminal stenosis bilaterally at L4/5     Scoliosis standing AP and Lateral X-ray, dated 3/14/2019:  1. No significant sagittal or coronal imbalance  2. Left coronal lumbar curve apex L2/3 measuring 29 degrees  3. Right coronal thoracic curve measuring 28 degrees    ASSESSMENT/PLAN:     Olga Aguiar has adult degenerative scoliosis with multilevel severe central and neuroforaminal stenosis. She has had significant axial low back pain and bilateral radicular leg pain, but her leg pain has resolved. Since axial low back pain is now her only concern, I recommend an NM bone scan to identify facet joint targets, followed by facet joint injections. I also recommend intermittent use of an LSO brace (no more than 1 hr per day). Because of her age and her poor pulmonary function she is not a candidate for a large deformity surgery, and therefore she may pursue a SCS trial with her PM doctor. Given the lack of leg pain and the concern for scoliosis progression, I would also not recommend a more focal decompression surgery or short segment fusion. She will follow up with me PRN.    The patient understands and agrees with the plan of care. All questions were answered.     1. NM Bone Scan  2. LSO brace  3. RTC PRN      I, Dr. Harley King personally performed the services described in this documentation. All medical record entries made by the scribe, Brice Israel, were at my direction and in my presence.  I have reviewed the chart and agree that the record reflects my personal performance and is accurate and complete.      Harley King M.D.  Department of Neurosurgery  [facility]      .

## 2019-04-29 NOTE — LETTER
April 29, 2019      Elias Amaro MD  [address] [facility] [address]  [address]  [facility] [address]           [facility] [address] [facility] [address]  [address], [address]  [facility] [address]  Phone: [phone]  Fax: [phone]          Patient: Olga Aguiar   MR Number: [id number]   YOB: 1935   Date of Visit: 4/29/2019       Dear Dr. Elias Amaro:    Thank you for referring Olga Aguiar to me for evaluation. Attached you will find relevant portions of my assessment and plan of care.    If you have questions, please do not hesitate to call me. I look forward to following Olga Aguiar along with you.    Sincerely,    Harley King MD    Enclosure  CC:  No Recipients    If you would like to receive this communication electronically, please contact [email or web address] or [phone] to request more information on [employer] access.    For providers and/or their staff who would like to refer a patient to [facility], please contact us through our one-stop-shop provider referral line, [facility], at [phone].    If you feel you have received this communication in error or would no longer like to receive these types of communications, please e-mail [email or web address]

## 2019-05-01 ENCOUNTER — PATIENT MESSAGE (OUTPATIENT)
Dept: PAIN MEDICINE | Facility: CLINIC | Age: 84
End: 2019-05-01

## 2019-05-03 ENCOUNTER — PATIENT MESSAGE (OUTPATIENT)
Dept: PAIN MEDICINE | Facility: CLINIC | Age: 84
End: 2019-05-03

## 2019-05-05 RX ORDER — TRAMADOL HYDROCHLORIDE 50 MG/1
50 TABLET ORAL EVERY 8 HOURS PRN
Qty: 90 TABLET | Refills: 1 | Status: SHIPPED | OUTPATIENT
Start: 2019-05-05 | End: 2019-06-11 | Stop reason: SDUPTHER

## 2019-05-06 ENCOUNTER — TELEPHONE (OUTPATIENT)
Dept: PAIN MEDICINE | Facility: CLINIC | Age: 84
End: 2019-05-06

## 2019-05-06 DIAGNOSIS — M51.36 DDD (DEGENERATIVE DISC DISEASE), LUMBAR: Primary | ICD-10-CM

## 2019-05-06 NOTE — TELEPHONE ENCOUNTER
----- Message from Elayne Stevens sent at 5/6/2019 10:20 AM CDT -----  Contact: Etienne horne/[facility] [phone]  Please call him to verify the tramadol prescription.  Thank you!

## 2019-05-07 ENCOUNTER — HOSPITAL ENCOUNTER (OUTPATIENT)
Dept: RADIOLOGY | Facility: HOSPITAL | Age: 84
Discharge: HOME | End: 2019-05-07
Attending: ANESTHESIOLOGY | Admitting: ANESTHESIOLOGY
Payer: MEDICARE

## 2019-05-07 ENCOUNTER — HOSPITAL ENCOUNTER (OUTPATIENT)
Facility: HOSPITAL | Age: 84
Discharge: HOME | End: 2019-05-07
Attending: ANESTHESIOLOGY | Admitting: ANESTHESIOLOGY
Payer: MEDICARE

## 2019-05-07 VITALS
HEART RATE: 67 BPM | SYSTOLIC BLOOD PRESSURE: 149 MMHG | OXYGEN SATURATION: 97 % | BODY MASS INDEX: 26.58 KG/M2 | HEIGHT: 63 IN | WEIGHT: 150 LBS | DIASTOLIC BLOOD PRESSURE: 67 MMHG | TEMPERATURE: 98 F | RESPIRATION RATE: 16 BRPM

## 2019-05-07 DIAGNOSIS — M54.16 LUMBAR RADICULOPATHY: Primary | ICD-10-CM

## 2019-05-07 DIAGNOSIS — M51.36 DDD (DEGENERATIVE DISC DISEASE), LUMBAR: ICD-10-CM

## 2019-05-07 PROCEDURE — 62323 NJX INTERLAMINAR LMBR/SAC: CPT | Mod: PO | Performed by: ANESTHESIOLOGY

## 2019-05-07 PROCEDURE — 25500020 PHARM REV CODE 255: Mod: PO | Performed by: ANESTHESIOLOGY

## 2019-05-07 PROCEDURE — 63600175 PHARM REV CODE 636 W HCPCS: Mod: PO | Performed by: ANESTHESIOLOGY

## 2019-05-07 PROCEDURE — 76000 FLUOROSCOPY <1 HR PHYS/QHP: CPT | Mod: TC,PO

## 2019-05-07 PROCEDURE — 62323 NJX INTERLAMINAR LMBR/SAC: CPT | Mod: ,,, | Performed by: ANESTHESIOLOGY

## 2019-05-07 PROCEDURE — 25000003 PHARM REV CODE 250: Mod: PO | Performed by: ANESTHESIOLOGY

## 2019-05-07 PROCEDURE — 62323 PR INJ LUMBAR/SACRAL, W/IMAGING GUIDANCE: ICD-10-PCS | Mod: ,,, | Performed by: ANESTHESIOLOGY

## 2019-05-07 RX ORDER — METHYLPREDNISOLONE ACETATE 80 MG/ML
INJECTION, SUSPENSION INTRA-ARTICULAR; INTRALESIONAL; INTRAMUSCULAR; SOFT TISSUE
Status: DISCONTINUED | OUTPATIENT
Start: 2019-05-07 | End: 2019-05-07 | Stop reason: HOSPADM

## 2019-05-07 RX ORDER — LIDOCAINE HYDROCHLORIDE 10 MG/ML
INJECTION, SOLUTION EPIDURAL; INFILTRATION; INTRACAUDAL; PERINEURAL
Status: DISCONTINUED | OUTPATIENT
Start: 2019-05-07 | End: 2019-05-07 | Stop reason: HOSPADM

## 2019-05-07 RX ORDER — ALPRAZOLAM 0.5 MG/1
1 TABLET, ORALLY DISINTEGRATING ORAL ONCE AS NEEDED
Status: COMPLETED | OUTPATIENT
Start: 2019-05-07 | End: 2019-05-07

## 2019-05-07 RX ADMIN — ALPRAZOLAM 0.5 MG: 0.5 TABLET, ORALLY DISINTEGRATING ORAL at 02:05

## 2019-05-07 NOTE — DISCHARGE SUMMARY
[facility]  Discharge Note  Short Stay    Admit Date: 5/7/2019    Discharge Date: 5/7/2019    Attending Physician: Rohan Ware MD     Discharge Provider: Rohan Ware    Diagnoses:  Active Hospital Problems    Diagnosis  POA    *Lumbar radiculopathy [M54.16]  Yes      Resolved Hospital Problems   No resolved problems to display.       Discharged Condition: good    Final Diagnoses: Lumbar radiculopathy [M54.16]    Disposition: Home or Self Care    Hospital Course: no complications, uneventful    Outcome of Hospitalization, Treatment, Procedure, or Surgery:  Patient was admitted for outpatient procedure. The patient underwent procedure without complications and are discharged home    Follow up/Patient Instructions:  Follow up as scheduled in Pain Management clinic in 3-4 weeks/Patient has received instructions and follow up date and time    Medications:  Continue previous medications    Discharge Procedure Orders   Call MD for:  temperature >100.4     Call MD for:  severe uncontrolled pain     Call MD for:  redness, tenderness, or signs of infection (pain, swelling, redness, odor or green/yellow discharge around incision site)     Call MD for:  severe persistent headache     No dressing needed         Discharge Procedure Orders (must include Diet, Follow-up, Activity):   Discharge Procedure Orders (must include Diet, Follow-up, Activity)   Call MD for:  temperature >100.4     Call MD for:  severe uncontrolled pain     Call MD for:  redness, tenderness, or signs of infection (pain, swelling, redness, odor or green/yellow discharge around incision site)     Call MD for:  severe persistent headache     No dressing needed

## 2019-05-07 NOTE — DISCHARGE INSTRUCTIONS
Home care instructions  Apply ice pack to the injection site for 20 minutes periods for the first 24 hrs for soreness/discomfort at injection site   DO NOT USE HEAT FOR 24 HOURS  Keep site clean and dry for 24 hours  MAY SHOWER TOMORROW  Do not drive until tomorrow  Take care when walking after YOUR LUMBAR LOWER BACK STEROID injection  Avoid strenuous activities for 2 days  Make take 2 weeks to feel the full effects   Resume home medication as prescribed today  Resume Aspirin, Plavix, or Coumadin the day after the procedure unless otherwise instructed.    SEE IMMEDIATE MEDICAL HELP FOR:  Severe increase in your usual pain or appearance of new pain  Prolonged or increasing weakness or numbness in the legs or arms  Drainage, redness, active bleeding, or increased swelling at the injection site  Temperature over 100.0 degrees F.  Headache that increases when your head is upright and decreases when you lie flat    CALL 911 OR GO DIRECTLY TO EMERGENCY DEPARTMENT FOR:  Shortness of breath, chest pain, or problems breathing

## 2019-05-07 NOTE — OP NOTE

## 2019-05-13 ENCOUNTER — HOSPITAL ENCOUNTER (OUTPATIENT)
Dept: RADIOLOGY | Facility: HOSPITAL | Age: 84
Discharge: HOME | End: 2019-05-13
Attending: NEUROLOGICAL SURGERY
Payer: MEDICARE

## 2019-05-13 DIAGNOSIS — M41.50 DEGENERATIVE SCOLIOSIS IN ADULT PATIENT: ICD-10-CM

## 2019-05-13 PROCEDURE — 78320 NM BONE SCAN SPECT: ICD-10-PCS | Mod: 26,,, | Performed by: RADIOLOGY

## 2019-05-13 PROCEDURE — 78320 NM BONE SCAN SPECT: CPT | Mod: 26,,, | Performed by: RADIOLOGY

## 2019-05-13 PROCEDURE — A9503 TC99M MEDRONATE: HCPCS | Mod: PO

## 2019-05-16 ENCOUNTER — PATIENT MESSAGE (OUTPATIENT)
Dept: PAIN MEDICINE | Facility: CLINIC | Age: 84
End: 2019-05-16

## 2019-05-17 NOTE — TELEPHONE ENCOUNTER
Please let her know that I have reviewed this, it shows significant arthritis at several different levels and we can discuss treatment options when she comes in for her follow-up appointment.  I want to see how she is doing at that time based on her most recent injection as well.

## 2019-05-19 ENCOUNTER — PATIENT MESSAGE (OUTPATIENT)
Dept: PAIN MEDICINE | Facility: CLINIC | Age: 84
End: 2019-05-19

## 2019-06-02 ENCOUNTER — PATIENT MESSAGE (OUTPATIENT)
Dept: PAIN MEDICINE | Facility: CLINIC | Age: 84
End: 2019-06-02

## 2019-06-07 ENCOUNTER — PATIENT MESSAGE (OUTPATIENT)
Dept: INTERNAL MEDICINE | Facility: CLINIC | Age: 84
End: 2019-06-07

## 2019-06-07 DIAGNOSIS — F51.01 PRIMARY INSOMNIA: ICD-10-CM

## 2019-06-07 RX ORDER — ALLOPURINOL 100 MG/1
100 TABLET ORAL DAILY
Qty: 90 TABLET | Refills: 2 | Status: SHIPPED | OUTPATIENT
Start: 2019-06-07 | End: 2020-02-26

## 2019-06-07 RX ORDER — ZOLPIDEM TARTRATE 5 MG/1
TABLET ORAL
Qty: 30 TABLET | Refills: 1 | OUTPATIENT
Start: 2019-06-07

## 2019-06-10 DIAGNOSIS — J45.20 INTERMITTENT ASTHMA, UNCOMPLICATED: ICD-10-CM

## 2019-06-10 DIAGNOSIS — J44.89 COPD (CHRONIC OBSTRUCTIVE PULMONARY DISEASE) WITH CHRONIC BRONCHITIS: ICD-10-CM

## 2019-06-10 DIAGNOSIS — J47.9 BRONCHIECTASIS WITHOUT COMPLICATION: ICD-10-CM

## 2019-06-10 RX ORDER — FLUTICASONE FUROATE AND VILANTEROL 200; 25 UG/1; UG/1
1 POWDER RESPIRATORY (INHALATION) DAILY
Qty: 3 EACH | Refills: 3 | Status: SHIPPED | OUTPATIENT
Start: 2019-06-10 | End: 2020-06-26 | Stop reason: SDUPTHER

## 2019-06-11 ENCOUNTER — OFFICE VISIT (OUTPATIENT)
Dept: PAIN MEDICINE | Facility: CLINIC | Age: 84
End: 2019-06-11
Payer: MEDICARE

## 2019-06-11 VITALS
HEART RATE: 78 BPM | OXYGEN SATURATION: 98 % | SYSTOLIC BLOOD PRESSURE: 140 MMHG | DIASTOLIC BLOOD PRESSURE: 65 MMHG | WEIGHT: 156.31 LBS | BODY MASS INDEX: 27.69 KG/M2 | TEMPERATURE: 97 F | RESPIRATION RATE: 18 BRPM

## 2019-06-11 DIAGNOSIS — R26.81 GAIT INSTABILITY: ICD-10-CM

## 2019-06-11 DIAGNOSIS — M54.16 LUMBAR RADICULOPATHY: Primary | ICD-10-CM

## 2019-06-11 DIAGNOSIS — M47.816 LUMBAR SPONDYLOSIS: ICD-10-CM

## 2019-06-11 DIAGNOSIS — M51.36 DDD (DEGENERATIVE DISC DISEASE), LUMBAR: ICD-10-CM

## 2019-06-11 DIAGNOSIS — M48.061 SPINAL STENOSIS OF LUMBAR REGION WITHOUT NEUROGENIC CLAUDICATION: ICD-10-CM

## 2019-06-11 PROCEDURE — 3078F DIAST BP <80 MM HG: CPT | Mod: CPTII,S$GLB,, | Performed by: PHYSICIAN ASSISTANT

## 2019-06-11 PROCEDURE — 1101F PR PT FALLS ASSESS DOC 0-1 FALLS W/OUT INJ PAST YR: ICD-10-PCS | Mod: CPTII,S$GLB,, | Performed by: PHYSICIAN ASSISTANT

## 2019-06-11 PROCEDURE — 3077F SYST BP >= 140 MM HG: CPT | Mod: CPTII,S$GLB,, | Performed by: PHYSICIAN ASSISTANT

## 2019-06-11 PROCEDURE — 1101F PT FALLS ASSESS-DOCD LE1/YR: CPT | Mod: CPTII,S$GLB,, | Performed by: PHYSICIAN ASSISTANT

## 2019-06-11 PROCEDURE — 3078F PR MOST RECENT DIASTOLIC BLOOD PRESSURE < 80 MM HG: ICD-10-PCS | Mod: CPTII,S$GLB,, | Performed by: PHYSICIAN ASSISTANT

## 2019-06-11 PROCEDURE — 99214 OFFICE O/P EST MOD 30 MIN: CPT | Mod: S$GLB,,, | Performed by: PHYSICIAN ASSISTANT

## 2019-06-11 PROCEDURE — 99214 PR OFFICE/OUTPT VISIT, EST, LEVL IV, 30-39 MIN: ICD-10-PCS | Mod: S$GLB,,, | Performed by: PHYSICIAN ASSISTANT

## 2019-06-11 PROCEDURE — 99999 PR PBB SHADOW E&M-EST. PATIENT-LVL V: CPT | Mod: PBBFAC,,, | Performed by: PHYSICIAN ASSISTANT

## 2019-06-11 PROCEDURE — 3077F PR MOST RECENT SYSTOLIC BLOOD PRESSURE >= 140 MM HG: ICD-10-PCS | Mod: CPTII,S$GLB,, | Performed by: PHYSICIAN ASSISTANT

## 2019-06-11 PROCEDURE — 99999 PR PBB SHADOW E&M-EST. PATIENT-LVL V: ICD-10-PCS | Mod: PBBFAC,,, | Performed by: PHYSICIAN ASSISTANT

## 2019-06-11 RX ORDER — TRAMADOL HYDROCHLORIDE 50 MG/1
50 TABLET ORAL EVERY 8 HOURS PRN
Qty: 90 TABLET | Refills: 2 | Status: SHIPPED | OUTPATIENT
Start: 2019-06-11 | End: 2019-08-12

## 2019-06-11 NOTE — PROGRESS NOTES
CC: Back pain    HPI: The patient is a 84-year-old woman with a history of diabetes, previous breast CA, CVA and spinal stenosis who presents in referral from her primary care physician, Dr. Barger for back pain and leg pain. She is status post L5/S1 interlaminar epidural steroid injection on 05/07/2019 with 75% relief.  The patient is pleased with these results and states that her pain is tolerable with tramadol.  She reports current bilateral low back pain without radiation to her legs.  Her pain is worse with standing and walking, improved with sitting.  She denies weakness, numbness, bladder or bowel incontinence.    Pain intervention history: She tried physical therapy about 1 year ago and it seemed to make her pain worse. Patient is status post right L3 and L4 transforaminal injection on 8/9/13 with 50% relief of low back and right leg pain.  She is status post bilateral L4 transforaminal epidural steroid injections on 9/27/13 and 11/1/13 with 10-20% relief.  She is status post radiofrequency ablation of the left L3, 4, 5 medial branch nerves on 5/5/14 with 50% relief of her left low back pain.  She is status post radiofrequency ablation of the right L3, 4 and 5 medial branch nerves on 6/16/14 with mild relief, however she reported significant more relief at a later visit.  She is status post bilateral L4 transforaminal epidural steroid injections on 9/24/14 with moderate relief.  She is status post bilateral L4 transforaminal epidural steroid injections on 1/7/15 with complete relief of her posterior thigh pain, moderate relief of her low back pain and minimal relief of her right lateral hip pain.  She is status post C7-T1 cervical interlaminar epidural steroid injection on 2/27/15 with greater than 50% relief.  She is status post bilateral L3, 4 and 5 medial branch radio frequency ablation on 7/29/16 reporting 0% relief initially at 4 weeks but then reporting 90% relief after 6-7 weeks.  She is status post  bilateral L4 transforaminal epidural steroid injections on 11/11/16 with not quite 50% relief.  She is status post bilateral L4 transforaminal epidural steroid injections on 4/4/17 with 50% relief.  She is status post bilateral L4 transforaminal epidural steroid injections on 8/23/17 with greater than 80% relief.  She is status post bilateral L3, 4 and 5 medial branch radiofrequency ablation on 3/6/18 with moderate relief.  She is status post L5/S1 interlaminar epidural steroid injection on 06/04/2018 with almost 100% relief lasting 1 month.  She is status post L5/S1 interlaminar epidural steroid injection on 09/04/2018 with almost 100% relief.  She is status post L5/S1 interlaminar epidural steroid injection on 12/20/2018 with greater than 50% relief. She is status post L5/S1 interlaminar epidural steroid injection on 05/07/2019 with 75% relief.    ROS:She reports easy bruising, back pain and difficulty sleeping.  Balance of review of systems is negative.    Medical, surgical, family and social history reviewed elsewhere in record.    Medications/Allergies: See med card    Vitals:    06/11/19 1316   BP: (!) 140/65   Pulse: 78   Resp: 18   Temp: 97.4 °F (36.3 °C)   TempSrc: Oral   SpO2: 98%   Weight: 70.9 kg (156 lb 4.9 oz)   PainSc:   4   PainLoc: Back         Physical exam:  Gen: A and O x3, pleasant, well-groomed  Skin: No rashes or obvious lesions  HEENT: PERRLA  CVS: Regular rate and rhythm, normal S1 and S2, no murmurs.  Resp: Clear to auscultation bilaterally, no wheezes or rales.  Abdomen: Soft, NT/ND, normal bowel sounds present.  Musculoskeletal: Able to stand and walk short distances without assistance, however uses a walker when out of the house.  She has disuse atrophy of the lumbar paraspinous muscles.  Slow to go from seated to standing position.    Neuro:  Upper extremities: 5/5 strength bilaterally   Lower extremities: 5/5 strength bilaterally  Reflexes: Brachioradialis 2+, Bicep 2+, Tricep 2+.  Patellar 2+, Achilles 2+.  Sensory: Intact and symmetrical to light touch and pinprick in C2-T1 dermatomes bilaterally.  Intact and symmetrical to light touch and pinprick in L2-S1 dermatomes bilaterally, except for a small patch over her left lateral shin decreased with light touch and pinprick.    Lumbar spine:  Lumbar spine: ROM is moderately reduced with flexion extension and oblique extension without increased pain.  Terry's test is deferred.  Supine straight leg raise is negative bilaterally.  Internal and external rotation of the hip causes no increased pain on either side.  Myofascial exam: No tenderness to palpation across lumbar paraspinous muscles.      Imagin13 MRI L-spine  T10-T11: There is severe disk desiccation and a moderate diffuse disk bulge and ligamentous hypertrophy. This is not complete evaluation of the thoracic spine, but there does appear to be mild central canal stenosis.  T11-T12: Moderate disk desiccation and mild diffuse disk bulge. Mild narrowing of the thecal sac. Neural foraminal regions difficult to assess due to the scoliosis.  T12-L1: Severe disk degenerative disease with marked desiccation, diffuse disk bulge, and spurring of vertebral bodies. Mild central canal stenosis. Neural foraminal narrowing bilaterally, difficult to grade due to scoliosis.  L1-L2: Advanced disk desiccation with moderate diffuse disk bulge and mild spurring of vertebral bodies. Small superimposed central to right paracentral disk extrusion. Mild central canal stenosis. Mild to moderate facet arthropathy and ligamentous hypertrophy. Bilateral foraminal stenosis.  L2 - L3: There is also disk desiccation and diffuse disk bulge and small annular fissure posteriorly. facet arthropathy and hypertrophy ligamentum flavum. Mild central canal stenosis. Moderate right and mild left neuroforaminal stenosis.  L3-L4: Disk desiccation and moderate diffuse disk bulge. Small right paracentral ascending disk  extrusion and moderate facet arthropathy present bilaterally and hypertrophy of ligamentum flavum. In combination, the findings result in severe central canal stenosis. For example see axial image 22, central image 7. There is mild to moderate neural foraminal stenosis bilaterally.  L4 - L5: Mild anterolisthesis with disk desiccation and uncovering of the disk, with moderate right and severe left facet arthropathy and hypertrophy of ligamentum flavum. Severe central canal stenosis. Mild neural foraminal narrowing, right worse than left.  L5-S1: Disk desiccation and mild diffuse disk bulge. A small ascending disk extrusion centrally in the midline. No significant central canal stenosis. Advanced facet arthropathy bilaterally, left worse than right. Mild left neural foraminal narrowing. No significant right neural foraminal stenosis.    5/28/2018 MRI lumbar spine  T12-L1: There is marked disc space narrowing, trace retrolisthesis, unroofing of a moderate disc bulge.  There is right greater than left facet joint arthropathy.  There is mild spinal stenosis with moderate left and moderate-to-marked right foraminal stenosis.  These findings have mildly progressed.  L1-2: There is trace retrolisthesis of L1 on L2, there is inferior unroofing of a moderate diffuse disc bulge with small superimposed central disc protrusion.  There is right greater than left facet joint arthropathy.  There is borderline spinal stenosis.  There is crowding of right lateral recess.  There is marked right and moderate-to-marked left foraminal stenosis with very slight progression.  L2-3: There is trace retrolisthesis of L2 on L3 where there is marked disc space narrowing.  There is a moderate diffuse disc bulge with osteophytic ridging and superimposed right foraminal disc protrusion.  There is moderate-to-marked bilateral facet joint arthropathy with ligamentum flavum thickening, right greater than.  There is moderate central spinal stenosis  and right lateral recess stenosis.  There is mild left lateral recess stenosis.  There is marked right and moderate-to-marked left foraminal stenosis with progression.  L3-4: There is disc space narrowing.  There is marked facet joint arthropathy with ligamentum flavum thickening.  There is a small 4 mm left synovial cyst.  There is unroofing of a moderate to marked diffuse disc bulge.  There is severe spinal canal, bilateral lateral recess and right foraminal stenosis with moderate to marked left foraminal stenosis and with slight progression.  L4-5: There is stable grade 1 spondylolisthesis with 4 mm anterolisthesis of L4 on L5.  There is marked facet joint arthropathy.  There is unroofing of a moderate to marked diffuse disc bulge.  There is severe spinal canal, bilateral lateral recess and foraminal stenosis with slight progression.  L5-S1: There is disc space narrowing at the L5-S1 level, worse towards the left.  There is a disc bulge with small superimposed central disc extrusion with 5 mm cephalad extension.  This was present previously but is slightly more conspicuous.  There is marked facet joint arthropathy.  There is moderate central spinal stenosis with mild crowding of the left lateral recess.  There is mild right and moderate-to-marked left foraminal stenosis with progression.    05/13/2019 bone scan  Increased tracer activity is noted in the region of the facets presumably at the T12-L1 level on the left.  Tracer activity is also noted at the L4-L5 and L5-S1 levels on the left suggestive of facet arthropathy.  More diffuse facet arthropathy is noted throughout the lumbar spine.  Some increased uptake may be noted within the vertebral bodies more anteriorly at the T9 through T12 levels which could relate to compression deformity or Schmorl's node formation/endplate or Modic type degenerative change.  Given that no STIR signal abnormality was noted on the MRI of 03/14/2019 no significant vertebral body  height loss was noted at these levels on that exam, this uptake likely relates to degenerative Modic type endplate changes which can be seen on that exam.  Anatomical imaging such as from MRI which was noted to have been performed on 03/14/2019 is much more specific for degenerative changes of the spine as well as for numbering purposes    Assessment:   The patient is a 84-year-old woman with a history of diabetes, previous breast CA, CVA and spinal stenosis who presents in referral from her primary care physician, Dr. Barger for back pain and leg pain.     1. Lumbar radiculopathy     2. DDD (degenerative disc disease), lumbar     3. Spinal stenosis of lumbar region without neurogenic claudication     4. Lumbar spondylosis     5. Gait instability         Plan:  1.  The patient did well following the L5/S1 interlaminar ANUEL and we discussed that this can be repeated in the future if necessary.  If she is not having lasting relief we may consider repeating bilateral L3, 4 and 5 medial branch RFA prior to considering a spinal cord stimulator trial with [id number].  She has seen Neurosurgery and an operation was not recommended.  We reviewed her bone scan and discussed that she had increased trace her activity on the left at L4/5, L5/S1 and also T12/L1.  She does not have any pain at the thoracolumbar junction.  2.  Dr. Ware refill tramadol 50 mg up to 3 times daily as needed for pain.  I have reviewed the [place] [employer] website and there are no abberancies.    3.  Follow-up in 3 months or sooner as needed.

## 2019-06-18 ENCOUNTER — OFFICE VISIT (OUTPATIENT)
Dept: CARDIOLOGY | Facility: CLINIC | Age: 84
End: 2019-06-18
Payer: MEDICARE

## 2019-06-18 VITALS
BODY MASS INDEX: 27.34 KG/M2 | OXYGEN SATURATION: 92 % | HEIGHT: 63 IN | DIASTOLIC BLOOD PRESSURE: 84 MMHG | SYSTOLIC BLOOD PRESSURE: 133 MMHG | HEART RATE: 84 BPM | WEIGHT: 154.31 LBS

## 2019-06-18 DIAGNOSIS — R94.31 ABNORMAL ECG: ICD-10-CM

## 2019-06-18 DIAGNOSIS — E11.8 CONTROLLED TYPE 2 DIABETES MELLITUS WITH COMPLICATION, WITHOUT LONG-TERM CURRENT USE OF INSULIN: ICD-10-CM

## 2019-06-18 DIAGNOSIS — E65 ABDOMINAL OBESITY: ICD-10-CM

## 2019-06-18 DIAGNOSIS — I35.0 AORTIC VALVE STENOSIS, MODERATE: Primary | ICD-10-CM

## 2019-06-18 DIAGNOSIS — N18.30 CKD (CHRONIC KIDNEY DISEASE) STAGE 3, GFR 30-59 ML/MIN: ICD-10-CM

## 2019-06-18 DIAGNOSIS — E11.59 HYPERTENSION ASSOCIATED WITH DIABETES: ICD-10-CM

## 2019-06-18 DIAGNOSIS — E78.2 MIXED HYPERLIPIDEMIA: ICD-10-CM

## 2019-06-18 DIAGNOSIS — I15.2 HYPERTENSION ASSOCIATED WITH DIABETES: ICD-10-CM

## 2019-06-18 DIAGNOSIS — F13.20 AMBIEN USE DISORDER, MODERATE, DEPENDENCE: ICD-10-CM

## 2019-06-18 PROCEDURE — 99499 RISK ADDL DX/OHS AUDIT: ICD-10-PCS | Mod: S$GLB,,, | Performed by: INTERNAL MEDICINE

## 2019-06-18 PROCEDURE — 3075F PR MOST RECENT SYSTOLIC BLOOD PRESS GE 130-139MM HG: ICD-10-PCS | Mod: CPTII,S$GLB,, | Performed by: INTERNAL MEDICINE

## 2019-06-18 PROCEDURE — 99499 UNLISTED E&M SERVICE: CPT | Mod: S$GLB,,, | Performed by: INTERNAL MEDICINE

## 2019-06-18 PROCEDURE — 3075F SYST BP GE 130 - 139MM HG: CPT | Mod: CPTII,S$GLB,, | Performed by: INTERNAL MEDICINE

## 2019-06-18 PROCEDURE — 99999 PR PBB SHADOW E&M-EST. PATIENT-LVL III: ICD-10-PCS | Mod: PBBFAC,,, | Performed by: INTERNAL MEDICINE

## 2019-06-18 PROCEDURE — 99214 PR OFFICE/OUTPT VISIT, EST, LEVL IV, 30-39 MIN: ICD-10-PCS | Mod: S$GLB,,, | Performed by: INTERNAL MEDICINE

## 2019-06-18 PROCEDURE — 99214 OFFICE O/P EST MOD 30 MIN: CPT | Mod: S$GLB,,, | Performed by: INTERNAL MEDICINE

## 2019-06-18 PROCEDURE — 3079F PR MOST RECENT DIASTOLIC BLOOD PRESSURE 80-89 MM HG: ICD-10-PCS | Mod: CPTII,S$GLB,, | Performed by: INTERNAL MEDICINE

## 2019-06-18 PROCEDURE — 3079F DIAST BP 80-89 MM HG: CPT | Mod: CPTII,S$GLB,, | Performed by: INTERNAL MEDICINE

## 2019-06-18 PROCEDURE — 99999 PR PBB SHADOW E&M-EST. PATIENT-LVL III: CPT | Mod: PBBFAC,,, | Performed by: INTERNAL MEDICINE

## 2019-06-18 PROCEDURE — 1101F PT FALLS ASSESS-DOCD LE1/YR: CPT | Mod: CPTII,S$GLB,, | Performed by: INTERNAL MEDICINE

## 2019-06-18 PROCEDURE — 1101F PR PT FALLS ASSESS DOC 0-1 FALLS W/OUT INJ PAST YR: ICD-10-PCS | Mod: CPTII,S$GLB,, | Performed by: INTERNAL MEDICINE

## 2019-06-18 NOTE — PROGRESS NOTES
Subjective:    Patient ID:  Olga Aguiar is a 84 y.o. female who presents for evaluation of 6 month f/u  For seldom cold sweat related to anxiety, aortic stenosis, HTN  PCP: Dr. Barger  Podiatrist: Dr. Rivero  Pain: Dr. Ware, was to hold ASA for 7 days prior to epidural injection.  GI: Dr. Castro  Pulmonary: Dr. Heller  Eye: Dr. Snowden  Lives with , [place], here with patient, non-smoker  Housewife    Health literacy: high  Activities: do housework, ADL is fine, limited by CLBP and SOB, post radiation lung capacity 42%  Nicotine: quit , 18 py  Alcohol: rare  Illicit drugs: none  Cardiac symptoms: none  Home BP: do not check  Medication compliance: yes  Diet: regular  Caffeine: 1 cpd  Labs: 2019, LDL 90.6, on Rx, CMP (Cr 1.2, , eGFR 42), normal CBC, TSH, A1C 7.5%  Last Echo: DSE 3/2017  Last stress test: 3/2017  Cardiovascular angiogram: none  EC2018 NSR, 85, low voltage, QTc 480 msec  Fundoscopic exam: within the past year, negative for HTN changes    In 2014:  White female with recurrent bouts of sudden onset cold sweat, with nausea, no vomiting, and drop in BP to as low as 80 SBP with near-syncope. Denies any feeling of pain prior to onset.  noted that several times occurred about 40 minutes after meal, always with some nausea or feeling of weakness. Started about 6 months ago, at one time it was several times daily, up to 4 times. Can last up to 5 minutes. No fall but have to sit down. No change in medications, or diet or exercise. Had acute gouty attack also over the past 6 months. Now the third bout. No able to tolerate colchicine with severe upset stomach. Eventually had to be placed on steroid pack and have completed one round of Rx with improvement in the stomach, back pains, and these cold spells. ECG on 2014 was normal, rate of 71. No prior history of cardiac problem except for TIA in . No recurrence. Have several cardiac risk factors - see Problems  List.    Since visit of 8/18/2014, continue with night and day sweats, weakness with standing, easy fatigue, and FISCHER especially with stair climbing. Seems progressive over the past 2 years. Try to do housework, avoid vacuuming and feels very limited due to chronic back pains. Sitting a lot of time. Have not been on any regular exercise routine.  Holter, 8/2014: PREDOMINANT RHYTHM  1. Sinus rhythm with heart rates varying between 44 and 145 bpm with an average of 85 bpm.     2. Some sinus arrhythmia noted.    VENTRICULAR ARRHYTHMIAS  1. There were very rare mostly monomorphic PVCs recorded totalling 17 and averaging less than 1 per hour.     2. There were no episodes of ventricular tachycardia.    SUPRA VENTRICULAR ARRHYTHMIAS  1. There were very rare PACs totalling 57 and averaging 2 per hour.     2. There was a single (11 beat) run of EAT. The high rate was 140 bpm.     SINUS NODE FUNCTION  1. There was no evidence of high grade SA kylie block.     AV CONDUCTION  1. There was no evidence of high grade AV block.     DIARY  1. The diary was returned, but not completed    MISCELLANEOUS  1. This was a tape of adequate length (24 hrs).     ECHO CONCLUSIONS     1 - Concentric hypertrophy. Septal wall thickness is upper limit of normal in size, and posterior wall thickness is increased, with the septum measuring 1.0 cm and the posterior wall measuring 1.2 cm across.    2 - Normal left ventricular systolic function (EF 60-65%).     3 - Moderate aortic stenosis, MARI = 1.13 cm2.     4 - Normal left ventricular diastolic function.     5 - Normal right ventricular systolic function .     6 - The aortic valve now appears stenotic, change from Echo on 2/8/2012.    Since visit of 12/8/2014, no new problem, less of dizziness. More active to gym with , twice weekly for 1:15. No problem, denies any CP, near-syncope, but does have FISCHER, due to chronic bronchitis. No recent change. ECG shows NSR, rate 71, low voltage. Recent  labs reviewed from 9/2014 - CMP showed , eGFR 38.8, A1C 7.1%, Lipid with LDL 87, non-, normal CBC. Reviewed TIA in 1/2012 with transient dysphasia, left sided weakness, leg first, then fingers, numbness of the left side of tongue and left facial droop. All resolved over an hour. Carotid US at the time - No significant carotid artery stenosis bilaterally    In 2/2017, still with FISCHER, fairly limiting, reviewed with Dr. Heller, being treated with intermittent steroid for 3-12 days per month for the past 2 months with benefit. No CP. Compliant with medications. ECG in 4/2016 suggest prior anterior MI.     In 5/2018, annual visit also anticipating epidural steroid injection. Otherwise no heart worries, no cardiac symptoms of CP nor SOB. Limited due to CLBP, able to do own housework and plays cards. Discuss symptoms of severe AS. LDL in 4/2018 90.6, baseline 166.  DSE 3/2017 - EKG Conclusions:    1. The EKG portion of this study is negative for ischemia at a peak heart rate of 129 bpm (96% of predicted).   2. Blood pressure remained stable throughout the protocol  (Presenting BP: 112/88 Peak BP: 185/83).   3. The following arrhythmias were present: PVCs.   4. The patient reported significant dyspnea during the protocol which resolved in recovery.     Echo - CONCLUSIONS     1 - Concentric remodeling.     2 - Normal left ventricular systolic function (EF 60-65%).     3 - Normal left ventricular diastolic function.     4 - Normal right ventricular systolic function .     5 - The estimated PA systolic pressure is greater than 12 mmHg.     6 - Small pericardial effusion.     No evidence of stress induced myocardial ischemia.     Venous US 10/2017 per Dr. Craven - RIGHT:  No evidence of Right lower extremity DVT.      LEFT:  No evidence of Left lower extremity DVT.      US of abdominal aorta - There is no evidence of an Abdominal Aortic Aneurysm.    In 12/2018, here for pre epidural review, started to hold ASA on  12/13, procedure set for 12/20. Denies any heart worries nor symptoms. Limited in activities due to chronic back pain. Epidural effective up to 3 months. ECG in NSR, rate 85, low voltage and QTc 480 msec. Compliant with medications.     HPI comments: in 6/2019, return for 6 months follow up, no heart worries, also no symptoms.     Review of Systems   Constitution: Positive for malaise/fatigue and some diaphoresis. Negative for fever, weakness, night sweats and have weight stability. Down 3 lbs since 12/2018.  HENT: Negative for headaches, nosebleeds and tinnitus.    Eyes: Negative for visual disturbance.   Cardiovascular: Positive for dyspnea on exertion and no near-syncope. Negative for chest pain, claudication, cyanosis, irregular heartbeat, leg swelling, orthopnea, palpitations and paroxysmal nocturnal dyspnia.   Respiratory: Positive for shortness of breath and some wheezing. Negative for cough, sleep disturbances due to breathing, snoring. Bradley score 3 down to 1 on chronic Ambien, use 5 mg for the past 9+ years   Endocrine: Positive for cold intolerance and heat intolerance. Negative for polydipsia and polyuria.   Hematologic/Lymphatic: Does not bruise/bleed easily.   Skin: Positive for dry skin. Negative for nail changes, color change, flushing, poor wound healing and suspicious lesions.   Musculoskeletal: Positive for arthritis, back pain, gout, joint pain, joint swelling and stiffness. Negative for falls, muscle cramps, muscle weakness and myalgias.   Gastrointestinal: Positive for bloating, constipation, nausea and vomiting during meals. Negative for hematemesis, hematochezia and melena.   Neurological: Positive for loss of balance and memory loss. Negative for disturbances in coordination, excessive daytime sleepiness, dizziness, focal weakness, light-headedness, numbness and vertigo.   Psychiatric/Behavioral: Negative for depression and substance abuse. The patient has insomnia. The patient is not  "nervous/anxious.         Objective:    Physical Exam   Constitutional: She is oriented to person, place, and time. She appears well-developed and well-nourished.   HENT:   Head: Normocephalic.   Eyes: Conjunctivae and EOM are normal. Pupils are equal, round, and reactive to light.   Neck: Normal range of motion. Neck supple. No JVD present. No thyromegaly present. Circumference 16.75", deep carotid pulses  Cardiovascular: Normal rate, regular rhythm and intact distal pulses.  Exam reveals distant heart sounds. Exam reveals no gallop and no friction rub.    Soft 2/6 murmur with preserved S2 heard.  Pulses:       Posterior tibial pulses are 1+ on the right side, and 1+ on the left side.   Lymphedema of the right arm   Pulmonary/Chest: Effort normal and breath sounds normal. She has no rales. She exhibits no tenderness.   Abdominal: Soft. Bowel sounds are normal. There is no tenderness.   Waist 39.5" down to 39", hip 42.5"   Musculoskeletal: Normal range of motion. She exhibits no edema.   Lymphadenopathy:     She has no cervical adenopathy.   Neurological: She is alert and oriented to person, place, and time.   Skin: Skin is warm and dry. No rash noted.         Assessment:       1. Aortic valve stenosis, mild- moderate, MARI 1.44 1/16    2. Abdominal obesity    3. Abnormal ECG    4. Ambien use disorder, moderate, dependence, start 2010    5. CKD (chronic kidney disease) stage 3, GFR 30-59 ml/min    6. Controlled type 2 diabetes mellitus with complication, without long-term current use of insulin    7. Hypertension associated with diabetes    8. Mixed hyperlipidemia, baseline     9. BMI 27.0-27.9,adult         Plan:       Olga was seen today for consult.    Diagnoses and associated orders for this visit:    Aortic valve stenosis, mild- moderate, MARI 1.44 1/16    Abdominal obesity    Abnormal ECG    Ambien use disorder, moderate, dependence, start 2010    CKD (chronic kidney disease) stage 3, GFR 30-59 " ml/min    Controlled type 2 diabetes mellitus with complication, without long-term current use of insulin    Hypertension associated with diabetes    Mixed hyperlipidemia, baseline     BMI 27.0-27.9,adult    - All medical issues reviewed, continue current Rx.  - Doing well from CV standpoint, continue current Rx.  - Need good exercise program, 4 key elements: 1. Aerobic (walking, swimming, dancing, jogging, biking, etc, 2. Muscle strengthening / resistance exercise, need to do 2-3 times weekly, 3. Stretching daily, good stretch takes a whole  total minute. 4. Balance exercise daily.  - Highly recommend 30 minutes of exercise daily, can have Sunday off, with 2-3 sessions of muscle strengthening weekly. A  would be very helpful.  - Check home blood pressure, 2 days weekly, do 2 readings within 5 minutes in AM and PM, keep log for review.  - Instruction for Mediterranean diet and heart healthy exercise given.  - Recommend at least annual cardiovascular evaluation in view of her significant risk factors.  - Have to do some abdominal exercise to rid belly fat    Chronic pain syndrome - improved    Fatigue - improved    Near syncope - suspect vasovagal, improved after steroid Rx  - Maintain good hydration, especially while on diuretics   - Demonstrated the View the Space       Patient Active Problem List   Diagnosis    Mixed hyperlipidemia, baseline     Chronic pain syndrome    Lumbosacral spondylosis without myelopathy    Adrenal adenoma: 2.8 X 2.1CM 2010; stable 2016, also stable 2018    BMI 27.0-27.9,adult    Abdominal obesity    Lymph edema, right arm    Gallbladder polyp    CKD (chronic kidney disease) stage 3, GFR 30-59 ml/min    Aortic valve stenosis, mild- moderate, MARI 1.44 1/16    LVH (left ventricular hypertrophy)    Pancreatic cyst: stable on MRI 11/16 per GI repeated 2018, do again 2020    Cervical spinal stenosis    Controlled type 2 diabetes mellitus with  complication, without long-term current use of insulin    Acquired hypothyroidism    Transient cerebral ischemia: 2012    Primary insomnia    Gastroesophageal reflux disease without esophagitis    Multiple lung nodules: stable CT 10/2016    Former smoker: 1 pack daily for < 20 years, quit 1973    COPD (chronic obstructive pulmonary disease) with chronic bronchitis, 2013    Facet hypertrophy of lumbar region    History of breast cancer    Intermittent asthma    Radiation fibrosis of lung    Bronchiectasis without complication    Ectatic abdominal aorta: see CT scan 10/16; no aneurysm 10/17    Aortic atherosclerosis: see CT scan 2016    Renal cysts, acquired, bilateral    Lumbar radiculopathy    Abnormal ECG    Encounter for monitoring proton pump inhibitor therapy    Diverticulosis of large intestine without hemorrhage: see CT 10/14    Localized edema    Idiopathic gout    Lumbar spondylosis    DDD (degenerative disc disease), lumbar    Liver cysts: see MRI 2018    Dysphagia    Pharyngitis    Odynophagia    Sore throat    Ambien use disorder, moderate, dependence, start 2010     Total face-to-face time with the patient was 25 minutes and greater than 50% was spent in counseling and coordination of care. The above assessment and plan have been discussed at length. Labs and procedure over the last 6 months reviewed. Problem List updated. Asked to bring in all active medications / pills bottles with next visit.

## 2019-06-20 ENCOUNTER — TELEPHONE (OUTPATIENT)
Dept: INTERNAL MEDICINE | Facility: CLINIC | Age: 84
End: 2019-06-20

## 2019-06-20 DIAGNOSIS — E11.8 CONTROLLED TYPE 2 DIABETES MELLITUS WITH COMPLICATION, WITHOUT LONG-TERM CURRENT USE OF INSULIN: Primary | ICD-10-CM

## 2019-06-20 DIAGNOSIS — F51.01 PRIMARY INSOMNIA: ICD-10-CM

## 2019-06-23 RX ORDER — ZOLPIDEM TARTRATE 5 MG/1
TABLET ORAL
Qty: 30 TABLET | Refills: 0 | Status: SHIPPED | OUTPATIENT
Start: 2019-06-23 | End: 2019-07-19 | Stop reason: SDUPTHER

## 2019-06-23 NOTE — TELEPHONE ENCOUNTER
Please call.  Ambien refilled x 1 only;  reviewed    She needs labs, mammogram and eye exam; orders in for all    Please schedule and let me know thanks

## 2019-06-25 ENCOUNTER — TELEPHONE (OUTPATIENT)
Dept: OPHTHALMOLOGY | Facility: CLINIC | Age: 84
End: 2019-06-25

## 2019-06-25 NOTE — TELEPHONE ENCOUNTER
----- Message from Najma Lowe sent at 6/25/2019  9:52 AM CDT -----  Contact: [facility]  Appointment Request From: Olga Aguiar    With Provider: Eye Doctor    Preferred Date Range: 7/2/2019 - 8/16/2019    Preferred Times: Monday Afternoon, Tuesday Afternoon, Wednesday Afternoon, Friday Afternoon    Reason for visit: Eye exam    Comments:  Eye Health

## 2019-06-27 ENCOUNTER — TELEPHONE (OUTPATIENT)
Dept: INTERNAL MEDICINE | Facility: CLINIC | Age: 84
End: 2019-06-27

## 2019-06-27 DIAGNOSIS — Z12.31 ENCOUNTER FOR SCREENING MAMMOGRAM FOR MALIGNANT NEOPLASM OF BREAST: Primary | ICD-10-CM

## 2019-06-27 NOTE — TELEPHONE ENCOUNTER
----- Message from Najma Lowe sent at 6/27/2019 11:37 AM CDT -----  Contact: [employer]  Appointment Request From: Olga Aguiar    With Provider: Mammogram    Preferred Date Range: 7/2/2019 - 8/16/2019    Preferred Times: Monday Afternoon, Tuesday Afternoon, Wednesday Afternoon, Friday Afternoon    Reason for visit: Existing Patient    Comments:  Brest cancers      Mammogram order request please

## 2019-07-02 ENCOUNTER — HOSPITAL ENCOUNTER (OUTPATIENT)
Dept: RADIOLOGY | Facility: HOSPITAL | Age: 84
Discharge: HOME | End: 2019-07-02
Attending: INTERNAL MEDICINE
Payer: MEDICARE

## 2019-07-02 VITALS — BODY MASS INDEX: 27.29 KG/M2 | WEIGHT: 154 LBS | HEIGHT: 63 IN

## 2019-07-02 DIAGNOSIS — Z12.31 ENCOUNTER FOR SCREENING MAMMOGRAM FOR MALIGNANT NEOPLASM OF BREAST: ICD-10-CM

## 2019-07-02 PROCEDURE — 77067 SCR MAMMO BI INCL CAD: CPT | Mod: 26,,, | Performed by: RADIOLOGY

## 2019-07-02 PROCEDURE — 77063 MAMMO DIGITAL SCREENING BILAT WITH TOMOSYNTHESIS_CAD: ICD-10-PCS | Mod: 26,,, | Performed by: RADIOLOGY

## 2019-07-02 PROCEDURE — 77067 MAMMO DIGITAL SCREENING BILAT WITH TOMOSYNTHESIS_CAD: ICD-10-PCS | Mod: 26,,, | Performed by: RADIOLOGY

## 2019-07-02 PROCEDURE — 77063 BREAST TOMOSYNTHESIS BI: CPT | Mod: 26,,, | Performed by: RADIOLOGY

## 2019-07-02 PROCEDURE — 77067 SCR MAMMO BI INCL CAD: CPT | Mod: TC,PN

## 2019-07-03 ENCOUNTER — TELEPHONE (OUTPATIENT)
Dept: INTERNAL MEDICINE | Facility: CLINIC | Age: 84
End: 2019-07-03

## 2019-07-03 NOTE — TELEPHONE ENCOUNTER
She is due for labs an eye exam, all ordered, please schedule and let me know when scheduled thanks

## 2019-07-05 RX ORDER — LEVOTHYROXINE SODIUM 75 UG/1
TABLET ORAL
Qty: 90 TABLET | Refills: 0 | Status: SHIPPED | OUTPATIENT
Start: 2019-07-05 | End: 2019-10-07 | Stop reason: SDUPTHER

## 2019-07-16 ENCOUNTER — LAB VISIT (OUTPATIENT)
Dept: LAB | Facility: HOSPITAL | Age: 84
End: 2019-07-16
Attending: INTERNAL MEDICINE
Payer: MEDICARE

## 2019-07-16 ENCOUNTER — OFFICE VISIT (OUTPATIENT)
Dept: OPTOMETRY | Facility: CLINIC | Age: 84
End: 2019-07-16
Payer: MEDICARE

## 2019-07-16 DIAGNOSIS — Z96.1 PSEUDOPHAKIA: ICD-10-CM

## 2019-07-16 DIAGNOSIS — E11.9 DIABETES MELLITUS WITHOUT OPHTHALMIC MANIFESTATIONS: Primary | ICD-10-CM

## 2019-07-16 DIAGNOSIS — H52.7 REFRACTIVE ERROR: ICD-10-CM

## 2019-07-16 DIAGNOSIS — M10.00 IDIOPATHIC GOUT, UNSPECIFIED CHRONICITY, UNSPECIFIED SITE: ICD-10-CM

## 2019-07-16 DIAGNOSIS — D31.32 NEVUS OF CHOROID OF LEFT EYE: ICD-10-CM

## 2019-07-16 DIAGNOSIS — E11.8 CONTROLLED TYPE 2 DIABETES MELLITUS WITH COMPLICATION, WITHOUT LONG-TERM CURRENT USE OF INSULIN: ICD-10-CM

## 2019-07-16 DIAGNOSIS — N18.30 CKD (CHRONIC KIDNEY DISEASE) STAGE 3, GFR 30-59 ML/MIN: ICD-10-CM

## 2019-07-16 LAB
ALBUMIN SERPL BCP-MCNC: 3.4 G/DL (ref 3.5–5.2)
ALP SERPL-CCNC: 80 U/L (ref 55–135)
ALT SERPL W/O P-5'-P-CCNC: 24 U/L (ref 10–44)
ANION GAP SERPL CALC-SCNC: 11 MMOL/L (ref 8–16)
AST SERPL-CCNC: 27 U/L (ref 10–40)
BASOPHILS # BLD AUTO: 0.04 K/UL (ref 0–0.2)
BASOPHILS NFR BLD: 0.7 % (ref 0–1.9)
BILIRUB SERPL-MCNC: 0.6 MG/DL (ref 0.1–1)
BUN SERPL-MCNC: 18 MG/DL (ref 8–23)
CALCIUM SERPL-MCNC: 9.8 MG/DL (ref 8.7–10.5)
CHLORIDE SERPL-SCNC: 99 MMOL/L (ref 95–110)
CHOLEST SERPL-MCNC: 173 MG/DL (ref 120–199)
CHOLEST/HDLC SERPL: 3.7 {RATIO} (ref 2–5)
CO2 SERPL-SCNC: 31 MMOL/L (ref 23–29)
CREAT SERPL-MCNC: 1.3 MG/DL (ref 0.5–1.4)
DIFFERENTIAL METHOD: ABNORMAL
EOSINOPHIL # BLD AUTO: 0.1 K/UL (ref 0–0.5)
EOSINOPHIL NFR BLD: 1.9 % (ref 0–8)
ERYTHROCYTE [DISTWIDTH] IN BLOOD BY AUTOMATED COUNT: 13.7 % (ref 11.5–14.5)
EST. GFR  (AFRICAN AMERICAN): 43.5 ML/MIN/1.73 M^2
EST. GFR  (NON AFRICAN AMERICAN): 37.8 ML/MIN/1.73 M^2
ESTIMATED AVG GLUCOSE: 206 MG/DL (ref 68–131)
GLUCOSE SERPL-MCNC: 179 MG/DL (ref 70–110)
HBA1C MFR BLD HPLC: 8.8 % (ref 4–5.6)
HCT VFR BLD AUTO: 48 % (ref 37–48.5)
HDLC SERPL-MCNC: 47 MG/DL (ref 40–75)
HDLC SERPL: 27.2 % (ref 20–50)
HGB BLD-MCNC: 15.2 G/DL (ref 12–16)
IMM GRANULOCYTES # BLD AUTO: 0.01 K/UL (ref 0–0.04)
IMM GRANULOCYTES NFR BLD AUTO: 0.2 % (ref 0–0.5)
LDLC SERPL CALC-MCNC: 86.6 MG/DL (ref 63–159)
LYMPHOCYTES # BLD AUTO: 1.7 K/UL (ref 1–4.8)
LYMPHOCYTES NFR BLD: 29.6 % (ref 18–48)
MCH RBC QN AUTO: 28.8 PG (ref 27–31)
MCHC RBC AUTO-ENTMCNC: 31.7 G/DL (ref 32–36)
MCV RBC AUTO: 91 FL (ref 82–98)
MONOCYTES # BLD AUTO: 0.4 K/UL (ref 0.3–1)
MONOCYTES NFR BLD: 7.2 % (ref 4–15)
NEUTROPHILS # BLD AUTO: 3.4 K/UL (ref 1.8–7.7)
NEUTROPHILS NFR BLD: 60.4 % (ref 38–73)
NONHDLC SERPL-MCNC: 126 MG/DL
NRBC BLD-RTO: 0 /100 WBC
PLATELET # BLD AUTO: 188 K/UL (ref 150–350)
PMV BLD AUTO: 10.7 FL (ref 9.2–12.9)
POTASSIUM SERPL-SCNC: 4 MMOL/L (ref 3.5–5.1)
PROT SERPL-MCNC: 6.8 G/DL (ref 6–8.4)
RBC # BLD AUTO: 5.27 M/UL (ref 4–5.4)
SODIUM SERPL-SCNC: 141 MMOL/L (ref 136–145)
TRIGL SERPL-MCNC: 197 MG/DL (ref 30–150)
URATE SERPL-MCNC: 6.1 MG/DL (ref 2.4–5.7)
WBC # BLD AUTO: 5.67 K/UL (ref 3.9–12.7)

## 2019-07-16 PROCEDURE — 99499 UNLISTED E&M SERVICE: CPT | Mod: S$GLB,,, | Performed by: OPTOMETRIST

## 2019-07-16 PROCEDURE — 83036 HEMOGLOBIN GLYCOSYLATED A1C: CPT

## 2019-07-16 PROCEDURE — 85025 COMPLETE CBC W/AUTO DIFF WBC: CPT

## 2019-07-16 PROCEDURE — 36415 COLL VENOUS BLD VENIPUNCTURE: CPT | Mod: PO

## 2019-07-16 PROCEDURE — 99999 PR PBB SHADOW E&M-EST. PATIENT-LVL II: ICD-10-PCS | Mod: PBBFAC,,, | Performed by: OPTOMETRIST

## 2019-07-16 PROCEDURE — 99499 RISK ADDL DX/OHS AUDIT: ICD-10-PCS | Mod: S$GLB,,, | Performed by: OPTOMETRIST

## 2019-07-16 PROCEDURE — 80053 COMPREHEN METABOLIC PANEL: CPT

## 2019-07-16 PROCEDURE — 80061 LIPID PANEL: CPT

## 2019-07-16 PROCEDURE — 84550 ASSAY OF BLOOD/URIC ACID: CPT

## 2019-07-16 PROCEDURE — 99999 PR PBB SHADOW E&M-EST. PATIENT-LVL II: CPT | Mod: PBBFAC,,, | Performed by: OPTOMETRIST

## 2019-07-16 PROCEDURE — 92014 PR EYE EXAM, EST PATIENT,COMPREHESV: ICD-10-PCS | Mod: S$GLB,,, | Performed by: OPTOMETRIST

## 2019-07-16 PROCEDURE — 92014 COMPRE OPH EXAM EST PT 1/>: CPT | Mod: S$GLB,,, | Performed by: OPTOMETRIST

## 2019-07-16 NOTE — LETTER
July 16, 2019      Sera Barger MD  [address]  [facility] [address]           [facility] - Optometry  [address] [place] [address] Drive [address]  [facility] [address]  Phone: [phone]          Patient: Olga Aguiar   MR Number: [id number]   YOB: 1935   Date of Visit: 7/16/2019       Dear Dr. Sera Barger:    Thank you for referring Olga Aguiar to me for evaluation. Attached you will find relevant portions of my assessment and plan of care.    If you have questions, please do not hesitate to call me. I look forward to following Olga Aguiar along with you.    Sincerely,    Ashwin Snowden, OD    Enclosure  CC:  No Recipients    If you would like to receive this communication electronically, please contact [email or web address] or [phone] to request more information on [facility] access.    For providers and/or their staff who would like to refer a patient to [facility], please contact us through our one-stop-shop provider referral line, [facility] , at [phone].    If you feel you have received this communication in error or would no longer like to receive these types of communications, please e-mail [email or web address]

## 2019-07-16 NOTE — PROGRESS NOTES
HPI     HPI    Pt here today for yearly diabetic eye exam.    Would patient like a refraction today? No, Pt happy using OTC readers for   small print.      (-)drops  (-)flashes  (-)floaters  (-)diplopia     (+)Diabetes  Hemoglobin A1C       Date                     Value               Ref Range             Status                10/10/2018               7.5 (H)             4.0 - 5.6 %           Final                 04/18/2018               6.2 (H)             4.0 - 5.6 %           Final                 01/24/2018               5.6                 4.0 - 5.6 %           Final                 OCULAR HISTORY  Last Eye Exam: 1 year  (+)eye surgery , Cataract sx            Last edited by Ashwin Snowden, OD on 7/16/2019  3:27 PM. (History)            Assessment /Plan     For exam results, see Encounter Report.    Diabetes mellitus without ophthalmic manifestations    Pseudophakia    Refractive error    Nevus of choroid of left eye      DM type 2 w/o ocular retinopathy OU. Discussed possible ocular affects of uncontrolled blood sugar with patient. Recommended continued strong blood sugar control and continued care with PCP. Monitor yearly.     S/p cataract extraction OU with good results, mild PCO OD, not yet significant. Patient happy with uncorrected vision and otc readers prn for near. Denies refraction. Return as desired for spec Rx.    Choroidal nevus temporal periphery OS. Stable from previous, flat, distinct, no overlying pigment. Monitor yearly.      RTC in 1 year for comprehensive eye exam, or sooner prn.

## 2019-07-19 DIAGNOSIS — F51.01 PRIMARY INSOMNIA: ICD-10-CM

## 2019-07-21 RX ORDER — TRAMADOL HYDROCHLORIDE 50 MG/1
TABLET ORAL
Qty: 90 TABLET | Refills: 2 | Status: SHIPPED | OUTPATIENT
Start: 2019-07-21 | End: 2019-11-20 | Stop reason: SDUPTHER

## 2019-07-25 ENCOUNTER — TELEPHONE (OUTPATIENT)
Dept: INTERNAL MEDICINE | Facility: CLINIC | Age: 84
End: 2019-07-25

## 2019-07-25 RX ORDER — ZOLPIDEM TARTRATE 5 MG/1
TABLET ORAL
Qty: 30 TABLET | Refills: 0 | Status: SHIPPED | OUTPATIENT
Start: 2019-07-25 | End: 2019-08-01

## 2019-07-25 NOTE — TELEPHONE ENCOUNTER
Please try to schedule for a sooner appointment, sometime in the next 2 weeks.  Diabetes is worse as is her kidney function.  Thank you

## 2019-07-26 ENCOUNTER — TELEPHONE (OUTPATIENT)
Dept: INTERNAL MEDICINE | Facility: CLINIC | Age: 84
End: 2019-07-26

## 2019-07-31 ENCOUNTER — TELEPHONE (OUTPATIENT)
Dept: INTERNAL MEDICINE | Facility: CLINIC | Age: 84
End: 2019-07-31

## 2019-07-31 NOTE — TELEPHONE ENCOUNTER
----- Message from Brenna Baum sent at 7/31/2019  9:27 AM CDT -----  Contact: Meghan/[employer] [employer]/[phone]  Office is calling about a PA sent to them on medication zolpidem (AMBIEN) 5 MG Tab. Office has questions on what was sent. Please call and advise.        Thank You

## 2019-08-01 ENCOUNTER — TELEPHONE (OUTPATIENT)
Dept: INTERNAL MEDICINE | Facility: CLINIC | Age: 84
End: 2019-08-01

## 2019-08-01 RX ORDER — TRAZODONE HYDROCHLORIDE 50 MG/1
25 TABLET ORAL NIGHTLY PRN
Qty: 45 TABLET | Refills: 1 | Status: SHIPPED | OUTPATIENT
Start: 2019-08-01 | End: 2019-08-12

## 2019-08-01 NOTE — TELEPHONE ENCOUNTER
Please let her know insurance is not covering Ambien any more    I sent an rx for trazadone for sleep    It is a 1/2 pill at night (1/2 of a 50 mg)    I would like for her to try to let us know how this works out for her.  She should wean the Ambien to every other night for a week and then can stop.  Thank you thanks

## 2019-08-01 NOTE — TELEPHONE ENCOUNTER
Spoke to pt and advised. Once she starts the trazodone she is call the office and let us know how she does on this.

## 2019-08-12 ENCOUNTER — OFFICE VISIT (OUTPATIENT)
Dept: INTERNAL MEDICINE | Facility: CLINIC | Age: 84
End: 2019-08-12
Payer: MEDICARE

## 2019-08-12 VITALS
HEIGHT: 62 IN | SYSTOLIC BLOOD PRESSURE: 138 MMHG | BODY MASS INDEX: 28.34 KG/M2 | DIASTOLIC BLOOD PRESSURE: 76 MMHG | WEIGHT: 154 LBS

## 2019-08-12 DIAGNOSIS — K86.2 PANCREATIC CYST: ICD-10-CM

## 2019-08-12 DIAGNOSIS — Z00.00 ANNUAL PHYSICAL EXAM: Primary | ICD-10-CM

## 2019-08-12 DIAGNOSIS — R11.0 NAUSEA: ICD-10-CM

## 2019-08-12 DIAGNOSIS — M10.00 IDIOPATHIC GOUT, UNSPECIFIED CHRONICITY, UNSPECIFIED SITE: ICD-10-CM

## 2019-08-12 DIAGNOSIS — I70.0 AORTIC ATHEROSCLEROSIS: ICD-10-CM

## 2019-08-12 DIAGNOSIS — N18.30 CKD (CHRONIC KIDNEY DISEASE) STAGE 3, GFR 30-59 ML/MIN: ICD-10-CM

## 2019-08-12 DIAGNOSIS — E03.9 ACQUIRED HYPOTHYROIDISM: ICD-10-CM

## 2019-08-12 DIAGNOSIS — J44.89 COPD (CHRONIC OBSTRUCTIVE PULMONARY DISEASE) WITH CHRONIC BRONCHITIS: ICD-10-CM

## 2019-08-12 DIAGNOSIS — R91.8 MULTIPLE LUNG NODULES: ICD-10-CM

## 2019-08-12 DIAGNOSIS — J47.9 BRONCHIECTASIS WITHOUT COMPLICATION: ICD-10-CM

## 2019-08-12 DIAGNOSIS — D35.00 ADRENAL ADENOMA, UNSPECIFIED LATERALITY: ICD-10-CM

## 2019-08-12 DIAGNOSIS — E11.8 CONTROLLED TYPE 2 DIABETES MELLITUS WITH COMPLICATION, WITHOUT LONG-TERM CURRENT USE OF INSULIN: ICD-10-CM

## 2019-08-12 DIAGNOSIS — F51.01 PRIMARY INSOMNIA: ICD-10-CM

## 2019-08-12 DIAGNOSIS — J70.1 RADIATION FIBROSIS OF LUNG: ICD-10-CM

## 2019-08-12 PROBLEM — R13.10 DYSPHAGIA: Status: RESOLVED | Noted: 2018-11-12 | Resolved: 2019-08-12

## 2019-08-12 PROBLEM — J02.9 SORE THROAT: Status: RESOLVED | Noted: 2019-01-29 | Resolved: 2019-08-12

## 2019-08-12 PROBLEM — R60.0 LOCALIZED EDEMA: Status: RESOLVED | Noted: 2017-10-17 | Resolved: 2019-08-12

## 2019-08-12 PROBLEM — J02.9 PHARYNGITIS: Status: RESOLVED | Noted: 2019-01-29 | Resolved: 2019-08-12

## 2019-08-12 PROBLEM — M47.816 LUMBAR SPONDYLOSIS: Status: RESOLVED | Noted: 2018-03-06 | Resolved: 2019-08-12

## 2019-08-12 PROCEDURE — 99999 PR PBB SHADOW E&M-EST. PATIENT-LVL V: CPT | Mod: PBBFAC,,, | Performed by: INTERNAL MEDICINE

## 2019-08-12 PROCEDURE — 99499 RISK ADDL DX/OHS AUDIT: ICD-10-PCS | Mod: S$GLB,,, | Performed by: INTERNAL MEDICINE

## 2019-08-12 PROCEDURE — 3075F SYST BP GE 130 - 139MM HG: CPT | Mod: CPTII,S$GLB,, | Performed by: INTERNAL MEDICINE

## 2019-08-12 PROCEDURE — 99397 PER PM REEVAL EST PAT 65+ YR: CPT | Mod: S$GLB,,, | Performed by: INTERNAL MEDICINE

## 2019-08-12 PROCEDURE — 3078F DIAST BP <80 MM HG: CPT | Mod: CPTII,S$GLB,, | Performed by: INTERNAL MEDICINE

## 2019-08-12 PROCEDURE — 99397 PR PREVENTIVE VISIT,EST,65 & OVER: ICD-10-PCS | Mod: S$GLB,,, | Performed by: INTERNAL MEDICINE

## 2019-08-12 PROCEDURE — 3075F PR MOST RECENT SYSTOLIC BLOOD PRESS GE 130-139MM HG: ICD-10-PCS | Mod: CPTII,S$GLB,, | Performed by: INTERNAL MEDICINE

## 2019-08-12 PROCEDURE — 99499 UNLISTED E&M SERVICE: CPT | Mod: S$GLB,,, | Performed by: INTERNAL MEDICINE

## 2019-08-12 PROCEDURE — 3078F PR MOST RECENT DIASTOLIC BLOOD PRESSURE < 80 MM HG: ICD-10-PCS | Mod: CPTII,S$GLB,, | Performed by: INTERNAL MEDICINE

## 2019-08-12 PROCEDURE — 99999 PR PBB SHADOW E&M-EST. PATIENT-LVL V: ICD-10-PCS | Mod: PBBFAC,,, | Performed by: INTERNAL MEDICINE

## 2019-08-12 RX ORDER — GLIMEPIRIDE 2 MG/1
2 TABLET ORAL
Qty: 90 TABLET | Refills: 3 | Status: SHIPPED | OUTPATIENT
Start: 2019-08-12 | End: 2020-02-21

## 2019-08-12 RX ORDER — ZOLPIDEM TARTRATE 5 MG/1
5 TABLET ORAL NIGHTLY
Qty: 30 TABLET | Refills: 3 | Status: SHIPPED | OUTPATIENT
Start: 2019-08-12 | End: 2019-12-27

## 2019-08-12 NOTE — PROGRESS NOTES
Subjective:       Patient ID: Olga Aguiar is a 84 y.o. female.    Chief Complaint: No chief complaint on file.    Annual exam    Here with     A1c up to 8.8; she admits to dietary indiscretion.  Has to had to be on oral steroids for her lungs and also steroid shots.    No polydipsia, polyuria or unexplained weight loss.    Her insurance has also decided to cover her Ambien.  This was reviewed.  Pattern of use has been stable, she uses 5 mg at night.    Patient Active Problem List:     Mixed hyperlipidemia, baseline      Chronic pain syndrome     Lumbosacral spondylosis without myelopathy     Adrenal adenoma: 2.8 X 2.1CM 2010; stable 2016, also stable 2018     Abdominal obesity     Lymph edema, right arm     Gallbladder polyp     CKD (chronic kidney disease) stage 3, GFR 30-59 ml/min     Aortic valve stenosis, mild- moderate, MARI 1.44 1/16     LVH (left ventricular hypertrophy)     Pancreatic cyst: stable on MRI 11/16 per GI repeated 2018, do again 2020     Cervical spinal stenosis     Controlled type 2 diabetes mellitus with complication, without long-term current use of insulin     Acquired hypothyroidism     Transient cerebral ischemia: 2012     Primary insomnia     Gastroesophageal reflux disease without esophagitis     Multiple lung nodules: stable CT 10/2016     Former smoker: 1 pack daily for < 20 years, quit 1973     COPD (chronic obstructive pulmonary disease) with chronic bronchitis, 2013     Facet hypertrophy of lumbar region     History of breast cancer     Intermittent asthma     Radiation fibrosis of lung     Bronchiectasis without complication     Ectatic abdominal aorta: see CT scan 10/16; no aneurysm 10/17     Aortic atherosclerosis: see CT scan 2016     Renal cysts, acquired, bilateral     Lumbar radiculopathy     Abnormal ECG     Encounter for monitoring proton pump inhibitor therapy     Diverticulosis of large intestine without hemorrhage: see CT 10/14     Idiopathic gout      DDD (degenerative disc disease), lumbar     Liver cysts: see MRI 2018     Odynophagia     Ambien use disorder, moderate, dependence, start 2010      Review of Systems   Constitutional: Negative for fatigue and fever.   Eyes: Negative for visual disturbance.   Respiratory: Negative for cough and shortness of breath.    Cardiovascular: Negative for chest pain.   Gastrointestinal: Negative for abdominal pain.        Occasional nausea after breakfast but no vomiting.  No abdominal pain, diarrhea, fever, chills, sweats or weight loss   Endocrine: Negative for polydipsia, polyphagia and polyuria.   Genitourinary: Negative for difficulty urinating and hematuria.   Musculoskeletal: Positive for arthralgias and back pain.   Skin: Negative for rash.   Neurological: Negative for weakness and numbness.   Psychiatric/Behavioral: Positive for sleep disturbance. The patient is not nervous/anxious.         Stable on Ambien       Objective:      Physical Exam   Constitutional: She is oriented to person, place, and time. She appears well-developed and well-nourished.   HENT:   Head: Normocephalic and atraumatic.   Right Ear: External ear normal.   Left Ear: External ear normal.   Nose: Nose normal.   Mouth/Throat: Oropharynx is clear and moist. No oropharyngeal exudate.   Eyes: Conjunctivae and EOM are normal. No scleral icterus.   Neck: Normal range of motion. Neck supple. No JVD present. No thyromegaly present.   Cardiovascular: Normal rate, regular rhythm, normal heart sounds and intact distal pulses. Exam reveals no gallop.   No murmur heard.  Pulmonary/Chest: Effort normal and breath sounds normal. No respiratory distress. She has no wheezes.   Abdominal: Soft. Bowel sounds are normal. She exhibits no distension and no mass. There is no tenderness. There is no rebound and no guarding.   Musculoskeletal: Normal range of motion. She exhibits no edema or tenderness.   Lymph edema right arm   Lymphadenopathy:     She has no cervical  adenopathy.   Neurological: She is alert and oriented to person, place, and time. She displays normal reflexes. No cranial nerve deficit. Coordination normal.   Skin: Skin is warm. No rash noted. No erythema.   Psychiatric: She has a normal mood and affect. Her behavior is normal. Judgment and thought content normal.   Nursing note and vitals reviewed.      Assessment:       1. Annual physical exam    2. Bronchiectasis without complication    3. COPD (chronic obstructive pulmonary disease) with chronic bronchitis, 2013    4. CKD (chronic kidney disease) stage 3, GFR 30-59 ml/min    5. Idiopathic gout, unspecified chronicity, unspecified site    6. Primary insomnia    7. Multiple lung nodules: stable CT 10/2016    8. Radiation fibrosis of lung    9. Aortic atherosclerosis: see CT scan 2016    10. Nausea    11. Controlled type 2 diabetes mellitus with complication, without long-term current use of insulin    12. Acquired hypothyroidism    13. Adrenal adenoma, unspecified laterality    14. Pancreatic cyst: stable on MRI 11/16 per GI repeated 2018, do again 2020        Plan:         Diagnoses and all orders for this visit:    Annual physical exam    Bronchiectasis without complication; stable.  Follows with an outside pulmonologist Dr. Heller.  Flu shot when available    COPD (chronic obstructive pulmonary disease) with chronic bronchitis, 2013:  Stable.  Follows with an outside pulmonologist Dr. Heller.  Flu shot when available    CKD (chronic kidney disease) stage 3, GFR 30-59 ml/min:  Stable.  Hydration.  Avoid nephrotoxins.  -     Ambulatory consult to Nephrology    Idiopathic gout, unspecified chronicity, unspecified site; currently no symptoms    Primary insomnia:  Stable on regimen, pattern of use has been stable.   reviewed    Multiple lung nodules: stable CT 10/2016    Radiation fibrosis of lung    Aortic atherosclerosis: see CT scan 2016:  Continue regimen    Nausea  -     Amylase; Future  -     Lipase;  Future  -     Comprehensive metabolic panel; Future    Controlled type 2 diabetes mellitus with complication, without long-term current use of insulin  -     Hemoglobin A1c; Future    Acquired hypothyroidism  -     TSH; Future    Other orders  -     zolpidem (AMBIEN) 5 MG Tab; Take 1 tablet (5 mg total) by mouth every evening.  -     glimepiride (AMARYL) 2 MG tablet; Take 1 tablet (2 mg total) by mouth before breakfast.    Keep Cardiology follow-up    Flu shot and shingles vaccines when available  Periodic monitoring of adrenal adenoma an pancreas cyst    I will review all studies and determine further tx depending on findings

## 2019-08-12 NOTE — PATIENT INSTRUCTIONS
Understanding Carbohydrates, Fats, and Protein  Food is a source of fuel and nourishment for your body. Its also a source of pleasure. Having diabetes doesnt mean you have to eat special foods or give up desserts. Instead, your dietitian can show you how to plan meals to suit your body. To start, learn how different foods affect blood sugar.  Carbohydrates  Carbohydrates are the main source of fuel for the body. Carbohydrates raise blood sugar. Many people think carbohydrates are only found in pasta or bread. But carbohydrates are actually in many kinds of foods:  · Sugars occur naturally in foods such as fruit, milk, honey, and molasses. Sugars can also be added to many foods, from cereals and yogurt to candy and desserts. Sugars raise blood sugar.  · Starches are found in bread, cereals, pasta, and dried beans. Theyre also found in corn, peas, potatoes, yam, acorn squash, and butternut squash. Starches also raise blood sugar.   · Fiber is found in foods such as vegetables, fruits, beans, and whole grains. Unlike other carbs, fiber isnt digested or absorbed. So it doesnt raise blood sugar. In fact, fiber can help keep blood sugar from rising too fast. It also helps keep blood cholesterol at a healthy level.  Did you know?  Even though carbohydrates raise blood sugar, its best to have some in every meal. They are an important part of a healthy diet.   Fat  Fat is an energy source that can be stored until needed. Fat does not raise blood sugar. However, it can raise blood cholesterol, increasing the risk of heart disease. Fat is also high in calories, which can cause weight gain. Not all types of fat are the same.  More Healthy:  · Monounsaturated fats are mostly found in vegetable oils, such as olive, canola, and peanut oils. They are also found in avocados and some nuts. Monounsaturated fats are healthy for your heart. Thats because they lower LDL (unhealthy) cholesterol.  · Polyunsaturated fats are mostly  found in vegetable oils, such as corn, safflower, and soybean oils. They are also found in some seeds, nuts, and fish. Polyunsaturated fats lower LDL (unhealthy) cholesterol. So, choosing them instead of saturated fats is healthy for your heart. Certain unsaturated fats can help lower triglycerides.   Less Healthy:  · Saturated fats are found in animal products, such as meat, poultry, whole milk, lard, and butter. Saturated fats raise LDL cholesterol and are not healthy for your heart.  · Hydrogenated oils and trans fats are formed when vegetable oils are processed into solid fats. They are found in many processed foods. Hydrogenated oils and trans fats raise LDL cholesterol and lower HDL (healthy) cholesterol. They are not healthy for your heart.  Protein  Protein helps the body build and repair muscle and other tissue. Protein has little or no effect on blood sugar. However, many foods that contain protein also contain saturated fat. By choosing low-fat protein sources, you can get the benefits of protein without the extra fat:  · Plant protein is found in dry beans and peas, nuts, and soy products, such as tofu and soymilk. These sources tend to be cholesterol-free and low in saturated fat.  · Animal protein is found in fish, poultry, meat, cheese, milk, and eggs. These contain cholesterol and can be high in saturated fat. Aim for lean, lower-fat choices.  Date Last Reviewed: 3/1/2016  © 3147-0898 [employer]. [address] [place] [id number]. All rights reserved. This information is not intended as a substitute for professional medical care. Always follow your healthcare professional's instructions.        Diet: Diabetes  Food is an important tool that you can use to control diabetes and stay healthy. Eating well-balanced meals in the correct amounts will help you control your blood glucose levels and prevent low blood sugar reactions. It will also help you reduce the health risks of  diabetes. There is no one specific diet that is right for everyone with diabetes. But there are general guidelines to follow. A registered dietitian (RD) will create a tailored diet approach thats just right for you. He or she will also help you plan healthy meals and snacks. If you have any questions, call your dietitian for advice.     Guidelines for success  Talk with your healthcare provider before starting a diabetes diet or weight loss program. If you haven't talked with a dietitian yet, ask your provider for a referral. The following guidelines can help you succeed:  · Select foods from the 6 food groups below. Your dietitian will help you find food choices within each group. He or she will also show you serving sizes and how many servings you can have at each meal.  ¨ Grains, beans, and starchy vegetables  ¨ Vegetables  ¨ Fruit  ¨ Milk or yogurt  ¨ Meat, poultry, fish, or tofu  ¨ Healthy fats  · Check your blood sugar levels as directed by your provider. Take any medicine as prescribed by your provider.  · Learn to read food labels and pick the right portion sizes.  · Eat only the amount of food in your meal plan. Eat about the same amount of food at regular times each day. Dont skip meals. Eat meals 4 to 5 hours apart, with snacks in between.  · Limit alcohol. It raises blood sugar levels. Drink water or calorie-free diet drinks that use safe sweeteners.  · Eat less fat to help lower your risk of heart disease. Use nonfat or low-fat dairy products and lean meats. Avoid fried foods. Use cooking oils that are unsaturated, such as olive, canola, or peanut oil.  · Talk with your dietitian about safe sugar substitutes.  · Avoid added salt. It can contribute to high blood pressure, which can cause heart disease. People with diabetes already have a risk of high blood pressure and heart disease.  · Stay at a healthy weight. If you need to lose weight, cut down on your portion sizes. But dont skip meals. Exercise  is an important part of any weight management program. Talk with your provider about an exercise program thats right for you.  · For more information about the best diet plan for you, talk with a registered dietitian (RD). To find an RD in your area, contact:  ¨ Academy of Nutrition and Dietetics www.eatright.org  ¨ The American Diabetes Association 038-301-3344 www.diabetes.org  Date Last Reviewed: 8/1/2016 © 2000-2017 [employer]. [address] [place] [address]. All rights reserved. This information is not intended as a substitute for professional medical care. Always follow your healthcare professional's instructions.        Diabetes: Meal Planning    You can help keep your blood sugar level in your target range by eating healthy foods. Your healthcare team can help you create a low-fat, nutritious meal plan. Take an active role in your diabetes management by following your meal plan and working with your healthcare team.  Make your meal plan  A meal plan gives guidelines for the types and amounts of food you should eat. The goal is to balance food and insulin (or other diabetes medications) so your blood sugars will be in your target range. Your dietitian will help you make a flexible meal plan that includes many foods that you like.  Watch serving sizes  Your meal plan will group foods by servings. To learn how much a serving is, start by measuring food portions at each meal. Soon youll know what a serving looks like on your plate. Ask your healthcare provider about how to balance servings of different foods.  Eat from all the food groups  The basis of a healthy meal plan is variety (eating lots of different foods). Choose lean meats, fresh fruits and vegetables, whole grains, and low-fat or nonfat dairy products. Eating a wide variety of foods provides the nutrients your body needs. It can also keep you from getting bored with your meal plan.  Learn about carbohydrates, fats, and  protein  · Carbohydrates are starches, sugars, and fiber. They are found in many foods, including fruit, bread, pasta, milk, and sweets. Of all the foods you eat, carbohydrates have the most effect on your blood sugar. Your dietitian may teach you about carb counting, a way to figure out the number of carbohydrates in a meal.  · Fats have the most calories. They also have the most effect on your weight and your risk of heart disease. When you have diabetes, its important to control your weight and protect your heart. Foods that are high in fat include whole milk, cheese, snack foods, and desserts.  · Protein is important for building and repairing muscles and bones. Choose low-fat protein sources, such as fish, egg whites, and skinless chicken.  Reduce liquid sugars  Extra calories from sodas, sports drinks, and fruit drinks make it hard to keep blood sugar in range. Cut as many liquid sugars from your meal plan as you can.  This includes most fruit juices, which are often high in natural or added sugar. Instead, drink plenty of water and other sugar-free beverages.  Eat less fat  If you need to lose weight, try to reduce the amount of fat in your diet. This can also help lower your cholesterol level to keep blood vessels healthier. Cut fat by using only small amounts of liquid oil for cooking. Read food labels carefully to avoid foods with unhealthy trans fats.  Timing your meals  When it comes to blood sugar control, when you eat is as important as what you eat. You may need to eat several small meals spaced evenly throughout the day to stay in your target range. So dont skip breakfast or wait until late in the day to get most of your calories. Doing so can cause your blood sugar to rise too high or fall too low.   Date Last Reviewed: 3/1/2016  © 7364-9636 [employer]. [address] [place] [address], [place] [address]. All rights reserved. This information is not intended as a substitute for professional  medical care. Always follow your healthcare professional's instructions.        Understanding Carbohydrates    A car needs the right type of fuel to run. And you need the right kind of food to function. To keep your energy level up, your body needs food that has carbohydrates. But carbohydrates raise blood sugar levels higher and faster than other kinds of food. Your dietitian will work with you to figure out the amount of carbohydrates you need.  Starches  Starches are found in grains, some vegetables, and beans. Grain products include bread, pasta, cereal, and tortillas. Starchy vegetables include potatoes, peas, corn, lima beans, yams, and squash. Kidney beans, richard beans, black beans, garbanzo beans, and lentils also contain starches.  Sugars  Sugars are found naturally in many foods. Or sugar can be added. Foods that contain natural sugar include fruits and fruit juices, dairy products, honey, and molasses. Added sugars are found in most desserts, processed foods, candy, regular soda, and fruit drinks. These are very helpful for treating low blood sugar, or hypoglycemia. They provide sugar quickly. Try to keep at least 15 to 20 grams of these simple sugars with you at all times. Eat this if you begin to have low blood sugar symptoms.  Fiber  Fiber comes from plant foods. Most fiber isnt digested by the body. Instead of raising blood sugar levels like other carbohydrates, it actually stops blood sugar from rising too fast. Fiber is found in fruits, vegetables, whole grains, beans, peas, and many nuts.  Carb counting  Keep track of the amount of carbohydrates you eat. This can help you keep the right balance of physical activity and medicine. The amount of carbohydrates needed will vary for each person. It depends on many things such as your health, the medicines you take, and how active you are. Your healthcare team will help you figure out the right amount of carbohydrates for you. You may start with around  45 to 60 grams of carbohydrate per meal, depending on your situation. Carb counting is a system that helps you keep track of the carbohydrates you eat at each meal.  Carbohydrates come from a variety of foods. These include grains, starchy vegetables, fruit, milk, beans, and snack foods. You can either count carbohydrate grams or carbohydrate servings. When you count carbohydrate servings, 1 carbohydrate serving = 15 grams of carbohydrates.  Here are some examples of foods containing about 15 grams of carbohydrates (1 serving of carbohydrates):  · 1/2 cup of canned or frozen fruit  · A small piece of fresh fruit (4 ounces)  · 1 slice of bread  · 1/2 cup of oatmeal  · 1/3 cup of rice  · 4 to 6 crackers  · 1/2 English muffin  · 1/2 cup of black beans  · 1/4 of a large baked potato (3 ounces)  · 2/3 cup of plain fat-free yogurt  · 1 cup of soup  · 1/2 cup of casserole  · 6 chicken nuggets  · 2-inch-square brownie or cake without frosting  · 2 small cookies  · 1/2 cup of ice cream or sherbet  Carb counting is easier when food labels are available. Look at the label to see how many grams of total carbohydrates the food contains. Then you can figure out how much you should eat.  Two very important lines to look at on the label are the serving size and the total carbohydrate amount. Here are some tips for using food labels to count your carbohydrate intake:  · Check the serving size. The information on the label is based on that serving size. If you eat more than the listed serving size, you may have to double or triple the other information on the label.   · Check the total grams of carbohydrates. Total carbohydrate from the label includes sugar, starch, and fiber. Be sure to use the total carbohydrate number and not sugar alone.  · Know how many grams of carbohydrates you can have.  Be familiar with the matching portion sizes.  · Compare labels. Compare the labels of different products, looking at serving sizes and total  carbohydrates to find the products that work best for you.   · Don't forget protein and fat. With all the focus on carb counting, it might be easy to forget protein and fat in your meals. Don't forget to include sources of protein and healthy fat to balance your meals.  Its also important to be consistent with the amount and time you eat when taking a fixed dose of diabetes medicine. Work with your healthcare provider or dietitian if you need additional help. He or she can help you keep track of your carbohydrate intake. He or she can also help you figure out how many grams of carbohydrates you should have.  Date Last Reviewed: 7/1/2016 © 2000-2017 [employer]. [address] [place] [address], [place] [address]. All rights reserved. This information is not intended as a substitute for professional medical care. Always follow your healthcare professional's instructions.

## 2019-09-03 ENCOUNTER — LAB VISIT (OUTPATIENT)
Dept: LAB | Facility: HOSPITAL | Age: 84
End: 2019-09-03
Attending: PHYSICIAN ASSISTANT
Payer: MEDICARE

## 2019-09-03 ENCOUNTER — OFFICE VISIT (OUTPATIENT)
Dept: PAIN MEDICINE | Facility: CLINIC | Age: 84
End: 2019-09-03
Payer: MEDICARE

## 2019-09-03 VITALS
SYSTOLIC BLOOD PRESSURE: 119 MMHG | HEART RATE: 91 BPM | DIASTOLIC BLOOD PRESSURE: 79 MMHG | RESPIRATION RATE: 18 BRPM | TEMPERATURE: 98 F | OXYGEN SATURATION: 98 % | WEIGHT: 154.19 LBS | BODY MASS INDEX: 28.21 KG/M2

## 2019-09-03 DIAGNOSIS — M48.061 SPINAL STENOSIS OF LUMBAR REGION WITHOUT NEUROGENIC CLAUDICATION: ICD-10-CM

## 2019-09-03 DIAGNOSIS — M54.16 LUMBAR RADICULOPATHY: Primary | ICD-10-CM

## 2019-09-03 DIAGNOSIS — E11.9 TYPE 2 DIABETES MELLITUS WITHOUT COMPLICATION, WITHOUT LONG-TERM CURRENT USE OF INSULIN: ICD-10-CM

## 2019-09-03 DIAGNOSIS — M51.36 DDD (DEGENERATIVE DISC DISEASE), LUMBAR: ICD-10-CM

## 2019-09-03 DIAGNOSIS — R26.81 GAIT INSTABILITY: ICD-10-CM

## 2019-09-03 LAB
ESTIMATED AVG GLUCOSE: 166 MG/DL (ref 68–131)
HBA1C MFR BLD HPLC: 7.4 % (ref 4–5.6)

## 2019-09-03 PROCEDURE — 99999 PR PBB SHADOW E&M-EST. PATIENT-LVL V: ICD-10-PCS | Mod: PBBFAC,,, | Performed by: PHYSICIAN ASSISTANT

## 2019-09-03 PROCEDURE — 99499 RISK ADDL DX/OHS AUDIT: ICD-10-PCS | Mod: S$GLB,,, | Performed by: PHYSICIAN ASSISTANT

## 2019-09-03 PROCEDURE — 36415 COLL VENOUS BLD VENIPUNCTURE: CPT | Mod: PO

## 2019-09-03 PROCEDURE — 99214 PR OFFICE/OUTPT VISIT, EST, LEVL IV, 30-39 MIN: ICD-10-PCS | Mod: S$GLB,,, | Performed by: PHYSICIAN ASSISTANT

## 2019-09-03 PROCEDURE — 3078F DIAST BP <80 MM HG: CPT | Mod: CPTII,S$GLB,, | Performed by: PHYSICIAN ASSISTANT

## 2019-09-03 PROCEDURE — 3078F PR MOST RECENT DIASTOLIC BLOOD PRESSURE < 80 MM HG: ICD-10-PCS | Mod: CPTII,S$GLB,, | Performed by: PHYSICIAN ASSISTANT

## 2019-09-03 PROCEDURE — 1101F PR PT FALLS ASSESS DOC 0-1 FALLS W/OUT INJ PAST YR: ICD-10-PCS | Mod: CPTII,S$GLB,, | Performed by: PHYSICIAN ASSISTANT

## 2019-09-03 PROCEDURE — 99499 UNLISTED E&M SERVICE: CPT | Mod: S$GLB,,, | Performed by: PHYSICIAN ASSISTANT

## 2019-09-03 PROCEDURE — 1101F PT FALLS ASSESS-DOCD LE1/YR: CPT | Mod: CPTII,S$GLB,, | Performed by: PHYSICIAN ASSISTANT

## 2019-09-03 PROCEDURE — 83036 HEMOGLOBIN GLYCOSYLATED A1C: CPT

## 2019-09-03 PROCEDURE — 99999 PR PBB SHADOW E&M-EST. PATIENT-LVL V: CPT | Mod: PBBFAC,,, | Performed by: PHYSICIAN ASSISTANT

## 2019-09-03 PROCEDURE — 99214 OFFICE O/P EST MOD 30 MIN: CPT | Mod: S$GLB,,, | Performed by: PHYSICIAN ASSISTANT

## 2019-09-03 PROCEDURE — 3074F PR MOST RECENT SYSTOLIC BLOOD PRESSURE < 130 MM HG: ICD-10-PCS | Mod: CPTII,S$GLB,, | Performed by: PHYSICIAN ASSISTANT

## 2019-09-03 PROCEDURE — 3074F SYST BP LT 130 MM HG: CPT | Mod: CPTII,S$GLB,, | Performed by: PHYSICIAN ASSISTANT

## 2019-09-04 ENCOUNTER — TELEPHONE (OUTPATIENT)
Dept: PAIN MEDICINE | Facility: CLINIC | Age: 84
End: 2019-09-04

## 2019-09-04 NOTE — TELEPHONE ENCOUNTER
Please let the patient know that I checked her hemoglobin A1c and it has improved from 8.8 to 7.4.  Please schedule her for an L5/S1 interlaminar epidural steroid injection and have her watch her blood glucose closely following the injection.

## 2019-09-04 NOTE — TELEPHONE ENCOUNTER
Spoke with patient and reviewed this information. She will be calling back to schedule procedure.

## 2019-09-04 NOTE — PROGRESS NOTES
CC: Back pain    HPI: The patient is a 84-year-old woman with a history of diabetes, previous breast CA, CVA and spinal stenosis who presents in referral from her primary care physician, Dr. Barger for back pain and leg pain. She returns in follow-up today with worsening back pain. This is located in the right greater than left low back radiating throughout the right buttock.  Her pain is described as sharp, aching, worse with standing and improved with tramadol and ice.  She does okay walking if she has her walker or uses a shopping cart.  She denies weakness, numbness, bladder or bowel incontinence.    Pain intervention history: She tried physical therapy about 1 year ago and it seemed to make her pain worse. Patient is status post right L3 and L4 transforaminal injection on 8/9/13 with 50% relief of low back and right leg pain.  She is status post bilateral L4 transforaminal epidural steroid injections on 9/27/13 and 11/1/13 with 10-20% relief.  She is status post radiofrequency ablation of the left L3, 4, 5 medial branch nerves on 5/5/14 with 50% relief of her left low back pain.  She is status post radiofrequency ablation of the right L3, 4 and 5 medial branch nerves on 6/16/14 with mild relief, however she reported significant more relief at a later visit.  She is status post bilateral L4 transforaminal epidural steroid injections on 9/24/14 with moderate relief.  She is status post bilateral L4 transforaminal epidural steroid injections on 1/7/15 with complete relief of her posterior thigh pain, moderate relief of her low back pain and minimal relief of her right lateral hip pain.  She is status post C7-T1 cervical interlaminar epidural steroid injection on 2/27/15 with greater than 50% relief.  She is status post bilateral L3, 4 and 5 medial branch radio frequency ablation on 7/29/16 reporting 0% relief initially at 4 weeks but then reporting 90% relief after 6-7 weeks.  She is status post bilateral L4  transforaminal epidural steroid injections on 11/11/16 with not quite 50% relief.  She is status post bilateral L4 transforaminal epidural steroid injections on 4/4/17 with 50% relief.  She is status post bilateral L4 transforaminal epidural steroid injections on 8/23/17 with greater than 80% relief.  She is status post bilateral L3, 4 and 5 medial branch radiofrequency ablation on 3/6/18 with moderate relief.  She is status post L5/S1 interlaminar epidural steroid injection on 06/04/2018 with almost 100% relief lasting 1 month.  She is status post L5/S1 interlaminar epidural steroid injection on 09/04/2018 with almost 100% relief.  She is status post L5/S1 interlaminar epidural steroid injection on 12/20/2018 with greater than 50% relief. She is status post L5/S1 interlaminar epidural steroid injection on 05/07/2019 with 75% relief.    ROS:She reports easy bruising, back pain and difficulty sleeping.  Balance of review of systems is negative.    Medical, surgical, family and social history reviewed elsewhere in record.    Medications/Allergies: See med card    Vitals:    09/03/19 1327   BP: 119/79   Pulse: 91   Resp: 18   Temp: 98.2 °F (36.8 °C)   TempSrc: Oral   SpO2: 98%   Weight: 70 kg (154 lb 3.4 oz)   PainSc:   4   PainLoc: Back         Physical exam:  Gen: A and O x3, pleasant, well-groomed  Skin: No rashes or obvious lesions  HEENT: PERRLA  CVS: Regular rate and rhythm, normal S1 and S2, no murmurs.  Resp: Clear to auscultation bilaterally, no wheezes or rales.  Abdomen: Soft, NT/ND, normal bowel sounds present.  Musculoskeletal: Able to stand and walk short distances without assistance, however uses a walker when out of the house.  She has disuse atrophy of the lumbar paraspinous muscles.  Slow to go from seated to standing position.    Neuro:  Upper extremities: 5/5 strength bilaterally   Lower extremities: 5/5 strength bilaterally  Reflexes: Brachioradialis 2+, Bicep 2+, Tricep 2+. Patellar 2+, Achilles  2+.  Sensory: Intact and symmetrical to light touch and pinprick in C2-T1 dermatomes bilaterally.  Intact and symmetrical to light touch and pinprick in L2-S1 dermatomes bilaterally, except for a small patch over her left lateral shin decreased with light touch and pinprick.    Lumbar spine:  Lumbar spine: ROM is moderately reduced with flexion extension and oblique extension with increased low back pain during extension.  Terry's test is deferred.  Supine straight leg raise is negative bilaterally.  Internal and external rotation of the hip causes no increased pain on either side.  Myofascial exam: No tenderness to palpation across lumbar paraspinous muscles.      Imagin13 MRI L-spine  T10-T11: There is severe disk desiccation and a moderate diffuse disk bulge and ligamentous hypertrophy. This is not complete evaluation of the thoracic spine, but there does appear to be mild central canal stenosis.  T11-T12: Moderate disk desiccation and mild diffuse disk bulge. Mild narrowing of the thecal sac. Neural foraminal regions difficult to assess due to the scoliosis.  T12-L1: Severe disk degenerative disease with marked desiccation, diffuse disk bulge, and spurring of vertebral bodies. Mild central canal stenosis. Neural foraminal narrowing bilaterally, difficult to grade due to scoliosis.  L1-L2: Advanced disk desiccation with moderate diffuse disk bulge and mild spurring of vertebral bodies. Small superimposed central to right paracentral disk extrusion. Mild central canal stenosis. Mild to moderate facet arthropathy and ligamentous hypertrophy. Bilateral foraminal stenosis.  L2 - L3: There is also disk desiccation and diffuse disk bulge and small annular fissure posteriorly. facet arthropathy and hypertrophy ligamentum flavum. Mild central canal stenosis. Moderate right and mild left neuroforaminal stenosis.  L3-L4: Disk desiccation and moderate diffuse disk bulge. Small right paracentral ascending disk  extrusion and moderate facet arthropathy present bilaterally and hypertrophy of ligamentum flavum. In combination, the findings result in severe central canal stenosis. For example see axial image 22, central image 7. There is mild to moderate neural foraminal stenosis bilaterally.  L4 - L5: Mild anterolisthesis with disk desiccation and uncovering of the disk, with moderate right and severe left facet arthropathy and hypertrophy of ligamentum flavum. Severe central canal stenosis. Mild neural foraminal narrowing, right worse than left.  L5-S1: Disk desiccation and mild diffuse disk bulge. A small ascending disk extrusion centrally in the midline. No significant central canal stenosis. Advanced facet arthropathy bilaterally, left worse than right. Mild left neural foraminal narrowing. No significant right neural foraminal stenosis.    5/28/2018 MRI lumbar spine  T12-L1: There is marked disc space narrowing, trace retrolisthesis, unroofing of a moderate disc bulge.  There is right greater than left facet joint arthropathy.  There is mild spinal stenosis with moderate left and moderate-to-marked right foraminal stenosis.  These findings have mildly progressed.  L1-2: There is trace retrolisthesis of L1 on L2, there is inferior unroofing of a moderate diffuse disc bulge with small superimposed central disc protrusion.  There is right greater than left facet joint arthropathy.  There is borderline spinal stenosis.  There is crowding of right lateral recess.  There is marked right and moderate-to-marked left foraminal stenosis with very slight progression.  L2-3: There is trace retrolisthesis of L2 on L3 where there is marked disc space narrowing.  There is a moderate diffuse disc bulge with osteophytic ridging and superimposed right foraminal disc protrusion.  There is moderate-to-marked bilateral facet joint arthropathy with ligamentum flavum thickening, right greater than.  There is moderate central spinal stenosis  and right lateral recess stenosis.  There is mild left lateral recess stenosis.  There is marked right and moderate-to-marked left foraminal stenosis with progression.  L3-4: There is disc space narrowing.  There is marked facet joint arthropathy with ligamentum flavum thickening.  There is a small 4 mm left synovial cyst.  There is unroofing of a moderate to marked diffuse disc bulge.  There is severe spinal canal, bilateral lateral recess and right foraminal stenosis with moderate to marked left foraminal stenosis and with slight progression.  L4-5: There is stable grade 1 spondylolisthesis with 4 mm anterolisthesis of L4 on L5.  There is marked facet joint arthropathy.  There is unroofing of a moderate to marked diffuse disc bulge.  There is severe spinal canal, bilateral lateral recess and foraminal stenosis with slight progression.  L5-S1: There is disc space narrowing at the L5-S1 level, worse towards the left.  There is a disc bulge with small superimposed central disc extrusion with 5 mm cephalad extension.  This was present previously but is slightly more conspicuous.  There is marked facet joint arthropathy.  There is moderate central spinal stenosis with mild crowding of the left lateral recess.  There is mild right and moderate-to-marked left foraminal stenosis with progression.    05/13/2019 bone scan  Increased tracer activity is noted in the region of the facets presumably at the T12-L1 level on the left.  Tracer activity is also noted at the L4-L5 and L5-S1 levels on the left suggestive of facet arthropathy.  More diffuse facet arthropathy is noted throughout the lumbar spine.  Some increased uptake may be noted within the vertebral bodies more anteriorly at the T9 through T12 levels which could relate to compression deformity or Schmorl's node formation/endplate or Modic type degenerative change.  Given that no STIR signal abnormality was noted on the MRI of 03/14/2019 no significant vertebral body  height loss was noted at these levels on that exam, this uptake likely relates to degenerative Modic type endplate changes which can be seen on that exam.  Anatomical imaging such as from MRI which was noted to have been performed on 03/14/2019 is much more specific for degenerative changes of the spine as well as for numbering purposes    Assessment:   The patient is a 84-year-old woman with a history of diabetes, previous breast CA, CVA and spinal stenosis who presents in referral from her primary care physician, Dr. Barger for back pain and leg pain.     1. Lumbar radiculopathy     2. DDD (degenerative disc disease), lumbar     3. Spinal stenosis of lumbar region without neurogenic claudication     4. Gait instability     5. Type 2 diabetes mellitus without complication, without long-term current use of insulin  HEMOGLOBIN A1C       Plan:  1.  The patient would likely benefit from repeating an L5/S1 interlaminar ANUEL.  However, she is a high risk patient with diabetes and I will check her hemoglobin A1c prior to her procedure. This has a recently increased to 8.8, higher than it has ever been.  She reports better control of her blood glucose over the past month because she has cut out sweets and most carbohydrates.  We will call her with results and in the meantime, she will continue to take tramadol.

## 2019-09-04 NOTE — TELEPHONE ENCOUNTER
----- Message from Panda Vigil sent at 9/4/2019  4:39 PM CDT -----  Type: Needs Medical Advice    Who Called:  Patient      Best Call Back Number: [phone]  Additional Information: Patient states that she would like a callback regarding an appointment for a procedure.  Patient states that her previous call was dropped

## 2019-09-05 DIAGNOSIS — M54.16 LUMBAR RADICULOPATHY: Primary | ICD-10-CM

## 2019-09-05 RX ORDER — ALPRAZOLAM 0.5 MG/1
1 TABLET, ORALLY DISINTEGRATING ORAL ONCE AS NEEDED
Status: CANCELLED | OUTPATIENT
Start: 2019-09-16 | End: 2031-02-11

## 2019-09-05 NOTE — TELEPHONE ENCOUNTER
Spoke with patient and the lumbar injection has been scheduled for 9/16 with a 4 week follow up. Pre-op instructions were reviewed and sent to patient.

## 2019-09-08 ENCOUNTER — PATIENT MESSAGE (OUTPATIENT)
Dept: INTERNAL MEDICINE | Facility: CLINIC | Age: 84
End: 2019-09-08

## 2019-09-09 ENCOUNTER — TELEPHONE (OUTPATIENT)
Dept: PAIN MEDICINE | Facility: CLINIC | Age: 84
End: 2019-09-09

## 2019-09-09 RX ORDER — COLCHICINE 0.6 MG/1
0.6 TABLET ORAL DAILY PRN
Qty: 30 TABLET | Refills: 0 | Status: SHIPPED | OUTPATIENT
Start: 2019-09-09 | End: 2022-03-21

## 2019-09-09 NOTE — TELEPHONE ENCOUNTER
----- Message from Tonia Rivero sent at 9/9/2019  3:53 PM CDT -----  Contact: patient  Type: Needs Medical Advice    Who Called:  patient  Symptoms (please be specific):  jaqueline  How long has patient had these symptoms:  jaqueline  Pharmacy name and phone #:  [facility]  Best Call Back Number: [phone]  Additional Information: Patient states she has developed a slight case of gout and was asking if that will affect her procedure scheduled for Monday 9/16.  Please call to advise. Thanks!

## 2019-09-13 DIAGNOSIS — M51.36 DDD (DEGENERATIVE DISC DISEASE), LUMBAR: Primary | ICD-10-CM

## 2019-09-16 ENCOUNTER — HOSPITAL ENCOUNTER (OUTPATIENT)
Facility: HOSPITAL | Age: 84
Discharge: HOME | End: 2019-09-16
Attending: ANESTHESIOLOGY | Admitting: ANESTHESIOLOGY
Payer: MEDICARE

## 2019-09-16 ENCOUNTER — HOSPITAL ENCOUNTER (OUTPATIENT)
Dept: RADIOLOGY | Facility: HOSPITAL | Age: 84
Discharge: HOME | End: 2019-09-16
Attending: ANESTHESIOLOGY
Payer: MEDICARE

## 2019-09-16 DIAGNOSIS — M54.16 LUMBAR RADICULOPATHY: Primary | ICD-10-CM

## 2019-09-16 DIAGNOSIS — M51.36 DDD (DEGENERATIVE DISC DISEASE), LUMBAR: ICD-10-CM

## 2019-09-16 LAB — GLUCOSE SERPL-MCNC: 84 MG/DL (ref 70–110)

## 2019-09-16 PROCEDURE — 82962 GLUCOSE BLOOD TEST: CPT | Mod: PO | Performed by: ANESTHESIOLOGY

## 2019-09-16 PROCEDURE — 25500020 PHARM REV CODE 255: Mod: PO | Performed by: ANESTHESIOLOGY

## 2019-09-16 PROCEDURE — 62323 NJX INTERLAMINAR LMBR/SAC: CPT | Mod: PO | Performed by: ANESTHESIOLOGY

## 2019-09-16 PROCEDURE — 63600175 PHARM REV CODE 636 W HCPCS: Mod: PO | Performed by: ANESTHESIOLOGY

## 2019-09-16 PROCEDURE — 25000003 PHARM REV CODE 250: Mod: PO | Performed by: ANESTHESIOLOGY

## 2019-09-16 PROCEDURE — 76000 FLUOROSCOPY <1 HR PHYS/QHP: CPT | Mod: TC,PO

## 2019-09-16 PROCEDURE — 62323 PR INJ LUMBAR/SACRAL, W/IMAGING GUIDANCE: ICD-10-PCS | Mod: ,,, | Performed by: ANESTHESIOLOGY

## 2019-09-16 PROCEDURE — 62323 NJX INTERLAMINAR LMBR/SAC: CPT | Mod: ,,, | Performed by: ANESTHESIOLOGY

## 2019-09-16 RX ORDER — ALPRAZOLAM 0.5 MG/1
1 TABLET, ORALLY DISINTEGRATING ORAL ONCE AS NEEDED
Status: COMPLETED | OUTPATIENT
Start: 2019-09-16 | End: 2019-09-16

## 2019-09-16 RX ORDER — METHYLPREDNISOLONE ACETATE 80 MG/ML
INJECTION, SUSPENSION INTRA-ARTICULAR; INTRALESIONAL; INTRAMUSCULAR; SOFT TISSUE
Status: DISCONTINUED | OUTPATIENT
Start: 2019-09-16 | End: 2019-09-16 | Stop reason: HOSPADM

## 2019-09-16 RX ORDER — LIDOCAINE HYDROCHLORIDE 10 MG/ML
INJECTION, SOLUTION EPIDURAL; INFILTRATION; INTRACAUDAL; PERINEURAL
Status: DISCONTINUED | OUTPATIENT
Start: 2019-09-16 | End: 2019-09-16 | Stop reason: HOSPADM

## 2019-09-16 RX ADMIN — ALPRAZOLAM 0.5 MG: 0.5 TABLET, ORALLY DISINTEGRATING ORAL at 03:09

## 2019-09-16 NOTE — DISCHARGE INSTRUCTIONS
Home care instructions   Apply ice pack to the injection site for 20 minutes periods for the first 24 hrs for soreness/discomfort at injection site   DO NOT USE HEAT FOR 24 HOURS  Keep site clean and dry for 24 hours  MAY SHOWER TOMORROW  Do not drive until tomorrow  Take care when walking after YOUR LUMBAR LOWER BACK STEROID INJECTION    STEROIDS   May take 10-14 days for full affects.  Avoid strenuous exercises for 2 days    Resume home medication as prescribed today  Resume Aspirin, Plavix, or Coumadin the day after the procedure unless otherwise instructed.    SEE IMMEDIATE MEDICAL HELP FOR:  Severe increase in your usual pain or appearance of new pain  Prolonged or increasing weakness or numbness in the legs or arms  Drainage, redness, active bleeding, or increased swelling at the injection site  Temperature over 100.0 degrees F.  Headache that increases when your head is upright and decreases when you lie flat    CALL 911 OR GO DIRECTLY TO EMERGENCY DEPARTMENT FOR:  Shortness of breath, chest pain, or problems breathing

## 2019-09-16 NOTE — OP NOTE

## 2019-09-16 NOTE — DISCHARGE SUMMARY
[facility]  Discharge Note  Short Stay    Admit Date: 9/16/2019    Discharge Date: 9/16/2019    Attending Physician: Rohan Ware MD     Discharge Provider: Rohan Ware    Diagnoses:  Active Hospital Problems    Diagnosis  POA    *Lumbar radiculopathy [M54.16]  Yes      Resolved Hospital Problems   No resolved problems to display.       Discharged Condition: good    Final Diagnoses: Lumbar radiculopathy [M54.16]    Disposition: Home or Self Care    Hospital Course: no complications, uneventful    Outcome of Hospitalization, Treatment, Procedure, or Surgery:  Patient was admitted for outpatient procedure. The patient underwent procedure without complications and are discharged home    Follow up/Patient Instructions:  Follow up as scheduled in Pain Management clinic in 3-4 weeks/Patient has received instructions and follow up date and time    Medications:  Continue previous medications    Discharge Procedure Orders   Call MD for:  temperature >100.4     Call MD for:  severe uncontrolled pain     Call MD for:  redness, tenderness, or signs of infection (pain, swelling, redness, odor or green/yellow discharge around incision site)     Call MD for:  severe persistent headache     No dressing needed         Discharge Procedure Orders (must include Diet, Follow-up, Activity):   Discharge Procedure Orders (must include Diet, Follow-up, Activity)   Call MD for:  temperature >100.4     Call MD for:  severe uncontrolled pain     Call MD for:  redness, tenderness, or signs of infection (pain, swelling, redness, odor or green/yellow discharge around incision site)     Call MD for:  severe persistent headache     No dressing needed

## 2019-09-17 VITALS
HEIGHT: 63 IN | BODY MASS INDEX: 26.58 KG/M2 | RESPIRATION RATE: 17 BRPM | HEART RATE: 60 BPM | SYSTOLIC BLOOD PRESSURE: 148 MMHG | WEIGHT: 150 LBS | TEMPERATURE: 97 F | OXYGEN SATURATION: 94 % | DIASTOLIC BLOOD PRESSURE: 65 MMHG

## 2019-09-24 ENCOUNTER — TELEPHONE (OUTPATIENT)
Dept: GASTROENTEROLOGY | Facility: CLINIC | Age: 84
End: 2019-09-24

## 2019-09-24 DIAGNOSIS — K21.9 GASTROESOPHAGEAL REFLUX DISEASE WITHOUT ESOPHAGITIS: ICD-10-CM

## 2019-09-25 DIAGNOSIS — K21.9 GASTROESOPHAGEAL REFLUX DISEASE WITHOUT ESOPHAGITIS: ICD-10-CM

## 2019-09-25 RX ORDER — SUCRALFATE 1 G/1
1 TABLET ORAL
Qty: 60 TABLET | Refills: 0 | Status: SHIPPED | OUTPATIENT
Start: 2019-09-25 | End: 2019-10-25

## 2019-09-25 NOTE — TELEPHONE ENCOUNTER
Received refill request for carafate (on patient's medication list). It was discontinued on 10/28/18 by Dr. Barger. This medication is typically used short term. It has been greater than a year since our last visit. I recommend patient schedule a follow-up for continued evaluation and management. I sent in a refill until she can be seen.  Thanks  REED

## 2019-09-27 RX ORDER — SUCRALFATE 1 G/1
1 TABLET ORAL
Qty: 60 TABLET | Refills: 0 | OUTPATIENT
Start: 2019-09-27 | End: 2019-10-27

## 2019-09-30 ENCOUNTER — TELEPHONE (OUTPATIENT)
Dept: ENDOSCOPY | Facility: HOSPITAL | Age: 84
End: 2019-09-30

## 2019-10-08 RX ORDER — LEVOTHYROXINE SODIUM 75 UG/1
TABLET ORAL
Qty: 90 TABLET | Refills: 0 | Status: SHIPPED | OUTPATIENT
Start: 2019-10-08 | End: 2020-01-06

## 2019-10-14 ENCOUNTER — IMMUNIZATION (OUTPATIENT)
Dept: PHARMACY | Facility: CLINIC | Age: 84
End: 2019-10-14
Payer: MEDICARE

## 2019-10-14 ENCOUNTER — OFFICE VISIT (OUTPATIENT)
Dept: PAIN MEDICINE | Facility: CLINIC | Age: 84
End: 2019-10-14
Payer: MEDICARE

## 2019-10-14 VITALS
HEART RATE: 67 BPM | DIASTOLIC BLOOD PRESSURE: 76 MMHG | BODY MASS INDEX: 27.34 KG/M2 | WEIGHT: 154.31 LBS | RESPIRATION RATE: 18 BRPM | TEMPERATURE: 96 F | OXYGEN SATURATION: 95 % | SYSTOLIC BLOOD PRESSURE: 139 MMHG

## 2019-10-14 DIAGNOSIS — M47.816 LUMBAR SPONDYLOSIS: Primary | ICD-10-CM

## 2019-10-14 DIAGNOSIS — M48.061 SPINAL STENOSIS OF LUMBAR REGION WITHOUT NEUROGENIC CLAUDICATION: ICD-10-CM

## 2019-10-14 DIAGNOSIS — R26.81 GAIT INSTABILITY: ICD-10-CM

## 2019-10-14 DIAGNOSIS — M54.16 LUMBAR RADICULOPATHY: ICD-10-CM

## 2019-10-14 DIAGNOSIS — M51.36 DDD (DEGENERATIVE DISC DISEASE), LUMBAR: ICD-10-CM

## 2019-10-14 PROCEDURE — 3078F DIAST BP <80 MM HG: CPT | Mod: CPTII,S$GLB,, | Performed by: PHYSICIAN ASSISTANT

## 2019-10-14 PROCEDURE — 3075F PR MOST RECENT SYSTOLIC BLOOD PRESS GE 130-139MM HG: ICD-10-PCS | Mod: CPTII,S$GLB,, | Performed by: PHYSICIAN ASSISTANT

## 2019-10-14 PROCEDURE — 1101F PT FALLS ASSESS-DOCD LE1/YR: CPT | Mod: CPTII,S$GLB,, | Performed by: PHYSICIAN ASSISTANT

## 2019-10-14 PROCEDURE — 3075F SYST BP GE 130 - 139MM HG: CPT | Mod: CPTII,S$GLB,, | Performed by: PHYSICIAN ASSISTANT

## 2019-10-14 PROCEDURE — 99213 OFFICE O/P EST LOW 20 MIN: CPT | Mod: S$GLB,,, | Performed by: PHYSICIAN ASSISTANT

## 2019-10-14 PROCEDURE — 3078F PR MOST RECENT DIASTOLIC BLOOD PRESSURE < 80 MM HG: ICD-10-PCS | Mod: CPTII,S$GLB,, | Performed by: PHYSICIAN ASSISTANT

## 2019-10-14 PROCEDURE — 99999 PR PBB SHADOW E&M-EST. PATIENT-LVL V: CPT | Mod: PBBFAC,,, | Performed by: PHYSICIAN ASSISTANT

## 2019-10-14 PROCEDURE — 99213 PR OFFICE/OUTPT VISIT, EST, LEVL III, 20-29 MIN: ICD-10-PCS | Mod: S$GLB,,, | Performed by: PHYSICIAN ASSISTANT

## 2019-10-14 PROCEDURE — 99999 PR PBB SHADOW E&M-EST. PATIENT-LVL V: ICD-10-PCS | Mod: PBBFAC,,, | Performed by: PHYSICIAN ASSISTANT

## 2019-10-14 PROCEDURE — 1101F PR PT FALLS ASSESS DOC 0-1 FALLS W/OUT INJ PAST YR: ICD-10-PCS | Mod: CPTII,S$GLB,, | Performed by: PHYSICIAN ASSISTANT

## 2019-10-14 RX ORDER — SODIUM CHLORIDE, SODIUM LACTATE, POTASSIUM CHLORIDE, CALCIUM CHLORIDE 600; 310; 30; 20 MG/100ML; MG/100ML; MG/100ML; MG/100ML
INJECTION, SOLUTION INTRAVENOUS CONTINUOUS
Status: CANCELLED | OUTPATIENT
Start: 2019-10-29

## 2019-10-14 NOTE — PROGRESS NOTES
CC: Back pain    HPI: The patient is a 84-year-old woman with a history of diabetes, previous breast CA, CVA and spinal stenosis who presents in referral from her primary care physician, Dr. Barger for back pain and leg pain. She is status post L5/S1 interlaminar epidural steroid injection on 09/16/2019 with 50% relief.  She continues to have back pain with intermittent radiation to the right buttock.  This is worse with standing walking, improved with sitting and tramadol.  She typically takes tramadol in the morning and in the evening.  She occasionally will take this in the middle the day if she has been active and had to go several places.  She denies weakness, numbness, bladder or bowel incontinence.    Pain intervention history: She tried physical therapy about 1 year ago and it seemed to make her pain worse. Patient is status post right L3 and L4 transforaminal injection on 8/9/13 with 50% relief of low back and right leg pain.  She is status post bilateral L4 transforaminal epidural steroid injections on 9/27/13 and 11/1/13 with 10-20% relief.  She is status post radiofrequency ablation of the left L3, 4, 5 medial branch nerves on 5/5/14 with 50% relief of her left low back pain.  She is status post radiofrequency ablation of the right L3, 4 and 5 medial branch nerves on 6/16/14 with mild relief, however she reported significant more relief at a later visit.  She is status post bilateral L4 transforaminal epidural steroid injections on 9/24/14 with moderate relief.  She is status post bilateral L4 transforaminal epidural steroid injections on 1/7/15 with complete relief of her posterior thigh pain, moderate relief of her low back pain and minimal relief of her right lateral hip pain.  She is status post C7-T1 cervical interlaminar epidural steroid injection on 2/27/15 with greater than 50% relief.  She is status post bilateral L3, 4 and 5 medial branch radio frequency ablation on 7/29/16 reporting 0% relief  initially at 4 weeks but then reporting 90% relief after 6-7 weeks.  She is status post bilateral L4 transforaminal epidural steroid injections on 11/11/16 with not quite 50% relief.  She is status post bilateral L4 transforaminal epidural steroid injections on 4/4/17 with 50% relief.  She is status post bilateral L4 transforaminal epidural steroid injections on 8/23/17 with greater than 80% relief.  She is status post bilateral L3, 4 and 5 medial branch radiofrequency ablation on 3/6/18 with moderate relief.  She is status post L5/S1 interlaminar epidural steroid injection on 06/04/2018 with almost 100% relief lasting 1 month.  She is status post L5/S1 interlaminar epidural steroid injection on 09/04/2018 with almost 100% relief.  She is status post L5/S1 interlaminar epidural steroid injection on 12/20/2018 with greater than 50% relief. She is status post L5/S1 interlaminar epidural steroid injection on 05/07/2019 with 75% relief.  She is status post L5/S1 interlaminar epidural steroid injection on 09/16/2019 with 50% relief.     ROS:She reports easy bruising, back pain and difficulty sleeping.  Balance of review of systems is negative.    Medical, surgical, family and social history reviewed elsewhere in record.    Medications/Allergies: See med card    Vitals:    10/14/19 1308   BP: 139/76   Pulse: 67   Resp: 18   Temp: 96.3 °F (35.7 °C)   TempSrc: Oral   SpO2: 95%   Weight: 70 kg (154 lb 5.2 oz)   PainSc:   4   PainLoc: Back         Physical exam:  Gen: A and O x3, pleasant, well-groomed  Skin: No rashes or obvious lesions  HEENT: PERRLA  CVS: Regular rate and rhythm, normal S1 and S2, no murmurs.  Resp: Clear to auscultation bilaterally, no wheezes or rales.  Abdomen: Soft, NT/ND, normal bowel sounds present.  Musculoskeletal: Able to stand and walk short distances without assistance, however uses a walker when out of the house.  She has disuse atrophy of the lumbar paraspinous muscles.  Slow to go from seated  to standing position.    Neuro:  Upper extremities: 5/5 strength bilaterally   Lower extremities: 5/5 strength bilaterally  Reflexes: Brachioradialis 2+, Bicep 2+, Tricep 2+. Patellar 2+, Achilles 2+.  Sensory: Intact and symmetrical to light touch and pinprick in C2-T1 dermatomes bilaterally.  Intact and symmetrical to light touch and pinprick in L2-S1 dermatomes bilaterally, except for a small patch over her left lateral shin decreased with light touch and pinprick.    Lumbar spine:  Lumbar spine: ROM is moderately reduced with flexion extension and oblique extension with increased low back pain during extension.  Terry's test is deferred.  Supine straight leg raise is negative bilaterally.  Internal and external rotation of the hip causes no increased pain on either side.  Myofascial exam: No tenderness to palpation across lumbar paraspinous muscles.      Imagin13 MRI L-spine  T10-T11: There is severe disk desiccation and a moderate diffuse disk bulge and ligamentous hypertrophy. This is not complete evaluation of the thoracic spine, but there does appear to be mild central canal stenosis.  T11-T12: Moderate disk desiccation and mild diffuse disk bulge. Mild narrowing of the thecal sac. Neural foraminal regions difficult to assess due to the scoliosis.  T12-L1: Severe disk degenerative disease with marked desiccation, diffuse disk bulge, and spurring of vertebral bodies. Mild central canal stenosis. Neural foraminal narrowing bilaterally, difficult to grade due to scoliosis.  L1-L2: Advanced disk desiccation with moderate diffuse disk bulge and mild spurring of vertebral bodies. Small superimposed central to right paracentral disk extrusion. Mild central canal stenosis. Mild to moderate facet arthropathy and ligamentous hypertrophy. Bilateral foraminal stenosis.  L2 - L3: There is also disk desiccation and diffuse disk bulge and small annular fissure posteriorly. facet arthropathy and hypertrophy  ligamentum flavum. Mild central canal stenosis. Moderate right and mild left neuroforaminal stenosis.  L3-L4: Disk desiccation and moderate diffuse disk bulge. Small right paracentral ascending disk extrusion and moderate facet arthropathy present bilaterally and hypertrophy of ligamentum flavum. In combination, the findings result in severe central canal stenosis. For example see axial image 22, central image 7. There is mild to moderate neural foraminal stenosis bilaterally.  L4 - L5: Mild anterolisthesis with disk desiccation and uncovering of the disk, with moderate right and severe left facet arthropathy and hypertrophy of ligamentum flavum. Severe central canal stenosis. Mild neural foraminal narrowing, right worse than left.  L5-S1: Disk desiccation and mild diffuse disk bulge. A small ascending disk extrusion centrally in the midline. No significant central canal stenosis. Advanced facet arthropathy bilaterally, left worse than right. Mild left neural foraminal narrowing. No significant right neural foraminal stenosis.    5/28/2018 MRI lumbar spine  T12-L1: There is marked disc space narrowing, trace retrolisthesis, unroofing of a moderate disc bulge.  There is right greater than left facet joint arthropathy.  There is mild spinal stenosis with moderate left and moderate-to-marked right foraminal stenosis.  These findings have mildly progressed.  L1-2: There is trace retrolisthesis of L1 on L2, there is inferior unroofing of a moderate diffuse disc bulge with small superimposed central disc protrusion.  There is right greater than left facet joint arthropathy.  There is borderline spinal stenosis.  There is crowding of right lateral recess.  There is marked right and moderate-to-marked left foraminal stenosis with very slight progression.  L2-3: There is trace retrolisthesis of L2 on L3 where there is marked disc space narrowing.  There is a moderate diffuse disc bulge with osteophytic ridging and  superimposed right foraminal disc protrusion.  There is moderate-to-marked bilateral facet joint arthropathy with ligamentum flavum thickening, right greater than.  There is moderate central spinal stenosis and right lateral recess stenosis.  There is mild left lateral recess stenosis.  There is marked right and moderate-to-marked left foraminal stenosis with progression.  L3-4: There is disc space narrowing.  There is marked facet joint arthropathy with ligamentum flavum thickening.  There is a small 4 mm left synovial cyst.  There is unroofing of a moderate to marked diffuse disc bulge.  There is severe spinal canal, bilateral lateral recess and right foraminal stenosis with moderate to marked left foraminal stenosis and with slight progression.  L4-5: There is stable grade 1 spondylolisthesis with 4 mm anterolisthesis of L4 on L5.  There is marked facet joint arthropathy.  There is unroofing of a moderate to marked diffuse disc bulge.  There is severe spinal canal, bilateral lateral recess and foraminal stenosis with slight progression.  L5-S1: There is disc space narrowing at the L5-S1 level, worse towards the left.  There is a disc bulge with small superimposed central disc extrusion with 5 mm cephalad extension.  This was present previously but is slightly more conspicuous.  There is marked facet joint arthropathy.  There is moderate central spinal stenosis with mild crowding of the left lateral recess.  There is mild right and moderate-to-marked left foraminal stenosis with progression.    05/13/2019 bone scan  Increased tracer activity is noted in the region of the facets presumably at the T12-L1 level on the left.  Tracer activity is also noted at the L4-L5 and L5-S1 levels on the left suggestive of facet arthropathy.  More diffuse facet arthropathy is noted throughout the lumbar spine.  Some increased uptake may be noted within the vertebral bodies more anteriorly at the T9 through T12 levels which could  relate to compression deformity or Schmorl's node formation/endplate or Modic type degenerative change.  Given that no STIR signal abnormality was noted on the MRI of 03/14/2019 no significant vertebral body height loss was noted at these levels on that exam, this uptake likely relates to degenerative Modic type endplate changes which can be seen on that exam.  Anatomical imaging such as from MRI which was noted to have been performed on 03/14/2019 is much more specific for degenerative changes of the spine as well as for numbering purposes    Assessment:   The patient is a 84-year-old woman with a history of diabetes, previous breast CA, CVA and spinal stenosis who presents in referral from her primary care physician, Dr. Barger for back pain and leg pain.     1. Lumbar spondylosis     2. DDD (degenerative disc disease), lumbar     3. Lumbar radiculopathy     4. Spinal stenosis of lumbar region without neurogenic claudication     5. Gait instability         Plan:  1.  The patient did well following the L5/S1 interlaminar epidural steroid injection.  I am going to set her up to repeat bilateral L3, 4 and 5 medial branch RFA.  She reports having greater than 50% relief with this in the past.  We then discussed likely setting up a lumbar ANUEL mid December prior to the holidays.  When she follows up in January we will see how she is doing and if not having lasting relief, we can consider spinal cord stimulation at that time.  2.  Follow-up in 3 weeks postprocedure sooner as needed.

## 2019-10-28 ENCOUNTER — TELEPHONE (OUTPATIENT)
Dept: PAIN MEDICINE | Facility: CLINIC | Age: 84
End: 2019-10-28

## 2019-10-28 DIAGNOSIS — M51.36 DDD (DEGENERATIVE DISC DISEASE), LUMBAR: Primary | ICD-10-CM

## 2019-10-28 NOTE — TELEPHONE ENCOUNTER
----- Message from Lita Cazares sent at 10/28/2019 11:44 AM CDT -----  Contact: Pino  Patient's  called in regards to patients procedure tomorrow, wanting to know what time to arrive and pre-op direction.    Please call to advise, thank you! [phone]

## 2019-10-29 ENCOUNTER — HOSPITAL ENCOUNTER (OUTPATIENT)
Facility: HOSPITAL | Age: 84
Discharge: HOME | End: 2019-10-29
Attending: ANESTHESIOLOGY | Admitting: ANESTHESIOLOGY
Payer: MEDICARE

## 2019-10-29 ENCOUNTER — HOSPITAL ENCOUNTER (OUTPATIENT)
Dept: RADIOLOGY | Facility: HOSPITAL | Age: 84
Discharge: HOME | End: 2019-10-29
Attending: ANESTHESIOLOGY | Admitting: ANESTHESIOLOGY
Payer: MEDICARE

## 2019-10-29 DIAGNOSIS — M51.36 DDD (DEGENERATIVE DISC DISEASE), LUMBAR: ICD-10-CM

## 2019-10-29 DIAGNOSIS — M47.816 LUMBAR SPONDYLOSIS: Primary | ICD-10-CM

## 2019-10-29 PROCEDURE — 76000 FLUOROSCOPY <1 HR PHYS/QHP: CPT | Mod: TC,PO

## 2019-10-29 PROCEDURE — 25000003 PHARM REV CODE 250: Mod: PO | Performed by: ANESTHESIOLOGY

## 2019-10-29 PROCEDURE — A4216 STERILE WATER/SALINE, 10 ML: HCPCS | Mod: PO | Performed by: ANESTHESIOLOGY

## 2019-10-29 PROCEDURE — 99152 MOD SED SAME PHYS/QHP 5/>YRS: CPT | Mod: ,,, | Performed by: ANESTHESIOLOGY

## 2019-10-29 PROCEDURE — 64636 DESTROY L/S FACET JNT ADDL: CPT | Mod: 50,PO | Performed by: ANESTHESIOLOGY

## 2019-10-29 PROCEDURE — 64636 PR DESTROY L/S FACET JNT ADDL: ICD-10-PCS | Mod: 50,,, | Performed by: ANESTHESIOLOGY

## 2019-10-29 PROCEDURE — 64636 DESTROY L/S FACET JNT ADDL: CPT | Mod: 50,,, | Performed by: ANESTHESIOLOGY

## 2019-10-29 PROCEDURE — 64635 DESTROY LUMB/SAC FACET JNT: CPT | Mod: 50,PO | Performed by: ANESTHESIOLOGY

## 2019-10-29 PROCEDURE — 63600175 PHARM REV CODE 636 W HCPCS: Mod: PO | Performed by: ANESTHESIOLOGY

## 2019-10-29 PROCEDURE — 99152 PR MOD CONSCIOUS SEDATION, SAME PHYS, 5+ YRS, FIRST 15 MIN: ICD-10-PCS | Mod: ,,, | Performed by: ANESTHESIOLOGY

## 2019-10-29 PROCEDURE — 64635 PR DESTROY LUMB/SAC FACET JNT: ICD-10-PCS | Mod: 50,,, | Performed by: ANESTHESIOLOGY

## 2019-10-29 PROCEDURE — 64635 DESTROY LUMB/SAC FACET JNT: CPT | Mod: 50,,, | Performed by: ANESTHESIOLOGY

## 2019-10-29 RX ORDER — LIDOCAINE HYDROCHLORIDE 20 MG/ML
INJECTION, SOLUTION EPIDURAL; INFILTRATION; INTRACAUDAL; PERINEURAL
Status: DISCONTINUED | OUTPATIENT
Start: 2019-10-29 | End: 2019-10-29 | Stop reason: HOSPADM

## 2019-10-29 RX ORDER — MIDAZOLAM HYDROCHLORIDE 2 MG/2ML
INJECTION, SOLUTION INTRAMUSCULAR; INTRAVENOUS
Status: DISCONTINUED | OUTPATIENT
Start: 2019-10-29 | End: 2019-10-29 | Stop reason: HOSPADM

## 2019-10-29 RX ORDER — SODIUM CHLORIDE 9 MG/ML
INJECTION, SOLUTION INTRAMUSCULAR; INTRAVENOUS; SUBCUTANEOUS
Status: DISCONTINUED | OUTPATIENT
Start: 2019-10-29 | End: 2019-10-29 | Stop reason: HOSPADM

## 2019-10-29 RX ORDER — FENTANYL CITRATE 50 UG/ML
INJECTION, SOLUTION INTRAMUSCULAR; INTRAVENOUS
Status: DISCONTINUED | OUTPATIENT
Start: 2019-10-29 | End: 2019-10-29 | Stop reason: HOSPADM

## 2019-10-29 RX ORDER — METHYLPREDNISOLONE ACETATE 40 MG/ML
INJECTION, SUSPENSION INTRA-ARTICULAR; INTRALESIONAL; INTRAMUSCULAR; SOFT TISSUE
Status: DISCONTINUED | OUTPATIENT
Start: 2019-10-29 | End: 2019-10-29 | Stop reason: HOSPADM

## 2019-10-29 RX ORDER — SODIUM CHLORIDE, SODIUM LACTATE, POTASSIUM CHLORIDE, CALCIUM CHLORIDE 600; 310; 30; 20 MG/100ML; MG/100ML; MG/100ML; MG/100ML
INJECTION, SOLUTION INTRAVENOUS CONTINUOUS
Status: DISCONTINUED | OUTPATIENT
Start: 2019-10-29 | End: 2019-10-29 | Stop reason: HOSPADM

## 2019-10-29 RX ORDER — LIDOCAINE HYDROCHLORIDE 10 MG/ML
INJECTION, SOLUTION EPIDURAL; INFILTRATION; INTRACAUDAL; PERINEURAL
Status: DISCONTINUED | OUTPATIENT
Start: 2019-10-29 | End: 2019-10-29 | Stop reason: HOSPADM

## 2019-10-29 RX ADMIN — SODIUM CHLORIDE, SODIUM LACTATE, POTASSIUM CHLORIDE, AND CALCIUM CHLORIDE: .6; .31; .03; .02 INJECTION, SOLUTION INTRAVENOUS at 12:10

## 2019-10-29 NOTE — INTERVAL H&P NOTE
The patient has been examined and the H&P has been reviewed:    I concur with the findings and no changes have occurred since H&P was written.    Anesthesia/Surgery risks, benefits and alternative options discussed and understood by patient/family.    ASA 2, mallampati 2      Active Hospital Problems    Diagnosis  POA    Lumbar spondylosis [M47.816]  Yes      Resolved Hospital Problems   No resolved problems to display.

## 2019-10-29 NOTE — OP NOTE
PROCEDURE DATE: 10/29/2019    PROCEDURE:  Radiofrequency ablation of the bilateral L3,4,5 medial branch nerves on the bilateral-side utilizing fluoroscopy    DIAGNOSIS:  Lumbar spondylosis    Post op Diagnosis: Same    PHYSICIAN: Rohan Ware MD    MEDICATIONS INJECTED:  From a mixture of 4ml of 2% lidocaine and 40mg of methylprednisone, 1ml of this solution was injected at each level.    LOCAL ANESTHETIC USED: Lidocaine 1%, 3 ml given at each site.    SEDATION MEDICATIONS: 2mg versed, 25mcg fentanyl    ESTIMATED BLOOD LOSS:  none    COMPLICATIONS:  none    TECHNIQUE:  A time out was taken to identify patient and procedure side prior to starting the procedure. Laying in a prone position, the patient was prepped and draped in the usual sterile fashion using ChloraPrep and sterile towels.  The levels were determined under fluoroscopic guidance and then marked.  Local anesthetic was given by raising a wheal at the skin over each site and then infiltrated approximately 2cm deeper.  A 20-gauge  100 mm [employer] RF needle was introduced to the anatomic location of the right and then left L3,4,5 medial branch nerves.  Motor stimulation up to 2 Volts at each level confirmed no motor nerve involvement.  Impedance was less than 800 ohms at each level. The above noted medication was then injected slowly.  Ablation was performed per level utilizing [place] radiofrequency generator 80°C for 90 seconds. The patient tolerated the procedure well.     The patient was monitored after the procedure.  Patient was given post procedure and discharge instructions to follow at home.  The patient was discharged in a stable condition

## 2019-10-29 NOTE — DISCHARGE SUMMARY
[facility]  Discharge Note  Short Stay    Admit Date: 10/29/2019    Discharge Date: 10/29/2019    Attending Physician: Rohan Ware MD     Discharge Provider: Rohan Ware    Diagnoses:  Active Hospital Problems    Diagnosis  POA    *Lumbar spondylosis [M47.816]  Yes      Resolved Hospital Problems   No resolved problems to display.       Discharged Condition: good    Final Diagnoses: Lumbar spondylosis [M47.816]    Disposition: Home or Self Care    Hospital Course: no complications, uneventful    Outcome of Hospitalization, Treatment, Procedure, or Surgery:  Patient was admitted for outpatient procedure. The patient underwent procedure without complications and are discharged home    Follow up/Patient Instructions:  Follow up as scheduled in Pain Management clinic in 3-4 weeks/Patient has received instructions and follow up date and time    Medications:  Continue previous medications    Discharge Procedure Orders   Call MD for:  temperature >100.4     Call MD for:  severe uncontrolled pain     Call MD for:  redness, tenderness, or signs of infection (pain, swelling, redness, odor or green/yellow discharge around incision site)     Call MD for:  severe persistent headache     No dressing needed         Discharge Procedure Orders (must include Diet, Follow-up, Activity):   Discharge Procedure Orders (must include Diet, Follow-up, Activity)   Call MD for:  temperature >100.4     Call MD for:  severe uncontrolled pain     Call MD for:  redness, tenderness, or signs of infection (pain, swelling, redness, odor or green/yellow discharge around incision site)     Call MD for:  severe persistent headache     No dressing needed

## 2019-10-29 NOTE — DISCHARGE INSTRUCTIONS
Recovery After Procedural Sedation (Adult)  You have been given medicine by vein to make you sleep during your surgery. This may have included both a pain medicine and sleeping medicine. Most of the effects have worn off. But you may still have some drowsiness for the next 6 to 8 hours.  Home care  Follow these guidelines when you get home:  · For the next 8 hours, you should be watched by a responsible adult. This person should make sure your condition is not getting worse.  · Don't drink any alcohol for the next 24 hours.  · Don't drive, operate dangerous machinery, or make important business or personal decisions during the next 24 hours.  Note: Your healthcare provider may tell you not to take any medicine by mouth for pain or sleep in the next 4 hours. These medicines may react with the medicines you were given in the hospital. This could cause a much stronger response than usual.  Follow-up care  Follow up with your healthcare provider if you are not alert and back to your usual level of activity within 12 hours.  When to seek medical advice  Call your healthcare provider right away if any of these occur:  · Drowsiness gets worse  · Weakness or dizziness gets worse  · Repeated vomiting  · You can't be awakened   Date Last Reviewed: 10/18/2016  © 6897-6801 [employer]. [address] [place] [address], [place] [address]. All rights reserved. This information is not intended as a substitute for professional medical care. Always follow your healthcare professional's instructions.            PAIN MANAGEMENT    Home care instructions   Apply ice pack to the injection site for 20 minute prior for the first 24 hours for soreness/discomfort at injection site   DO NOT USE HEAT FOR 24 HOURS   Keep site clean and dry for 24 hours, remove bandaid when desired   Do not drive until tomorrow  Take care when walking after a lumbar injection     STEROIDS OR RADIOFREQUENCY    May take 10-14 days for full effects  Avoid  strenuous exercises for 2 days        Resume Aspirin, Plavix, or Coumadin the day after the procedure unless other wise instructed  Resume home medication as prescribed today      CALL PHYSICIAN FOR:   Severe increase in your usual pain or appearance of new pain   Prolonged or increasing weakness or numbness in the legs or arms   Fever greater then 100 degrees F..   Drainage from the incision site, redness, active bleeding or increased swelling at the injection site   Headache that increases when your head is upright and decreases when you lie flat    FOR EMERGENCIES:   Go directly to Emergency Department for Shortness of breath, chest pain, or problems breathing

## 2019-10-30 VITALS
TEMPERATURE: 98 F | RESPIRATION RATE: 16 BRPM | HEART RATE: 58 BPM | OXYGEN SATURATION: 98 % | SYSTOLIC BLOOD PRESSURE: 134 MMHG | DIASTOLIC BLOOD PRESSURE: 70 MMHG

## 2019-11-04 RX ORDER — TRIAMTERENE AND HYDROCHLOROTHIAZIDE 37.5; 25 MG/1; MG/1
CAPSULE ORAL
Qty: 90 CAPSULE | Refills: 1 | Status: SHIPPED | OUTPATIENT
Start: 2019-11-04 | End: 2020-04-28

## 2019-11-04 RX ORDER — GABAPENTIN 300 MG/1
CAPSULE ORAL
Qty: 90 CAPSULE | Refills: 3 | Status: SHIPPED | OUTPATIENT
Start: 2019-11-04 | End: 2020-10-28

## 2019-11-20 DIAGNOSIS — E78.5 HYPERLIPIDEMIA, UNSPECIFIED HYPERLIPIDEMIA TYPE: ICD-10-CM

## 2019-11-20 RX ORDER — TRAMADOL HYDROCHLORIDE 50 MG/1
TABLET ORAL
Qty: 90 TABLET | Refills: 2 | Status: SHIPPED | OUTPATIENT
Start: 2019-11-20 | End: 2019-11-21 | Stop reason: SDUPTHER

## 2019-11-20 RX ORDER — SIMVASTATIN 40 MG/1
TABLET, FILM COATED ORAL
Qty: 90 TABLET | Refills: 3 | Status: SHIPPED | OUTPATIENT
Start: 2019-11-20 | End: 2020-11-25

## 2019-11-21 ENCOUNTER — OFFICE VISIT (OUTPATIENT)
Dept: PAIN MEDICINE | Facility: CLINIC | Age: 84
End: 2019-11-21
Payer: MEDICARE

## 2019-11-21 VITALS
WEIGHT: 153.56 LBS | TEMPERATURE: 97 F | SYSTOLIC BLOOD PRESSURE: 139 MMHG | HEART RATE: 75 BPM | DIASTOLIC BLOOD PRESSURE: 68 MMHG | RESPIRATION RATE: 18 BRPM | BODY MASS INDEX: 27.2 KG/M2

## 2019-11-21 DIAGNOSIS — M51.36 DDD (DEGENERATIVE DISC DISEASE), LUMBAR: ICD-10-CM

## 2019-11-21 DIAGNOSIS — M48.061 SPINAL STENOSIS OF LUMBAR REGION WITHOUT NEUROGENIC CLAUDICATION: ICD-10-CM

## 2019-11-21 DIAGNOSIS — M47.816 LUMBAR SPONDYLOSIS: ICD-10-CM

## 2019-11-21 DIAGNOSIS — R26.81 GAIT INSTABILITY: ICD-10-CM

## 2019-11-21 DIAGNOSIS — M54.16 LUMBAR RADICULOPATHY: Primary | ICD-10-CM

## 2019-11-21 PROCEDURE — 1125F AMNT PAIN NOTED PAIN PRSNT: CPT | Mod: S$GLB,,, | Performed by: PHYSICIAN ASSISTANT

## 2019-11-21 PROCEDURE — 99214 OFFICE O/P EST MOD 30 MIN: CPT | Mod: S$GLB,,, | Performed by: PHYSICIAN ASSISTANT

## 2019-11-21 PROCEDURE — 3288F PR FALLS RISK ASSESSMENT DOCUMENTED: ICD-10-PCS | Mod: CPTII,S$GLB,, | Performed by: PHYSICIAN ASSISTANT

## 2019-11-21 PROCEDURE — 3075F PR MOST RECENT SYSTOLIC BLOOD PRESS GE 130-139MM HG: ICD-10-PCS | Mod: CPTII,S$GLB,, | Performed by: PHYSICIAN ASSISTANT

## 2019-11-21 PROCEDURE — 1159F MED LIST DOCD IN RCRD: CPT | Mod: S$GLB,,, | Performed by: PHYSICIAN ASSISTANT

## 2019-11-21 PROCEDURE — 1100F PTFALLS ASSESS-DOCD GE2>/YR: CPT | Mod: CPTII,S$GLB,, | Performed by: PHYSICIAN ASSISTANT

## 2019-11-21 PROCEDURE — 3078F PR MOST RECENT DIASTOLIC BLOOD PRESSURE < 80 MM HG: ICD-10-PCS | Mod: CPTII,S$GLB,, | Performed by: PHYSICIAN ASSISTANT

## 2019-11-21 PROCEDURE — 3288F FALL RISK ASSESSMENT DOCD: CPT | Mod: CPTII,S$GLB,, | Performed by: PHYSICIAN ASSISTANT

## 2019-11-21 PROCEDURE — 1125F PR PAIN SEVERITY QUANTIFIED, PAIN PRESENT: ICD-10-PCS | Mod: S$GLB,,, | Performed by: PHYSICIAN ASSISTANT

## 2019-11-21 PROCEDURE — 99214 PR OFFICE/OUTPT VISIT, EST, LEVL IV, 30-39 MIN: ICD-10-PCS | Mod: S$GLB,,, | Performed by: PHYSICIAN ASSISTANT

## 2019-11-21 PROCEDURE — 1100F PR PT FALLS ASSESS DOC 2+ FALLS/FALL W/INJURY/YR: ICD-10-PCS | Mod: CPTII,S$GLB,, | Performed by: PHYSICIAN ASSISTANT

## 2019-11-21 PROCEDURE — 1159F PR MEDICATION LIST DOCUMENTED IN MEDICAL RECORD: ICD-10-PCS | Mod: S$GLB,,, | Performed by: PHYSICIAN ASSISTANT

## 2019-11-21 PROCEDURE — 3075F SYST BP GE 130 - 139MM HG: CPT | Mod: CPTII,S$GLB,, | Performed by: PHYSICIAN ASSISTANT

## 2019-11-21 PROCEDURE — 99999 PR PBB SHADOW E&M-EST. PATIENT-LVL V: ICD-10-PCS | Mod: PBBFAC,,, | Performed by: PHYSICIAN ASSISTANT

## 2019-11-21 PROCEDURE — 3078F DIAST BP <80 MM HG: CPT | Mod: CPTII,S$GLB,, | Performed by: PHYSICIAN ASSISTANT

## 2019-11-21 PROCEDURE — 99999 PR PBB SHADOW E&M-EST. PATIENT-LVL V: CPT | Mod: PBBFAC,,, | Performed by: PHYSICIAN ASSISTANT

## 2019-11-21 RX ORDER — TRAMADOL HYDROCHLORIDE 50 MG/1
50 TABLET ORAL EVERY 8 HOURS PRN
Qty: 90 TABLET | Refills: 2 | Status: SHIPPED | OUTPATIENT
Start: 2019-11-21 | End: 2019-12-21

## 2019-11-21 RX ORDER — ALPRAZOLAM 0.5 MG/1
1 TABLET, ORALLY DISINTEGRATING ORAL ONCE AS NEEDED
Status: CANCELLED | OUTPATIENT
Start: 2019-12-16 | End: 2031-05-13

## 2019-11-21 NOTE — TELEPHONE ENCOUNTER
----- Message from Tanisha Pagan LPN sent at 11/21/2019 10:41 AM CST -----  Contact: everardo with [facility] pharmacy ph#[phone]       ----- Message -----  From: Jesica Solorio  Sent: 11/20/2019  10:54 AM CST  To: [facility] Staff    everardo with [facility] pharmacy ph#[phone]   Requesting documentation on tramadol and need to specify its medical necessary for greater than 7 days

## 2019-11-21 NOTE — PROGRESS NOTES
CC: Back pain    HPI: The patient is a 84-year-old woman with a history of diabetes, previous breast CA, CVA and spinal stenosis who presents in referral from her primary care physician, Dr. Barger for back pain and leg pain. She is status post bilateral L3, 4 and 5 medial branch radiofrequency ablation on 10/29/2019 with 50% relief.  She complains of bilateral aching low back pain with sharp pain radiating to the right lateral buttock.  This is worse with going from a seated to standing position and with walking.  She continues to take tramadol with relief.  She ambulates with a walker but denies any weakness in her legs.  She reports having some weakness in her core muscles and her low back and feels like it is difficult for her to hold herself up.  She denies numbness or incontinence.    Pain intervention history: She tried physical therapy about 1 year ago and it seemed to make her pain worse. Patient is status post right L3 and L4 transforaminal injection on 8/9/13 with 50% relief of low back and right leg pain.  She is status post bilateral L4 transforaminal epidural steroid injections on 9/27/13 and 11/1/13 with 10-20% relief.  She is status post radiofrequency ablation of the left L3, 4, 5 medial branch nerves on 5/5/14 with 50% relief of her left low back pain.  She is status post radiofrequency ablation of the right L3, 4 and 5 medial branch nerves on 6/16/14 with mild relief, however she reported significant more relief at a later visit.  She is status post bilateral L4 transforaminal epidural steroid injections on 9/24/14 with moderate relief.  She is status post bilateral L4 transforaminal epidural steroid injections on 1/7/15 with complete relief of her posterior thigh pain, moderate relief of her low back pain and minimal relief of her right lateral hip pain.  She is status post C7-T1 cervical interlaminar epidural steroid injection on 2/27/15 with greater than 50% relief.  She is status post bilateral  L3, 4 and 5 medial branch radio frequency ablation on 7/29/16 reporting 0% relief initially at 4 weeks but then reporting 90% relief after 6-7 weeks.  She is status post bilateral L4 transforaminal epidural steroid injections on 11/11/16 with not quite 50% relief.  She is status post bilateral L4 transforaminal epidural steroid injections on 4/4/17 with 50% relief.  She is status post bilateral L4 transforaminal epidural steroid injections on 8/23/17 with greater than 80% relief.  She is status post bilateral L3, 4 and 5 medial branch radiofrequency ablation on 3/6/18 with moderate relief.  She is status post L5/S1 interlaminar epidural steroid injection on 06/04/2018 with almost 100% relief lasting 1 month.  She is status post L5/S1 interlaminar epidural steroid injection on 09/04/2018 with almost 100% relief.  She is status post L5/S1 interlaminar epidural steroid injection on 12/20/2018 with greater than 50% relief. She is status post L5/S1 interlaminar epidural steroid injection on 05/07/2019 with 75% relief.  She is status post L5/S1 interlaminar epidural steroid injection on 09/16/2019 with 50% relief.  She is status post bilateral L3, 4 and 5 medial branch radiofrequency ablation on 10/29/2019 with 50% relief.    ROS:She reports easy bruising, back pain and difficulty sleeping.  Balance of review of systems is negative.    Medical, surgical, family and social history reviewed elsewhere in record.    Medications/Allergies: See med card    Vitals:    11/21/19 1001   BP: 139/68   Pulse: 75   Resp: 18   Temp: 97.2 °F (36.2 °C)   TempSrc: Oral   Weight: 69.7 kg (153 lb 8.8 oz)   PainSc:   4   PainLoc: Back         Physical exam:  Gen: A and O x3, pleasant, well-groomed  Skin: No rashes or obvious lesions  HEENT: PERRLA  CVS: Regular rate and rhythm, normal S1 and S2, no murmurs.  Resp: Clear to auscultation bilaterally, no wheezes or rales.  Abdomen: Soft, NT/ND, normal bowel sounds present.  Musculoskeletal: Able  to stand and walk short distances without assistance, however uses a walker when out of the house.  She has disuse atrophy of the lumbar paraspinous muscles.  Slow to go from seated to standing position.    Neuro:  Upper extremities: 5/5 strength bilaterally   Lower extremities: 5/5 strength bilaterally  Reflexes: Brachioradialis 2+, Bicep 2+, Tricep 2+. Patellar 2+, Achilles 2+.  Sensory: Intact and symmetrical to light touch and pinprick in C2-T1 dermatomes bilaterally.  Intact and symmetrical to light touch and pinprick in L2-S1 dermatomes bilaterally, except for a small patch over her left lateral shin decreased with light touch and pinprick.    Lumbar spine:  Lumbar spine: ROM is moderately reduced with flexion extension and oblique extension with increased low back pain during extension.  Terry's test is deferred.  Supine straight leg raise causes right lateral buttock pain.  Internal and external rotation of the hip causes no increased pain on either side.  Myofascial exam: No tenderness to palpation across lumbar paraspinous muscles.      Imagin13 MRI L-spine  T10-T11: There is severe disk desiccation and a moderate diffuse disk bulge and ligamentous hypertrophy. This is not complete evaluation of the thoracic spine, but there does appear to be mild central canal stenosis.  T11-T12: Moderate disk desiccation and mild diffuse disk bulge. Mild narrowing of the thecal sac. Neural foraminal regions difficult to assess due to the scoliosis.  T12-L1: Severe disk degenerative disease with marked desiccation, diffuse disk bulge, and spurring of vertebral bodies. Mild central canal stenosis. Neural foraminal narrowing bilaterally, difficult to grade due to scoliosis.  L1-L2: Advanced disk desiccation with moderate diffuse disk bulge and mild spurring of vertebral bodies. Small superimposed central to right paracentral disk extrusion. Mild central canal stenosis. Mild to moderate facet arthropathy and  ligamentous hypertrophy. Bilateral foraminal stenosis.  L2 - L3: There is also disk desiccation and diffuse disk bulge and small annular fissure posteriorly. facet arthropathy and hypertrophy ligamentum flavum. Mild central canal stenosis. Moderate right and mild left neuroforaminal stenosis.  L3-L4: Disk desiccation and moderate diffuse disk bulge. Small right paracentral ascending disk extrusion and moderate facet arthropathy present bilaterally and hypertrophy of ligamentum flavum. In combination, the findings result in severe central canal stenosis. For example see axial image 22, central image 7. There is mild to moderate neural foraminal stenosis bilaterally.  L4 - L5: Mild anterolisthesis with disk desiccation and uncovering of the disk, with moderate right and severe left facet arthropathy and hypertrophy of ligamentum flavum. Severe central canal stenosis. Mild neural foraminal narrowing, right worse than left.  L5-S1: Disk desiccation and mild diffuse disk bulge. A small ascending disk extrusion centrally in the midline. No significant central canal stenosis. Advanced facet arthropathy bilaterally, left worse than right. Mild left neural foraminal narrowing. No significant right neural foraminal stenosis.    5/28/2018 MRI lumbar spine  T12-L1: There is marked disc space narrowing, trace retrolisthesis, unroofing of a moderate disc bulge.  There is right greater than left facet joint arthropathy.  There is mild spinal stenosis with moderate left and moderate-to-marked right foraminal stenosis.  These findings have mildly progressed.  L1-2: There is trace retrolisthesis of L1 on L2, there is inferior unroofing of a moderate diffuse disc bulge with small superimposed central disc protrusion.  There is right greater than left facet joint arthropathy.  There is borderline spinal stenosis.  There is crowding of right lateral recess.  There is marked right and moderate-to-marked left foraminal stenosis with very  slight progression.  L2-3: There is trace retrolisthesis of L2 on L3 where there is marked disc space narrowing.  There is a moderate diffuse disc bulge with osteophytic ridging and superimposed right foraminal disc protrusion.  There is moderate-to-marked bilateral facet joint arthropathy with ligamentum flavum thickening, right greater than.  There is moderate central spinal stenosis and right lateral recess stenosis.  There is mild left lateral recess stenosis.  There is marked right and moderate-to-marked left foraminal stenosis with progression.  L3-4: There is disc space narrowing.  There is marked facet joint arthropathy with ligamentum flavum thickening.  There is a small 4 mm left synovial cyst.  There is unroofing of a moderate to marked diffuse disc bulge.  There is severe spinal canal, bilateral lateral recess and right foraminal stenosis with moderate to marked left foraminal stenosis and with slight progression.  L4-5: There is stable grade 1 spondylolisthesis with 4 mm anterolisthesis of L4 on L5.  There is marked facet joint arthropathy.  There is unroofing of a moderate to marked diffuse disc bulge.  There is severe spinal canal, bilateral lateral recess and foraminal stenosis with slight progression.  L5-S1: There is disc space narrowing at the L5-S1 level, worse towards the left.  There is a disc bulge with small superimposed central disc extrusion with 5 mm cephalad extension.  This was present previously but is slightly more conspicuous.  There is marked facet joint arthropathy.  There is moderate central spinal stenosis with mild crowding of the left lateral recess.  There is mild right and moderate-to-marked left foraminal stenosis with progression.    05/13/2019 bone scan  Increased tracer activity is noted in the region of the facets presumably at the T12-L1 level on the left.  Tracer activity is also noted at the L4-L5 and L5-S1 levels on the left suggestive of facet arthropathy.  More  diffuse facet arthropathy is noted throughout the lumbar spine.  Some increased uptake may be noted within the vertebral bodies more anteriorly at the T9 through T12 levels which could relate to compression deformity or Schmorl's node formation/endplate or Modic type degenerative change.  Given that no STIR signal abnormality was noted on the MRI of 03/14/2019 no significant vertebral body height loss was noted at these levels on that exam, this uptake likely relates to degenerative Modic type endplate changes which can be seen on that exam.  Anatomical imaging such as from MRI which was noted to have been performed on 03/14/2019 is much more specific for degenerative changes of the spine as well as for numbering purposes    Assessment:   The patient is a 84-year-old woman with a history of diabetes, previous breast CA, CVA and spinal stenosis who presents in referral from her primary care physician, Dr. Barger for back pain and leg pain.     1. Lumbar radiculopathy     2. DDD (degenerative disc disease), lumbar     3. Lumbar spondylosis     4. Spinal stenosis of lumbar region without neurogenic claudication     5. Gait instability         Plan:  1.  The patient did well following the bilateral L3, 4 and 5 medial branch radiofrequency ablation.  I will set her up for another L5/S1 interlaminar epidural steroid injection to the right.  We will do this mid December prior to the holidays.  When she follows up in January, we will discuss spinal cord stimulation if she is not having lasting relief.  Dr. Ware recently refilled her tramadol.  2.  Follow-up 4 weeks postprocedure sooner as needed.

## 2019-12-13 DIAGNOSIS — M51.36 DDD (DEGENERATIVE DISC DISEASE), LUMBAR: Primary | ICD-10-CM

## 2019-12-16 ENCOUNTER — HOSPITAL ENCOUNTER (OUTPATIENT)
Dept: RADIOLOGY | Facility: HOSPITAL | Age: 84
Discharge: HOME | End: 2019-12-16
Attending: ANESTHESIOLOGY | Admitting: ANESTHESIOLOGY
Payer: MEDICARE

## 2019-12-16 ENCOUNTER — HOSPITAL ENCOUNTER (OUTPATIENT)
Facility: HOSPITAL | Age: 84
Discharge: HOME | End: 2019-12-16
Attending: ANESTHESIOLOGY | Admitting: ANESTHESIOLOGY
Payer: MEDICARE

## 2019-12-16 DIAGNOSIS — M51.36 DDD (DEGENERATIVE DISC DISEASE), LUMBAR: ICD-10-CM

## 2019-12-16 DIAGNOSIS — M54.16 LUMBAR RADICULOPATHY: Primary | ICD-10-CM

## 2019-12-16 PROCEDURE — 25000003 PHARM REV CODE 250: Mod: PO | Performed by: ANESTHESIOLOGY

## 2019-12-16 PROCEDURE — A4216 STERILE WATER/SALINE, 10 ML: HCPCS | Mod: PO | Performed by: ANESTHESIOLOGY

## 2019-12-16 PROCEDURE — 25000003 PHARM REV CODE 250: Mod: PO | Performed by: PHYSICIAN ASSISTANT

## 2019-12-16 PROCEDURE — 62323 PR INJ LUMBAR/SACRAL, W/IMAGING GUIDANCE: ICD-10-PCS | Mod: ,,, | Performed by: ANESTHESIOLOGY

## 2019-12-16 PROCEDURE — 62323 NJX INTERLAMINAR LMBR/SAC: CPT | Mod: ,,, | Performed by: ANESTHESIOLOGY

## 2019-12-16 PROCEDURE — 63600175 PHARM REV CODE 636 W HCPCS: Mod: PO | Performed by: ANESTHESIOLOGY

## 2019-12-16 PROCEDURE — 25500020 PHARM REV CODE 255: Mod: PO | Performed by: ANESTHESIOLOGY

## 2019-12-16 PROCEDURE — 76000 FLUOROSCOPY <1 HR PHYS/QHP: CPT | Mod: TC,PO

## 2019-12-16 PROCEDURE — 62323 NJX INTERLAMINAR LMBR/SAC: CPT | Mod: PO | Performed by: ANESTHESIOLOGY

## 2019-12-16 RX ORDER — METHYLPREDNISOLONE ACETATE 80 MG/ML
INJECTION, SUSPENSION INTRA-ARTICULAR; INTRALESIONAL; INTRAMUSCULAR; SOFT TISSUE
Status: DISCONTINUED | OUTPATIENT
Start: 2019-12-16 | End: 2019-12-16 | Stop reason: HOSPADM

## 2019-12-16 RX ORDER — LIDOCAINE HYDROCHLORIDE 10 MG/ML
INJECTION, SOLUTION EPIDURAL; INFILTRATION; INTRACAUDAL; PERINEURAL
Status: DISCONTINUED | OUTPATIENT
Start: 2019-12-16 | End: 2019-12-16 | Stop reason: HOSPADM

## 2019-12-16 RX ORDER — SODIUM CHLORIDE 9 MG/ML
INJECTION, SOLUTION INTRAMUSCULAR; INTRAVENOUS; SUBCUTANEOUS
Status: DISCONTINUED | OUTPATIENT
Start: 2019-12-16 | End: 2019-12-16 | Stop reason: HOSPADM

## 2019-12-16 RX ORDER — ALPRAZOLAM 0.5 MG/1
1 TABLET, ORALLY DISINTEGRATING ORAL ONCE AS NEEDED
Status: COMPLETED | OUTPATIENT
Start: 2019-12-16 | End: 2019-12-16

## 2019-12-16 RX ADMIN — ALPRAZOLAM 0.5 MG: 0.5 TABLET, ORALLY DISINTEGRATING ORAL at 01:12

## 2019-12-16 NOTE — DISCHARGE SUMMARY
[facility]  Discharge Note  Short Stay    Admit Date: 12/16/2019    Discharge Date: 12/16/2019    Attending Physician: Rohan Ware MD     Discharge Provider: Rohan Ware    Diagnoses:  Active Hospital Problems    Diagnosis  POA    *Lumbar radiculopathy [M54.16]  Yes      Resolved Hospital Problems   No resolved problems to display.       Discharged Condition: good    Final Diagnoses: Lumbar radiculopathy [M54.16]    Disposition: Home or Self Care    Hospital Course: no complications, uneventful    Outcome of Hospitalization, Treatment, Procedure, or Surgery:  Patient was admitted for outpatient procedure. The patient underwent procedure without complications and are discharged home    Follow up/Patient Instructions:  Follow up as scheduled in Pain Management clinic in 3-4 weeks/Patient has received instructions and follow up date and time    Medications:  Continue previous medications    Discharge Procedure Orders   Call MD for:  temperature >100.4     Call MD for:  severe uncontrolled pain     Call MD for:  redness, tenderness, or signs of infection (pain, swelling, redness, odor or green/yellow discharge around incision site)     Call MD for:  severe persistent headache     No dressing needed         Discharge Procedure Orders (must include Diet, Follow-up, Activity):   Discharge Procedure Orders (must include Diet, Follow-up, Activity)   Call MD for:  temperature >100.4     Call MD for:  severe uncontrolled pain     Call MD for:  redness, tenderness, or signs of infection (pain, swelling, redness, odor or green/yellow discharge around incision site)     Call MD for:  severe persistent headache     No dressing needed

## 2019-12-16 NOTE — OP NOTE

## 2019-12-16 NOTE — PLAN OF CARE
VSS. Questions answered to patient satisfaction. Patient denies recent illness. Instructed to call for assistance if needing to ambulate due to Niravam administration. Patient verbalized understanding.  Patient ready for procedure.

## 2019-12-17 VITALS
HEART RATE: 65 BPM | BODY MASS INDEX: 26.58 KG/M2 | HEIGHT: 63 IN | TEMPERATURE: 98 F | RESPIRATION RATE: 16 BRPM | WEIGHT: 150 LBS | DIASTOLIC BLOOD PRESSURE: 68 MMHG | SYSTOLIC BLOOD PRESSURE: 137 MMHG | OXYGEN SATURATION: 96 %

## 2019-12-20 ENCOUNTER — PATIENT MESSAGE (OUTPATIENT)
Dept: INTERNAL MEDICINE | Facility: CLINIC | Age: 84
End: 2019-12-20

## 2019-12-20 RX ORDER — AMOXICILLIN 875 MG/1
875 TABLET, FILM COATED ORAL EVERY 12 HOURS
Qty: 14 TABLET | Refills: 0 | Status: SHIPPED | OUTPATIENT
Start: 2019-12-20 | End: 2020-02-21

## 2019-12-27 RX ORDER — ZOLPIDEM TARTRATE 5 MG/1
TABLET ORAL
Qty: 30 TABLET | Refills: 0 | Status: SHIPPED | OUTPATIENT
Start: 2019-12-27 | End: 2020-01-24

## 2020-01-06 RX ORDER — LEVOTHYROXINE SODIUM 75 UG/1
TABLET ORAL
Qty: 90 TABLET | Refills: 0 | Status: SHIPPED | OUTPATIENT
Start: 2020-01-06 | End: 2020-04-01

## 2020-01-08 ENCOUNTER — OFFICE VISIT (OUTPATIENT)
Dept: PAIN MEDICINE | Facility: CLINIC | Age: 85
End: 2020-01-08
Payer: MEDICARE

## 2020-01-08 VITALS
HEART RATE: 83 BPM | SYSTOLIC BLOOD PRESSURE: 142 MMHG | OXYGEN SATURATION: 98 % | TEMPERATURE: 97 F | BODY MASS INDEX: 27.42 KG/M2 | WEIGHT: 154.75 LBS | RESPIRATION RATE: 18 BRPM | DIASTOLIC BLOOD PRESSURE: 68 MMHG

## 2020-01-08 DIAGNOSIS — M51.36 DDD (DEGENERATIVE DISC DISEASE), LUMBAR: ICD-10-CM

## 2020-01-08 DIAGNOSIS — M48.061 SPINAL STENOSIS OF LUMBAR REGION WITHOUT NEUROGENIC CLAUDICATION: ICD-10-CM

## 2020-01-08 DIAGNOSIS — R26.81 GAIT INSTABILITY: ICD-10-CM

## 2020-01-08 DIAGNOSIS — M47.816 LUMBAR SPONDYLOSIS: ICD-10-CM

## 2020-01-08 DIAGNOSIS — M54.16 LUMBAR RADICULOPATHY: Primary | ICD-10-CM

## 2020-01-08 PROCEDURE — 3288F PR FALLS RISK ASSESSMENT DOCUMENTED: ICD-10-PCS | Mod: CPTII,S$GLB,, | Performed by: PHYSICIAN ASSISTANT

## 2020-01-08 PROCEDURE — 3077F SYST BP >= 140 MM HG: CPT | Mod: CPTII,S$GLB,, | Performed by: PHYSICIAN ASSISTANT

## 2020-01-08 PROCEDURE — 99999 PR PBB SHADOW E&M-EST. PATIENT-LVL V: CPT | Mod: PBBFAC,,, | Performed by: PHYSICIAN ASSISTANT

## 2020-01-08 PROCEDURE — 99213 PR OFFICE/OUTPT VISIT, EST, LEVL III, 20-29 MIN: ICD-10-PCS | Mod: S$GLB,,, | Performed by: PHYSICIAN ASSISTANT

## 2020-01-08 PROCEDURE — 3078F DIAST BP <80 MM HG: CPT | Mod: CPTII,S$GLB,, | Performed by: PHYSICIAN ASSISTANT

## 2020-01-08 PROCEDURE — 3288F FALL RISK ASSESSMENT DOCD: CPT | Mod: CPTII,S$GLB,, | Performed by: PHYSICIAN ASSISTANT

## 2020-01-08 PROCEDURE — 1159F PR MEDICATION LIST DOCUMENTED IN MEDICAL RECORD: ICD-10-PCS | Mod: S$GLB,,, | Performed by: PHYSICIAN ASSISTANT

## 2020-01-08 PROCEDURE — 1125F AMNT PAIN NOTED PAIN PRSNT: CPT | Mod: S$GLB,,, | Performed by: PHYSICIAN ASSISTANT

## 2020-01-08 PROCEDURE — 3077F PR MOST RECENT SYSTOLIC BLOOD PRESSURE >= 140 MM HG: ICD-10-PCS | Mod: CPTII,S$GLB,, | Performed by: PHYSICIAN ASSISTANT

## 2020-01-08 PROCEDURE — 1100F PTFALLS ASSESS-DOCD GE2>/YR: CPT | Mod: CPTII,S$GLB,, | Performed by: PHYSICIAN ASSISTANT

## 2020-01-08 PROCEDURE — 1159F MED LIST DOCD IN RCRD: CPT | Mod: S$GLB,,, | Performed by: PHYSICIAN ASSISTANT

## 2020-01-08 PROCEDURE — 1100F PR PT FALLS ASSESS DOC 2+ FALLS/FALL W/INJURY/YR: ICD-10-PCS | Mod: CPTII,S$GLB,, | Performed by: PHYSICIAN ASSISTANT

## 2020-01-08 PROCEDURE — 3078F PR MOST RECENT DIASTOLIC BLOOD PRESSURE < 80 MM HG: ICD-10-PCS | Mod: CPTII,S$GLB,, | Performed by: PHYSICIAN ASSISTANT

## 2020-01-08 PROCEDURE — 99999 PR PBB SHADOW E&M-EST. PATIENT-LVL V: ICD-10-PCS | Mod: PBBFAC,,, | Performed by: PHYSICIAN ASSISTANT

## 2020-01-08 PROCEDURE — 1125F PR PAIN SEVERITY QUANTIFIED, PAIN PRESENT: ICD-10-PCS | Mod: S$GLB,,, | Performed by: PHYSICIAN ASSISTANT

## 2020-01-08 PROCEDURE — 99213 OFFICE O/P EST LOW 20 MIN: CPT | Mod: S$GLB,,, | Performed by: PHYSICIAN ASSISTANT

## 2020-01-08 NOTE — PROGRESS NOTES
CC: Back pain    HPI: The patient is a 84-year-old woman with a history of diabetes, previous breast CA, CVA and spinal stenosis who presents in referral from her primary care physician, Dr. Barger for back pain and leg pain. She is status post L5/S1 interlaminar epidural steroid injection on 12/16/2019 with what sounds like about 50% relief.  She describes back pain that is much worse with standing and improved with sitting and tramadol.  Her pain radiates into the right buttock but not further down the leg.  She reports weakness in her core.  She denies weakness in her legs.  She denies numbness or incontinence.    Pain intervention history: She tried physical therapy about 1 year ago and it seemed to make her pain worse. Patient is status post right L3 and L4 transforaminal injection on 8/9/13 with 50% relief of low back and right leg pain.  She is status post bilateral L4 transforaminal epidural steroid injections on 9/27/13 and 11/1/13 with 10-20% relief.  She is status post radiofrequency ablation of the left L3, 4, 5 medial branch nerves on 5/5/14 with 50% relief of her left low back pain.  She is status post radiofrequency ablation of the right L3, 4 and 5 medial branch nerves on 6/16/14 with mild relief, however she reported significant more relief at a later visit.  She is status post bilateral L4 transforaminal epidural steroid injections on 9/24/14 with moderate relief.  She is status post bilateral L4 transforaminal epidural steroid injections on 1/7/15 with complete relief of her posterior thigh pain, moderate relief of her low back pain and minimal relief of her right lateral hip pain.  She is status post C7-T1 cervical interlaminar epidural steroid injection on 2/27/15 with greater than 50% relief.  She is status post bilateral L3, 4 and 5 medial branch radio frequency ablation on 7/29/16 reporting 0% relief initially at 4 weeks but then reporting 90% relief after 6-7 weeks.  She is status post  bilateral L4 transforaminal epidural steroid injections on 11/11/16 with not quite 50% relief.  She is status post bilateral L4 transforaminal epidural steroid injections on 4/4/17 with 50% relief.  She is status post bilateral L4 transforaminal epidural steroid injections on 8/23/17 with greater than 80% relief.  She is status post bilateral L3, 4 and 5 medial branch radiofrequency ablation on 3/6/18 with moderate relief.  She is status post L5/S1 interlaminar epidural steroid injection on 06/04/2018 with almost 100% relief lasting 1 month.  She is status post L5/S1 interlaminar epidural steroid injection on 09/04/2018 with almost 100% relief.  She is status post L5/S1 interlaminar epidural steroid injection on 12/20/2018 with greater than 50% relief. She is status post L5/S1 interlaminar epidural steroid injection on 05/07/2019 with 75% relief.  She is status post L5/S1 interlaminar epidural steroid injection on 09/16/2019 with 50% relief.  She is status post bilateral L3, 4 and 5 medial branch radiofrequency ablation on 10/29/2019 with 50% relief. She is status post L5/S1 interlaminar epidural steroid injection on 12/16/2019 with what sounds like about 50% relief.     ROS:She reports easy bruising, back pain and difficulty sleeping.  Balance of review of systems is negative.    Medical, surgical, family and social history reviewed elsewhere in record.    Medications/Allergies: See med card    Vitals:    01/08/20 1502   BP: (!) 142/68   Pulse: 83   Resp: 18   Temp: 97 °F (36.1 °C)   TempSrc: Oral   SpO2: 98%   Weight: 70.2 kg (154 lb 12.2 oz)   PainSc:   4   PainLoc: Back         Physical exam:  Gen: A and O x3, pleasant, well-groomed  Skin: No rashes or obvious lesions  HEENT: PERRLA  CVS: Regular rate and rhythm, normal S1 and S2, no murmurs.  Resp: Clear to auscultation bilaterally, no wheezes or rales.  Abdomen: Soft, NT/ND, normal bowel sounds present.  Musculoskeletal: Able to stand and walk short distances  without assistance, however uses a walker when out of the house.  She has disuse atrophy of the lumbar paraspinous muscles.  Slow to go from seated to standing position.    Neuro:  Upper extremities: 5/5 strength bilaterally   Lower extremities: 5/5 strength bilaterally  Reflexes: Brachioradialis 2+, Bicep 2+, Tricep 2+. Patellar 2+, Achilles 2+.  Sensory: Intact and symmetrical to light touch and pinprick in C2-T1 dermatomes bilaterally.  Intact and symmetrical to light touch and pinprick in L2-S1 dermatomes bilaterally, except for a small patch over her left lateral shin decreased with light touch and pinprick.    Lumbar spine:  Lumbar spine: ROM is moderately reduced with flexion extension and oblique extension with increased low back pain during extension.  Terry's test is deferred.  Supine straight leg raise causes right lateral buttock pain.  Internal and external rotation of the hip causes no increased pain on either side.  Myofascial exam: No tenderness to palpation across lumbar paraspinous muscles.      Imagin13 MRI L-spine  T10-T11: There is severe disk desiccation and a moderate diffuse disk bulge and ligamentous hypertrophy. This is not complete evaluation of the thoracic spine, but there does appear to be mild central canal stenosis.  T11-T12: Moderate disk desiccation and mild diffuse disk bulge. Mild narrowing of the thecal sac. Neural foraminal regions difficult to assess due to the scoliosis.  T12-L1: Severe disk degenerative disease with marked desiccation, diffuse disk bulge, and spurring of vertebral bodies. Mild central canal stenosis. Neural foraminal narrowing bilaterally, difficult to grade due to scoliosis.  L1-L2: Advanced disk desiccation with moderate diffuse disk bulge and mild spurring of vertebral bodies. Small superimposed central to right paracentral disk extrusion. Mild central canal stenosis. Mild to moderate facet arthropathy and ligamentous hypertrophy. Bilateral  foraminal stenosis.  L2 - L3: There is also disk desiccation and diffuse disk bulge and small annular fissure posteriorly. facet arthropathy and hypertrophy ligamentum flavum. Mild central canal stenosis. Moderate right and mild left neuroforaminal stenosis.  L3-L4: Disk desiccation and moderate diffuse disk bulge. Small right paracentral ascending disk extrusion and moderate facet arthropathy present bilaterally and hypertrophy of ligamentum flavum. In combination, the findings result in severe central canal stenosis. For example see axial image 22, central image 7. There is mild to moderate neural foraminal stenosis bilaterally.  L4 - L5: Mild anterolisthesis with disk desiccation and uncovering of the disk, with moderate right and severe left facet arthropathy and hypertrophy of ligamentum flavum. Severe central canal stenosis. Mild neural foraminal narrowing, right worse than left.  L5-S1: Disk desiccation and mild diffuse disk bulge. A small ascending disk extrusion centrally in the midline. No significant central canal stenosis. Advanced facet arthropathy bilaterally, left worse than right. Mild left neural foraminal narrowing. No significant right neural foraminal stenosis.    5/28/2018 MRI lumbar spine  T12-L1: There is marked disc space narrowing, trace retrolisthesis, unroofing of a moderate disc bulge.  There is right greater than left facet joint arthropathy.  There is mild spinal stenosis with moderate left and moderate-to-marked right foraminal stenosis.  These findings have mildly progressed.  L1-2: There is trace retrolisthesis of L1 on L2, there is inferior unroofing of a moderate diffuse disc bulge with small superimposed central disc protrusion.  There is right greater than left facet joint arthropathy.  There is borderline spinal stenosis.  There is crowding of right lateral recess.  There is marked right and moderate-to-marked left foraminal stenosis with very slight progression.  L2-3: There is  trace retrolisthesis of L2 on L3 where there is marked disc space narrowing.  There is a moderate diffuse disc bulge with osteophytic ridging and superimposed right foraminal disc protrusion.  There is moderate-to-marked bilateral facet joint arthropathy with ligamentum flavum thickening, right greater than.  There is moderate central spinal stenosis and right lateral recess stenosis.  There is mild left lateral recess stenosis.  There is marked right and moderate-to-marked left foraminal stenosis with progression.  L3-4: There is disc space narrowing.  There is marked facet joint arthropathy with ligamentum flavum thickening.  There is a small 4 mm left synovial cyst.  There is unroofing of a moderate to marked diffuse disc bulge.  There is severe spinal canal, bilateral lateral recess and right foraminal stenosis with moderate to marked left foraminal stenosis and with slight progression.  L4-5: There is stable grade 1 spondylolisthesis with 4 mm anterolisthesis of L4 on L5.  There is marked facet joint arthropathy.  There is unroofing of a moderate to marked diffuse disc bulge.  There is severe spinal canal, bilateral lateral recess and foraminal stenosis with slight progression.  L5-S1: There is disc space narrowing at the L5-S1 level, worse towards the left.  There is a disc bulge with small superimposed central disc extrusion with 5 mm cephalad extension.  This was present previously but is slightly more conspicuous.  There is marked facet joint arthropathy.  There is moderate central spinal stenosis with mild crowding of the left lateral recess.  There is mild right and moderate-to-marked left foraminal stenosis with progression.    05/13/2019 bone scan  Increased tracer activity is noted in the region of the facets presumably at the T12-L1 level on the left.  Tracer activity is also noted at the L4-L5 and L5-S1 levels on the left suggestive of facet arthropathy.  More diffuse facet arthropathy is noted  throughout the lumbar spine.  Some increased uptake may be noted within the vertebral bodies more anteriorly at the T9 through T12 levels which could relate to compression deformity or Schmorl's node formation/endplate or Modic type degenerative change.  Given that no STIR signal abnormality was noted on the MRI of 03/14/2019 no significant vertebral body height loss was noted at these levels on that exam, this uptake likely relates to degenerative Modic type endplate changes which can be seen on that exam.  Anatomical imaging such as from MRI which was noted to have been performed on 03/14/2019 is much more specific for degenerative changes of the spine as well as for numbering purposes    Assessment:   The patient is a 84-year-old woman with a history of diabetes, previous breast CA, CVA and spinal stenosis who presents in referral from her primary care physician, Dr. Barger for back pain and leg pain.     1. Lumbar radiculopathy     2. DDD (degenerative disc disease), lumbar     3. Lumbar spondylosis     4. Spinal stenosis of lumbar region without neurogenic claudication     5. Gait instability         Plan:  1.  We discussed that she does have moderate relief from the epidural steroid injections and she would like to set this up in a few months to repeat.  We will get her scheduled for another L5/S1 interlaminar epidural steroid injection to the right.  We also discussed that if the injection she just had wears off quickly, she may call to set up a spinal cord stimulator trial with [employer].  In this case, I will order a back brace with lateral support and we discussed the risks involved.  2.  She continues to take tramadol and currently does not need a prescription.  3.  She will follow-up 4 weeks postprocedure or sooner as needed.

## 2020-01-09 RX ORDER — ALPRAZOLAM 0.5 MG/1
1 TABLET, ORALLY DISINTEGRATING ORAL ONCE AS NEEDED
Status: CANCELLED | OUTPATIENT
Start: 2020-04-22 | End: 2031-09-18

## 2020-01-24 RX ORDER — ZOLPIDEM TARTRATE 5 MG/1
TABLET ORAL
Qty: 30 TABLET | Refills: 0 | Status: SHIPPED | OUTPATIENT
Start: 2020-01-24 | End: 2020-02-21 | Stop reason: SDUPTHER

## 2020-02-11 ENCOUNTER — LAB VISIT (OUTPATIENT)
Dept: LAB | Facility: HOSPITAL | Age: 85
End: 2020-02-11
Attending: INTERNAL MEDICINE
Payer: MEDICARE

## 2020-02-11 DIAGNOSIS — I15.2 HYPERTENSION ASSOCIATED WITH DIABETES: ICD-10-CM

## 2020-02-11 DIAGNOSIS — E03.9 ACQUIRED HYPOTHYROIDISM: ICD-10-CM

## 2020-02-11 DIAGNOSIS — E11.59 HYPERTENSION ASSOCIATED WITH DIABETES: ICD-10-CM

## 2020-02-11 DIAGNOSIS — E11.9 TYPE 2 DIABETES MELLITUS WITHOUT COMPLICATION, WITHOUT LONG-TERM CURRENT USE OF INSULIN: ICD-10-CM

## 2020-02-11 DIAGNOSIS — N18.30 CKD (CHRONIC KIDNEY DISEASE) STAGE 3, GFR 30-59 ML/MIN: ICD-10-CM

## 2020-02-11 DIAGNOSIS — M10.00 IDIOPATHIC GOUT, UNSPECIFIED CHRONICITY, UNSPECIFIED SITE: ICD-10-CM

## 2020-02-11 DIAGNOSIS — E78.2 MIXED HYPERLIPIDEMIA: ICD-10-CM

## 2020-02-11 LAB
25(OH)D3+25(OH)D2 SERPL-MCNC: 41 NG/ML (ref 30–96)
ALBUMIN SERPL BCP-MCNC: 3.4 G/DL (ref 3.5–5.2)
ALP SERPL-CCNC: 85 U/L (ref 55–135)
ALT SERPL W/O P-5'-P-CCNC: 23 U/L (ref 10–44)
ANION GAP SERPL CALC-SCNC: 10 MMOL/L (ref 8–16)
AST SERPL-CCNC: 27 U/L (ref 10–40)
BASOPHILS # BLD AUTO: 0.04 K/UL (ref 0–0.2)
BASOPHILS NFR BLD: 0.8 % (ref 0–1.9)
BILIRUB SERPL-MCNC: 0.6 MG/DL (ref 0.1–1)
BUN SERPL-MCNC: 20 MG/DL (ref 8–23)
CALCIUM SERPL-MCNC: 9.7 MG/DL (ref 8.7–10.5)
CHLORIDE SERPL-SCNC: 98 MMOL/L (ref 95–110)
CHOLEST SERPL-MCNC: 179 MG/DL (ref 120–199)
CHOLEST/HDLC SERPL: 3.6 {RATIO} (ref 2–5)
CO2 SERPL-SCNC: 31 MMOL/L (ref 23–29)
CREAT SERPL-MCNC: 1.3 MG/DL (ref 0.5–1.4)
DIFFERENTIAL METHOD: NORMAL
EOSINOPHIL # BLD AUTO: 0.1 K/UL (ref 0–0.5)
EOSINOPHIL NFR BLD: 2.8 % (ref 0–8)
ERYTHROCYTE [DISTWIDTH] IN BLOOD BY AUTOMATED COUNT: 13.4 % (ref 11.5–14.5)
EST. GFR  (AFRICAN AMERICAN): 43.5 ML/MIN/1.73 M^2
EST. GFR  (NON AFRICAN AMERICAN): 37.8 ML/MIN/1.73 M^2
ESTIMATED AVG GLUCOSE: 180 MG/DL (ref 68–131)
GLUCOSE SERPL-MCNC: 168 MG/DL (ref 70–110)
HBA1C MFR BLD HPLC: 7.9 % (ref 4–5.6)
HCT VFR BLD AUTO: 48.2 % (ref 37–48.5)
HDLC SERPL-MCNC: 50 MG/DL (ref 40–75)
HDLC SERPL: 27.9 % (ref 20–50)
HGB BLD-MCNC: 15.4 G/DL (ref 12–16)
IMM GRANULOCYTES # BLD AUTO: 0.02 K/UL (ref 0–0.04)
IMM GRANULOCYTES NFR BLD AUTO: 0.4 % (ref 0–0.5)
LDLC SERPL CALC-MCNC: 91.4 MG/DL (ref 63–159)
LYMPHOCYTES # BLD AUTO: 1.7 K/UL (ref 1–4.8)
LYMPHOCYTES NFR BLD: 33.6 % (ref 18–48)
MCH RBC QN AUTO: 29.7 PG (ref 27–31)
MCHC RBC AUTO-ENTMCNC: 32 G/DL (ref 32–36)
MCV RBC AUTO: 93 FL (ref 82–98)
MONOCYTES # BLD AUTO: 0.4 K/UL (ref 0.3–1)
MONOCYTES NFR BLD: 6.9 % (ref 4–15)
NEUTROPHILS # BLD AUTO: 2.8 K/UL (ref 1.8–7.7)
NEUTROPHILS NFR BLD: 55.5 % (ref 38–73)
NONHDLC SERPL-MCNC: 129 MG/DL
NRBC BLD-RTO: 0 /100 WBC
PLATELET # BLD AUTO: 199 K/UL (ref 150–350)
PMV BLD AUTO: 10.6 FL (ref 9.2–12.9)
POTASSIUM SERPL-SCNC: 3.8 MMOL/L (ref 3.5–5.1)
PROT SERPL-MCNC: 7.1 G/DL (ref 6–8.4)
RBC # BLD AUTO: 5.19 M/UL (ref 4–5.4)
SODIUM SERPL-SCNC: 139 MMOL/L (ref 136–145)
TRIGL SERPL-MCNC: 188 MG/DL (ref 30–150)
TSH SERPL DL<=0.005 MIU/L-ACNC: 1.14 UIU/ML (ref 0.4–4)
TSH SERPL DL<=0.005 MIU/L-ACNC: 1.14 UIU/ML (ref 0.4–4)
URATE SERPL-MCNC: 5.7 MG/DL (ref 2.4–5.7)
WBC # BLD AUTO: 5.09 K/UL (ref 3.9–12.7)

## 2020-02-11 PROCEDURE — 84443 ASSAY THYROID STIM HORMONE: CPT

## 2020-02-11 PROCEDURE — 84550 ASSAY OF BLOOD/URIC ACID: CPT

## 2020-02-11 PROCEDURE — 85025 COMPLETE CBC W/AUTO DIFF WBC: CPT

## 2020-02-11 PROCEDURE — 82306 VITAMIN D 25 HYDROXY: CPT

## 2020-02-11 PROCEDURE — 80061 LIPID PANEL: CPT

## 2020-02-11 PROCEDURE — 36415 COLL VENOUS BLD VENIPUNCTURE: CPT | Mod: PO

## 2020-02-11 PROCEDURE — 80053 COMPREHEN METABOLIC PANEL: CPT

## 2020-02-11 PROCEDURE — 83036 HEMOGLOBIN GLYCOSYLATED A1C: CPT

## 2020-02-21 ENCOUNTER — OFFICE VISIT (OUTPATIENT)
Dept: INTERNAL MEDICINE | Facility: CLINIC | Age: 85
End: 2020-02-21
Payer: MEDICARE

## 2020-02-21 VITALS
BODY MASS INDEX: 28.72 KG/M2 | SYSTOLIC BLOOD PRESSURE: 130 MMHG | DIASTOLIC BLOOD PRESSURE: 75 MMHG | WEIGHT: 152.13 LBS | HEIGHT: 61 IN

## 2020-02-21 DIAGNOSIS — I70.0 AORTIC ATHEROSCLEROSIS: ICD-10-CM

## 2020-02-21 DIAGNOSIS — Z12.31 ENCOUNTER FOR SCREENING MAMMOGRAM FOR MALIGNANT NEOPLASM OF BREAST: ICD-10-CM

## 2020-02-21 DIAGNOSIS — Z85.3 HISTORY OF BREAST CANCER: ICD-10-CM

## 2020-02-21 DIAGNOSIS — E11.8 CONTROLLED TYPE 2 DIABETES MELLITUS WITH COMPLICATION, WITHOUT LONG-TERM CURRENT USE OF INSULIN: ICD-10-CM

## 2020-02-21 DIAGNOSIS — Z00.00 ANNUAL PHYSICAL EXAM: Primary | ICD-10-CM

## 2020-02-21 DIAGNOSIS — F13.20 AMBIEN USE DISORDER, MODERATE, DEPENDENCE: ICD-10-CM

## 2020-02-21 DIAGNOSIS — Z87.891 FORMER SMOKER: ICD-10-CM

## 2020-02-21 DIAGNOSIS — F51.01 PRIMARY INSOMNIA: ICD-10-CM

## 2020-02-21 DIAGNOSIS — M81.0 OSTEOPOROSIS WITHOUT CURRENT PATHOLOGICAL FRACTURE, UNSPECIFIED OSTEOPOROSIS TYPE: ICD-10-CM

## 2020-02-21 DIAGNOSIS — E27.8 OTHER SPECIFIED DISORDERS OF ADRENAL GLAND: ICD-10-CM

## 2020-02-21 DIAGNOSIS — N18.30 CKD (CHRONIC KIDNEY DISEASE) STAGE 3, GFR 30-59 ML/MIN: ICD-10-CM

## 2020-02-21 DIAGNOSIS — E03.9 ACQUIRED HYPOTHYROIDISM: ICD-10-CM

## 2020-02-21 DIAGNOSIS — D35.00 ADRENAL ADENOMA, UNSPECIFIED LATERALITY: ICD-10-CM

## 2020-02-21 DIAGNOSIS — M10.00 IDIOPATHIC GOUT, UNSPECIFIED CHRONICITY, UNSPECIFIED SITE: ICD-10-CM

## 2020-02-21 DIAGNOSIS — J47.9 BRONCHIECTASIS WITHOUT COMPLICATION: ICD-10-CM

## 2020-02-21 DIAGNOSIS — J44.89 COPD (CHRONIC OBSTRUCTIVE PULMONARY DISEASE) WITH CHRONIC BRONCHITIS: ICD-10-CM

## 2020-02-21 DIAGNOSIS — K86.2 PANCREATIC CYST: ICD-10-CM

## 2020-02-21 DIAGNOSIS — I35.0 AORTIC VALVE STENOSIS, MODERATE: ICD-10-CM

## 2020-02-21 DIAGNOSIS — I89.0 LYMPH EDEMA: ICD-10-CM

## 2020-02-21 DIAGNOSIS — M46.99 UNSPECIFIED INFLAMMATORY SPONDYLOPATHY, MULTIPLE SITES IN SPINE: ICD-10-CM

## 2020-02-21 LAB
ALBUMIN/CREAT UR: 18.9 UG/MG (ref 0–30)
CREAT UR-MCNC: 122 MG/DL (ref 15–325)
MICROALBUMIN UR DL<=1MG/L-MCNC: 23 UG/ML

## 2020-02-21 PROCEDURE — 3051F HG A1C>EQUAL 7.0%<8.0%: CPT | Mod: CPTII,S$GLB,, | Performed by: INTERNAL MEDICINE

## 2020-02-21 PROCEDURE — 3078F PR MOST RECENT DIASTOLIC BLOOD PRESSURE < 80 MM HG: ICD-10-PCS | Mod: CPTII,S$GLB,, | Performed by: INTERNAL MEDICINE

## 2020-02-21 PROCEDURE — 99214 OFFICE O/P EST MOD 30 MIN: CPT | Mod: S$GLB,,, | Performed by: INTERNAL MEDICINE

## 2020-02-21 PROCEDURE — 99214 PR OFFICE/OUTPT VISIT, EST, LEVL IV, 30-39 MIN: ICD-10-PCS | Mod: S$GLB,,, | Performed by: INTERNAL MEDICINE

## 2020-02-21 PROCEDURE — 3075F SYST BP GE 130 - 139MM HG: CPT | Mod: CPTII,S$GLB,, | Performed by: INTERNAL MEDICINE

## 2020-02-21 PROCEDURE — 3075F PR MOST RECENT SYSTOLIC BLOOD PRESS GE 130-139MM HG: ICD-10-PCS | Mod: CPTII,S$GLB,, | Performed by: INTERNAL MEDICINE

## 2020-02-21 PROCEDURE — 99999 PR PBB SHADOW E&M-EST. PATIENT-LVL V: ICD-10-PCS | Mod: PBBFAC,,, | Performed by: INTERNAL MEDICINE

## 2020-02-21 PROCEDURE — 99999 PR PBB SHADOW E&M-EST. PATIENT-LVL V: CPT | Mod: PBBFAC,,, | Performed by: INTERNAL MEDICINE

## 2020-02-21 PROCEDURE — 82043 UR ALBUMIN QUANTITATIVE: CPT

## 2020-02-21 PROCEDURE — 3078F DIAST BP <80 MM HG: CPT | Mod: CPTII,S$GLB,, | Performed by: INTERNAL MEDICINE

## 2020-02-21 PROCEDURE — 3051F PR MOST RECENT HEMOGLOBIN A1C LEVEL 7.0 - < 8.0%: ICD-10-PCS | Mod: CPTII,S$GLB,, | Performed by: INTERNAL MEDICINE

## 2020-02-21 RX ORDER — GLIMEPIRIDE 1 MG/1
1 TABLET ORAL
Qty: 90 TABLET | Refills: 3 | Status: SHIPPED | OUTPATIENT
Start: 2020-02-21 | End: 2023-04-03

## 2020-02-21 RX ORDER — TRAMADOL HYDROCHLORIDE 50 MG/1
TABLET ORAL
COMMUNITY
Start: 2020-02-08 | End: 2020-03-14

## 2020-02-21 RX ORDER — ZOLPIDEM TARTRATE 5 MG/1
5 TABLET ORAL NIGHTLY
Qty: 30 TABLET | Refills: 2 | Status: SHIPPED | OUTPATIENT
Start: 2020-02-21 | End: 2020-05-13

## 2020-02-21 NOTE — PROGRESS NOTES
Subjective:       Patient ID: Olga Aguiar is a 84 y.o. female.    Chief Complaint: Annual Exam    Annual exam     Here with     Ongoing issues with lumbar spine discomfort and has been seen in the Pain Clinic multiple times and has had multiple procedures.  Some steroid shots which may be causing her sugar to go up.  Denies polydipsia, polyuria or unexplained weight loss.    Recent labs acceptable other than A1c 7.9.  Has not been on glimepiride but is willing to consider again.  Would like some information about a diabetic diet as she has been eating more carbs than usual lately; she would also like to see a dietitian.    Will be due for MRI of the abdomen for follow-up of pancreatic cyst in the fall of 2020.    Ambien use reviewed,  reviewed, pattern of use has been stable.    Bone density exam is also due currently.    She has not seen pulmonary for several years.  Respiratory status is stable.  No syncope, chest pain, pressure, tightness or shortness of breath.    Pain clinic January 2020  Optometry July 2019  Mammogram July 2019  Cardiology June 2019, 1 year follow-up recommended  Abdominal MRI October 2018 due 2020  Pulmonary February 2018  DEXA scan May 2017, due now    Patient Active Problem List:     Mixed hyperlipidemia, baseline      Chronic pain syndrome     Lumbosacral spondylosis without myelopathy     Adrenal adenoma: 2.8 X 2.1CM 2010; stable 2016, also stable 2018     Abdominal obesity     Lymph edema, right arm     Gallbladder polyp     CKD (chronic kidney disease) stage 3, GFR 30-59 ml/min     Aortic valve stenosis, mild- moderate, MARI 1.44 1/16     LVH (left ventricular hypertrophy)     Pancreatic cyst: stable on MRI 11/16 also 2018, due again 2020     Cervical spinal stenosis     Controlled type 2 diabetes mellitus with complication, without long-term current use of insulin     Acquired hypothyroidism     Transient cerebral ischemia: 2012     Primary insomnia      Gastroesophageal reflux disease without esophagitis     Multiple lung nodules: stable CT 10/2016     Former smoker: 1 pack daily for < 20 years, quit 1973     COPD (chronic obstructive pulmonary disease) with chronic bronchitis, 2013     Facet hypertrophy of lumbar region     History of breast cancer     Intermittent asthma     Radiation fibrosis of lung     Bronchiectasis without complication     Ectatic abdominal aorta: see CT scan 10/16; no aneurysm 10/17     Aortic atherosclerosis: see CT scan 2016     Renal cysts, acquired, bilateral     Lumbar radiculopathy     Abnormal ECG     Encounter for monitoring proton pump inhibitor therapy     Diverticulosis of large intestine without hemorrhage: see CT 10/14     Idiopathic gout     DDD (degenerative disc disease), lumbar     Liver cysts: see MRI 2018     Odynophagia     Ambien use disorder, moderate, dependence, start 2010     Lumbar spondylosis     Unspecified inflammatory spondylopathy, 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   HENT: Negative for congestion, hearing loss, sinus pressure and sore throat.    Eyes: Negative for visual disturbance.   Respiratory: Negative for apnea, cough and shortness of breath.         Occasional cough   Cardiovascular: Negative for chest pain, palpitations and leg swelling.   Gastrointestinal: Negative for abdominal distention, abdominal pain, constipation, diarrhea, nausea and vomiting.   Genitourinary: Negative for difficulty urinating, dysuria, frequency, hematuria and vaginal bleeding.   Musculoskeletal: Positive for back pain. Negative for gait problem, joint swelling and myalgias.        Stable lymphedema   Skin: Negative for rash.   Neurological: Negative for dizziness, weakness, light-headedness, numbness and headaches.   Hematological: Negative for adenopathy. Does not bruise/bleed easily.   Psychiatric/Behavioral:  Negative for confusion, hallucinations, sleep disturbance and suicidal ideas.       Objective:      Physical Exam   Constitutional: She is oriented to person, place, and time. She appears well-developed and well-nourished.   HENT:   Head: Normocephalic and atraumatic.   Neck: Normal range of motion. Neck supple.   Cardiovascular: Normal rate and regular rhythm.   No murmur heard.  Pulses:       Dorsalis pedis pulses are 2+ on the right side, and 2+ on the left side.        Posterior tibial pulses are 2+ on the right side, and 2+ on the left side.   Pulmonary/Chest: Effort normal and breath sounds normal. No respiratory distress. She has no wheezes.   Abdominal: Soft. She exhibits no distension.   Musculoskeletal: She exhibits edema.        Right foot: There is normal range of motion and no deformity.        Left foot: There is normal range of motion and no deformity.   No CVA pain.  Negative SLR.  Strength UE and LE wnl.  No pain over spine  Mild bilateral lower extremity edema  Mild right-sided arm lymphedema   Feet:   Right Foot:   Protective Sensation: 6 sites tested. 6 sites sensed.   Skin Integrity: Negative for ulcer, blister, skin breakdown, erythema or warmth.   Left Foot:   Protective Sensation: 6 sites tested. 6 sites sensed.   Skin Integrity: Negative for ulcer, blister, skin breakdown, erythema or warmth.   Neurological: She is oriented to person, place, and time. She displays normal reflexes. No cranial nerve deficit.   Skin: Skin is warm and dry.   Psychiatric: She has a normal mood and affect. Her behavior is normal.       Assessment:       1. Annual physical exam    2. Controlled type 2 diabetes mellitus with complication, without long-term current use of insulin    3. Other specified disorders of adrenal gland    4. 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 Ambien use disorder, moderate, dependence    6. Aortic atherosclerosis    7. COPD (chronic obstructive pulmonary  disease) with chronic bronchitis    8. Ambien use disorder, moderate, dependence, start 2010    9. Bronchiectasis without complication    10. CKD (chronic kidney disease) stage 3, GFR 30-59 ml/min    11. Aortic valve stenosis, mild- moderate, MARI 1.44 1/16    12. Adrenal adenoma, unspecified laterality    13. History of breast cancer    14. Acquired hypothyroidism    15. Pancreatic cyst: stable on MRI 11/16 also 2018, due again 2020    16. Idiopathic gout, unspecified chronicity, unspecified site    17. Lymph edema, right arm    18. Primary insomnia    19. Former smoker: 1 pack daily for < 20 years, quit 1973    20. Osteoporosis without current pathological fracture, unspecified osteoporosis type    21. Encounter for screening mammogram for malignant neoplasm of breast         Plan:         Olga was seen today for annual exam.    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; keep follow-up in Pain Clinic    Ambien use disorder, moderate, dependence    Aortic atherosclerosis; stable, keep Cardiology follow-up    COPD (chronic obstructive pulmonary disease) with chronic bronchitis  -     Ambulatory referral/consult to Pulmonology; Future    Ambien use disorder, moderate, dependence, start 2010    Bronchiectasis without complication:  Pulmonary follow-up    CKD (chronic kidney disease) stage 3, GFR 30-59 ml/min  -     Ambulatory referral/consult to Nutrition Services; Future  -     Basic metabolic panel; Future    Aortic valve stenosis, mild- moderate, MARI 1.44 1/16; stable without symptoms, keep Cardiology follow-up    Adrenal adenoma, unspecified  laterality; stable    History of breast cancer:  Mammogram due this summer    Acquired hypothyroidism:  Stable    Pancreatic cyst: stable on MRI 11/16 also 2018, due again 2020    Idiopathic gout, unspecified chronicity, unspecified site:  Stable without current symptoms    Lymphedema, right arm; stable    Primary insomnia; stable on regimen    Former smoker: 1 pack daily for < 20 years, quit 1973    Osteoporosis without current pathological fracture, unspecified osteoporosis type  -     DXA Bone Density Spine And Hip; Future    Other orders  -     zolpidem (AMBIEN) 5 MG Tab; Take 1 tablet (5 mg total) by mouth every evening.  -     glimepiride (AMARYL) 1 MG tablet; Take 1 tablet (1 mg total) by mouth before breakfast.     Advanced directives in place  Screenings up to date  No falls; slight fall risk  No dementia or depression  Immunizations reviewed    I will review all studies and determine further tx depending on findings  Diabetic diet reviewed at length, hydration, exercise as tolerated.  Repeat labs in 3 months    Shingrix # 1 today

## 2020-02-21 NOTE — PATIENT INSTRUCTIONS
Hypoglycemia (Low Blood Sugar)     Fast-acting sugar includes a cup of nonfat milk.     Too little sugar (glucose) in your blood is called hypoglycemia or low blood sugar. Low blood sugar usually means anything lower than 70 mg/dL. Talk with your healthcare provider about your target range and what level is too low for you. Diabetes itself doesnt cause low blood sugar. But some of the treatments for diabetes, such as pills or insulin, may raise your risk for it. Low blood sugar may cause you to pass out or have a seizure. So always treat low blood sugar right away, but don't overeat.  Special note: Always carry a source of fast-acting sugar and a snack in case of hypoglycemia.   What you may notice  If you have low blood sugar, you may have one or more of these symptoms:  · Shakiness or dizziness  · Cold, clammy skin or sweating  · Feelings of hunger  · Headache  · Nervousness  · A hard, fast heartbeat  · Weakness  · Confusion or irritability  · Blurred vision  · Having nightmares or waking up confused or sweating  · Numbness or tingling in the lips or tongue  What you should do  Here are tips to follow if you have hypoglycemia:   · First check your blood sugar. If it is too low (out of your target range), eat or drink 15 to 20 grams of fast-acting sugar. This may be 3 to 4 glucose tablets, 4 ounces (half a cup) of fruit juice or regular (nondiet) soda, 8 ounces (1 cup) of fat-free milk, or 1 tablespoon of honey. Dont take more than this, or your blood sugar may go too high.  · Wait 15 minutes. Then recheck your blood sugar if you can.  · If your blood sugar is still too low, repeat the steps above and check your blood sugar again. If your blood sugar still has not returned to your target range, contact your healthcare provider or seek emergency care.  · Once your blood sugar returns to target range, eat a snack or meal.  Preventing low blood sugar  Things you can do include the following:   · If your condition  needs a strict treatment plan, eat your meals and snacks at the same times each day. Dont skip meals!  · If your treatment plan lets you change when you eat and what you eat, learn how to change the time and dose of your rapid-acting insulin to match this.   · Ask your healthcare provider if it is safe for you to drink alcohol. Never drink on an empty stomach.  · Take your medicine at the prescribed times.  · Always carry a source of fast-acting sugar and a snack when youre away from home.  Other things to do  Additional tips include the following:  · Carry a medical ID card, a compact USB drive, or wear a medical alert bracelet or necklace. It should say that you have diabetes. It should also say what to do if you pass out or have a seizure.  · Make sure your family, friends, and coworkers know the signs of low blood sugar. Tell them what to do if your blood sugar falls very low and you cant treat yourself.  · Keep a glucagon emergency kit handy. Be sure your family, friends, and coworkers know how and when to use it. Check it regularly and replace the glucagon before it expires.  · Talk with your health care team about other things you can do to prevent low blood sugar.     If you have unexplained hypoglycemia or hypoglycemia several times, call your healthcare provider.   Date Last Reviewed: 5/1/2016  © 4944-2928 [employer]. [address] [place] [address], [place] [address]. All rights reserved. This information is not intended as a substitute for professional medical care. Always follow your healthcare professional's instructions.        Diet: Diabetes  Food is an important tool that you can use to control diabetes and stay healthy. Eating well-balanced meals in the correct amounts will help you control your blood glucose levels and prevent low blood sugar reactions. It will also help you reduce the health risks of diabetes. There is no one specific diet that is right for everyone with diabetes. But  there are general guidelines to follow. A registered dietitian (RD) will create a tailored diet approach thats just right for you. He or she will also help you plan healthy meals and snacks. If you have any questions, call your dietitian for advice.     Guidelines for success  Talk with your healthcare provider before starting a diabetes diet or weight loss program. If you haven't talked with a dietitian yet, ask your provider for a referral. The following guidelines can help you succeed:  · Select foods from the 6 food groups below. Your dietitian will help you find food choices within each group. He or she will also show you serving sizes and how many servings you can have at each meal.  ¨ Grains, beans, and starchy vegetables  ¨ Vegetables  ¨ Fruit  ¨ Milk or yogurt  ¨ Meat, poultry, fish, or tofu  ¨ Healthy fats  · Check your blood sugar levels as directed by your provider. Take any medicine as prescribed by your provider.  · Learn to read food labels and pick the right portion sizes.  · Eat only the amount of food in your meal plan. Eat about the same amount of food at regular times each day. Dont skip meals. Eat meals 4 to 5 hours apart, with snacks in between.  · Limit alcohol. It raises blood sugar levels. Drink water or calorie-free diet drinks that use safe sweeteners.  · Eat less fat to help lower your risk of heart disease. Use nonfat or low-fat dairy products and lean meats. Avoid fried foods. Use cooking oils that are unsaturated, such as olive, canola, or peanut oil.  · Talk with your dietitian about safe sugar substitutes.  · Avoid added salt. It can contribute to high blood pressure, which can cause heart disease. People with diabetes already have a risk of high blood pressure and heart disease.  · Stay at a healthy weight. If you need to lose weight, cut down on your portion sizes. But dont skip meals. Exercise is an important part of any weight management program. Talk with your provider about  an exercise program thats right for you.  · For more information about the best diet plan for you, talk with a registered dietitian (RD). To find an RD in your area, contact:  ¨ Academy of Nutrition and Dietetics www.eatright.org  ¨ The American Diabetes Association [phone] www.diabetes.org  Date Last Reviewed: 8/1/2016  © 4861-7131 [employer]. [address] [place] [address], [place] [address]. All rights reserved. This information is not intended as a substitute for professional medical care. Always follow your healthcare professional's instructions.        Understanding Carbohydrates, Fats, and Protein  Food is a source of fuel and nourishment for your body. Its also a source of pleasure. Having diabetes doesnt mean you have to eat special foods or give up desserts. Instead, your dietitian can show you how to plan meals to suit your body. To start, learn how different foods affect blood sugar.  Carbohydrates  Carbohydrates are the main source of fuel for the body. Carbohydrates raise blood sugar. Many people think carbohydrates are only found in pasta or bread. But carbohydrates are actually in many kinds of foods:  · Sugars occur naturally in foods such as fruit, milk, honey, and molasses. Sugars can also be added to many foods, from cereals and yogurt to candy and desserts. Sugars raise blood sugar.  · Starches are found in bread, cereals, pasta, and dried beans. Theyre also found in corn, peas, potatoes, yam, acorn squash, and butternut squash. Starches also raise blood sugar.   · Fiber is found in foods such as vegetables, fruits, beans, and whole grains. Unlike other carbs, fiber isnt digested or absorbed. So it doesnt raise blood sugar. In fact, fiber can help keep blood sugar from rising too fast. It also helps keep blood cholesterol at a healthy level.  Did you know?  Even though carbohydrates raise blood sugar, its best to have some in every meal. They are an important part of a healthy  diet.   Fat  Fat is an energy source that can be stored until needed. Fat does not raise blood sugar. However, it can raise blood cholesterol, increasing the risk of heart disease. Fat is also high in calories, which can cause weight gain. Not all types of fat are the same.  More Healthy:  · Monounsaturated fats are mostly found in vegetable oils, such as olive, canola, and peanut oils. They are also found in avocados and some nuts. Monounsaturated fats are healthy for your heart. Thats because they lower LDL (unhealthy) cholesterol.  · Polyunsaturated fats are mostly found in vegetable oils, such as corn, safflower, and soybean oils. They are also found in some seeds, nuts, and fish. Polyunsaturated fats lower LDL (unhealthy) cholesterol. So, choosing them instead of saturated fats is healthy for your heart. Certain unsaturated fats can help lower triglycerides.   Less Healthy:  · Saturated fats are found in animal products, such as meat, poultry, whole milk, lard, and butter. Saturated fats raise LDL cholesterol and are not healthy for your heart.  · Hydrogenated oils and trans fats are formed when vegetable oils are processed into solid fats. They are found in many processed foods. Hydrogenated oils and trans fats raise LDL cholesterol and lower HDL (healthy) cholesterol. They are not healthy for your heart.  Protein  Protein helps the body build and repair muscle and other tissue. Protein has little or no effect on blood sugar. However, many foods that contain protein also contain saturated fat. By choosing low-fat protein sources, you can get the benefits of protein without the extra fat:  · Plant protein is found in dry beans and peas, nuts, and soy products, such as tofu and soymilk. These sources tend to be cholesterol-free and low in saturated fat.  · Animal protein is found in fish, poultry, meat, cheese, milk, and eggs. These contain cholesterol and can be high in saturated fat. Aim for lean, lower-fat  choices.  Date Last Reviewed: 3/1/2016  © 0046-3528 [employer]. [address] [place] [address], [place] [address]. All rights reserved. This information is not intended as a substitute for professional medical care. Always follow your healthcare professional's instructions.        Diabetes (General Information)  Diabetes is a long-term health problem. It means your body does not make enough insulin. Or it may mean that your body cannot use the insulin it makes. Insulin is a hormone in your body. It lets blood sugar (glucose) reach the cells in your body. All of your cells need glucose for fuel.  When you have diabetes, the glucose in your blood builds up because it cannot get into the cells. This buildup is called high blood sugar (hyperglycemia).  Your blood sugar level depends on several things. It depends on what kind of food you eat and how much of it you eat. It also depends on how much exercise you get, and how much insulin you have in your body. Eating too much of the wrong kinds of food or not taking diabetes medicine on time can cause high blood sugar. Infections can cause high blood sugar even if you are taking medicines correctly.  These things can also cause low blood sugar:  · Missing meals  · Not eating enough food  · Taking too much diabetes medicine  Diabetes can cause serious problems over time if you do not get treated. These problems include heart disease, stroke, kidney failure, and blindness. They also include nerve pain or loss of feeling in your legs and feet, and gangrene of the feet. By keeping your blood sugar under control you can prevent or delay these problems.  Normal blood sugar levels are 80 to 100 before a meal and less than 180 in the 1 to 2 hours after a meal.  Home care  Follow these guidelines when caring for yourself at home:  · Follow the diet your healthcare provider gives you. Take insulin or other diabetes medicine exactly as told to.  · Watch your blood sugar as you  are told to. Keep a log of your results. This will help your provider change your medicines to keep your blood sugar under control.  · Try to reach your ideal weight. You may be able to cut back on or not have to take diabetes medicine if you eat the right foods and get exercise.  · Do not smoke. Smoking worsens the effects of diabetes on your circulation. You are much more likely to have a heart attack if you have diabetes and you smoke.  · Take good care of your feet. If you have lost feeling in your feet, you may not see an injury or infection. Check your feet and between your toes at least once a week.  · Wear a medical alert bracelet or necklace, or carry a card in your wallet that says you have diabetes. This will help healthcare providers give you the right care if you get very ill and cannot tell them that you have diabetes.  Sick day plan  If you get a cold, the flu, or a bacterial or viral infection, take these steps:  · Look at your diabetes sick plan and call your healthcare provider as you were told to. You may need to call your provider right away if:  ¨ Your blood sugar is above 240 while taking your diabetes medicine  ¨ Your urine ketone levels are above normal or high  ¨ You have been vomiting more than 6 hours  ¨ You have trouble breathing or your breath ha s a fruity smell  ¨ You have a high fever  ¨ You have a fever for several days and you are not getting better  ¨ You get light-headed and are sleepier than usual  · Keep taking your diabetes pills (oral medicine) even if you have been vomiting and are feeling sick. Call your provider right away because you may need insulin to lower your blood sugar until you recover from your illness.  · Keep taking your insulin even if you have been vomiting and are feeling sick. Call your provider right away to ask if you need to change your insulin dose. This will depend on your blood sugar results.  · Check your blood sugar every 2 to 4 hours, or at least 4  times a day.  · Check your ketones often. If you are vomiting and having diarrhea, watch them more often.  · Do not skip meals. Try to eat small meals on a regular schedule. Do this even if you do not feel like eating.  · Drink water or other liquids that do not have caffeine or calories. This will keep you from getting dehydrated. If you are nauseated or vomiting, takes small sips every 5 minutes. To prevent dehydration try to drink a cup (8 ounces) of fluids every hour while you are awake.  General care  Always bring a source of fast-acting sugar with you in case you have symptoms of low blood sugar (below 70). At the first sign of low blood sugar, eat or drink 15 to 20 grams of fast-acting sugar to raise your blood sugar. Examples are:  · 3 to 4 glucose tablets. You can buy these at most [employer].  · 4 ounces (1/2 cup) of regular (not diet) soft drinks  · 4 ounces (1/2 cup) of any fruit juice  · 8 ounces (1 cup) of milk  · 5 to 6 pieces of hard candy  · 1 tablespoon of honey  Check your blood sugar 15 minutes after treating yourself. If it is still below 70, take 15 to 20 more grams of fast-acting sugar. Test again in 15 minutes. If it returns to normal (70 or above), eat a snack or meal to keep your blood sugar in a safe range. If it stays low, call your doctor or go to an emergency room.  Follow-up care  Follow-up with your healthcare provider, or as advised. For more information about diabetes, visit the American Diabetes Association website at www.diabetes.org or call [phone].  When to seek medical advice  Call your healthcare provider right away if you have any of these symptoms of high blood sugar:  · Frequent urination  · Dizziness  · Drowsiness  · Thirst  · Headache  · Nausea or vomiting  · Abdominal pain  · Eyesight changes  · Fast breathing  · Confusion or loss of consciousness  Also call your provider right away if you have any of these signs of low blood  sugar:  · Fatigue  · Headache  · Shakes  · Excess sweating  · Hunger  · Feeling anxious or restless  · Eyesight changes  · Drowsiness  · Weakness  · Confusion or loss of consciousness  Call 911  Call for emergency help right away if any of these occur:  · Chest pain or shortness of breath  · Dizziness or fainting  · Weakness of an arm or leg or one side of the face  · Trouble speaking or seeing   Date Last Reviewed: 6/1/2016  © 5309-5451 [employer]. [address] [place] [address]. All rights reserved. This information is not intended as a substitute for professional medical care. Always follow your healthcare professional's instructions.    [employer]

## 2020-02-22 ENCOUNTER — PATIENT MESSAGE (OUTPATIENT)
Dept: INTERNAL MEDICINE | Facility: CLINIC | Age: 85
End: 2020-02-22

## 2020-02-26 RX ORDER — ALLOPURINOL 100 MG/1
TABLET ORAL
Qty: 90 TABLET | Refills: 0 | Status: SHIPPED | OUTPATIENT
Start: 2020-02-26 | End: 2020-05-27

## 2020-03-14 RX ORDER — TRAMADOL HYDROCHLORIDE 50 MG/1
TABLET ORAL
Qty: 90 TABLET | Refills: 2 | Status: SHIPPED | OUTPATIENT
Start: 2020-03-14 | End: 2020-06-09

## 2020-04-01 RX ORDER — LEVOTHYROXINE SODIUM 75 UG/1
TABLET ORAL
Qty: 90 TABLET | Refills: 0 | Status: SHIPPED | OUTPATIENT
Start: 2020-04-01 | End: 2020-06-24

## 2020-04-02 DIAGNOSIS — J45.20 MILD INTERMITTENT ASTHMA WITHOUT COMPLICATION: Primary | ICD-10-CM

## 2020-04-02 RX ORDER — MONTELUKAST SODIUM 10 MG/1
10 TABLET ORAL NIGHTLY
Qty: 90 TABLET | Refills: 3 | Status: SHIPPED | OUTPATIENT
Start: 2020-04-02 | End: 2023-07-24 | Stop reason: CLARIF

## 2020-04-15 ENCOUNTER — PATIENT MESSAGE (OUTPATIENT)
Dept: PAIN MEDICINE | Facility: CLINIC | Age: 85
End: 2020-04-15

## 2020-04-15 ENCOUNTER — PATIENT MESSAGE (OUTPATIENT)
Dept: INTERNAL MEDICINE | Facility: CLINIC | Age: 85
End: 2020-04-15

## 2020-04-15 RX ORDER — BUSPIRONE HYDROCHLORIDE 5 MG/1
5 TABLET ORAL 2 TIMES DAILY PRN
Qty: 60 TABLET | Refills: 2 | Status: SHIPPED | OUTPATIENT
Start: 2020-04-15 | End: 2022-01-03 | Stop reason: SDUPTHER

## 2020-04-28 ENCOUNTER — PATIENT MESSAGE (OUTPATIENT)
Dept: INTERNAL MEDICINE | Facility: CLINIC | Age: 85
End: 2020-04-28

## 2020-04-28 RX ORDER — TRIAMTERENE AND HYDROCHLOROTHIAZIDE 37.5; 25 MG/1; MG/1
CAPSULE ORAL
Qty: 90 CAPSULE | Refills: 0 | Status: SHIPPED | OUTPATIENT
Start: 2020-04-28 | End: 2020-08-04

## 2020-04-28 RX ORDER — NITROFURANTOIN 25; 75 MG/1; MG/1
100 CAPSULE ORAL 2 TIMES DAILY
Qty: 10 CAPSULE | Refills: 0 | Status: SHIPPED | OUTPATIENT
Start: 2020-04-28 | End: 2021-03-29

## 2020-05-13 ENCOUNTER — TELEPHONE (OUTPATIENT)
Dept: INTERNAL MEDICINE | Facility: CLINIC | Age: 85
End: 2020-05-13

## 2020-05-13 RX ORDER — ZOLPIDEM TARTRATE 5 MG/1
TABLET ORAL
Qty: 30 TABLET | Refills: 0 | Status: SHIPPED | OUTPATIENT
Start: 2020-05-13 | End: 2020-06-09

## 2020-05-13 NOTE — TELEPHONE ENCOUNTER
----- Message from Lanre De Anda sent at 5/13/2020  3:05 PM CDT -----  Contact: Self  Pt calling in regards to if she can reschedule her Lab on May 26th to the location in [place] closer to her home instead, pt states she will have trouble trying to make it to [place] that day         Please advise pt can be contact at [phone]

## 2020-05-20 ENCOUNTER — OFFICE VISIT (OUTPATIENT)
Dept: PULMONOLOGY | Facility: CLINIC | Age: 85
End: 2020-05-20
Payer: MEDICARE

## 2020-05-20 VITALS
HEART RATE: 79 BPM | BODY MASS INDEX: 28.51 KG/M2 | OXYGEN SATURATION: 96 % | SYSTOLIC BLOOD PRESSURE: 144 MMHG | HEIGHT: 61 IN | DIASTOLIC BLOOD PRESSURE: 87 MMHG | WEIGHT: 151 LBS

## 2020-05-20 DIAGNOSIS — J47.9 BRONCHIECTASIS WITHOUT COMPLICATION: ICD-10-CM

## 2020-05-20 DIAGNOSIS — J44.89 COPD (CHRONIC OBSTRUCTIVE PULMONARY DISEASE) WITH CHRONIC BRONCHITIS: ICD-10-CM

## 2020-05-20 DIAGNOSIS — J45.20 INTERMITTENT ASTHMA, UNCOMPLICATED: ICD-10-CM

## 2020-05-20 PROCEDURE — 99214 PR OFFICE/OUTPT VISIT, EST, LEVL IV, 30-39 MIN: ICD-10-PCS | Mod: S$GLB,,, | Performed by: INTERNAL MEDICINE

## 2020-05-20 PROCEDURE — 3077F PR MOST RECENT SYSTOLIC BLOOD PRESSURE >= 140 MM HG: ICD-10-PCS | Mod: CPTII,S$GLB,, | Performed by: INTERNAL MEDICINE

## 2020-05-20 PROCEDURE — 99999 PR PBB SHADOW E&M-EST. PATIENT-LVL V: ICD-10-PCS | Mod: PBBFAC,,, | Performed by: INTERNAL MEDICINE

## 2020-05-20 PROCEDURE — 1125F PR PAIN SEVERITY QUANTIFIED, PAIN PRESENT: ICD-10-PCS | Mod: S$GLB,,, | Performed by: INTERNAL MEDICINE

## 2020-05-20 PROCEDURE — 1101F PR PT FALLS ASSESS DOC 0-1 FALLS W/OUT INJ PAST YR: ICD-10-PCS | Mod: CPTII,S$GLB,, | Performed by: INTERNAL MEDICINE

## 2020-05-20 PROCEDURE — 3079F DIAST BP 80-89 MM HG: CPT | Mod: CPTII,S$GLB,, | Performed by: INTERNAL MEDICINE

## 2020-05-20 PROCEDURE — 99499 RISK ADDL DX/OHS AUDIT: ICD-10-PCS | Mod: S$GLB,,, | Performed by: INTERNAL MEDICINE

## 2020-05-20 PROCEDURE — 99214 OFFICE O/P EST MOD 30 MIN: CPT | Mod: S$GLB,,, | Performed by: INTERNAL MEDICINE

## 2020-05-20 PROCEDURE — 1101F PT FALLS ASSESS-DOCD LE1/YR: CPT | Mod: CPTII,S$GLB,, | Performed by: INTERNAL MEDICINE

## 2020-05-20 PROCEDURE — 99999 PR PBB SHADOW E&M-EST. PATIENT-LVL V: CPT | Mod: PBBFAC,,, | Performed by: INTERNAL MEDICINE

## 2020-05-20 PROCEDURE — 1159F MED LIST DOCD IN RCRD: CPT | Mod: S$GLB,,, | Performed by: INTERNAL MEDICINE

## 2020-05-20 PROCEDURE — 3079F PR MOST RECENT DIASTOLIC BLOOD PRESSURE 80-89 MM HG: ICD-10-PCS | Mod: CPTII,S$GLB,, | Performed by: INTERNAL MEDICINE

## 2020-05-20 PROCEDURE — 1159F PR MEDICATION LIST DOCUMENTED IN MEDICAL RECORD: ICD-10-PCS | Mod: S$GLB,,, | Performed by: INTERNAL MEDICINE

## 2020-05-20 PROCEDURE — 3077F SYST BP >= 140 MM HG: CPT | Mod: CPTII,S$GLB,, | Performed by: INTERNAL MEDICINE

## 2020-05-20 PROCEDURE — 1125F AMNT PAIN NOTED PAIN PRSNT: CPT | Mod: S$GLB,,, | Performed by: INTERNAL MEDICINE

## 2020-05-20 PROCEDURE — 99499 UNLISTED E&M SERVICE: CPT | Mod: S$GLB,,, | Performed by: INTERNAL MEDICINE

## 2020-05-20 RX ORDER — PREDNISONE 20 MG/1
TABLET ORAL
Qty: 12 TABLET | Refills: 2 | Status: SHIPPED | OUTPATIENT
Start: 2020-05-20 | End: 2022-03-21

## 2020-05-20 NOTE — PATIENT INSTRUCTIONS
Would be best to use medications to improve function.        Breo/trelegy once daily will make lungs better- would have great benefit if you did more function.  Skip montelukast.    Control pain and stay mobile would be good goals.    Would use prednisone if needed for lung flare.      Exercise programs may help.  Could check oxygen level walking but looked good today.

## 2020-05-20 NOTE — PROGRESS NOTES
5/20/2020    Olga Aguiar  office    Chief Complaint   Patient presents with    COPD    Sputum Production     5/20/2020 - no recent falls, back pain limits, uses breo but out last 2 months - no difference without breo.  Breathing not good - has clarke.  No prednisone use.  Has higher sugars recently- hgba1c 8.8 last July.    March 28, 2018-- moving to [facility], fell 2-4 over last yr- has gotten  Therapy.  Breathing still an issue. No cough or wheezes, took prednisone for breathing 5x/yr- usually for 3 days.  Very clarke.  Was in gym but now out due to back pain issues.    Discussed with patient above for education the following:    Use breo or trelegy at once daily- use most effective but not both.    Use xopenex or albuterol before activity - may be reasonable to use before activity or more to assure optimal breathing.    Skip singulair/montelukast- resume for sinuses or worse lungs- if helps clearly would use regular.      May use prednisone 10 mg 2 daily for 3 days and repeat if needed.    Exercise program would be good once settled with recent falls and likely deconditioning.    April 7, 2017 - steroids help breathing and mobility- used mainly 4 times, once for 6 days.  Mucous and breathing doing well and does even better with steroids.  Jan 4, 2017, cough is better, less mucous, wheezes with stairs and night air, took prednisone last month and after 3 days improved a bit, uses singulair only on regular basis, advair used til out- was causing a hoarseness with reg use.  Albuterol ppt nervousness.  Sept 28, 2016HPI: minimal ex smoker with h/o breast ca rx xrt to anterior right chest with chr vol loss- ct with fibrotic right upper with bronchiectasis.  Chr cough and no mucous progressively worse,   Had had cough and sob related irratents.  occ wheezes last yrs,   [place]ke flight of stairs varies. Inhalers no help.  Will have occasoion of cough and mucous chr , copious.  Yrs back would have cough and occ wheeze withe  irratents.    The chief compliant  problem is new to me   PFSH:  Past Medical History:   Diagnosis Date    Adrenal adenoma: 2.8 X 2.1CM 2010 5/16/2014    Anticoagulant long-term use      mg    Aortic atherosclerosis: see CT scan 2016 10/28/2016    Aortic stenosis 8/19/2014    Asthma     Breast cancer     Cancer     breast, both sides, right arm restriction    Cataracts, both eyes     Chronic back pain     Chronic bronchitis     CKD (chronic kidney disease) stage 3, GFR 30-59 ml/min 8/19/2014    no dialysis    COPD (chronic obstructive pulmonary disease)     occasional inhaler use, no oxygen    DDD (degenerative disc disease), lumbar 7/30/2013    Diabetes mellitus     diet controlled    Diverticulosis     Ectatic abdominal aorta: see CT scan 10/16 10/28/2016    Gallbladder polyp 8/19/2014    GERD (gastroesophageal reflux disease)     Gout     Herpes simplex     Fever Blisters and Styes in right eye    High blood cholesterol     Hypertension     holding medication when B/P low    Hypertension associated with diabetes 2/7/2012    Insomnia     Lumbar spinal stenosis     Lymphedema of arm     Right    Multiple lung nodules 9/21/2015    Renal cyst, right: stable per CT 2016 10/28/2016    Stroke 2/2012    TIA, no residual effects    Stye 12/5/2013    Thyroid disease     hypothyroidism    Type 2 diabetes mellitus without complication, without long-term current use of insulin 7/20/2015    Unspecified hypothyroidism 7/20/2015         Past Surgical History:   Procedure Laterality Date    AXILLARY NODE DISSECTION      both sides    BREAST BIOPSY      BREAST LUMPECTOMY Bilateral     lymph nodes on right    CATARACT EXTRACTION      bilateral PHACO with IOL    COLONOSCOPY  01/12/2010    in legacy    Epidural steroid injection      Pain mangement    EPIDURAL STEROID INJECTION INTO LUMBAR SPINE N/A 6/4/2018    Procedure: Injection-steroid-epidural-lumbar;  Surgeon: Rohan Ware,  MD;  Location: [facility] OR;  Service: Pain Management;  Laterality: N/A;  L5/S1 interlaminar    EPIDURAL STEROID INJECTION INTO LUMBAR SPINE N/A 9/4/2018    Procedure: Injection-steroid-epidural-lumbar L5/S1;  Surgeon: Rohan Ware MD;  Location: [facility] OR;  Service: Pain Management;  Laterality: N/A;    EPIDURAL STEROID INJECTION INTO LUMBAR SPINE N/A 12/20/2018    Procedure: Injection-steroid-epidural-lumbar;  Surgeon: Rohan Ware MD;  Location: [facility] OR;  Service: Pain Management;  Laterality: N/A;  L5/S1 interlaminar    EPIDURAL STEROID INJECTION INTO LUMBAR SPINE N/A 5/7/2019    Procedure: Injection-steroid-epidural-lumbar L5/S1;  Surgeon: Rohan Ware MD;  Location: [facility] OR;  Service: Pain Management;  Laterality: N/A;    EPIDURAL STEROID INJECTION INTO LUMBAR SPINE N/A 9/16/2019    Procedure: Injection-steroid-epidural-lumbar;  Surgeon: Rohan Ware MD;  Location: [facility] OR;  Service: Pain Management;  Laterality: N/A;  L5/S1 interlaminar     EPIDURAL STEROID INJECTION INTO LUMBAR SPINE Right 12/16/2019    Procedure: Injection-steroid-epidural-lumbar L5, S1 Right;  Surgeon: Rohan Ware MD;  Location: [facility] OR;  Service: Pain Management;  Laterality: Right;    ESOPHAGOGASTRODUODENOSCOPY N/A 11/12/2018    Procedure: EGD (ESOPHAGOGASTRODUODENOSCOPY);  Surgeon: Braydon Becerra MD;  Location: [facility];  Service: Endoscopy;  Laterality: N/A;    HYSTERECTOMY      HYSTERECTOMY      Nerve Block Injection      Pain management, Times 2    RADIOFREQUENCY ABLATION      Nerve, Pain management    RADIOFREQUENCY ABLATION OF LUMBAR MEDIAL BRANCH NERVE AT SINGLE LEVEL Bilateral 10/29/2019    Procedure: Radiofrequency Ablation, Nerve, Spinal, Lumbar, Medial Branch, L3, L4, L5;  Surgeon: Rohan Ware MD;  Location: [facility] OR;  Service: Pain Management;  Laterality: Bilateral;    RECTAL SURGERY      Fissure repair    TONSILLECTOMY      UPPER GASTROINTESTINAL ENDOSCOPY  08/29/2014  "   Dr. Castro     Social History     Tobacco Use    Smoking status: Former Smoker     Packs/day: 1.00     Years: 18.00     Pack years: 18.00     Start date: 1952     Last attempt to quit: 1970     Years since quittin.8    Smokeless tobacco: Never Used   Substance Use Topics    Alcohol use: Yes     Frequency: Monthly or less     Drinks per session: 1 or 2     Binge frequency: Never     Comment: rare    Drug use: No     Family History   Problem Relation Age of Onset    Cancer Mother         Breast    COPD Mother     Hearing loss Mother     Hypertension Mother     Breast cancer Mother 90    Diabetes Father     Heart disease Father     Heart attack Father     Cancer Daughter         Breast    Breast cancer Daughter 45    Cancer Sister         Breast    Breast cancer Sister 70    Amblyopia Neg Hx     Blindness Neg Hx     Cataracts Neg Hx     Glaucoma Neg Hx     Macular degeneration Neg Hx     Retinal detachment Neg Hx     Strabismus Neg Hx     Stroke Neg Hx     Thyroid disease Neg Hx     Colon cancer Neg Hx     Stomach cancer Neg Hx     Esophageal cancer Neg Hx      Review of patient's allergies indicates:   Allergen Reactions    Allopurinol analogues Other (See Comments)     Headache.  This is more of a side effect than it is an allergic reaction.       Performance Status:The patient's activity level is functions out of house.      Review of Systems:  a review of eleven systems covering constitutional, Eye, HEENT, Psych, Respiratory, Cardiac, GI, , Musculoskeletal, Endocrine, Dermatologic was negative except for pertinent findings as listed ABOVE and below: as, no swallow problems with xrt, no clear dm,     Exam:Comprehensive exam done.   BP (!) 144/87 (BP Location: Left arm, Patient Position: Sitting)   Pulse 79   Ht 5' 1" (1.549 m)   Wt 68.5 kg (151 lb 0.2 oz)   SpO2 96% Comment: on room air  BMI 28.53 kg/m²   Exam included Vitals as listed, and patient's appearance " and affect and alertness and mood, oral exam for yeast and hygiene and pharynx lesions and Mallapatti (M) score, neck with inspection for jvd and masses and thyroid abnormalities and lymph nodes (supraclavicular and infraclavicular nodes and axillary also examined and noted if abn), chest exam included symmetry and effort and fremitus and percussion and auscultation, cardiac exam included rhythm and gallops and murmur and rubs and jvd and edema, abdominal exam for mass and hepatosplenomegaly and tenderness and hernias and bowel sounds, Musculoskeletal exam with muscle tone and posture and mobility/gait and  strength, and skin for rashes and cyanosis and pallor and turgor, extremity for clubbing.  Findings were normal except for pertinent findings listed below:  M2, good bs, looks good, chest is symmetric, no distress, normal percussion, normal fremitus and good normal breath sounds    Radiographs reviewed: view by direct vision focal fibrotic dx right upper with mainly bronchiectasis.  Reviewed 3/28    10 mm nodule in the lingula not significant change compared to earlier study dated back to 1/5/2010.  Few scattered 4 mm or smaller nodules although not definitely seen on the 2010 exam the difference could be technical do not appear significantly change compared to 1/13/2016.  Mildly prominent lymph node anterior mediastinal fat representing a change compared to 2010 but not significantly change compared to 1/13/2016.  Fibrotic change right lung consistent with radiation change and also not appearing significantly changed compared to the prior exam 1/13/ 2016      Additional findings as detailed above including stable hypodense masses in the liver consistent with cysts, stable left adrenal nodule, right renal cyst    ______________________________________     Electronically signed by: MINE BURROUGHS MD  Date: 09/23/16  Time: 10:35   Labs reviewed      PFT  Pulmonary Functions, including spirometry and  bronchodilator response and lung volumes and diffusion, study was done oct 7, 2016.  Spirometry shows severe obstruction, loss vital capacity and obstruction and bronchodilator response.   FEV1 is 42% irma 0.81 liters liters.  Lung volumes show  loss of TLC with restriction 63%.  Diffusion shows reduced to 38% uncorrected for anemia if present..     Pulmonary functions show severe restriction and good bronchodilator response and low dlco. Clinical correlation recommended.      Saul Heller M.D.      Plan:  Clinical impression is resonably certain and repeated evaluation prn +/- follow up will be needed as below.    Olga was seen today for copd and sputum production.    Diagnoses and all orders for this visit:    COPD (chronic obstructive pulmonary disease) with chronic bronchitis  -     Ambulatory referral/consult to Pulmonology  -     predniSONE (DELTASONE) 20 MG tablet; One daily for   3 days and repeat as needed.  -     fluticasone-umeclidin-vilanter (TRELEGY ELLIPTA) 100-62.5-25 mcg DsDv; Inhale 1 puff into the lungs once daily.    Intermittent asthma, uncomplicated  -     predniSONE (DELTASONE) 20 MG tablet; One daily for   3 days and repeat as needed.    Bronchiectasis without complication  -     predniSONE (DELTASONE) 20 MG tablet; One daily for   3 days and repeat as needed.        Follow up in about 1 year (around 5/20/2021), or if symptoms worsen or fail to improve.    Discussed with patient above for education the following:        Discussed with patient above for education the following:      Patient Instructions   Would be best to use medications to improve function.        Breo/trelegy once daily will make lungs better- would have great benefit if you did more function.  Skip montelukast.    Control pain and stay mobile would be good goals.    Would use prednisone if needed for lung flare.      Exercise programs may help.  Could check oxygen level walking but looked good today.

## 2020-05-20 NOTE — LETTER
May 20, 2020      Sera Barger MD  [address]  [facility] [address]           [facility]  [address]  [place] [address]  Phone: [phone]  Fax: [phone]          Patient: Olga Aguiar   MR Number: [id number]   YOB: 1935   Date of Visit: 5/20/2020       Dear Dr. Sera Barger:    Thank you for referring Olga Aguiar to me for evaluation. Attached you will find relevant portions of my assessment and plan of care.    If you have questions, please do not hesitate to call me. I look forward to following Olga Aguiar along with you.    Sincerely,    Saul Heller MD    Enclosure  CC:  No Recipients    If you would like to receive this communication electronically, please contact [email or web address] or [phone] to request more information on [employer] Link access.    For providers and/or their staff who would like to refer a patient to [facility], please contact us through our one-stop-shop provider referral line, [facility], at [phone].    If you feel you have received this communication in error or would no longer like to receive these types of communications, please e-mail [email or web address]

## 2020-05-26 ENCOUNTER — LAB VISIT (OUTPATIENT)
Dept: LAB | Facility: HOSPITAL | Age: 85
End: 2020-05-26
Attending: INTERNAL MEDICINE
Payer: MEDICARE

## 2020-05-26 DIAGNOSIS — N18.30 CKD (CHRONIC KIDNEY DISEASE) STAGE 3, GFR 30-59 ML/MIN: ICD-10-CM

## 2020-05-26 DIAGNOSIS — E11.8 CONTROLLED TYPE 2 DIABETES MELLITUS WITH COMPLICATION, WITHOUT LONG-TERM CURRENT USE OF INSULIN: ICD-10-CM

## 2020-05-26 LAB
ANION GAP SERPL CALC-SCNC: 9 MMOL/L (ref 8–16)
BUN SERPL-MCNC: 18 MG/DL (ref 8–23)
CALCIUM SERPL-MCNC: 9.9 MG/DL (ref 8.7–10.5)
CHLORIDE SERPL-SCNC: 98 MMOL/L (ref 95–110)
CO2 SERPL-SCNC: 33 MMOL/L (ref 23–29)
CREAT SERPL-MCNC: 1.1 MG/DL (ref 0.5–1.4)
EST. GFR  (AFRICAN AMERICAN): 52.9 ML/MIN/1.73 M^2
EST. GFR  (NON AFRICAN AMERICAN): 45.9 ML/MIN/1.73 M^2
GLUCOSE SERPL-MCNC: 125 MG/DL (ref 70–110)
POTASSIUM SERPL-SCNC: 4.1 MMOL/L (ref 3.5–5.1)
SODIUM SERPL-SCNC: 140 MMOL/L (ref 136–145)

## 2020-05-26 PROCEDURE — 36415 COLL VENOUS BLD VENIPUNCTURE: CPT | Mod: PO

## 2020-05-26 PROCEDURE — 83036 HEMOGLOBIN GLYCOSYLATED A1C: CPT

## 2020-05-26 PROCEDURE — 80048 BASIC METABOLIC PNL TOTAL CA: CPT

## 2020-05-27 ENCOUNTER — PATIENT MESSAGE (OUTPATIENT)
Dept: INTERNAL MEDICINE | Facility: CLINIC | Age: 85
End: 2020-05-27

## 2020-05-27 ENCOUNTER — TELEPHONE (OUTPATIENT)
Dept: INTERNAL MEDICINE | Facility: CLINIC | Age: 85
End: 2020-05-27

## 2020-05-27 DIAGNOSIS — E78.2 MIXED HYPERLIPIDEMIA: Primary | ICD-10-CM

## 2020-05-27 DIAGNOSIS — E03.9 ACQUIRED HYPOTHYROIDISM: ICD-10-CM

## 2020-05-27 DIAGNOSIS — E11.8 CONTROLLED TYPE 2 DIABETES MELLITUS WITH COMPLICATION, WITHOUT LONG-TERM CURRENT USE OF INSULIN: ICD-10-CM

## 2020-05-27 DIAGNOSIS — N18.30 CKD (CHRONIC KIDNEY DISEASE) STAGE 3, GFR 30-59 ML/MIN: ICD-10-CM

## 2020-05-27 DIAGNOSIS — E55.9 VITAMIN D DEFICIENCY DISEASE: ICD-10-CM

## 2020-05-27 DIAGNOSIS — I51.7 LVH (LEFT VENTRICULAR HYPERTROPHY): ICD-10-CM

## 2020-05-27 LAB
ESTIMATED AVG GLUCOSE: 126 MG/DL (ref 68–131)
HBA1C MFR BLD HPLC: 6 % (ref 4–5.6)

## 2020-05-27 RX ORDER — ALLOPURINOL 100 MG/1
TABLET ORAL
Qty: 90 TABLET | Refills: 0 | Status: SHIPPED | OUTPATIENT
Start: 2020-05-27 | End: 2020-08-26

## 2020-06-09 ENCOUNTER — TELEPHONE (OUTPATIENT)
Dept: PAIN MEDICINE | Facility: CLINIC | Age: 85
End: 2020-06-09

## 2020-06-09 DIAGNOSIS — Z11.9 SCREENING EXAMINATION FOR INFECTIOUS DISEASE: Primary | ICD-10-CM

## 2020-06-09 DIAGNOSIS — Z13.9 SCREENING PROCEDURE: Primary | ICD-10-CM

## 2020-06-09 RX ORDER — TRAMADOL HYDROCHLORIDE 50 MG/1
50 TABLET ORAL EVERY 8 HOURS PRN
Qty: 90 TABLET | Refills: 2 | Status: SHIPPED | OUTPATIENT
Start: 2020-06-09 | End: 2020-09-29

## 2020-06-09 RX ORDER — ZOLPIDEM TARTRATE 5 MG/1
TABLET ORAL
Qty: 30 TABLET | Refills: 0 | Status: SHIPPED | OUTPATIENT
Start: 2020-06-09 | End: 2020-07-07

## 2020-06-09 RX ORDER — TRAMADOL HYDROCHLORIDE 50 MG/1
TABLET ORAL
Qty: 90 TABLET | Refills: 2 | Status: SHIPPED | OUTPATIENT
Start: 2020-06-09 | End: 2020-06-09 | Stop reason: SDUPTHER

## 2020-06-09 NOTE — TELEPHONE ENCOUNTER
Patient is scheduled for a lumbar steroid injection with Dr. Ware and she will need to stop the aspirin 7 days before. Please advise if this is okay. Thanks.

## 2020-06-09 NOTE — TELEPHONE ENCOUNTER
Spoke with patient and the lumbar ANUEL has been rescheduled to 6/19. Pre-op instructions reviewed.

## 2020-06-09 NOTE — TELEPHONE ENCOUNTER
----- Message from Shruti Estrella sent at 6/9/2020  2:12 PM CDT -----  Contact: Wilber Mills  Type: Patient Call Back    Who called: Wilber Mills    What is the request in detail: traMADoL (ULTRAM) 50 mg tablet please add diagnosis code and greater than seven day supply medically necessary on the script    Can the clinic reply by [facility]?    Would the patient rather a call back or a response via [facility]?  Call     Best call back number: [phone]

## 2020-06-15 DIAGNOSIS — Z00.00 ANNUAL PHYSICAL EXAM: Primary | ICD-10-CM

## 2020-06-16 ENCOUNTER — OFFICE VISIT (OUTPATIENT)
Dept: CARDIOLOGY | Facility: CLINIC | Age: 85
End: 2020-06-16
Payer: MEDICARE

## 2020-06-16 VITALS
HEIGHT: 61 IN | DIASTOLIC BLOOD PRESSURE: 64 MMHG | OXYGEN SATURATION: 94 % | SYSTOLIC BLOOD PRESSURE: 122 MMHG | BODY MASS INDEX: 28.59 KG/M2 | WEIGHT: 151.44 LBS | HEART RATE: 82 BPM

## 2020-06-16 DIAGNOSIS — E78.2 MIXED HYPERLIPIDEMIA: ICD-10-CM

## 2020-06-16 DIAGNOSIS — Z01.810 PREOP CARDIOVASCULAR EXAM: ICD-10-CM

## 2020-06-16 DIAGNOSIS — J44.89 COPD (CHRONIC OBSTRUCTIVE PULMONARY DISEASE) WITH CHRONIC BRONCHITIS: ICD-10-CM

## 2020-06-16 DIAGNOSIS — E65 ABDOMINAL OBESITY: ICD-10-CM

## 2020-06-16 DIAGNOSIS — I35.0 AORTIC VALVE STENOSIS, MODERATE: Primary | ICD-10-CM

## 2020-06-16 DIAGNOSIS — G89.4 CHRONIC PAIN SYNDROME: ICD-10-CM

## 2020-06-16 DIAGNOSIS — I89.0 LYMPH EDEMA: ICD-10-CM

## 2020-06-16 DIAGNOSIS — Z00.00 ANNUAL PHYSICAL EXAM: ICD-10-CM

## 2020-06-16 DIAGNOSIS — E11.8 CONTROLLED TYPE 2 DIABETES MELLITUS WITH COMPLICATION, WITHOUT LONG-TERM CURRENT USE OF INSULIN: ICD-10-CM

## 2020-06-16 DIAGNOSIS — N18.30 CKD (CHRONIC KIDNEY DISEASE) STAGE 3, GFR 30-59 ML/MIN: ICD-10-CM

## 2020-06-16 DIAGNOSIS — I51.7 LVH (LEFT VENTRICULAR HYPERTROPHY): ICD-10-CM

## 2020-06-16 PROCEDURE — 99999 PR PBB SHADOW E&M-EST. PATIENT-LVL IV: ICD-10-PCS | Mod: PBBFAC,,, | Performed by: INTERNAL MEDICINE

## 2020-06-16 PROCEDURE — 1125F PR PAIN SEVERITY QUANTIFIED, PAIN PRESENT: ICD-10-PCS | Mod: S$GLB,,, | Performed by: INTERNAL MEDICINE

## 2020-06-16 PROCEDURE — 99499 UNLISTED E&M SERVICE: CPT | Mod: S$GLB,,, | Performed by: INTERNAL MEDICINE

## 2020-06-16 PROCEDURE — 3074F PR MOST RECENT SYSTOLIC BLOOD PRESSURE < 130 MM HG: ICD-10-PCS | Mod: CPTII,S$GLB,, | Performed by: INTERNAL MEDICINE

## 2020-06-16 PROCEDURE — 1159F MED LIST DOCD IN RCRD: CPT | Mod: S$GLB,,, | Performed by: INTERNAL MEDICINE

## 2020-06-16 PROCEDURE — 93000 ELECTROCARDIOGRAM COMPLETE: CPT | Mod: S$GLB,,, | Performed by: INTERNAL MEDICINE

## 2020-06-16 PROCEDURE — 99215 PR OFFICE/OUTPT VISIT, EST, LEVL V, 40-54 MIN: ICD-10-PCS | Mod: 25,S$GLB,, | Performed by: INTERNAL MEDICINE

## 2020-06-16 PROCEDURE — 99215 OFFICE O/P EST HI 40 MIN: CPT | Mod: 25,S$GLB,, | Performed by: INTERNAL MEDICINE

## 2020-06-16 PROCEDURE — 1125F AMNT PAIN NOTED PAIN PRSNT: CPT | Mod: S$GLB,,, | Performed by: INTERNAL MEDICINE

## 2020-06-16 PROCEDURE — 3078F PR MOST RECENT DIASTOLIC BLOOD PRESSURE < 80 MM HG: ICD-10-PCS | Mod: CPTII,S$GLB,, | Performed by: INTERNAL MEDICINE

## 2020-06-16 PROCEDURE — 99999 PR PBB SHADOW E&M-EST. PATIENT-LVL IV: CPT | Mod: PBBFAC,,, | Performed by: INTERNAL MEDICINE

## 2020-06-16 PROCEDURE — 1159F PR MEDICATION LIST DOCUMENTED IN MEDICAL RECORD: ICD-10-PCS | Mod: S$GLB,,, | Performed by: INTERNAL MEDICINE

## 2020-06-16 PROCEDURE — 1101F PR PT FALLS ASSESS DOC 0-1 FALLS W/OUT INJ PAST YR: ICD-10-PCS | Mod: CPTII,S$GLB,, | Performed by: INTERNAL MEDICINE

## 2020-06-16 PROCEDURE — 1101F PT FALLS ASSESS-DOCD LE1/YR: CPT | Mod: CPTII,S$GLB,, | Performed by: INTERNAL MEDICINE

## 2020-06-16 PROCEDURE — 3074F SYST BP LT 130 MM HG: CPT | Mod: CPTII,S$GLB,, | Performed by: INTERNAL MEDICINE

## 2020-06-16 PROCEDURE — 93000 EKG 12-LEAD: ICD-10-PCS | Mod: S$GLB,,, | Performed by: INTERNAL MEDICINE

## 2020-06-16 PROCEDURE — 99499 RISK ADDL DX/OHS AUDIT: ICD-10-PCS | Mod: S$GLB,,, | Performed by: INTERNAL MEDICINE

## 2020-06-16 PROCEDURE — 3078F DIAST BP <80 MM HG: CPT | Mod: CPTII,S$GLB,, | Performed by: INTERNAL MEDICINE

## 2020-06-16 NOTE — PROGRESS NOTES
" Patient ID:  Olga Aguiar is a 85 y.o. female who presents for {Misc; evaluation/follow-up:59712::"follow-up"} of Follow-up      Health literacy: {DESC; LOW/MEDIUM/HIGH:36842} High  Activities: housework  Nicotine: quit   Alcohol: rare   Illicit drugs: none  Cardiac symptoms: none  Home BP: do not check  Medication compliance: yes  Diet: regular, diabetic, Mediterranean regular  Caffeine: 1 cpd  Labs:   Last Echo: 2016  Last stress test: 2017  Cardiovascular angiogram:   EC2018  Fundoscopic exam:     EPWORTH SLEEPINESS SCALE 2017   Sitting and reading 0   Watching TV 2   Sitting, inactive in a public place (e.g. a theatre or a meeting) 0   As a passenger in a car for an hour without a break 0   Lying down to rest in the afternoon when circumstances permit 0   Sitting and talking to someone 0   Sitting quietly after a lunch without alcohol 0   In a car, while stopped for a few minutes in traffic 0   Total score 2         HPI    ROS     Objective:    Physical Exam      Assessment:       1. Annual physical exam         Plan:         Annual physical exam  -     EKG 12-lead                  "

## 2020-06-16 NOTE — PROGRESS NOTES
Subjective:    Patient ID:  Olga Aguiar is a 85 y.o. female who presents for evaluation of Follow-up  For aortic stenosis, HTN, annual review  PCP: Dr. Barger  Podiatrist: Dr. Rivero  Pain: Dr. Ware, was to hold ASA for 7 days prior to epidural injection.  GI: Dr. Castro  Pulmonary: Dr. Heller  Eye: Dr. Snowden  Lives with , Pino, here with patient, non-smoker  Housewife    Health literacy: high  Vaccinations: up-to-date, need 2nd Sh[place]  Activities: do housework, ADL is fine, limited by CLBP and SOB, post radiation lung capacity 42%  Nicotine: quit 1973, 18 py  Alcohol: rare  Illicit drugs: none  Cardiac symptoms: none  Home BP: do not check  Medication compliance: yes  Diet: regular  Caffeine: 1 cpd  Labs: 2/2019, LDL 90.6, on Rx, CMP (Cr 1.2, , eGFR 42), normal CBC, TSH, A1C 7.5%  Lab Results   Component Value Date    TSH 1.144 02/11/2020    TSH 1.144 02/11/2020        Lab Results   Component Value Date    HGBA1C 6.0 (H) 05/26/2020       Lab Results   Component Value Date    WBC 5.09 02/11/2020    HGB 15.4 02/11/2020    HCT 48.2 02/11/2020    MCV 93 02/11/2020     02/11/2020       CMP  Sodium   Date Value Ref Range Status   05/26/2020 140 136 - 145 mmol/L Final     Potassium   Date Value Ref Range Status   05/26/2020 4.1 3.5 - 5.1 mmol/L Final     Chloride   Date Value Ref Range Status   05/26/2020 98 95 - 110 mmol/L Final     CO2   Date Value Ref Range Status   05/26/2020 33 (H) 23 - 29 mmol/L Final     Glucose   Date Value Ref Range Status   05/26/2020 125 (H) 70 - 110 mg/dL Final     BUN, Bld   Date Value Ref Range Status   05/26/2020 18 8 - 23 mg/dL Final     Creatinine   Date Value Ref Range Status   05/26/2020 1.1 0.5 - 1.4 mg/dL Final   06/07/2013 1.3 0.5 - 1.4 mg/dL Final     Calcium   Date Value Ref Range Status   05/26/2020 9.9 8.7 - 10.5 mg/dL Final   06/07/2013 10.0 8.7 - 10.5 mg/dL Final     Total Protein   Date Value Ref Range Status   02/11/2020 7.1 6.0 - 8.4 g/dL Final      Albumin   Date Value Ref Range Status   02/11/2020 3.4 (L) 3.5 - 5.2 g/dL Final     Total Bilirubin   Date Value Ref Range Status   02/11/2020 0.6 0.1 - 1.0 mg/dL Final     Comment:     For infants and newborns, interpretation of results should be based  on gestational age, weight and in agreement with clinical  observations.  Premature Infant recommended reference ranges:  Up to 24 hours.............<8.0 mg/dL  Up to 48 hours............<12.0 mg/dL  3-5 days..................<15.0 mg/dL  6-29 days.................<15.0 mg/dL       Alkaline Phosphatase   Date Value Ref Range Status   02/11/2020 85 55 - 135 U/L Final   02/07/2012 76 23 - 119 UNIT/L Final     AST   Date Value Ref Range Status   02/11/2020 27 10 - 40 U/L Final   02/07/2012 26 10 - 30 UNIT/L Final     ALT   Date Value Ref Range Status   02/11/2020 23 10 - 44 U/L Final     Anion Gap   Date Value Ref Range Status   05/26/2020 9 8 - 16 mmol/L Final   06/07/2013 13 5 - 15 meq/L Final     eGFR if    Date Value Ref Range Status   05/26/2020 52.9 (A) >60 mL/min/1.73 m^2 Final     eGFR if non    Date Value Ref Range Status   05/26/2020 45.9 (A) >60 mL/min/1.73 m^2 Final     Comment:     Calculation used to obtain the estimated glomerular filtration  rate (eGFR) is the CKD-EPI equation.        @labrcntip(troponini)@    BNP   Date Value Ref Range Status   04/15/2016 21 0 - 99 pg/mL Final     Comment:     Values of less than 100 pg/ml are consistent with non-CHF populations.   }   Lab Results   Component Value Date    CHOL 179 02/11/2020    CHOL 173 07/16/2019    CHOL 173 04/18/2018     Lab Results   Component Value Date    HDL 50 02/11/2020    HDL 47 07/16/2019    HDL 53 04/18/2018     Lab Results   Component Value Date    LDLCALC 91.4 02/11/2020    LDLCALC 86.6 07/16/2019    LDLCALC 90.6 04/18/2018     Lab Results   Component Value Date    TRIG 188 (H) 02/11/2020    TRIG 197 (H) 07/16/2019    TRIG 147 04/18/2018     Lab  Results   Component Value Date    CHOLHDL 27.9 02/11/2020    CHOLHDL 27.2 07/16/2019    CHOLHDL 30.6 04/18/2018      Last Echo: DSE 3/2017  Last stress test: 3/2017  Cardiovascular angiogram: none  ECG: NSR, 73, low voltage, PRWP QTc 480 msec  Fundoscopic exam: within the past year, negative for HTN changes    In 8/2014:  White female with recurrent bouts of sudden onset cold sweat, with nausea, no vomiting, and drop in BP to as low as 80 SBP with near-syncope. Denies any feeling of pain prior to onset.  noted that several times occurred about 40 minutes after meal, always with some nausea or feeling of weakness. Started about 6 months ago, at one time it was several times daily, up to 4 times. Can last up to 5 minutes. No fall but have to sit down. No change in medications, or diet or exercise. Had acute gouty attack also over the past 6 months. Now the third bout. No able to tolerate colchicine with severe upset stomach. Eventually had to be placed on steroid pack and have completed one round of Rx with improvement in the stomach, back pains, and these cold spells. ECG on 7/24/2014 was normal, rate of 71. No prior history of cardiac problem except for TIA in 2012. No recurrence. Have several cardiac risk factors - see Problems List.    Since visit of 8/18/2014, continue with night and day sweats, weakness with standing, easy fatigue, and FISCHER especially with stair climbing. Seems progressive over the past 2 years. Try to do housework, avoid vacuuming and feels very limited due to chronic back pains. Sitting a lot of time. Have not been on any regular exercise routine.  Holter, 8/2014: PREDOMINANT RHYTHM  1. Sinus rhythm with heart rates varying between 44 and 145 bpm with an average of 85 bpm.     2. Some sinus arrhythmia noted.    VENTRICULAR ARRHYTHMIAS  1. There were very rare mostly monomorphic PVCs recorded totalling 17 and averaging less than 1 per hour.     2. There were no episodes of ventricular  tachycardia.    SUPRA VENTRICULAR ARRHYTHMIAS  1. There were very rare PACs totalling 57 and averaging 2 per hour.     2. There was a single (11 beat) run of EAT. The high rate was 140 bpm.     SINUS NODE FUNCTION  1. There was no evidence of high grade SA kylie block.     AV CONDUCTION  1. There was no evidence of high grade AV block.     DIARY  1. The diary was returned, but not completed    MISCELLANEOUS  1. This was a tape of adequate length (24 hrs).     ECHO CONCLUSIONS     1 - Concentric hypertrophy. Septal wall thickness is upper limit of normal in size, and posterior wall thickness is increased, with the septum measuring 1.0 cm and the posterior wall measuring 1.2 cm across.    2 - Normal left ventricular systolic function (EF 60-65%).     3 - Moderate aortic stenosis, MARI = 1.13 cm2.     4 - Normal left ventricular diastolic function.     5 - Normal right ventricular systolic function .     6 - The aortic valve now appears stenotic, change from Echo on 2/8/2012.    Since visit of 12/8/2014, no new problem, less of dizziness. More active to gym with , twice weekly for 1:15. No problem, denies any CP, near-syncope, but does have FISCHER, due to chronic bronchitis. No recent change. ECG shows NSR, rate 71, low voltage. Recent labs reviewed from 9/2014 - CMP showed , eGFR 38.8, A1C 7.1%, Lipid with LDL 87, non-, normal CBC. Reviewed TIA in 1/2012 with transient dysphasia, left sided weakness, leg first, then fingers, numbness of the left side of tongue and left facial droop. All resolved over an hour. Carotid US at the time - No significant carotid artery stenosis bilaterally    In 2/2017, still with FISCHER, fairly limiting, reviewed with Dr. Heller, being treated with intermittent steroid for 3-12 days per month for the past 2 months with benefit. No CP. Compliant with medications. ECG in 4/2016 suggest prior anterior MI.     In 5/2018, annual visit also anticipating epidural steroid injection.  Otherwise no heart worries, no cardiac symptoms of CP nor SOB. Limited due to CLBP, able to do own housework and plays cards. Discuss symptoms of severe AS. LDL in 4/2018 90.6, baseline 166.  DSE 3/2017 - EKG Conclusions:    1. The EKG portion of this study is negative for ischemia at a peak heart rate of 129 bpm (96% of predicted).   2. Blood pressure remained stable throughout the protocol  (Presenting BP: 112/88 Peak BP: 185/83).   3. The following arrhythmias were present: PVCs.   4. The patient reported significant dyspnea during the protocol which resolved in recovery.     Echo - CONCLUSIONS     1 - Concentric remodeling.     2 - Normal left ventricular systolic function (EF 60-65%).     3 - Normal left ventricular diastolic function.     4 - Normal right ventricular systolic function .     5 - The estimated PA systolic pressure is greater than 12 mmHg.     6 - Small pericardial effusion.     No evidence of stress induced myocardial ischemia.     Venous US 10/2017 per Dr. Craven - RIGHT:  No evidence of Right lower extremity DVT.      LEFT:  No evidence of Left lower extremity DVT.      US of abdominal aorta - There is no evidence of an Abdominal Aortic Aneurysm.    In 12/2018, here for pre epidural review, started to hold ASA on 12/13, procedure set for 12/20. Denies any heart worries nor symptoms. Limited in activities due to chronic back pain. Epidural effective up to 3 months. ECG in NSR, rate 85, low voltage and QTc 480 msec. Compliant with medications.     Iin 6/2019, return for 6 months follow up, no heart worries, also no symptoms.     HPI comments: in 6/2020, here for annual review. Need pre-op review for epidural injections. Heart wise no problem, prefer no more stress test. Denies any cardiac symptoms.     Review of Systems   Constitution: Positive for malaise/fatigue and some diaphoresis. Negative for fever, weakness, night sweats and have weight stability.   HENT: Negative for headaches,  "nosebleeds and tinnitus.    Eyes: Negative for visual disturbance.   Cardiovascular: Positive for dyspnea on exertion and no near-syncope. Negative for chest pain, claudication, cyanosis, irregular heartbeat, leg swelling, orthopnea, palpitations and paroxysmal nocturnal dyspnia.   Respiratory: Positive for shortness of breath and some wheezing. Negative for cough, sleep disturbances due to breathing, snoring. Alpine score 3 down to 2 on chronic Ambien, use 5 mg for the past 10+ years   Endocrine: Positive for cold intolerance and heat intolerance. Negative for polydipsia and polyuria.   Hematologic/Lymphatic: Does not bruise/bleed easily.   Skin: Positive for dry skin. Negative for nail changes, color change, flushing, poor wound healing and suspicious lesions.   Musculoskeletal: Positive for arthritis, back pain, gout, joint pain, joint swelling and stiffness. Negative for falls, muscle cramps, muscle weakness and myalgias.   Gastrointestinal: Positive for bloating, constipation, nausea and vomiting during meals. Negative for hematemesis, hematochezia and melena.   Neurological: Positive for loss of balance and memory loss. Negative for disturbances in coordination, excessive daytime sleepiness, dizziness, focal weakness, light-headedness, numbness and vertigo.   Psychiatric/Behavioral: Negative for depression and substance abuse. The patient has insomnia. The patient is not nervous/anxious.         Objective:    Physical Exam   Constitutional: She is oriented to person, place, and time. She appears well-developed and well-nourished.   HENT:   Head: Normocephalic.   Eyes: Conjunctivae and EOM are normal. Pupils are equal, round, and reactive to light.   Neck: Normal range of motion. Neck supple. No JVD present. No thyromegaly present. Circumference 16.75", deep carotid pulses  Cardiovascular: Normal rate, regular rhythm and intact distal pulses.  Exam reveals distant heart sounds. Exam reveals no gallop and no " "friction rub.    Soft 1/6 murmur with preserved S2 heard.  Pulses:       Posterior tibial pulses are 1+ on the right side, and 1+ on the left side.   Lymphedema of the right arm   Pulmonary/Chest: Effort normal and breath sounds normal. She has no rales. She exhibits no tenderness.   Abdominal: Soft. Bowel sounds are normal. There is no tenderness.   Waist 39.5" down to 38.5", hip 42.5"   Musculoskeletal: Normal range of motion. She exhibits no edema.   Lymphadenopathy:     She has no cervical adenopathy.   Neurological: She is alert and oriented to person, place, and time.   Skin: Skin is warm and dry. No rash noted.         Assessment:       1. Aortic valve stenosis, mild- moderate, MARI 1.44 1/16    2. Annual physical exam    3. Abdominal obesity    4. CKD (chronic kidney disease) stage 3, GFR 30-59 ml/min    5. COPD (chronic obstructive pulmonary disease) with chronic bronchitis, 2013    6. Chronic pain syndrome    7. Controlled type 2 diabetes mellitus with complication, without long-term current use of insulin    8. LVH (left ventricular hypertrophy)    9. Lymph edema, right arm    10. Mixed hyperlipidemia, baseline     11. Preop cardiovascular exam         Plan:       Olga was seen today for consult.    Diagnoses and associated orders for this visit:    Aortic valve stenosis, mild- moderate, MARI 1.44 1/16    Annual physical exam  -     EKG 12-lead    Abdominal obesity    CKD (chronic kidney disease) stage 3, GFR 30-59 ml/min    COPD (chronic obstructive pulmonary disease) with chronic bronchitis, 2013    Chronic pain syndrome    Controlled type 2 diabetes mellitus with complication, without long-term current use of insulin    LVH (left ventricular hypertrophy)    Lymph edema, right arm    Mixed hyperlipidemia, baseline     Preop cardiovascular exam    - All medical issues reviewed, continue current Rx.  - Doing well from CV standpoint.  - Clear for Surgery and anesthesia with acceptable risk " from the cardiac standpoint. Will be at increase risk for complications secondary to patient's co-morbidities.  - OK to hold ASA for 7-days  - Need good exercise program, 4 key elements: 1. Aerobic (walking, swimming, dancing, jogging, biking, etc, 2. Muscle strengthening / resistance exercise, need to do 2-3 times weekly, 3. Stretching daily, good stretch takes a whole  total minute. 4. Balance exercise daily.  - Highly recommend 30 minutes of exercise daily, can have Sunday off, with 2-3 sessions of muscle strengthening weekly. A  would be very helpful.  - Check home blood pressure, 2 days weekly, do 2 readings within 5 minutes in AM and PM, keep log for review.  - Instruction for Mediterranean diet and heart healthy exercise given.  - Recommend at least annual cardiovascular evaluation in view of her significant risk factors. Wants follow up in [place], recommend Dr. Christiano Herrera  - Have to do some abdominal exercise to rid belly fat  - Maintain good hydration, especially while on diuretics       Patient Active Problem List   Diagnosis    Mixed hyperlipidemia, baseline     Chronic pain syndrome    Lumbosacral spondylosis without myelopathy    Adrenal adenoma: 2.8 X 2.1CM 2010; stable 2016, also stable 2018    Abdominal obesity    Lymph edema, right arm    Gallbladder polyp    CKD (chronic kidney disease) stage 3, GFR 30-59 ml/min    Aortic valve stenosis, mild- moderate, MARI 1.44 1/16    LVH (left ventricular hypertrophy)    Pancreatic cyst: stable on MRI 11/16 also 2018, due again 2020    Cervical spinal stenosis    Controlled type 2 diabetes mellitus with complication, without long-term current use of insulin    Acquired hypothyroidism    Transient cerebral ischemia: 2012    Primary insomnia    Gastroesophageal reflux disease without esophagitis    Multiple lung nodules: stable CT 10/2016    Former smoker: 1 pack daily for < 20 years, quit 1973    COPD (chronic  obstructive pulmonary disease) with chronic bronchitis, 2013    Facet hypertrophy of lumbar region    History of breast cancer    Intermittent asthma    Radiation fibrosis of lung    Bronchiectasis without complication    Ectatic abdominal aorta: see CT scan 10/16; no aneurysm 10/17    Aortic atherosclerosis: see CT scan 2016    Renal cysts, acquired, bilateral    Lumbar radiculopathy    Abnormal ECG    Encounter for monitoring proton pump inhibitor therapy    Diverticulosis of large intestine without hemorrhage: see CT 10/14    Idiopathic gout    DDD (degenerative disc disease), lumbar    Liver cysts: see MRI 2018    Odynophagia    Ambien use disorder, moderate, dependence, start 2010    Lumbar spondylosis    Unspecified inflammatory spondylopathy, 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 The above assessment and plan have been discussed at length. Labs and procedure over the last 6 months reviewed. Problem List updated. Asked to bring in all active medications / pills bottles with next visit.

## 2020-06-22 ENCOUNTER — PATIENT MESSAGE (OUTPATIENT)
Dept: PAIN MEDICINE | Facility: CLINIC | Age: 85
End: 2020-06-22

## 2020-06-23 ENCOUNTER — TELEPHONE (OUTPATIENT)
Dept: PAIN MEDICINE | Facility: CLINIC | Age: 85
End: 2020-06-23

## 2020-06-23 DIAGNOSIS — Z11.9 SCREENING EXAMINATION FOR INFECTIOUS DISEASE: Primary | ICD-10-CM

## 2020-06-26 DIAGNOSIS — J47.9 BRONCHIECTASIS WITHOUT COMPLICATION: ICD-10-CM

## 2020-06-26 DIAGNOSIS — J45.20 INTERMITTENT ASTHMA, UNCOMPLICATED: ICD-10-CM

## 2020-06-26 DIAGNOSIS — J44.89 COPD (CHRONIC OBSTRUCTIVE PULMONARY DISEASE) WITH CHRONIC BRONCHITIS: ICD-10-CM

## 2020-06-26 RX ORDER — FLUTICASONE FUROATE AND VILANTEROL TRIFENATATE 200; 25 UG/1; UG/1
1 POWDER RESPIRATORY (INHALATION) DAILY
Qty: 3 EACH | Refills: 3 | Status: SHIPPED | OUTPATIENT
Start: 2020-06-26 | End: 2021-07-23 | Stop reason: SDUPTHER

## 2020-06-26 RX ORDER — LEVALBUTEROL TARTRATE 45 UG/1
1-2 AEROSOL, METERED ORAL EVERY 4 HOURS PRN
Qty: 3 INHALER | Refills: 3 | Status: SHIPPED | OUTPATIENT
Start: 2020-06-26 | End: 2023-04-03

## 2020-06-29 ENCOUNTER — LAB VISIT (OUTPATIENT)
Dept: FAMILY MEDICINE | Facility: CLINIC | Age: 85
End: 2020-06-29
Payer: MEDICARE

## 2020-06-29 DIAGNOSIS — Z11.9 SCREENING EXAMINATION FOR INFECTIOUS DISEASE: ICD-10-CM

## 2020-06-29 PROCEDURE — U0003 INFECTIOUS AGENT DETECTION BY NUCLEIC ACID (DNA OR RNA); SEVERE ACUTE RESPIRATORY SYNDROME CORONAVIRUS 2 (SARS-COV-2) (CORONAVIRUS DISEASE [COVID-19]), AMPLIFIED PROBE TECHNIQUE, MAKING USE OF HIGH THROUGHPUT TECHNOLOGIES AS DESCRIBED BY CMS-2020-01-R: HCPCS

## 2020-06-30 LAB — SARS-COV-2 RNA RESP QL NAA+PROBE: NOT DETECTED

## 2020-07-01 ENCOUNTER — HOSPITAL ENCOUNTER (OUTPATIENT)
Dept: RADIOLOGY | Facility: HOSPITAL | Age: 85
Discharge: HOME | End: 2020-07-01
Attending: ANESTHESIOLOGY | Admitting: ANESTHESIOLOGY
Payer: MEDICARE

## 2020-07-01 ENCOUNTER — HOSPITAL ENCOUNTER (OUTPATIENT)
Facility: HOSPITAL | Age: 85
Discharge: HOME | End: 2020-07-01
Attending: ANESTHESIOLOGY | Admitting: ANESTHESIOLOGY
Payer: MEDICARE

## 2020-07-01 DIAGNOSIS — G89.4 CHRONIC PAIN SYNDROME: ICD-10-CM

## 2020-07-01 DIAGNOSIS — M54.16 LUMBAR RADICULOPATHY: ICD-10-CM

## 2020-07-01 LAB — GLUCOSE SERPL-MCNC: 91 MG/DL (ref 70–110)

## 2020-07-01 PROCEDURE — 62323 NJX INTERLAMINAR LMBR/SAC: CPT | Mod: ,,, | Performed by: ANESTHESIOLOGY

## 2020-07-01 PROCEDURE — 76000 FLUOROSCOPY <1 HR PHYS/QHP: CPT | Mod: TC,PO

## 2020-07-01 PROCEDURE — 62323 PR INJ LUMBAR/SACRAL, W/IMAGING GUIDANCE: ICD-10-PCS | Mod: ,,, | Performed by: ANESTHESIOLOGY

## 2020-07-01 PROCEDURE — 82962 GLUCOSE BLOOD TEST: CPT | Mod: PO | Performed by: ANESTHESIOLOGY

## 2020-07-01 PROCEDURE — A4216 STERILE WATER/SALINE, 10 ML: HCPCS | Mod: PO | Performed by: ANESTHESIOLOGY

## 2020-07-01 PROCEDURE — 25500020 PHARM REV CODE 255: Mod: PO | Performed by: ANESTHESIOLOGY

## 2020-07-01 PROCEDURE — 62323 NJX INTERLAMINAR LMBR/SAC: CPT | Mod: PO | Performed by: ANESTHESIOLOGY

## 2020-07-01 PROCEDURE — 63600175 PHARM REV CODE 636 W HCPCS: Mod: PO | Performed by: ANESTHESIOLOGY

## 2020-07-01 PROCEDURE — 25000003 PHARM REV CODE 250: Mod: PO | Performed by: ANESTHESIOLOGY

## 2020-07-01 RX ORDER — SODIUM CHLORIDE 9 MG/ML
INJECTION, SOLUTION INTRAMUSCULAR; INTRAVENOUS; SUBCUTANEOUS
Status: DISCONTINUED | OUTPATIENT
Start: 2020-07-01 | End: 2020-07-01 | Stop reason: HOSPADM

## 2020-07-01 RX ORDER — ALPRAZOLAM 0.5 MG/1
0.5 TABLET, ORALLY DISINTEGRATING ORAL ONCE AS NEEDED
Status: COMPLETED | OUTPATIENT
Start: 2020-07-01 | End: 2020-07-01

## 2020-07-01 RX ORDER — LIDOCAINE HYDROCHLORIDE 10 MG/ML
INJECTION, SOLUTION EPIDURAL; INFILTRATION; INTRACAUDAL; PERINEURAL
Status: DISCONTINUED | OUTPATIENT
Start: 2020-07-01 | End: 2020-07-01 | Stop reason: HOSPADM

## 2020-07-01 RX ORDER — METHYLPREDNISOLONE ACETATE 80 MG/ML
INJECTION, SUSPENSION INTRA-ARTICULAR; INTRALESIONAL; INTRAMUSCULAR; SOFT TISSUE
Status: DISCONTINUED | OUTPATIENT
Start: 2020-07-01 | End: 2020-07-01 | Stop reason: HOSPADM

## 2020-07-01 RX ORDER — ALPRAZOLAM 0.5 MG/1
1 TABLET, ORALLY DISINTEGRATING ORAL ONCE AS NEEDED
Status: DISCONTINUED | OUTPATIENT
Start: 2020-07-01 | End: 2020-07-01

## 2020-07-01 RX ADMIN — ALPRAZOLAM 0.5 MG: 0.5 TABLET, ORALLY DISINTEGRATING ORAL at 12:07

## 2020-07-01 NOTE — H&P
CC: Back pain    HPI: The patient is a 86yo woman with a history of lumbar radiculopathy here for L5/S1 ANUEL. There are no major changes in history and physical from 1/8/20.    Past Medical History:   Diagnosis Date    Adrenal adenoma: 2.8 X 2.1CM 2010 5/16/2014    Anticoagulant long-term use      mg    Aortic atherosclerosis: see CT scan 2016 10/28/2016    Aortic stenosis 8/19/2014    Asthma     Breast cancer     Cancer     breast, both sides, right arm restriction    Cataracts, both eyes     Chronic back pain     Chronic bronchitis     CKD (chronic kidney disease) stage 3, GFR 30-59 ml/min 8/19/2014    no dialysis    COPD (chronic obstructive pulmonary disease)     occasional inhaler use, no oxygen    DDD (degenerative disc disease), lumbar 7/30/2013    Diabetes mellitus     diet controlled    Diverticulosis     Ectatic abdominal aorta: see CT scan 10/16 10/28/2016    Gallbladder polyp 8/19/2014    GERD (gastroesophageal reflux disease)     Gout     Herpes simplex     Fever Blisters and Styes in right eye    High blood cholesterol     Hypertension     holding medication when B/P low    Hypertension associated with diabetes 2/7/2012    Insomnia     Lumbar spinal stenosis     Lymphedema of arm     Right    Multiple lung nodules 9/21/2015    Renal cyst, right: stable per CT 2016 10/28/2016    Stroke 2/2012    TIA, no residual effects    Stye 12/5/2013    Thyroid disease     hypothyroidism    Type 2 diabetes mellitus without complication, without long-term current use of insulin 7/20/2015    Unspecified hypothyroidism 7/20/2015       Past Surgical History:   Procedure Laterality Date    AXILLARY NODE DISSECTION      both sides    BREAST BIOPSY      BREAST LUMPECTOMY Bilateral     lymph nodes on right    CATARACT EXTRACTION      bilateral PHACO with IOL    COLONOSCOPY  01/12/2010    in legacy    Epidural steroid injection      Pain mangement    EPIDURAL STEROID INJECTION  INTO LUMBAR SPINE N/A 6/4/2018    Procedure: Injection-steroid-epidural-lumbar;  Surgeon: Rohan Ware MD;  Location: [facility] OR;  Service: Pain Management;  Laterality: N/A;  L5/S1 interlaminar    EPIDURAL STEROID INJECTION INTO LUMBAR SPINE N/A 9/4/2018    Procedure: Injection-steroid-epidural-lumbar L5/S1;  Surgeon: Rohan Ware MD;  Location: [facility] OR;  Service: Pain Management;  Laterality: N/A;    EPIDURAL STEROID INJECTION INTO LUMBAR SPINE N/A 12/20/2018    Procedure: Injection-steroid-epidural-lumbar;  Surgeon: Rohan Ware MD;  Location: [facility] OR;  Service: Pain Management;  Laterality: N/A;  L5/S1 interlaminar    EPIDURAL STEROID INJECTION INTO LUMBAR SPINE N/A 5/7/2019    Procedure: Injection-steroid-epidural-lumbar L5/S1;  Surgeon: Rohan Ware MD;  Location: [facility] OR;  Service: Pain Management;  Laterality: N/A;    EPIDURAL STEROID INJECTION INTO LUMBAR SPINE N/A 9/16/2019    Procedure: Injection-steroid-epidural-lumbar;  Surgeon: Rohan Ware MD;  Location: [facility] OR;  Service: Pain Management;  Laterality: N/A;  L5/S1 interlaminar     EPIDURAL STEROID INJECTION INTO LUMBAR SPINE Right 12/16/2019    Procedure: Injection-steroid-epidural-lumbar L5, S1 Right;  Surgeon: Rohan Ware MD;  Location: [facility] OR;  Service: Pain Management;  Laterality: Right;    ESOPHAGOGASTRODUODENOSCOPY N/A 11/12/2018    Procedure: EGD (ESOPHAGOGASTRODUODENOSCOPY);  Surgeon: Braydon Becerra MD;  Location: [facility] ENDO;  Service: Endoscopy;  Laterality: N/A;    HYSTERECTOMY      HYSTERECTOMY      Nerve Block Injection      Pain management, Times 2    RADIOFREQUENCY ABLATION      Nerve, Pain management    RADIOFREQUENCY ABLATION OF LUMBAR MEDIAL BRANCH NERVE AT SINGLE LEVEL Bilateral 10/29/2019    Procedure: Radiofrequency Ablation, Nerve, Spinal, Lumbar, Medial Branch, L3, L4, L5;  Surgeon: Rohan Ware MD;  Location: [facility] OR;  Service: Pain Management;  Laterality:  Bilateral;    RECTAL SURGERY      Fissure repair    TONSILLECTOMY      UPPER GASTROINTESTINAL ENDOSCOPY  2014    Dr. Castro       Family History   Problem Relation Age of Onset    Cancer Mother         Breast    COPD Mother     Hearing loss Mother     Hypertension Mother     Breast cancer Mother 90    Diabetes Father     Heart disease Father     Heart attack Father     Cancer Daughter         Breast    Breast cancer Daughter 45    Cancer Sister         Breast    Breast cancer Sister 70    Amblyopia Neg Hx     Blindness Neg Hx     Cataracts Neg Hx     Glaucoma Neg Hx     Macular degeneration Neg Hx     Retinal detachment Neg Hx     Strabismus Neg Hx     Stroke Neg Hx     Thyroid disease Neg Hx     Colon cancer Neg Hx     Stomach cancer Neg Hx     Esophageal cancer Neg Hx        Social History     Socioeconomic History    Marital status:      Spouse name: Not on file    Number of children: Not on file    Years of education: Not on file    Highest education level: Not on file   Occupational History    Not on file   Social Needs    Financial resource strain: Not very hard    Food insecurity     Worry: Never true     Inability: Never true    Transportation needs     Medical: No     Non-medical: No   Tobacco Use    Smoking status: Former Smoker     Packs/day: 1.00     Years: 18.00     Pack years: 18.00     Start date: 1952     Quit date: 1970     Years since quittin.9    Smokeless tobacco: Never Used   Substance and Sexual Activity    Alcohol use: Yes     Frequency: Monthly or less     Drinks per session: 1 or 2     Binge frequency: Never     Comment: rare    Drug use: No    Sexual activity: Not Currently   Lifestyle    Physical activity     Days per week: 0 days     Minutes per session: 0 min    Stress: Not at all   Relationships    Social connections     Talks on phone: More than three times a week     Gets together: Once a week     Attends [employer]  "service: Not on file     Active member of club or organization: No     Attends meetings of clubs or organizations: Never     Relationship status:    Other Topics Concern    Not on file   Social History Narrative    Not on file       Current Facility-Administered Medications   Medication Dose Route Frequency Provider Last Rate Last Dose    alprazolam ODT dissolvable tablet 0.5 mg  0.5 mg Oral Once PRN Rohan Ware MD        iohexoL (OMNIPAQUE 300) injection    PRN Rohan Ware MD   3 mL at 07/01/20 1216    lidocaine (PF) 10 mg/ml (1%) injection    PRN Rohan Ware MD   10 mL at 07/01/20 1217    methylPREDNISolone acetate injection    PRN Rohan Ware MD   80 mg at 07/01/20 1218    sodium chloride 0.9% injection    PRN Rohan Ware MD   4 mL at 07/01/20 1219       Review of patient's allergies indicates:  No Known Allergies    Vitals:    06/30/20 1355 07/01/20 1228   BP:  (!) 165/71   Pulse:  66   Resp:  15   Temp:  97.7 °F (36.5 °C)   TempSrc:  Skin   SpO2:  96%   Weight: 68 kg (150 lb) 68 kg (150 lb)   Height: 5' 3" (1.6 m) 5' 3" (1.6 m)       REVIEW OF SYSTEMS:     GENERAL: No weight loss, malaise or fevers.  HEENT:  No recent changes in vision or hearing  NECK: Negative for lumps, no difficulty with swallowing.  RESPIRATORY: Negative for cough, wheezing or shortness of breath, patient denies any recent URI.  CARDIOVASCULAR: Negative for chest pain, leg swelling or palpitations.  GI: Negative for abdominal discomfort, blood in stools or black stools or change in bowel habits.  MUSCULOSKELETAL: See HPI.  SKIN: Negative for lesions, rash, and itching.  PSYCH: No suicidal or homicidal ideations, no current mood disturbances.  HEMATOLOGY/LYMPHOLOGY: Negative for prolonged bleeding, bruising easily or swollen nodes. Patient is not currently taking any anti-coagulants  ENDO: No history of diabetes or thyroid dysfunction  NEURO: No history of syncope, paralysis, seizures or " tremors.All other reviewed and negative other than HPI.    Physical exam:  Gen: A and O x3, pleasant, well-groomed  Skin: No rashes or obvious lesions  HEENT: PERRLA, no obvious deformities on ears or in canals. No thyroid masses, trachea midline, no palpable lymph nodes in neck, axilla.  CVS: Regular rate and rhythm, normal S1 and S2, no murmurs.  Resp: Clear to auscultation bilaterally.  Abdomen: Soft, NT/ND, normal bowel sounds present.  Musculoskeletal/Neuro: Moving all extremities    Assessment:  Lumbar radiculopathy  -     Case Request Operating Room: Injection-steroid-epidural-lumbar  -     Place in Outpatient; Standing  -     Diet NPO; Standing  -     Discontinue: alprazolam ODT dissolvable tablet 1 mg  -     Notify physician ; Standing  -     Notify physician ; Standing  -     Notify physician (specify); Standing  -     Verify informed consent; Standing  -     Vital signs; Standing  -     alprazolam ODT dissolvable tablet 0.5 mg    Other orders  -     Progressive Mobility Protocol (mobilize patient to their highest level of functioning at least twice daily); Standing  -     IP VTE HIGH RISK PATIENT; Standing  -     iohexoL (OMNIPAQUE 300) injection  -     lidocaine (PF) 10 mg/ml (1%) injection  -     methylPREDNISolone acetate injection  -     sodium chloride 0.9% injection

## 2020-07-01 NOTE — DISCHARGE INSTRUCTIONS
Home Care Instructions    Apply ice pack to injection site for 20 minute periods for the first 24 hours for soreness/discomfort at injection site  DO NOT USE HEAT FOR 24 HOURS  Keep site clean and dry for 24 hours. If Band-Aid present remove when desired.  Do not drive until tomorrow  Take care when walking after a lumbar injection    STEROIDS   Make take 10-14 days for full affects  Avoid strenuous exercises for 2 days    scribes today  Resume Aspirin, Plavix or Coumadin the day after the procedure unless otherwise intructed    SEE IMMEDIATE MEDICAL HELP FOR:  Severe increase in your usual pain or appearance of new pain  Prolonged or increasing weakness or numbness in the legs or arms  Drainage, redness, active bleeding, or increased swelling at the injection site  Temperature over 100.0 degrees F.  Headache that increases when your head is upright and decrease when you lie flat    CALL 911 OR GO DIRECTLY TO EMERGENCY DEPARTMENT FOR:  Shortness of breath, chest pain, or problems breathing

## 2020-07-01 NOTE — OP NOTE

## 2020-07-01 NOTE — DISCHARGE SUMMARY
[facility]  Discharge Note  Short Stay    Admit Date: 7/1/2020    Discharge Date: 7/1/2020    Attending Physician: Rohan Ware MD     Discharge Provider: Rohan Ware    Diagnoses:  Active Hospital Problems    Diagnosis  POA    *Lumbar radiculopathy [M54.16]  Yes      Resolved Hospital Problems   No resolved problems to display.       Discharged Condition: good    Final Diagnoses: Lumbar radiculopathy [M54.16]    Disposition: Home or Self Care    Hospital Course: no complications, uneventful    Outcome of Hospitalization, Treatment, Procedure, or Surgery:  Patient was admitted for outpatient procedure. The patient underwent procedure without complications and are discharged home    Follow up/Patient Instructions:  Follow up as scheduled in Pain Management clinic in 3-4 weeks/Patient has received instructions and follow up date and time    Medications:  Continue previous medications    Discharge Procedure Orders   Call MD for:  temperature >100.4     Call MD for:  severe uncontrolled pain     Call MD for:  redness, tenderness, or signs of infection (pain, swelling, redness, odor or green/yellow discharge around incision site)     Call MD for:  severe persistent headache     No dressing needed         Discharge Procedure Orders (must include Diet, Follow-up, Activity):   Discharge Procedure Orders (must include Diet, Follow-up, Activity)   Call MD for:  temperature >100.4     Call MD for:  severe uncontrolled pain     Call MD for:  redness, tenderness, or signs of infection (pain, swelling, redness, odor or green/yellow discharge around incision site)     Call MD for:  severe persistent headache     No dressing needed

## 2020-07-02 VITALS
BODY MASS INDEX: 26.58 KG/M2 | HEIGHT: 63 IN | WEIGHT: 150 LBS | DIASTOLIC BLOOD PRESSURE: 83 MMHG | OXYGEN SATURATION: 98 % | TEMPERATURE: 98 F | RESPIRATION RATE: 16 BRPM | SYSTOLIC BLOOD PRESSURE: 141 MMHG | HEART RATE: 64 BPM

## 2020-07-07 RX ORDER — ZOLPIDEM TARTRATE 5 MG/1
TABLET ORAL
Qty: 30 TABLET | Refills: 0 | Status: SHIPPED | OUTPATIENT
Start: 2020-07-07 | End: 2020-08-04

## 2020-07-20 ENCOUNTER — TELEPHONE (OUTPATIENT)
Dept: ENDOSCOPY | Facility: HOSPITAL | Age: 85
End: 2020-07-20

## 2020-07-20 DIAGNOSIS — K86.2 PANCREATIC CYST: Primary | ICD-10-CM

## 2020-07-20 NOTE — TELEPHONE ENCOUNTER
----- Message from Morris Rodriguez MD sent at 7/20/2020  8:56 AM CDT -----  EUS  R  ----- Message -----  From: Hafsa Muller MA  Sent: 7/19/2020   6:14 PM CDT  To: Morris Rodriguez MD    Patient had an MRI in 2018 with a recommended 2 year follow up. I doubt you are going to be able to sign the MRI order due to her insurance. Do you want a CT or EUS?

## 2020-07-21 ENCOUNTER — HOSPITAL ENCOUNTER (OUTPATIENT)
Dept: RADIOLOGY | Facility: HOSPITAL | Age: 85
Discharge: HOME | End: 2020-07-21
Attending: INTERNAL MEDICINE
Payer: MEDICARE

## 2020-07-21 DIAGNOSIS — Z85.3 HISTORY OF BREAST CANCER: ICD-10-CM

## 2020-07-21 DIAGNOSIS — Z12.31 ENCOUNTER FOR SCREENING MAMMOGRAM FOR MALIGNANT NEOPLASM OF BREAST: ICD-10-CM

## 2020-07-21 PROCEDURE — 77067 MAMMO DIGITAL SCREENING BILAT WITH TOMOSYNTHESIS_CAD: ICD-10-PCS | Mod: 26,,, | Performed by: RADIOLOGY

## 2020-07-21 PROCEDURE — 77063 MAMMO DIGITAL SCREENING BILAT WITH TOMOSYNTHESIS_CAD: ICD-10-PCS | Mod: 26,,, | Performed by: RADIOLOGY

## 2020-07-21 PROCEDURE — 77067 SCR MAMMO BI INCL CAD: CPT | Mod: TC,PN

## 2020-07-21 PROCEDURE — 77063 BREAST TOMOSYNTHESIS BI: CPT | Mod: 26,,, | Performed by: RADIOLOGY

## 2020-07-21 PROCEDURE — 77067 SCR MAMMO BI INCL CAD: CPT | Mod: 26,,, | Performed by: RADIOLOGY

## 2020-07-28 ENCOUNTER — PATIENT OUTREACH (OUTPATIENT)
Dept: ADMINISTRATIVE | Facility: OTHER | Age: 85
End: 2020-07-28

## 2020-07-28 NOTE — PROGRESS NOTES
Requested updates within Care Everywhere.  Patient's chart was reviewed for overdue [facility] topics.  Immunizations reconciled.

## 2020-07-29 ENCOUNTER — OFFICE VISIT (OUTPATIENT)
Dept: PAIN MEDICINE | Facility: CLINIC | Age: 85
End: 2020-07-29
Payer: MEDICARE

## 2020-07-29 VITALS
BODY MASS INDEX: 26.58 KG/M2 | SYSTOLIC BLOOD PRESSURE: 151 MMHG | HEIGHT: 63 IN | DIASTOLIC BLOOD PRESSURE: 78 MMHG | HEART RATE: 73 BPM | RESPIRATION RATE: 18 BRPM | WEIGHT: 150 LBS

## 2020-07-29 DIAGNOSIS — M51.36 DDD (DEGENERATIVE DISC DISEASE), LUMBAR: Primary | ICD-10-CM

## 2020-07-29 DIAGNOSIS — M54.16 LUMBAR RADICULITIS: ICD-10-CM

## 2020-07-29 DIAGNOSIS — G89.4 CHRONIC PAIN DISORDER: ICD-10-CM

## 2020-07-29 PROCEDURE — 1159F MED LIST DOCD IN RCRD: CPT | Mod: S$GLB,,, | Performed by: PHYSICIAN ASSISTANT

## 2020-07-29 PROCEDURE — 1101F PT FALLS ASSESS-DOCD LE1/YR: CPT | Mod: CPTII,S$GLB,, | Performed by: PHYSICIAN ASSISTANT

## 2020-07-29 PROCEDURE — 3077F SYST BP >= 140 MM HG: CPT | Mod: CPTII,S$GLB,, | Performed by: PHYSICIAN ASSISTANT

## 2020-07-29 PROCEDURE — 99999 PR PBB SHADOW E&M-EST. PATIENT-LVL V: ICD-10-PCS | Mod: PBBFAC,,, | Performed by: PHYSICIAN ASSISTANT

## 2020-07-29 PROCEDURE — 3078F DIAST BP <80 MM HG: CPT | Mod: CPTII,S$GLB,, | Performed by: PHYSICIAN ASSISTANT

## 2020-07-29 PROCEDURE — 99214 OFFICE O/P EST MOD 30 MIN: CPT | Mod: S$GLB,,, | Performed by: PHYSICIAN ASSISTANT

## 2020-07-29 PROCEDURE — 3077F PR MOST RECENT SYSTOLIC BLOOD PRESSURE >= 140 MM HG: ICD-10-PCS | Mod: CPTII,S$GLB,, | Performed by: PHYSICIAN ASSISTANT

## 2020-07-29 PROCEDURE — 1101F PR PT FALLS ASSESS DOC 0-1 FALLS W/OUT INJ PAST YR: ICD-10-PCS | Mod: CPTII,S$GLB,, | Performed by: PHYSICIAN ASSISTANT

## 2020-07-29 PROCEDURE — 99214 PR OFFICE/OUTPT VISIT, EST, LEVL IV, 30-39 MIN: ICD-10-PCS | Mod: S$GLB,,, | Performed by: PHYSICIAN ASSISTANT

## 2020-07-29 PROCEDURE — 99999 PR PBB SHADOW E&M-EST. PATIENT-LVL V: CPT | Mod: PBBFAC,,, | Performed by: PHYSICIAN ASSISTANT

## 2020-07-29 PROCEDURE — 1159F PR MEDICATION LIST DOCUMENTED IN MEDICAL RECORD: ICD-10-PCS | Mod: S$GLB,,, | Performed by: PHYSICIAN ASSISTANT

## 2020-07-29 PROCEDURE — 3078F PR MOST RECENT DIASTOLIC BLOOD PRESSURE < 80 MM HG: ICD-10-PCS | Mod: CPTII,S$GLB,, | Performed by: PHYSICIAN ASSISTANT

## 2020-07-29 PROCEDURE — 1125F AMNT PAIN NOTED PAIN PRSNT: CPT | Mod: S$GLB,,, | Performed by: PHYSICIAN ASSISTANT

## 2020-07-29 PROCEDURE — 1125F PR PAIN SEVERITY QUANTIFIED, PAIN PRESENT: ICD-10-PCS | Mod: S$GLB,,, | Performed by: PHYSICIAN ASSISTANT

## 2020-07-30 NOTE — PROGRESS NOTES
CC: Back pain    HPI: The patient is a 85-year-old woman with a history of diabetes, previous breast CA, CVA and spinal stenosis who presents in referral from her primary care physician, Dr. Barger for back pain and leg pain.  She is status post L5/S1 interlaminar epidural steroid injection on 07/01/2020 with almost 100% relief lasting 3 weeks, now reporting about 0% relief.  She continues to have right greater than left low back pain radiating to the right lateral buttock.  Her pain is worse with activity, standing and walking, improved with sitting, ice and medication.  She denies weakness in her legs.  She denies numbness, bladder or bowel incontinence.    Pain intervention history: She tried physical therapy about 1 year ago and it seemed to make her pain worse. Patient is status post right L3 and L4 transforaminal injection on 8/9/13 with 50% relief of low back and right leg pain.  She is status post bilateral L4 transforaminal epidural steroid injections on 9/27/13 and 11/1/13 with 10-20% relief.  She is status post radiofrequency ablation of the left L3, 4, 5 medial branch nerves on 5/5/14 with 50% relief of her left low back pain.  She is status post radiofrequency ablation of the right L3, 4 and 5 medial branch nerves on 6/16/14 with mild relief, however she reported significant more relief at a later visit.  She is status post bilateral L4 transforaminal epidural steroid injections on 9/24/14 with moderate relief.  She is status post bilateral L4 transforaminal epidural steroid injections on 1/7/15 with complete relief of her posterior thigh pain, moderate relief of her low back pain and minimal relief of her right lateral hip pain.  She is status post C7-T1 cervical interlaminar epidural steroid injection on 2/27/15 with greater than 50% relief.  She is status post bilateral L3, 4 and 5 medial branch radio frequency ablation on 7/29/16 reporting 0% relief initially at 4 weeks but then reporting 90% relief  "after 6-7 weeks.  She is status post bilateral L4 transforaminal epidural steroid injections on 11/11/16 with not quite 50% relief.  She is status post bilateral L4 transforaminal epidural steroid injections on 4/4/17 with 50% relief.  She is status post bilateral L4 transforaminal epidural steroid injections on 8/23/17 with greater than 80% relief.  She is status post bilateral L3, 4 and 5 medial branch radiofrequency ablation on 3/6/18 with moderate relief.  She is status post L5/S1 interlaminar epidural steroid injection on 06/04/2018 with almost 100% relief lasting 1 month.  She is status post L5/S1 interlaminar epidural steroid injection on 09/04/2018 with almost 100% relief.  She is status post L5/S1 interlaminar epidural steroid injection on 12/20/2018 with greater than 50% relief. She is status post L5/S1 interlaminar epidural steroid injection on 05/07/2019 with 75% relief.  She is status post L5/S1 interlaminar epidural steroid injection on 09/16/2019 with 50% relief.  She is status post bilateral L3, 4 and 5 medial branch radiofrequency ablation on 10/29/2019 with 50% relief. She is status post L5/S1 interlaminar epidural steroid injection on 12/16/2019 with what sounds like about 50% relief.   She is status post L5/S1 interlaminar epidural steroid injection on 07/01/2020 with almost 100% relief lasting 3 weeks, now reporting about 0% relief.    ROS:She reports easy bruising, back pain and difficulty sleeping.  Balance of review of systems is negative.    Medical, surgical, family and social history reviewed elsewhere in record.    Medications/Allergies: See med card    Vitals:    07/29/20 1313   BP: (!) 151/78   Pulse: 73   Resp: 18   Weight: 68 kg (150 lb)   Height: 5' 3" (1.6 m)   PainSc:   4   PainLoc: Back         Physical exam:  Gen: A and O x3, pleasant, well-groomed  Skin: No rashes or obvious lesions  HEENT: PERRLA  CVS: Regular rate and rhythm, normal S1 and S2, no murmurs.  Resp: Clear to " auscultation bilaterally, no wheezes or rales.  Abdomen: Soft, NT/ND, normal bowel sounds present.  Musculoskeletal: Able to stand and walk short distances without assistance, however uses a walker when out of the house.  She has disuse atrophy of the lumbar paraspinous muscles.  Slow to go from seated to standing position.    Neuro:  Upper extremities: 5/5 strength bilaterally   Lower extremities: 5/5 strength bilaterally  Reflexes: Brachioradialis 2+, Bicep 2+, Tricep 2+. Patellar 2+, Achilles 2+.  Sensory: Intact and symmetrical to light touch and pinprick in C2-T1 dermatomes bilaterally.  Intact and symmetrical to light touch and pinprick in L2-S1 dermatomes bilaterally, except for a small patch over her left lateral shin decreased with light touch and pinprick.    Lumbar spine:  Lumbar spine: ROM is moderately reduced with flexion extension and oblique extension with increased low back pain during extension.  Terry's test is deferred.  Supine straight leg raise causes right lateral buttock pain.  Internal and external rotation of the hip causes no increased pain on either side.  Myofascial exam: No tenderness to palpation across lumbar paraspinous muscles.      Imagin13 MRI L-spine  T10-T11: There is severe disk desiccation and a moderate diffuse disk bulge and ligamentous hypertrophy. This is not complete evaluation of the thoracic spine, but there does appear to be mild central canal stenosis.  T11-T12: Moderate disk desiccation and mild diffuse disk bulge. Mild narrowing of the thecal sac. Neural foraminal regions difficult to assess due to the scoliosis.  T12-L1: Severe disk degenerative disease with marked desiccation, diffuse disk bulge, and spurring of vertebral bodies. Mild central canal stenosis. Neural foraminal narrowing bilaterally, difficult to grade due to scoliosis.  L1-L2: Advanced disk desiccation with moderate diffuse disk bulge and mild spurring of vertebral bodies. Small  superimposed central to right paracentral disk extrusion. Mild central canal stenosis. Mild to moderate facet arthropathy and ligamentous hypertrophy. Bilateral foraminal stenosis.  L2 - L3: There is also disk desiccation and diffuse disk bulge and small annular fissure posteriorly. facet arthropathy and hypertrophy ligamentum flavum. Mild central canal stenosis. Moderate right and mild left neuroforaminal stenosis.  L3-L4: Disk desiccation and moderate diffuse disk bulge. Small right paracentral ascending disk extrusion and moderate facet arthropathy present bilaterally and hypertrophy of ligamentum flavum. In combination, the findings result in severe central canal stenosis. For example see axial image 22, central image 7. There is mild to moderate neural foraminal stenosis bilaterally.  L4 - L5: Mild anterolisthesis with disk desiccation and uncovering of the disk, with moderate right and severe left facet arthropathy and hypertrophy of ligamentum flavum. Severe central canal stenosis. Mild neural foraminal narrowing, right worse than left.  L5-S1: Disk desiccation and mild diffuse disk bulge. A small ascending disk extrusion centrally in the midline. No significant central canal stenosis. Advanced facet arthropathy bilaterally, left worse than right. Mild left neural foraminal narrowing. No significant right neural foraminal stenosis.    5/28/2018 MRI lumbar spine  T12-L1: There is marked disc space narrowing, trace retrolisthesis, unroofing of a moderate disc bulge.  There is right greater than left facet joint arthropathy.  There is mild spinal stenosis with moderate left and moderate-to-marked right foraminal stenosis.  These findings have mildly progressed.  L1-2: There is trace retrolisthesis of L1 on L2, there is inferior unroofing of a moderate diffuse disc bulge with small superimposed central disc protrusion.  There is right greater than left facet joint arthropathy.  There is borderline spinal  stenosis.  There is crowding of right lateral recess.  There is marked right and moderate-to-marked left foraminal stenosis with very slight progression.  L2-3: There is trace retrolisthesis of L2 on L3 where there is marked disc space narrowing.  There is a moderate diffuse disc bulge with osteophytic ridging and superimposed right foraminal disc protrusion.  There is moderate-to-marked bilateral facet joint arthropathy with ligamentum flavum thickening, right greater than.  There is moderate central spinal stenosis and right lateral recess stenosis.  There is mild left lateral recess stenosis.  There is marked right and moderate-to-marked left foraminal stenosis with progression.  L3-4: There is disc space narrowing.  There is marked facet joint arthropathy with ligamentum flavum thickening.  There is a small 4 mm left synovial cyst.  There is unroofing of a moderate to marked diffuse disc bulge.  There is severe spinal canal, bilateral lateral recess and right foraminal stenosis with moderate to marked left foraminal stenosis and with slight progression.  L4-5: There is stable grade 1 spondylolisthesis with 4 mm anterolisthesis of L4 on L5.  There is marked facet joint arthropathy.  There is unroofing of a moderate to marked diffuse disc bulge.  There is severe spinal canal, bilateral lateral recess and foraminal stenosis with slight progression.  L5-S1: There is disc space narrowing at the L5-S1 level, worse towards the left.  There is a disc bulge with small superimposed central disc extrusion with 5 mm cephalad extension.  This was present previously but is slightly more conspicuous.  There is marked facet joint arthropathy.  There is moderate central spinal stenosis with mild crowding of the left lateral recess.  There is mild right and moderate-to-marked left foraminal stenosis with progression.    05/13/2019 bone scan  Increased tracer activity is noted in the region of the facets presumably at the T12-L1  level on the left.  Tracer activity is also noted at the L4-L5 and L5-S1 levels on the left suggestive of facet arthropathy.  More diffuse facet arthropathy is noted throughout the lumbar spine.  Some increased uptake may be noted within the vertebral bodies more anteriorly at the T9 through T12 levels which could relate to compression deformity or Schmorl's node formation/endplate or Modic type degenerative change.  Given that no STIR signal abnormality was noted on the MRI of 03/14/2019 no significant vertebral body height loss was noted at these levels on that exam, this uptake likely relates to degenerative Modic type endplate changes which can be seen on that exam.  Anatomical imaging such as from MRI which was noted to have been performed on 03/14/2019 is much more specific for degenerative changes of the spine as well as for numbering purposes    Assessment:   The patient is a 85-year-old woman with a history of diabetes, previous breast CA, CVA and spinal stenosis who presents in referral from her primary care physician, Dr. Barger for back pain and leg pain.     1. DDD (degenerative disc disease), lumbar     2. Chronic pain disorder     3. Lumbar radiculitis         Plan:  1.  Unfortunately she has not had lasting relief with the last 2 epidural steroid injections.  We discussed that unless she has severe increased pain we will likely hold off on future injections for her.  If her pain gets worse consistently she will consider spinal cord stimulation with [employer] but she feels that right now she can manage her pain.  She will continue to take tramadol and gabapentin and follow up here in 3 months or sooner as needed.    Greater than 50% of this 30 minutes visit was spent counseling the patient.

## 2020-08-04 RX ORDER — ZOLPIDEM TARTRATE 5 MG/1
TABLET ORAL
Qty: 30 TABLET | Refills: 0 | Status: SHIPPED | OUTPATIENT
Start: 2020-08-04 | End: 2020-09-02

## 2020-08-04 RX ORDER — TRIAMTERENE AND HYDROCHLOROTHIAZIDE 37.5; 25 MG/1; MG/1
CAPSULE ORAL
Qty: 90 CAPSULE | Refills: 0 | Status: SHIPPED | OUTPATIENT
Start: 2020-08-04 | End: 2020-10-28

## 2020-08-08 ENCOUNTER — TELEPHONE (OUTPATIENT)
Dept: ENDOSCOPY | Facility: HOSPITAL | Age: 85
End: 2020-08-08

## 2020-08-10 ENCOUNTER — PATIENT MESSAGE (OUTPATIENT)
Dept: ENDOSCOPY | Facility: HOSPITAL | Age: 85
End: 2020-08-10

## 2020-09-30 RX ORDER — ZOLPIDEM TARTRATE 5 MG/1
TABLET ORAL
Qty: 30 TABLET | Refills: 0 | Status: SHIPPED | OUTPATIENT
Start: 2020-09-30 | End: 2020-10-28

## 2020-10-12 ENCOUNTER — PATIENT MESSAGE (OUTPATIENT)
Dept: INTERNAL MEDICINE | Facility: CLINIC | Age: 85
End: 2020-10-12

## 2020-10-14 ENCOUNTER — PATIENT MESSAGE (OUTPATIENT)
Dept: INTERNAL MEDICINE | Facility: CLINIC | Age: 85
End: 2020-10-14

## 2020-10-19 ENCOUNTER — IMMUNIZATION (OUTPATIENT)
Dept: PHARMACY | Facility: CLINIC | Age: 85
End: 2020-10-19
Payer: MEDICARE

## 2020-10-27 ENCOUNTER — PATIENT OUTREACH (OUTPATIENT)
Dept: ADMINISTRATIVE | Facility: OTHER | Age: 85
End: 2020-10-27

## 2020-10-27 ENCOUNTER — PATIENT MESSAGE (OUTPATIENT)
Dept: PAIN MEDICINE | Facility: CLINIC | Age: 85
End: 2020-10-27

## 2020-10-27 RX ORDER — TRAMADOL HYDROCHLORIDE 50 MG/1
TABLET ORAL
Qty: 90 TABLET | Refills: 2 | Status: SHIPPED | OUTPATIENT
Start: 2020-10-27 | End: 2021-01-24

## 2020-10-27 NOTE — PROGRESS NOTES
Health Maintenance Due   Topic Date Due    Shingles Vaccine (2 of 3) 04/21/2008    Eye Exam  07/16/2020     Updates were requested from care everywhere.  Chart was reviewed for overdue Proactive [facility] Encounters ([facility]) topics (CRS, Breast Cancer Screening, Eye exam)  Health Maintenance has been updated.  LINKS immunization registry triggered.  Immunizations were reconciled.

## 2020-10-28 RX ORDER — TRIAMTERENE AND HYDROCHLOROTHIAZIDE 37.5; 25 MG/1; MG/1
CAPSULE ORAL
Qty: 90 CAPSULE | Refills: 0 | Status: SHIPPED | OUTPATIENT
Start: 2020-10-28 | End: 2021-01-25

## 2020-10-28 RX ORDER — ZOLPIDEM TARTRATE 5 MG/1
TABLET ORAL
Qty: 30 TABLET | Refills: 0 | Status: SHIPPED | OUTPATIENT
Start: 2020-10-28 | End: 2020-11-25

## 2020-10-29 ENCOUNTER — IMMUNIZATION (OUTPATIENT)
Dept: PHARMACY | Facility: CLINIC | Age: 85
End: 2020-10-29
Payer: MEDICARE

## 2020-10-29 ENCOUNTER — OFFICE VISIT (OUTPATIENT)
Dept: PAIN MEDICINE | Facility: CLINIC | Age: 85
End: 2020-10-29
Payer: MEDICARE

## 2020-10-29 VITALS
HEART RATE: 79 BPM | SYSTOLIC BLOOD PRESSURE: 136 MMHG | OXYGEN SATURATION: 96 % | WEIGHT: 153.25 LBS | HEIGHT: 63 IN | BODY MASS INDEX: 27.15 KG/M2 | TEMPERATURE: 98 F | DIASTOLIC BLOOD PRESSURE: 66 MMHG

## 2020-10-29 DIAGNOSIS — M54.16 LUMBAR RADICULITIS: ICD-10-CM

## 2020-10-29 DIAGNOSIS — R26.81 GAIT INSTABILITY: ICD-10-CM

## 2020-10-29 DIAGNOSIS — G89.4 CHRONIC PAIN DISORDER: ICD-10-CM

## 2020-10-29 DIAGNOSIS — M25.511 ACUTE PAIN OF RIGHT SHOULDER: Primary | ICD-10-CM

## 2020-10-29 DIAGNOSIS — M51.36 DDD (DEGENERATIVE DISC DISEASE), LUMBAR: ICD-10-CM

## 2020-10-29 PROCEDURE — 1125F PR PAIN SEVERITY QUANTIFIED, PAIN PRESENT: ICD-10-PCS | Mod: S$GLB,,, | Performed by: PHYSICIAN ASSISTANT

## 2020-10-29 PROCEDURE — 1159F MED LIST DOCD IN RCRD: CPT | Mod: S$GLB,,, | Performed by: PHYSICIAN ASSISTANT

## 2020-10-29 PROCEDURE — 1101F PT FALLS ASSESS-DOCD LE1/YR: CPT | Mod: CPTII,S$GLB,, | Performed by: PHYSICIAN ASSISTANT

## 2020-10-29 PROCEDURE — 99999 PR PBB SHADOW E&M-EST. PATIENT-LVL V: CPT | Mod: PBBFAC,,, | Performed by: PHYSICIAN ASSISTANT

## 2020-10-29 PROCEDURE — 1159F PR MEDICATION LIST DOCUMENTED IN MEDICAL RECORD: ICD-10-PCS | Mod: S$GLB,,, | Performed by: PHYSICIAN ASSISTANT

## 2020-10-29 PROCEDURE — 3078F PR MOST RECENT DIASTOLIC BLOOD PRESSURE < 80 MM HG: ICD-10-PCS | Mod: CPTII,S$GLB,, | Performed by: PHYSICIAN ASSISTANT

## 2020-10-29 PROCEDURE — 1125F AMNT PAIN NOTED PAIN PRSNT: CPT | Mod: S$GLB,,, | Performed by: PHYSICIAN ASSISTANT

## 2020-10-29 PROCEDURE — 3075F SYST BP GE 130 - 139MM HG: CPT | Mod: CPTII,S$GLB,, | Performed by: PHYSICIAN ASSISTANT

## 2020-10-29 PROCEDURE — 3078F DIAST BP <80 MM HG: CPT | Mod: CPTII,S$GLB,, | Performed by: PHYSICIAN ASSISTANT

## 2020-10-29 PROCEDURE — 99999 PR PBB SHADOW E&M-EST. PATIENT-LVL V: ICD-10-PCS | Mod: PBBFAC,,, | Performed by: PHYSICIAN ASSISTANT

## 2020-10-29 PROCEDURE — 3075F PR MOST RECENT SYSTOLIC BLOOD PRESS GE 130-139MM HG: ICD-10-PCS | Mod: CPTII,S$GLB,, | Performed by: PHYSICIAN ASSISTANT

## 2020-10-29 PROCEDURE — 99214 OFFICE O/P EST MOD 30 MIN: CPT | Mod: S$GLB,,, | Performed by: PHYSICIAN ASSISTANT

## 2020-10-29 PROCEDURE — 99214 PR OFFICE/OUTPT VISIT, EST, LEVL IV, 30-39 MIN: ICD-10-PCS | Mod: S$GLB,,, | Performed by: PHYSICIAN ASSISTANT

## 2020-10-29 PROCEDURE — 1101F PR PT FALLS ASSESS DOC 0-1 FALLS W/OUT INJ PAST YR: ICD-10-PCS | Mod: CPTII,S$GLB,, | Performed by: PHYSICIAN ASSISTANT

## 2020-10-31 NOTE — PROGRESS NOTES
CC: Back pain    HPI: The patient is a 85-year-old woman with a history of diabetes, previous breast CA, CVA and spinal stenosis who presents in referral from her primary care physician, Dr. Barger for back pain and leg pain.  She returns in follow-up today with back pain.  She continues to have low back pain with burning into the right buttock.  This is worse with activity improved with rest and medication.  She also complains of right shoulder pain over the past several weeks.  It is constant but she reports feeling a snap and pop in her shoulder.  This is worse with any type of use.  She denies any neck pain or pain radiating into her arm.  She denies weakness, numbness or incontinence.    Pain intervention history: She tried physical therapy about 1 year ago and it seemed to make her pain worse. Patient is status post right L3 and L4 transforaminal injection on 8/9/13 with 50% relief of low back and right leg pain.  She is status post bilateral L4 transforaminal epidural steroid injections on 9/27/13 and 11/1/13 with 10-20% relief.  She is status post radiofrequency ablation of the left L3, 4, 5 medial branch nerves on 5/5/14 with 50% relief of her left low back pain.  She is status post radiofrequency ablation of the right L3, 4 and 5 medial branch nerves on 6/16/14 with mild relief, however she reported significant more relief at a later visit.  She is status post bilateral L4 transforaminal epidural steroid injections on 9/24/14 with moderate relief.  She is status post bilateral L4 transforaminal epidural steroid injections on 1/7/15 with complete relief of her posterior thigh pain, moderate relief of her low back pain and minimal relief of her right lateral hip pain.  She is status post C7-T1 cervical interlaminar epidural steroid injection on 2/27/15 with greater than 50% relief.  She is status post bilateral L3, 4 and 5 medial branch radio frequency ablation on 7/29/16 reporting 0% relief initially at 4  "weeks but then reporting 90% relief after 6-7 weeks.  She is status post bilateral L4 transforaminal epidural steroid injections on 11/11/16 with not quite 50% relief.  She is status post bilateral L4 transforaminal epidural steroid injections on 4/4/17 with 50% relief.  She is status post bilateral L4 transforaminal epidural steroid injections on 8/23/17 with greater than 80% relief.  She is status post bilateral L3, 4 and 5 medial branch radiofrequency ablation on 3/6/18 with moderate relief.  She is status post L5/S1 interlaminar epidural steroid injection on 06/04/2018 with almost 100% relief lasting 1 month.  She is status post L5/S1 interlaminar epidural steroid injection on 09/04/2018 with almost 100% relief.  She is status post L5/S1 interlaminar epidural steroid injection on 12/20/2018 with greater than 50% relief. She is status post L5/S1 interlaminar epidural steroid injection on 05/07/2019 with 75% relief.  She is status post L5/S1 interlaminar epidural steroid injection on 09/16/2019 with 50% relief.  She is status post bilateral L3, 4 and 5 medial branch radiofrequency ablation on 10/29/2019 with 50% relief. She is status post L5/S1 interlaminar epidural steroid injection on 12/16/2019 with what sounds like about 50% relief.   She is status post L5/S1 interlaminar epidural steroid injection on 07/01/2020 with almost 100% relief lasting 3 weeks, now reporting about 0% relief.    ROS:She reports easy bruising, back pain and difficulty sleeping.  Balance of review of systems is negative.    Medical, surgical, family and social history reviewed elsewhere in record.    Medications/Allergies: See med card    Vitals:    10/29/20 1338   BP: 136/66   Pulse: 79   Temp: 97.5 °F (36.4 °C)   TempSrc: Temporal   SpO2: 96%   Weight: 69.5 kg (153 lb 3.5 oz)   Height: 5' 3" (1.6 m)   PainSc:   5   PainLoc: Back         Physical exam:  Gen: A and O x3, pleasant, well-groomed  Skin: No rashes or obvious lesions  HEENT: " PERRLA  CVS: Regular rate and rhythm, normal S1 and S2, no murmurs.  Resp: Clear to auscultation bilaterally, no wheezes or rales.  Abdomen: Soft, NT/ND, normal bowel sounds present.  Musculoskeletal: Able to stand and walk short distances without assistance, however uses a walker when out of the house.  She has disuse atrophy of the lumbar paraspinous muscles.  Slow to go from seated to standing position.    Neuro:  Upper extremities: 5/5 strength bilaterally   Lower extremities: 5/5 strength bilaterally  Reflexes: Brachioradialis 2+, Bicep 2+, Tricep 2+. Patellar 2+, Achilles 2+.  Sensory: Intact and symmetrical to light touch and pinprick in C2-T1 dermatomes bilaterally.  Intact and symmetrical to light touch and pinprick in L2-S1 dermatomes bilaterally, except for a small patch over her left lateral shin decreased with light touch and pinprick.    Lumbar spine:  Lumbar spine: ROM is moderately reduced with flexion extension and oblique extension with increased low back pain during extension.  Terry's test is deferred.  Supine straight leg raise causes right lateral buttock pain.  Internal and external rotation of the hip causes no increased pain on either side.  Myofascial exam: No tenderness to palpation across lumbar paraspinous muscles.      Imagin13 MRI L-spine  T10-T11: There is severe disk desiccation and a moderate diffuse disk bulge and ligamentous hypertrophy. This is not complete evaluation of the thoracic spine, but there does appear to be mild central canal stenosis.  T11-T12: Moderate disk desiccation and mild diffuse disk bulge. Mild narrowing of the thecal sac. Neural foraminal regions difficult to assess due to the scoliosis.  T12-L1: Severe disk degenerative disease with marked desiccation, diffuse disk bulge, and spurring of vertebral bodies. Mild central canal stenosis. Neural foraminal narrowing bilaterally, difficult to grade due to scoliosis.  L1-L2: Advanced disk desiccation  with moderate diffuse disk bulge and mild spurring of vertebral bodies. Small superimposed central to right paracentral disk extrusion. Mild central canal stenosis. Mild to moderate facet arthropathy and ligamentous hypertrophy. Bilateral foraminal stenosis.  L2 - L3: There is also disk desiccation and diffuse disk bulge and small annular fissure posteriorly. facet arthropathy and hypertrophy ligamentum flavum. Mild central canal stenosis. Moderate right and mild left neuroforaminal stenosis.  L3-L4: Disk desiccation and moderate diffuse disk bulge. Small right paracentral ascending disk extrusion and moderate facet arthropathy present bilaterally and hypertrophy of ligamentum flavum. In combination, the findings result in severe central canal stenosis. For example see axial image 22, central image 7. There is mild to moderate neural foraminal stenosis bilaterally.  L4 - L5: Mild anterolisthesis with disk desiccation and uncovering of the disk, with moderate right and severe left facet arthropathy and hypertrophy of ligamentum flavum. Severe central canal stenosis. Mild neural foraminal narrowing, right worse than left.  L5-S1: Disk desiccation and mild diffuse disk bulge. A small ascending disk extrusion centrally in the midline. No significant central canal stenosis. Advanced facet arthropathy bilaterally, left worse than right. Mild left neural foraminal narrowing. No significant right neural foraminal stenosis.    5/28/2018 MRI lumbar spine  T12-L1: There is marked disc space narrowing, trace retrolisthesis, unroofing of a moderate disc bulge.  There is right greater than left facet joint arthropathy.  There is mild spinal stenosis with moderate left and moderate-to-marked right foraminal stenosis.  These findings have mildly progressed.  L1-2: There is trace retrolisthesis of L1 on L2, there is inferior unroofing of a moderate diffuse disc bulge with small superimposed central disc protrusion.  There is right  greater than left facet joint arthropathy.  There is borderline spinal stenosis.  There is crowding of right lateral recess.  There is marked right and moderate-to-marked left foraminal stenosis with very slight progression.  L2-3: There is trace retrolisthesis of L2 on L3 where there is marked disc space narrowing.  There is a moderate diffuse disc bulge with osteophytic ridging and superimposed right foraminal disc protrusion.  There is moderate-to-marked bilateral facet joint arthropathy with ligamentum flavum thickening, right greater than.  There is moderate central spinal stenosis and right lateral recess stenosis.  There is mild left lateral recess stenosis.  There is marked right and moderate-to-marked left foraminal stenosis with progression.  L3-4: There is disc space narrowing.  There is marked facet joint arthropathy with ligamentum flavum thickening.  There is a small 4 mm left synovial cyst.  There is unroofing of a moderate to marked diffuse disc bulge.  There is severe spinal canal, bilateral lateral recess and right foraminal stenosis with moderate to marked left foraminal stenosis and with slight progression.  L4-5: There is stable grade 1 spondylolisthesis with 4 mm anterolisthesis of L4 on L5.  There is marked facet joint arthropathy.  There is unroofing of a moderate to marked diffuse disc bulge.  There is severe spinal canal, bilateral lateral recess and foraminal stenosis with slight progression.  L5-S1: There is disc space narrowing at the L5-S1 level, worse towards the left.  There is a disc bulge with small superimposed central disc extrusion with 5 mm cephalad extension.  This was present previously but is slightly more conspicuous.  There is marked facet joint arthropathy.  There is moderate central spinal stenosis with mild crowding of the left lateral recess.  There is mild right and moderate-to-marked left foraminal stenosis with progression.    05/13/2019 bone scan  Increased tracer  activity is noted in the region of the facets presumably at the T12-L1 level on the left.  Tracer activity is also noted at the L4-L5 and L5-S1 levels on the left suggestive of facet arthropathy.  More diffuse facet arthropathy is noted throughout the lumbar spine.  Some increased uptake may be noted within the vertebral bodies more anteriorly at the T9 through T12 levels which could relate to compression deformity or Schmorl's node formation/endplate or Modic type degenerative change.  Given that no STIR signal abnormality was noted on the MRI of 03/14/2019 no significant vertebral body height loss was noted at these levels on that exam, this uptake likely relates to degenerative Modic type endplate changes which can be seen on that exam.  Anatomical imaging such as from MRI which was noted to have been performed on 03/14/2019 is much more specific for degenerative changes of the spine as well as for numbering purposes    Assessment:   The patient is a 85-year-old woman with a history of diabetes, previous breast CA, CVA and spinal stenosis who presents in referral from her primary care physician, Dr. Barger for back pain and leg pain.     1. Acute pain of right shoulder  Ambulatory referral/consult to Physical Medicine Rehab   2. Chronic pain disorder     3. Lumbar radiculitis     4. DDD (degenerative disc disease), lumbar     5. Gait instability         Plan:  1.  For her new right shoulder pain I placed a referral to Dr. Rome.  2.  If her low back pain worsens we will schedule a spinal cord stimulator trial with [facility].  3.  She will continue to take tramadol and gabapentin, currently does not need refills.  4.  Follow-up in 3 months or sooner as needed.    Greater than 50% of this 25 minutes visit was spent counseling the patient.

## 2020-11-02 ENCOUNTER — OFFICE VISIT (OUTPATIENT)
Dept: PHYSICAL MEDICINE AND REHAB | Facility: CLINIC | Age: 85
End: 2020-11-02
Payer: MEDICARE

## 2020-11-02 VITALS — WEIGHT: 153 LBS | HEIGHT: 63 IN | BODY MASS INDEX: 27.11 KG/M2

## 2020-11-02 DIAGNOSIS — M12.811 ROTATOR CUFF TEAR ARTHROPATHY OF RIGHT SHOULDER: ICD-10-CM

## 2020-11-02 DIAGNOSIS — M25.511 CHRONIC RIGHT SHOULDER PAIN: Primary | ICD-10-CM

## 2020-11-02 DIAGNOSIS — M75.101 ROTATOR CUFF TEAR ARTHROPATHY OF RIGHT SHOULDER: ICD-10-CM

## 2020-11-02 DIAGNOSIS — G89.29 CHRONIC RIGHT SHOULDER PAIN: Primary | ICD-10-CM

## 2020-11-02 PROCEDURE — 1101F PR PT FALLS ASSESS DOC 0-1 FALLS W/OUT INJ PAST YR: ICD-10-PCS | Mod: CPTII,S$GLB,, | Performed by: PHYSICAL MEDICINE & REHABILITATION

## 2020-11-02 PROCEDURE — 99214 OFFICE O/P EST MOD 30 MIN: CPT | Mod: 25,GC,S$GLB, | Performed by: PHYSICAL MEDICINE & REHABILITATION

## 2020-11-02 PROCEDURE — 1125F AMNT PAIN NOTED PAIN PRSNT: CPT | Mod: S$GLB,,, | Performed by: PHYSICAL MEDICINE & REHABILITATION

## 2020-11-02 PROCEDURE — 1159F PR MEDICATION LIST DOCUMENTED IN MEDICAL RECORD: ICD-10-PCS | Mod: S$GLB,,, | Performed by: PHYSICAL MEDICINE & REHABILITATION

## 2020-11-02 PROCEDURE — 76942 ECHO GUIDE FOR BIOPSY: CPT | Mod: S$GLB,,, | Performed by: PHYSICAL MEDICINE & REHABILITATION

## 2020-11-02 PROCEDURE — 64418 NERVE BLOCK: ICD-10-PCS | Mod: RT,S$GLB,, | Performed by: PHYSICAL MEDICINE & REHABILITATION

## 2020-11-02 PROCEDURE — 1125F PR PAIN SEVERITY QUANTIFIED, PAIN PRESENT: ICD-10-PCS | Mod: S$GLB,,, | Performed by: PHYSICAL MEDICINE & REHABILITATION

## 2020-11-02 PROCEDURE — 1101F PT FALLS ASSESS-DOCD LE1/YR: CPT | Mod: CPTII,S$GLB,, | Performed by: PHYSICAL MEDICINE & REHABILITATION

## 2020-11-02 PROCEDURE — 99999 PR PBB SHADOW E&M-EST. PATIENT-LVL IV: ICD-10-PCS | Mod: PBBFAC,,, | Performed by: PHYSICAL MEDICINE & REHABILITATION

## 2020-11-02 PROCEDURE — 76942 PR U/S GUIDANCE FOR NEEDLE GUIDANCE: ICD-10-PCS | Mod: S$GLB,,, | Performed by: PHYSICAL MEDICINE & REHABILITATION

## 2020-11-02 PROCEDURE — 99214 PR OFFICE/OUTPT VISIT, EST, LEVL IV, 30-39 MIN: ICD-10-PCS | Mod: 25,GC,S$GLB, | Performed by: PHYSICAL MEDICINE & REHABILITATION

## 2020-11-02 PROCEDURE — 64418 NJX AA&/STRD SPRSCAP NRV: CPT | Mod: RT,S$GLB,, | Performed by: PHYSICAL MEDICINE & REHABILITATION

## 2020-11-02 PROCEDURE — 1159F MED LIST DOCD IN RCRD: CPT | Mod: S$GLB,,, | Performed by: PHYSICAL MEDICINE & REHABILITATION

## 2020-11-02 PROCEDURE — 99999 PR PBB SHADOW E&M-EST. PATIENT-LVL IV: CPT | Mod: PBBFAC,,, | Performed by: PHYSICAL MEDICINE & REHABILITATION

## 2020-11-02 NOTE — LETTER
November 2, 2020      EVERETT Lott  [address] [facility] [address] [place] [address]           [facility]  [address] [place] [address]  Phone: [phone]  Fax: [phone]          Patient: Olga Aguiar   MR Number: [id number]   YOB: 1935   Date of Visit: 11/2/2020       Dear Daniel Dupont:    Thank you for referring Olga Aguiar to me for evaluation. Attached you will find relevant portions of my assessment and plan of care.    If you have questions, please do not hesitate to call me. I look forward to following Olga Aguiar along with you.    Sincerely,    David Rome MD    Enclosure  CC:  No Recipients    If you would like to receive this communication electronically, please contact [email or web address] or [phone] to request more information on [employer] access.    For providers and/or their staff who would like to refer a patient to [facility], please contact us through our one-stop-shop provider referral line, [facility], at [phone].    If you feel you have received this communication in error or would no longer like to receive these types of communications, please e-mail [email or web address]

## 2020-11-02 NOTE — PROGRESS NOTES
[facility]    Consulting Provider: Daniel Dupont *    CHIEF COMPLAINT:   Chief Complaint   Patient presents with    Shoulder Pain     Right     HISTORY OF PRESENT ILLNESS: Olga Aguiar is a 85 y.o. female who presents to me for evaluation of right shoulder pain. Right shoulder has been present for several weeks.  Pain is constant but occasionally feels a snap, crunch, pop in her shoulder that is associated with a sharper pain.  She denies neck pain or pain radiating into her arm. Denies weakness or numbness. Patient has not had previous interventions for shoulder pain. Uses tramadol prescribed for axial low back pain.   Of note, patient has lymphedema following breast cancer/lymph node resection in 2000. Patient says lymphedema was well controlled until about 5 years ago when patient was unable to find a sleeve that fit her. Patient says her arm feels very heavy and she believes the heaviness of her left arm is one of the causes of her pain.  Denies trauma or recent infection.    Review of Systems   Constitutional: Negative for fever.   HENT: Negative for drooling.    Eyes: Negative for discharge.   Respiratory: Negative for choking.    Cardiovascular: Negative for chest pain.   Genitourinary: Negative for flank pain.   Skin: Negative for wound.   Allergic/Immunologic: Negative for immunocompromised state.   Neurological: Negative for tremors and syncope.   Psychiatric/Behavioral: Negative for behavioral problems.     Past Medical History:   Past Medical History:   Diagnosis Date    Adrenal adenoma: 2.8 X 2.1CM 2010 5/16/2014    Anticoagulant long-term use      mg    Aortic atherosclerosis: see CT scan 2016 10/28/2016    Aortic stenosis 8/19/2014    Asthma     Breast cancer     Cancer     breast, both sides, right arm restriction    Cataracts, both eyes     Chronic back pain     Chronic bronchitis     CKD (chronic kidney disease) stage 3, GFR 30-59 ml/min  8/19/2014    no dialysis    COPD (chronic obstructive pulmonary disease)     occasional inhaler use, no oxygen    DDD (degenerative disc disease), lumbar 7/30/2013    Diabetes mellitus     diet controlled    Diverticulosis     Ectatic abdominal aorta: see CT scan 10/16 10/28/2016    Gallbladder polyp 8/19/2014    GERD (gastroesophageal reflux disease)     Gout     Herpes simplex     Fever Blisters and Styes in right eye    High blood cholesterol     Hypertension     holding medication when B/P low    Hypertension associated with diabetes 2/7/2012    Insomnia     Lumbar spinal stenosis     Lymphedema of arm     Right    Multiple lung nodules 9/21/2015    Renal cyst, right: stable per CT 2016 10/28/2016    Stroke 2/2012    TIA, no residual effects    Stye 12/5/2013    Thyroid disease     hypothyroidism    Type 2 diabetes mellitus without complication, without long-term current use of insulin 7/20/2015    Unspecified hypothyroidism 7/20/2015       Past Surgical History:   Past Surgical History:   Procedure Laterality Date    AXILLARY NODE DISSECTION      both sides    BREAST BIOPSY      BREAST LUMPECTOMY Bilateral     lymph nodes on right    CATARACT EXTRACTION      bilateral PHACO with IOL    COLONOSCOPY  01/12/2010    in legacy    Epidural steroid injection      Pain mangement    EPIDURAL STEROID INJECTION INTO LUMBAR SPINE N/A 6/4/2018    Procedure: Injection-steroid-epidural-lumbar;  Surgeon: Rohan Ware MD;  Location: [facility];  Service: Pain Management;  Laterality: N/A;  L5/S1 interlaminar    EPIDURAL STEROID INJECTION INTO LUMBAR SPINE N/A 9/4/2018    Procedure: Injection-steroid-epidural-lumbar L5/S1;  Surgeon: Rohan Ware MD;  Location: [facility];  Service: Pain Management;  Laterality: N/A;    EPIDURAL STEROID INJECTION INTO LUMBAR SPINE N/A 12/20/2018    Procedure: Injection-steroid-epidural-lumbar;  Surgeon: Rohan Ware MD;  Location: [facility];  Service:  Pain Management;  Laterality: N/A;  L5/S1 interlaminar    EPIDURAL STEROID INJECTION INTO LUMBAR SPINE N/A 5/7/2019    Procedure: Injection-steroid-epidural-lumbar L5/S1;  Surgeon: Rohan Ware MD;  Location: [facility] OR;  Service: Pain Management;  Laterality: N/A;    EPIDURAL STEROID INJECTION INTO LUMBAR SPINE N/A 9/16/2019    Procedure: Injection-steroid-epidural-lumbar;  Surgeon: Rohan Ware MD;  Location: [facility] OR;  Service: Pain Management;  Laterality: N/A;  L5/S1 interlaminar     EPIDURAL STEROID INJECTION INTO LUMBAR SPINE Right 12/16/2019    Procedure: Injection-steroid-epidural-lumbar L5, S1 Right;  Surgeon: Rohan Ware MD;  Location: [facility] OR;  Service: Pain Management;  Laterality: Right;    EPIDURAL STEROID INJECTION INTO LUMBAR SPINE N/A 7/1/2020    Procedure: Injection-steroid-epidural-lumbar;  Surgeon: Rohan Ware MD;  Location: [facility] OR;  Service: Pain Management;  Laterality: N/A;  L5/S1 to RIGHT    ESOPHAGOGASTRODUODENOSCOPY N/A 11/12/2018    Procedure: EGD (ESOPHAGOGASTRODUODENOSCOPY);  Surgeon: Braydon Becerra MD;  Location[facility];  Service: Endoscopy;  Laterality: N/A;    HYSTERECTOMY      HYSTERECTOMY      Nerve Block Injection      Pain management, Times 2    RADIOFREQUENCY ABLATION      Nerve, Pain management    RADIOFREQUENCY ABLATION OF LUMBAR MEDIAL BRANCH NERVE AT SINGLE LEVEL Bilateral 10/29/2019    Procedure: Radiofrequency Ablation, Nerve, Spinal, Lumbar, Medial Branch, L3, L4, L5;  Surgeon: Rohan Ware MD;  Location: [facility];  Service: Pain Management;  Laterality: Bilateral;    RECTAL SURGERY      Fissure repair    TONSILLECTOMY      UPPER GASTROINTESTINAL ENDOSCOPY  08/29/2014    Dr. Castro       Family History:   Family History   Problem Relation Age of Onset    Cancer Mother         Breast    COPD Mother     Hearing loss Mother     Hypertension Mother     Breast cancer Mother 90    Diabetes Father     Heart disease  Father     Heart attack Father     Cancer Daughter         Breast    Breast cancer Daughter 45    Cancer Sister         Breast    Breast cancer Sister 70    Amblyopia Neg Hx     Blindness Neg Hx     Cataracts Neg Hx     Glaucoma Neg Hx     Macular degeneration Neg Hx     Retinal detachment Neg Hx     Strabismus Neg Hx     Stroke Neg Hx     Thyroid disease Neg Hx     Colon cancer Neg Hx     Stomach cancer Neg Hx     Esophageal cancer Neg Hx        Medications:   Current Outpatient Medications on File Prior to Visit   Medication Sig Dispense Refill    acetaminophen (TYLENOL) 500 MG tablet Take 500 mg by mouth every 6 (six) hours as needed.      acyclovir (ZOVIRAX) 400 MG tablet Take 400 mg by mouth 2 (two) times daily as needed.      allopurinoL (ZYLOPRIM) 100 MG tablet Take 1 tablet by mouth once daily 90 tablet 0    aspirin (ECOTRIN) 81 MG EC tablet Take 81 mg by mouth once daily.      busPIRone (BUSPAR) 5 MG Tab Take 1 tablet (5 mg total) by mouth 2 (two) times daily as needed (anxiety). 60 tablet 2    cholecalciferol, vitamin D3, 2,000 unit Cap Take 2,000 Units by mouth Daily.       colchicine (COLCRYS) 0.6 mg tablet Take 1 tablet (0.6 mg total) by mouth daily as needed (gout). 30 tablet 0    fluticasone furoate-vilanteroL (BREO ELLIPTA) 200-25 mcg/dose DsDv diskus inhaler Inhale 1 puff into the lungs once daily. 3 each 3    fluticasone-umeclidin-vilanter (TRELEGY ELLIPTA) 100-62.5-25 mcg DsDv Inhale 1 puff into the lungs once daily. 1 each 11    gabapentin (NEURONTIN) 300 MG capsule TAKE 1 CAPSULE BY MOUTH IN THE EVENING 90 capsule 0    glimepiride (AMARYL) 1 MG tablet Take 1 tablet (1 mg total) by mouth before breakfast. 90 tablet 3    levalbuterol (XOPENEX HFA) 45 mcg/actuation inhaler Inhale 1-2 puffs into the lungs every 4 (four) hours as needed for Wheezing or Shortness of Breath. 3 Inhaler 3    levothyroxine (SYNTHROID) 75 MCG tablet Take 1 tablet by mouth once daily 90 tablet  0    montelukast (SINGULAIR) 10 mg tablet Take 1 tablet (10 mg total) by mouth every evening. 90 tablet 3    nitrofurantoin, macrocrystal-monohydrate, (MACROBID) 100 MG capsule Take 1 capsule (100 mg total) by mouth 2 (two) times daily. 10 capsule 0    predniSONE (DELTASONE) 10 MG tablet Take 2 pills/day X 3 days, then 1 pills/day X 3 days then 1/2 pill/day X 3 days then stop (Patient not taking: Reported on 2020) 11 tablet 0    predniSONE (DELTASONE) 20 MG tablet One daily for   3 days and repeat as needed. 12 tablet 2    ranitidine (ZANTAC) 300 MG tablet Take 1 tablet (300 mg total) by mouth every evening. (Patient not taking: Reported on 2020) 90 tablet 3    simvastatin (ZOCOR) 40 MG tablet TAKE 1 TABLET BY MOUTH ONCE DAILY IN THE EVENING 90 tablet 3    traMADoL (ULTRAM) 50 mg tablet TAKE 1 TABLET BY MOUTH EVERY 8 HOURS AS NEEDED 90 tablet 2    triamterene-hydrochlorothiazide 37.5-25 mg (DYAZIDE) 37.5-25 mg per capsule Take 1 capsule by mouth once daily in the morning 90 capsule 0    zolpidem (AMBIEN) 5 MG Tab TAKE 1 TABLET BY MOUTH ONCE DAILY IN THE EVENING 30 tablet 0     No current facility-administered medications on file prior to visit.        Allergies: Review of patient's allergies indicates:  No Known Allergies    Social History:   Social History     Socioeconomic History    Marital status:      Spouse name: Not on file    Number of children: Not on file    Years of education: Not on file    Highest education level: Not on file   Occupational History    Not on file   Social Needs    Financial resource strain: Not very hard    Food insecurity     Worry: Never true     Inability: Never true    Transportation needs     Medical: No     Non-medical: No   Tobacco Use    Smoking status: Former Smoker     Packs/day: 1.00     Years: 18.00     Pack years: 18.00     Start date: 1952     Quit date: 1970     Years since quittin.2    Smokeless tobacco: Never Used  "  Substance and Sexual Activity    Alcohol use: Yes     Frequency: Monthly or less     Drinks per session: 1 or 2     Binge frequency: Never     Comment: rare    Drug use: No    Sexual activity: Not Currently   Lifestyle    Physical activity     Days per week: 0 days     Minutes per session: 0 min    Stress: Not at all   Relationships    Social connections     Talks on phone: More than three times a week     Gets together: Once a week     Attends Mos[employer] service: Not on file     Active member of club or organization: No     Attends meetings of clubs or organizations: Never     Relationship status:    Other Topics Concern    Not on file   Social History Narrative    Not on file     PHYSICAL EXAMINATION:   General    Vitals:    11/02/20 1418   Weight: 69.4 kg (153 lb)   Height: 5' 3" (1.6 m)     Constitutional: Oriented to person, place, and time. No apparent distress. Pleasant.  HENT:   Head: Normocephalic and atraumatic.   Eyes: Right eye exhibits no discharge. Left eye exhibits no discharge. No scleral icterus.   Pulmonary/Chest: Effort normal. No respiratory distress.   Abdominal: There is no guarding.   Neurological: Alert and oriented to person, place, and time.   Psychiatric: Behavior is normal.   Right Shoulder Exam     Tenderness   Right shoulder tenderness location: no tenderness over AC joint. + Anterior shoulder tenderness.    Range of Motion   Active abduction: 80   Passive abduction: 80   Right shoulder external rotation: 45.   Internal rotation 90 degrees: 20     Muscle Strength   Abduction: 2/5   Internal rotation: 3/5   External rotation: 3/5   Biceps: 4/5     Tests   Cross arm: negative  Sulcus: absent    Other   Erythema: absent  Scars: absent  Sensation: normal    Comments:  Diffuse lymphadema  Audible and palpable crepitus with pain on glenohumeral ROM      Left Shoulder Exam     Tenderness   The patient is experiencing no tenderness.     Tests   Cross arm: negative    Other " "  Erythema: absent  Scars: absent  Sensation: normal         INSPECTION: There is no ecchymoses, erythema about the right shoulder.    ASSESSMENT:   1. Chronic right shoulder pain    2. Rotator cuff tear arthropathy of right shoulder      PLAN:     1. Time was spent reviewing the above diagnosis in depth with Olga today, including acute management and rehabilitation.     2.  We discussed that her shoulder pain is secondary to degenerative change, likely from significant rotator cuff tearing.  She is likely not an ideal surgical candidate for shoulder arthroplasty.  We discussed alternative options such as injection of corticosteroid, and suprascapular nerve interventions.  After discussion, she wished to proceed with suprascapular nerve diagnostic block to assess her candidacy for the radiofrequency ablation procedure.  Suprascapular block done in clinic today. Patient reported immediate relief. Will call patient tomorrow to evaluate efficacy of block. Will schedule for suprascapular RFA if patient reports good pain relief with block.    3. Return for suprascapular RFA    This is a consult from Daniel Dupont. Please see the "Communications" section of Epic to see how the consulting physician received the report of today's findings and recommendations. If it's an [facility] physician, it will be forwarded to his/her "in basket".    The above note was completed, in part, with the aid of Dragon dictation software/hardware. Translation errors may be present.  " impaired balance/pain/decreased strength

## 2020-11-02 NOTE — H&P (VIEW-ONLY)
[facility]    Consulting Provider: Daniel Dupont *    CHIEF COMPLAINT:   Chief Complaint   Patient presents with    Shoulder Pain     Right     HISTORY OF PRESENT ILLNESS: Olga Aguiar is a 85 y.o. female who presents to me for evaluation of right shoulder pain. Right shoulder has been present for several weeks.  Pain is constant but occasionally feels a snap, crunch, pop in her shoulder that is associated with a sharper pain.  She denies neck pain or pain radiating into her arm. Denies weakness or numbness. Patient has not had previous interventions for shoulder pain. Uses tramadol prescribed for axial low back pain.   Of note, patient has lymphedema following breast cancer/lymph node resection in 2000. Patient says lymphedema was well controlled until about 5 years ago when patient was unable to find a sleeve that fit her. Patient says her arm feels very heavy and she believes the heaviness of her left arm is one of the causes of her pain.  Denies trauma or recent infection.    Review of Systems   Constitutional: Negative for fever.   HENT: Negative for drooling.    Eyes: Negative for discharge.   Respiratory: Negative for choking.    Cardiovascular: Negative for chest pain.   Genitourinary: Negative for flank pain.   Skin: Negative for wound.   Allergic/Immunologic: Negative for immunocompromised state.   Neurological: Negative for tremors and syncope.   Psychiatric/Behavioral: Negative for behavioral problems.     Past Medical History:   Past Medical History:   Diagnosis Date    Adrenal adenoma: 2.8 X 2.1CM 2010 5/16/2014    Anticoagulant long-term use      mg    Aortic atherosclerosis: see CT scan 2016 10/28/2016    Aortic stenosis 8/19/2014    Asthma     Breast cancer     Cancer     breast, both sides, right arm restriction    Cataracts, both eyes     Chronic back pain     Chronic bronchitis     CKD (chronic kidney disease) stage 3, GFR 30-59 ml/min  8/19/2014    no dialysis    COPD (chronic obstructive pulmonary disease)     occasional inhaler use, no oxygen    DDD (degenerative disc disease), lumbar 7/30/2013    Diabetes mellitus     diet controlled    Diverticulosis     Ectatic abdominal aorta: see CT scan 10/16 10/28/2016    Gallbladder polyp 8/19/2014    GERD (gastroesophageal reflux disease)     Gout     Herpes simplex     Fever Blisters and Styes in right eye    High blood cholesterol     Hypertension     holding medication when B/P low    Hypertension associated with diabetes 2/7/2012    Insomnia     Lumbar spinal stenosis     Lymphedema of arm     Right    Multiple lung nodules 9/21/2015    Renal cyst, right: stable per CT 2016 10/28/2016    Stroke 2/2012    TIA, no residual effects    Stye 12/5/2013    Thyroid disease     hypothyroidism    Type 2 diabetes mellitus without complication, without long-term current use of insulin 7/20/2015    Unspecified hypothyroidism 7/20/2015       Past Surgical History:   Past Surgical History:   Procedure Laterality Date    AXILLARY NODE DISSECTION      both sides    BREAST BIOPSY      BREAST LUMPECTOMY Bilateral     lymph nodes on right    CATARACT EXTRACTION      bilateral PHACO with IOL    COLONOSCOPY  01/12/2010    in legacy    Epidural steroid injection      Pain mangement    EPIDURAL STEROID INJECTION INTO LUMBAR SPINE N/A 6/4/2018    Procedure: Injection-steroid-epidural-lumbar;  Surgeon: Rohan Ware MD;  Location: [facility] OR;  Service: Pain Management;  Laterality: N/A;  L5/S1 interlaminar    EPIDURAL STEROID INJECTION INTO LUMBAR SPINE N/A 9/4/2018    Procedure: Injection-steroid-epidural-lumbar L5/S1;  Surgeon: Rohan Ware MD;  Location: [facility] OR;  Service: Pain Management;  Laterality: N/A;    EPIDURAL STEROID INJECTION INTO LUMBAR SPINE N/A 12/20/2018    Procedure: Injection-steroid-epidural-lumbar;  Surgeon: Rohan Ware MD;  Location: [facility] OR;  Service:  Pain Management;  Laterality: N/A;  L5/S1 interlaminar    EPIDURAL STEROID INJECTION INTO LUMBAR SPINE N/A 5/7/2019    Procedure: Injection-steroid-epidural-lumbar L5/S1;  Surgeon: Rohan Ware MD;  Location: [facility] OR;  Service: Pain Management;  Laterality: N/A;    EPIDURAL STEROID INJECTION INTO LUMBAR SPINE N/A 9/16/2019    Procedure: Injection-steroid-epidural-lumbar;  Surgeon: Rohan Ware MD;  Location: [facility] OR;  Service: Pain Management;  Laterality: N/A;  L5/S1 interlaminar     EPIDURAL STEROID INJECTION INTO LUMBAR SPINE Right 12/16/2019    Procedure: Injection-steroid-epidural-lumbar L5, S1 Right;  Surgeon: Rohan Ware MD;  Location: [facility] OR;  Service: Pain Management;  Laterality: Right;    EPIDURAL STEROID INJECTION INTO LUMBAR SPINE N/A 7/1/2020    Procedure: Injection-steroid-epidural-lumbar;  Surgeon: Rohan Ware MD;  Location: [facility] OR;  Service: Pain Management;  Laterality: N/A;  L5/S1 to RIGHT    ESOPHAGOGASTRODUODENOSCOPY N/A 11/12/2018    Procedure: EGD (ESOPHAGOGASTRODUODENOSCOPY);  Surgeon: Braydon Becerra MD;  Location: [facility];  Service: Endoscopy;  Laterality: N/A;    HYSTERECTOMY      HYSTERECTOMY      Nerve Block Injection      Pain management, Times 2    RADIOFREQUENCY ABLATION      Nerve, Pain management    RADIOFREQUENCY ABLATION OF LUMBAR MEDIAL BRANCH NERVE AT SINGLE LEVEL Bilateral 10/29/2019    Procedure: Radiofrequency Ablation, Nerve, Spinal, Lumbar, Medial Branch, L3, L4, L5;  Surgeon: Rohan Ware MD;  Location: [facility] OR;  Service: Pain Management;  Laterality: Bilateral;    RECTAL SURGERY      Fissure repair    TONSILLECTOMY      UPPER GASTROINTESTINAL ENDOSCOPY  08/29/2014    Dr. Castro       Family History:   Family History   Problem Relation Age of Onset    Cancer Mother         Breast    COPD Mother     Hearing loss Mother     Hypertension Mother     Breast cancer Mother 90    Diabetes Father     Heart disease  Father     Heart attack Father     Cancer Daughter         Breast    Breast cancer Daughter 45    Cancer Sister         Breast    Breast cancer Sister 70    Amblyopia Neg Hx     Blindness Neg Hx     Cataracts Neg Hx     Glaucoma Neg Hx     Macular degeneration Neg Hx     Retinal detachment Neg Hx     Strabismus Neg Hx     Stroke Neg Hx     Thyroid disease Neg Hx     Colon cancer Neg Hx     Stomach cancer Neg Hx     Esophageal cancer Neg Hx        Medications:   Current Outpatient Medications on File Prior to Visit   Medication Sig Dispense Refill    acetaminophen (TYLENOL) 500 MG tablet Take 500 mg by mouth every 6 (six) hours as needed.      acyclovir (ZOVIRAX) 400 MG tablet Take 400 mg by mouth 2 (two) times daily as needed.      allopurinoL (ZYLOPRIM) 100 MG tablet Take 1 tablet by mouth once daily 90 tablet 0    aspirin (ECOTRIN) 81 MG EC tablet Take 81 mg by mouth once daily.      busPIRone (BUSPAR) 5 MG Tab Take 1 tablet (5 mg total) by mouth 2 (two) times daily as needed (anxiety). 60 tablet 2    cholecalciferol, vitamin D3, 2,000 unit Cap Take 2,000 Units by mouth Daily.       colchicine (COLCRYS) 0.6 mg tablet Take 1 tablet (0.6 mg total) by mouth daily as needed (gout). 30 tablet 0    fluticasone furoate-vilanteroL (BREO ELLIPTA) 200-25 mcg/dose DsDv diskus inhaler Inhale 1 puff into the lungs once daily. 3 each 3    fluticasone-umeclidin-vilanter (TRELEGY ELLIPTA) 100-62.5-25 mcg DsDv Inhale 1 puff into the lungs once daily. 1 each 11    gabapentin (NEURONTIN) 300 MG capsule TAKE 1 CAPSULE BY MOUTH IN THE EVENING 90 capsule 0    glimepiride (AMARYL) 1 MG tablet Take 1 tablet (1 mg total) by mouth before breakfast. 90 tablet 3    levalbuterol (XOPENEX HFA) 45 mcg/actuation inhaler Inhale 1-2 puffs into the lungs every 4 (four) hours as needed for Wheezing or Shortness of Breath. 3 Inhaler 3    levothyroxine (SYNTHROID) 75 MCG tablet Take 1 tablet by mouth once daily 90 tablet  0    montelukast (SINGULAIR) 10 mg tablet Take 1 tablet (10 mg total) by mouth every evening. 90 tablet 3    nitrofurantoin, macrocrystal-monohydrate, (MACROBID) 100 MG capsule Take 1 capsule (100 mg total) by mouth 2 (two) times daily. 10 capsule 0    predniSONE (DELTASONE) 10 MG tablet Take 2 pills/day X 3 days, then 1 pills/day X 3 days then 1/2 pill/day X 3 days then stop (Patient not taking: Reported on 2020) 11 tablet 0    predniSONE (DELTASONE) 20 MG tablet One daily for   3 days and repeat as needed. 12 tablet 2    ranitidine (ZANTAC) 300 MG tablet Take 1 tablet (300 mg total) by mouth every evening. (Patient not taking: Reported on 2020) 90 tablet 3    simvastatin (ZOCOR) 40 MG tablet TAKE 1 TABLET BY MOUTH ONCE DAILY IN THE EVENING 90 tablet 3    traMADoL (ULTRAM) 50 mg tablet TAKE 1 TABLET BY MOUTH EVERY 8 HOURS AS NEEDED 90 tablet 2    triamterene-hydrochlorothiazide 37.5-25 mg (DYAZIDE) 37.5-25 mg per capsule Take 1 capsule by mouth once daily in the morning 90 capsule 0    zolpidem (AMBIEN) 5 MG Tab TAKE 1 TABLET BY MOUTH ONCE DAILY IN THE EVENING 30 tablet 0     No current facility-administered medications on file prior to visit.        Allergies: Review of patient's allergies indicates:  No Known Allergies    Social History:   Social History     Socioeconomic History    Marital status:      Spouse name: Not on file    Number of children: Not on file    Years of education: Not on file    Highest education level: Not on file   Occupational History    Not on file   Social Needs    Financial resource strain: Not very hard    Food insecurity     Worry: Never true     Inability: Never true    Transportation needs     Medical: No     Non-medical: No   Tobacco Use    Smoking status: Former Smoker     Packs/day: 1.00     Years: 18.00     Pack years: 18.00     Start date: 1952     Quit date: 1970     Years since quittin.2    Smokeless tobacco: Never Used  "  Substance and Sexual Activity    Alcohol use: Yes     Frequency: Monthly or less     Drinks per session: 1 or 2     Binge frequency: Never     Comment: rare    Drug use: No    Sexual activity: Not Currently   Lifestyle    Physical activity     Days per week: 0 days     Minutes per session: 0 min    Stress: Not at all   Relationships    Social connections     Talks on phone: More than three times a week     Gets together: Once a week     Attends Yarsanism service: Not on file     Active member of club or organization: No     Attends meetings of clubs or organizations: Never     Relationship status:    Other Topics Concern    Not on file   Social History Narrative    Not on file     PHYSICAL EXAMINATION:   General    Vitals:    11/02/20 1418   Weight: 69.4 kg (153 lb)   Height: 5' 3" (1.6 m)     Constitutional: Oriented to person, place, and time. No apparent distress. Pleasant.  HENT:   Head: Normocephalic and atraumatic.   Eyes: Right eye exhibits no discharge. Left eye exhibits no discharge. No scleral icterus.   Pulmonary/Chest: Effort normal. No respiratory distress.   Abdominal: There is no guarding.   Neurological: Alert and oriented to person, place, and time.   Psychiatric: Behavior is normal.   Right Shoulder Exam     Tenderness   Right shoulder tenderness location: no tenderness over AC joint. + Anterior shoulder tenderness.    Range of Motion   Active abduction: 80   Passive abduction: 80   Right shoulder external rotation: 45.   Internal rotation 90 degrees: 20     Muscle Strength   Abduction: 2/5   Internal rotation: 3/5   External rotation: 3/5   Biceps: 4/5     Tests   Cross arm: negative  Sulcus: absent    Other   Erythema: absent  Scars: absent  Sensation: normal    Comments:  Diffuse lymphadema  Audible and palpable crepitus with pain on glenohumeral ROM      Left Shoulder Exam     Tenderness   The patient is experiencing no tenderness.     Tests   Cross arm: negative    Other " "  Erythema: absent  Scars: absent  Sensation: normal         INSPECTION: There is no ecchymoses, erythema about the right shoulder.    ASSESSMENT:   1. Chronic right shoulder pain    2. Rotator cuff tear arthropathy of right shoulder      PLAN:     1. Time was spent reviewing the above diagnosis in depth with Olga today, including acute management and rehabilitation.     2.  We discussed that her shoulder pain is secondary to degenerative change, likely from significant rotator cuff tearing.  She is likely not an ideal surgical candidate for shoulder arthroplasty.  We discussed alternative options such as injection of corticosteroid, and suprascapular nerve interventions.  After discussion, she wished to proceed with suprascapular nerve diagnostic block to assess her candidacy for the radiofrequency ablation procedure.  Suprascapular block done in clinic today. Patient reported immediate relief. Will call patient tomorrow to evaluate efficacy of block. Will schedule for suprascapular RFA if patient reports good pain relief with block.    3. Return for suprascapular RFA    This is a consult from Daniel Dupont. Please see the "Communications" section of Epic to see how the consulting physician received the report of today's findings and recommendations. If it's an [facility] physician, it will be forwarded to his/her "in basket".    The above note was completed, in part, with the aid of Dragon dictation software/hardware. Translation errors may be present.  "

## 2020-11-03 ENCOUNTER — TELEPHONE (OUTPATIENT)
Dept: PHYSICAL MEDICINE AND REHAB | Facility: CLINIC | Age: 85
End: 2020-11-03

## 2020-11-03 ENCOUNTER — LAB VISIT (OUTPATIENT)
Dept: LAB | Facility: HOSPITAL | Age: 85
End: 2020-11-03
Attending: INTERNAL MEDICINE
Payer: MEDICARE

## 2020-11-03 DIAGNOSIS — Z01.818 PREOP TESTING: ICD-10-CM

## 2020-11-03 DIAGNOSIS — N18.30 CKD (CHRONIC KIDNEY DISEASE) STAGE 3, GFR 30-59 ML/MIN: ICD-10-CM

## 2020-11-03 DIAGNOSIS — E55.9 VITAMIN D DEFICIENCY DISEASE: ICD-10-CM

## 2020-11-03 DIAGNOSIS — G89.29 CHRONIC RIGHT SHOULDER PAIN: Primary | ICD-10-CM

## 2020-11-03 DIAGNOSIS — M25.511 CHRONIC RIGHT SHOULDER PAIN: Primary | ICD-10-CM

## 2020-11-03 DIAGNOSIS — E03.9 ACQUIRED HYPOTHYROIDISM: ICD-10-CM

## 2020-11-03 DIAGNOSIS — E11.8 CONTROLLED TYPE 2 DIABETES MELLITUS WITH COMPLICATION, WITHOUT LONG-TERM CURRENT USE OF INSULIN: ICD-10-CM

## 2020-11-03 DIAGNOSIS — E78.2 MIXED HYPERLIPIDEMIA: ICD-10-CM

## 2020-11-03 LAB
ALBUMIN SERPL BCP-MCNC: 3.4 G/DL (ref 3.5–5.2)
ALP SERPL-CCNC: 83 U/L (ref 55–135)
ALT SERPL W/O P-5'-P-CCNC: 35 U/L (ref 10–44)
ANION GAP SERPL CALC-SCNC: 13 MMOL/L (ref 8–16)
AST SERPL-CCNC: 36 U/L (ref 10–40)
BASOPHILS # BLD AUTO: 0.05 K/UL (ref 0–0.2)
BASOPHILS NFR BLD: 0.9 % (ref 0–1.9)
BILIRUB SERPL-MCNC: 0.5 MG/DL (ref 0.1–1)
BUN SERPL-MCNC: 20 MG/DL (ref 8–23)
CALCIUM SERPL-MCNC: 9.9 MG/DL (ref 8.7–10.5)
CHLORIDE SERPL-SCNC: 98 MMOL/L (ref 95–110)
CHOLEST SERPL-MCNC: 175 MG/DL (ref 120–199)
CHOLEST/HDLC SERPL: 2.9 {RATIO} (ref 2–5)
CO2 SERPL-SCNC: 31 MMOL/L (ref 23–29)
CREAT SERPL-MCNC: 1.1 MG/DL (ref 0.5–1.4)
DIFFERENTIAL METHOD: ABNORMAL
EOSINOPHIL # BLD AUTO: 0.1 K/UL (ref 0–0.5)
EOSINOPHIL NFR BLD: 1.6 % (ref 0–8)
ERYTHROCYTE [DISTWIDTH] IN BLOOD BY AUTOMATED COUNT: 13.7 % (ref 11.5–14.5)
EST. GFR  (AFRICAN AMERICAN): 52.9 ML/MIN/1.73 M^2
EST. GFR  (NON AFRICAN AMERICAN): 45.9 ML/MIN/1.73 M^2
ESTIMATED AVG GLUCOSE: 123 MG/DL (ref 68–131)
GLUCOSE SERPL-MCNC: 93 MG/DL (ref 70–110)
HBA1C MFR BLD HPLC: 5.9 % (ref 4–5.6)
HCT VFR BLD AUTO: 48.1 % (ref 37–48.5)
HDLC SERPL-MCNC: 60 MG/DL (ref 40–75)
HDLC SERPL: 34.3 % (ref 20–50)
HGB BLD-MCNC: 15.3 G/DL (ref 12–16)
IMM GRANULOCYTES # BLD AUTO: 0.02 K/UL (ref 0–0.04)
IMM GRANULOCYTES NFR BLD AUTO: 0.4 % (ref 0–0.5)
LDLC SERPL CALC-MCNC: 96.2 MG/DL (ref 63–159)
LYMPHOCYTES # BLD AUTO: 1.5 K/UL (ref 1–4.8)
LYMPHOCYTES NFR BLD: 27.8 % (ref 18–48)
MCH RBC QN AUTO: 29.5 PG (ref 27–31)
MCHC RBC AUTO-ENTMCNC: 31.8 G/DL (ref 32–36)
MCV RBC AUTO: 93 FL (ref 82–98)
MONOCYTES # BLD AUTO: 0.5 K/UL (ref 0.3–1)
MONOCYTES NFR BLD: 8.5 % (ref 4–15)
NEUTROPHILS # BLD AUTO: 3.4 K/UL (ref 1.8–7.7)
NEUTROPHILS NFR BLD: 60.8 % (ref 38–73)
NONHDLC SERPL-MCNC: 115 MG/DL
NRBC BLD-RTO: 0 /100 WBC
PLATELET # BLD AUTO: 209 K/UL (ref 150–350)
PMV BLD AUTO: 10.5 FL (ref 9.2–12.9)
POTASSIUM SERPL-SCNC: 4.3 MMOL/L (ref 3.5–5.1)
PROT SERPL-MCNC: 7 G/DL (ref 6–8.4)
RBC # BLD AUTO: 5.18 M/UL (ref 4–5.4)
SODIUM SERPL-SCNC: 142 MMOL/L (ref 136–145)
TRIGL SERPL-MCNC: 94 MG/DL (ref 30–150)
TSH SERPL DL<=0.005 MIU/L-ACNC: 2.07 UIU/ML (ref 0.4–4)
WBC # BLD AUTO: 5.53 K/UL (ref 3.9–12.7)

## 2020-11-03 PROCEDURE — 85025 COMPLETE CBC W/AUTO DIFF WBC: CPT

## 2020-11-03 PROCEDURE — 84443 ASSAY THYROID STIM HORMONE: CPT

## 2020-11-03 PROCEDURE — 83036 HEMOGLOBIN GLYCOSYLATED A1C: CPT

## 2020-11-03 PROCEDURE — 80061 LIPID PANEL: CPT

## 2020-11-03 PROCEDURE — 82306 VITAMIN D 25 HYDROXY: CPT

## 2020-11-03 PROCEDURE — 80053 COMPREHEN METABOLIC PANEL: CPT

## 2020-11-03 PROCEDURE — 36415 COLL VENOUS BLD VENIPUNCTURE: CPT | Mod: PO

## 2020-11-03 NOTE — TELEPHONE ENCOUNTER
Called and spoke with the patient. Reports 90% releif from the dx block yesterday. Wished to proceed with the RFA. Will have Dr. Rome fill out a booking sheet and will call her tomorrow to pick a date.

## 2020-11-03 NOTE — PROCEDURES
"Nerve Block    Date/Time: 11/2/2020 2:30 PM  Performed by: David Rome MD  Authorized by: David Rome MD   Consent Done: Yes  Time out: Immediately prior to procedure a "time out" was called to verify the correct patient, procedure, equipment, support staff and site/side marked as required.  Indications: pain relief  Body area: upper extremity  Nerve: suprascapular  Laterality: right  Patient sedated: no  Preparation: Patient was prepped and draped in the usual sterile fashion.  Patient position: sitting  Needle size: 22 G  Location technique: ultrasound guidance  Local Anesthetic: bupivacaine 0.25% without epinephrine  Anesthetic total: 7 mL  Outcome: pain improved  Patient tolerance: Patient tolerated the procedure well with no immediate complications  Comments: Ultrasound guidance was used for correct needle placement, the images were saved will be uploaded to EMR.          "

## 2020-11-04 LAB — 25(OH)D3+25(OH)D2 SERPL-MCNC: 43 NG/ML (ref 30–96)

## 2020-11-05 DIAGNOSIS — G89.29 CHRONIC RIGHT SHOULDER PAIN: Primary | ICD-10-CM

## 2020-11-05 DIAGNOSIS — M25.511 CHRONIC RIGHT SHOULDER PAIN: Primary | ICD-10-CM

## 2020-11-09 ENCOUNTER — LAB VISIT (OUTPATIENT)
Dept: FAMILY MEDICINE | Facility: CLINIC | Age: 85
End: 2020-11-09
Payer: MEDICARE

## 2020-11-09 DIAGNOSIS — G89.29 CHRONIC RIGHT SHOULDER PAIN: ICD-10-CM

## 2020-11-09 DIAGNOSIS — Z01.818 PREOP TESTING: ICD-10-CM

## 2020-11-09 DIAGNOSIS — M25.511 CHRONIC RIGHT SHOULDER PAIN: ICD-10-CM

## 2020-11-09 PROCEDURE — U0003 INFECTIOUS AGENT DETECTION BY NUCLEIC ACID (DNA OR RNA); SEVERE ACUTE RESPIRATORY SYNDROME CORONAVIRUS 2 (SARS-COV-2) (CORONAVIRUS DISEASE [COVID-19]), AMPLIFIED PROBE TECHNIQUE, MAKING USE OF HIGH THROUGHPUT TECHNOLOGIES AS DESCRIBED BY CMS-2020-01-R: HCPCS

## 2020-11-10 LAB — SARS-COV-2 RNA RESP QL NAA+PROBE: NOT DETECTED

## 2020-11-11 ENCOUNTER — PATIENT MESSAGE (OUTPATIENT)
Dept: SURGERY | Facility: HOSPITAL | Age: 85
End: 2020-11-11

## 2020-11-11 DIAGNOSIS — G89.29 CHRONIC RIGHT SHOULDER PAIN: Primary | ICD-10-CM

## 2020-11-11 DIAGNOSIS — M25.511 CHRONIC RIGHT SHOULDER PAIN: Primary | ICD-10-CM

## 2020-11-11 RX ORDER — SODIUM CHLORIDE, SODIUM LACTATE, POTASSIUM CHLORIDE, CALCIUM CHLORIDE 600; 310; 30; 20 MG/100ML; MG/100ML; MG/100ML; MG/100ML
INJECTION, SOLUTION INTRAVENOUS CONTINUOUS
Status: CANCELLED | OUTPATIENT
Start: 2020-11-11

## 2020-11-11 RX ORDER — LIDOCAINE HYDROCHLORIDE 10 MG/ML
1 INJECTION, SOLUTION EPIDURAL; INFILTRATION; INTRACAUDAL; PERINEURAL ONCE
Status: CANCELLED | OUTPATIENT
Start: 2020-11-11 | End: 2020-11-11

## 2020-11-11 RX ORDER — MIDAZOLAM HYDROCHLORIDE 1 MG/ML
2 INJECTION INTRAMUSCULAR; INTRAVENOUS ONCE AS NEEDED
Status: CANCELLED | OUTPATIENT
Start: 2020-11-11 | End: 2032-04-09

## 2020-11-12 ENCOUNTER — HOSPITAL ENCOUNTER (OUTPATIENT)
Facility: HOSPITAL | Age: 85
Discharge: HOME | End: 2020-11-12
Attending: PHYSICAL MEDICINE & REHABILITATION | Admitting: PHYSICAL MEDICINE & REHABILITATION
Payer: MEDICARE

## 2020-11-12 ENCOUNTER — HOSPITAL ENCOUNTER (OUTPATIENT)
Dept: RADIOLOGY | Facility: HOSPITAL | Age: 85
Discharge: HOME | End: 2020-11-12
Attending: PHYSICAL MEDICINE & REHABILITATION
Payer: MEDICARE

## 2020-11-12 DIAGNOSIS — G89.4 CHRONIC PAIN SYNDROME: ICD-10-CM

## 2020-11-12 DIAGNOSIS — M25.511 CHRONIC RIGHT SHOULDER PAIN: ICD-10-CM

## 2020-11-12 DIAGNOSIS — G89.29 CHRONIC RIGHT SHOULDER PAIN: ICD-10-CM

## 2020-11-12 LAB — GLUCOSE SERPL-MCNC: 109 MG/DL (ref 70–110)

## 2020-11-12 PROCEDURE — 71000033 HC RECOVERY, INTIAL HOUR: Mod: PO | Performed by: PHYSICAL MEDICINE & REHABILITATION

## 2020-11-12 PROCEDURE — 64640 INJECTION TREATMENT OF NERVE: CPT | Mod: PO | Performed by: PHYSICAL MEDICINE & REHABILITATION

## 2020-11-12 PROCEDURE — 99152 MOD SED SAME PHYS/QHP 5/>YRS: CPT | Mod: ,,, | Performed by: PHYSICAL MEDICINE & REHABILITATION

## 2020-11-12 PROCEDURE — 63600175 PHARM REV CODE 636 W HCPCS: Mod: PO | Performed by: PHYSICAL MEDICINE & REHABILITATION

## 2020-11-12 PROCEDURE — 64640 INJECTION TREATMENT OF NERVE: CPT | Mod: RT,,, | Performed by: PHYSICAL MEDICINE & REHABILITATION

## 2020-11-12 PROCEDURE — 99152 PR MOD CONSCIOUS SEDATION, SAME PHYS, 5+ YRS, FIRST 15 MIN: ICD-10-PCS | Mod: ,,, | Performed by: PHYSICAL MEDICINE & REHABILITATION

## 2020-11-12 PROCEDURE — 25000003 PHARM REV CODE 250: Mod: PO | Performed by: PHYSICAL MEDICINE & REHABILITATION

## 2020-11-12 PROCEDURE — 64640 PR DESTRUCT BY NEURO AGENT; OTHER PERIPH NERVE: ICD-10-PCS | Mod: RT,,, | Performed by: PHYSICAL MEDICINE & REHABILITATION

## 2020-11-12 RX ORDER — LIDOCAINE HYDROCHLORIDE 20 MG/ML
INJECTION, SOLUTION EPIDURAL; INFILTRATION; INTRACAUDAL; PERINEURAL
Status: DISCONTINUED | OUTPATIENT
Start: 2020-11-12 | End: 2020-11-12 | Stop reason: HOSPADM

## 2020-11-12 RX ORDER — SODIUM CHLORIDE, SODIUM LACTATE, POTASSIUM CHLORIDE, CALCIUM CHLORIDE 600; 310; 30; 20 MG/100ML; MG/100ML; MG/100ML; MG/100ML
INJECTION, SOLUTION INTRAVENOUS CONTINUOUS
Status: DISCONTINUED | OUTPATIENT
Start: 2020-11-12 | End: 2020-11-12 | Stop reason: HOSPADM

## 2020-11-12 RX ORDER — LIDOCAINE HYDROCHLORIDE 10 MG/ML
1 INJECTION, SOLUTION EPIDURAL; INFILTRATION; INTRACAUDAL; PERINEURAL ONCE
Status: DISCONTINUED | OUTPATIENT
Start: 2020-11-12 | End: 2020-11-12 | Stop reason: HOSPADM

## 2020-11-12 RX ORDER — LIDOCAINE HYDROCHLORIDE 10 MG/ML
INJECTION, SOLUTION EPIDURAL; INFILTRATION; INTRACAUDAL; PERINEURAL
Status: DISCONTINUED | OUTPATIENT
Start: 2020-11-12 | End: 2020-11-12 | Stop reason: HOSPADM

## 2020-11-12 RX ORDER — MIDAZOLAM HYDROCHLORIDE 1 MG/ML
2 INJECTION INTRAMUSCULAR; INTRAVENOUS ONCE AS NEEDED
Status: COMPLETED | OUTPATIENT
Start: 2020-11-12 | End: 2020-11-12

## 2020-11-12 RX ADMIN — SODIUM CHLORIDE, SODIUM LACTATE, POTASSIUM CHLORIDE, AND CALCIUM CHLORIDE: .6; .31; .03; .02 INJECTION, SOLUTION INTRAVENOUS at 10:11

## 2020-11-12 NOTE — INTERVAL H&P NOTE
The patient has been examined and the H&P has been reviewed:    I concur with the findings and no changes have occurred since H&P was written.    Anesthesia/Surgery risks, benefits and alternative options discussed and understood by patient/family.    Malampatti score II, ASA 2      There are no hospital problems to display for this patient.

## 2020-11-12 NOTE — OP NOTE
Operative Note       Surgery Date: 11/12/2020     Surgeon(s) and Role:     * David Rome MD - Primary    Pre-op Diagnosis:  Chronic right shoulder pain [M25.511, G89.29]    Post-op Diagnosis: Post-Op Diagnosis Codes:     * Chronic right shoulder pain [M25.511, G89.29]    Procedure:  Radiofrequency ablation of the right shoulder (right) suprascapular nerve    Anesthesia: RN IV Sedation    Description of Procedure:    Olga Aguiar is a 85 y.o. female with chronic right shoulder pain who presents for an elective radiofrequency ablation of the right suprascapular nerve. She did get significant pain relief (>80%) from the diagnostic block of the suprascapular nerve and has elected to undergo the RF procedure in the hopes of longer term pain relief. We discussed risks, benefits, and alternatives. Patient's verbal and written consent was obtained. She was brought to the fluoroscopic suite, placed in an upright sitting position. Once she was brought to the operating room, a time-out was performed confirming the name, the location and the procedure. The right shoulder was prepped and draped in the usual sterile fashion. 1% lidocaine was used to anesthetize the overlying subcutaneous cutaneous tissues using a 27-gauge 1.5 inch needle on a 10 cc syringe. The needle tract from the skin to the target suprascapular nerve was navigated via ultrasound guidance.  Also using ultrasound, a 20-gauge needle cannula was guided down from a medial to lateral trajectory down to the suprascapular ligament and suprascapular nerve observed on ultrasound. This was done in an in plane fashion. Once in position adjacent to the suprascapular nerve, the stylette was removed and the radiofrequency probe was then inserted through the cannula. Motor stimulation was then performed at 1.5 V showing active contraction of the infraspinatus. Following motor stimulation, the needle cannula was then pulled back 1 mm and 1 cc of 2% of  lidocaine was then injected through the cannula. Next, the radio frequency probe was placed back into the introducer needle and pulsed thermal radiofrequency lesioning was performed at 42°C for 2 minutes, 30 seconds. The needle was then repositioned more posterior and thermal radiofrequency lesioning was  performed at 80°C for 1 minutes, 30 seconds. Following lesioning, a mixture of 1 cc of dexamethasone, with 1 cc 1% lidocaine was injected through the introducer needle. The cannula was then re-styletted and removed. Band-Aid was applied to the puncture site after cleaning the skin with sterile solution.    Estimated Blood Loss: Minimal    Attestation:  I performed the procedure.    The patient was given conscious sedation by a registered nurse with me in attendance. The agents used were fentanyl and Versed. There was continuous monitoring of EKG, blood pressure and pulse oximetry. Total time for conscious sedation was 28 minutes.          Discharge Note    Admit Date: 11/12/2020    Attending Physician: David Rome MD     Discharge Physician: David Rome MD    Final Diagnosis: Post-Op Diagnosis Codes:     * Chronic right shoulder pain [M25.511, G89.29]    Disposition: Home or Self Care, pt discharged in good condition and will f/u in clinic in 2 weeks.    Patient Instructions:   Current Discharge Medication List      CONTINUE these medications which have NOT CHANGED    Details   acetaminophen (TYLENOL) 500 MG tablet Take 500 mg by mouth every 6 (six) hours as needed.      allopurinoL (ZYLOPRIM) 100 MG tablet Take 1 tablet by mouth once daily  Qty: 90 tablet, Refills: 0      aspirin (ECOTRIN) 81 MG EC tablet Take 81 mg by mouth once daily.      busPIRone (BUSPAR) 5 MG Tab Take 1 tablet (5 mg total) by mouth 2 (two) times daily as needed (anxiety).  Qty: 60 tablet, Refills: 2      cholecalciferol, vitamin D3, 2,000 unit Cap Take 2,000 Units by mouth Daily.       fluticasone furoate-vilanteroL (BREO  ELLIPTA) 200-25 mcg/dose DsDv diskus inhaler Inhale 1 puff into the lungs once daily.  Qty: 3 each, Refills: 3    Associated Diagnoses: COPD (chronic obstructive pulmonary disease) with chronic bronchitis; Intermittent asthma, uncomplicated; Bronchiectasis without complication      gabapentin (NEURONTIN) 300 MG capsule TAKE 1 CAPSULE BY MOUTH IN THE EVENING  Qty: 90 capsule, Refills: 0      glimepiride (AMARYL) 1 MG tablet Take 1 tablet (1 mg total) by mouth before breakfast.  Qty: 90 tablet, Refills: 3      levothyroxine (SYNTHROID) 75 MCG tablet Take 1 tablet by mouth once daily  Qty: 90 tablet, Refills: 0      simvastatin (ZOCOR) 40 MG tablet TAKE 1 TABLET BY MOUTH ONCE DAILY IN THE EVENING  Qty: 90 tablet, Refills: 3    Associated Diagnoses: Hyperlipidemia, unspecified hyperlipidemia type      traMADoL (ULTRAM) 50 mg tablet TAKE 1 TABLET BY MOUTH EVERY 8 HOURS AS NEEDED  Qty: 90 tablet, Refills: 2      triamterene-hydrochlorothiazide 37.5-25 mg (DYAZIDE) 37.5-25 mg per capsule Take 1 capsule by mouth once daily in the morning  Qty: 90 capsule, Refills: 0      zolpidem (AMBIEN) 5 MG Tab TAKE 1 TABLET BY MOUTH ONCE DAILY IN THE EVENING  Qty: 30 tablet, Refills: 0      acyclovir (ZOVIRAX) 400 MG tablet Take 400 mg by mouth 2 (two) times daily as needed.      colchicine (COLCRYS) 0.6 mg tablet Take 1 tablet (0.6 mg total) by mouth daily as needed (gout).  Qty: 30 tablet, Refills: 0      fluticasone-umeclidin-vilanter (TRELEGY ELLIPTA) 100-62.5-25 mcg DsDv Inhale 1 puff into the lungs once daily.  Qty: 1 each, Refills: 11    Associated Diagnoses: COPD (chronic obstructive pulmonary disease) with chronic bronchitis      levalbuterol (XOPENEX HFA) 45 mcg/actuation inhaler Inhale 1-2 puffs into the lungs every 4 (four) hours as needed for Wheezing or Shortness of Breath.  Qty: 3 Inhaler, Refills: 3    Associated Diagnoses: COPD (chronic obstructive pulmonary disease) with chronic bronchitis; Intermittent asthma,  uncomplicated      montelukast (SINGULAIR) 10 mg tablet Take 1 tablet (10 mg total) by mouth every evening.  Qty: 90 tablet, Refills: 3    Associated Diagnoses: Mild intermittent asthma without complication      nitrofurantoin, macrocrystal-monohydrate, (MACROBID) 100 MG capsule Take 1 capsule (100 mg total) by mouth 2 (two) times daily.  Qty: 10 capsule, Refills: 0      !! predniSONE (DELTASONE) 10 MG tablet Take 2 pills/day X 3 days, then 1 pills/day X 3 days then 1/2 pill/day X 3 days then stop  Qty: 11 tablet, Refills: 0    Associated Diagnoses: Pharyngitis, unspecified etiology      !! predniSONE (DELTASONE) 20 MG tablet One daily for   3 days and repeat as needed.  Qty: 12 tablet, Refills: 2    Associated Diagnoses: Intermittent asthma, uncomplicated; Bronchiectasis without complication; COPD (chronic obstructive pulmonary disease) with chronic bronchitis      ranitidine (ZANTAC) 300 MG tablet Take 1 tablet (300 mg total) by mouth every evening.  Qty: 90 tablet, Refills: 3    Associated Diagnoses: Gastroesophageal reflux disease, esophagitis presence not specified       !! - Potential duplicate medications found. Please discuss with provider.          Discharge Procedure Orders (must include Diet, Follow-up, Activity)   Discharge Procedure Orders (must include Diet, Follow-up, Activity)   Diet general     Ice to affected area     Call MD for:  temperature >100.4     Call MD for:  persistent nausea and vomiting     Call MD for:  severe uncontrolled pain     Call MD for:  difficulty breathing, headache or visual disturbances     Call MD for:  redness, tenderness, or signs of infection (pain, swelling, redness, odor or green/yellow discharge around incision site)     Call MD for:  hives     Call MD for:  persistent dizziness or light-headedness     Call MD for:  extreme fatigue     No dressing needed     Shower on day dressing removed (No bath)        Discharge Date: 11/12/2020

## 2020-11-12 NOTE — DISCHARGE INSTRUCTIONS
Recovery After Procedural Sedation (Adult)   You have been given medicine by vein to make you sleep during your surgery. This may have included both a pain medicine and sleeping medicine. Most of the effects have worn off. But you may still have some drowsiness for the next 6 to 8 hours.  Home care  Follow these guidelines when you get home:  · For the next 8 hours, you should be watched by a responsible adult. This person should make sure your condition is not getting worse.  · Don't drink any alcohol for the next 24 hours.  · Don't drive, operate dangerous machinery, or make important business or personal decisions during the next 24 hours.  · To prevent injury or falls, use caution when standing and walking for at least 24 hours after your procedure.  Note: Your healthcare provider may tell you not to take any medicine by mouth for pain or sleep in the next 4 hours. These medicines may react with the medicines you were given in the hospital. This could cause a much stronger response than usual.  Follow-up care  Follow up with your healthcare provider if you are not alert and back to your usual level of activity within 12 hours.  When to seek medical advice  Call your healthcare provider right away if any of these occur:  · Drowsiness gets worse  · Weakness or dizziness gets worse  · Repeated vomiting  · You can't be awakened  · Fever  · New rash  [employer] last reviewed this educational content on 9/1/2019  © 1839-4367 [employer]. [address] [place] [id number]. All rights reserved. This information is not intended as a substitute for professional medical care. Always follow your healthcare professional's instructions.      Home care instructions  Apply ice pack to the injection site for 20 minutes periods for the first 24 hrs for soreness/discomfort at injection site   DO NOT USE HEAT FOR 24 HOURS  Keep site clean and dry for 24 hours, remove bandaid when desired  Do not drive until  tomorrow  Take care when walking after a lumbar injection  Avoid strenuous activities for 2 days  Make take 2 weeks to feel the full effects   Resume home medication as prescribed today  Resume Aspirin, Plavix, or Coumadin the day after the procedure unless otherwise instructed.    SEE IMMEDIATE MEDICAL HELP FOR:  Severe increase in your usual pain or appearance of new pain  Prolonged or increasing weakness or numbness in the legs or arms  Drainage, redness, active bleeding, or increased swelling at the injection site  Temperature over 100.0 degrees F.  Headache that increases when your head is upright and decreases when you lie flat    CALL 911 OR GO DIRECTLY TO EMERGENCY DEPARTMENT FOR:  Shortness of breath, chest pain, or problems breathing

## 2020-11-13 ENCOUNTER — PATIENT MESSAGE (OUTPATIENT)
Dept: INTERNAL MEDICINE | Facility: CLINIC | Age: 85
End: 2020-11-13

## 2020-11-13 ENCOUNTER — PATIENT MESSAGE (OUTPATIENT)
Dept: PHYSICAL MEDICINE AND REHAB | Facility: CLINIC | Age: 85
End: 2020-11-13

## 2020-11-13 VITALS
TEMPERATURE: 98 F | DIASTOLIC BLOOD PRESSURE: 69 MMHG | RESPIRATION RATE: 16 BRPM | HEART RATE: 60 BPM | OXYGEN SATURATION: 96 % | HEIGHT: 63 IN | BODY MASS INDEX: 26.58 KG/M2 | WEIGHT: 150 LBS | SYSTOLIC BLOOD PRESSURE: 146 MMHG

## 2020-11-16 RX ORDER — CEPHALEXIN 500 MG/1
500 CAPSULE ORAL 4 TIMES DAILY
Qty: 40 CAPSULE | Refills: 0 | Status: SHIPPED | OUTPATIENT
Start: 2020-11-16 | End: 2020-11-23 | Stop reason: SDUPTHER

## 2020-11-23 ENCOUNTER — OFFICE VISIT (OUTPATIENT)
Dept: INTERNAL MEDICINE | Facility: CLINIC | Age: 85
End: 2020-11-23
Payer: MEDICARE

## 2020-11-23 VITALS
DIASTOLIC BLOOD PRESSURE: 75 MMHG | SYSTOLIC BLOOD PRESSURE: 125 MMHG | BODY MASS INDEX: 26.93 KG/M2 | WEIGHT: 152 LBS | HEIGHT: 63 IN

## 2020-11-23 DIAGNOSIS — I89.0 LYMPH EDEMA: ICD-10-CM

## 2020-11-23 DIAGNOSIS — K86.2 PANCREATIC CYST: ICD-10-CM

## 2020-11-23 DIAGNOSIS — I35.0 AORTIC VALVE STENOSIS, MODERATE: ICD-10-CM

## 2020-11-23 DIAGNOSIS — D35.00 ADRENAL ADENOMA, UNSPECIFIED LATERALITY: ICD-10-CM

## 2020-11-23 DIAGNOSIS — I70.0 AORTIC ATHEROSCLEROSIS: ICD-10-CM

## 2020-11-23 DIAGNOSIS — E78.2 MIXED HYPERLIPIDEMIA: ICD-10-CM

## 2020-11-23 DIAGNOSIS — E11.8 CONTROLLED TYPE 2 DIABETES MELLITUS WITH COMPLICATION, WITHOUT LONG-TERM CURRENT USE OF INSULIN: ICD-10-CM

## 2020-11-23 DIAGNOSIS — M81.0 OSTEOPOROSIS WITHOUT CURRENT PATHOLOGICAL FRACTURE, UNSPECIFIED OSTEOPOROSIS TYPE: ICD-10-CM

## 2020-11-23 DIAGNOSIS — F51.01 PRIMARY INSOMNIA: ICD-10-CM

## 2020-11-23 DIAGNOSIS — Z85.3 HISTORY OF BREAST CANCER: ICD-10-CM

## 2020-11-23 DIAGNOSIS — E03.9 ACQUIRED HYPOTHYROIDISM: ICD-10-CM

## 2020-11-23 DIAGNOSIS — M10.00 IDIOPATHIC GOUT, UNSPECIFIED CHRONICITY, UNSPECIFIED SITE: ICD-10-CM

## 2020-11-23 DIAGNOSIS — J47.9 BRONCHIECTASIS WITHOUT COMPLICATION: Primary | ICD-10-CM

## 2020-11-23 DIAGNOSIS — N18.31 STAGE 3A CHRONIC KIDNEY DISEASE: ICD-10-CM

## 2020-11-23 DIAGNOSIS — Z87.891 FORMER SMOKER: ICD-10-CM

## 2020-11-23 PROCEDURE — 99214 OFFICE O/P EST MOD 30 MIN: CPT | Mod: S$GLB,,, | Performed by: INTERNAL MEDICINE

## 2020-11-23 PROCEDURE — 1159F MED LIST DOCD IN RCRD: CPT | Mod: S$GLB,,, | Performed by: INTERNAL MEDICINE

## 2020-11-23 PROCEDURE — 99214 PR OFFICE/OUTPT VISIT, EST, LEVL IV, 30-39 MIN: ICD-10-PCS | Mod: S$GLB,,, | Performed by: INTERNAL MEDICINE

## 2020-11-23 PROCEDURE — 1125F AMNT PAIN NOTED PAIN PRSNT: CPT | Mod: S$GLB,,, | Performed by: INTERNAL MEDICINE

## 2020-11-23 PROCEDURE — 1159F PR MEDICATION LIST DOCUMENTED IN MEDICAL RECORD: ICD-10-PCS | Mod: S$GLB,,, | Performed by: INTERNAL MEDICINE

## 2020-11-23 PROCEDURE — 99999 PR PBB SHADOW E&M-EST. PATIENT-LVL V: CPT | Mod: PBBFAC,,, | Performed by: INTERNAL MEDICINE

## 2020-11-23 PROCEDURE — 99999 PR PBB SHADOW E&M-EST. PATIENT-LVL V: ICD-10-PCS | Mod: PBBFAC,,, | Performed by: INTERNAL MEDICINE

## 2020-11-23 PROCEDURE — 3072F LOW RISK FOR RETINOPATHY: CPT | Mod: S$GLB,,, | Performed by: INTERNAL MEDICINE

## 2020-11-23 PROCEDURE — 1101F PR PT FALLS ASSESS DOC 0-1 FALLS W/OUT INJ PAST YR: ICD-10-PCS | Mod: CPTII,S$GLB,, | Performed by: INTERNAL MEDICINE

## 2020-11-23 PROCEDURE — 1125F PR PAIN SEVERITY QUANTIFIED, PAIN PRESENT: ICD-10-PCS | Mod: S$GLB,,, | Performed by: INTERNAL MEDICINE

## 2020-11-23 PROCEDURE — 1101F PT FALLS ASSESS-DOCD LE1/YR: CPT | Mod: CPTII,S$GLB,, | Performed by: INTERNAL MEDICINE

## 2020-11-23 PROCEDURE — 3078F DIAST BP <80 MM HG: CPT | Mod: CPTII,S$GLB,, | Performed by: INTERNAL MEDICINE

## 2020-11-23 PROCEDURE — 3074F SYST BP LT 130 MM HG: CPT | Mod: CPTII,S$GLB,, | Performed by: INTERNAL MEDICINE

## 2020-11-23 PROCEDURE — 3288F PR FALLS RISK ASSESSMENT DOCUMENTED: ICD-10-PCS | Mod: CPTII,S$GLB,, | Performed by: INTERNAL MEDICINE

## 2020-11-23 PROCEDURE — 3288F FALL RISK ASSESSMENT DOCD: CPT | Mod: CPTII,S$GLB,, | Performed by: INTERNAL MEDICINE

## 2020-11-23 PROCEDURE — 3078F PR MOST RECENT DIASTOLIC BLOOD PRESSURE < 80 MM HG: ICD-10-PCS | Mod: CPTII,S$GLB,, | Performed by: INTERNAL MEDICINE

## 2020-11-23 PROCEDURE — 3074F PR MOST RECENT SYSTOLIC BLOOD PRESSURE < 130 MM HG: ICD-10-PCS | Mod: CPTII,S$GLB,, | Performed by: INTERNAL MEDICINE

## 2020-11-23 PROCEDURE — 3072F PR LOW RISK FOR RETINOPATHY: ICD-10-PCS | Mod: S$GLB,,, | Performed by: INTERNAL MEDICINE

## 2020-11-23 RX ORDER — CEPHALEXIN 500 MG/1
500 CAPSULE ORAL 4 TIMES DAILY
Qty: 40 CAPSULE | Refills: 6 | Status: SHIPPED | OUTPATIENT
Start: 2020-11-23 | End: 2022-01-03 | Stop reason: SDUPTHER

## 2020-11-23 NOTE — PROGRESS NOTES
Subjective:       Patient ID: Olga Aguiar is a 85 y.o. female.    Chief Complaint: Follow-up    Here with .    Lymphedema R arm, may have been worse since injection in shoulder.  Prone to cellulitis- needs antibiotics on hand.     Recent labs acceptable; A1c 5.9.       Will be due for MRI of the abdomen for follow-up of pancreatic cyst now.     Ambien use reviewed,  reviewed, pattern of use has been stable.     Bone density exam is also due currently.     Respiratory status is stable.  No syncope, chest pain, pressure, tightness or shortness of breath.     Physical medicine November 2020  Pain clinic October 2020  Mammogram July 2020  Cardiology June 2020  Pulmonary May 2020  Optometry July 2019  Abdominal MRI October 2018 due 2020  Nephrology August 2018  DEXA scan May 2017, due now    Review of Systems   Constitutional: Negative for fatigue and fever.   Respiratory: Negative for cough and shortness of breath.    Cardiovascular: Negative for chest pain.   Gastrointestinal: Negative for abdominal pain.   Genitourinary: Negative for difficulty urinating and hematuria.   Musculoskeletal: Positive for arthralgias and back pain.        Chronic arthralgias   Skin: Negative for rash.        Lymphedema   Neurological: Negative for weakness and numbness.   Psychiatric/Behavioral: The patient is not nervous/anxious.        Objective:      Physical Exam  Vitals signs and nursing note reviewed.   Constitutional:       Appearance: She is well-developed.   HENT:      Head: Normocephalic and atraumatic.      Right Ear: External ear normal.      Left Ear: External ear normal.      Nose: Nose normal.      Mouth/Throat:      Pharynx: No oropharyngeal exudate.   Eyes:      General: No scleral icterus.     Extraocular Movements: Extraocular movements intact.      Conjunctiva/sclera: Conjunctivae normal.   Neck:      Musculoskeletal: Normal range of motion and neck supple.      Thyroid: No thyromegaly.      Vascular:  No JVD.   Cardiovascular:      Rate and Rhythm: Normal rate and regular rhythm.      Heart sounds: Murmur present. No gallop.    Pulmonary:      Effort: Pulmonary effort is normal. No respiratory distress.      Breath sounds: Normal breath sounds. No wheezing.   Abdominal:      General: Bowel sounds are normal. There is no distension.      Palpations: Abdomen is soft. There is no mass.      Tenderness: There is no abdominal tenderness. There is no guarding or rebound.   Musculoskeletal: Normal range of motion.         General: No tenderness.      Comments: Lymphedema R arm   Lymphadenopathy:      Cervical: No cervical adenopathy.   Skin:     General: Skin is warm.      Findings: No erythema or rash.   Neurological:      General: No focal deficit present.      Mental Status: She is alert and oriented to person, place, and time.      Cranial Nerves: No cranial nerve deficit.      Coordination: Coordination normal.   Psychiatric:         Behavior: Behavior normal.         Thought Content: Thought content normal.         Judgment: Judgment normal.         Assessment:       1. Bronchiectasis without complication    2. Mixed hyperlipidemia, baseline     3. Aortic valve stenosis, mild- moderate, MARI 1.44 1/16    4. Aortic atherosclerosis: see CT scan 2016    5. Stage 3a chronic kidney disease    6. Controlled type 2 diabetes mellitus with complication, without long-term current use of insulin    7. Acquired hypothyroidism    8. History of breast cancer    9. Adrenal adenoma, unspecified laterality    10. Pancreatic cyst: stable on MRI 11/16 also 2018, due again 2020    11. Idiopathic gout, unspecified chronicity, unspecified site    12. Lymph edema, right arm    13. Primary insomnia    14. Former smoker: 1 pack daily for < 20 years, quit 1973    15. Osteoporosis without current pathological fracture, unspecified osteoporosis type        Plan:           Olga was seen today for follow-up.    Diagnoses and all  orders for this visit:    Bronchiectasis without complication; stable without symptoms    Mixed hyperlipidemia, baseline :  Continue regimen    Aortic valve stenosis, mild- moderate, MARI 1.44 1/16:  Stable without symptoms, keep Cardiology follow-up    Aortic atherosclerosis: see CT scan 2016:  Continue regimen    Stage 3a chronic kidney disease:  Gentle hydration, avoid nephrotoxins    Controlled type 2 diabetes mellitus with complication, without long-term current use of insulin; much improved, continue regimen  -     Ambulatory referral/consult to Optometry; Future    Acquired hypothyroidism:  Stable, continue regimen    History of breast cancer; up-to-date with screening    Adrenal adenoma, unspecified laterality    Pancreatic cyst: stable on MRI 11/16 also 2018, due again 2020  -     MRI Abdomen With Contrast; Future    Idiopathic gout, unspecified chronicity, unspecified site    Lymph edema, right arm; stable    Primary insomnia    Former smoker: 1 pack daily for < 20 years, quit 1973    Osteoporosis without current pathological fracture, unspecified osteoporosis type  -     DXA Bone Density Spine And Hip; Future    Other orders  -     cephALEXin (KEFLEX) 500 MG capsule; Take 1 capsule (500 mg total) by mouth 4 (four) times daily.    Schedule MRI  Get 2nd of the 2 shingles vaccines  COVID vaccine when available  DEXA scan  Optometry appointment    If all goes well, return 6 months, sooner with problems prior

## 2020-11-24 DIAGNOSIS — E78.5 HYPERLIPIDEMIA, UNSPECIFIED HYPERLIPIDEMIA TYPE: ICD-10-CM

## 2020-11-25 RX ORDER — ALLOPURINOL 100 MG/1
TABLET ORAL
Qty: 90 TABLET | Refills: 0 | Status: SHIPPED | OUTPATIENT
Start: 2020-11-25 | End: 2021-02-23

## 2020-11-25 RX ORDER — SIMVASTATIN 40 MG/1
TABLET, FILM COATED ORAL
Qty: 90 TABLET | Refills: 0 | Status: SHIPPED | OUTPATIENT
Start: 2020-11-25 | End: 2021-02-23

## 2020-11-25 RX ORDER — ZOLPIDEM TARTRATE 5 MG/1
TABLET ORAL
Qty: 30 TABLET | Refills: 0 | Status: SHIPPED | OUTPATIENT
Start: 2020-11-25 | End: 2020-12-29

## 2020-12-04 ENCOUNTER — TELEPHONE (OUTPATIENT)
Dept: ENDOSCOPY | Facility: HOSPITAL | Age: 85
End: 2020-12-04

## 2020-12-20 ENCOUNTER — PATIENT MESSAGE (OUTPATIENT)
Dept: INTERNAL MEDICINE | Facility: CLINIC | Age: 85
End: 2020-12-20

## 2020-12-20 DIAGNOSIS — R50.9 FEVER: ICD-10-CM

## 2020-12-21 ENCOUNTER — PATIENT MESSAGE (OUTPATIENT)
Dept: INTERNAL MEDICINE | Facility: CLINIC | Age: 85
End: 2020-12-21

## 2020-12-21 ENCOUNTER — CLINICAL SUPPORT (OUTPATIENT)
Dept: URGENT CARE | Facility: CLINIC | Age: 85
End: 2020-12-21
Payer: MEDICARE

## 2020-12-21 DIAGNOSIS — R50.9 FEVER: ICD-10-CM

## 2020-12-21 PROCEDURE — U0003 INFECTIOUS AGENT DETECTION BY NUCLEIC ACID (DNA OR RNA); SEVERE ACUTE RESPIRATORY SYNDROME CORONAVIRUS 2 (SARS-COV-2) (CORONAVIRUS DISEASE [COVID-19]), AMPLIFIED PROBE TECHNIQUE, MAKING USE OF HIGH THROUGHPUT TECHNOLOGIES AS DESCRIBED BY CMS-2020-01-R: HCPCS

## 2020-12-21 NOTE — TELEPHONE ENCOUNTER
Please let her know that I do want her to be tested and I do think that both she and her  need to quarantine    I do not think that they should attend the [facility] gatherings    I can order the testing for both her and her

## 2020-12-22 ENCOUNTER — PATIENT MESSAGE (OUTPATIENT)
Dept: INTERNAL MEDICINE | Facility: CLINIC | Age: 85
End: 2020-12-22

## 2020-12-22 ENCOUNTER — TELEPHONE (OUTPATIENT)
Dept: INTERNAL MEDICINE | Facility: CLINIC | Age: 85
End: 2020-12-22

## 2020-12-22 DIAGNOSIS — U07.1 COVID-19: Primary | ICD-10-CM

## 2020-12-22 LAB — SARS-COV-2 RNA RESP QL NAA+PROBE: DETECTED

## 2020-12-22 NOTE — TELEPHONE ENCOUNTER
Spoke to pt  and they Both agree to start with the Antibiotics treatment  (philip marquez)   Pt  voiced understanding

## 2020-12-22 NOTE — TELEPHONE ENCOUNTER
Please let her know that both she and her  tested positive for COVID    I will be sending them information about quarantining, at least 14 days    We do also have the option for them to get some additional treatment, the antibody treatment and if they are interested I can place the order and they can get the information to read about it and consider that option    I would also recommend the COVID surveillance for both of them and I am going to place the orders for both of them

## 2020-12-22 NOTE — TELEPHONE ENCOUNTER
I spoke to her  She feels like she has the flu  Does not feel like breathing is any worse than baseline    Antibody treatment reviewed and she is interested (NOT antibiotics)  Discussed surveillance program- enrolled    Advised that if sx worsen or change to closest ER - she and  understand and agree

## 2020-12-23 ENCOUNTER — NURSE TRIAGE (OUTPATIENT)
Dept: ADMINISTRATIVE | Facility: CLINIC | Age: 85
End: 2020-12-23

## 2020-12-23 NOTE — TELEPHONE ENCOUNTER
Called pt for enrollment in Covid Surveillance program. Spoke to  who verified pt's name and .  stated he and his wife do not have smart phones they just both have flip phones but they do have texting capability so enrolled in text home symptom monitoring and also gave number to OOC for any new or worsening symptoms. Pt has pulse ox and  states they have both been sating around 96. Advised if SpO2 falls below 94 and does not increase after a few deep breaths to go to ED for evaluation. Pt is currently napping.  states she took a shower earlier and was tired so unable to triage pt at this time. Placed order for text program. DID NOT remove surveillance tasks so pt still eligible to participate in surveillance if they can download [facility] [employer] [facility] [employer] and submit consent. Both pt and  are set to get BAM infusion tomorrow.    Reason for Disposition   Health Information question, no triage required and triager able to answer question    Protocols used: INFORMATION ONLY CALL - NO TRIAGE-A-

## 2020-12-24 ENCOUNTER — INFUSION (OUTPATIENT)
Dept: INFECTIOUS DISEASES | Facility: HOSPITAL | Age: 85
End: 2020-12-24
Attending: INTERNAL MEDICINE
Payer: MEDICARE

## 2020-12-24 VITALS
TEMPERATURE: 98 F | WEIGHT: 151.88 LBS | BODY MASS INDEX: 26.91 KG/M2 | DIASTOLIC BLOOD PRESSURE: 60 MMHG | OXYGEN SATURATION: 95 % | SYSTOLIC BLOOD PRESSURE: 129 MMHG | HEART RATE: 62 BPM | RESPIRATION RATE: 18 BRPM | HEIGHT: 63 IN

## 2020-12-24 DIAGNOSIS — U07.1 COVID-19: ICD-10-CM

## 2020-12-24 PROCEDURE — 25000003 PHARM REV CODE 250: Performed by: INTERNAL MEDICINE

## 2020-12-24 PROCEDURE — 63600175 PHARM REV CODE 636 W HCPCS: Performed by: INTERNAL MEDICINE

## 2020-12-24 PROCEDURE — M0243 CASIRIVI AND IMDEVI INFUSION: HCPCS

## 2020-12-24 RX ORDER — SODIUM CHLORIDE 0.9 % (FLUSH) 0.9 %
10 SYRINGE (ML) INJECTION
Status: DISCONTINUED | OUTPATIENT
Start: 2020-12-24 | End: 2022-03-17

## 2020-12-24 RX ORDER — ACETAMINOPHEN 325 MG/1
650 TABLET ORAL ONCE AS NEEDED
Status: DISCONTINUED | OUTPATIENT
Start: 2020-12-24 | End: 2022-03-17

## 2020-12-24 RX ORDER — ONDANSETRON 4 MG/1
4 TABLET, ORALLY DISINTEGRATING ORAL ONCE AS NEEDED
Status: DISCONTINUED | OUTPATIENT
Start: 2020-12-24 | End: 2022-03-17

## 2020-12-24 RX ORDER — EPINEPHRINE 0.1 MG/ML
0.3 INJECTION INTRAVENOUS
Status: DISCONTINUED | OUTPATIENT
Start: 2020-12-24 | End: 2022-03-17

## 2020-12-24 RX ORDER — DIPHENHYDRAMINE HYDROCHLORIDE 50 MG/ML
25 INJECTION INTRAMUSCULAR; INTRAVENOUS ONCE AS NEEDED
Status: DISCONTINUED | OUTPATIENT
Start: 2020-12-24 | End: 2021-04-28

## 2020-12-24 RX ORDER — ALBUTEROL SULFATE 90 UG/1
2 AEROSOL, METERED RESPIRATORY (INHALATION)
Status: DISCONTINUED | OUTPATIENT
Start: 2020-12-24 | End: 2022-03-17

## 2020-12-24 RX ADMIN — CASIRIVIMAB: 1332 INJECTION, SOLUTION, CONCENTRATE INTRAVENOUS at 10:12

## 2020-12-24 RX ADMIN — SODIUM CHLORIDE: 0.9 INJECTION, SOLUTION INTRAVENOUS at 09:12

## 2020-12-24 NOTE — PLAN OF CARE
Problem: Adult Inpatient Plan of Care  Goal: Patient-Specific Goal (Individualization)  Outcome: Ongoing, Progressing  Flowsheets (Taken 12/24/2020 1002)  Individualized Care Needs: recliner, blanket, TV, water  Anxieties, Fears or Concerns: covid 19  Patient-Specific Goals (Include Timeframe): no s/s of rx during tx

## 2020-12-24 NOTE — PLAN OF CARE
Problem: Adult Inpatient Plan of Care  12/24/2020 1235 by Dyana Lew RN  Outcome: Ongoing, Progressing  Flowsheets (Taken 12/24/2020 1235)  Plan of Care Reviewed With: patient   Pt tolerated covid monoclonal antibody infusion well.  No adverse reaction noted.  IV flushed with NS and D/C per protocol.  Patient left clinic in no acute distress.

## 2020-12-28 ENCOUNTER — PATIENT MESSAGE (OUTPATIENT)
Dept: INTERNAL MEDICINE | Facility: CLINIC | Age: 85
End: 2020-12-28

## 2020-12-28 DIAGNOSIS — R11.0 NAUSEA: Primary | ICD-10-CM

## 2020-12-28 RX ORDER — ONDANSETRON 4 MG/1
4 TABLET, ORALLY DISINTEGRATING ORAL EVERY 8 HOURS PRN
Qty: 10 TABLET | Refills: 0 | Status: SHIPPED | OUTPATIENT
Start: 2020-12-28 | End: 2022-03-21

## 2020-12-28 NOTE — TELEPHONE ENCOUNTER
Spoke to patient's . Covid + 12/21. S/p Bamlamivimab infusion.  Reports nausea today.  Able to keep some down but not much.    Will try Zofran for 24 hr.  If not improved, we should at least have a CMP done to look for NICHOLE for any other organic causes nausea.    Blaise Fisher

## 2020-12-29 RX ORDER — ZOLPIDEM TARTRATE 5 MG/1
TABLET ORAL
Qty: 30 TABLET | Refills: 0 | Status: SHIPPED | OUTPATIENT
Start: 2020-12-29 | End: 2021-01-25

## 2021-01-05 ENCOUNTER — PATIENT MESSAGE (OUTPATIENT)
Dept: INTERNAL MEDICINE | Facility: CLINIC | Age: 86
End: 2021-01-05

## 2021-01-09 ENCOUNTER — IMMUNIZATION (OUTPATIENT)
Dept: FAMILY MEDICINE | Facility: CLINIC | Age: 86
End: 2021-01-09
Payer: MEDICARE

## 2021-01-09 DIAGNOSIS — Z23 NEED FOR VACCINATION: ICD-10-CM

## 2021-01-09 PROCEDURE — 91300 COVID-19, MRNA, LNP-S, PF, 30 MCG/0.3 ML DOSE VACCINE: CPT | Mod: PBBFAC | Performed by: INTERNAL MEDICINE

## 2021-01-25 ENCOUNTER — PATIENT OUTREACH (OUTPATIENT)
Dept: ADMINISTRATIVE | Facility: OTHER | Age: 86
End: 2021-01-25

## 2021-01-25 RX ORDER — LEVOTHYROXINE SODIUM 75 UG/1
TABLET ORAL
Qty: 90 TABLET | Refills: 0 | Status: SHIPPED | OUTPATIENT
Start: 2021-01-25 | End: 2021-04-26

## 2021-01-25 RX ORDER — TRIAMTERENE AND HYDROCHLOROTHIAZIDE 37.5; 25 MG/1; MG/1
CAPSULE ORAL
Qty: 90 CAPSULE | Refills: 0 | Status: SHIPPED | OUTPATIENT
Start: 2021-01-25 | End: 2021-04-26

## 2021-01-25 RX ORDER — ZOLPIDEM TARTRATE 5 MG/1
TABLET ORAL
Qty: 30 TABLET | Refills: 0 | Status: SHIPPED | OUTPATIENT
Start: 2021-01-25 | End: 2021-02-23

## 2021-01-26 ENCOUNTER — OFFICE VISIT (OUTPATIENT)
Dept: PAIN MEDICINE | Facility: CLINIC | Age: 86
End: 2021-01-26
Payer: MEDICARE

## 2021-01-26 VITALS
DIASTOLIC BLOOD PRESSURE: 68 MMHG | RESPIRATION RATE: 18 BRPM | HEART RATE: 78 BPM | BODY MASS INDEX: 26.99 KG/M2 | SYSTOLIC BLOOD PRESSURE: 150 MMHG | TEMPERATURE: 98 F | OXYGEN SATURATION: 95 % | WEIGHT: 152.31 LBS

## 2021-01-26 DIAGNOSIS — M51.36 DDD (DEGENERATIVE DISC DISEASE), LUMBAR: ICD-10-CM

## 2021-01-26 DIAGNOSIS — M54.16 LUMBAR RADICULITIS: ICD-10-CM

## 2021-01-26 DIAGNOSIS — R26.81 GAIT INSTABILITY: ICD-10-CM

## 2021-01-26 DIAGNOSIS — G89.4 CHRONIC PAIN DISORDER: Primary | ICD-10-CM

## 2021-01-26 PROCEDURE — 3288F FALL RISK ASSESSMENT DOCD: CPT | Mod: CPTII,S$GLB,, | Performed by: PHYSICIAN ASSISTANT

## 2021-01-26 PROCEDURE — 1125F PR PAIN SEVERITY QUANTIFIED, PAIN PRESENT: ICD-10-PCS | Mod: S$GLB,,, | Performed by: PHYSICIAN ASSISTANT

## 2021-01-26 PROCEDURE — 3077F SYST BP >= 140 MM HG: CPT | Mod: CPTII,S$GLB,, | Performed by: PHYSICIAN ASSISTANT

## 2021-01-26 PROCEDURE — 1159F MED LIST DOCD IN RCRD: CPT | Mod: S$GLB,,, | Performed by: PHYSICIAN ASSISTANT

## 2021-01-26 PROCEDURE — 1159F PR MEDICATION LIST DOCUMENTED IN MEDICAL RECORD: ICD-10-PCS | Mod: S$GLB,,, | Performed by: PHYSICIAN ASSISTANT

## 2021-01-26 PROCEDURE — 3078F PR MOST RECENT DIASTOLIC BLOOD PRESSURE < 80 MM HG: ICD-10-PCS | Mod: CPTII,S$GLB,, | Performed by: PHYSICIAN ASSISTANT

## 2021-01-26 PROCEDURE — 99214 OFFICE O/P EST MOD 30 MIN: CPT | Mod: S$GLB,,, | Performed by: PHYSICIAN ASSISTANT

## 2021-01-26 PROCEDURE — 99999 PR PBB SHADOW E&M-EST. PATIENT-LVL V: CPT | Mod: PBBFAC,,, | Performed by: PHYSICIAN ASSISTANT

## 2021-01-26 PROCEDURE — 3288F PR FALLS RISK ASSESSMENT DOCUMENTED: ICD-10-PCS | Mod: CPTII,S$GLB,, | Performed by: PHYSICIAN ASSISTANT

## 2021-01-26 PROCEDURE — 3077F PR MOST RECENT SYSTOLIC BLOOD PRESSURE >= 140 MM HG: ICD-10-PCS | Mod: CPTII,S$GLB,, | Performed by: PHYSICIAN ASSISTANT

## 2021-01-26 PROCEDURE — 1101F PR PT FALLS ASSESS DOC 0-1 FALLS W/OUT INJ PAST YR: ICD-10-PCS | Mod: CPTII,S$GLB,, | Performed by: PHYSICIAN ASSISTANT

## 2021-01-26 PROCEDURE — 99214 PR OFFICE/OUTPT VISIT, EST, LEVL IV, 30-39 MIN: ICD-10-PCS | Mod: S$GLB,,, | Performed by: PHYSICIAN ASSISTANT

## 2021-01-26 PROCEDURE — 1101F PT FALLS ASSESS-DOCD LE1/YR: CPT | Mod: CPTII,S$GLB,, | Performed by: PHYSICIAN ASSISTANT

## 2021-01-26 PROCEDURE — 99999 PR PBB SHADOW E&M-EST. PATIENT-LVL V: ICD-10-PCS | Mod: PBBFAC,,, | Performed by: PHYSICIAN ASSISTANT

## 2021-01-26 PROCEDURE — 3078F DIAST BP <80 MM HG: CPT | Mod: CPTII,S$GLB,, | Performed by: PHYSICIAN ASSISTANT

## 2021-01-26 PROCEDURE — 1125F AMNT PAIN NOTED PAIN PRSNT: CPT | Mod: S$GLB,,, | Performed by: PHYSICIAN ASSISTANT

## 2021-01-26 RX ORDER — TRAMADOL HYDROCHLORIDE 50 MG/1
50 TABLET ORAL EVERY 8 HOURS PRN
Qty: 90 TABLET | Refills: 1 | Status: SHIPPED | OUTPATIENT
Start: 2021-02-03 | End: 2021-03-05

## 2021-01-30 ENCOUNTER — IMMUNIZATION (OUTPATIENT)
Dept: FAMILY MEDICINE | Facility: CLINIC | Age: 86
End: 2021-01-30
Payer: MEDICARE

## 2021-01-30 DIAGNOSIS — Z23 NEED FOR VACCINATION: Primary | ICD-10-CM

## 2021-01-30 PROCEDURE — 0002A COVID-19, MRNA, LNP-S, PF, 30 MCG/0.3 ML DOSE VACCINE: CPT | Mod: PBBFAC | Performed by: INTERNAL MEDICINE

## 2021-01-30 PROCEDURE — 91300 COVID-19, MRNA, LNP-S, PF, 30 MCG/0.3 ML DOSE VACCINE: CPT | Mod: PBBFAC | Performed by: INTERNAL MEDICINE

## 2021-02-23 DIAGNOSIS — E78.5 HYPERLIPIDEMIA, UNSPECIFIED HYPERLIPIDEMIA TYPE: ICD-10-CM

## 2021-02-23 RX ORDER — SIMVASTATIN 40 MG/1
TABLET, FILM COATED ORAL
Qty: 90 TABLET | Refills: 0 | Status: SHIPPED | OUTPATIENT
Start: 2021-02-23 | End: 2021-05-24

## 2021-02-23 RX ORDER — ZOLPIDEM TARTRATE 5 MG/1
TABLET ORAL
Qty: 30 TABLET | Refills: 0 | Status: SHIPPED | OUTPATIENT
Start: 2021-02-23 | End: 2021-03-31 | Stop reason: SDUPTHER

## 2021-02-23 RX ORDER — ALLOPURINOL 100 MG/1
TABLET ORAL
Qty: 90 TABLET | Refills: 0 | Status: SHIPPED | OUTPATIENT
Start: 2021-02-23 | End: 2021-05-24

## 2021-03-04 ENCOUNTER — TELEPHONE (OUTPATIENT)
Dept: ENDOSCOPY | Facility: HOSPITAL | Age: 86
End: 2021-03-04

## 2021-03-04 ENCOUNTER — TELEPHONE (OUTPATIENT)
Dept: INTERNAL MEDICINE | Facility: CLINIC | Age: 86
End: 2021-03-04

## 2021-03-08 ENCOUNTER — PATIENT MESSAGE (OUTPATIENT)
Dept: INTERNAL MEDICINE | Facility: CLINIC | Age: 86
End: 2021-03-08

## 2021-03-09 RX ORDER — LORAZEPAM 0.5 MG/1
TABLET ORAL
Qty: 1 TABLET | Refills: 0 | Status: SHIPPED | OUTPATIENT
Start: 2021-03-09 | End: 2022-03-17

## 2021-03-29 ENCOUNTER — PATIENT MESSAGE (OUTPATIENT)
Dept: INTERNAL MEDICINE | Facility: CLINIC | Age: 86
End: 2021-03-29

## 2021-03-29 RX ORDER — NITROFURANTOIN 25; 75 MG/1; MG/1
100 CAPSULE ORAL 2 TIMES DAILY
Qty: 10 CAPSULE | Refills: 0 | Status: SHIPPED | OUTPATIENT
Start: 2021-03-29 | End: 2022-01-04

## 2021-03-31 ENCOUNTER — TELEPHONE (OUTPATIENT)
Dept: INTERNAL MEDICINE | Facility: CLINIC | Age: 86
End: 2021-03-31

## 2021-03-31 ENCOUNTER — PATIENT MESSAGE (OUTPATIENT)
Dept: PAIN MEDICINE | Facility: CLINIC | Age: 86
End: 2021-03-31

## 2021-03-31 DIAGNOSIS — Z12.31 ENCOUNTER FOR SCREENING MAMMOGRAM FOR MALIGNANT NEOPLASM OF BREAST: ICD-10-CM

## 2021-03-31 DIAGNOSIS — Z85.3 HISTORY OF BREAST CANCER: ICD-10-CM

## 2021-03-31 DIAGNOSIS — E78.2 MIXED HYPERLIPIDEMIA: ICD-10-CM

## 2021-03-31 DIAGNOSIS — E11.8 CONTROLLED TYPE 2 DIABETES MELLITUS WITH COMPLICATION, WITHOUT LONG-TERM CURRENT USE OF INSULIN: Primary | ICD-10-CM

## 2021-03-31 RX ORDER — TRAMADOL HYDROCHLORIDE 50 MG/1
50 TABLET ORAL EVERY 8 HOURS PRN
COMMUNITY
Start: 2021-03-29 | End: 2021-08-06

## 2021-03-31 RX ORDER — ZOLPIDEM TARTRATE 5 MG/1
5 TABLET ORAL NIGHTLY
Qty: 30 TABLET | Refills: 0 | Status: SHIPPED | OUTPATIENT
Start: 2021-03-31 | End: 2021-04-28

## 2021-04-01 ENCOUNTER — PATIENT MESSAGE (OUTPATIENT)
Dept: PAIN MEDICINE | Facility: CLINIC | Age: 86
End: 2021-04-01

## 2021-04-05 ENCOUNTER — PATIENT OUTREACH (OUTPATIENT)
Dept: ADMINISTRATIVE | Facility: OTHER | Age: 86
End: 2021-04-05

## 2021-04-06 ENCOUNTER — OFFICE VISIT (OUTPATIENT)
Dept: PAIN MEDICINE | Facility: CLINIC | Age: 86
End: 2021-04-06
Payer: MEDICARE

## 2021-04-06 ENCOUNTER — TELEPHONE (OUTPATIENT)
Dept: PAIN MEDICINE | Facility: CLINIC | Age: 86
End: 2021-04-06

## 2021-04-06 VITALS
BODY MASS INDEX: 26.97 KG/M2 | SYSTOLIC BLOOD PRESSURE: 153 MMHG | RESPIRATION RATE: 18 BRPM | TEMPERATURE: 98 F | DIASTOLIC BLOOD PRESSURE: 67 MMHG | WEIGHT: 152.25 LBS | HEART RATE: 75 BPM | OXYGEN SATURATION: 97 %

## 2021-04-06 DIAGNOSIS — M51.36 DDD (DEGENERATIVE DISC DISEASE), LUMBAR: ICD-10-CM

## 2021-04-06 DIAGNOSIS — M54.16 LUMBAR RADICULITIS: ICD-10-CM

## 2021-04-06 DIAGNOSIS — M54.16 LUMBAR RADICULOPATHY: Primary | ICD-10-CM

## 2021-04-06 DIAGNOSIS — Z01.818 PRE-OP TESTING: ICD-10-CM

## 2021-04-06 DIAGNOSIS — R26.81 GAIT INSTABILITY: ICD-10-CM

## 2021-04-06 DIAGNOSIS — G89.4 CHRONIC PAIN SYNDROME: ICD-10-CM

## 2021-04-06 PROCEDURE — 1125F AMNT PAIN NOTED PAIN PRSNT: CPT | Mod: S$GLB,,, | Performed by: PHYSICIAN ASSISTANT

## 2021-04-06 PROCEDURE — 1101F PR PT FALLS ASSESS DOC 0-1 FALLS W/OUT INJ PAST YR: ICD-10-PCS | Mod: CPTII,S$GLB,, | Performed by: PHYSICIAN ASSISTANT

## 2021-04-06 PROCEDURE — 1125F PR PAIN SEVERITY QUANTIFIED, PAIN PRESENT: ICD-10-PCS | Mod: S$GLB,,, | Performed by: PHYSICIAN ASSISTANT

## 2021-04-06 PROCEDURE — 1159F PR MEDICATION LIST DOCUMENTED IN MEDICAL RECORD: ICD-10-PCS | Mod: S$GLB,,, | Performed by: PHYSICIAN ASSISTANT

## 2021-04-06 PROCEDURE — 3288F FALL RISK ASSESSMENT DOCD: CPT | Mod: CPTII,S$GLB,, | Performed by: PHYSICIAN ASSISTANT

## 2021-04-06 PROCEDURE — 99999 PR PBB SHADOW E&M-EST. PATIENT-LVL V: CPT | Mod: PBBFAC,,, | Performed by: PHYSICIAN ASSISTANT

## 2021-04-06 PROCEDURE — 99214 OFFICE O/P EST MOD 30 MIN: CPT | Mod: S$GLB,,, | Performed by: PHYSICIAN ASSISTANT

## 2021-04-06 PROCEDURE — 99214 PR OFFICE/OUTPT VISIT, EST, LEVL IV, 30-39 MIN: ICD-10-PCS | Mod: S$GLB,,, | Performed by: PHYSICIAN ASSISTANT

## 2021-04-06 PROCEDURE — 3288F PR FALLS RISK ASSESSMENT DOCUMENTED: ICD-10-PCS | Mod: CPTII,S$GLB,, | Performed by: PHYSICIAN ASSISTANT

## 2021-04-06 PROCEDURE — 1101F PT FALLS ASSESS-DOCD LE1/YR: CPT | Mod: CPTII,S$GLB,, | Performed by: PHYSICIAN ASSISTANT

## 2021-04-06 PROCEDURE — 1159F MED LIST DOCD IN RCRD: CPT | Mod: S$GLB,,, | Performed by: PHYSICIAN ASSISTANT

## 2021-04-06 PROCEDURE — 99999 PR PBB SHADOW E&M-EST. PATIENT-LVL V: ICD-10-PCS | Mod: PBBFAC,,, | Performed by: PHYSICIAN ASSISTANT

## 2021-04-06 RX ORDER — ALPRAZOLAM 0.5 MG/1
1 TABLET, ORALLY DISINTEGRATING ORAL ONCE AS NEEDED
Status: CANCELLED | OUTPATIENT
Start: 2021-04-16 | End: 2032-09-11

## 2021-04-11 ENCOUNTER — PATIENT MESSAGE (OUTPATIENT)
Dept: PAIN MEDICINE | Facility: CLINIC | Age: 86
End: 2021-04-11

## 2021-04-13 ENCOUNTER — TELEPHONE (OUTPATIENT)
Dept: PAIN MEDICINE | Facility: CLINIC | Age: 86
End: 2021-04-13

## 2021-04-13 ENCOUNTER — LAB VISIT (OUTPATIENT)
Dept: FAMILY MEDICINE | Facility: CLINIC | Age: 86
End: 2021-04-13
Payer: MEDICARE

## 2021-04-13 DIAGNOSIS — Z01.818 PRE-OP TESTING: ICD-10-CM

## 2021-04-13 PROCEDURE — U0003 INFECTIOUS AGENT DETECTION BY NUCLEIC ACID (DNA OR RNA); SEVERE ACUTE RESPIRATORY SYNDROME CORONAVIRUS 2 (SARS-COV-2) (CORONAVIRUS DISEASE [COVID-19]), AMPLIFIED PROBE TECHNIQUE, MAKING USE OF HIGH THROUGHPUT TECHNOLOGIES AS DESCRIBED BY CMS-2020-01-R: HCPCS | Performed by: ANESTHESIOLOGY

## 2021-04-13 PROCEDURE — U0005 INFEC AGEN DETEC AMPLI PROBE: HCPCS | Performed by: ANESTHESIOLOGY

## 2021-04-13 RX ORDER — HYDROCODONE BITARTRATE AND ACETAMINOPHEN 5; 325 MG/1; MG/1
1 TABLET ORAL DAILY PRN
Qty: 15 TABLET | Refills: 0 | Status: SHIPPED | OUTPATIENT
Start: 2021-04-13 | End: 2021-05-10

## 2021-04-14 LAB — SARS-COV-2 RNA RESP QL NAA+PROBE: NOT DETECTED

## 2021-04-15 ENCOUNTER — TELEPHONE (OUTPATIENT)
Dept: PAIN MEDICINE | Facility: CLINIC | Age: 86
End: 2021-04-15

## 2021-04-16 ENCOUNTER — HOSPITAL ENCOUNTER (OUTPATIENT)
Dept: RADIOLOGY | Facility: HOSPITAL | Age: 86
Discharge: HOME | End: 2021-04-16
Attending: ANESTHESIOLOGY
Payer: MEDICARE

## 2021-04-16 ENCOUNTER — HOSPITAL ENCOUNTER (OUTPATIENT)
Facility: HOSPITAL | Age: 86
Discharge: HOME | End: 2021-04-16
Attending: ANESTHESIOLOGY | Admitting: ANESTHESIOLOGY
Payer: MEDICARE

## 2021-04-16 VITALS
HEART RATE: 67 BPM | RESPIRATION RATE: 15 BRPM | OXYGEN SATURATION: 96 % | TEMPERATURE: 98 F | BODY MASS INDEX: 26.93 KG/M2 | SYSTOLIC BLOOD PRESSURE: 142 MMHG | DIASTOLIC BLOOD PRESSURE: 65 MMHG | HEIGHT: 63 IN | WEIGHT: 152 LBS

## 2021-04-16 DIAGNOSIS — M54.16 LUMBAR RADICULOPATHY: Primary | ICD-10-CM

## 2021-04-16 DIAGNOSIS — M51.36 DDD (DEGENERATIVE DISC DISEASE), LUMBAR: ICD-10-CM

## 2021-04-16 PROCEDURE — 63600175 PHARM REV CODE 636 W HCPCS: Mod: PO | Performed by: ANESTHESIOLOGY

## 2021-04-16 PROCEDURE — 64483 NJX AA&/STRD TFRM EPI L/S 1: CPT | Mod: PO | Performed by: ANESTHESIOLOGY

## 2021-04-16 PROCEDURE — 82962 GLUCOSE BLOOD TEST: CPT | Mod: PO | Performed by: ANESTHESIOLOGY

## 2021-04-16 PROCEDURE — 64484 PRA INJECT ANES/STEROID FORAMEN LUMBAR/SACRAL W IMG GUIDE ,EA ADD LEVEL: ICD-10-PCS | Mod: RT,,, | Performed by: ANESTHESIOLOGY

## 2021-04-16 PROCEDURE — 64484 NJX AA&/STRD TFRM EPI L/S EA: CPT | Mod: PO | Performed by: ANESTHESIOLOGY

## 2021-04-16 PROCEDURE — 76000 FLUOROSCOPY <1 HR PHYS/QHP: CPT | Mod: TC,PO

## 2021-04-16 PROCEDURE — 64483 NJX AA&/STRD TFRM EPI L/S 1: CPT | Mod: RT,,, | Performed by: ANESTHESIOLOGY

## 2021-04-16 PROCEDURE — 25000003 PHARM REV CODE 250: Mod: PO | Performed by: ANESTHESIOLOGY

## 2021-04-16 PROCEDURE — 25500020 PHARM REV CODE 255: Mod: PO | Performed by: ANESTHESIOLOGY

## 2021-04-16 PROCEDURE — 64484 NJX AA&/STRD TFRM EPI L/S EA: CPT | Mod: RT,,, | Performed by: ANESTHESIOLOGY

## 2021-04-16 PROCEDURE — 64483 PR EPIDURAL INJ, ANES/STEROID, TRANSFORAMINAL, LUMB/SACR, SNGL LEVL: ICD-10-PCS | Mod: RT,,, | Performed by: ANESTHESIOLOGY

## 2021-04-16 RX ORDER — TRIAMCINOLONE ACETONIDE 40 MG/ML
INJECTION, SUSPENSION INTRA-ARTICULAR; INTRAMUSCULAR
Status: DISCONTINUED | OUTPATIENT
Start: 2021-04-16 | End: 2021-04-16 | Stop reason: HOSPADM

## 2021-04-16 RX ORDER — ALPRAZOLAM 0.5 MG/1
1 TABLET, ORALLY DISINTEGRATING ORAL ONCE AS NEEDED
Status: COMPLETED | OUTPATIENT
Start: 2021-04-16 | End: 2021-04-16

## 2021-04-16 RX ORDER — SODIUM CHLORIDE 0.9 G/100ML
IRRIGANT IRRIGATION
Status: DISCONTINUED | OUTPATIENT
Start: 2021-04-16 | End: 2021-04-16 | Stop reason: HOSPADM

## 2021-04-16 RX ORDER — LIDOCAINE HYDROCHLORIDE 10 MG/ML
INJECTION, SOLUTION EPIDURAL; INFILTRATION; INTRACAUDAL; PERINEURAL
Status: DISCONTINUED | OUTPATIENT
Start: 2021-04-16 | End: 2021-04-16 | Stop reason: HOSPADM

## 2021-04-16 RX ORDER — BUPIVACAINE HYDROCHLORIDE 2.5 MG/ML
INJECTION, SOLUTION EPIDURAL; INFILTRATION; INTRACAUDAL
Status: DISCONTINUED | OUTPATIENT
Start: 2021-04-16 | End: 2021-04-16 | Stop reason: HOSPADM

## 2021-04-16 RX ADMIN — ALPRAZOLAM 0.5 MG: 0.5 TABLET, ORALLY DISINTEGRATING ORAL at 09:04

## 2021-04-25 ENCOUNTER — TELEPHONE (OUTPATIENT)
Dept: INTERNAL MEDICINE | Facility: CLINIC | Age: 86
End: 2021-04-25

## 2021-04-26 ENCOUNTER — OFFICE VISIT (OUTPATIENT)
Dept: CARDIOLOGY | Facility: CLINIC | Age: 86
End: 2021-04-26
Payer: MEDICARE

## 2021-04-26 VITALS
HEART RATE: 70 BPM | DIASTOLIC BLOOD PRESSURE: 74 MMHG | SYSTOLIC BLOOD PRESSURE: 138 MMHG | HEIGHT: 63 IN | BODY MASS INDEX: 23.51 KG/M2 | WEIGHT: 132.69 LBS

## 2021-04-26 DIAGNOSIS — I35.0 AORTIC VALVE STENOSIS, MODERATE: ICD-10-CM

## 2021-04-26 DIAGNOSIS — I77.811 ECTATIC ABDOMINAL AORTA: ICD-10-CM

## 2021-04-26 DIAGNOSIS — I70.0 AORTIC ATHEROSCLEROSIS: ICD-10-CM

## 2021-04-26 DIAGNOSIS — I51.7 LVH (LEFT VENTRICULAR HYPERTROPHY): ICD-10-CM

## 2021-04-26 DIAGNOSIS — I10 ESSENTIAL HYPERTENSION: ICD-10-CM

## 2021-04-26 DIAGNOSIS — E78.2 MIXED HYPERLIPIDEMIA: ICD-10-CM

## 2021-04-26 DIAGNOSIS — I70.0 AORTIC ATHEROSCLEROSIS: Primary | ICD-10-CM

## 2021-04-26 DIAGNOSIS — G45.8 OTHER SPECIFIED TRANSIENT CEREBRAL ISCHEMIAS: ICD-10-CM

## 2021-04-26 PROBLEM — R94.31 ABNORMAL ECG: Status: RESOLVED | Noted: 2017-02-06 | Resolved: 2021-04-26

## 2021-04-26 PROCEDURE — 99499 UNLISTED E&M SERVICE: CPT | Mod: S$GLB,,, | Performed by: INTERNAL MEDICINE

## 2021-04-26 PROCEDURE — 3288F PR FALLS RISK ASSESSMENT DOCUMENTED: ICD-10-PCS | Mod: CPTII,S$GLB,, | Performed by: INTERNAL MEDICINE

## 2021-04-26 PROCEDURE — 1126F AMNT PAIN NOTED NONE PRSNT: CPT | Mod: S$GLB,,, | Performed by: INTERNAL MEDICINE

## 2021-04-26 PROCEDURE — 99214 PR OFFICE/OUTPT VISIT, EST, LEVL IV, 30-39 MIN: ICD-10-PCS | Mod: S$GLB,,, | Performed by: INTERNAL MEDICINE

## 2021-04-26 PROCEDURE — 3288F FALL RISK ASSESSMENT DOCD: CPT | Mod: CPTII,S$GLB,, | Performed by: INTERNAL MEDICINE

## 2021-04-26 PROCEDURE — 1101F PT FALLS ASSESS-DOCD LE1/YR: CPT | Mod: CPTII,S$GLB,, | Performed by: INTERNAL MEDICINE

## 2021-04-26 PROCEDURE — 99499 RISK ADDL DX/OHS AUDIT: ICD-10-PCS | Mod: S$GLB,,, | Performed by: INTERNAL MEDICINE

## 2021-04-26 PROCEDURE — 1101F PR PT FALLS ASSESS DOC 0-1 FALLS W/OUT INJ PAST YR: ICD-10-PCS | Mod: CPTII,S$GLB,, | Performed by: INTERNAL MEDICINE

## 2021-04-26 PROCEDURE — 99999 PR PBB SHADOW E&M-EST. PATIENT-LVL V: CPT | Mod: PBBFAC,,, | Performed by: INTERNAL MEDICINE

## 2021-04-26 PROCEDURE — 1159F MED LIST DOCD IN RCRD: CPT | Mod: S$GLB,,, | Performed by: INTERNAL MEDICINE

## 2021-04-26 PROCEDURE — 1159F PR MEDICATION LIST DOCUMENTED IN MEDICAL RECORD: ICD-10-PCS | Mod: S$GLB,,, | Performed by: INTERNAL MEDICINE

## 2021-04-26 PROCEDURE — 99999 PR PBB SHADOW E&M-EST. PATIENT-LVL V: ICD-10-PCS | Mod: PBBFAC,,, | Performed by: INTERNAL MEDICINE

## 2021-04-26 PROCEDURE — 99214 OFFICE O/P EST MOD 30 MIN: CPT | Mod: S$GLB,,, | Performed by: INTERNAL MEDICINE

## 2021-04-26 PROCEDURE — 1126F PR PAIN SEVERITY QUANTIFIED, NO PAIN PRESENT: ICD-10-PCS | Mod: S$GLB,,, | Performed by: INTERNAL MEDICINE

## 2021-04-28 RX ORDER — ZOLPIDEM TARTRATE 5 MG/1
TABLET ORAL
Qty: 30 TABLET | Refills: 0 | Status: SHIPPED | OUTPATIENT
Start: 2021-04-28 | End: 2021-06-01

## 2021-04-30 ENCOUNTER — DOCUMENTATION ONLY (OUTPATIENT)
Dept: PSYCHIATRY | Facility: CLINIC | Age: 86
End: 2021-04-30

## 2021-05-03 ENCOUNTER — OFFICE VISIT (OUTPATIENT)
Dept: PAIN MEDICINE | Facility: CLINIC | Age: 86
End: 2021-05-03
Payer: MEDICARE

## 2021-05-03 VITALS
HEART RATE: 79 BPM | WEIGHT: 150.13 LBS | BODY MASS INDEX: 26.59 KG/M2 | TEMPERATURE: 97 F | DIASTOLIC BLOOD PRESSURE: 70 MMHG | RESPIRATION RATE: 18 BRPM | OXYGEN SATURATION: 97 % | SYSTOLIC BLOOD PRESSURE: 136 MMHG

## 2021-05-03 DIAGNOSIS — M51.36 DDD (DEGENERATIVE DISC DISEASE), LUMBAR: ICD-10-CM

## 2021-05-03 DIAGNOSIS — G89.4 CHRONIC PAIN DISORDER: ICD-10-CM

## 2021-05-03 DIAGNOSIS — R26.81 GAIT INSTABILITY: ICD-10-CM

## 2021-05-03 DIAGNOSIS — M54.16 LUMBAR RADICULITIS: ICD-10-CM

## 2021-05-03 DIAGNOSIS — M54.16 LUMBAR RADICULOPATHY: Primary | ICD-10-CM

## 2021-05-03 PROCEDURE — 1101F PT FALLS ASSESS-DOCD LE1/YR: CPT | Mod: CPTII,S$GLB,, | Performed by: PHYSICIAN ASSISTANT

## 2021-05-03 PROCEDURE — 99213 OFFICE O/P EST LOW 20 MIN: CPT | Mod: S$GLB,,, | Performed by: PHYSICIAN ASSISTANT

## 2021-05-03 PROCEDURE — 1159F MED LIST DOCD IN RCRD: CPT | Mod: S$GLB,,, | Performed by: PHYSICIAN ASSISTANT

## 2021-05-03 PROCEDURE — 99999 PR PBB SHADOW E&M-EST. PATIENT-LVL V: CPT | Mod: PBBFAC,,, | Performed by: PHYSICIAN ASSISTANT

## 2021-05-03 PROCEDURE — 1159F PR MEDICATION LIST DOCUMENTED IN MEDICAL RECORD: ICD-10-PCS | Mod: S$GLB,,, | Performed by: PHYSICIAN ASSISTANT

## 2021-05-03 PROCEDURE — 3288F PR FALLS RISK ASSESSMENT DOCUMENTED: ICD-10-PCS | Mod: CPTII,S$GLB,, | Performed by: PHYSICIAN ASSISTANT

## 2021-05-03 PROCEDURE — 99213 PR OFFICE/OUTPT VISIT, EST, LEVL III, 20-29 MIN: ICD-10-PCS | Mod: S$GLB,,, | Performed by: PHYSICIAN ASSISTANT

## 2021-05-03 PROCEDURE — 1101F PR PT FALLS ASSESS DOC 0-1 FALLS W/OUT INJ PAST YR: ICD-10-PCS | Mod: CPTII,S$GLB,, | Performed by: PHYSICIAN ASSISTANT

## 2021-05-03 PROCEDURE — 99999 PR PBB SHADOW E&M-EST. PATIENT-LVL V: ICD-10-PCS | Mod: PBBFAC,,, | Performed by: PHYSICIAN ASSISTANT

## 2021-05-03 PROCEDURE — 1125F AMNT PAIN NOTED PAIN PRSNT: CPT | Mod: S$GLB,,, | Performed by: PHYSICIAN ASSISTANT

## 2021-05-03 PROCEDURE — 3288F FALL RISK ASSESSMENT DOCD: CPT | Mod: CPTII,S$GLB,, | Performed by: PHYSICIAN ASSISTANT

## 2021-05-03 PROCEDURE — 1125F PR PAIN SEVERITY QUANTIFIED, PAIN PRESENT: ICD-10-PCS | Mod: S$GLB,,, | Performed by: PHYSICIAN ASSISTANT

## 2021-05-08 ENCOUNTER — PATIENT MESSAGE (OUTPATIENT)
Dept: PAIN MEDICINE | Facility: CLINIC | Age: 86
End: 2021-05-08

## 2021-05-10 RX ORDER — TRAMADOL HYDROCHLORIDE 50 MG/1
50 TABLET ORAL 3 TIMES DAILY PRN
Qty: 90 TABLET | Refills: 2 | Status: SHIPPED | OUTPATIENT
Start: 2021-05-10 | End: 2021-08-06

## 2021-05-11 ENCOUNTER — HOSPITAL ENCOUNTER (OUTPATIENT)
Dept: CARDIOLOGY | Facility: HOSPITAL | Age: 86
Discharge: HOME | End: 2021-05-11
Attending: INTERNAL MEDICINE
Payer: MEDICARE

## 2021-05-11 VITALS — HEART RATE: 68 BPM

## 2021-05-11 DIAGNOSIS — I35.0 AORTIC VALVE STENOSIS, MODERATE: ICD-10-CM

## 2021-05-11 PROCEDURE — 93306 ECHO (CUPID ONLY): ICD-10-PCS | Mod: 26,,, | Performed by: INTERNAL MEDICINE

## 2021-05-11 PROCEDURE — 93306 TTE W/DOPPLER COMPLETE: CPT | Mod: PO

## 2021-05-11 PROCEDURE — 93306 TTE W/DOPPLER COMPLETE: CPT | Mod: 26,,, | Performed by: INTERNAL MEDICINE

## 2021-05-13 LAB
ASCENDING AORTA: 2.51 CM
AV INDEX (PROSTH): 0.37
AV MEAN GRADIENT: 21 MMHG
AV PEAK GRADIENT: 34 MMHG
AV VALVE AREA: 1.4 CM2
AV VELOCITY RATIO: 0.37
CV ECHO LV RWT: 0.45 CM
DOP CALC AO PEAK VEL: 2.93 M/S
DOP CALC AO VTI: 70.66 CM
DOP CALC LVOT AREA: 3.8 CM2
DOP CALC LVOT DIAMETER: 2.2 CM
DOP CALC LVOT PEAK VEL: 1.07 M/S
DOP CALC LVOT STROKE VOLUME: 98.63 CM3
DOP CALCLVOT PEAK VEL VTI: 25.96 CM
E WAVE DECELERATION TIME: 240.75 MSEC
E/A RATIO: 0.5
E/E' RATIO: 12.4 M/S
ECHO LV POSTERIOR WALL: 0.9 CM (ref 0.6–1.1)
EJECTION FRACTION: 60 %
FRACTIONAL SHORTENING: 34 % (ref 28–44)
INTERVENTRICULAR SEPTUM: 0.77 CM (ref 0.6–1.1)
IVRT: 165.56 MSEC
LA MAJOR: 4.08 CM
LA MINOR: 4.77 CM
LA WIDTH: 2.52 CM
LEFT ATRIUM SIZE: 3.46 CM
LEFT ATRIUM VOLUME: 32.6 CM3
LEFT INTERNAL DIMENSION IN SYSTOLE: 2.66 CM (ref 2.1–4)
LEFT VENTRICLE DIASTOLIC VOLUME: 69.93 ML
LEFT VENTRICLE SYSTOLIC VOLUME: 25.95 ML
LEFT VENTRICULAR INTERNAL DIMENSION IN DIASTOLE: 4 CM (ref 3.5–6)
LEFT VENTRICULAR MASS: 99.01 G
LV LATERAL E/E' RATIO: 10.33 M/S
LV SEPTAL E/E' RATIO: 15.5 M/S
MV A" WAVE DURATION": 16.56 MSEC
MV MEAN GRADIENT: 1 MMHG
MV PEAK A VEL: 1.25 M/S
MV PEAK E VEL: 0.62 M/S
MV PEAK GRADIENT: 9 MMHG
MV STENOSIS PRESSURE HALF TIME: 69.82 MS
MV VALVE AREA P 1/2 METHOD: 3.15 CM2
PISA TR MAX VEL: 2.12 M/S
PULM VEIN S/D RATIO: 0.93
PV PEAK D VEL: 0.46 M/S
PV PEAK S VEL: 0.43 M/S
RA MAJOR: 4.43 CM
RA PRESSURE: 3 MMHG
RA WIDTH: 2.71 CM
RIGHT VENTRICULAR END-DIASTOLIC DIMENSION: 3.37 CM
RV TISSUE DOPPLER FREE WALL SYSTOLIC VELOCITY 1 (APICAL 4 CHAMBER VIEW): 13.62 CM/S
SINUS: 2.17 CM
STJ: 2.28 CM
TDI LATERAL: 0.06 M/S
TDI SEPTAL: 0.04 M/S
TDI: 0.05 M/S
TR MAX PG: 18 MMHG
TRICUSPID ANNULAR PLANE SYSTOLIC EXCURSION: 1.95 CM
TV REST PULMONARY ARTERY PRESSURE: 21 MMHG

## 2021-05-31 ENCOUNTER — PATIENT OUTREACH (OUTPATIENT)
Dept: ADMINISTRATIVE | Facility: OTHER | Age: 86
End: 2021-05-31

## 2021-06-01 RX ORDER — ZOLPIDEM TARTRATE 5 MG/1
TABLET ORAL
Qty: 30 TABLET | Refills: 0 | Status: SHIPPED | OUTPATIENT
Start: 2021-06-01 | End: 2021-06-30

## 2021-06-02 ENCOUNTER — IMMUNIZATION (OUTPATIENT)
Dept: PHARMACY | Facility: CLINIC | Age: 86
End: 2021-06-02
Payer: MEDICARE

## 2021-06-02 ENCOUNTER — OFFICE VISIT (OUTPATIENT)
Dept: OPTOMETRY | Facility: CLINIC | Age: 86
End: 2021-06-02
Payer: MEDICARE

## 2021-06-02 DIAGNOSIS — E11.9 TYPE 2 DIABETES MELLITUS WITHOUT RETINOPATHY: Primary | ICD-10-CM

## 2021-06-02 DIAGNOSIS — H26.492 LEFT POSTERIOR CAPSULAR OPACIFICATION: ICD-10-CM

## 2021-06-02 DIAGNOSIS — H00.14 CHALAZION OF LEFT UPPER EYELID: ICD-10-CM

## 2021-06-02 DIAGNOSIS — E11.8 CONTROLLED TYPE 2 DIABETES MELLITUS WITH COMPLICATION, WITHOUT LONG-TERM CURRENT USE OF INSULIN: ICD-10-CM

## 2021-06-02 PROCEDURE — 92014 COMPRE OPH EXAM EST PT 1/>: CPT | Mod: S$GLB,,, | Performed by: OPTOMETRIST

## 2021-06-02 PROCEDURE — 99499 UNLISTED E&M SERVICE: CPT | Mod: S$GLB,,, | Performed by: OPTOMETRIST

## 2021-06-02 PROCEDURE — 1126F AMNT PAIN NOTED NONE PRSNT: CPT | Mod: S$GLB,,, | Performed by: OPTOMETRIST

## 2021-06-02 PROCEDURE — 99999 PR PBB SHADOW E&M-EST. PATIENT-LVL V: CPT | Mod: PBBFAC,,, | Performed by: OPTOMETRIST

## 2021-06-02 PROCEDURE — 3288F PR FALLS RISK ASSESSMENT DOCUMENTED: ICD-10-PCS | Mod: CPTII,S$GLB,, | Performed by: OPTOMETRIST

## 2021-06-02 PROCEDURE — 92014 PR EYE EXAM, EST PATIENT,COMPREHESV: ICD-10-PCS | Mod: S$GLB,,, | Performed by: OPTOMETRIST

## 2021-06-02 PROCEDURE — 99999 PR PBB SHADOW E&M-EST. PATIENT-LVL V: ICD-10-PCS | Mod: PBBFAC,,, | Performed by: OPTOMETRIST

## 2021-06-02 PROCEDURE — 99499 RISK ADDL DX/OHS AUDIT: ICD-10-PCS | Mod: S$GLB,,, | Performed by: OPTOMETRIST

## 2021-06-02 PROCEDURE — 1101F PR PT FALLS ASSESS DOC 0-1 FALLS W/OUT INJ PAST YR: ICD-10-PCS | Mod: CPTII,S$GLB,, | Performed by: OPTOMETRIST

## 2021-06-02 PROCEDURE — 1101F PT FALLS ASSESS-DOCD LE1/YR: CPT | Mod: CPTII,S$GLB,, | Performed by: OPTOMETRIST

## 2021-06-02 PROCEDURE — 2023F PR DILATED RETINAL EXAM W/O EVID OF RETINOPATHY: ICD-10-PCS | Mod: S$GLB,,, | Performed by: OPTOMETRIST

## 2021-06-02 PROCEDURE — 2023F DILAT RTA XM W/O RTNOPTHY: CPT | Mod: S$GLB,,, | Performed by: OPTOMETRIST

## 2021-06-02 PROCEDURE — 3288F FALL RISK ASSESSMENT DOCD: CPT | Mod: CPTII,S$GLB,, | Performed by: OPTOMETRIST

## 2021-06-02 PROCEDURE — 1126F PR PAIN SEVERITY QUANTIFIED, NO PAIN PRESENT: ICD-10-PCS | Mod: S$GLB,,, | Performed by: OPTOMETRIST

## 2021-06-04 ENCOUNTER — PATIENT MESSAGE (OUTPATIENT)
Dept: OPTOMETRY | Facility: CLINIC | Age: 86
End: 2021-06-04

## 2021-06-30 ENCOUNTER — TELEPHONE (OUTPATIENT)
Dept: INTERNAL MEDICINE | Facility: CLINIC | Age: 86
End: 2021-06-30

## 2021-06-30 RX ORDER — ZOLPIDEM TARTRATE 5 MG/1
TABLET ORAL
Qty: 30 TABLET | Refills: 0 | Status: SHIPPED | OUTPATIENT
Start: 2021-06-30 | End: 2021-07-28

## 2021-07-02 ENCOUNTER — TELEPHONE (OUTPATIENT)
Dept: PSYCHIATRY | Facility: CLINIC | Age: 86
End: 2021-07-02

## 2021-07-07 ENCOUNTER — PATIENT OUTREACH (OUTPATIENT)
Dept: ADMINISTRATIVE | Facility: OTHER | Age: 86
End: 2021-07-07

## 2021-07-09 ENCOUNTER — OFFICE VISIT (OUTPATIENT)
Dept: OPTOMETRY | Facility: CLINIC | Age: 86
End: 2021-07-09
Payer: MEDICARE

## 2021-07-09 DIAGNOSIS — H00.14 CHALAZION OF LEFT UPPER EYELID: Primary | ICD-10-CM

## 2021-07-09 PROCEDURE — 99999 PR PBB SHADOW E&M-EST. PATIENT-LVL IV: ICD-10-PCS | Mod: PBBFAC,,, | Performed by: OPTOMETRIST

## 2021-07-09 PROCEDURE — 92012 INTRM OPH EXAM EST PATIENT: CPT | Mod: S$GLB,,, | Performed by: OPTOMETRIST

## 2021-07-09 PROCEDURE — 1101F PT FALLS ASSESS-DOCD LE1/YR: CPT | Mod: CPTII,S$GLB,, | Performed by: OPTOMETRIST

## 2021-07-09 PROCEDURE — 92012 PR EYE EXAM, EST PATIENT,INTERMED: ICD-10-PCS | Mod: S$GLB,,, | Performed by: OPTOMETRIST

## 2021-07-09 PROCEDURE — 1101F PR PT FALLS ASSESS DOC 0-1 FALLS W/OUT INJ PAST YR: ICD-10-PCS | Mod: CPTII,S$GLB,, | Performed by: OPTOMETRIST

## 2021-07-09 PROCEDURE — 3288F PR FALLS RISK ASSESSMENT DOCUMENTED: ICD-10-PCS | Mod: CPTII,S$GLB,, | Performed by: OPTOMETRIST

## 2021-07-09 PROCEDURE — 3288F FALL RISK ASSESSMENT DOCD: CPT | Mod: CPTII,S$GLB,, | Performed by: OPTOMETRIST

## 2021-07-09 PROCEDURE — 1126F PR PAIN SEVERITY QUANTIFIED, NO PAIN PRESENT: ICD-10-PCS | Mod: S$GLB,,, | Performed by: OPTOMETRIST

## 2021-07-09 PROCEDURE — 99999 PR PBB SHADOW E&M-EST. PATIENT-LVL IV: CPT | Mod: PBBFAC,,, | Performed by: OPTOMETRIST

## 2021-07-09 PROCEDURE — 1126F AMNT PAIN NOTED NONE PRSNT: CPT | Mod: S$GLB,,, | Performed by: OPTOMETRIST

## 2021-07-23 DIAGNOSIS — J47.9 BRONCHIECTASIS WITHOUT COMPLICATION: ICD-10-CM

## 2021-07-23 DIAGNOSIS — J45.20 INTERMITTENT ASTHMA, UNCOMPLICATED: ICD-10-CM

## 2021-07-23 DIAGNOSIS — J44.89 COPD (CHRONIC OBSTRUCTIVE PULMONARY DISEASE) WITH CHRONIC BRONCHITIS: ICD-10-CM

## 2021-07-23 RX ORDER — FLUTICASONE FUROATE AND VILANTEROL TRIFENATATE 200; 25 UG/1; UG/1
1 POWDER RESPIRATORY (INHALATION) DAILY
Qty: 3 EACH | Refills: 3 | Status: SHIPPED | OUTPATIENT
Start: 2021-07-23 | End: 2022-01-04 | Stop reason: SDUPTHER

## 2021-07-28 RX ORDER — ZOLPIDEM TARTRATE 5 MG/1
TABLET ORAL
Qty: 30 TABLET | Refills: 0 | Status: SHIPPED | OUTPATIENT
Start: 2021-07-28 | End: 2021-08-27

## 2021-08-03 ENCOUNTER — PATIENT MESSAGE (OUTPATIENT)
Dept: ADMINISTRATIVE | Facility: HOSPITAL | Age: 86
End: 2021-08-03

## 2021-08-06 ENCOUNTER — OFFICE VISIT (OUTPATIENT)
Dept: PAIN MEDICINE | Facility: CLINIC | Age: 86
End: 2021-08-06
Payer: MEDICARE

## 2021-08-06 VITALS
HEART RATE: 71 BPM | OXYGEN SATURATION: 97 % | DIASTOLIC BLOOD PRESSURE: 81 MMHG | RESPIRATION RATE: 20 BRPM | TEMPERATURE: 97 F | SYSTOLIC BLOOD PRESSURE: 143 MMHG | WEIGHT: 152.75 LBS | BODY MASS INDEX: 27.06 KG/M2

## 2021-08-06 DIAGNOSIS — G89.4 CHRONIC PAIN DISORDER: ICD-10-CM

## 2021-08-06 DIAGNOSIS — M54.16 LUMBAR RADICULITIS: ICD-10-CM

## 2021-08-06 DIAGNOSIS — M54.16 LUMBAR RADICULOPATHY: Primary | ICD-10-CM

## 2021-08-06 DIAGNOSIS — M51.36 DDD (DEGENERATIVE DISC DISEASE), LUMBAR: ICD-10-CM

## 2021-08-06 DIAGNOSIS — R26.81 GAIT INSTABILITY: ICD-10-CM

## 2021-08-06 PROCEDURE — 1160F PR REVIEW ALL MEDS BY PRESCRIBER/CLIN PHARMACIST DOCUMENTED: ICD-10-PCS | Mod: CPTII,S$GLB,, | Performed by: PHYSICIAN ASSISTANT

## 2021-08-06 PROCEDURE — 1101F PR PT FALLS ASSESS DOC 0-1 FALLS W/OUT INJ PAST YR: ICD-10-PCS | Mod: CPTII,S$GLB,, | Performed by: PHYSICIAN ASSISTANT

## 2021-08-06 PROCEDURE — 1159F MED LIST DOCD IN RCRD: CPT | Mod: CPTII,S$GLB,, | Performed by: PHYSICIAN ASSISTANT

## 2021-08-06 PROCEDURE — 3288F PR FALLS RISK ASSESSMENT DOCUMENTED: ICD-10-PCS | Mod: CPTII,S$GLB,, | Performed by: PHYSICIAN ASSISTANT

## 2021-08-06 PROCEDURE — 99999 PR PBB SHADOW E&M-EST. PATIENT-LVL V: ICD-10-PCS | Mod: PBBFAC,,, | Performed by: PHYSICIAN ASSISTANT

## 2021-08-06 PROCEDURE — 3288F FALL RISK ASSESSMENT DOCD: CPT | Mod: CPTII,S$GLB,, | Performed by: PHYSICIAN ASSISTANT

## 2021-08-06 PROCEDURE — 1125F PR PAIN SEVERITY QUANTIFIED, PAIN PRESENT: ICD-10-PCS | Mod: CPTII,S$GLB,, | Performed by: PHYSICIAN ASSISTANT

## 2021-08-06 PROCEDURE — 99214 OFFICE O/P EST MOD 30 MIN: CPT | Mod: S$GLB,,, | Performed by: PHYSICIAN ASSISTANT

## 2021-08-06 PROCEDURE — 1101F PT FALLS ASSESS-DOCD LE1/YR: CPT | Mod: CPTII,S$GLB,, | Performed by: PHYSICIAN ASSISTANT

## 2021-08-06 PROCEDURE — 99214 PR OFFICE/OUTPT VISIT, EST, LEVL IV, 30-39 MIN: ICD-10-PCS | Mod: S$GLB,,, | Performed by: PHYSICIAN ASSISTANT

## 2021-08-06 PROCEDURE — 1125F AMNT PAIN NOTED PAIN PRSNT: CPT | Mod: CPTII,S$GLB,, | Performed by: PHYSICIAN ASSISTANT

## 2021-08-06 PROCEDURE — 99999 PR PBB SHADOW E&M-EST. PATIENT-LVL V: CPT | Mod: PBBFAC,,, | Performed by: PHYSICIAN ASSISTANT

## 2021-08-06 PROCEDURE — 1160F RVW MEDS BY RX/DR IN RCRD: CPT | Mod: CPTII,S$GLB,, | Performed by: PHYSICIAN ASSISTANT

## 2021-08-06 PROCEDURE — 1159F PR MEDICATION LIST DOCUMENTED IN MEDICAL RECORD: ICD-10-PCS | Mod: CPTII,S$GLB,, | Performed by: PHYSICIAN ASSISTANT

## 2021-08-06 RX ORDER — TRAMADOL HYDROCHLORIDE 50 MG/1
50 TABLET ORAL 3 TIMES DAILY PRN
Qty: 90 TABLET | Refills: 2 | Status: SHIPPED | OUTPATIENT
Start: 2021-08-16 | End: 2021-11-08 | Stop reason: SDUPTHER

## 2021-08-13 ENCOUNTER — PATIENT MESSAGE (OUTPATIENT)
Dept: INTERNAL MEDICINE | Facility: CLINIC | Age: 86
End: 2021-08-13

## 2021-08-24 ENCOUNTER — HOSPITAL ENCOUNTER (OUTPATIENT)
Dept: RADIOLOGY | Facility: HOSPITAL | Age: 86
Discharge: HOME | End: 2021-08-24
Attending: INTERNAL MEDICINE
Payer: MEDICARE

## 2021-08-24 DIAGNOSIS — Z85.3 HISTORY OF BREAST CANCER: ICD-10-CM

## 2021-08-24 DIAGNOSIS — Z12.31 ENCOUNTER FOR SCREENING MAMMOGRAM FOR MALIGNANT NEOPLASM OF BREAST: ICD-10-CM

## 2021-08-24 PROCEDURE — 77067 SCR MAMMO BI INCL CAD: CPT | Mod: TC,PO

## 2021-08-24 PROCEDURE — 77063 MAMMO DIGITAL SCREENING BILAT WITH TOMO: ICD-10-PCS | Mod: 26,,, | Performed by: RADIOLOGY

## 2021-08-24 PROCEDURE — 77063 BREAST TOMOSYNTHESIS BI: CPT | Mod: 26,,, | Performed by: RADIOLOGY

## 2021-08-24 PROCEDURE — 77067 SCR MAMMO BI INCL CAD: CPT | Mod: 26,,, | Performed by: RADIOLOGY

## 2021-08-24 PROCEDURE — 77067 MAMMO DIGITAL SCREENING BILAT WITH TOMO: ICD-10-PCS | Mod: 26,,, | Performed by: RADIOLOGY

## 2021-09-22 ENCOUNTER — PATIENT MESSAGE (OUTPATIENT)
Dept: OPTOMETRY | Facility: CLINIC | Age: 86
End: 2021-09-22

## 2021-09-23 RX ORDER — ZOLPIDEM TARTRATE 5 MG/1
TABLET ORAL
Qty: 30 TABLET | Refills: 0 | Status: SHIPPED | OUTPATIENT
Start: 2021-09-23 | End: 2021-10-21

## 2021-10-12 ENCOUNTER — IMMUNIZATION (OUTPATIENT)
Dept: PHARMACY | Facility: CLINIC | Age: 86
End: 2021-10-12
Payer: MEDICARE

## 2021-10-18 ENCOUNTER — PATIENT MESSAGE (OUTPATIENT)
Dept: ADMINISTRATIVE | Facility: HOSPITAL | Age: 86
End: 2021-10-18
Payer: MEDICARE

## 2021-10-21 RX ORDER — ZOLPIDEM TARTRATE 5 MG/1
TABLET ORAL
Qty: 30 TABLET | Refills: 0 | Status: SHIPPED | OUTPATIENT
Start: 2021-10-21 | End: 2021-11-17

## 2021-11-01 ENCOUNTER — OFFICE VISIT (OUTPATIENT)
Dept: CARDIOLOGY | Facility: CLINIC | Age: 86
End: 2021-11-01
Payer: MEDICARE

## 2021-11-01 VITALS
HEIGHT: 63 IN | HEART RATE: 66 BPM | BODY MASS INDEX: 26.8 KG/M2 | SYSTOLIC BLOOD PRESSURE: 130 MMHG | WEIGHT: 151.25 LBS | DIASTOLIC BLOOD PRESSURE: 90 MMHG

## 2021-11-01 DIAGNOSIS — I35.0 NONRHEUMATIC AORTIC VALVE STENOSIS: ICD-10-CM

## 2021-11-01 DIAGNOSIS — I77.811 ECTATIC ABDOMINAL AORTA: ICD-10-CM

## 2021-11-01 DIAGNOSIS — I10 ESSENTIAL HYPERTENSION: ICD-10-CM

## 2021-11-01 DIAGNOSIS — I70.0 AORTIC ATHEROSCLEROSIS: Primary | ICD-10-CM

## 2021-11-01 DIAGNOSIS — E78.2 MIXED HYPERLIPIDEMIA: ICD-10-CM

## 2021-11-01 DIAGNOSIS — I51.7 LVH (LEFT VENTRICULAR HYPERTROPHY): ICD-10-CM

## 2021-11-01 PROCEDURE — 99499 RISK ADDL DX/OHS AUDIT: ICD-10-PCS | Mod: S$GLB,,, | Performed by: INTERNAL MEDICINE

## 2021-11-01 PROCEDURE — 1101F PT FALLS ASSESS-DOCD LE1/YR: CPT | Mod: CPTII,S$GLB,, | Performed by: INTERNAL MEDICINE

## 2021-11-01 PROCEDURE — 99214 PR OFFICE/OUTPT VISIT, EST, LEVL IV, 30-39 MIN: ICD-10-PCS | Mod: S$GLB,,, | Performed by: INTERNAL MEDICINE

## 2021-11-01 PROCEDURE — 3288F FALL RISK ASSESSMENT DOCD: CPT | Mod: CPTII,S$GLB,, | Performed by: INTERNAL MEDICINE

## 2021-11-01 PROCEDURE — 1160F RVW MEDS BY RX/DR IN RCRD: CPT | Mod: CPTII,S$GLB,, | Performed by: INTERNAL MEDICINE

## 2021-11-01 PROCEDURE — 99499 UNLISTED E&M SERVICE: CPT | Mod: S$GLB,,, | Performed by: INTERNAL MEDICINE

## 2021-11-01 PROCEDURE — 99999 PR PBB SHADOW E&M-EST. PATIENT-LVL V: ICD-10-PCS | Mod: PBBFAC,,, | Performed by: INTERNAL MEDICINE

## 2021-11-01 PROCEDURE — 99214 OFFICE O/P EST MOD 30 MIN: CPT | Mod: S$GLB,,, | Performed by: INTERNAL MEDICINE

## 2021-11-01 PROCEDURE — 1159F MED LIST DOCD IN RCRD: CPT | Mod: CPTII,S$GLB,, | Performed by: INTERNAL MEDICINE

## 2021-11-01 PROCEDURE — 1125F AMNT PAIN NOTED PAIN PRSNT: CPT | Mod: CPTII,S$GLB,, | Performed by: INTERNAL MEDICINE

## 2021-11-01 PROCEDURE — 3288F PR FALLS RISK ASSESSMENT DOCUMENTED: ICD-10-PCS | Mod: CPTII,S$GLB,, | Performed by: INTERNAL MEDICINE

## 2021-11-01 PROCEDURE — 1159F PR MEDICATION LIST DOCUMENTED IN MEDICAL RECORD: ICD-10-PCS | Mod: CPTII,S$GLB,, | Performed by: INTERNAL MEDICINE

## 2021-11-01 PROCEDURE — 1101F PR PT FALLS ASSESS DOC 0-1 FALLS W/OUT INJ PAST YR: ICD-10-PCS | Mod: CPTII,S$GLB,, | Performed by: INTERNAL MEDICINE

## 2021-11-01 PROCEDURE — 99999 PR PBB SHADOW E&M-EST. PATIENT-LVL V: CPT | Mod: PBBFAC,,, | Performed by: INTERNAL MEDICINE

## 2021-11-01 PROCEDURE — 1160F PR REVIEW ALL MEDS BY PRESCRIBER/CLIN PHARMACIST DOCUMENTED: ICD-10-PCS | Mod: CPTII,S$GLB,, | Performed by: INTERNAL MEDICINE

## 2021-11-01 PROCEDURE — 1125F PR PAIN SEVERITY QUANTIFIED, PAIN PRESENT: ICD-10-PCS | Mod: CPTII,S$GLB,, | Performed by: INTERNAL MEDICINE

## 2021-11-02 ENCOUNTER — TELEPHONE (OUTPATIENT)
Dept: INTERNAL MEDICINE | Facility: CLINIC | Age: 86
End: 2021-11-02
Payer: MEDICARE

## 2021-11-02 DIAGNOSIS — E11.8 CONTROLLED TYPE 2 DIABETES MELLITUS WITH COMPLICATION, WITHOUT LONG-TERM CURRENT USE OF INSULIN: Primary | ICD-10-CM

## 2021-11-02 DIAGNOSIS — E55.9 VITAMIN D DEFICIENCY DISEASE: ICD-10-CM

## 2021-11-02 DIAGNOSIS — E03.9 ACQUIRED HYPOTHYROIDISM: ICD-10-CM

## 2021-11-02 DIAGNOSIS — E78.2 MIXED HYPERLIPIDEMIA: ICD-10-CM

## 2021-11-02 RX ORDER — LEVOTHYROXINE SODIUM 75 UG/1
TABLET ORAL
Qty: 90 TABLET | Refills: 0 | Status: SHIPPED | OUTPATIENT
Start: 2021-11-02 | End: 2022-01-03 | Stop reason: SDUPTHER

## 2021-11-08 ENCOUNTER — OFFICE VISIT (OUTPATIENT)
Dept: PAIN MEDICINE | Facility: CLINIC | Age: 86
End: 2021-11-08
Payer: MEDICARE

## 2021-11-08 VITALS
DIASTOLIC BLOOD PRESSURE: 71 MMHG | SYSTOLIC BLOOD PRESSURE: 152 MMHG | RESPIRATION RATE: 18 BRPM | BODY MASS INDEX: 26.75 KG/M2 | OXYGEN SATURATION: 95 % | HEART RATE: 65 BPM | TEMPERATURE: 97 F | WEIGHT: 151 LBS

## 2021-11-08 DIAGNOSIS — M48.061 SPINAL STENOSIS OF LUMBAR REGION WITHOUT NEUROGENIC CLAUDICATION: ICD-10-CM

## 2021-11-08 DIAGNOSIS — M51.36 DDD (DEGENERATIVE DISC DISEASE), LUMBAR: ICD-10-CM

## 2021-11-08 DIAGNOSIS — R26.81 GAIT INSTABILITY: ICD-10-CM

## 2021-11-08 DIAGNOSIS — M54.16 LUMBAR RADICULOPATHY: Primary | ICD-10-CM

## 2021-11-08 PROCEDURE — 1159F PR MEDICATION LIST DOCUMENTED IN MEDICAL RECORD: ICD-10-PCS | Mod: CPTII,S$GLB,, | Performed by: PHYSICIAN ASSISTANT

## 2021-11-08 PROCEDURE — 3288F PR FALLS RISK ASSESSMENT DOCUMENTED: ICD-10-PCS | Mod: CPTII,S$GLB,, | Performed by: PHYSICIAN ASSISTANT

## 2021-11-08 PROCEDURE — 99214 PR OFFICE/OUTPT VISIT, EST, LEVL IV, 30-39 MIN: ICD-10-PCS | Mod: S$GLB,,, | Performed by: PHYSICIAN ASSISTANT

## 2021-11-08 PROCEDURE — 1159F MED LIST DOCD IN RCRD: CPT | Mod: CPTII,S$GLB,, | Performed by: PHYSICIAN ASSISTANT

## 2021-11-08 PROCEDURE — 1101F PT FALLS ASSESS-DOCD LE1/YR: CPT | Mod: CPTII,S$GLB,, | Performed by: PHYSICIAN ASSISTANT

## 2021-11-08 PROCEDURE — 1160F RVW MEDS BY RX/DR IN RCRD: CPT | Mod: CPTII,S$GLB,, | Performed by: PHYSICIAN ASSISTANT

## 2021-11-08 PROCEDURE — 1125F AMNT PAIN NOTED PAIN PRSNT: CPT | Mod: CPTII,S$GLB,, | Performed by: PHYSICIAN ASSISTANT

## 2021-11-08 PROCEDURE — 99999 PR PBB SHADOW E&M-EST. PATIENT-LVL V: CPT | Mod: PBBFAC,,, | Performed by: PHYSICIAN ASSISTANT

## 2021-11-08 PROCEDURE — 3288F FALL RISK ASSESSMENT DOCD: CPT | Mod: CPTII,S$GLB,, | Performed by: PHYSICIAN ASSISTANT

## 2021-11-08 PROCEDURE — 1125F PR PAIN SEVERITY QUANTIFIED, PAIN PRESENT: ICD-10-PCS | Mod: CPTII,S$GLB,, | Performed by: PHYSICIAN ASSISTANT

## 2021-11-08 PROCEDURE — 99999 PR PBB SHADOW E&M-EST. PATIENT-LVL V: ICD-10-PCS | Mod: PBBFAC,,, | Performed by: PHYSICIAN ASSISTANT

## 2021-11-08 PROCEDURE — 1160F PR REVIEW ALL MEDS BY PRESCRIBER/CLIN PHARMACIST DOCUMENTED: ICD-10-PCS | Mod: CPTII,S$GLB,, | Performed by: PHYSICIAN ASSISTANT

## 2021-11-08 PROCEDURE — 1101F PR PT FALLS ASSESS DOC 0-1 FALLS W/OUT INJ PAST YR: ICD-10-PCS | Mod: CPTII,S$GLB,, | Performed by: PHYSICIAN ASSISTANT

## 2021-11-08 PROCEDURE — 99214 OFFICE O/P EST MOD 30 MIN: CPT | Mod: S$GLB,,, | Performed by: PHYSICIAN ASSISTANT

## 2021-11-08 RX ORDER — TRAMADOL HYDROCHLORIDE 50 MG/1
50 TABLET ORAL 3 TIMES DAILY PRN
Qty: 90 TABLET | Refills: 2 | Status: SHIPPED | OUTPATIENT
Start: 2021-11-19 | End: 2022-01-03 | Stop reason: SDUPTHER

## 2021-11-16 ENCOUNTER — TELEPHONE (OUTPATIENT)
Dept: INTERNAL MEDICINE | Facility: CLINIC | Age: 86
End: 2021-11-16
Payer: MEDICARE

## 2021-11-17 RX ORDER — ZOLPIDEM TARTRATE 5 MG/1
TABLET ORAL
Qty: 30 TABLET | Refills: 0 | Status: SHIPPED | OUTPATIENT
Start: 2021-11-17 | End: 2021-12-20

## 2021-12-08 DIAGNOSIS — J44.89 COPD (CHRONIC OBSTRUCTIVE PULMONARY DISEASE) WITH CHRONIC BRONCHITIS: Primary | ICD-10-CM

## 2021-12-08 DIAGNOSIS — J47.9 BRONCHIECTASIS WITHOUT COMPLICATION: ICD-10-CM

## 2021-12-08 DIAGNOSIS — J45.20 INTERMITTENT ASTHMA, UNCOMPLICATED: ICD-10-CM

## 2021-12-08 RX ORDER — ALBUTEROL SULFATE 90 UG/1
2 AEROSOL, METERED RESPIRATORY (INHALATION) EVERY 6 HOURS PRN
Qty: 18 G | Refills: 5 | Status: SHIPPED | OUTPATIENT
Start: 2021-12-08 | End: 2021-12-09

## 2021-12-09 DIAGNOSIS — J45.20 INTERMITTENT ASTHMA, UNCOMPLICATED: ICD-10-CM

## 2021-12-09 DIAGNOSIS — J44.89 COPD (CHRONIC OBSTRUCTIVE PULMONARY DISEASE) WITH CHRONIC BRONCHITIS: Primary | ICD-10-CM

## 2021-12-09 RX ORDER — ALBUTEROL SULFATE 90 UG/1
2 AEROSOL, METERED RESPIRATORY (INHALATION) EVERY 6 HOURS PRN
Qty: 18 G | Refills: 11 | Status: SHIPPED | OUTPATIENT
Start: 2021-12-09 | End: 2022-09-13 | Stop reason: SDUPTHER

## 2021-12-09 RX ORDER — ALBUTEROL SULFATE 90 UG/1
2 AEROSOL, METERED RESPIRATORY (INHALATION) EVERY 6 HOURS PRN
Qty: 18 G | Refills: 11 | Status: CANCELLED | OUTPATIENT
Start: 2021-12-09

## 2021-12-10 ENCOUNTER — TELEPHONE (OUTPATIENT)
Dept: PULMONOLOGY | Facility: CLINIC | Age: 86
End: 2021-12-10
Payer: MEDICARE

## 2021-12-20 DIAGNOSIS — M10.00 IDIOPATHIC GOUT, UNSPECIFIED CHRONICITY, UNSPECIFIED SITE: Primary | ICD-10-CM

## 2021-12-20 RX ORDER — ALLOPURINOL 100 MG/1
TABLET ORAL
Qty: 90 TABLET | Refills: 0 | Status: SHIPPED | OUTPATIENT
Start: 2021-12-20 | End: 2022-01-03 | Stop reason: SDUPTHER

## 2021-12-20 RX ORDER — ZOLPIDEM TARTRATE 5 MG/1
TABLET ORAL
Qty: 30 TABLET | Refills: 0 | Status: SHIPPED | OUTPATIENT
Start: 2021-12-20 | End: 2022-01-03 | Stop reason: SDUPTHER

## 2022-01-03 ENCOUNTER — PATIENT MESSAGE (OUTPATIENT)
Dept: PAIN MEDICINE | Facility: CLINIC | Age: 87
End: 2022-01-03
Payer: MEDICARE

## 2022-01-03 DIAGNOSIS — E78.5 HYPERLIPIDEMIA, UNSPECIFIED HYPERLIPIDEMIA TYPE: ICD-10-CM

## 2022-01-03 RX ORDER — TRAMADOL HYDROCHLORIDE 50 MG/1
50 TABLET ORAL 3 TIMES DAILY PRN
Qty: 90 TABLET | Refills: 2 | Status: SHIPPED | OUTPATIENT
Start: 2022-01-19 | End: 2022-02-15

## 2022-01-03 RX ORDER — GABAPENTIN 300 MG/1
300 CAPSULE ORAL 3 TIMES DAILY
Qty: 90 CAPSULE | Refills: 3 | Status: SHIPPED | OUTPATIENT
Start: 2022-01-03 | End: 2022-05-10 | Stop reason: SDUPTHER

## 2022-01-03 NOTE — TELEPHONE ENCOUNTER
No new care gaps identified.  Powered by [employer]. Reference number: [id number].   1/03/2022 1:00:37 PM CST

## 2022-01-03 NOTE — TELEPHONE ENCOUNTER
This Rx Request does not qualify for assessment with the OR.  Please review protocol details and the Care Due Message for extra clinical information    Reasons Rx Request may be deferred:  1. Labs/Vitals overdue  2. Labs/Vitals abnormal  3. Patient has been seen in the ED/Hospital since the last PCP visit  4. Medication is outside of scope

## 2022-01-04 RX ORDER — TRIAMTERENE AND HYDROCHLOROTHIAZIDE 37.5; 25 MG/1; MG/1
1 CAPSULE ORAL EVERY MORNING
Qty: 90 CAPSULE | Refills: 0 | Status: SHIPPED | OUTPATIENT
Start: 2022-01-04 | End: 2022-04-05

## 2022-01-04 RX ORDER — CEPHALEXIN 500 MG/1
500 CAPSULE ORAL 4 TIMES DAILY
Qty: 40 CAPSULE | Refills: 6 | Status: SHIPPED | OUTPATIENT
Start: 2022-01-04 | End: 2022-02-21

## 2022-01-04 RX ORDER — ALLOPURINOL 100 MG/1
100 TABLET ORAL DAILY
Qty: 90 TABLET | Refills: 0 | Status: SHIPPED | OUTPATIENT
Start: 2022-01-04 | End: 2022-04-05

## 2022-01-04 RX ORDER — ZOLPIDEM TARTRATE 5 MG/1
5 TABLET ORAL NIGHTLY
Qty: 30 TABLET | Refills: 0 | Status: SHIPPED | OUTPATIENT
Start: 2022-01-04 | End: 2022-02-15

## 2022-01-04 RX ORDER — SIMVASTATIN 40 MG/1
40 TABLET, FILM COATED ORAL NIGHTLY
Qty: 90 TABLET | Refills: 0 | Status: SHIPPED | OUTPATIENT
Start: 2022-01-04 | End: 2022-04-05

## 2022-01-04 RX ORDER — BUSPIRONE HYDROCHLORIDE 5 MG/1
5 TABLET ORAL 2 TIMES DAILY PRN
Qty: 60 TABLET | Refills: 2 | Status: SHIPPED | OUTPATIENT
Start: 2022-01-04 | End: 2022-11-28

## 2022-01-04 RX ORDER — LEVOTHYROXINE SODIUM 75 UG/1
75 TABLET ORAL DAILY
Qty: 90 TABLET | Refills: 0 | Status: SHIPPED | OUTPATIENT
Start: 2022-01-04 | End: 2022-04-05

## 2022-01-16 NOTE — TELEPHONE ENCOUNTER
Care Due:                  Date            Visit Type   Department     Provider  --------------------------------------------------------------------------------                                             [facility] INTERNAL  Last Visit: 11-      [facility]         JAMEL Barger                                           [facility] INTERNAL  Next Visit: 03-      None         JAMEL Barger                                                            Last  Test          Frequency    Reason                     Performed    Due Date  --------------------------------------------------------------------------------    TSH.........  12 months..  levothyroxine............  11-   10-    Powered by [employer]. Reference number: [id number].   1/16/2022 12:01:11 AM CST

## 2022-01-18 ENCOUNTER — PATIENT MESSAGE (OUTPATIENT)
Dept: ADMINISTRATIVE | Facility: HOSPITAL | Age: 87
End: 2022-01-18
Payer: MEDICARE

## 2022-01-18 ENCOUNTER — PES CALL (OUTPATIENT)
Dept: ADMINISTRATIVE | Facility: CLINIC | Age: 87
End: 2022-01-18
Payer: MEDICARE

## 2022-01-18 RX ORDER — ZOLPIDEM TARTRATE 5 MG/1
TABLET ORAL
Qty: 30 TABLET | Refills: 0 | OUTPATIENT
Start: 2022-01-18

## 2022-02-08 ENCOUNTER — OFFICE VISIT (OUTPATIENT)
Dept: PAIN MEDICINE | Facility: CLINIC | Age: 87
End: 2022-02-08
Payer: MEDICARE

## 2022-02-08 VITALS
OXYGEN SATURATION: 97 % | RESPIRATION RATE: 20 BRPM | TEMPERATURE: 97 F | SYSTOLIC BLOOD PRESSURE: 137 MMHG | DIASTOLIC BLOOD PRESSURE: 69 MMHG | BODY MASS INDEX: 27.1 KG/M2 | HEART RATE: 84 BPM | WEIGHT: 153 LBS

## 2022-02-08 DIAGNOSIS — M51.36 DDD (DEGENERATIVE DISC DISEASE), LUMBAR: Primary | ICD-10-CM

## 2022-02-08 DIAGNOSIS — R26.81 GAIT INSTABILITY: ICD-10-CM

## 2022-02-08 DIAGNOSIS — M48.061 SPINAL STENOSIS OF LUMBAR REGION WITHOUT NEUROGENIC CLAUDICATION: ICD-10-CM

## 2022-02-08 DIAGNOSIS — M54.16 LUMBAR RADICULOPATHY: ICD-10-CM

## 2022-02-08 PROCEDURE — 99214 PR OFFICE/OUTPT VISIT, EST, LEVL IV, 30-39 MIN: ICD-10-PCS | Mod: S$GLB,,, | Performed by: PHYSICIAN ASSISTANT

## 2022-02-08 PROCEDURE — 99999 PR PBB SHADOW E&M-EST. PATIENT-LVL V: CPT | Mod: PBBFAC,,, | Performed by: PHYSICIAN ASSISTANT

## 2022-02-08 PROCEDURE — 99999 PR PBB SHADOW E&M-EST. PATIENT-LVL V: ICD-10-PCS | Mod: PBBFAC,,, | Performed by: PHYSICIAN ASSISTANT

## 2022-02-08 PROCEDURE — 99214 OFFICE O/P EST MOD 30 MIN: CPT | Mod: S$GLB,,, | Performed by: PHYSICIAN ASSISTANT

## 2022-02-13 NOTE — PROGRESS NOTES
CC: Back pain    HPI: The patient is a 86-year-old woman with a history of diabetes, previous breast CA, CVA and spinal stenosis who presents in referral from her primary care physician, Dr. Barger for back pain and leg pain.  She returns in follow-up today with continued right low back, right buttock and lateral hip pain.  She denies any changes to this.  Her pain is worse with walking and improved with sitting, lying down and tramadol.  She denies weakness, numbness, bladder or bowel incontinence.  She continues to ambulate with walker.    Pain intervention history: She tried physical therapy about 1 year ago and it seemed to make her pain worse. Patient is status post right L3 and L4 transforaminal injection on 8/9/13 with 50% relief of low back and right leg pain.  She is status post bilateral L4 transforaminal epidural steroid injections on 9/27/13 and 11/1/13 with 10-20% relief.  She is status post radiofrequency ablation of the left L3, 4, 5 medial branch nerves on 5/5/14 with 50% relief of her left low back pain.  She is status post radiofrequency ablation of the right L3, 4 and 5 medial branch nerves on 6/16/14 with mild relief, however she reported significant more relief at a later visit.  She is status post bilateral L4 transforaminal epidural steroid injections on 9/24/14 with moderate relief.  She is status post bilateral L4 transforaminal epidural steroid injections on 1/7/15 with complete relief of her posterior thigh pain, moderate relief of her low back pain and minimal relief of her right lateral hip pain.  She is status post C7-T1 cervical interlaminar epidural steroid injection on 2/27/15 with greater than 50% relief.  She is status post bilateral L3, 4 and 5 medial branch radio frequency ablation on 7/29/16 reporting 0% relief initially at 4 weeks but then reporting 90% relief after 6-7 weeks.  She is status post bilateral L4 transforaminal epidural steroid injections on 11/11/16 with not  quite 50% relief.  She is status post bilateral L4 transforaminal epidural steroid injections on 4/4/17 with 50% relief.  She is status post bilateral L4 transforaminal epidural steroid injections on 8/23/17 with greater than 80% relief.  She is status post bilateral L3, 4 and 5 medial branch radiofrequency ablation on 3/6/18 with moderate relief.  She is status post L5/S1 interlaminar epidural steroid injection on 06/04/2018 with almost 100% relief lasting 1 month.  She is status post L5/S1 interlaminar epidural steroid injection on 09/04/2018 with almost 100% relief.  She is status post L5/S1 interlaminar epidural steroid injection on 12/20/2018 with greater than 50% relief. She is status post L5/S1 interlaminar epidural steroid injection on 05/07/2019 with 75% relief.  She is status post L5/S1 interlaminar epidural steroid injection on 09/16/2019 with 50% relief.  She is status post bilateral L3, 4 and 5 medial branch radiofrequency ablation on 10/29/2019 with 50% relief. She is status post L5/S1 interlaminar epidural steroid injection on 12/16/2019 with what sounds like about 50% relief.   She is status post L5/S1 interlaminar epidural steroid injection on 07/01/2020 with almost 100% relief lasting 3 weeks, now reporting about 0% relief.  She is status post right L3/4 and L4/5 transforaminal epidural steroid injection on 04/16/2021 with 50% relief.    ROS:She reports easy bruising, back pain and difficulty sleeping.  Balance of review of systems is negative.    Medical, surgical, family and social history reviewed elsewhere in record.    Medications/Allergies: See med card    Vitals:    02/08/22 1408   BP: 137/69   Pulse: 84   Resp: 20   Temp: 97.4 °F (36.3 °C)   TempSrc: Oral   SpO2: 97%   Weight: 69.4 kg (153 lb)   PainSc:   4   PainLoc: Back         Physical exam:  Gen: A and O x3, pleasant, well-groomed, appears uncomfortable  Skin: No rashes or obvious lesions  HEENT: PERRLA  CVS: Regular rate and rhythm,  normal S1 and S2, no murmurs.  Resp: Clear to auscultation bilaterally, no wheezes or rales.  Abdomen: Soft, NT/ND, normal bowel sounds present.  Musculoskeletal: Able to stand and walk short distances without assistance, however uses a walker when out of the house.  She has disuse atrophy of the lumbar paraspinous muscles.  Slow to go from seated to standing position.    Neuro:  Upper extremities: 5/5 strength bilaterally   Lower extremities: 5/5 strength bilaterally  Reflexes: Brachioradialis 2+, Bicep 2+, Tricep 2+. Patellar 2+, Achilles 2+.  Sensory: Intact and symmetrical to light touch and pinprick in C2-T1 dermatomes bilaterally.  Intact and symmetrical to light touch and pinprick in L2-S1 dermatomes bilaterally, except for a small patch over her left lateral shin decreased with light touch and pinprick.    Lumbar spine:  Lumbar spine: ROM is severely reduced with flexion and extension with increased right low back pain during each maneuver.  Terry's test is deferred.  Supine straight leg raise causes right lateral buttock pain.  Internal and external rotation of the hip causes no increased pain on either side.  Myofascial exam: No tenderness to palpation across lumbar paraspinous muscles.      Imagin13 MRI L-spine  T10-T11: There is severe disk desiccation and a moderate diffuse disk bulge and ligamentous hypertrophy. This is not complete evaluation of the thoracic spine, but there does appear to be mild central canal stenosis.  T11-T12: Moderate disk desiccation and mild diffuse disk bulge. Mild narrowing of the thecal sac. Neural foraminal regions difficult to assess due to the scoliosis.  T12-L1: Severe disk degenerative disease with marked desiccation, diffuse disk bulge, and spurring of vertebral bodies. Mild central canal stenosis. Neural foraminal narrowing bilaterally, difficult to grade due to scoliosis.  L1-L2: Advanced disk desiccation with moderate diffuse disk bulge and mild spurring  of vertebral bodies. Small superimposed central to right paracentral disk extrusion. Mild central canal stenosis. Mild to moderate facet arthropathy and ligamentous hypertrophy. Bilateral foraminal stenosis.  L2 - L3: There is also disk desiccation and diffuse disk bulge and small annular fissure posteriorly. facet arthropathy and hypertrophy ligamentum flavum. Mild central canal stenosis. Moderate right and mild left neuroforaminal stenosis.  L3-L4: Disk desiccation and moderate diffuse disk bulge. Small right paracentral ascending disk extrusion and moderate facet arthropathy present bilaterally and hypertrophy of ligamentum flavum. In combination, the findings result in severe central canal stenosis. For example see axial image 22, central image 7. There is mild to moderate neural foraminal stenosis bilaterally.  L4 - L5: Mild anterolisthesis with disk desiccation and uncovering of the disk, with moderate right and severe left facet arthropathy and hypertrophy of ligamentum flavum. Severe central canal stenosis. Mild neural foraminal narrowing, right worse than left.  L5-S1: Disk desiccation and mild diffuse disk bulge. A small ascending disk extrusion centrally in the midline. No significant central canal stenosis. Advanced facet arthropathy bilaterally, left worse than right. Mild left neural foraminal narrowing. No significant right neural foraminal stenosis.    5/28/2018 MRI lumbar spine  T12-L1: There is marked disc space narrowing, trace retrolisthesis, unroofing of a moderate disc bulge.  There is right greater than left facet joint arthropathy.  There is mild spinal stenosis with moderate left and moderate-to-marked right foraminal stenosis.  These findings have mildly progressed.  L1-2: There is trace retrolisthesis of L1 on L2, there is inferior unroofing of a moderate diffuse disc bulge with small superimposed central disc protrusion.  There is right greater than left facet joint arthropathy.  There is  borderline spinal stenosis.  There is crowding of right lateral recess.  There is marked right and moderate-to-marked left foraminal stenosis with very slight progression.  L2-3: There is trace retrolisthesis of L2 on L3 where there is marked disc space narrowing.  There is a moderate diffuse disc bulge with osteophytic ridging and superimposed right foraminal disc protrusion.  There is moderate-to-marked bilateral facet joint arthropathy with ligamentum flavum thickening, right greater than.  There is moderate central spinal stenosis and right lateral recess stenosis.  There is mild left lateral recess stenosis.  There is marked right and moderate-to-marked left foraminal stenosis with progression.  L3-4: There is disc space narrowing.  There is marked facet joint arthropathy with ligamentum flavum thickening.  There is a small 4 mm left synovial cyst.  There is unroofing of a moderate to marked diffuse disc bulge.  There is severe spinal canal, bilateral lateral recess and right foraminal stenosis with moderate to marked left foraminal stenosis and with slight progression.  L4-5: There is stable grade 1 spondylolisthesis with 4 mm anterolisthesis of L4 on L5.  There is marked facet joint arthropathy.  There is unroofing of a moderate to marked diffuse disc bulge.  There is severe spinal canal, bilateral lateral recess and foraminal stenosis with slight progression.  L5-S1: There is disc space narrowing at the L5-S1 level, worse towards the left.  There is a disc bulge with small superimposed central disc extrusion with 5 mm cephalad extension.  This was present previously but is slightly more conspicuous.  There is marked facet joint arthropathy.  There is moderate central spinal stenosis with mild crowding of the left lateral recess.  There is mild right and moderate-to-marked left foraminal stenosis with progression.    05/13/2019 bone scan  Increased tracer activity is noted in the region of the facets  presumably at the T12-L1 level on the left.  Tracer activity is also noted at the L4-L5 and L5-S1 levels on the left suggestive of facet arthropathy.  More diffuse facet arthropathy is noted throughout the lumbar spine.  Some increased uptake may be noted within the vertebral bodies more anteriorly at the T9 through T12 levels which could relate to compression deformity or Schmorl's node formation/endplate or Modic type degenerative change.  Given that no STIR signal abnormality was noted on the MRI of 03/14/2019 no significant vertebral body height loss was noted at these levels on that exam, this uptake likely relates to degenerative Modic type endplate changes which can be seen on that exam.  Anatomical imaging such as from MRI which was noted to have been performed on 03/14/2019 is much more specific for degenerative changes of the spine as well as for numbering purposes    Assessment:   The patient is a 86-year-old woman with a history of diabetes, previous breast CA, CVA and spinal stenosis who presents in referral from her primary care physician, Dr. Barger for back pain and leg pain.     1. DDD (degenerative disc disease), lumbar     2. Spinal stenosis of lumbar region without neurogenic claudication     3. Lumbar radiculopathy     4. Gait instability         Plan:  1. Dr. Ware has refilled tramadol and she continues to take gabapentin.  I have reviewed the [place] Board of [employer]acy website and there are no abberancies.    2. Follow-up in 3 months or sooner as needed.

## 2022-02-15 RX ORDER — TRAMADOL HYDROCHLORIDE 50 MG/1
TABLET ORAL
Qty: 90 TABLET | Refills: 2 | Status: SHIPPED | OUTPATIENT
Start: 2022-02-15 | End: 2022-05-09

## 2022-02-15 RX ORDER — ZOLPIDEM TARTRATE 5 MG/1
TABLET ORAL
Qty: 30 TABLET | Refills: 0 | Status: SHIPPED | OUTPATIENT
Start: 2022-02-15 | End: 2022-03-14

## 2022-02-15 NOTE — TELEPHONE ENCOUNTER
Care Due:                  Date            Visit Type   Department     Provider  --------------------------------------------------------------------------------                                EP -                              PRIMARY      [facility] INTERNAL  Last Visit: 11-      CARE ([facility])   MEDICINE       Sera Barger                              [facility]                              PRIMARY      [facility] INTERNAL  Next Visit: 03-      CARE ([facility])   MEDICINE       Sera Barger                                                            Last  Test          Frequency    Reason                     Performed    Due Date  --------------------------------------------------------------------------------    Uric Acid...  12 months..  allopurinoL..............  Not Found    Overdue    Powered by [employer]. Reference number: [id number].   2/15/2022 11:21:08 AM CST

## 2022-02-21 ENCOUNTER — PATIENT MESSAGE (OUTPATIENT)
Dept: INTERNAL MEDICINE | Facility: CLINIC | Age: 87
End: 2022-02-21
Payer: MEDICARE

## 2022-02-21 RX ORDER — NITROFURANTOIN 25; 75 MG/1; MG/1
100 CAPSULE ORAL 2 TIMES DAILY
Qty: 10 CAPSULE | Refills: 0 | Status: SHIPPED | OUTPATIENT
Start: 2022-02-21 | End: 2022-03-17

## 2022-03-11 NOTE — TELEPHONE ENCOUNTER
No new care gaps identified.  Powered by [employer]. Reference number: [id number].   3/10/2022 7:46:49 PM CST

## 2022-03-14 ENCOUNTER — LAB VISIT (OUTPATIENT)
Dept: LAB | Facility: HOSPITAL | Age: 87
End: 2022-03-14
Attending: INTERNAL MEDICINE
Payer: MEDICARE

## 2022-03-14 DIAGNOSIS — E55.9 VITAMIN D DEFICIENCY DISEASE: ICD-10-CM

## 2022-03-14 DIAGNOSIS — E11.8 CONTROLLED TYPE 2 DIABETES MELLITUS WITH COMPLICATION, WITHOUT LONG-TERM CURRENT USE OF INSULIN: ICD-10-CM

## 2022-03-14 DIAGNOSIS — M10.00 IDIOPATHIC GOUT, UNSPECIFIED CHRONICITY, UNSPECIFIED SITE: ICD-10-CM

## 2022-03-14 DIAGNOSIS — E03.9 ACQUIRED HYPOTHYROIDISM: ICD-10-CM

## 2022-03-14 DIAGNOSIS — E78.2 MIXED HYPERLIPIDEMIA: ICD-10-CM

## 2022-03-14 LAB
ALBUMIN SERPL BCP-MCNC: 3.4 G/DL (ref 3.5–5.2)
ALP SERPL-CCNC: 71 U/L (ref 55–135)
ALT SERPL W/O P-5'-P-CCNC: 21 U/L (ref 10–44)
ANION GAP SERPL CALC-SCNC: 16 MMOL/L (ref 8–16)
AST SERPL-CCNC: 21 U/L (ref 10–40)
BASOPHILS # BLD AUTO: 0.03 K/UL (ref 0–0.2)
BASOPHILS NFR BLD: 0.5 % (ref 0–1.9)
BILIRUB SERPL-MCNC: 0.4 MG/DL (ref 0.1–1)
BUN SERPL-MCNC: 15 MG/DL (ref 8–23)
CALCIUM SERPL-MCNC: 10.4 MG/DL (ref 8.7–10.5)
CHLORIDE SERPL-SCNC: 94 MMOL/L (ref 95–110)
CHOLEST SERPL-MCNC: 182 MG/DL (ref 120–199)
CHOLEST/HDLC SERPL: 3.8 {RATIO} (ref 2–5)
CO2 SERPL-SCNC: 27 MMOL/L (ref 23–29)
CREAT SERPL-MCNC: 1 MG/DL (ref 0.5–1.4)
DIFFERENTIAL METHOD: ABNORMAL
EOSINOPHIL # BLD AUTO: 0 K/UL (ref 0–0.5)
EOSINOPHIL NFR BLD: 0.7 % (ref 0–8)
ERYTHROCYTE [DISTWIDTH] IN BLOOD BY AUTOMATED COUNT: 12.1 % (ref 11.5–14.5)
EST. GFR  (AFRICAN AMERICAN): 58.5 ML/MIN/1.73 M^2
EST. GFR  (NON AFRICAN AMERICAN): 50.8 ML/MIN/1.73 M^2
ESTIMATED AVG GLUCOSE: 154 MG/DL (ref 68–131)
GLUCOSE SERPL-MCNC: 164 MG/DL (ref 70–110)
HBA1C MFR BLD: 7 % (ref 4–5.6)
HCT VFR BLD AUTO: 46.5 % (ref 37–48.5)
HDLC SERPL-MCNC: 48 MG/DL (ref 40–75)
HDLC SERPL: 26.4 % (ref 20–50)
HGB BLD-MCNC: 15.4 G/DL (ref 12–16)
IMM GRANULOCYTES # BLD AUTO: 0.05 K/UL (ref 0–0.04)
IMM GRANULOCYTES NFR BLD AUTO: 0.8 % (ref 0–0.5)
LDLC SERPL CALC-MCNC: 108.8 MG/DL (ref 63–159)
LYMPHOCYTES # BLD AUTO: 1.2 K/UL (ref 1–4.8)
LYMPHOCYTES NFR BLD: 20 % (ref 18–48)
MCH RBC QN AUTO: 30.4 PG (ref 27–31)
MCHC RBC AUTO-ENTMCNC: 33.1 G/DL (ref 32–36)
MCV RBC AUTO: 92 FL (ref 82–98)
MONOCYTES # BLD AUTO: 0.4 K/UL (ref 0.3–1)
MONOCYTES NFR BLD: 5.9 % (ref 4–15)
NEUTROPHILS # BLD AUTO: 4.3 K/UL (ref 1.8–7.7)
NEUTROPHILS NFR BLD: 72.1 % (ref 38–73)
NONHDLC SERPL-MCNC: 134 MG/DL
NRBC BLD-RTO: 0 /100 WBC
PLATELET # BLD AUTO: 258 K/UL (ref 150–450)
PMV BLD AUTO: 9.8 FL (ref 9.2–12.9)
POTASSIUM SERPL-SCNC: 3.9 MMOL/L (ref 3.5–5.1)
PROT SERPL-MCNC: 7.2 G/DL (ref 6–8.4)
RBC # BLD AUTO: 5.06 M/UL (ref 4–5.4)
SODIUM SERPL-SCNC: 137 MMOL/L (ref 136–145)
TRIGL SERPL-MCNC: 126 MG/DL (ref 30–150)
TSH SERPL DL<=0.005 MIU/L-ACNC: 1.26 UIU/ML (ref 0.4–4)
URATE SERPL-MCNC: 5.2 MG/DL (ref 2.4–5.7)
WBC # BLD AUTO: 5.94 K/UL (ref 3.9–12.7)

## 2022-03-14 PROCEDURE — 84550 ASSAY OF BLOOD/URIC ACID: CPT | Performed by: INTERNAL MEDICINE

## 2022-03-14 PROCEDURE — 80053 COMPREHEN METABOLIC PANEL: CPT | Performed by: INTERNAL MEDICINE

## 2022-03-14 PROCEDURE — 83036 HEMOGLOBIN GLYCOSYLATED A1C: CPT | Performed by: INTERNAL MEDICINE

## 2022-03-14 PROCEDURE — 80061 LIPID PANEL: CPT | Performed by: INTERNAL MEDICINE

## 2022-03-14 PROCEDURE — 36415 COLL VENOUS BLD VENIPUNCTURE: CPT | Mod: PO | Performed by: INTERNAL MEDICINE

## 2022-03-14 PROCEDURE — 85025 COMPLETE CBC W/AUTO DIFF WBC: CPT | Performed by: INTERNAL MEDICINE

## 2022-03-14 PROCEDURE — 84443 ASSAY THYROID STIM HORMONE: CPT | Performed by: INTERNAL MEDICINE

## 2022-03-14 RX ORDER — ZOLPIDEM TARTRATE 5 MG/1
TABLET ORAL
Qty: 30 TABLET | Refills: 0 | Status: SHIPPED | OUTPATIENT
Start: 2022-03-14 | End: 2022-04-20

## 2022-03-16 ENCOUNTER — PATIENT MESSAGE (OUTPATIENT)
Dept: INTERNAL MEDICINE | Facility: CLINIC | Age: 87
End: 2022-03-16
Payer: MEDICARE

## 2022-03-17 ENCOUNTER — OFFICE VISIT (OUTPATIENT)
Dept: INTERNAL MEDICINE | Facility: CLINIC | Age: 87
End: 2022-03-17
Payer: MEDICARE

## 2022-03-17 ENCOUNTER — HOSPITAL ENCOUNTER (OUTPATIENT)
Dept: RADIOLOGY | Facility: HOSPITAL | Age: 87
Discharge: HOME | End: 2022-03-17
Attending: PHYSICIAN ASSISTANT
Payer: MEDICARE

## 2022-03-17 VITALS
TEMPERATURE: 98 F | OXYGEN SATURATION: 97 % | WEIGHT: 150.25 LBS | HEIGHT: 63 IN | SYSTOLIC BLOOD PRESSURE: 138 MMHG | DIASTOLIC BLOOD PRESSURE: 88 MMHG | BODY MASS INDEX: 26.62 KG/M2 | HEART RATE: 81 BPM

## 2022-03-17 DIAGNOSIS — R10.32 LLQ ABDOMINAL PAIN: ICD-10-CM

## 2022-03-17 DIAGNOSIS — E78.2 MIXED HYPERLIPIDEMIA: ICD-10-CM

## 2022-03-17 DIAGNOSIS — R10.32 LLQ ABDOMINAL PAIN: Primary | ICD-10-CM

## 2022-03-17 DIAGNOSIS — K57.30 DIVERTICULOSIS OF LARGE INTESTINE WITHOUT HEMORRHAGE: ICD-10-CM

## 2022-03-17 DIAGNOSIS — N18.31 STAGE 3A CHRONIC KIDNEY DISEASE: ICD-10-CM

## 2022-03-17 DIAGNOSIS — I10 ESSENTIAL HYPERTENSION: ICD-10-CM

## 2022-03-17 DIAGNOSIS — I70.0 AORTIC ATHEROSCLEROSIS: ICD-10-CM

## 2022-03-17 PROCEDURE — 74019 RADEX ABDOMEN 2 VIEWS: CPT | Mod: TC

## 2022-03-17 PROCEDURE — 99214 OFFICE O/P EST MOD 30 MIN: CPT | Mod: S$GLB,,, | Performed by: PHYSICIAN ASSISTANT

## 2022-03-17 PROCEDURE — 99999 PR PBB SHADOW E&M-EST. PATIENT-LVL V: ICD-10-PCS | Mod: PBBFAC,,, | Performed by: PHYSICIAN ASSISTANT

## 2022-03-17 PROCEDURE — 74019 RADEX ABDOMEN 2 VIEWS: CPT | Mod: 26,,, | Performed by: RADIOLOGY

## 2022-03-17 PROCEDURE — 99999 PR PBB SHADOW E&M-EST. PATIENT-LVL V: CPT | Mod: PBBFAC,,, | Performed by: PHYSICIAN ASSISTANT

## 2022-03-17 PROCEDURE — 74019 XR ABDOMEN FLAT AND ERECT: ICD-10-PCS | Mod: 26,,, | Performed by: RADIOLOGY

## 2022-03-17 PROCEDURE — 99214 PR OFFICE/OUTPT VISIT, EST, LEVL IV, 30-39 MIN: ICD-10-PCS | Mod: S$GLB,,, | Performed by: PHYSICIAN ASSISTANT

## 2022-03-17 NOTE — PROGRESS NOTES
"Subjective:       Patient ID: Olga Aguiar is a 87 y.o. female.        Chief Complaint: Abdominal Pain (Pt stated pain has been bothering her for a few weeks )    Olga Aguiar is an established patient of Sera Barger MD here today for urgent care visit.    Her  is here with her today.    Intermittent discomfort LLQ for the past few weeks, occurs occasionally, "a little feeling in there"  Yesterday she had increased pain in the area but today she is much better  No diarrhea, takes stool softener daily for bowel movements for the past 6 months which are now much easier to pass but seem small, though she admits she doesn't eat much  Occ will get nausea after taking her medications, which is long standing  No vomiting  No fever  No urinary sx, had a UTI about 1 1/2 months ago  She wonders now if it was gas as she had a bowel movement and sx improved  Typically has a bowel movement daily    Lipids controlled with zocor.    BP controlled with dyazide.  BP Readings from Last 5 Encounters:  03/17/22 : 138/88  02/08/22 : 137/69  11/08/21 : (!) 152/71  11/01/21 : (!) 130/90  08/06/21 : (!) 143/81     No recent gout flares.         Review of Systems   Constitutional: Negative for chills, diaphoresis, fatigue and fever.   HENT: Negative for congestion and sore throat.    Eyes: Negative for visual disturbance.   Respiratory: Negative for cough, chest tightness and shortness of breath.    Cardiovascular: Negative for chest pain, palpitations and leg swelling.   Gastrointestinal: Positive for abdominal pain. Negative for blood in stool, constipation, diarrhea, nausea and vomiting.   Genitourinary: Negative for dysuria, frequency, hematuria and urgency.   Musculoskeletal: Negative for arthralgias and back pain.   Skin: Negative for rash.   Neurological: Negative for dizziness, syncope, weakness and headaches.   Psychiatric/Behavioral: Negative for dysphoric mood and sleep disturbance. The patient is not " nervous/anxious.        Objective:      Physical Exam  Vitals and nursing note reviewed.   Constitutional:       Appearance: Normal appearance. She is well-developed.   HENT:      Head: Normocephalic.      Right Ear: External ear normal.      Left Ear: External ear normal.   Eyes:      Pupils: Pupils are equal, round, and reactive to light.   Cardiovascular:      Rate and Rhythm: Normal rate and regular rhythm.      Heart sounds: Normal heart sounds. No murmur heard.    No friction rub. No gallop.   Pulmonary:      Effort: Pulmonary effort is normal. No respiratory distress.      Breath sounds: Normal breath sounds.   Abdominal:      Palpations: Abdomen is soft.      Tenderness: There is abdominal tenderness (very mild) in the left lower quadrant.   Skin:     General: Skin is warm and dry.   Neurological:      Mental Status: She is alert.         Assessment:       1. LLQ abdominal pain    2. Essential hypertension    3. Mixed hyperlipidemia    4. Aortic atherosclerosis    5. Stage 3a chronic kidney disease    6. Diverticulosis of large intestine without hemorrhage: see CT 10/14        Plan:       Olga was seen today for abdominal pain.    Diagnoses and all orders for this visit:    LLQ abdominal pain - today much improved but intermittent for the past few weeks, will check labs and x-ray, may be due to inadequate bowel movements  -     CBC Auto Differential; Future  -     Comprehensive Metabolic Panel; Future  -     X-Ray Abdomen Flat And Erect; Future    Essential hypertension - stable and controlled  BP Readings from Last 5 Encounters:   03/17/22 138/88   02/08/22 137/69   11/08/21 (!) 152/71   11/01/21 (!) 130/90   08/06/21 (!) 143/81      Mixed hyperlipidemia - stable and controlled  Lab Results   Component Value Date    CHOL 182 03/14/2022    TRIG 126 03/14/2022    HDL 48 03/14/2022    LDLCALC 108.8 03/14/2022     Aortic atherosclerosis - tx with statin and aspirin    Stage 3a chronic kidney  "disease    Diverticulosis of large intestine without hemorrhage: see CT 10/14    Check labs and x-ray today  If not improving or worsening, would do CT scan abdomen/pelvis    Pt has been given instructions populated from [employer] database and has verbalized understanding of the after visit summary and information contained wherein.    Follow up with a primary care provider. May go to ER for acute shortness of breath, lightheadedness, fever, or any other emergent complaints or changes in condition.    "This note will be shared with the patient"    Future Appointments   Date Time Provider Department Center   3/17/2022 10:00 AM [facility] OIC-XRAY [facility]   3/21/2022  3:30 PM Sera Barger MD [facility]   5/10/2022  2:20 PM [facility] PAINMGT [place]   8/1/2022  2:30 PM ECHO, DADA [facility] CARDDIHELEN [place]   8/17/2022  1:30 PM Christiano Herrera MD [facility] CARDIO [place]                 "

## 2022-03-21 ENCOUNTER — OFFICE VISIT (OUTPATIENT)
Dept: INTERNAL MEDICINE | Facility: CLINIC | Age: 87
End: 2022-03-21
Payer: MEDICARE

## 2022-03-21 VITALS
BODY MASS INDEX: 26.4 KG/M2 | HEIGHT: 63 IN | SYSTOLIC BLOOD PRESSURE: 125 MMHG | DIASTOLIC BLOOD PRESSURE: 70 MMHG | WEIGHT: 149 LBS

## 2022-03-21 DIAGNOSIS — I10 ESSENTIAL HYPERTENSION: ICD-10-CM

## 2022-03-21 DIAGNOSIS — K76.89 LIVER CYST: ICD-10-CM

## 2022-03-21 DIAGNOSIS — D35.00 ADRENAL ADENOMA, UNSPECIFIED LATERALITY: ICD-10-CM

## 2022-03-21 DIAGNOSIS — E78.2 MIXED HYPERLIPIDEMIA: ICD-10-CM

## 2022-03-21 DIAGNOSIS — K86.2 PANCREATIC CYST: ICD-10-CM

## 2022-03-21 DIAGNOSIS — N18.31 STAGE 3A CHRONIC KIDNEY DISEASE: ICD-10-CM

## 2022-03-21 DIAGNOSIS — E11.8 CONTROLLED TYPE 2 DIABETES MELLITUS WITH COMPLICATION, WITHOUT LONG-TERM CURRENT USE OF INSULIN: ICD-10-CM

## 2022-03-21 DIAGNOSIS — I70.0 AORTIC ATHEROSCLEROSIS: ICD-10-CM

## 2022-03-21 DIAGNOSIS — I35.0 NONRHEUMATIC AORTIC VALVE STENOSIS: ICD-10-CM

## 2022-03-21 DIAGNOSIS — J47.9 BRONCHIECTASIS WITHOUT COMPLICATION: Primary | ICD-10-CM

## 2022-03-21 DIAGNOSIS — E03.9 ACQUIRED HYPOTHYROIDISM: ICD-10-CM

## 2022-03-21 DIAGNOSIS — Z85.3 HISTORY OF BREAST CANCER: ICD-10-CM

## 2022-03-21 PROBLEM — M51.369 DDD (DEGENERATIVE DISC DISEASE), LUMBAR: Status: RESOLVED | Noted: 2018-09-04 | Resolved: 2022-03-21

## 2022-03-21 PROBLEM — M51.36 DDD (DEGENERATIVE DISC DISEASE), LUMBAR: Status: RESOLVED | Noted: 2018-09-04 | Resolved: 2022-03-21

## 2022-03-21 PROCEDURE — 99214 PR OFFICE/OUTPT VISIT, EST, LEVL IV, 30-39 MIN: ICD-10-PCS | Mod: S$GLB,,, | Performed by: INTERNAL MEDICINE

## 2022-03-21 PROCEDURE — 99999 PR PBB SHADOW E&M-EST. PATIENT-LVL V: CPT | Mod: PBBFAC,,, | Performed by: INTERNAL MEDICINE

## 2022-03-21 PROCEDURE — 99999 PR PBB SHADOW E&M-EST. PATIENT-LVL V: ICD-10-PCS | Mod: PBBFAC,,, | Performed by: INTERNAL MEDICINE

## 2022-03-21 PROCEDURE — 99214 OFFICE O/P EST MOD 30 MIN: CPT | Mod: S$GLB,,, | Performed by: INTERNAL MEDICINE

## 2022-03-21 NOTE — PROGRESS NOTES
"Subjective:       Patient ID: Olga Aguiar is a 87 y.o. female.    Chief Complaint: Annual Exam    Follow up multiple issues, "annual."     with her    Some abdominal pain, lasted about 1-2 days, seen and sx resolved.  KUB acceptable when she saw Radha Loza all better.  Plain x-ray was acceptable.    Recall pancreas lesion, had MRI 2018, recommended repeat in 2 years- discussed again and she declines currently.  Rarely nauseated.   Weight stable.  No GERD.  No blood in the stool.  No diarrhea.  No urinary sx.    She has had some slight tremor,  states she has had most of her life.  She denies shuffling or bradykinesia.  She does not want to see neurology for this.    Cardiology November 2021  Optometry July 2021  Mammogram 3/21  MRI abdomen 2018    Patient Active Problem List:     Essential hypertension     Chronic pain syndrome     Lumbosacral spondylosis without myelopathy     Adrenal adenoma: 2.8 X 2.1CM 2010; stable 2016, also stable 2018     Lymph edema, right arm     Gallbladder polyp     CKD (chronic kidney disease) stage 3, GFR 30-59 ml/min     Nonrheumatic aortic valve stenosis     LVH (left ventricular hypertrophy)     Pancreatic cyst: stable on MRI 11/16 also 2018, due again 2020     Cervical spinal stenosis     Controlled type 2 diabetes mellitus with complication, without long-term current use of insulin     Acquired hypothyroidism     Transient cerebral ischemia: 2012     Primary insomnia     Gastroesophageal reflux disease without esophagitis     Multiple lung nodules: stable CT 10/2016     Former smoker: 1 pack daily for < 20 years, quit 1973     COPD (chronic obstructive pulmonary disease) with chronic bronchitis, 2013     Facet hypertrophy of lumbar region     History of breast cancer     Intermittent asthma     Radiation fibrosis of lung     Bronchiectasis without complication     Ectatic abdominal aorta: see CT scan 10/16; no aneurysm 10/17     Renal cysts, acquired, " bilateral     Lumbar radiculopathy     Encounter for monitoring proton pump inhibitor therapy     Diverticulosis of large intestine without hemorrhage: see CT 10/14     Idiopathic gout     Liver cysts: see MRI 2018     Odynophagia     Ambien use disorder, moderate, dependence, start 2010     Lumbar spondylosis     Unspecified inflammatory spondylopathy, 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   Eyes: Negative for visual disturbance.   Respiratory: Negative for cough and shortness of breath.    Cardiovascular: Negative for chest pain.   Gastrointestinal: Positive for constipation. Negative for abdominal pain.        Chronic constipation   Genitourinary: Negative for difficulty urinating and hematuria.   Musculoskeletal: Positive for back pain.        Chronic arthralgias   Skin: Negative for rash.   Neurological: Positive for tremors. Negative for weakness and numbness.        See above       Objective:      Physical Exam  Vitals and nursing note reviewed.   Constitutional:       Appearance: She is well-developed.   HENT:      Head: Normocephalic and atraumatic.      Right Ear: External ear normal.      Left Ear: External ear normal.      Nose: Nose normal.      Mouth/Throat:      Pharynx: No oropharyngeal exudate.   Eyes:      General: No scleral icterus.     Extraocular Movements: Extraocular movements intact.      Conjunctiva/sclera: Conjunctivae normal.   Neck:      Thyroid: No thyromegaly.      Vascular: No JVD.   Cardiovascular:      Rate and Rhythm: Normal rate and regular rhythm.      Pulses:           Dorsalis pedis pulses are 2+ on the right side and 2+ on the left side.        Posterior tibial pulses are 2+ on the right side and 2+ on the left side.      Heart sounds: Murmur heard.    Systolic murmur is present with a grade of 3/6.    No gallop.   Pulmonary:       Effort: Pulmonary effort is normal. No respiratory distress.      Breath sounds: Normal breath sounds. No wheezing.   Abdominal:      General: Bowel sounds are normal. There is no distension.      Palpations: Abdomen is soft. There is no mass.      Tenderness: There is no abdominal tenderness. There is no guarding or rebound.   Musculoskeletal:         General: No tenderness. Normal range of motion.      Cervical back: Normal range of motion and neck supple.      Right foot: Normal range of motion. No deformity or bunion.      Left foot: Normal range of motion. No deformity or bunion.   Feet:      Right foot:      Protective Sensation: 5 sites tested. 5 sites sensed.      Skin integrity: No ulcer, blister or skin breakdown.      Left foot:      Protective Sensation: 5 sites tested. 5 sites sensed.      Skin integrity: No ulcer, blister or skin breakdown.   Lymphadenopathy:      Cervical: No cervical adenopathy.   Skin:     General: Skin is warm.      Findings: No erythema or rash.   Neurological:      General: No focal deficit present.      Mental Status: She is alert and oriented to person, place, and time.      Cranial Nerves: No cranial nerve deficit.      Coordination: Coordination normal.      Comments: Intention tremor right arm greater than left   Psychiatric:         Behavior: Behavior normal.         Thought Content: Thought content normal.         Judgment: Judgment normal.         Assessment:       1. Bronchiectasis without complication    2. Nonrheumatic aortic valve stenosis    3. Mixed hyperlipidemia    4. Essential hypertension    5. Aortic atherosclerosis    6. Stage 3a chronic kidney disease    7. Controlled type 2 diabetes mellitus with complication, without long-term current use of insulin    8. Acquired hypothyroidism    9. Adrenal adenoma, unspecified laterality    10. History of breast cancer    11. Pancreatic cyst: stable on MRI 11/16 also 2018, due again 2020    12. Liver cysts: see MRI 2018         Plan:           Olga was seen today for annual exam.    Diagnoses and all orders for this visit:    Bronchiectasis without complication; no alarm symptoms    Nonrheumatic aortic valve stenosis; schedule echo; keep Cardiology follow-up    Mixed hyperlipidemia:  Continue regimen    Essential hypertension:  Continue regimen    Aortic atherosclerosis:  Continue regimen    Stage 3a chronic kidney disease:  Stable; hydration; avoid nephrotoxins    Controlled type 2 diabetes mellitus with complication, without long-term current use of insulin  -     Hemoglobin A1C; Future  -     Ambulatory referral/consult to Optometry; Future    Acquired hypothyroidism; continue regimen    Adrenal adenoma, unspecified laterality:  Discussed, she declines any further imaging currently    History of breast cancer  -     Mammo Digital Screening Bilat w/ Tony; Standing    Pancreas lesion:  She declines follow-up; alarm symptoms reviewed    Tremor:  Discussed.  She declines further assessment    I will review all studies and determine further tx depending on findings.  Anticipate follow-up in 6 months, sooner with problems in the interim   17-Jun-2020 17:17 17-Jun-2020 17:30

## 2022-04-04 DIAGNOSIS — E78.5 HYPERLIPIDEMIA, UNSPECIFIED HYPERLIPIDEMIA TYPE: ICD-10-CM

## 2022-04-04 NOTE — TELEPHONE ENCOUNTER
No new care gaps identified.  Powered by [employer]. Reference number: [id number].   4/04/2022 5:32:29 AM CDT

## 2022-04-05 ENCOUNTER — PATIENT MESSAGE (OUTPATIENT)
Dept: INTERNAL MEDICINE | Facility: CLINIC | Age: 87
End: 2022-04-05
Payer: MEDICARE

## 2022-04-05 RX ORDER — LEVOTHYROXINE SODIUM 75 UG/1
TABLET ORAL
Qty: 90 TABLET | Refills: 3 | Status: SHIPPED | OUTPATIENT
Start: 2022-04-05 | End: 2023-04-30

## 2022-04-05 RX ORDER — TRIAMTERENE AND HYDROCHLOROTHIAZIDE 37.5; 25 MG/1; MG/1
CAPSULE ORAL
Qty: 90 CAPSULE | Refills: 3 | Status: SHIPPED | OUTPATIENT
Start: 2022-04-05 | End: 2023-04-30

## 2022-04-05 RX ORDER — SIMVASTATIN 40 MG/1
TABLET, FILM COATED ORAL
Qty: 90 TABLET | Refills: 3 | Status: SHIPPED | OUTPATIENT
Start: 2022-04-05 | End: 2023-04-30

## 2022-04-05 RX ORDER — ALLOPURINOL 100 MG/1
TABLET ORAL
Qty: 90 TABLET | Refills: 3 | Status: SHIPPED | OUTPATIENT
Start: 2022-04-05 | End: 2023-04-30

## 2022-04-05 NOTE — TELEPHONE ENCOUNTER
Refill Authorization Note   Olga Aguiar  is requesting a refill authorization.  Brief Assessment and Rationale for Refill:  Approve     Medication Therapy Plan:       Medication Reconciliation Completed: No   Comments:     No Care Gaps recommended.     Note composed:6:11 PM 04/05/2022

## 2022-04-20 RX ORDER — ZOLPIDEM TARTRATE 5 MG/1
TABLET ORAL
Qty: 30 TABLET | Refills: 0 | Status: SHIPPED | OUTPATIENT
Start: 2022-04-20 | End: 2022-05-10

## 2022-04-20 NOTE — TELEPHONE ENCOUNTER
No new care gaps identified.  Powered by [employer]. Reference number: [id number].   4/20/2022 3:26:20 PM CDT

## 2022-05-09 ENCOUNTER — PES CALL (OUTPATIENT)
Dept: ADMINISTRATIVE | Facility: CLINIC | Age: 87
End: 2022-05-09
Payer: MEDICARE

## 2022-05-09 RX ORDER — TRAMADOL HYDROCHLORIDE 50 MG/1
TABLET ORAL
Qty: 90 TABLET | Refills: 0 | Status: SHIPPED | OUTPATIENT
Start: 2022-05-09 | End: 2022-06-15

## 2022-05-09 NOTE — TELEPHONE ENCOUNTER
No new care gaps identified.  [facility] Embedded Care Gaps. Reference number: [id number]. 5/09/2022   5:15:56 PM CDT

## 2022-05-10 ENCOUNTER — OFFICE VISIT (OUTPATIENT)
Dept: PAIN MEDICINE | Facility: CLINIC | Age: 87
End: 2022-05-10
Payer: MEDICARE

## 2022-05-10 VITALS
BODY MASS INDEX: 26.57 KG/M2 | RESPIRATION RATE: 16 BRPM | SYSTOLIC BLOOD PRESSURE: 130 MMHG | HEIGHT: 63 IN | DIASTOLIC BLOOD PRESSURE: 78 MMHG | WEIGHT: 149.94 LBS | HEART RATE: 88 BPM

## 2022-05-10 DIAGNOSIS — M51.36 DDD (DEGENERATIVE DISC DISEASE), LUMBAR: Primary | ICD-10-CM

## 2022-05-10 DIAGNOSIS — M54.16 LUMBAR RADICULOPATHY: ICD-10-CM

## 2022-05-10 DIAGNOSIS — M48.061 SPINAL STENOSIS OF LUMBAR REGION WITHOUT NEUROGENIC CLAUDICATION: ICD-10-CM

## 2022-05-10 DIAGNOSIS — R26.81 GAIT INSTABILITY: ICD-10-CM

## 2022-05-10 PROCEDURE — 99999 PR PBB SHADOW E&M-EST. PATIENT-LVL IV: CPT | Mod: PBBFAC,,, | Performed by: PHYSICIAN ASSISTANT

## 2022-05-10 PROCEDURE — 99214 PR OFFICE/OUTPT VISIT, EST, LEVL IV, 30-39 MIN: ICD-10-PCS | Mod: S$GLB,,, | Performed by: PHYSICIAN ASSISTANT

## 2022-05-10 PROCEDURE — 99999 PR PBB SHADOW E&M-EST. PATIENT-LVL IV: ICD-10-PCS | Mod: PBBFAC,,, | Performed by: PHYSICIAN ASSISTANT

## 2022-05-10 PROCEDURE — 99214 OFFICE O/P EST MOD 30 MIN: CPT | Mod: S$GLB,,, | Performed by: PHYSICIAN ASSISTANT

## 2022-05-10 RX ORDER — GABAPENTIN 300 MG/1
300 CAPSULE ORAL 3 TIMES DAILY
Qty: 90 CAPSULE | Refills: 3 | Status: SHIPPED | OUTPATIENT
Start: 2022-05-10 | End: 2022-08-10 | Stop reason: SDUPTHER

## 2022-05-10 RX ORDER — ZOLPIDEM TARTRATE 5 MG/1
TABLET ORAL
Qty: 30 TABLET | Refills: 0 | Status: SHIPPED | OUTPATIENT
Start: 2022-05-10 | End: 2022-06-16

## 2022-05-10 NOTE — TELEPHONE ENCOUNTER
Call returned to pharmacy to pharm to clarify diagnosis for Tramadol. Information provided to Etienne.

## 2022-05-10 NOTE — PROGRESS NOTES
CC: Back pain    HPI: The patient is a 87-year-old woman with a history of diabetes, previous breast CA, CVA and spinal stenosis who presents in referral from her primary care physician, Dr. Barger for back pain and leg pain.  She returns in follow-up today with back pain and right leg pain.  She describes pain across the low back and buttocks that radiates to the right lateral hip.  She also has pain radiating across her right shin.  The pain is severe with walking and standing, improved with sitting and pain medication.  She reports some weakness in her legs but denies numbness.    Pain intervention history:  Patient is status post right L3 and L4 transforaminal injection on 8/9/13 with 50% relief of low back and right leg pain.  She is status post bilateral L4 transforaminal epidural steroid injections on 9/27/13 and 11/1/13 with 10-20% relief.  She is status post radiofrequency ablation of the left L3, 4, 5 medial branch nerves on 5/5/14 with 50% relief of her left low back pain.  She is status post radiofrequency ablation of the right L3, 4 and 5 medial branch nerves on 6/16/14 with mild relief, however she reported significant more relief at a later visit.  She is status post bilateral L4 transforaminal epidural steroid injections on 9/24/14 with moderate relief.  She is status post bilateral L4 transforaminal epidural steroid injections on 1/7/15 with complete relief of her posterior thigh pain, moderate relief of her low back pain and minimal relief of her right lateral hip pain.  She is status post C7-T1 cervical interlaminar epidural steroid injection on 2/27/15 with greater than 50% relief.  She is status post bilateral L3, 4 and 5 medial branch radio frequency ablation on 7/29/16 reporting 0% relief initially at 4 weeks but then reporting 90% relief after 6-7 weeks.  She is status post bilateral L4 transforaminal epidural steroid injections on 11/11/16 with not quite 50% relief.  She is status post  "bilateral L4 transforaminal epidural steroid injections on 4/4/17 with 50% relief.  She is status post bilateral L4 transforaminal epidural steroid injections on 8/23/17 with greater than 80% relief.  She is status post bilateral L3, 4 and 5 medial branch radiofrequency ablation on 3/6/18 with moderate relief.  She is status post L5/S1 interlaminar epidural steroid injection on 06/04/2018 with almost 100% relief lasting 1 month.  She is status post L5/S1 interlaminar epidural steroid injection on 09/04/2018 with almost 100% relief.  She is status post L5/S1 interlaminar epidural steroid injection on 12/20/2018 with greater than 50% relief. She is status post L5/S1 interlaminar epidural steroid injection on 05/07/2019 with 75% relief.  She is status post L5/S1 interlaminar epidural steroid injection on 09/16/2019 with 50% relief.  She is status post bilateral L3, 4 and 5 medial branch radiofrequency ablation on 10/29/2019 with 50% relief. She is status post L5/S1 interlaminar epidural steroid injection on 12/16/2019 with what sounds like about 50% relief.   She is status post L5/S1 interlaminar epidural steroid injection on 07/01/2020 with almost 100% relief lasting 3 weeks, now reporting about 0% relief.  She is status post right L3/4 and L4/5 transforaminal epidural steroid injection on 04/16/2021 with 50% relief.    Spine surgeries:    Antineuropathics:  Gabapentin 300 mg 3 times daily  NSAIDs:  Physical therapy: in past  Antidepressants:  Muscle relaxers:  Opioids:  Tramadol 50 mg 3 times daily as needed  Antiplatelets/Anticoagulants:  ASA 81 mg    ROS:She reports easy bruising, back pain and difficulty sleeping.  Balance of review of systems is negative.    Medical, surgical, family and social history reviewed elsewhere in record.    Medications/Allergies: See med card    Vitals:    05/10/22 1421 05/10/22 1424   BP: 130/78    Pulse: 88    Resp: 16    Weight: 68 kg (149 lb 14.6 oz)    Height: 5' 3" (1.6 m)  "   PainSc:   3   3   PainLoc: Back          Physical exam:  Gen: A and O x3, pleasant, well-groomed, appears uncomfortable  Skin: No rashes or obvious lesions  HEENT: PERRLA  CVS: Regular rate and rhythm, normal S1 and S2, no murmurs.  Resp: Clear to auscultation bilaterally, no wheezes or rales.  Abdomen: Soft, NT/ND, normal bowel sounds present.  Musculoskeletal: Able to stand and walk short distances without assistance, however uses a walker when out of the house.  She has disuse atrophy of the lumbar paraspinous muscles.  Slow to go from seated to standing position.    Neuro:  Upper extremities: 5/5 strength bilaterally   Lower extremities: 5/5 strength bilaterally  Reflexes: Brachioradialis 2+, Bicep 2+, Tricep 2+. Patellar 2+, Achilles 2+.  Sensory: Intact and symmetrical to light touch and pinprick in C2-T1 dermatomes bilaterally.  Intact and symmetrical to light touch and pinprick in L2-S1 dermatomes bilaterally, except for a small patch over her left lateral shin decreased with light touch and pinprick.    Lumbar spine:  Lumbar spine: ROM is severely reduced with flexion and extension with increased right low back pain during each maneuver.  Terry's test is deferred.  Supine straight leg raise causes right lateral buttock pain.  Internal and external rotation of the hip causes no increased pain on either side.  Myofascial exam: No tenderness to palpation across lumbar paraspinous muscles.      Imagin13 MRI L-spine  T10-T11: There is severe disk desiccation and a moderate diffuse disk bulge and ligamentous hypertrophy. This is not complete evaluation of the thoracic spine, but there does appear to be mild central canal stenosis.  T11-T12: Moderate disk desiccation and mild diffuse disk bulge. Mild narrowing of the thecal sac. Neural foraminal regions difficult to assess due to the scoliosis.  T12-L1: Severe disk degenerative disease with marked desiccation, diffuse disk bulge, and spurring of  vertebral bodies. Mild central canal stenosis. Neural foraminal narrowing bilaterally, difficult to grade due to scoliosis.  L1-L2: Advanced disk desiccation with moderate diffuse disk bulge and mild spurring of vertebral bodies. Small superimposed central to right paracentral disk extrusion. Mild central canal stenosis. Mild to moderate facet arthropathy and ligamentous hypertrophy. Bilateral foraminal stenosis.  L2 - L3: There is also disk desiccation and diffuse disk bulge and small annular fissure posteriorly. facet arthropathy and hypertrophy ligamentum flavum. Mild central canal stenosis. Moderate right and mild left neuroforaminal stenosis.  L3-L4: Disk desiccation and moderate diffuse disk bulge. Small right paracentral ascending disk extrusion and moderate facet arthropathy present bilaterally and hypertrophy of ligamentum flavum. In combination, the findings result in severe central canal stenosis. For example see axial image 22, central image 7. There is mild to moderate neural foraminal stenosis bilaterally.  L4 - L5: Mild anterolisthesis with disk desiccation and uncovering of the disk, with moderate right and severe left facet arthropathy and hypertrophy of ligamentum flavum. Severe central canal stenosis. Mild neural foraminal narrowing, right worse than left.  L5-S1: Disk desiccation and mild diffuse disk bulge. A small ascending disk extrusion centrally in the midline. No significant central canal stenosis. Advanced facet arthropathy bilaterally, left worse than right. Mild left neural foraminal narrowing. No significant right neural foraminal stenosis.    5/28/2018 MRI lumbar spine  T12-L1: There is marked disc space narrowing, trace retrolisthesis, unroofing of a moderate disc bulge.  There is right greater than left facet joint arthropathy.  There is mild spinal stenosis with moderate left and moderate-to-marked right foraminal stenosis.  These findings have mildly progressed.  L1-2: There is  trace retrolisthesis of L1 on L2, there is inferior unroofing of a moderate diffuse disc bulge with small superimposed central disc protrusion.  There is right greater than left facet joint arthropathy.  There is borderline spinal stenosis.  There is crowding of right lateral recess.  There is marked right and moderate-to-marked left foraminal stenosis with very slight progression.  L2-3: There is trace retrolisthesis of L2 on L3 where there is marked disc space narrowing.  There is a moderate diffuse disc bulge with osteophytic ridging and superimposed right foraminal disc protrusion.  There is moderate-to-marked bilateral facet joint arthropathy with ligamentum flavum thickening, right greater than.  There is moderate central spinal stenosis and right lateral recess stenosis.  There is mild left lateral recess stenosis.  There is marked right and moderate-to-marked left foraminal stenosis with progression.  L3-4: There is disc space narrowing.  There is marked facet joint arthropathy with ligamentum flavum thickening.  There is a small 4 mm left synovial cyst.  There is unroofing of a moderate to marked diffuse disc bulge.  There is severe spinal canal, bilateral lateral recess and right foraminal stenosis with moderate to marked left foraminal stenosis and with slight progression.  L4-5: There is stable grade 1 spondylolisthesis with 4 mm anterolisthesis of L4 on L5.  There is marked facet joint arthropathy.  There is unroofing of a moderate to marked diffuse disc bulge.  There is severe spinal canal, bilateral lateral recess and foraminal stenosis with slight progression.  L5-S1: There is disc space narrowing at the L5-S1 level, worse towards the left.  There is a disc bulge with small superimposed central disc extrusion with 5 mm cephalad extension.  This was present previously but is slightly more conspicuous.  There is marked facet joint arthropathy.  There is moderate central spinal stenosis with mild  crowding of the left lateral recess.  There is mild right and moderate-to-marked left foraminal stenosis with progression.    05/13/2019 bone scan  Increased tracer activity is noted in the region of the facets presumably at the T12-L1 level on the left.  Tracer activity is also noted at the L4-L5 and L5-S1 levels on the left suggestive of facet arthropathy.  More diffuse facet arthropathy is noted throughout the lumbar spine.  Some increased uptake may be noted within the vertebral bodies more anteriorly at the T9 through T12 levels which could relate to compression deformity or Schmorl's node formation/endplate or Modic type degenerative change.  Given that no STIR signal abnormality was noted on the MRI of 03/14/2019 no significant vertebral body height loss was noted at these levels on that exam, this uptake likely relates to degenerative Modic type endplate changes which can be seen on that exam.  Anatomical imaging such as from MRI which was noted to have been performed on 03/14/2019 is much more specific for degenerative changes of the spine as well as for numbering purposes    Assessment:   The patient is a 87-year-old woman with a history of diabetes, previous breast CA, CVA and spinal stenosis who presents in referral from her primary care physician, Dr. Barger for back pain and leg pain.     1. DDD (degenerative disc disease), lumbar     2. Spinal stenosis of lumbar region without neurogenic claudication     3. Lumbar radiculopathy     4. Gait instability         Plan:  1. Dr. Ware has refilled tramadol 50 mg 3 times daily as needed.  I have reviewed the [place] Board of Pharmacy website and there are no abberancies.  I refilled gabapentin 300 mg 3 times daily.  2. Follow-up in 3 months or sooner as needed.

## 2022-05-10 NOTE — TELEPHONE ENCOUNTER
----- Message from Whitney Souza sent at 5/10/2022 10:12 AM CDT -----  Contact: Etienne  Type:  Pharmacy Calling to Clarify an RX  Name of Caller:  Etienne  Pharmacy Name:  [id number]  [place], [place] - [address]   Phone:  [phone]  Fax:  [phone]  Prescription Name:  traMADoL (ULTRAM) 50 mg tablet 90 tablet   What do they need to clarify?: verify the diagnosis, medical necessity   Best Call Back Number:  [phone]  Additional Information:  Etienne with [facility] requesting a call back concerning Rx  traMADoL (ULTRAM) 50 mg tablet 90 tablet

## 2022-05-16 ENCOUNTER — PES CALL (OUTPATIENT)
Dept: ADMINISTRATIVE | Facility: CLINIC | Age: 87
End: 2022-05-16
Payer: MEDICARE

## 2022-06-03 ENCOUNTER — OFFICE VISIT (OUTPATIENT)
Dept: FAMILY MEDICINE | Facility: CLINIC | Age: 87
End: 2022-06-03
Payer: MEDICARE

## 2022-06-03 VITALS
SYSTOLIC BLOOD PRESSURE: 122 MMHG | HEIGHT: 63 IN | OXYGEN SATURATION: 97 % | WEIGHT: 149.25 LBS | DIASTOLIC BLOOD PRESSURE: 62 MMHG | BODY MASS INDEX: 26.45 KG/M2 | HEART RATE: 90 BPM

## 2022-06-03 DIAGNOSIS — M10.032 ACUTE IDIOPATHIC GOUT OF LEFT WRIST: Primary | ICD-10-CM

## 2022-06-03 PROCEDURE — 99213 OFFICE O/P EST LOW 20 MIN: CPT | Mod: S$GLB,,, | Performed by: PHYSICIAN ASSISTANT

## 2022-06-03 PROCEDURE — 99999 PR PBB SHADOW E&M-EST. PATIENT-LVL IV: CPT | Mod: PBBFAC,,, | Performed by: PHYSICIAN ASSISTANT

## 2022-06-03 PROCEDURE — 99999 PR PBB SHADOW E&M-EST. PATIENT-LVL IV: ICD-10-PCS | Mod: PBBFAC,,, | Performed by: PHYSICIAN ASSISTANT

## 2022-06-03 PROCEDURE — 99213 PR OFFICE/OUTPT VISIT, EST, LEVL III, 20-29 MIN: ICD-10-PCS | Mod: S$GLB,,, | Performed by: PHYSICIAN ASSISTANT

## 2022-06-03 RX ORDER — PREDNISONE 20 MG/1
20 TABLET ORAL DAILY
Qty: 5 TABLET | Refills: 0 | Status: SHIPPED | OUTPATIENT
Start: 2022-06-03 | End: 2022-06-08

## 2022-06-03 NOTE — PATIENT INSTRUCTIONS
Take prednisone for the next 5 days.  Stop allopurinol until pain goes away.    Thanks for seeing me,  Najma Saenz PA-C

## 2022-06-03 NOTE — PROGRESS NOTES
"Subjective:      Patient ID: Olga Aguiar is a 87 y.o. female.    Chief Complaint: Hand Pain (Left hand pain, from wrist, up forearm some; no numbness; )  Patient is new to me.    HPI   Patient has PMH of chronic pain syndrome, COPD, HTN, HLD, CKD3, breast cancer, and gout.    Patient reports left wrist pain since Monday.  Taken tramadol and blue emu oil without relief of symptoms.  No injury or trauma noted.    Lab Results   Component Value Date    HGBA1C 7.0 (H) 03/14/2022     Review of Systems   Constitutional: Positive for fever (subjective). Negative for chills.   Respiratory: Positive for shortness of breath (baseline).    Cardiovascular: Negative for chest pain.   Musculoskeletal:        Left wrist pain.       Objective:   /62   Pulse 90   Ht 5' 3" (1.6 m)   Wt 67.7 kg (149 lb 4 oz)   SpO2 97%   BMI 26.44 kg/m²      Physical Exam  Vitals reviewed.   Constitutional:       General: She is not in acute distress.     Appearance: Normal appearance. She is well-developed.   HENT:      Head: Normocephalic and atraumatic.      Right Ear: External ear normal.      Left Ear: External ear normal.   Eyes:      Conjunctiva/sclera: Conjunctivae normal.   Cardiovascular:      Rate and Rhythm: Normal rate and regular rhythm.      Heart sounds: Normal heart sounds. No murmur heard.    No friction rub. No gallop.   Pulmonary:      Effort: Pulmonary effort is normal. No respiratory distress.      Breath sounds: Normal breath sounds. No wheezing or rales.   Musculoskeletal:      Left wrist: Swelling and tenderness present. Decreased range of motion.      Cervical back: Normal range of motion.   Skin:     General: Skin is warm and dry.      Findings: No rash.   Neurological:      General: No focal deficit present.      Mental Status: She is alert and oriented to person, place, and time.   Psychiatric:         Mood and Affect: Mood normal.         Behavior: Behavior normal.         Judgment: Judgment normal.      "   Assessment:      1. Acute idiopathic gout of left wrist       Plan:   1. Acute idiopathic gout of left wrist  Stop allopurinol until pain goes away.  - predniSONE (DELTASONE) 20 MG tablet; Take 1 tablet (20 mg total) by mouth once daily. for 5 days  Dispense: 5 tablet; Refill: 0    Follow up as needed.  Patient agreed with plan and expressed understanding.    Thank you for allowing me to serve you,

## 2022-06-15 RX ORDER — TRAMADOL HYDROCHLORIDE 50 MG/1
TABLET ORAL
Qty: 90 TABLET | Refills: 2 | Status: SHIPPED | OUTPATIENT
Start: 2022-06-15 | End: 2022-08-10 | Stop reason: SDUPTHER

## 2022-06-30 ENCOUNTER — PATIENT MESSAGE (OUTPATIENT)
Dept: FAMILY MEDICINE | Facility: CLINIC | Age: 87
End: 2022-06-30
Payer: MEDICARE

## 2022-06-30 DIAGNOSIS — M10.032 ACUTE IDIOPATHIC GOUT OF LEFT WRIST: Primary | ICD-10-CM

## 2022-07-01 RX ORDER — PREDNISONE 20 MG/1
20 TABLET ORAL DAILY
Qty: 5 TABLET | Refills: 0 | Status: SHIPPED | OUTPATIENT
Start: 2022-07-01 | End: 2022-07-06

## 2022-07-13 ENCOUNTER — TELEPHONE (OUTPATIENT)
Dept: PAIN MEDICINE | Facility: CLINIC | Age: 87
End: 2022-07-13
Payer: MEDICARE

## 2022-07-13 RX ORDER — ZOLPIDEM TARTRATE 5 MG/1
TABLET ORAL
Qty: 30 TABLET | Refills: 0 | Status: SHIPPED | OUTPATIENT
Start: 2022-07-13 | End: 2022-08-16

## 2022-07-13 NOTE — TELEPHONE ENCOUNTER
Confirmed information with [facility] pharmacy. No further assistance needed at this time.

## 2022-07-13 NOTE — TELEPHONE ENCOUNTER
----- Message from Anuja Heaven sent at 7/13/2022  4:19 PM CDT -----  Regarding: pharmacy call  Name of Who is Calling: JUAN SOLANO [[id number]] queenie ([facility] pharmacy)      What is the request in detail: pharmacy needs to verify with office the pt last visit  for her chronic pain medication. Please advise       Can the clinic reply by [facility]: No      What Number to Call Back if not in [facility]: [phone]

## 2022-07-31 ENCOUNTER — PATIENT MESSAGE (OUTPATIENT)
Dept: CARDIOLOGY | Facility: CLINIC | Age: 87
End: 2022-07-31
Payer: MEDICARE

## 2022-08-01 ENCOUNTER — CLINICAL SUPPORT (OUTPATIENT)
Dept: CARDIOLOGY | Facility: HOSPITAL | Age: 87
End: 2022-08-01
Attending: INTERNAL MEDICINE
Payer: MEDICARE

## 2022-08-01 VITALS — WEIGHT: 149 LBS | HEART RATE: 75 BPM | BODY MASS INDEX: 26.4 KG/M2 | HEIGHT: 63 IN

## 2022-08-01 DIAGNOSIS — I35.0 NONRHEUMATIC AORTIC VALVE STENOSIS: ICD-10-CM

## 2022-08-01 PROCEDURE — 93306 TTE W/DOPPLER COMPLETE: CPT | Mod: 26,,, | Performed by: INTERNAL MEDICINE

## 2022-08-01 PROCEDURE — 93306 ECHO (CUPID ONLY): ICD-10-PCS | Mod: 26,,, | Performed by: INTERNAL MEDICINE

## 2022-08-01 PROCEDURE — 93306 TTE W/DOPPLER COMPLETE: CPT | Mod: PO

## 2022-08-02 ENCOUNTER — PATIENT MESSAGE (OUTPATIENT)
Dept: CARDIOLOGY | Facility: CLINIC | Age: 87
End: 2022-08-02
Payer: MEDICARE

## 2022-08-02 LAB
ASCENDING AORTA: 2.96 CM
AV INDEX (PROSTH): 0.24
AV MEAN GRADIENT: 39 MMHG
AV PEAK GRADIENT: 57 MMHG
AV VALVE AREA: 0.92 CM2
AV VELOCITY RATIO: 0.28
BSA FOR ECHO PROCEDURE: 1.73 M2
CV ECHO LV RWT: 0.32 CM
DOP CALC AO PEAK VEL: 3.76 M/S
DOP CALC AO VTI: 99.31 CM
DOP CALC LVOT AREA: 3.8 CM2
DOP CALC LVOT DIAMETER: 2.2 CM
DOP CALC LVOT PEAK VEL: 1.05 M/S
DOP CALC LVOT STROKE VOLUME: 91.38 CM3
DOP CALCLVOT PEAK VEL VTI: 24.05 CM
E WAVE DECELERATION TIME: 301.93 MSEC
E/A RATIO: 0.56
E/E' RATIO: 15.8 M/S
ECHO LV POSTERIOR WALL: 0.64 CM (ref 0.6–1.1)
EJECTION FRACTION: 60 %
FRACTIONAL SHORTENING: 26 % (ref 28–44)
INTERVENTRICULAR SEPTUM: 0.61 CM (ref 0.6–1.1)
IVRT: 122.74 MSEC
LA MAJOR: 4.79 CM
LA MINOR: 4.37 CM
LA WIDTH: 3.13 CM
LEFT ATRIUM SIZE: 2.8 CM
LEFT ATRIUM VOLUME INDEX: 19.9 ML/M2
LEFT ATRIUM VOLUME: 34.05 CM3
LEFT INTERNAL DIMENSION IN SYSTOLE: 2.98 CM (ref 2.1–4)
LEFT VENTRICLE DIASTOLIC VOLUME INDEX: 41.65 ML/M2
LEFT VENTRICLE DIASTOLIC VOLUME: 71.23 ML
LEFT VENTRICLE MASS INDEX: 40 G/M2
LEFT VENTRICLE SYSTOLIC VOLUME INDEX: 20.1 ML/M2
LEFT VENTRICLE SYSTOLIC VOLUME: 34.38 ML
LEFT VENTRICULAR INTERNAL DIMENSION IN DIASTOLE: 4.03 CM (ref 3.5–6)
LEFT VENTRICULAR MASS: 68.61 G
LV LATERAL E/E' RATIO: 13.17 M/S
LV SEPTAL E/E' RATIO: 19.75 M/S
MV A" WAVE DURATION": 13.13 MSEC
MV PEAK A VEL: 1.41 M/S
MV PEAK E VEL: 0.79 M/S
MV STENOSIS PRESSURE HALF TIME: 87.56 MS
MV VALVE AREA P 1/2 METHOD: 2.51 CM2
PISA TR MAX VEL: 2.7 M/S
PULM VEIN S/D RATIO: 1.68
PV PEAK D VEL: 0.37 M/S
PV PEAK S VEL: 0.62 M/S
RA MAJOR: 4.37 CM
RA PRESSURE: 3 MMHG
RA WIDTH: 1.91 CM
RIGHT VENTRICULAR END-DIASTOLIC DIMENSION: 2.87 CM
RV TISSUE DOPPLER FREE WALL SYSTOLIC VELOCITY 1 (APICAL 4 CHAMBER VIEW): 11.33 CM/S
SINUS: 2.33 CM
STJ: 1.83 CM
TDI LATERAL: 0.06 M/S
TDI SEPTAL: 0.04 M/S
TDI: 0.05 M/S
TR MAX PG: 29 MMHG
TRICUSPID ANNULAR PLANE SYSTOLIC EXCURSION: 2.04 CM
TV REST PULMONARY ARTERY PRESSURE: 32 MMHG

## 2022-08-04 ENCOUNTER — PES CALL (OUTPATIENT)
Dept: ADMINISTRATIVE | Facility: CLINIC | Age: 87
End: 2022-08-04
Payer: MEDICARE

## 2022-08-08 ENCOUNTER — PATIENT MESSAGE (OUTPATIENT)
Dept: PAIN MEDICINE | Facility: CLINIC | Age: 87
End: 2022-08-08
Payer: MEDICARE

## 2022-08-08 ENCOUNTER — PATIENT MESSAGE (OUTPATIENT)
Dept: INTERNAL MEDICINE | Facility: CLINIC | Age: 87
End: 2022-08-08
Payer: MEDICARE

## 2022-08-10 ENCOUNTER — OFFICE VISIT (OUTPATIENT)
Dept: PAIN MEDICINE | Facility: CLINIC | Age: 87
End: 2022-08-10
Payer: MEDICARE

## 2022-08-10 VITALS
HEART RATE: 79 BPM | OXYGEN SATURATION: 96 % | WEIGHT: 150.69 LBS | BODY MASS INDEX: 26.7 KG/M2 | HEIGHT: 63 IN | DIASTOLIC BLOOD PRESSURE: 76 MMHG | SYSTOLIC BLOOD PRESSURE: 154 MMHG

## 2022-08-10 DIAGNOSIS — R26.81 GAIT INSTABILITY: ICD-10-CM

## 2022-08-10 DIAGNOSIS — M51.36 DDD (DEGENERATIVE DISC DISEASE), LUMBAR: Primary | ICD-10-CM

## 2022-08-10 DIAGNOSIS — G89.4 CHRONIC PAIN DISORDER: ICD-10-CM

## 2022-08-10 PROCEDURE — 99213 PR OFFICE/OUTPT VISIT, EST, LEVL III, 20-29 MIN: ICD-10-PCS | Mod: S$GLB,,, | Performed by: ANESTHESIOLOGY

## 2022-08-10 PROCEDURE — 1159F PR MEDICATION LIST DOCUMENTED IN MEDICAL RECORD: ICD-10-PCS | Mod: CPTII,S$GLB,, | Performed by: ANESTHESIOLOGY

## 2022-08-10 PROCEDURE — 99999 PR PBB SHADOW E&M-EST. PATIENT-LVL III: CPT | Mod: PBBFAC,,, | Performed by: ANESTHESIOLOGY

## 2022-08-10 PROCEDURE — 3288F PR FALLS RISK ASSESSMENT DOCUMENTED: ICD-10-PCS | Mod: CPTII,S$GLB,, | Performed by: ANESTHESIOLOGY

## 2022-08-10 PROCEDURE — 1101F PT FALLS ASSESS-DOCD LE1/YR: CPT | Mod: CPTII,S$GLB,, | Performed by: ANESTHESIOLOGY

## 2022-08-10 PROCEDURE — 3072F LOW RISK FOR RETINOPATHY: CPT | Mod: CPTII,S$GLB,, | Performed by: ANESTHESIOLOGY

## 2022-08-10 PROCEDURE — 1101F PR PT FALLS ASSESS DOC 0-1 FALLS W/OUT INJ PAST YR: ICD-10-PCS | Mod: CPTII,S$GLB,, | Performed by: ANESTHESIOLOGY

## 2022-08-10 PROCEDURE — 99999 PR PBB SHADOW E&M-EST. PATIENT-LVL III: ICD-10-PCS | Mod: PBBFAC,,, | Performed by: ANESTHESIOLOGY

## 2022-08-10 PROCEDURE — 3072F PR LOW RISK FOR RETINOPATHY: ICD-10-PCS | Mod: CPTII,S$GLB,, | Performed by: ANESTHESIOLOGY

## 2022-08-10 PROCEDURE — 1125F PR PAIN SEVERITY QUANTIFIED, PAIN PRESENT: ICD-10-PCS | Mod: CPTII,S$GLB,, | Performed by: ANESTHESIOLOGY

## 2022-08-10 PROCEDURE — 99213 OFFICE O/P EST LOW 20 MIN: CPT | Mod: S$GLB,,, | Performed by: ANESTHESIOLOGY

## 2022-08-10 PROCEDURE — 1125F AMNT PAIN NOTED PAIN PRSNT: CPT | Mod: CPTII,S$GLB,, | Performed by: ANESTHESIOLOGY

## 2022-08-10 PROCEDURE — 3288F FALL RISK ASSESSMENT DOCD: CPT | Mod: CPTII,S$GLB,, | Performed by: ANESTHESIOLOGY

## 2022-08-10 PROCEDURE — 1159F MED LIST DOCD IN RCRD: CPT | Mod: CPTII,S$GLB,, | Performed by: ANESTHESIOLOGY

## 2022-08-10 RX ORDER — TRAMADOL HYDROCHLORIDE 50 MG/1
50 TABLET ORAL 3 TIMES DAILY PRN
Qty: 90 TABLET | Refills: 2 | Status: SHIPPED | OUTPATIENT
Start: 2022-08-10 | End: 2022-11-08

## 2022-08-10 RX ORDER — GABAPENTIN 300 MG/1
300 CAPSULE ORAL 3 TIMES DAILY
Qty: 90 CAPSULE | Refills: 3 | Status: SHIPPED | OUTPATIENT
Start: 2022-08-10 | End: 2022-12-13 | Stop reason: SDUPTHER

## 2022-08-10 NOTE — PROGRESS NOTES
CC: Back pain    HPI: The patient is a 87-year-old woman with a history of diabetes, previous breast CA, CVA and spinal stenosis who presents in referral from her primary care physician, Dr. Barger for back pain and leg pain Patient returns in follow-up today for back pain, states that she has very little pain if she is sitting down but the problem is standing up for any length of time.  She is able to do this by holding onto a counter and she does better with this.  She states that she would walk more but she has shortness of breath.  Nonetheless, she does get relief with taking tramadol, especially at night to help with sleep, continues gabapentin.  She denies any worsening weakness, denies any bowel or bladder incontinence..    Pain intervention history:  Patient is status post right L3 and L4 transforaminal injection on 8/9/13 with 50% relief of low back and right leg pain.  She is status post bilateral L4 transforaminal epidural steroid injections on 9/27/13 and 11/1/13 with 10-20% relief.  She is status post radiofrequency ablation of the left L3, 4, 5 medial branch nerves on 5/5/14 with 50% relief of her left low back pain.  She is status post radiofrequency ablation of the right L3, 4 and 5 medial branch nerves on 6/16/14 with mild relief, however she reported significant more relief at a later visit.  She is status post bilateral L4 transforaminal epidural steroid injections on 9/24/14 with moderate relief.  She is status post bilateral L4 transforaminal epidural steroid injections on 1/7/15 with complete relief of her posterior thigh pain, moderate relief of her low back pain and minimal relief of her right lateral hip pain.  She is status post C7-T1 cervical interlaminar epidural steroid injection on 2/27/15 with greater than 50% relief.  She is status post bilateral L3, 4 and 5 medial branch radio frequency ablation on 7/29/16 reporting 0% relief initially at 4 weeks but then reporting 90% relief after  6-7 weeks.  She is status post bilateral L4 transforaminal epidural steroid injections on 11/11/16 with not quite 50% relief.  She is status post bilateral L4 transforaminal epidural steroid injections on 4/4/17 with 50% relief.  She is status post bilateral L4 transforaminal epidural steroid injections on 8/23/17 with greater than 80% relief.  She is status post bilateral L3, 4 and 5 medial branch radiofrequency ablation on 3/6/18 with moderate relief.  She is status post L5/S1 interlaminar epidural steroid injection on 06/04/2018 with almost 100% relief lasting 1 month.  She is status post L5/S1 interlaminar epidural steroid injection on 09/04/2018 with almost 100% relief.  She is status post L5/S1 interlaminar epidural steroid injection on 12/20/2018 with greater than 50% relief. She is status post L5/S1 interlaminar epidural steroid injection on 05/07/2019 with 75% relief.  She is status post L5/S1 interlaminar epidural steroid injection on 09/16/2019 with 50% relief.  She is status post bilateral L3, 4 and 5 medial branch radiofrequency ablation on 10/29/2019 with 50% relief. She is status post L5/S1 interlaminar epidural steroid injection on 12/16/2019 with what sounds like about 50% relief.   She is status post L5/S1 interlaminar epidural steroid injection on 07/01/2020 with almost 100% relief lasting 3 weeks, now reporting about 0% relief.  She is status post right L3/4 and L4/5 transforaminal epidural steroid injection on 04/16/2021 with 50% relief.    Spine surgeries:    Antineuropathics:  Gabapentin 300 mg 3 times daily  NSAIDs:  Physical therapy: in past  Antidepressants:  Muscle relaxers:  Opioids:  Tramadol 50 mg 3 times daily as needed  Antiplatelets/Anticoagulants:  ASA 81 mg    ROS:She reports easy bruising, back pain and difficulty sleeping.  Balance of review of systems is negative.    Medical, surgical, family and social history reviewed elsewhere in record.    Medications/Allergies: See med  "card    Vitals:    08/10/22 1122   BP: (!) 154/76   Pulse: 79   SpO2: 96%   Weight: 68.3 kg (150 lb 11 oz)   Height: 5' 3" (1.6 m)   PainSc:   2   PainLoc: Back         Physical exam:  Gen: A and O x3, pleasant, well-groomed, appears uncomfortable  Skin: No rashes or obvious lesions  HEENT: PERRLA  CVS: Regular rate and rhythm, normal S1 and S2, no murmurs.  Resp: Clear to auscultation bilaterally, no wheezes or rales.  Abdomen: Soft, NT/ND, normal bowel sounds present.  Musculoskeletal: Able to stand and walk short distances without assistance, however uses a walker when out of the house.  She has disuse atrophy of the lumbar paraspinous muscles.  Slow to go from seated to standing position.    Neuro:  Upper extremities: 5/5 strength bilaterally   Lower extremities: 5/5 strength bilaterally  Reflexes: Brachioradialis 2+, Bicep 2+, Tricep 2+. Patellar 2+, Achilles 2+.  Sensory: Intact and symmetrical to light touch and pinprick in C2-T1 dermatomes bilaterally.  Intact and symmetrical to light touch and pinprick in L2-S1 dermatomes bilaterally, except for a small patch over her left lateral shin decreased with light touch and pinprick.    Lumbar spine:  Lumbar spine: ROM is severely reduced with flexion and extension with increased right low back pain during each maneuver.  Terry's test is deferred.  Supine straight leg raise causes right lateral buttock pain.  Internal and external rotation of the hip causes no increased pain on either side.  Myofascial exam: No tenderness to palpation across lumbar paraspinous muscles.      Imagin13 MRI L-spine  T10-T11: There is severe disk desiccation and a moderate diffuse disk bulge and ligamentous hypertrophy. This is not complete evaluation of the thoracic spine, but there does appear to be mild central canal stenosis.  T11-T12: Moderate disk desiccation and mild diffuse disk bulge. Mild narrowing of the thecal sac. Neural foraminal regions difficult to assess due " to the scoliosis.  T12-L1: Severe disk degenerative disease with marked desiccation, diffuse disk bulge, and spurring of vertebral bodies. Mild central canal stenosis. Neural foraminal narrowing bilaterally, difficult to grade due to scoliosis.  L1-L2: Advanced disk desiccation with moderate diffuse disk bulge and mild spurring of vertebral bodies. Small superimposed central to right paracentral disk extrusion. Mild central canal stenosis. Mild to moderate facet arthropathy and ligamentous hypertrophy. Bilateral foraminal stenosis.  L2 - L3: There is also disk desiccation and diffuse disk bulge and small annular fissure posteriorly. facet arthropathy and hypertrophy ligamentum flavum. Mild central canal stenosis. Moderate right and mild left neuroforaminal stenosis.  L3-L4: Disk desiccation and moderate diffuse disk bulge. Small right paracentral ascending disk extrusion and moderate facet arthropathy present bilaterally and hypertrophy of ligamentum flavum. In combination, the findings result in severe central canal stenosis. For example see axial image 22, central image 7. There is mild to moderate neural foraminal stenosis bilaterally.  L4 - L5: Mild anterolisthesis with disk desiccation and uncovering of the disk, with moderate right and severe left facet arthropathy and hypertrophy of ligamentum flavum. Severe central canal stenosis. Mild neural foraminal narrowing, right worse than left.  L5-S1: Disk desiccation and mild diffuse disk bulge. A small ascending disk extrusion centrally in the midline. No significant central canal stenosis. Advanced facet arthropathy bilaterally, left worse than right. Mild left neural foraminal narrowing. No significant right neural foraminal stenosis.    5/28/2018 MRI lumbar spine  T12-L1: There is marked disc space narrowing, trace retrolisthesis, unroofing of a moderate disc bulge.  There is right greater than left facet joint arthropathy.  There is mild spinal stenosis with  moderate left and moderate-to-marked right foraminal stenosis.  These findings have mildly progressed.  L1-2: There is trace retrolisthesis of L1 on L2, there is inferior unroofing of a moderate diffuse disc bulge with small superimposed central disc protrusion.  There is right greater than left facet joint arthropathy.  There is borderline spinal stenosis.  There is crowding of right lateral recess.  There is marked right and moderate-to-marked left foraminal stenosis with very slight progression.  L2-3: There is trace retrolisthesis of L2 on L3 where there is marked disc space narrowing.  There is a moderate diffuse disc bulge with osteophytic ridging and superimposed right foraminal disc protrusion.  There is moderate-to-marked bilateral facet joint arthropathy with ligamentum flavum thickening, right greater than.  There is moderate central spinal stenosis and right lateral recess stenosis.  There is mild left lateral recess stenosis.  There is marked right and moderate-to-marked left foraminal stenosis with progression.  L3-4: There is disc space narrowing.  There is marked facet joint arthropathy with ligamentum flavum thickening.  There is a small 4 mm left synovial cyst.  There is unroofing of a moderate to marked diffuse disc bulge.  There is severe spinal canal, bilateral lateral recess and right foraminal stenosis with moderate to marked left foraminal stenosis and with slight progression.  L4-5: There is stable grade 1 spondylolisthesis with 4 mm anterolisthesis of L4 on L5.  There is marked facet joint arthropathy.  There is unroofing of a moderate to marked diffuse disc bulge.  There is severe spinal canal, bilateral lateral recess and foraminal stenosis with slight progression.  L5-S1: There is disc space narrowing at the L5-S1 level, worse towards the left.  There is a disc bulge with small superimposed central disc extrusion with 5 mm cephalad extension.  This was present previously but is slightly  more conspicuous.  There is marked facet joint arthropathy.  There is moderate central spinal stenosis with mild crowding of the left lateral recess.  There is mild right and moderate-to-marked left foraminal stenosis with progression.    05/13/2019 bone scan  Increased tracer activity is noted in the region of the facets presumably at the T12-L1 level on the left.  Tracer activity is also noted at the L4-L5 and L5-S1 levels on the left suggestive of facet arthropathy.  More diffuse facet arthropathy is noted throughout the lumbar spine.  Some increased uptake may be noted within the vertebral bodies more anteriorly at the T9 through T12 levels which could relate to compression deformity or Schmorl's node formation/endplate or Modic type degenerative change.  Given that no STIR signal abnormality was noted on the MRI of 03/14/2019 no significant vertebral body height loss was noted at these levels on that exam, this uptake likely relates to degenerative Modic type endplate changes which can be seen on that exam.  Anatomical imaging such as from MRI which was noted to have been performed on 03/14/2019 is much more specific for degenerative changes of the spine as well as for numbering purposes    Assessment:   The patient is a 87-year-old woman with a history of diabetes, previous breast CA, CVA and spinal stenosis who presents in referral from her primary care physician, Dr. Barger for back pain and leg pain.     1. DDD (degenerative disc disease), lumbar     2. Chronic pain disorder     3. Gait instability         Plan:  1. I have refilled her tramadol for the next 3 months, I will have her follow up in 3 months or sooner as needed.  I encouraged her to continue to be as active as possible.  We discussed that if she should have any worsening of pain that is not resolving, we could try an injection in the future but there is nothing that is necessarily going to relieve her baseline pain.

## 2022-08-11 ENCOUNTER — PES CALL (OUTPATIENT)
Dept: ADMINISTRATIVE | Facility: OTHER | Age: 87
End: 2022-08-11
Payer: MEDICARE

## 2022-08-15 ENCOUNTER — TELEPHONE (OUTPATIENT)
Dept: INTERNAL MEDICINE | Facility: CLINIC | Age: 87
End: 2022-08-15
Payer: MEDICARE

## 2022-08-15 DIAGNOSIS — E11.8 CONTROLLED TYPE 2 DIABETES MELLITUS WITH COMPLICATION, WITHOUT LONG-TERM CURRENT USE OF INSULIN: Primary | ICD-10-CM

## 2022-08-15 NOTE — TELEPHONE ENCOUNTER
No new care gaps identified.  [facility] Embedded Care Gaps. Reference number: [id number]. 8/15/2022   5:31:24 AM CDT

## 2022-08-16 RX ORDER — ZOLPIDEM TARTRATE 5 MG/1
TABLET ORAL
Qty: 30 TABLET | Refills: 0 | Status: SHIPPED | OUTPATIENT
Start: 2022-08-16 | End: 2022-09-19

## 2022-08-16 NOTE — TELEPHONE ENCOUNTER
Please contact her, she needs to schedule her mammogram which has been ordered    She also needs to schedule her eye exam orders in         reviewed, controlled meds refilled    Will need EPP with me in February 2023, thank you

## 2022-08-17 ENCOUNTER — PATIENT MESSAGE (OUTPATIENT)
Dept: INTERNAL MEDICINE | Facility: CLINIC | Age: 87
End: 2022-08-17
Payer: MEDICARE

## 2022-08-17 DIAGNOSIS — J47.9 BRONCHIECTASIS WITHOUT COMPLICATION: ICD-10-CM

## 2022-08-17 DIAGNOSIS — J44.89 COPD (CHRONIC OBSTRUCTIVE PULMONARY DISEASE) WITH CHRONIC BRONCHITIS: ICD-10-CM

## 2022-08-17 DIAGNOSIS — J45.20 INTERMITTENT ASTHMA, UNCOMPLICATED: ICD-10-CM

## 2022-08-17 RX ORDER — FLUTICASONE FUROATE AND VILANTEROL 200; 25 UG/1; UG/1
1 POWDER RESPIRATORY (INHALATION) DAILY
Qty: 60 EACH | Refills: 0 | Status: SHIPPED | OUTPATIENT
Start: 2022-08-17 | End: 2023-04-03

## 2022-08-17 NOTE — TELEPHONE ENCOUNTER
No new care gaps identified.  [facility] Embedded Care Gaps. Reference number: [id number]. 8/17/2022   12:57:45 PM CDT

## 2022-08-18 ENCOUNTER — PES CALL (OUTPATIENT)
Dept: ADMINISTRATIVE | Facility: CLINIC | Age: 87
End: 2022-08-18
Payer: MEDICARE

## 2022-08-30 ENCOUNTER — TELEPHONE (OUTPATIENT)
Dept: PULMONOLOGY | Facility: CLINIC | Age: 87
End: 2022-08-30
Payer: MEDICARE

## 2022-08-30 NOTE — TELEPHONE ENCOUNTER
Patient scheduled for 6/13    ----- Message from Alix Sandhu sent at 8/29/2022  5:05 PM CDT -----  Contact: JUAN SOLANO [[id number]] [phone]  Type:  Sooner Appointment Request    Caller is requesting a sooner appointment.  Caller declined first available appointment listed below.  Caller will not accept being placed on the waitlist and is requesting a message be sent to doctor.    Name of Caller: JUAN SOLANO [[id number]]  When is the first available appointment? 10/25/22  Reason for Visit: COPD, refill medications  Would the patient rather a call back or a response via [facility]? Phone call   Best Call Back Number: [phone]  Additional Information: Patient's pulm prescriptions will run out in September.

## 2022-09-12 ENCOUNTER — LAB VISIT (OUTPATIENT)
Dept: LAB | Facility: HOSPITAL | Age: 87
End: 2022-09-12
Attending: INTERNAL MEDICINE
Payer: MEDICARE

## 2022-09-12 DIAGNOSIS — E11.8 CONTROLLED TYPE 2 DIABETES MELLITUS WITH COMPLICATION, WITHOUT LONG-TERM CURRENT USE OF INSULIN: ICD-10-CM

## 2022-09-12 LAB
ESTIMATED AVG GLUCOSE: 174 MG/DL (ref 68–131)
HBA1C MFR BLD: 7.7 % (ref 4–5.6)

## 2022-09-12 PROCEDURE — 83036 HEMOGLOBIN GLYCOSYLATED A1C: CPT | Performed by: INTERNAL MEDICINE

## 2022-09-12 PROCEDURE — 36415 COLL VENOUS BLD VENIPUNCTURE: CPT | Mod: PO | Performed by: INTERNAL MEDICINE

## 2022-09-13 ENCOUNTER — OFFICE VISIT (OUTPATIENT)
Dept: PULMONOLOGY | Facility: CLINIC | Age: 87
End: 2022-09-13
Payer: MEDICARE

## 2022-09-13 ENCOUNTER — TELEPHONE (OUTPATIENT)
Dept: INTERNAL MEDICINE | Facility: CLINIC | Age: 87
End: 2022-09-13
Payer: MEDICARE

## 2022-09-13 VITALS
WEIGHT: 150.25 LBS | SYSTOLIC BLOOD PRESSURE: 175 MMHG | OXYGEN SATURATION: 95 % | DIASTOLIC BLOOD PRESSURE: 97 MMHG | HEIGHT: 63 IN | BODY MASS INDEX: 26.62 KG/M2 | HEART RATE: 73 BPM

## 2022-09-13 DIAGNOSIS — E53.8 LOW SERUM VITAMIN B12: ICD-10-CM

## 2022-09-13 DIAGNOSIS — J44.89 COPD (CHRONIC OBSTRUCTIVE PULMONARY DISEASE) WITH CHRONIC BRONCHITIS: ICD-10-CM

## 2022-09-13 DIAGNOSIS — E78.2 MIXED HYPERLIPIDEMIA: ICD-10-CM

## 2022-09-13 DIAGNOSIS — M10.00 IDIOPATHIC GOUT, UNSPECIFIED CHRONICITY, UNSPECIFIED SITE: ICD-10-CM

## 2022-09-13 DIAGNOSIS — J47.9 BRONCHIECTASIS WITHOUT COMPLICATION: ICD-10-CM

## 2022-09-13 DIAGNOSIS — E55.9 VITAMIN D DEFICIENCY DISEASE: ICD-10-CM

## 2022-09-13 DIAGNOSIS — E11.8 CONTROLLED TYPE 2 DIABETES MELLITUS WITH COMPLICATION, WITHOUT LONG-TERM CURRENT USE OF INSULIN: Primary | ICD-10-CM

## 2022-09-13 DIAGNOSIS — I10 ESSENTIAL HYPERTENSION: ICD-10-CM

## 2022-09-13 DIAGNOSIS — E03.9 ACQUIRED HYPOTHYROIDISM: ICD-10-CM

## 2022-09-13 DIAGNOSIS — J45.20 INTERMITTENT ASTHMA, UNCOMPLICATED: ICD-10-CM

## 2022-09-13 PROCEDURE — 3072F PR LOW RISK FOR RETINOPATHY: ICD-10-PCS | Mod: CPTII,S$GLB,, | Performed by: INTERNAL MEDICINE

## 2022-09-13 PROCEDURE — 3288F PR FALLS RISK ASSESSMENT DOCUMENTED: ICD-10-PCS | Mod: CPTII,S$GLB,, | Performed by: INTERNAL MEDICINE

## 2022-09-13 PROCEDURE — 3072F LOW RISK FOR RETINOPATHY: CPT | Mod: CPTII,S$GLB,, | Performed by: INTERNAL MEDICINE

## 2022-09-13 PROCEDURE — 99999 PR PBB SHADOW E&M-EST. PATIENT-LVL V: CPT | Mod: PBBFAC,,, | Performed by: INTERNAL MEDICINE

## 2022-09-13 PROCEDURE — 1125F AMNT PAIN NOTED PAIN PRSNT: CPT | Mod: CPTII,S$GLB,, | Performed by: INTERNAL MEDICINE

## 2022-09-13 PROCEDURE — 3288F FALL RISK ASSESSMENT DOCD: CPT | Mod: CPTII,S$GLB,, | Performed by: INTERNAL MEDICINE

## 2022-09-13 PROCEDURE — 1101F PR PT FALLS ASSESS DOC 0-1 FALLS W/OUT INJ PAST YR: ICD-10-PCS | Mod: CPTII,S$GLB,, | Performed by: INTERNAL MEDICINE

## 2022-09-13 PROCEDURE — 99214 OFFICE O/P EST MOD 30 MIN: CPT | Mod: S$GLB,,, | Performed by: INTERNAL MEDICINE

## 2022-09-13 PROCEDURE — 1125F PR PAIN SEVERITY QUANTIFIED, PAIN PRESENT: ICD-10-PCS | Mod: CPTII,S$GLB,, | Performed by: INTERNAL MEDICINE

## 2022-09-13 PROCEDURE — 1159F PR MEDICATION LIST DOCUMENTED IN MEDICAL RECORD: ICD-10-PCS | Mod: CPTII,S$GLB,, | Performed by: INTERNAL MEDICINE

## 2022-09-13 PROCEDURE — 1159F MED LIST DOCD IN RCRD: CPT | Mod: CPTII,S$GLB,, | Performed by: INTERNAL MEDICINE

## 2022-09-13 PROCEDURE — 99214 PR OFFICE/OUTPT VISIT, EST, LEVL IV, 30-39 MIN: ICD-10-PCS | Mod: S$GLB,,, | Performed by: INTERNAL MEDICINE

## 2022-09-13 PROCEDURE — 1101F PT FALLS ASSESS-DOCD LE1/YR: CPT | Mod: CPTII,S$GLB,, | Performed by: INTERNAL MEDICINE

## 2022-09-13 PROCEDURE — 99999 PR PBB SHADOW E&M-EST. PATIENT-LVL V: ICD-10-PCS | Mod: PBBFAC,,, | Performed by: INTERNAL MEDICINE

## 2022-09-13 RX ORDER — LEVALBUTEROL TARTRATE 45 UG/1
1-2 AEROSOL, METERED ORAL EVERY 4 HOURS PRN
Qty: 15 G | Refills: 11 | Status: SHIPPED | OUTPATIENT
Start: 2022-09-13 | End: 2023-05-01 | Stop reason: CLARIF

## 2022-09-13 RX ORDER — ALBUTEROL SULFATE 90 UG/1
2 AEROSOL, METERED RESPIRATORY (INHALATION) EVERY 6 HOURS PRN
Qty: 18 G | Refills: 11 | Status: SHIPPED | OUTPATIENT
Start: 2022-09-13 | End: 2024-03-02 | Stop reason: CLARIF

## 2022-09-13 RX ORDER — BUDESONIDE, GLYCOPYRROLATE, AND FORMOTEROL FUMARATE 160; 9; 4.8 UG/1; UG/1; UG/1
2 AEROSOL, METERED RESPIRATORY (INHALATION) 2 TIMES DAILY
Qty: 10.7 G | Refills: 11 | Status: SHIPPED | OUTPATIENT
Start: 2022-09-13 | End: 2023-05-01 | Stop reason: CLARIF

## 2022-09-13 RX ORDER — FLUTICASONE FUROATE AND VILANTEROL 200; 25 UG/1; UG/1
1 POWDER RESPIRATORY (INHALATION) DAILY
Qty: 60 EACH | Refills: 11 | Status: SHIPPED | OUTPATIENT
Start: 2022-09-13 | End: 2023-10-16 | Stop reason: SDUPTHER

## 2022-09-13 RX ORDER — PREDNISONE 10 MG/1
TABLET ORAL
Qty: 25 TABLET | Refills: 1 | Status: SHIPPED | OUTPATIENT
Start: 2022-09-13 | End: 2023-07-24 | Stop reason: CLARIF

## 2022-09-13 NOTE — PATIENT INSTRUCTIONS
Use albuterol inhaler or xopenex as needed.  Xopenex may cause less jitters.      Trail breztri 2 puffs twice daily in place of breo.  Will give paper script for breztri and instruct to stop breo if using breztri.    May use prednisone 10 mg daily for 5 days monthly-- may increase sugars.  Would encourage activity if uses prednisone.  # 25 with a refil.      May use spacer if needed for puffers.      Call if needed.     Could do phone follow up in a year.

## 2022-09-13 NOTE — PROGRESS NOTES
9/13/2022    Olga Aguiar  office    Chief Complaint   Patient presents with    Shortness of Breath     2 year follow up     9/13/2022 pt has de jesus, took prednisone with good results-- but sugars concerns prevents dosing.  Wanted prednisone in July and took 20/d x 5  with good results. Has hgb a1c up to 7.7.   trelegy ppt urine problems anduses breo.       5/20/2020 - no recent falls, back pain limits, uses breo but out last 2 months - no difference without breo.  Breathing not good - has de jesus.  No prednisone use.  Has higher sugars recently- hgba1c 8.8 last July.    March 28, 2018-- moving to [facility], fell 2-4 over last yr- has gotten  Therapy.  Breathing still an issue. No cough or wheezes, took prednisone for breathing 5x/yr- usually for 3 days.  Very de jesus.  Was in gym but now out due to back pain issues.    Discussed with patient above for education the following:    Use breo or trelegy at once daily- use most effective but not both.    Use xopenex or albuterol before activity - may be reasonable to use before activity or more to assure optimal breathing.    Skip singulair/montelukast- resume for sinuses or worse lungs- if helps clearly would use regular.      May use prednisone 10 mg 2 daily for 3 days and repeat if needed.    Exercise program would be good once settled with recent falls and likely deconditioning.    April 7, 2017 - steroids help breathing and mobility- used mainly 4 times, once for 6 days.  Mucous and breathing doing well and does even better with steroids.  Jan 4, 2017, cough is better, less mucous, wheezes with stairs and night air, took prednisone last month and after 3 days improved a bit, uses singulair only on regular basis, advair used til out- was causing a hoarseness with reg use.  Albuterol ppt nervousness.  Sept 28, 2016HPI: minimal ex smoker with h/o breast ca rx xrt to anterior right chest with chr vol loss- ct with fibrotic right upper with bronchiectasis.  Chr cough and no  mucous progressively worse,   Had had cough and sob related irratents.  occ wheezes last yrs,   [place] flight of stairs varies. Inhalers no help.  Will have occasoion of cough and mucous chr , copious.  Yrs back would have cough and occ wheeze withe irratents.    The chief compliant  problem is new to me   PFSH:  Past Medical History:   Diagnosis Date    Abnormal ECG 2/6/2017    Adrenal adenoma: 2.8 X 2.1CM 2010 5/16/2014    Anticoagulant long-term use      mg    Aortic atherosclerosis: see CT scan 2016 10/28/2016    Aortic stenosis 8/19/2014    Asthma     Breast cancer     Cancer     breast, both sides, right arm restriction    Cataracts, both eyes     Chronic back pain     Chronic bronchitis     CKD (chronic kidney disease) stage 3, GFR 30-59 ml/min 8/19/2014    no dialysis    COPD (chronic obstructive pulmonary disease)     occasional inhaler use, no oxygen    DDD (degenerative disc disease), lumbar 7/30/2013    Diabetes mellitus     diet controlled    Diverticulosis     Ectatic abdominal aorta: see CT scan 10/16 10/28/2016    Gallbladder polyp 8/19/2014    GERD (gastroesophageal reflux disease)     Gout     Herpes simplex     Fever Blisters and Styes in right eye    High blood cholesterol     Hypertension     holding medication when B/P low    Hypertension associated with diabetes 2/7/2012    Insomnia     Lumbar spinal stenosis     Lymphedema of arm     Right    Multiple lung nodules 9/21/2015    Renal cyst, right: stable per CT 2016 10/28/2016    Stroke 2/2012    TIA, no residual effects    Stye 12/5/2013    Thyroid disease     hypothyroidism    Type 2 diabetes mellitus without complication, without long-term current use of insulin 7/20/2015    Unspecified hypothyroidism 7/20/2015         Past Surgical History:   Procedure Laterality Date    AXILLARY NODE DISSECTION      both sides    BREAST BIOPSY      BREAST LUMPECTOMY Bilateral     lymph nodes on right    CATARACT EXTRACTION      bilateral PHACO with IOL     COLONOSCOPY  01/12/2010    in [facility]    Epidural steroid injection      Pain mangement    EPIDURAL STEROID INJECTION INTO LUMBAR SPINE N/A 6/4/2018    Procedure: Injection-steroid-epidural-lumbar;  Surgeon: Rohan Ware MD;  Location: [facility] OR;  Service: Pain Management;  Laterality: N/A;  L5/S1 interlaminar    EPIDURAL STEROID INJECTION INTO LUMBAR SPINE N/A 9/4/2018    Procedure: Injection-steroid-epidural-lumbar L5/S1;  Surgeon: Rohan Ware MD;  Location: [facility] OR;  Service: Pain Management;  Laterality: N/A;    EPIDURAL STEROID INJECTION INTO LUMBAR SPINE N/A 12/20/2018    Procedure: Injection-steroid-epidural-lumbar;  Surgeon: Rohan Ware MD;  Location: [facility] OR;  Service: Pain Management;  Laterality: N/A;  L5/S1 interlaminar    EPIDURAL STEROID INJECTION INTO LUMBAR SPINE N/A 5/7/2019    Procedure: Injection-steroid-epidural-lumbar L5/S1;  Surgeon: Rohan Ware MD;  Location: [facility] OR;  Service: Pain Management;  Laterality: N/A;    EPIDURAL STEROID INJECTION INTO LUMBAR SPINE N/A 9/16/2019    Procedure: Injection-steroid-epidural-lumbar;  Surgeon: Rohan Ware MD;  Location: [facility] OR;  Service: Pain Management;  Laterality: N/A;  L5/S1 interlaminar     EPIDURAL STEROID INJECTION INTO LUMBAR SPINE Right 12/16/2019    Procedure: Injection-steroid-epidural-lumbar L5, S1 Right;  Surgeon: Rohan Ware MD;  Location: [facility] OR;  Service: Pain Management;  Laterality: Right;    EPIDURAL STEROID INJECTION INTO LUMBAR SPINE N/A 7/1/2020    Procedure: Injection-steroid-epidural-lumbar;  Surgeon: Rohan Ware MD;  Location: [facility] OR;  Service: Pain Management;  Laterality: N/A;  L5/S1 to RIGHT    ESOPHAGOGASTRODUODENOSCOPY N/A 11/12/2018    Procedure: EGD (ESOPHAGOGASTRODUODENOSCOPY);  Surgeon: Braydon Becerra MD;  Location: [facility] ENDO;  Service: Endoscopy;  Laterality: N/A;    HYSTERECTOMY      HYSTERECTOMY      Nerve Block Injection      Pain management, Times 2     RADIOFREQUENCY ABLATION      Nerve, Pain management    RADIOFREQUENCY ABLATION Right 2020    Procedure: Radiofrequency Ablation of the right shoulder.;  Surgeon: David Rome MD;  Location: [facility] OR;  Service: Orthopedics;  Laterality: Right;    RADIOFREQUENCY ABLATION OF LUMBAR MEDIAL BRANCH NERVE AT SINGLE LEVEL Bilateral 10/29/2019    Procedure: Radiofrequency Ablation, Nerve, Spinal, Lumbar, Medial Branch, L3, L4, L5;  Surgeon: Rohan Ware MD;  Location: [facility] OR;  Service: Pain Management;  Laterality: Bilateral;    RECTAL SURGERY      Fissure repair    TONSILLECTOMY      TRANSFORAMINAL EPIDURAL INJECTION OF STEROID Right 2021    Procedure: Injection,steroid,epidural,transforaminal approach L3/4 and L4/5;  Surgeon: Soham Vann MD;  Location: [facility] OR;  Service: Pain Management;  Laterality: Right;    UPPER GASTROINTESTINAL ENDOSCOPY  2014    Dr. Castro     Social History     Tobacco Use    Smoking status: Former     Packs/day: 1.00     Years: 18.00     Pack years: 18.00     Types: Cigarettes     Start date: 1952     Quit date: 1970     Years since quittin.1    Smokeless tobacco: Never   Substance Use Topics    Alcohol use: Yes     Comment: rare    Drug use: No     Family History   Problem Relation Age of Onset    Cancer Mother         Breast    COPD Mother     Hearing loss Mother     Hypertension Mother     Breast cancer Mother 90    Diabetes Father     Heart disease Father     Heart attack Father     Cancer Daughter         Breast    Breast cancer Daughter 45    Cancer Sister         Breast    Breast cancer Sister 70    Amblyopia Neg Hx     Blindness Neg Hx     Cataracts Neg Hx     Glaucoma Neg Hx     Macular degeneration Neg Hx     Retinal detachment Neg Hx     Strabismus Neg Hx     Stroke Neg Hx     Thyroid disease Neg Hx     Colon cancer Neg Hx     Stomach cancer Neg Hx     Esophageal cancer Neg Hx      Review of patient's allergies indicates:   Allergen  "Reactions    Allopurinol analogues Other (See Comments)     Headache.  This is more of a side effect than it is an allergic reaction.       Performance Status:The patient's activity level is functions out of house.      Review of Systems:  a review of eleven systems covering constitutional, Eye, HEENT, Psych, Respiratory, Cardiac, GI, , Musculoskeletal, Endocrine, Dermatologic was negative except for pertinent findings as listed ABOVE and below: as, no swallow problems with xrt, no clear dm,     Exam:Comprehensive exam done.   BP (!) 175/97 (BP Location: Left arm, Patient Position: Sitting, BP Method: Medium (Automatic))   Pulse 73   Ht 5' 3" (1.6 m)   Wt 68.2 kg (150 lb 3.9 oz)   SpO2 95% Comment: on room air at rest  BMI 26.61 kg/m²   Exam included Vitals as listed, and patient's appearance and affect and alertness and mood, oral exam for yeast and hygiene and pharynx lesions and Mallapatti (M) score, neck with inspection for jvd and masses and thyroid abnormalities and lymph nodes (supraclavicular and infraclavicular nodes and axillary also examined and noted if abn), chest exam included symmetry and effort and fremitus and percussion and auscultation, cardiac exam included rhythm and gallops and murmur and rubs and jvd and edema, abdominal exam for mass and hepatosplenomegaly and tenderness and hernias and bowel sounds, Musculoskeletal exam with muscle tone and posture and mobility/gait and  strength, and skin for rashes and cyanosis and pallor and turgor, extremity for clubbing.  Findings were normal except for pertinent findings listed below:  M2, good bs, looks good, chest is symmetric, no distress, normal percussion, normal fremitus and good normal breath sounds    Radiographs reviewed: view by direct vision focal fibrotic dx right upper with mainly bronchiectasis.  Reviewed 3/28    10 mm nodule in the lingula not significant change compared to earlier study dated back to 1/5/2010.  Few scattered " 4 mm or smaller nodules although not definitely seen on the 2010 exam the difference could be technical do not appear significantly change compared to 1/13/2016.  Mildly prominent lymph node anterior mediastinal fat representing a change compared to 2010 but not significantly change compared to 1/13/2016.  Fibrotic change right lung consistent with radiation change and also not appearing significantly changed compared to the prior exam 1/13/ 2016      Additional findings as detailed above including stable hypodense masses in the liver consistent with cysts, stable left adrenal nodule, right renal cyst    ______________________________________     Electronically signed by: MINE BURROUGHS MD  Date: 09/23/16  Time: 10:35   Labs reviewed      PFT  Pulmonary Functions, including spirometry and bronchodilator response and lung volumes and diffusion, study was done oct 7, 2016.  Spirometry shows severe obstruction, loss vital capacity and obstruction and bronchodilator response.   FEV1 is 42% irma 0.81 liters liters.  Lung volumes show  loss of TLC with restriction 63% .  Diffusion shows reduced to 38% uncorrected for anemia if present..     Pulmonary functions show severe restriction and good bronchodilator response and low dlco. Clinical correlation recommended.      Saul Heller M.D.      Plan:  Clinical impression is resonably certain and repeated evaluation prn +/- follow up will be needed as below.    Olga was seen today for shortness of breath.    Diagnoses and all orders for this visit:    COPD (chronic obstructive pulmonary disease) with chronic bronchitis, 2013  -     fluticasone furoate-vilanteroL (BREO ELLIPTA) 200-25 mcg/dose DsDv diskus inhaler; Inhale 1 puff into the lungs once daily.  -     albuterol (PROAIR HFA) 90 mcg/actuation inhaler; Inhale 2 puffs into the lungs every 6 (six) hours as needed for Wheezing. Rescue  -     levalbuterol (XOPENEX HFA) 45 mcg/actuation inhaler; Inhale 1-2 puffs into the  lungs every 4 (four) hours as needed for Wheezing. This is a rescue inhaler medication and should be used as needed  -     predniSONE (DELTASONE) 10 MG tablet; Take one daily for 5 days for short breath and repeat as needed.  -     budesonide-glycopyr-formoterol (BREZTRI AEROSPHERE) 160-9-4.8 mcg/actuation HFAA; Inhale 2 puffs into the lungs 2 (two) times a day.    Intermittent asthma, uncomplicated  -     fluticasone furoate-vilanteroL (BREO ELLIPTA) 200-25 mcg/dose DsDv diskus inhaler; Inhale 1 puff into the lungs once daily.  -     albuterol (PROAIR HFA) 90 mcg/actuation inhaler; Inhale 2 puffs into the lungs every 6 (six) hours as needed for Wheezing. Rescue  -     levalbuterol (XOPENEX HFA) 45 mcg/actuation inhaler; Inhale 1-2 puffs into the lungs every 4 (four) hours as needed for Wheezing. This is a rescue inhaler medication and should be used as needed  -     predniSONE (DELTASONE) 10 MG tablet; Take one daily for 5 days for short breath and repeat as needed.  -     budesonide-glycopyr-formoterol (BREZTRI AEROSPHERE) 160-9-4.8 mcg/actuation HFAA; Inhale 2 puffs into the lungs 2 (two) times a day.    Bronchiectasis without complication  -     fluticasone furoate-vilanteroL (BREO ELLIPTA) 200-25 mcg/dose DsDv diskus inhaler; Inhale 1 puff into the lungs once daily.    COPD (chronic obstructive pulmonary disease) with chronic bronchitis  -     fluticasone furoate-vilanteroL (BREO ELLIPTA) 200-25 mcg/dose DsDv diskus inhaler; Inhale 1 puff into the lungs once daily.  -     albuterol (PROAIR HFA) 90 mcg/actuation inhaler; Inhale 2 puffs into the lungs every 6 (six) hours as needed for Wheezing. Rescue  -     levalbuterol (XOPENEX HFA) 45 mcg/actuation inhaler; Inhale 1-2 puffs into the lungs every 4 (four) hours as needed for Wheezing. This is a rescue inhaler medication and should be used as needed  -     predniSONE (DELTASONE) 10 MG tablet; Take one daily for 5 days for short breath and repeat as needed.  -      budesonide-glycopyr-formoterol (BREZTRI AEROSPHERE) 160-9-4.8 mcg/actuation HFAA; Inhale 2 puffs into the lungs 2 (two) times a day.        Follow up in about 1 year (around 9/13/2023), or if symptoms worsen or fail to improve.    Discussed with patient above for education the following:        Discussed with patient above for education the following:      Patient Instructions   Use albuterol inhaler or xopenex as needed.  Xopenex may cause less jitters.      Trail breztri 2 puffs twice daily in place of breo.  Will give paper script for breztri and instruct to stop breo if using breztri.    May use prednisone 10 mg daily for 5 days monthly-- may increase sugars.  Would encourage activity if uses prednisone.  # 25 with a refil.      May use spacer if needed for puffers.      Call if needed.     Could do phone follow up in a year.

## 2022-09-14 NOTE — TELEPHONE ENCOUNTER
Labs OK stay on same meds    Needs to schedule mammogram and eye exam (orders in)    Needs urine    EPP with me 6 months with labs prior orders in thanks

## 2022-09-14 NOTE — TELEPHONE ENCOUNTER
Spoke to patient and advised of recommendation. Patient verbalized understanding.       EPP scheduled     Declined scheduling of mammo and optometry

## 2022-09-16 ENCOUNTER — TELEPHONE (OUTPATIENT)
Dept: PULMONOLOGY | Facility: CLINIC | Age: 87
End: 2022-09-16
Payer: MEDICARE

## 2022-09-16 NOTE — TELEPHONE ENCOUNTER
Spoke with patient. Script sent to [employer].     ----- Message from Bhavana Tyalor sent at 9/16/2022 11:25 AM CDT -----  .Type:  RX Refill Request    Who Called: [facility]     Refill or New Rx: REFILL     RX Name and Strength: levalbuterol (XOPENEX HFA) 45 mcg/actuation inhaler    Preferred Pharmacy with phone number: [phone]     Pt Call Back Number: [phone]    Additional Information: PLEASE SEND TODAY     Thank You

## 2022-09-19 RX ORDER — ZOLPIDEM TARTRATE 5 MG/1
TABLET ORAL
Qty: 30 TABLET | Refills: 0 | Status: SHIPPED | OUTPATIENT
Start: 2022-09-19 | End: 2022-10-17

## 2022-09-19 NOTE — TELEPHONE ENCOUNTER
No new care gaps identified.  [facility] Embedded Care Gaps. Reference number: [id number]. 9/19/2022   5:31:45 AM CDT

## 2022-09-26 ENCOUNTER — OFFICE VISIT (OUTPATIENT)
Dept: CARDIOLOGY | Facility: CLINIC | Age: 87
End: 2022-09-26
Attending: INTERNAL MEDICINE
Payer: MEDICARE

## 2022-09-26 VITALS
HEIGHT: 63 IN | BODY MASS INDEX: 26.6 KG/M2 | HEART RATE: 66 BPM | SYSTOLIC BLOOD PRESSURE: 110 MMHG | WEIGHT: 150.13 LBS | DIASTOLIC BLOOD PRESSURE: 64 MMHG

## 2022-09-26 DIAGNOSIS — I10 ESSENTIAL HYPERTENSION: ICD-10-CM

## 2022-09-26 DIAGNOSIS — I77.811 ECTATIC ABDOMINAL AORTA: ICD-10-CM

## 2022-09-26 DIAGNOSIS — E78.2 MIXED HYPERLIPIDEMIA: ICD-10-CM

## 2022-09-26 DIAGNOSIS — I70.0 AORTIC ATHEROSCLEROSIS: Primary | ICD-10-CM

## 2022-09-26 DIAGNOSIS — I35.0 NONRHEUMATIC AORTIC VALVE STENOSIS: ICD-10-CM

## 2022-09-26 DIAGNOSIS — I51.7 LVH (LEFT VENTRICULAR HYPERTROPHY): ICD-10-CM

## 2022-09-26 PROCEDURE — 1159F PR MEDICATION LIST DOCUMENTED IN MEDICAL RECORD: ICD-10-PCS | Mod: CPTII,S$GLB,, | Performed by: INTERNAL MEDICINE

## 2022-09-26 PROCEDURE — 3072F PR LOW RISK FOR RETINOPATHY: ICD-10-PCS | Mod: CPTII,S$GLB,, | Performed by: INTERNAL MEDICINE

## 2022-09-26 PROCEDURE — 3288F PR FALLS RISK ASSESSMENT DOCUMENTED: ICD-10-PCS | Mod: CPTII,S$GLB,, | Performed by: INTERNAL MEDICINE

## 2022-09-26 PROCEDURE — 3072F LOW RISK FOR RETINOPATHY: CPT | Mod: CPTII,S$GLB,, | Performed by: INTERNAL MEDICINE

## 2022-09-26 PROCEDURE — 3288F FALL RISK ASSESSMENT DOCD: CPT | Mod: CPTII,S$GLB,, | Performed by: INTERNAL MEDICINE

## 2022-09-26 PROCEDURE — 1125F AMNT PAIN NOTED PAIN PRSNT: CPT | Mod: CPTII,S$GLB,, | Performed by: INTERNAL MEDICINE

## 2022-09-26 PROCEDURE — 1160F PR REVIEW ALL MEDS BY PRESCRIBER/CLIN PHARMACIST DOCUMENTED: ICD-10-PCS | Mod: CPTII,S$GLB,, | Performed by: INTERNAL MEDICINE

## 2022-09-26 PROCEDURE — 1160F RVW MEDS BY RX/DR IN RCRD: CPT | Mod: CPTII,S$GLB,, | Performed by: INTERNAL MEDICINE

## 2022-09-26 PROCEDURE — 99214 PR OFFICE/OUTPT VISIT, EST, LEVL IV, 30-39 MIN: ICD-10-PCS | Mod: S$GLB,,, | Performed by: INTERNAL MEDICINE

## 2022-09-26 PROCEDURE — 1101F PT FALLS ASSESS-DOCD LE1/YR: CPT | Mod: CPTII,S$GLB,, | Performed by: INTERNAL MEDICINE

## 2022-09-26 PROCEDURE — 99999 PR PBB SHADOW E&M-EST. PATIENT-LVL III: CPT | Mod: PBBFAC,,, | Performed by: INTERNAL MEDICINE

## 2022-09-26 PROCEDURE — 1101F PR PT FALLS ASSESS DOC 0-1 FALLS W/OUT INJ PAST YR: ICD-10-PCS | Mod: CPTII,S$GLB,, | Performed by: INTERNAL MEDICINE

## 2022-09-26 PROCEDURE — 1159F MED LIST DOCD IN RCRD: CPT | Mod: CPTII,S$GLB,, | Performed by: INTERNAL MEDICINE

## 2022-09-26 PROCEDURE — 99214 OFFICE O/P EST MOD 30 MIN: CPT | Mod: S$GLB,,, | Performed by: INTERNAL MEDICINE

## 2022-09-26 PROCEDURE — 1125F PR PAIN SEVERITY QUANTIFIED, PAIN PRESENT: ICD-10-PCS | Mod: CPTII,S$GLB,, | Performed by: INTERNAL MEDICINE

## 2022-09-26 PROCEDURE — 99999 PR PBB SHADOW E&M-EST. PATIENT-LVL III: ICD-10-PCS | Mod: PBBFAC,,, | Performed by: INTERNAL MEDICINE

## 2022-09-26 NOTE — PROGRESS NOTES
"Subjective:    Patient ID:  Olga Aguiar is a 87 y.o. female who presents for follow-up of Hyperlipidemia      Problem List Items Addressed This Visit          Cardiac/Vascular    Aortic atherosclerosis - Primary    Ectatic abdominal aorta: see CT scan 10/16; no aneurysm 10/17    Essential hypertension    LVH (left ventricular hypertrophy)    Mixed hyperlipidemia    Nonrheumatic aortic valve stenosis    Overview     There is moderate-to-severe aortic valve stenosis.  Aortic valve area is 0.92 cm2; peak velocity is 3.76 m/s; mean gradient is 39 mmHg.                HPI    Patient was last seen on 11/01/2021 at which time she was doing okay from a cardiac standpoint.  Echocardiogram ordered prior to this visit which showed moderate to severe aortic stenosis.    On assessment today, the patient states that she feels OK other than back pain issues, chronic    No chest pain.  Baseline shortness of breath from lung issues   Symptomatic from aortic stenosis - None at this time       Objective:     Vitals:    09/26/22 1438   BP: 110/64   BP Location: Left arm   Patient Position: Sitting   BP Method: Medium (Manual)   Pulse: 66   Weight: 68.1 kg (150 lb 2.1 oz)   Height: 5' 3" (1.6 m)        Physical Exam  Vitals and nursing note reviewed.   Constitutional:       General: She is not in acute distress.     Appearance: She is well-developed.   HENT:      Head: Normocephalic and atraumatic.   Neck:      Vascular: No JVD.   Cardiovascular:      Rate and Rhythm: Normal rate and regular rhythm.      Heart sounds: Murmur (AS Murmur) heard.     No friction rub. No gallop.   Pulmonary:      Effort: Pulmonary effort is normal. No respiratory distress.      Breath sounds: Normal breath sounds. No wheezing or rales.   Abdominal:      General: Bowel sounds are normal.      Palpations: Abdomen is soft.      Tenderness: There is no abdominal tenderness. There is no guarding or rebound.   Musculoskeletal:         General: No " tenderness.      Cervical back: Neck supple.   Skin:     General: Skin is warm and dry.   Neurological:      Mental Status: She is alert and oriented to person, place, and time.   Psychiatric:         Behavior: Behavior normal.           Current Outpatient Medications on File Prior to Visit   Medication Sig    acetaminophen (TYLENOL) 500 MG tablet Take 500 mg by mouth every 6 (six) hours as needed.    acyclovir (ZOVIRAX) 400 MG tablet Take 400 mg by mouth 2 (two) times daily as needed.    albuterol (PROAIR HFA) 90 mcg/actuation inhaler Inhale 2 puffs into the lungs every 6 (six) hours as needed for Wheezing. Rescue    allopurinoL (ZYLOPRIM) 100 MG tablet Take 1 tablet by mouth once daily    aspirin (ECOTRIN) 81 MG EC tablet Take 81 mg by mouth once daily.    budesonide-glycopyr-formoterol (BREZTRI AEROSPHERE) 160-9-4.8 mcg/actuation HFAA Inhale 2 puffs into the lungs 2 (two) times a day.    busPIRone (BUSPAR) 5 MG Tab Take 1 tablet (5 mg total) by mouth 2 (two) times daily as needed (anxiety).    cholecalciferol, vitamin D3, 2,000 unit Cap Take 2,000 Units by mouth Daily.    fluticasone furoate-vilanteroL (BREO ELLIPTA) 200-25 mcg/dose DsDv diskus inhaler Inhale 1 puff into the lungs once daily.    fluticasone furoate-vilanteroL (BREO ELLIPTA) 200-25 mcg/dose DsDv diskus inhaler Inhale 1 puff into the lungs once daily.    gabapentin (NEURONTIN) 300 MG capsule Take 1 capsule (300 mg total) by mouth 3 (three) times daily.    levalbuterol (XOPENEX HFA) 45 mcg/actuation inhaler Inhale 1-2 puffs into the lungs every 4 (four) hours as needed for Wheezing or Shortness of Breath.    levalbuterol (XOPENEX HFA) 45 mcg/actuation inhaler Inhale 1-2 puffs into the lungs every 4 (four) hours as needed for Wheezing. This is a rescue inhaler medication and should be used as needed    levothyroxine (SYNTHROID) 75 MCG tablet Take 1 tablet by mouth once daily    montelukast (SINGULAIR) 10 mg tablet Take 1 tablet (10 mg total) by mouth  every evening.    predniSONE (DELTASONE) 10 MG tablet Take one daily for 5 days for short breath and repeat as needed.    simvastatin (ZOCOR) 40 MG tablet TAKE 1 TABLET BY MOUTH ONCE DAILY IN THE EVENING    traMADoL (ULTRAM) 50 mg tablet Take 1 tablet (50 mg total) by mouth 3 (three) times daily as needed.    triamterene-hydrochlorothiazide 37.5-25 mg (DYAZIDE) 37.5-25 mg per capsule Take 1 capsule by mouth once daily in the morning    zolpidem (AMBIEN) 5 MG Tab TAKE 1 TABLET BY MOUTH ONCE DAILY IN THE EVENING    glimepiride (AMARYL) 1 MG tablet Take 1 tablet (1 mg total) by mouth before breakfast.     No current facility-administered medications on file prior to visit.       Lipid Panel:   Lab Results   Component Value Date    CHOL 182 03/14/2022    HDL 48 03/14/2022    LDLCALC 108.8 03/14/2022    TRIG 126 03/14/2022    CHOLHDL 26.4 03/14/2022       The ASCVD Risk score ([place] DK, et al., 2019) failed to calculate for the following reasons:    The 2019 ASCVD risk score is only valid for ages 40 to 79    All pertinent labs, imaging, and EKGs reviewed.  Patient's most recent EKG tracing was personally interpreted by this provider.    Most Recent Echocardiogram Results  Results for orders placed in visit on 08/01/22    Echo    Interpretation Summary  · The left ventricle is normal in size with normal systolic function.  · The estimated ejection fraction is 60%.  · Indeterminate left ventricular diastolic function.  · Normal right ventricular size with normal right ventricular systolic function.  · There is moderate-to-severe aortic valve stenosis.  · Aortic valve area is 0.92 cm2; peak velocity is 3.76 m/s; mean gradient is 39 mmHg.  · Mild tricuspid regurgitation.  · Normal central venous pressure (3 mmHg).  · The estimated PA systolic pressure is 32 mmHg.        Most Recent EKG  Results for orders placed or performed in visit on 06/16/20   EKG 12-lead    Collection Time: 06/16/20  3:06 PM    Narrative    Test  Reason : Z00.00,    Vent. Rate : 073 BPM     Atrial Rate : 073 BPM     P-R Int : 188 ms          QRS Dur : 080 ms      QT Int : 418 ms       P-R-T Axes : 064 -01 059 degrees     QTc Int : 460 ms    Normal sinus rhythm  Low voltage QRS  Cannot rule out Anterior infarct ,age undetermined  Abnormal ECG  When compared with ECG of 18-DEC-2018 16:00,  Minimal criteria for Anterior infarct are now Present  T wave inversion no longer evident in Inferior leads  Confirmed by William Lucas MD ([id number]) on 6/19/2020 7:53:25 PM    Referred By: WILLIAM LUCAS           Confirmed By:William Lucas MD       No valid procedures specified.   No results found for this or any previous visit.    No valid procedures specified.      Assessment:       1. Aortic atherosclerosis    2. Ectatic abdominal aorta: see CT scan 10/16; no aneurysm 10/17    3. LVH (left ventricular hypertrophy)    4. Essential hypertension    5. Mixed hyperlipidemia    6. Nonrheumatic aortic valve stenosis         Plan:     Symptoms OK today other than chronic back pain  BP/Pulse OK today  Most recent echocardiogram reviewed personally     Continue aspirin 81 mg PO Daily   Continue statin   Repeat echocardiogram in 6 months   Will consider TAVR when symptomatic from AS     Continue other cardiac medications  Mediterranean Diet/Cardiovascular Exercise Program    Patient queried and all questions were answered.    F/u in 6 months with echo prior      Signed:    Christiano Herrera MD  9/26/2022 8:29 AM

## 2022-10-04 ENCOUNTER — TELEPHONE (OUTPATIENT)
Dept: INTERNAL MEDICINE | Facility: CLINIC | Age: 87
End: 2022-10-04
Payer: MEDICARE

## 2022-10-04 NOTE — TELEPHONE ENCOUNTER
----- Message from Berkley Metzger sent at 10/4/2022  2:20 PM CDT -----  Contact: [employer]/Danielle/call [phone]  Danielle said that she is calling in regards to needing to let the doctor know that pt is overdue for her Eye Exam. Please advise

## 2022-10-10 ENCOUNTER — IMMUNIZATION (OUTPATIENT)
Dept: PHARMACY | Facility: CLINIC | Age: 87
End: 2022-10-10
Payer: MEDICARE

## 2022-10-17 RX ORDER — ZOLPIDEM TARTRATE 5 MG/1
TABLET ORAL
Qty: 30 TABLET | Refills: 0 | Status: SHIPPED | OUTPATIENT
Start: 2022-10-17 | End: 2022-11-17

## 2022-10-17 NOTE — TELEPHONE ENCOUNTER
No new care gaps identified.  [facility] Embedded Care Gaps. Reference number: [id number]. 10/17/2022   5:31:51 AM NOHEMYT

## 2022-10-25 ENCOUNTER — PES CALL (OUTPATIENT)
Dept: ADMINISTRATIVE | Facility: CLINIC | Age: 87
End: 2022-10-25
Payer: MEDICARE

## 2022-11-01 ENCOUNTER — HOSPITAL ENCOUNTER (OUTPATIENT)
Dept: RADIOLOGY | Facility: HOSPITAL | Age: 87
Discharge: HOME | End: 2022-11-01
Attending: INTERNAL MEDICINE
Payer: MEDICARE

## 2022-11-01 DIAGNOSIS — Z85.3 HISTORY OF BREAST CANCER: ICD-10-CM

## 2022-11-01 DIAGNOSIS — Z12.31 BREAST CANCER SCREENING BY MAMMOGRAM: ICD-10-CM

## 2022-11-01 PROCEDURE — 77067 MAMMO DIGITAL SCREENING BILAT WITH TOMO: ICD-10-PCS | Mod: 26,,, | Performed by: RADIOLOGY

## 2022-11-01 PROCEDURE — 77067 SCR MAMMO BI INCL CAD: CPT | Mod: TC,PO

## 2022-11-01 PROCEDURE — 77063 MAMMO DIGITAL SCREENING BILAT WITH TOMO: ICD-10-PCS | Mod: 26,,, | Performed by: RADIOLOGY

## 2022-11-01 PROCEDURE — 77063 BREAST TOMOSYNTHESIS BI: CPT | Mod: TC,PO

## 2022-11-01 PROCEDURE — 77063 BREAST TOMOSYNTHESIS BI: CPT | Mod: 26,,, | Performed by: RADIOLOGY

## 2022-11-01 PROCEDURE — 77067 SCR MAMMO BI INCL CAD: CPT | Mod: 26,,, | Performed by: RADIOLOGY

## 2022-11-09 ENCOUNTER — OFFICE VISIT (OUTPATIENT)
Dept: OPTOMETRY | Facility: CLINIC | Age: 87
End: 2022-11-09
Payer: MEDICARE

## 2022-11-09 DIAGNOSIS — Z96.1 PSEUDOPHAKIA OF BOTH EYES: ICD-10-CM

## 2022-11-09 DIAGNOSIS — E11.9 TYPE 2 DIABETES MELLITUS WITHOUT RETINOPATHY: Primary | ICD-10-CM

## 2022-11-09 DIAGNOSIS — H52.7 REFRACTIVE ERROR: ICD-10-CM

## 2022-11-09 PROCEDURE — 1126F PR PAIN SEVERITY QUANTIFIED, NO PAIN PRESENT: ICD-10-PCS | Mod: CPTII,S$GLB,, | Performed by: OPTOMETRIST

## 2022-11-09 PROCEDURE — 1159F PR MEDICATION LIST DOCUMENTED IN MEDICAL RECORD: ICD-10-PCS | Mod: CPTII,S$GLB,, | Performed by: OPTOMETRIST

## 2022-11-09 PROCEDURE — 92015 DETERMINE REFRACTIVE STATE: CPT | Mod: S$GLB,,, | Performed by: OPTOMETRIST

## 2022-11-09 PROCEDURE — 92014 COMPRE OPH EXAM EST PT 1/>: CPT | Mod: S$GLB,,, | Performed by: OPTOMETRIST

## 2022-11-09 PROCEDURE — 1159F MED LIST DOCD IN RCRD: CPT | Mod: CPTII,S$GLB,, | Performed by: OPTOMETRIST

## 2022-11-09 PROCEDURE — 92014 PR EYE EXAM, EST PATIENT,COMPREHESV: ICD-10-PCS | Mod: S$GLB,,, | Performed by: OPTOMETRIST

## 2022-11-09 PROCEDURE — 99999 PR PBB SHADOW E&M-EST. PATIENT-LVL III: ICD-10-PCS | Mod: PBBFAC,,, | Performed by: OPTOMETRIST

## 2022-11-09 PROCEDURE — 1101F PT FALLS ASSESS-DOCD LE1/YR: CPT | Mod: CPTII,S$GLB,, | Performed by: OPTOMETRIST

## 2022-11-09 PROCEDURE — 3288F FALL RISK ASSESSMENT DOCD: CPT | Mod: CPTII,S$GLB,, | Performed by: OPTOMETRIST

## 2022-11-09 PROCEDURE — 1126F AMNT PAIN NOTED NONE PRSNT: CPT | Mod: CPTII,S$GLB,, | Performed by: OPTOMETRIST

## 2022-11-09 PROCEDURE — 2023F DILAT RTA XM W/O RTNOPTHY: CPT | Mod: CPTII,S$GLB,, | Performed by: OPTOMETRIST

## 2022-11-09 PROCEDURE — 99999 PR PBB SHADOW E&M-EST. PATIENT-LVL III: CPT | Mod: PBBFAC,,, | Performed by: OPTOMETRIST

## 2022-11-09 PROCEDURE — 3288F PR FALLS RISK ASSESSMENT DOCUMENTED: ICD-10-PCS | Mod: CPTII,S$GLB,, | Performed by: OPTOMETRIST

## 2022-11-09 PROCEDURE — 2023F PR DILATED RETINAL EXAM W/O EVID OF RETINOPATHY: ICD-10-PCS | Mod: CPTII,S$GLB,, | Performed by: OPTOMETRIST

## 2022-11-09 PROCEDURE — 1101F PR PT FALLS ASSESS DOC 0-1 FALLS W/OUT INJ PAST YR: ICD-10-PCS | Mod: CPTII,S$GLB,, | Performed by: OPTOMETRIST

## 2022-11-09 PROCEDURE — 1160F PR REVIEW ALL MEDS BY PRESCRIBER/CLIN PHARMACIST DOCUMENTED: ICD-10-PCS | Mod: CPTII,S$GLB,, | Performed by: OPTOMETRIST

## 2022-11-09 PROCEDURE — 92015 PR REFRACTION: ICD-10-PCS | Mod: S$GLB,,, | Performed by: OPTOMETRIST

## 2022-11-09 PROCEDURE — 1160F RVW MEDS BY RX/DR IN RCRD: CPT | Mod: CPTII,S$GLB,, | Performed by: OPTOMETRIST

## 2022-11-09 NOTE — PROGRESS NOTES
HPI    Pt here for annual eye exam DLS- 07/09/21    Pt states her va is stable, wants to get new specs for dva. Didn't get her   last script filled. Using OTC readers +2.50 but needs to go up.   DM is controlled by diet, HTN is well controled on meds.   Occasional floaters. Pt is not using GTTS.       Hemoglobin A1C       Date                     Value               Ref Range             Status                09/12/2022               7.7 (H)             4.0 - 5.6 %           Final              Comment:    ADA Screening Guidelines:  5.7-6.4%  Consistent with   prediabetes  >or=6.5%  Consistent with diabetes    High levels of fetal   hemoglobin interfere with the HbA1C  assay. Heterozygous hemoglobin   variants (HbS, HgC, etc)do  not significantly interfere with this assay.     However, presence of multiple variants may affect accuracy.         03/14/2022               7.0 (H)             4.0 - 5.6 %           Final              Comment:    ADA Screening Guidelines:  5.7-6.4%  Consistent with   prediabetes  >or=6.5%  Consistent with diabetes    High levels of fetal   hemoglobin interfere with the HbA1C  assay. Heterozygous hemoglobin   variants (HbS, HgC, etc)do  not significantly interfere with this assay.     However, presence of multiple variants may affect accuracy.         08/24/2021               7.1 (H)             4.0 - 5.6 %           Final              Comment:    ADA Screening Guidelines:  5.7-6.4%  Consistent with   prediabetes  >or=6.5%  Consistent with diabetes    High levels of fetal   hemoglobin interfere with the HbA1C  assay. Heterozygous hemoglobin   variants (HbS, HgC, etc)do  not significantly interfere with this assay.     However, presence of multiple variants may affect accuracy.    ----------   Last edited by Leydi Luke on 11/9/2022  2:11 PM.            Assessment /Plan     For exam results, see Encounter Report.    Type 2 diabetes mellitus without retinopathy    Pseudophakia of both  eyes    Refractive error      No diabetic retinopathy, no csme. Return in 1 year for dilated eye exam.   2.Monitor condition. Patient to report any changes. RTC 1 year recheck.   3. Spectacle Rx given, discussed different options for glasses. RTC 1 year routine eye exam.

## 2022-11-16 NOTE — TELEPHONE ENCOUNTER
No new care gaps identified.  [facility] Embedded Care Gaps. Reference number: [id number]. 11/15/2022   6:41:45 PM CST

## 2022-11-17 RX ORDER — ZOLPIDEM TARTRATE 5 MG/1
TABLET ORAL
Qty: 30 TABLET | Refills: 0 | Status: SHIPPED | OUTPATIENT
Start: 2022-11-17 | End: 2022-12-19

## 2022-11-28 RX ORDER — TRAMADOL HYDROCHLORIDE 50 MG/1
TABLET ORAL
Qty: 90 TABLET | Refills: 0 | Status: SHIPPED | OUTPATIENT
Start: 2022-11-28 | End: 2022-12-20 | Stop reason: SDUPTHER

## 2022-12-13 ENCOUNTER — OFFICE VISIT (OUTPATIENT)
Dept: PAIN MEDICINE | Facility: CLINIC | Age: 87
End: 2022-12-13
Payer: MEDICARE

## 2022-12-13 ENCOUNTER — TELEPHONE (OUTPATIENT)
Dept: PAIN MEDICINE | Facility: CLINIC | Age: 87
End: 2022-12-13
Payer: MEDICARE

## 2022-12-13 VITALS
HEART RATE: 84 BPM | HEIGHT: 63 IN | OXYGEN SATURATION: 97 % | DIASTOLIC BLOOD PRESSURE: 69 MMHG | BODY MASS INDEX: 26.21 KG/M2 | WEIGHT: 147.94 LBS | SYSTOLIC BLOOD PRESSURE: 147 MMHG

## 2022-12-13 DIAGNOSIS — M54.16 LUMBAR RADICULOPATHY: Primary | ICD-10-CM

## 2022-12-13 DIAGNOSIS — M48.061 SPINAL STENOSIS OF LUMBAR REGION WITHOUT NEUROGENIC CLAUDICATION: ICD-10-CM

## 2022-12-13 DIAGNOSIS — M51.36 DDD (DEGENERATIVE DISC DISEASE), LUMBAR: ICD-10-CM

## 2022-12-13 PROCEDURE — 1125F PR PAIN SEVERITY QUANTIFIED, PAIN PRESENT: ICD-10-PCS | Mod: CPTII,S$GLB,, | Performed by: PHYSICIAN ASSISTANT

## 2022-12-13 PROCEDURE — 1160F RVW MEDS BY RX/DR IN RCRD: CPT | Mod: CPTII,S$GLB,, | Performed by: PHYSICIAN ASSISTANT

## 2022-12-13 PROCEDURE — 1160F PR REVIEW ALL MEDS BY PRESCRIBER/CLIN PHARMACIST DOCUMENTED: ICD-10-PCS | Mod: CPTII,S$GLB,, | Performed by: PHYSICIAN ASSISTANT

## 2022-12-13 PROCEDURE — 99214 PR OFFICE/OUTPT VISIT, EST, LEVL IV, 30-39 MIN: ICD-10-PCS | Mod: S$GLB,,, | Performed by: PHYSICIAN ASSISTANT

## 2022-12-13 PROCEDURE — 1159F MED LIST DOCD IN RCRD: CPT | Mod: CPTII,S$GLB,, | Performed by: PHYSICIAN ASSISTANT

## 2022-12-13 PROCEDURE — 3288F FALL RISK ASSESSMENT DOCD: CPT | Mod: CPTII,S$GLB,, | Performed by: PHYSICIAN ASSISTANT

## 2022-12-13 PROCEDURE — 99999 PR PBB SHADOW E&M-EST. PATIENT-LVL IV: ICD-10-PCS | Mod: PBBFAC,,, | Performed by: PHYSICIAN ASSISTANT

## 2022-12-13 PROCEDURE — 1159F PR MEDICATION LIST DOCUMENTED IN MEDICAL RECORD: ICD-10-PCS | Mod: CPTII,S$GLB,, | Performed by: PHYSICIAN ASSISTANT

## 2022-12-13 PROCEDURE — 1125F AMNT PAIN NOTED PAIN PRSNT: CPT | Mod: CPTII,S$GLB,, | Performed by: PHYSICIAN ASSISTANT

## 2022-12-13 PROCEDURE — 1101F PR PT FALLS ASSESS DOC 0-1 FALLS W/OUT INJ PAST YR: ICD-10-PCS | Mod: CPTII,S$GLB,, | Performed by: PHYSICIAN ASSISTANT

## 2022-12-13 PROCEDURE — 3072F LOW RISK FOR RETINOPATHY: CPT | Mod: CPTII,S$GLB,, | Performed by: PHYSICIAN ASSISTANT

## 2022-12-13 PROCEDURE — 3288F PR FALLS RISK ASSESSMENT DOCUMENTED: ICD-10-PCS | Mod: CPTII,S$GLB,, | Performed by: PHYSICIAN ASSISTANT

## 2022-12-13 PROCEDURE — 1101F PT FALLS ASSESS-DOCD LE1/YR: CPT | Mod: CPTII,S$GLB,, | Performed by: PHYSICIAN ASSISTANT

## 2022-12-13 PROCEDURE — 99214 OFFICE O/P EST MOD 30 MIN: CPT | Mod: S$GLB,,, | Performed by: PHYSICIAN ASSISTANT

## 2022-12-13 PROCEDURE — 99999 PR PBB SHADOW E&M-EST. PATIENT-LVL IV: CPT | Mod: PBBFAC,,, | Performed by: PHYSICIAN ASSISTANT

## 2022-12-13 PROCEDURE — 3072F PR LOW RISK FOR RETINOPATHY: ICD-10-PCS | Mod: CPTII,S$GLB,, | Performed by: PHYSICIAN ASSISTANT

## 2022-12-13 RX ORDER — ALPRAZOLAM 0.5 MG/1
1 TABLET, ORALLY DISINTEGRATING ORAL ONCE AS NEEDED
Status: CANCELLED | OUTPATIENT
Start: 2022-12-13 | End: 2034-05-11

## 2022-12-13 RX ORDER — GABAPENTIN 300 MG/1
300 CAPSULE ORAL 2 TIMES DAILY
Qty: 180 CAPSULE | Refills: 3 | Status: SHIPPED | OUTPATIENT
Start: 2022-12-13 | End: 2023-06-14

## 2022-12-13 NOTE — TELEPHONE ENCOUNTER
This patient will be scheduled for a lumbar epidural with Dr. Ware. She will need to hold ASA x7 days prior. Please advise if okay to hold. Thanks!

## 2022-12-17 DIAGNOSIS — F13.20 AMBIEN USE DISORDER, MODERATE, DEPENDENCE: Primary | ICD-10-CM

## 2022-12-17 DIAGNOSIS — F51.01 PRIMARY INSOMNIA: ICD-10-CM

## 2022-12-17 NOTE — TELEPHONE ENCOUNTER
Care Due:                  Date            Visit Type   Department     Provider  --------------------------------------------------------------------------------                                EP -                              PRIMARY      [facility] INTER[facility]  Last Visit: 03-      CARE ([facility])   JAMEL Barger                              EP -                              PRIMARY      [facility]  Next Visit: 03-      CARE ([facility])   MEDICINE       Sera Barger                                                            Last  Test          Frequency    Reason                     Performed    Due Date  --------------------------------------------------------------------------------    CBC.........  12 months..  allopurinoL..............  03- 03-    CMP.........  12 months..  allopurinoL, simvastatin,   03- 03-                             triamterene-hydrochloroth                             iazide...................    Lipid Panel.  12 months..  simvastatin..............  03-   03-    Uric Acid...  12 months..  allopurinoL..............  03-   03-    [facility] Embedded Care Gaps. Reference number: [id number]. 12/17/2022   5:30:54 AM CST

## 2022-12-19 RX ORDER — ZOLPIDEM TARTRATE 5 MG/1
TABLET ORAL
Qty: 30 TABLET | Refills: 0 | Status: SHIPPED | OUTPATIENT
Start: 2022-12-19 | End: 2023-01-17

## 2022-12-20 DIAGNOSIS — M51.36 DDD (DEGENERATIVE DISC DISEASE), LUMBAR: Primary | ICD-10-CM

## 2022-12-20 RX ORDER — TRAMADOL HYDROCHLORIDE 50 MG/1
TABLET ORAL
Qty: 90 TABLET | Refills: 2 | Status: SHIPPED | OUTPATIENT
Start: 2022-12-20 | End: 2023-04-17

## 2022-12-20 NOTE — H&P (VIEW-ONLY)
CC: Back pain    HPI: The patient is a 87-year-old woman with a history of diabetes, previous breast CA, CVA and spinal stenosis who presents in referral from her primary care physician, Dr. Barger for back pain and leg pain.She returns in follow-up today with low back pain.  She reports a return of her right leg pain.  This is located in the right anterior thigh and shin.  She describes it as severe, sharp, worse with standing and walking, improved with sitting.  She continues to  take tramadol with mild relief.  She denies weakness, denies any new numbness.  She denies bladder or bowel incontinence.    Pain intervention history:  Patient is status post right L3 and L4 transforaminal injection on 8/9/13 with 50% relief of low back and right leg pain.  She is status post bilateral L4 transforaminal epidural steroid injections on 9/27/13 and 11/1/13 with 10-20% relief.  She is status post radiofrequency ablation of the left L3, 4, 5 medial branch nerves on 5/5/14 with 50% relief of her left low back pain.  She is status post radiofrequency ablation of the right L3, 4 and 5 medial branch nerves on 6/16/14 with mild relief, however she reported significant more relief at a later visit.  She is status post bilateral L4 transforaminal epidural steroid injections on 9/24/14 with moderate relief.  She is status post bilateral L4 transforaminal epidural steroid injections on 1/7/15 with complete relief of her posterior thigh pain, moderate relief of her low back pain and minimal relief of her right lateral hip pain.  She is status post C7-T1 cervical interlaminar epidural steroid injection on 2/27/15 with greater than 50% relief.  She is status post bilateral L3, 4 and 5 medial branch radio frequency ablation on 7/29/16 reporting 0% relief initially at 4 weeks but then reporting 90% relief after 6-7 weeks.  She is status post bilateral L4 transforaminal epidural steroid injections on 11/11/16 with not quite 50% relief.   "She is status post bilateral L4 transforaminal epidural steroid injections on 4/4/17 with 50% relief.  She is status post bilateral L4 transforaminal epidural steroid injections on 8/23/17 with greater than 80% relief.  She is status post bilateral L3, 4 and 5 medial branch radiofrequency ablation on 3/6/18 with moderate relief.  She is status post L5/S1 interlaminar epidural steroid injection on 06/04/2018 with almost 100% relief lasting 1 month.  She is status post L5/S1 interlaminar epidural steroid injection on 09/04/2018 with almost 100% relief.  She is status post L5/S1 interlaminar epidural steroid injection on 12/20/2018 with greater than 50% relief. She is status post L5/S1 interlaminar epidural steroid injection on 05/07/2019 with 75% relief.  She is status post L5/S1 interlaminar epidural steroid injection on 09/16/2019 with 50% relief.  She is status post bilateral L3, 4 and 5 medial branch radiofrequency ablation on 10/29/2019 with 50% relief. She is status post L5/S1 interlaminar epidural steroid injection on 12/16/2019 with what sounds like about 50% relief.   She is status post L5/S1 interlaminar epidural steroid injection on 07/01/2020 with almost 100% relief lasting 3 weeks, now reporting about 0% relief.  She is status post right L3/4 and L4/5 transforaminal epidural steroid injection on 04/16/2021 with 50% relief.    Spine surgeries:    Antineuropathics:  Gabapentin 300 mg 3 times daily  NSAIDs:  Physical therapy: in past  Antidepressants:  Muscle relaxers:  Opioids:  Tramadol 50 mg 3 times daily as needed  Antiplatelets/Anticoagulants:  ASA 81 mg    ROS:She reports easy bruising, back pain and difficulty sleeping.  Balance of review of systems is negative.    Medical, surgical, family and social history reviewed elsewhere in record.    Medications/Allergies: See med card    Vitals:    12/13/22 1415   BP: (!) 147/69   Pulse: 84   SpO2: 97%   Weight: 67.1 kg (147 lb 14.9 oz)   Height: 5' 3" (1.6 " m)   PainSc:   6   PainLoc: Back         Physical exam:  Gen: A and O x3, pleasant, well-groomed, appears uncomfortable  Skin: No rashes or obvious lesions  HEENT: PERRLA  CVS: Regular rate and rhythm, normal S1 and S2, no murmurs.  Resp: Clear to auscultation bilaterally, no wheezes or rales.  Abdomen: Soft, NT/ND, normal bowel sounds present.  Musculoskeletal: Able to stand and walk short distances without assistance, however uses a walker when out of the house.  She has disuse atrophy of the lumbar paraspinous muscles.  Slow to go from seated to standing position.    Neuro:  Upper extremities: 5/5 strength bilaterally   Lower extremities: 5/5 strength bilaterally  Reflexes: Brachioradialis 2+, Bicep 2+, Tricep 2+. Patellar 2+, Achilles 2+.  Sensory: Intact and symmetrical to light touch and pinprick in C2-T1 dermatomes bilaterally.  Intact and symmetrical to light touch and pinprick in L2-S1 dermatomes bilaterally, except for a small patch over her left lateral shin decreased with light touch and pinprick.    Lumbar spine:  Lumbar spine: ROM is severely reduced with flexion and extension with increased right low back pain during each maneuver.  Terry's test is deferred.  Supine straight leg raise causes right lateral buttock pain.  Internal and external rotation of the hip causes no increased pain on either side.  Myofascial exam: No tenderness to palpation across lumbar paraspinous muscles.      Imagin13 MRI L-spine  T10-T11: There is severe disk desiccation and a moderate diffuse disk bulge and ligamentous hypertrophy. This is not complete evaluation of the thoracic spine, but there does appear to be mild central canal stenosis.  T11-T12: Moderate disk desiccation and mild diffuse disk bulge. Mild narrowing of the thecal sac. Neural foraminal regions difficult to assess due to the scoliosis.  T12-L1: Severe disk degenerative disease with marked desiccation, diffuse disk bulge, and spurring of  vertebral bodies. Mild central canal stenosis. Neural foraminal narrowing bilaterally, difficult to grade due to scoliosis.  L1-L2: Advanced disk desiccation with moderate diffuse disk bulge and mild spurring of vertebral bodies. Small superimposed central to right paracentral disk extrusion. Mild central canal stenosis. Mild to moderate facet arthropathy and ligamentous hypertrophy. Bilateral foraminal stenosis.  L2 - L3: There is also disk desiccation and diffuse disk bulge and small annular fissure posteriorly. facet arthropathy and hypertrophy ligamentum flavum. Mild central canal stenosis. Moderate right and mild left neuroforaminal stenosis.  L3-L4: Disk desiccation and moderate diffuse disk bulge. Small right paracentral ascending disk extrusion and moderate facet arthropathy present bilaterally and hypertrophy of ligamentum flavum. In combination, the findings result in severe central canal stenosis. For example see axial image 22, central image 7. There is mild to moderate neural foraminal stenosis bilaterally.  L4 - L5: Mild anterolisthesis with disk desiccation and uncovering of the disk, with moderate right and severe left facet arthropathy and hypertrophy of ligamentum flavum. Severe central canal stenosis. Mild neural foraminal narrowing, right worse than left.  L5-S1: Disk desiccation and mild diffuse disk bulge. A small ascending disk extrusion centrally in the midline. No significant central canal stenosis. Advanced facet arthropathy bilaterally, left worse than right. Mild left neural foraminal narrowing. No significant right neural foraminal stenosis.    5/28/2018 MRI lumbar spine  T12-L1: There is marked disc space narrowing, trace retrolisthesis, unroofing of a moderate disc bulge.  There is right greater than left facet joint arthropathy.  There is mild spinal stenosis with moderate left and moderate-to-marked right foraminal stenosis.  These findings have mildly progressed.  L1-2: There is  trace retrolisthesis of L1 on L2, there is inferior unroofing of a moderate diffuse disc bulge with small superimposed central disc protrusion.  There is right greater than left facet joint arthropathy.  There is borderline spinal stenosis.  There is crowding of right lateral recess.  There is marked right and moderate-to-marked left foraminal stenosis with very slight progression.  L2-3: There is trace retrolisthesis of L2 on L3 where there is marked disc space narrowing.  There is a moderate diffuse disc bulge with osteophytic ridging and superimposed right foraminal disc protrusion.  There is moderate-to-marked bilateral facet joint arthropathy with ligamentum flavum thickening, right greater than.  There is moderate central spinal stenosis and right lateral recess stenosis.  There is mild left lateral recess stenosis.  There is marked right and moderate-to-marked left foraminal stenosis with progression.  L3-4: There is disc space narrowing.  There is marked facet joint arthropathy with ligamentum flavum thickening.  There is a small 4 mm left synovial cyst.  There is unroofing of a moderate to marked diffuse disc bulge.  There is severe spinal canal, bilateral lateral recess and right foraminal stenosis with moderate to marked left foraminal stenosis and with slight progression.  L4-5: There is stable grade 1 spondylolisthesis with 4 mm anterolisthesis of L4 on L5.  There is marked facet joint arthropathy.  There is unroofing of a moderate to marked diffuse disc bulge.  There is severe spinal canal, bilateral lateral recess and foraminal stenosis with slight progression.  L5-S1: There is disc space narrowing at the L5-S1 level, worse towards the left.  There is a disc bulge with small superimposed central disc extrusion with 5 mm cephalad extension.  This was present previously but is slightly more conspicuous.  There is marked facet joint arthropathy.  There is moderate central spinal stenosis with mild  crowding of the left lateral recess.  There is mild right and moderate-to-marked left foraminal stenosis with progression.    05/13/2019 bone scan  Increased tracer activity is noted in the region of the facets presumably at the T12-L1 level on the left.  Tracer activity is also noted at the L4-L5 and L5-S1 levels on the left suggestive of facet arthropathy.  More diffuse facet arthropathy is noted throughout the lumbar spine.  Some increased uptake may be noted within the vertebral bodies more anteriorly at the T9 through T12 levels which could relate to compression deformity or Schmorl's node formation/endplate or Modic type degenerative change.  Given that no STIR signal abnormality was noted on the MRI of 03/14/2019 no significant vertebral body height loss was noted at these levels on that exam, this uptake likely relates to degenerative Modic type endplate changes which can be seen on that exam.  Anatomical imaging such as from MRI which was noted to have been performed on 03/14/2019 is much more specific for degenerative changes of the spine as well as for numbering purposes    Assessment:   The patient is a 87-year-old woman with a history of diabetes, previous breast CA, CVA and spinal stenosis who presents in referral from her primary care physician, Dr. Barger for back pain and leg pain.     1. Lumbar radiculopathy        2. Spinal stenosis of lumbar region without neurogenic claudication        3. DDD (degenerative disc disease), lumbar            Plan:  1. Since her right leg pain has returned I will schedule her to repeat a right L3/4 and L4/5 transforaminal epidural steroid injection.  She has done well with this in the past.    2.  She is going to restart gabapentin 300 mg nightly.    3.  Dr. Ware refilled tramadol 50 mg 3 times daily as needed.  I have reviewed the [place] Board [employer] website and there are no abberancies.    4. Follow-up in 4 weeks postprocedure or sooner as  needed.

## 2022-12-29 ENCOUNTER — PES CALL (OUTPATIENT)
Dept: ADMINISTRATIVE | Facility: OTHER | Age: 87
End: 2022-12-29
Payer: MEDICARE

## 2022-12-30 ENCOUNTER — HOSPITAL ENCOUNTER (OUTPATIENT)
Dept: RADIOLOGY | Facility: HOSPITAL | Age: 87
Discharge: HOME | End: 2022-12-30
Attending: ANESTHESIOLOGY | Admitting: ANESTHESIOLOGY
Payer: MEDICARE

## 2022-12-30 ENCOUNTER — HOSPITAL ENCOUNTER (OUTPATIENT)
Facility: HOSPITAL | Age: 87
Discharge: HOME | End: 2022-12-30
Attending: ANESTHESIOLOGY | Admitting: ANESTHESIOLOGY
Payer: MEDICARE

## 2022-12-30 VITALS
HEIGHT: 63 IN | RESPIRATION RATE: 16 BRPM | WEIGHT: 147 LBS | SYSTOLIC BLOOD PRESSURE: 171 MMHG | DIASTOLIC BLOOD PRESSURE: 74 MMHG | TEMPERATURE: 98 F | HEART RATE: 71 BPM | BODY MASS INDEX: 26.05 KG/M2 | OXYGEN SATURATION: 95 %

## 2022-12-30 DIAGNOSIS — M54.50 LOWER BACK PAIN: ICD-10-CM

## 2022-12-30 DIAGNOSIS — M54.16 LUMBAR RADICULOPATHY: ICD-10-CM

## 2022-12-30 LAB — GLUCOSE SERPL-MCNC: 169 MG/DL (ref 70–110)

## 2022-12-30 PROCEDURE — 76000 FLUOROSCOPY <1 HR PHYS/QHP: CPT | Mod: TC,PO

## 2022-12-30 PROCEDURE — 64483 NJX AA&/STRD TFRM EPI L/S 1: CPT | Mod: PO | Performed by: ANESTHESIOLOGY

## 2022-12-30 PROCEDURE — 25000003 PHARM REV CODE 250: Mod: PO | Performed by: ANESTHESIOLOGY

## 2022-12-30 PROCEDURE — 63600175 PHARM REV CODE 636 W HCPCS: Mod: PO | Performed by: ANESTHESIOLOGY

## 2022-12-30 PROCEDURE — 64483 NJX AA&/STRD TFRM EPI L/S 1: CPT | Mod: RT,,, | Performed by: ANESTHESIOLOGY

## 2022-12-30 PROCEDURE — 25500020 PHARM REV CODE 255: Mod: PO | Performed by: ANESTHESIOLOGY

## 2022-12-30 PROCEDURE — 64484 NJX AA&/STRD TFRM EPI L/S EA: CPT | Mod: PO | Performed by: ANESTHESIOLOGY

## 2022-12-30 PROCEDURE — A4216 STERILE WATER/SALINE, 10 ML: HCPCS | Mod: PO | Performed by: ANESTHESIOLOGY

## 2022-12-30 PROCEDURE — 64484 NJX AA&/STRD TFRM EPI L/S EA: CPT | Mod: RT,,, | Performed by: ANESTHESIOLOGY

## 2022-12-30 PROCEDURE — 64484 PRA INJECT ANES/STEROID FORAMEN LUMBAR/SACRAL W IMG GUIDE ,EA ADD LEVEL: ICD-10-PCS | Mod: RT,,, | Performed by: ANESTHESIOLOGY

## 2022-12-30 PROCEDURE — 64483 PR EPIDURAL INJ, ANES/STEROID, TRANSFORAMINAL, LUMB/SACR, SNGL LEVL: ICD-10-PCS | Mod: RT,,, | Performed by: ANESTHESIOLOGY

## 2022-12-30 RX ORDER — BUPIVACAINE HYDROCHLORIDE 5 MG/ML
INJECTION, SOLUTION EPIDURAL; INTRACAUDAL
Status: DISCONTINUED | OUTPATIENT
Start: 2022-12-30 | End: 2022-12-30 | Stop reason: HOSPADM

## 2022-12-30 RX ORDER — SODIUM CHLORIDE 9 MG/ML
INJECTION, SOLUTION INTRAMUSCULAR; INTRAVENOUS; SUBCUTANEOUS
Status: DISCONTINUED | OUTPATIENT
Start: 2022-12-30 | End: 2022-12-30 | Stop reason: HOSPADM

## 2022-12-30 RX ORDER — METHYLPREDNISOLONE ACETATE 80 MG/ML
INJECTION, SUSPENSION INTRA-ARTICULAR; INTRALESIONAL; INTRAMUSCULAR; SOFT TISSUE
Status: DISCONTINUED | OUTPATIENT
Start: 2022-12-30 | End: 2022-12-30 | Stop reason: HOSPADM

## 2022-12-30 RX ORDER — ALPRAZOLAM 0.5 MG/1
1 TABLET, ORALLY DISINTEGRATING ORAL ONCE AS NEEDED
Status: COMPLETED | OUTPATIENT
Start: 2022-12-30 | End: 2022-12-30

## 2022-12-30 RX ORDER — LIDOCAINE HYDROCHLORIDE 10 MG/ML
INJECTION, SOLUTION EPIDURAL; INFILTRATION; INTRACAUDAL; PERINEURAL
Status: DISCONTINUED | OUTPATIENT
Start: 2022-12-30 | End: 2022-12-30 | Stop reason: HOSPADM

## 2022-12-30 RX ADMIN — ALPRAZOLAM 0.5 MG: 0.5 TABLET, ORALLY DISINTEGRATING ORAL at 02:12

## 2022-12-30 NOTE — DISCHARGE SUMMARY
[facility] - Surgery  Discharge Note  Short Stay    Procedure(s) (LRB):  Injection,steroid,epidural,transforaminal approach L3/4, L4/5 (Right)      OUTCOME: Patient tolerated treatment/procedure well without complication and is now ready for discharge.    DISPOSITION: Home or Self Care    FINAL DIAGNOSIS:  Lumbar radiculopathy    FOLLOWUP: In clinic    DISCHARGE INSTRUCTIONS:    Discharge Procedure Orders   Diet Adult Regular     No dressing needed     Notify your health care provider if you experience any of the following:  temperature >100.4     Activity as tolerated

## 2022-12-30 NOTE — OP NOTE
PROCEDURE DATE: 12/30/2022    PROCEDURE: Right L3/4 and L4/5 transforaminal epidural steroid injection under fluoroscopy    DIAGNOSIS: Lumbar  Radiculopathy    Post op diagnosis: Same    PHYSICIAN: Rohan Ware MD    MEDICATIONS INJECTED:  Methylprednisolone 40mg (1ml) and 1ml 0.25% bupivicaine at each nerve root.     LOCAL ANESTHETIC INJECTED:  Lidocaine 1%. 4 ml per site.    SEDATION MEDICATIONS: none    ESTIMATED BLOOD LOSS:  none    COMPLICATIONS:  none    TECHNIQUE:   A time-out was taken to identify patient and procedure side prior to starting the procedure. The patient was placed in a prone position, prepped and draped in the usual sterile fashion using ChloraPrep and sterile towels.  The area to be injected was determined under fluoroscopic guidance in AP and oblique view.  Local anesthetic was given by raising a wheal and going down to the hub of a 25-gauge 1.5 inch needle.  In oblique view, a 3.5 inch 22-gauge bent-tip spinal needle was introduced towards 6 oclock position of the pedicle of each above named nerve root level.  The needle was walked medially then hinged into the neural foramen and position was confirmed in AP and lateral views.  Omnipaque contrast dye was injected to confirm appropriate placement and that there was no vascular uptake.  After negative aspiration for blood or CSF, the medication was then injected. This was performed at the left L3/4 and L4/5 level(s). The patient tolerated the procedure well.    The patient was monitored after the procedure.  Patient was given post procedure and discharge instructions to follow at home. The patient was discharged in a stable condition.

## 2023-01-16 DIAGNOSIS — F51.01 PRIMARY INSOMNIA: ICD-10-CM

## 2023-01-16 DIAGNOSIS — F13.20 AMBIEN USE DISORDER, MODERATE, DEPENDENCE: ICD-10-CM

## 2023-01-16 NOTE — TELEPHONE ENCOUNTER
No new care gaps identified.  [facility] Embedded Care Gaps. Reference number: [id number]. 1/16/2023   5:33:08 AM CST

## 2023-01-17 ENCOUNTER — HOSPITAL ENCOUNTER (OUTPATIENT)
Dept: RADIOLOGY | Facility: HOSPITAL | Age: 88
Discharge: HOME | End: 2023-01-17
Attending: NURSE PRACTITIONER
Payer: MEDICARE

## 2023-01-17 ENCOUNTER — OFFICE VISIT (OUTPATIENT)
Dept: ORTHOPEDICS | Facility: CLINIC | Age: 88
End: 2023-01-17
Payer: MEDICARE

## 2023-01-17 VITALS — BODY MASS INDEX: 26.05 KG/M2 | HEIGHT: 63 IN | WEIGHT: 147 LBS

## 2023-01-17 DIAGNOSIS — M25.561 ACUTE PAIN OF RIGHT KNEE: Primary | ICD-10-CM

## 2023-01-17 DIAGNOSIS — M25.461 PAIN AND SWELLING OF RIGHT KNEE: ICD-10-CM

## 2023-01-17 DIAGNOSIS — M17.11 PRIMARY OSTEOARTHRITIS OF RIGHT KNEE: Primary | ICD-10-CM

## 2023-01-17 DIAGNOSIS — M25.561 ACUTE PAIN OF RIGHT KNEE: ICD-10-CM

## 2023-01-17 DIAGNOSIS — M25.561 PAIN AND SWELLING OF RIGHT KNEE: ICD-10-CM

## 2023-01-17 DIAGNOSIS — M25.461 EFFUSION OF RIGHT KNEE JOINT: ICD-10-CM

## 2023-01-17 PROCEDURE — 1159F PR MEDICATION LIST DOCUMENTED IN MEDICAL RECORD: ICD-10-PCS | Mod: CPTII,S$GLB,, | Performed by: NURSE PRACTITIONER

## 2023-01-17 PROCEDURE — 1159F MED LIST DOCD IN RCRD: CPT | Mod: CPTII,S$GLB,, | Performed by: NURSE PRACTITIONER

## 2023-01-17 PROCEDURE — 1101F PT FALLS ASSESS-DOCD LE1/YR: CPT | Mod: CPTII,S$GLB,, | Performed by: NURSE PRACTITIONER

## 2023-01-17 PROCEDURE — 20610 LARGE JOINT ASPIRATION/INJECTION: R KNEE: ICD-10-PCS | Mod: RT,S$GLB,, | Performed by: NURSE PRACTITIONER

## 2023-01-17 PROCEDURE — 3072F LOW RISK FOR RETINOPATHY: CPT | Mod: CPTII,S$GLB,, | Performed by: NURSE PRACTITIONER

## 2023-01-17 PROCEDURE — 99999 PR PBB SHADOW E&M-EST. PATIENT-LVL IV: CPT | Mod: PBBFAC,,, | Performed by: NURSE PRACTITIONER

## 2023-01-17 PROCEDURE — 1101F PR PT FALLS ASSESS DOC 0-1 FALLS W/OUT INJ PAST YR: ICD-10-PCS | Mod: CPTII,S$GLB,, | Performed by: NURSE PRACTITIONER

## 2023-01-17 PROCEDURE — 3072F PR LOW RISK FOR RETINOPATHY: ICD-10-PCS | Mod: CPTII,S$GLB,, | Performed by: NURSE PRACTITIONER

## 2023-01-17 PROCEDURE — 3288F FALL RISK ASSESSMENT DOCD: CPT | Mod: CPTII,S$GLB,, | Performed by: NURSE PRACTITIONER

## 2023-01-17 PROCEDURE — 20610 DRAIN/INJ JOINT/BURSA W/O US: CPT | Mod: RT,S$GLB,, | Performed by: NURSE PRACTITIONER

## 2023-01-17 PROCEDURE — 73560 XR KNEE ORTHO RIGHT: ICD-10-PCS | Mod: 26,LT,, | Performed by: RADIOLOGY

## 2023-01-17 PROCEDURE — 73560 X-RAY EXAM OF KNEE 1 OR 2: CPT | Mod: TC,59,PO,LT

## 2023-01-17 PROCEDURE — 1160F PR REVIEW ALL MEDS BY PRESCRIBER/CLIN PHARMACIST DOCUMENTED: ICD-10-PCS | Mod: CPTII,S$GLB,, | Performed by: NURSE PRACTITIONER

## 2023-01-17 PROCEDURE — 73562 XR KNEE ORTHO RIGHT: ICD-10-PCS | Mod: 26,RT,, | Performed by: RADIOLOGY

## 2023-01-17 PROCEDURE — 3288F PR FALLS RISK ASSESSMENT DOCUMENTED: ICD-10-PCS | Mod: CPTII,S$GLB,, | Performed by: NURSE PRACTITIONER

## 2023-01-17 PROCEDURE — 1160F RVW MEDS BY RX/DR IN RCRD: CPT | Mod: CPTII,S$GLB,, | Performed by: NURSE PRACTITIONER

## 2023-01-17 PROCEDURE — 99204 OFFICE O/P NEW MOD 45 MIN: CPT | Mod: 25,S$GLB,, | Performed by: NURSE PRACTITIONER

## 2023-01-17 PROCEDURE — 99999 PR PBB SHADOW E&M-EST. PATIENT-LVL IV: ICD-10-PCS | Mod: PBBFAC,,, | Performed by: NURSE PRACTITIONER

## 2023-01-17 PROCEDURE — 73562 X-RAY EXAM OF KNEE 3: CPT | Mod: 26,RT,, | Performed by: RADIOLOGY

## 2023-01-17 PROCEDURE — 99204 PR OFFICE/OUTPT VISIT, NEW, LEVL IV, 45-59 MIN: ICD-10-PCS | Mod: 25,S$GLB,, | Performed by: NURSE PRACTITIONER

## 2023-01-17 PROCEDURE — 73560 X-RAY EXAM OF KNEE 1 OR 2: CPT | Mod: 26,LT,, | Performed by: RADIOLOGY

## 2023-01-17 RX ORDER — TRIAMCINOLONE ACETONIDE 40 MG/ML
40 INJECTION, SUSPENSION INTRA-ARTICULAR; INTRAMUSCULAR
Status: DISCONTINUED | OUTPATIENT
Start: 2023-01-17 | End: 2023-01-18 | Stop reason: HOSPADM

## 2023-01-17 RX ORDER — ZOLPIDEM TARTRATE 5 MG/1
TABLET ORAL
Qty: 30 TABLET | Refills: 0 | Status: SHIPPED | OUTPATIENT
Start: 2023-01-17 | End: 2023-02-16

## 2023-01-17 RX ADMIN — TRIAMCINOLONE ACETONIDE 40 MG: 40 INJECTION, SUSPENSION INTRA-ARTICULAR; INTRAMUSCULAR at 03:01

## 2023-01-17 NOTE — PROGRESS NOTES
Chief Complaint   Patient presents with    Right Knee - Pain       HPI:    This is a 87 y.o. who presents today complaining of right knee pain for a few days after sitting on rolling chair and moving in the chair while playing cards. Since then is having significant pain to her right knee as well as swelling and is having trouble ambulating.  Pain is aching and throbbing. No numbness or tingling. No associated signs or symptoms.      Past Medical History:   Diagnosis Date    Abnormal ECG 2/6/2017    Adrenal adenoma: 2.8 X 2.1CM 2010 5/16/2014    Anticoagulant long-term use      mg    Aortic atherosclerosis: see CT scan 2016 10/28/2016    Aortic stenosis 8/19/2014    Asthma     Breast cancer     Cancer     breast, both sides, right arm restriction    Cataracts, both eyes     Chronic back pain     Chronic bronchitis     CKD (chronic kidney disease) stage 3, GFR 30-59 ml/min 8/19/2014    no dialysis    COPD (chronic obstructive pulmonary disease)     occasional inhaler use, no oxygen    DDD (degenerative disc disease), lumbar 7/30/2013    Diabetes mellitus     diet controlled    Diverticulosis     Ectatic abdominal aorta: see CT scan 10/16 10/28/2016    Gallbladder polyp 8/19/2014    GERD (gastroesophageal reflux disease)     Gout     Herpes simplex     Fever Blisters and Styes in right eye    High blood cholesterol     Hypertension     holding medication when B/P low    Hypertension associated with diabetes 2/7/2012    Insomnia     Lumbar spinal stenosis     Lymphedema of arm     Right    Multiple lung nodules 9/21/2015    Renal cyst, right: stable per CT 2016 10/28/2016    Stroke 2/2012    TIA, no residual effects    Stye 12/5/2013    Thyroid disease     hypothyroidism    Type 2 diabetes mellitus without complication, without long-term current use of insulin 7/20/2015    Unspecified hypothyroidism 7/20/2015      Past Surgical History:   Procedure Laterality Date    AXILLARY NODE DISSECTION      both sides     BREAST BIOPSY      BREAST LUMPECTOMY Bilateral     lymph nodes on right    CATARACT EXTRACTION      bilateral PHACO with IOL    COLONOSCOPY  01/12/2010    in [facility]    Epidural steroid injection      Pain mangement    EPIDURAL STEROID INJECTION INTO LUMBAR SPINE N/A 6/4/2018    Procedure: Injection-steroid-epidural-lumbar;  Surgeon: Rohan Ware MD;  Location: [facility] OR;  Service: Pain Management;  Laterality: N/A;  L5/S1 interlaminar    EPIDURAL STEROID INJECTION INTO LUMBAR SPINE N/A 9/4/2018    Procedure: Injection-steroid-epidural-lumbar L5/S1;  Surgeon: Rohan Ware MD;  Location: [facility] OR;  Service: Pain Management;  Laterality: N/A;    EPIDURAL STEROID INJECTION INTO LUMBAR SPINE N/A 12/20/2018    Procedure: Injection-steroid-epidural-lumbar;  Surgeon: Rohan Ware MD;  Location: [facility] OR;  Service: Pain Management;  Laterality: N/A;  L5/S1 interlaminar    EPIDURAL STEROID INJECTION INTO LUMBAR SPINE N/A 5/7/2019    Procedure: Injection-steroid-epidural-lumbar L5/S1;  Surgeon: Rohan Ware MD;  Location: [facility] OR;  Service: Pain Management;  Laterality: N/A;    EPIDURAL STEROID INJECTION INTO LUMBAR SPINE N/A 9/16/2019    Procedure: Injection-steroid-epidural-lumbar;  Surgeon: Rohan Ware MD;  Location: [facility] OR;  Service: Pain Management;  Laterality: N/A;  L5/S1 interlaminar     EPIDURAL STEROID INJECTION INTO LUMBAR SPINE Right 12/16/2019    Procedure: Injection-steroid-epidural-lumbar L5, S1 Right;  Surgeon: Rohan Ware MD;  Location: [facility] OR;  Service: Pain Management;  Laterality: Right;    EPIDURAL STEROID INJECTION INTO LUMBAR SPINE N/A 7/1/2020    Procedure: Injection-steroid-epidural-lumbar;  Surgeon: Rohan Ware MD;  Location: [facility] OR;  Service: Pain Management;  Laterality: N/A;  L5/S1 to RIGHT    ESOPHAGOGASTRODUODENOSCOPY N/A 11/12/2018    Procedure: EGD (ESOPHAGOGASTRODUODENOSCOPY);  Surgeon: Braydon Becerra MD;  Location: [facility] ENDO;  Service:  Endoscopy;  Laterality: N/A;    HYSTERECTOMY      HYSTERECTOMY      Nerve Block Injection      Pain management, Times 2    RADIOFREQUENCY ABLATION      Nerve, Pain management    RADIOFREQUENCY ABLATION Right 11/12/2020    Procedure: Radiofrequency Ablation of the right shoulder.;  Surgeon: David Rome MD;  Location: [facility] OR;  Service: Orthopedics;  Laterality: Right;    RADIOFREQUENCY ABLATION OF LUMBAR MEDIAL BRANCH NERVE AT SINGLE LEVEL Bilateral 10/29/2019    Procedure: Radiofrequency Ablation, Nerve, Spinal, Lumbar, Medial Branch, L3, L4, L5;  Surgeon: Rohan Ware MD;  Location: [facility] OR;  Service: Pain Management;  Laterality: Bilateral;    RECTAL SURGERY      Fissure repair    TONSILLECTOMY      TRANSFORAMINAL EPIDURAL INJECTION OF STEROID Right 4/16/2021    Procedure: Injection,steroid,epidural,transforaminal approach L3/4 and L4/5;  Surgeon: Soham Vann MD;  Location: [facility] OR;  Service: Pain Management;  Laterality: Right;    TRANSFORAMINAL EPIDURAL INJECTION OF STEROID Right 12/30/2022    Procedure: Injection,steroid,epidural,transforaminal approach L3/4, L4/5;  Surgeon: Rohan Ware MD;  Location: [facility] OR;  Service: Pain Management;  Laterality: Right;    UPPER GASTROINTESTINAL ENDOSCOPY  08/29/2014    Dr. Castro      Current Outpatient Medications on File Prior to Visit   Medication Sig Dispense Refill    acetaminophen (TYLENOL) 500 MG tablet Take 500 mg by mouth every 6 (six) hours as needed.      acyclovir (ZOVIRAX) 400 MG tablet Take 400 mg by mouth 2 (two) times daily as needed.      albuterol (PROAIR HFA) 90 mcg/actuation inhaler Inhale 2 puffs into the lungs every 6 (six) hours as needed for Wheezing. Rescue 18 g 11    allopurinoL (ZYLOPRIM) 100 MG tablet Take 1 tablet by mouth once daily 90 tablet 3    aspirin (ECOTRIN) 81 MG EC tablet Take 81 mg by mouth once daily.      budesonide-glycopyr-formoterol (BREZTRI AEROSPHERE) 160-9-4.8 mcg/actuation HFAA Inhale 2 puffs  into the lungs 2 (two) times a day. 10.7 g 11    busPIRone (BUSPAR) 5 MG Tab Take 1 tablet by mouth twice daily as needed for anxiety 60 tablet 0    cholecalciferol, vitamin D3, 2,000 unit Cap Take 2,000 Units by mouth Daily.      fluticasone furoate-vilanteroL (BREO ELLIPTA) 200-25 mcg/dose DsDv diskus inhaler Inhale 1 puff into the lungs once daily. 60 each 0    fluticasone furoate-vilanteroL (BREO ELLIPTA) 200-25 mcg/dose DsDv diskus inhaler Inhale 1 puff into the lungs once daily. 60 each 11    gabapentin (NEURONTIN) 300 MG capsule Take 1 capsule (300 mg total) by mouth 2 (two) times daily. 180 capsule 3    levalbuterol (XOPENEX HFA) 45 mcg/actuation inhaler Inhale 1-2 puffs into the lungs every 4 (four) hours as needed for Wheezing or Shortness of Breath. 3 Inhaler 3    levalbuterol (XOPENEX HFA) 45 mcg/actuation inhaler Inhale 1-2 puffs into the lungs every 4 (four) hours as needed for Wheezing. This is a rescue inhaler medication and should be used as needed 15 g 11    levothyroxine (SYNTHROID) 75 MCG tablet Take 1 tablet by mouth once daily 90 tablet 3    montelukast (SINGULAIR) 10 mg tablet Take 1 tablet (10 mg total) by mouth every evening. 90 tablet 3    predniSONE (DELTASONE) 10 MG tablet Take one daily for 5 days for short breath and repeat as needed. 25 tablet 1    simvastatin (ZOCOR) 40 MG tablet TAKE 1 TABLET BY MOUTH ONCE DAILY IN THE EVENING 90 tablet 3    traMADoL (ULTRAM) 50 mg tablet TAKE 1 TABLET BY MOUTH THREE TIMES DAILY AS NEEDED FOR PAIN 90 tablet 2    triamterene-hydrochlorothiazide 37.5-25 mg (DYAZIDE) 37.5-25 mg per capsule Take 1 capsule by mouth once daily in the morning 90 capsule 3    zolpidem (AMBIEN) 5 MG Tab TAKE 1 TABLET BY MOUTH ONCE DAILY IN THE EVENING 30 tablet 0    glimepiride (AMARYL) 1 MG tablet Take 1 tablet (1 mg total) by mouth before breakfast. 90 tablet 3    [DISCONTINUED] zolpidem (AMBIEN) 5 MG Tab TAKE 1 TABLET BY MOUTH ONCE DAILY IN THE EVENING 30 tablet 0     No  current facility-administered medications on file prior to visit.      Review of patient's allergies indicates:  No Known Allergies   Family History not pertinent   Social History     Socioeconomic History    Marital status:    Tobacco Use    Smoking status: Former     Packs/day: 1.00     Years: 18.00     Pack years: 18.00     Types: Cigarettes     Start date: 1952     Quit date: 1970     Years since quittin.4    Smokeless tobacco: Never   Substance and Sexual Activity    Alcohol use: Yes     Comment: rare    Drug use: No    Sexual activity: Not Currently     Social Determinants of Health     Financial Resource Strain: Low Risk     Difficulty of Paying Living Expenses: Not hard at all   Food Insecurity: No Food Insecurity    Worried About Running Out of Food in the Last Year: Never true    Ran Out of Food in the Last Year: Never true   Transportation Needs: No Transportation Needs    Lack of Transportation (Medical): No    Lack of Transportation (Non-Medical): No   Physical Activity: Inactive    Days of Exercise per Week: 0 days    Minutes of Exercise per Session: 0 min   Stress: No Stress Concern Present    Feeling of Stress : Not at all   Social Connections: Unknown    Frequency of Communication with Friends and Family: More than three times a week    Frequency of Social Gatherings with Friends and Family: Once a week    Active Member of Clubs or Organizations: Yes    Attends Club or Organization Meetings: More than 4 times per year    Marital Status:    Housing Stability: Low Risk     Unable to Pay for Housing in the Last Year: No    Number of Places Lived in the Last Year: 1    Unstable Housing in the Last Year: No         Review of Systems:   Constitutional:  Denies fever or chills    Eyes:  Denies change in visual acuity    HENT:  Denies nasal congestion or sore throat    Respiratory:  Denies cough or shortness of breath    Cardiovascular:  Denies chest pain or edema    GI:  Denies  abdominal pain, nausea, vomiting, bloody stools or diarrhea    :  Denies dysuria    Integument:  Denies rash    Neurologic:  Denies headache, focal weakness or sensory changes    Endocrine:  Denies polyuria or polydipsia    Lymphatic:  Denies swollen glands    Psychiatric:  Denies depression or anxiety       Physical Exam:    Constitutional:  Well developed, well nourished, no acute distress, non-toxic appearance    Integument:  Well hydrated  Neurologic:  Alert & oriented x 3  Psychiatric:  Speech and behavior appropriate      Bilateral Knee Exam    Right Knee Exam   Tenderness   The patient is experiencing tenderness in the medial joint line.    Range of Motion   Flexion: abnormal     Muscle Strength   The patient has normal left knee strength.    Tests   Miriam:  Medial - positive   Lachman:  Anterior - negative      Varus: negative  Valgus: negative  Patellar Apprehension: negative      Other   Erythema: absent  Sensation: normal  Pulse: present  Swelling: mild    Left Knee exam   Knee exam performed same as contralateral side and is normal            X-rays were performed today, personally reviewed by me and findings discussed with the patient.   3 views of the right knee show:   1. Mild degenerative arthrosis of the right patellofemoral articulation.  2. Right knee joint effusion.              Primary osteoarthritis of right knee  -     Large Joint Aspiration/Injection: R knee    Effusion of right knee joint    Pain and swelling of right knee        Discussed with the patient about steroid injection to help with the pain.       Using an aseptic technique, I injected 5 cc of lidocaine 1% without and 1 cc of kenalog 40mg into the right knee. The patient tolerated this well.    Also given a bioskin brace to help with knee swelling.     Continue to Apply a compressive ACE bandage. Rest and elevate the affected painful area.  Apply cold compresses intermittently as needed.  As pain recedes, begin normal  activities slowly as tolerated.  Call if symptoms persist.

## 2023-01-18 NOTE — PROCEDURES
Large Joint Aspiration/Injection: R knee    Date/Time: 1/17/2023 3:20 PM  Performed by: NAUN Reaves  Authorized by: NAUN Reaves     Consent Done?:  Yes (Verbal)  Indications:  Pain  Timeout: prior to procedure the correct patient, procedure, and site was verified    Prep: patient was prepped and draped in usual sterile fashion    Local anesthetic:  Lidocaine 1% without epinephrine  Anesthetic total (ml):  5      Details:  Needle Size:  21 G  Approach:  Anterolateral  Location:  Knee  Site:  R knee  Medications:  40 mg triamcinolone acetonide 40 mg/mL  Patient tolerance:  Patient tolerated the procedure well with no immediate complications

## 2023-02-03 ENCOUNTER — OFFICE VISIT (OUTPATIENT)
Dept: PAIN MEDICINE | Facility: CLINIC | Age: 88
End: 2023-02-03
Payer: MEDICARE

## 2023-02-03 ENCOUNTER — TELEPHONE (OUTPATIENT)
Dept: PAIN MEDICINE | Facility: CLINIC | Age: 88
End: 2023-02-03
Payer: MEDICARE

## 2023-02-03 VITALS
WEIGHT: 148.06 LBS | HEART RATE: 75 BPM | DIASTOLIC BLOOD PRESSURE: 77 MMHG | HEIGHT: 63 IN | BODY MASS INDEX: 26.23 KG/M2 | SYSTOLIC BLOOD PRESSURE: 167 MMHG

## 2023-02-03 DIAGNOSIS — M54.16 LUMBAR RADICULOPATHY: Primary | ICD-10-CM

## 2023-02-03 DIAGNOSIS — M48.061 SPINAL STENOSIS OF LUMBAR REGION WITHOUT NEUROGENIC CLAUDICATION: ICD-10-CM

## 2023-02-03 DIAGNOSIS — G89.4 CHRONIC PAIN DISORDER: ICD-10-CM

## 2023-02-03 DIAGNOSIS — M51.36 DDD (DEGENERATIVE DISC DISEASE), LUMBAR: ICD-10-CM

## 2023-02-03 DIAGNOSIS — R26.81 GAIT INSTABILITY: ICD-10-CM

## 2023-02-03 PROCEDURE — 1101F PT FALLS ASSESS-DOCD LE1/YR: CPT | Mod: CPTII,S$GLB,, | Performed by: PHYSICIAN ASSISTANT

## 2023-02-03 PROCEDURE — 1101F PR PT FALLS ASSESS DOC 0-1 FALLS W/OUT INJ PAST YR: ICD-10-PCS | Mod: CPTII,S$GLB,, | Performed by: PHYSICIAN ASSISTANT

## 2023-02-03 PROCEDURE — 3072F PR LOW RISK FOR RETINOPATHY: ICD-10-PCS | Mod: CPTII,S$GLB,, | Performed by: PHYSICIAN ASSISTANT

## 2023-02-03 PROCEDURE — 3072F LOW RISK FOR RETINOPATHY: CPT | Mod: CPTII,S$GLB,, | Performed by: PHYSICIAN ASSISTANT

## 2023-02-03 PROCEDURE — 1159F MED LIST DOCD IN RCRD: CPT | Mod: CPTII,S$GLB,, | Performed by: PHYSICIAN ASSISTANT

## 2023-02-03 PROCEDURE — 1160F RVW MEDS BY RX/DR IN RCRD: CPT | Mod: CPTII,S$GLB,, | Performed by: PHYSICIAN ASSISTANT

## 2023-02-03 PROCEDURE — 1159F PR MEDICATION LIST DOCUMENTED IN MEDICAL RECORD: ICD-10-PCS | Mod: CPTII,S$GLB,, | Performed by: PHYSICIAN ASSISTANT

## 2023-02-03 PROCEDURE — 3288F FALL RISK ASSESSMENT DOCD: CPT | Mod: CPTII,S$GLB,, | Performed by: PHYSICIAN ASSISTANT

## 2023-02-03 PROCEDURE — 99999 PR PBB SHADOW E&M-EST. PATIENT-LVL IV: ICD-10-PCS | Mod: PBBFAC,,, | Performed by: PHYSICIAN ASSISTANT

## 2023-02-03 PROCEDURE — 1125F AMNT PAIN NOTED PAIN PRSNT: CPT | Mod: CPTII,S$GLB,, | Performed by: PHYSICIAN ASSISTANT

## 2023-02-03 PROCEDURE — 1125F PR PAIN SEVERITY QUANTIFIED, PAIN PRESENT: ICD-10-PCS | Mod: CPTII,S$GLB,, | Performed by: PHYSICIAN ASSISTANT

## 2023-02-03 PROCEDURE — 1160F PR REVIEW ALL MEDS BY PRESCRIBER/CLIN PHARMACIST DOCUMENTED: ICD-10-PCS | Mod: CPTII,S$GLB,, | Performed by: PHYSICIAN ASSISTANT

## 2023-02-03 PROCEDURE — 99214 PR OFFICE/OUTPT VISIT, EST, LEVL IV, 30-39 MIN: ICD-10-PCS | Mod: S$GLB,,, | Performed by: PHYSICIAN ASSISTANT

## 2023-02-03 PROCEDURE — 3288F PR FALLS RISK ASSESSMENT DOCUMENTED: ICD-10-PCS | Mod: CPTII,S$GLB,, | Performed by: PHYSICIAN ASSISTANT

## 2023-02-03 PROCEDURE — 99999 PR PBB SHADOW E&M-EST. PATIENT-LVL IV: CPT | Mod: PBBFAC,,, | Performed by: PHYSICIAN ASSISTANT

## 2023-02-03 PROCEDURE — 99214 OFFICE O/P EST MOD 30 MIN: CPT | Mod: S$GLB,,, | Performed by: PHYSICIAN ASSISTANT

## 2023-02-03 RX ORDER — ALPRAZOLAM 0.5 MG/1
1 TABLET, ORALLY DISINTEGRATING ORAL ONCE AS NEEDED
Status: CANCELLED | OUTPATIENT
Start: 2023-02-03 | End: 2034-07-02

## 2023-02-03 NOTE — TELEPHONE ENCOUNTER
Spoke with pt scheduled for 03/13 went over all instructions and she verbalizes understanding.      Dr Barger may pt please hold ASA x 7 days prior to upcoming ANUEL? Thank you

## 2023-02-03 NOTE — TELEPHONE ENCOUNTER
Follow-up: -> 4 weeks               Priority: Routine  Class: Normal     Future Order Information       Expires on:02/03/2024            Expected by:02/03/2023                    Associated Diagnoses       M54.16 Lumbar radiculopathy       Procedure -> Transforaminal Injection (Specify level and laterality) Cmt:                  Right L3/4 and L4/5, local          Physician -> Chaz           Is patient on anti-coagulants?    -> Yes Cmt: ASA, need clearance          Pre-op Diagnosis -> Lumbar radiculopathy           Facility Name: -> [facility]           Follow-up: -> 4 weeks

## 2023-02-06 NOTE — PROGRESS NOTES
CC: Back pain    HPI: The patient is a 87-year-old woman with a history of diabetes, previous breast CA, CVA and spinal stenosis who presents in referral from her primary care physician, Dr. Barger for back pain and leg pain.  She is status post right L3/4 and L4/5 transforaminal epidural steroid injection on 12/30/2022 with 50% relief lasting a few weeks, now reporting 25% relief.  She does feel that her injection was wearing off quickly because of the way she is walking after injuring her right knee.  Since then she has seen orthopedics and received a right knee injection with complete relief of her right knee.  Right leg pain is located in the right anterior thigh and shin.  She does feel that this injection gave her significant relief of both her low back and right leg and was doing very well until she hurt her right knee.    Pain intervention history:  Patient is status post right L3 and L4 transforaminal injection on 8/9/13 with 50% relief of low back and right leg pain.  She is status post bilateral L4 transforaminal epidural steroid injections on 9/27/13 and 11/1/13 with 10-20% relief.  She is status post radiofrequency ablation of the left L3, 4, 5 medial branch nerves on 5/5/14 with 50% relief of her left low back pain.  She is status post radiofrequency ablation of the right L3, 4 and 5 medial branch nerves on 6/16/14 with mild relief, however she reported significant more relief at a later visit.  She is status post bilateral L4 transforaminal epidural steroid injections on 9/24/14 with moderate relief.  She is status post bilateral L4 transforaminal epidural steroid injections on 1/7/15 with complete relief of her posterior thigh pain, moderate relief of her low back pain and minimal relief of her right lateral hip pain.  She is status post C7-T1 cervical interlaminar epidural steroid injection on 2/27/15 with greater than 50% relief.  She is status post bilateral L3, 4 and 5 medial branch radio  frequency ablation on 7/29/16 reporting 0% relief initially at 4 weeks but then reporting 90% relief after 6-7 weeks.  She is status post bilateral L4 transforaminal epidural steroid injections on 11/11/16 with not quite 50% relief.  She is status post bilateral L4 transforaminal epidural steroid injections on 4/4/17 with 50% relief.  She is status post bilateral L4 transforaminal epidural steroid injections on 8/23/17 with greater than 80% relief.  She is status post bilateral L3, 4 and 5 medial branch radiofrequency ablation on 3/6/18 with moderate relief.  She is status post L5/S1 interlaminar epidural steroid injection on 06/04/2018 with almost 100% relief lasting 1 month.  She is status post L5/S1 interlaminar epidural steroid injection on 09/04/2018 with almost 100% relief.  She is status post L5/S1 interlaminar epidural steroid injection on 12/20/2018 with greater than 50% relief. She is status post L5/S1 interlaminar epidural steroid injection on 05/07/2019 with 75% relief.  She is status post L5/S1 interlaminar epidural steroid injection on 09/16/2019 with 50% relief.  She is status post bilateral L3, 4 and 5 medial branch radiofrequency ablation on 10/29/2019 with 50% relief. She is status post L5/S1 interlaminar epidural steroid injection on 12/16/2019 with what sounds like about 50% relief.   She is status post L5/S1 interlaminar epidural steroid injection on 07/01/2020 with almost 100% relief lasting 3 weeks, now reporting about 0% relief.  She is status post right L3/4 and L4/5 transforaminal epidural steroid injection on 04/16/2021 with 50% relief.    Spine surgeries:    Antineuropathics:  Gabapentin 300 mg 3 times daily  NSAIDs:  Physical therapy: in past  Antidepressants:  Muscle relaxers:  Opioids:  Tramadol 50 mg 3 times daily as needed  Antiplatelets/Anticoagulants:  ASA 81 mg    ROS:She reports easy bruising, back pain and difficulty sleeping.  Balance of review of systems is  "negative.    Medical, surgical, family and social history reviewed elsewhere in record.    Medications/Allergies: See med card    Vitals:    23 1353   BP: (!) 167/77   Pulse: 75   Weight: 67.1 kg (148 lb 0.6 oz)   Height: 5' 3" (1.6 m)   PainSc:   5   PainLoc: Back         Physical exam:  Gen: A and O x3, pleasant, well-groomed, appears uncomfortable  Skin: No rashes or obvious lesions  HEENT: PERRLA  CVS: Regular rate and rhythm, normal S1 and S2, no murmurs.  Resp: Clear to auscultation bilaterally, no wheezes or rales.  Abdomen: Soft, NT/ND, normal bowel sounds present.  Musculoskeletal: Able to stand and walk short distances without assistance, however uses a walker when out of the house.  She has disuse atrophy of the lumbar paraspinous muscles.  Slow to go from seated to standing position.    Neuro:  Upper extremities: 5/5 strength bilaterally   Lower extremities: 5/5 strength bilaterally  Reflexes: Brachioradialis 2+, Bicep 2+, Tricep 2+. Patellar 2+, Achilles 2+.  Sensory: Intact and symmetrical to light touch and pinprick in C2-T1 dermatomes bilaterally.  Intact and symmetrical to light touch and pinprick in L2-S1 dermatomes bilaterally, except for a small patch over her left lateral shin decreased with light touch and pinprick.    Lumbar spine:  Lumbar spine: ROM is severely reduced with flexion and extension with increased right low back pain during each maneuver.  Terry's test is deferred.  Supine straight leg raise causes right lateral buttock pain.  Internal and external rotation of the hip causes no increased pain on either side.  Myofascial exam: No tenderness to palpation across lumbar paraspinous muscles.      Imagin13 MRI L-spine  T10-T11: There is severe disk desiccation and a moderate diffuse disk bulge and ligamentous hypertrophy. This is not complete evaluation of the thoracic spine, but there does appear to be mild central canal stenosis.  T11-T12: Moderate disk desiccation " and mild diffuse disk bulge. Mild narrowing of the thecal sac. Neural foraminal regions difficult to assess due to the scoliosis.  T12-L1: Severe disk degenerative disease with marked desiccation, diffuse disk bulge, and spurring of vertebral bodies. Mild central canal stenosis. Neural foraminal narrowing bilaterally, difficult to grade due to scoliosis.  L1-L2: Advanced disk desiccation with moderate diffuse disk bulge and mild spurring of vertebral bodies. Small superimposed central to right paracentral disk extrusion. Mild central canal stenosis. Mild to moderate facet arthropathy and ligamentous hypertrophy. Bilateral foraminal stenosis.  L2 - L3: There is also disk desiccation and diffuse disk bulge and small annular fissure posteriorly. facet arthropathy and hypertrophy ligamentum flavum. Mild central canal stenosis. Moderate right and mild left neuroforaminal stenosis.  L3-L4: Disk desiccation and moderate diffuse disk bulge. Small right paracentral ascending disk extrusion and moderate facet arthropathy present bilaterally and hypertrophy of ligamentum flavum. In combination, the findings result in severe central canal stenosis. For example see axial image 22, central image 7. There is mild to moderate neural foraminal stenosis bilaterally.  L4 - L5: Mild anterolisthesis with disk desiccation and uncovering of the disk, with moderate right and severe left facet arthropathy and hypertrophy of ligamentum flavum. Severe central canal stenosis. Mild neural foraminal narrowing, right worse than left.  L5-S1: Disk desiccation and mild diffuse disk bulge. A small ascending disk extrusion centrally in the midline. No significant central canal stenosis. Advanced facet arthropathy bilaterally, left worse than right. Mild left neural foraminal narrowing. No significant right neural foraminal stenosis.    5/28/2018 MRI lumbar spine  T12-L1: There is marked disc space narrowing, trace retrolisthesis, unroofing of a  moderate disc bulge.  There is right greater than left facet joint arthropathy.  There is mild spinal stenosis with moderate left and moderate-to-marked right foraminal stenosis.  These findings have mildly progressed.  L1-2: There is trace retrolisthesis of L1 on L2, there is inferior unroofing of a moderate diffuse disc bulge with small superimposed central disc protrusion.  There is right greater than left facet joint arthropathy.  There is borderline spinal stenosis.  There is crowding of right lateral recess.  There is marked right and moderate-to-marked left foraminal stenosis with very slight progression.  L2-3: There is trace retrolisthesis of L2 on L3 where there is marked disc space narrowing.  There is a moderate diffuse disc bulge with osteophytic ridging and superimposed right foraminal disc protrusion.  There is moderate-to-marked bilateral facet joint arthropathy with ligamentum flavum thickening, right greater than.  There is moderate central spinal stenosis and right lateral recess stenosis.  There is mild left lateral recess stenosis.  There is marked right and moderate-to-marked left foraminal stenosis with progression.  L3-4: There is disc space narrowing.  There is marked facet joint arthropathy with ligamentum flavum thickening.  There is a small 4 mm left synovial cyst.  There is unroofing of a moderate to marked diffuse disc bulge.  There is severe spinal canal, bilateral lateral recess and right foraminal stenosis with moderate to marked left foraminal stenosis and with slight progression.  L4-5: There is stable grade 1 spondylolisthesis with 4 mm anterolisthesis of L4 on L5.  There is marked facet joint arthropathy.  There is unroofing of a moderate to marked diffuse disc bulge.  There is severe spinal canal, bilateral lateral recess and foraminal stenosis with slight progression.  L5-S1: There is disc space narrowing at the L5-S1 level, worse towards the left.  There is a disc bulge with  small superimposed central disc extrusion with 5 mm cephalad extension.  This was present previously but is slightly more conspicuous.  There is marked facet joint arthropathy.  There is moderate central spinal stenosis with mild crowding of the left lateral recess.  There is mild right and moderate-to-marked left foraminal stenosis with progression.    05/13/2019 bone scan  Increased tracer activity is noted in the region of the facets presumably at the T12-L1 level on the left.  Tracer activity is also noted at the L4-L5 and L5-S1 levels on the left suggestive of facet arthropathy.  More diffuse facet arthropathy is noted throughout the lumbar spine.  Some increased uptake may be noted within the vertebral bodies more anteriorly at the T9 through T12 levels which could relate to compression deformity or Schmorl's node formation/endplate or Modic type degenerative change.  Given that no STIR signal abnormality was noted on the MRI of 03/14/2019 no significant vertebral body height loss was noted at these levels on that exam, this uptake likely relates to degenerative Modic type endplate changes which can be seen on that exam.  Anatomical imaging such as from MRI which was noted to have been performed on 03/14/2019 is much more specific for degenerative changes of the spine as well as for numbering purposes    Assessment:   The patient is a 87-year-old woman with a history of diabetes, previous breast CA, CVA and spinal stenosis who presents in referral from her primary care physician, Dr. Barger for back pain and leg pain.     1. Lumbar radiculopathy  Procedure Order to Pain Management      2. Spinal stenosis of lumbar region without neurogenic claudication        3. DDD (degenerative disc disease), lumbar        4. Chronic pain disorder        5. Gait instability            Plan:  1. The patient did very well following the right L3/4 and L4/5 transforaminal epidural steroid injection.  However, she hurt her right  knee which was affecting the way she was walking and she does feel that the injection is starting to wear off.  I will schedule her to repeat a right L3/4 and L4/5 transforaminal epidural steroid injection.  2. She continues to take tramadol and gabapentin.  3. Follow-up in 4 weeks postprocedure or sooner as needed.

## 2023-02-15 DIAGNOSIS — F51.01 PRIMARY INSOMNIA: ICD-10-CM

## 2023-02-15 DIAGNOSIS — F13.20 AMBIEN USE DISORDER, MODERATE, DEPENDENCE: ICD-10-CM

## 2023-02-15 NOTE — TELEPHONE ENCOUNTER
Care Due:                  Date            Visit Type   Department     Provider  --------------------------------------------------------------------------------                                EP -                              PRIMARY      [facility]  Last Visit: 03-      CARE ([facility])   JAMEL Barger                              EP -                              PRIMARY      [facility]  Next Visit: 04-      CARE ([facility])   MEDICINE       Sera Barger                                                            Last  Test          Frequency    Reason                     Performed    Due Date  --------------------------------------------------------------------------------    CBC.........  12 months..  allopurinoL..............  03- 03-    CMP.........  12 months..  allopurinoL, simvastatin,   03- 03-                             triamterene-hydrochloroth                             iazide...................    Lipid Panel.  12 months..  simvastatin..............  03-   03-    Uric Acid...  12 months..  allopurinoL..............  03-   03-    [facility] Embedded Care Gaps. Reference number: [id number]. 2/15/2023   5:57:18 PM CST

## 2023-02-16 RX ORDER — ZOLPIDEM TARTRATE 5 MG/1
TABLET ORAL
Qty: 30 TABLET | Refills: 0 | Status: SHIPPED | OUTPATIENT
Start: 2023-02-16 | End: 2023-03-15

## 2023-02-28 ENCOUNTER — TELEPHONE (OUTPATIENT)
Dept: PHYSICAL MEDICINE AND REHAB | Facility: CLINIC | Age: 88
End: 2023-02-28
Payer: MEDICARE

## 2023-02-28 DIAGNOSIS — M25.512 LEFT SHOULDER PAIN, UNSPECIFIED CHRONICITY: Primary | ICD-10-CM

## 2023-02-28 NOTE — TELEPHONE ENCOUNTER
Informed pt that Dr. Rome has left but I can chelly her scheduled with Dr. Nicholas. Pt stated that was fine and appointment was made. I informed pt that since she does not have any imaging for her left shoulder that she will need to arrive 15 minutes early for an xray. Pt understood.

## 2023-03-01 ENCOUNTER — LAB VISIT (OUTPATIENT)
Dept: LAB | Facility: HOSPITAL | Age: 88
End: 2023-03-01
Attending: INTERNAL MEDICINE
Payer: MEDICARE

## 2023-03-01 ENCOUNTER — PATIENT MESSAGE (OUTPATIENT)
Dept: PAIN MEDICINE | Facility: CLINIC | Age: 88
End: 2023-03-01
Payer: MEDICARE

## 2023-03-01 DIAGNOSIS — E11.8 CONTROLLED TYPE 2 DIABETES MELLITUS WITH COMPLICATION, WITHOUT LONG-TERM CURRENT USE OF INSULIN: ICD-10-CM

## 2023-03-01 DIAGNOSIS — M10.00 IDIOPATHIC GOUT, UNSPECIFIED CHRONICITY, UNSPECIFIED SITE: ICD-10-CM

## 2023-03-01 DIAGNOSIS — I10 ESSENTIAL HYPERTENSION: ICD-10-CM

## 2023-03-01 DIAGNOSIS — E55.9 VITAMIN D DEFICIENCY DISEASE: ICD-10-CM

## 2023-03-01 DIAGNOSIS — E03.9 ACQUIRED HYPOTHYROIDISM: ICD-10-CM

## 2023-03-01 DIAGNOSIS — E53.8 LOW SERUM VITAMIN B12: ICD-10-CM

## 2023-03-01 DIAGNOSIS — E78.2 MIXED HYPERLIPIDEMIA: ICD-10-CM

## 2023-03-01 LAB
25(OH)D3+25(OH)D2 SERPL-MCNC: 49 NG/ML (ref 30–96)
ALBUMIN SERPL BCP-MCNC: 3.5 G/DL (ref 3.5–5.2)
ALP SERPL-CCNC: 81 U/L (ref 55–135)
ALT SERPL W/O P-5'-P-CCNC: 16 U/L (ref 10–44)
ANION GAP SERPL CALC-SCNC: 11 MMOL/L (ref 8–16)
AST SERPL-CCNC: 20 U/L (ref 10–40)
BASOPHILS # BLD AUTO: 0.05 K/UL (ref 0–0.2)
BASOPHILS NFR BLD: 0.6 % (ref 0–1.9)
BILIRUB SERPL-MCNC: 0.6 MG/DL (ref 0.1–1)
BUN SERPL-MCNC: 17 MG/DL (ref 8–23)
CALCIUM SERPL-MCNC: 10.2 MG/DL (ref 8.7–10.5)
CHLORIDE SERPL-SCNC: 97 MMOL/L (ref 95–110)
CHOLEST SERPL-MCNC: 177 MG/DL (ref 120–199)
CHOLEST/HDLC SERPL: 3.4 {RATIO} (ref 2–5)
CO2 SERPL-SCNC: 30 MMOL/L (ref 23–29)
CREAT SERPL-MCNC: 1.1 MG/DL (ref 0.5–1.4)
DIFFERENTIAL METHOD: ABNORMAL
EOSINOPHIL # BLD AUTO: 0.1 K/UL (ref 0–0.5)
EOSINOPHIL NFR BLD: 1.2 % (ref 0–8)
ERYTHROCYTE [DISTWIDTH] IN BLOOD BY AUTOMATED COUNT: 13.4 % (ref 11.5–14.5)
EST. GFR  (NO RACE VARIABLE): 48.6 ML/MIN/1.73 M^2
GLUCOSE SERPL-MCNC: 171 MG/DL (ref 70–110)
HCT VFR BLD AUTO: 48.3 % (ref 37–48.5)
HDLC SERPL-MCNC: 52 MG/DL (ref 40–75)
HDLC SERPL: 29.4 % (ref 20–50)
HGB BLD-MCNC: 15.6 G/DL (ref 12–16)
IMM GRANULOCYTES # BLD AUTO: 0.05 K/UL (ref 0–0.04)
IMM GRANULOCYTES NFR BLD AUTO: 0.6 % (ref 0–0.5)
LDLC SERPL CALC-MCNC: 98.6 MG/DL (ref 63–159)
LYMPHOCYTES # BLD AUTO: 2 K/UL (ref 1–4.8)
LYMPHOCYTES NFR BLD: 22.9 % (ref 18–48)
MCH RBC QN AUTO: 29.1 PG (ref 27–31)
MCHC RBC AUTO-ENTMCNC: 32.3 G/DL (ref 32–36)
MCV RBC AUTO: 90 FL (ref 82–98)
MONOCYTES # BLD AUTO: 0.5 K/UL (ref 0.3–1)
MONOCYTES NFR BLD: 6.1 % (ref 4–15)
NEUTROPHILS # BLD AUTO: 5.9 K/UL (ref 1.8–7.7)
NEUTROPHILS NFR BLD: 68.6 % (ref 38–73)
NONHDLC SERPL-MCNC: 125 MG/DL
NRBC BLD-RTO: 0 /100 WBC
PLATELET # BLD AUTO: 238 K/UL (ref 150–450)
PMV BLD AUTO: 10.3 FL (ref 9.2–12.9)
POTASSIUM SERPL-SCNC: 4.1 MMOL/L (ref 3.5–5.1)
PROT SERPL-MCNC: 7.3 G/DL (ref 6–8.4)
RBC # BLD AUTO: 5.37 M/UL (ref 4–5.4)
SODIUM SERPL-SCNC: 138 MMOL/L (ref 136–145)
TRIGL SERPL-MCNC: 132 MG/DL (ref 30–150)
TSH SERPL DL<=0.005 MIU/L-ACNC: 1.68 UIU/ML (ref 0.4–4)
URATE SERPL-MCNC: 4.8 MG/DL (ref 2.4–5.7)
VIT B12 SERPL-MCNC: 270 PG/ML (ref 210–950)
WBC # BLD AUTO: 8.53 K/UL (ref 3.9–12.7)

## 2023-03-01 PROCEDURE — 80053 COMPREHEN METABOLIC PANEL: CPT | Performed by: INTERNAL MEDICINE

## 2023-03-01 PROCEDURE — 84443 ASSAY THYROID STIM HORMONE: CPT | Performed by: INTERNAL MEDICINE

## 2023-03-01 PROCEDURE — 82607 VITAMIN B-12: CPT | Performed by: INTERNAL MEDICINE

## 2023-03-01 PROCEDURE — 84550 ASSAY OF BLOOD/URIC ACID: CPT | Performed by: INTERNAL MEDICINE

## 2023-03-01 PROCEDURE — 80061 LIPID PANEL: CPT | Performed by: INTERNAL MEDICINE

## 2023-03-01 PROCEDURE — 82306 VITAMIN D 25 HYDROXY: CPT | Performed by: INTERNAL MEDICINE

## 2023-03-01 PROCEDURE — 36415 COLL VENOUS BLD VENIPUNCTURE: CPT | Mod: PO | Performed by: INTERNAL MEDICINE

## 2023-03-01 PROCEDURE — 83036 HEMOGLOBIN GLYCOSYLATED A1C: CPT | Performed by: INTERNAL MEDICINE

## 2023-03-01 PROCEDURE — 85025 COMPLETE CBC W/AUTO DIFF WBC: CPT | Performed by: INTERNAL MEDICINE

## 2023-03-02 DIAGNOSIS — E53.8 LOW SERUM VITAMIN B12: ICD-10-CM

## 2023-03-02 LAB
ESTIMATED AVG GLUCOSE: 180 MG/DL (ref 68–131)
HBA1C MFR BLD: 7.9 % (ref 4–5.6)

## 2023-03-02 RX ORDER — LANOLIN ALCOHOL/MO/W.PET/CERES
1000 CREAM (GRAM) TOPICAL DAILY
Qty: 30 TABLET | Refills: 12 | OUTPATIENT
Start: 2023-03-02 | End: 2023-04-03 | Stop reason: SDUPTHER

## 2023-03-14 NOTE — PROGRESS NOTES
[facility]    CHIEF COMPLAINT:   Chief Complaint   Patient presents with    Shoulder Pain     C/o pain in left shoulder      HISTORY OF PRESENT ILLNESS: Olga Aguiar is a 88 y.o. female who presents to me for evaluation and management of left shoulder pain.     Today reports, chronic left shoulder pain that worsened about 2 months ago without noted traumatic event.  Reports the pain is to the superior and posterior aspect of her shoulder denies any pain to the anterior shoulder.  Does state that her pain does radiate into her arm.  Denies any significant neck pain.  Denies numbness tingling into the left upper extremity.  States her pain is worse when reaching overhead.  Right shoulder pain controlled at this time    Medications:  Tramadol for chronic back pain  Injections:  None    Review of Systems   Constitutional: Negative for fever.   HENT: Negative for drooling.    Eyes: Negative for discharge.   Respiratory: Negative for choking.    Cardiovascular: Negative for chest pain.   Genitourinary: Negative for flank pain.   Skin: Negative for wound.   Allergic/Immunologic: Negative for immunocompromised state.   Neurological: Negative for tremors and syncope.   Psychiatric/Behavioral: Negative for behavioral problems.     Past Medical History:   Past Medical History:   Diagnosis Date    Abnormal ECG 2/6/2017    Adrenal adenoma: 2.8 X 2.1CM 2010 5/16/2014    Anticoagulant long-term use      mg    Aortic atherosclerosis: see CT scan 2016 10/28/2016    Aortic stenosis 8/19/2014    Asthma     Breast cancer     Cancer     breast, both sides, right arm restriction    Cataracts, both eyes     Chronic back pain     Chronic bronchitis     CKD (chronic kidney disease) stage 3, GFR 30-59 ml/min 8/19/2014    no dialysis    COPD (chronic obstructive pulmonary disease)     occasional inhaler use, no oxygen    DDD (degenerative disc disease), lumbar 7/30/2013    Diabetes  mellitus     diet controlled    Diverticulosis     Ectatic abdominal aorta: see CT scan 10/16 10/28/2016    Gallbladder polyp 8/19/2014    GERD (gastroesophageal reflux disease)     Gout     Herpes simplex     Fever Blisters and Styes in right eye    High blood cholesterol     Hypertension     holding medication when B/P low    Hypertension associated with diabetes 2/7/2012    Insomnia     Lumbar spinal stenosis     Lymphedema of arm     Right    Multiple lung nodules 9/21/2015    Renal cyst, right: stable per CT 2016 10/28/2016    Stroke 2/2012    TIA, no residual effects    Stye 12/5/2013    Thyroid disease     hypothyroidism    Type 2 diabetes mellitus without complication, without long-term current use of insulin 7/20/2015    Unspecified hypothyroidism 7/20/2015       Past Surgical History:   Past Surgical History:   Procedure Laterality Date    AXILLARY NODE DISSECTION      both sides    BREAST BIOPSY      BREAST LUMPECTOMY Bilateral     lymph nodes on right    CATARACT EXTRACTION      bilateral PHACO with IOL    COLONOSCOPY  01/12/2010    in legacy    Epidural steroid injection      Pain mangement    EPIDURAL STEROID INJECTION INTO LUMBAR SPINE N/A 6/4/2018    Procedure: Injection-steroid-epidural-lumbar;  Surgeon: Rohan Ware MD;  Location: [facility] OR;  Service: Pain Management;  Laterality: N/A;  L5/S1 interlaminar    EPIDURAL STEROID INJECTION INTO LUMBAR SPINE N/A 9/4/2018    Procedure: Injection-steroid-epidural-lumbar L5/S1;  Surgeon: Rohan Ware MD;  Location: [facility] OR;  Service: Pain Management;  Laterality: N/A;    EPIDURAL STEROID INJECTION INTO LUMBAR SPINE N/A 12/20/2018    Procedure: Injection-steroid-epidural-lumbar;  Surgeon: Rohan Ware MD;  Location: [facility] OR;  Service: Pain Management;  Laterality: N/A;  L5/S1 interlaminar    EPIDURAL STEROID INJECTION INTO LUMBAR SPINE N/A 5/7/2019    Procedure: Injection-steroid-epidural-lumbar L5/S1;  Surgeon: Rohan Ware MD;   Location: [facility] OR;  Service: Pain Management;  Laterality: N/A;    EPIDURAL STEROID INJECTION INTO LUMBAR SPINE N/A 9/16/2019    Procedure: Injection-steroid-epidural-lumbar;  Surgeon: Rohan Ware MD;  Location: [facility] OR;  Service: Pain Management;  Laterality: N/A;  L5/S1 interlaminar     EPIDURAL STEROID INJECTION INTO LUMBAR SPINE Right 12/16/2019    Procedure: Injection-steroid-epidural-lumbar L5, S1 Right;  Surgeon: Rohan Ware MD;  Location: [facility] OR;  Service: Pain Management;  Laterality: Right;    EPIDURAL STEROID INJECTION INTO LUMBAR SPINE N/A 7/1/2020    Procedure: Injection-steroid-epidural-lumbar;  Surgeon: Rohan Ware MD;  Location: [facility] OR;  Service: Pain Management;  Laterality: N/A;  L5/S1 to RIGHT    ESOPHAGOGASTRODUODENOSCOPY N/A 11/12/2018    Procedure: EGD (ESOPHAGOGASTRODUODENOSCOPY);  Surgeon: Braydon Becerra MD;  Location: [facility];  Service: Endoscopy;  Laterality: N/A;    HYSTERECTOMY      HYSTERECTOMY      Nerve Block Injection      Pain management, Times 2    RADIOFREQUENCY ABLATION      Nerve, Pain management    RADIOFREQUENCY ABLATION Right 11/12/2020    Procedure: Radiofrequency Ablation of the right shoulder.;  Surgeon: David Rome MD;  Location: [facility] OR;  Service: Orthopedics;  Laterality: Right;    RADIOFREQUENCY ABLATION OF LUMBAR MEDIAL BRANCH NERVE AT SINGLE LEVEL Bilateral 10/29/2019    Procedure: Radiofrequency Ablation, Nerve, Spinal, Lumbar, Medial Branch, L3, L4, L5;  Surgeon: Rohan Ware MD;  Location: [facility] OR;  Service: Pain Management;  Laterality: Bilateral;    RECTAL SURGERY      Fissure repair    TONSILLECTOMY      TRANSFORAMINAL EPIDURAL INJECTION OF STEROID Right 4/16/2021    Procedure: Injection,steroid,epidural,transforaminal approach L3/4 and L4/5;  Surgeon: Soham Vann MD;  Location: [facility] OR;  Service: Pain Management;  Laterality: Right;    TRANSFORAMINAL EPIDURAL INJECTION OF STEROID Right 12/30/2022     Procedure: Injection,steroid,epidural,transforaminal approach L3/4, L4/5;  Surgeon: Rohan Ware MD;  Location: [facility] OR;  Service: Pain Management;  Laterality: Right;    UPPER GASTROINTESTINAL ENDOSCOPY  08/29/2014    Dr. Castro       Family History:   Family History   Problem Relation Age of Onset    Cancer Mother         Breast    COPD Mother     Hearing loss Mother     Hypertension Mother     Breast cancer Mother 90    Diabetes Father     Heart disease Father     Heart attack Father     Cancer Daughter         Breast    Breast cancer Daughter 45    Cancer Sister         Breast    Breast cancer Sister 70    Amblyopia Neg Hx     Blindness Neg Hx     Cataracts Neg Hx     Glaucoma Neg Hx     Macular degeneration Neg Hx     Retinal detachment Neg Hx     Strabismus Neg Hx     Stroke Neg Hx     Thyroid disease Neg Hx     Colon cancer Neg Hx     Stomach cancer Neg Hx     Esophageal cancer Neg Hx        Medications:   Current Outpatient Medications on File Prior to Visit   Medication Sig Dispense Refill    acetaminophen (TYLENOL) 500 MG tablet Take 500 mg by mouth every 6 (six) hours as needed.      acyclovir (ZOVIRAX) 400 MG tablet Take 400 mg by mouth 2 (two) times daily as needed.      albuterol (PROAIR HFA) 90 mcg/actuation inhaler Inhale 2 puffs into the lungs every 6 (six) hours as needed for Wheezing. Rescue 18 g 11    allopurinoL (ZYLOPRIM) 100 MG tablet Take 1 tablet by mouth once daily 90 tablet 3    aspirin (ECOTRIN) 81 MG EC tablet Take 81 mg by mouth once daily.      budesonide-glycopyr-formoterol (BREZTRI AEROSPHERE) 160-9-4.8 mcg/actuation HFAA Inhale 2 puffs into the lungs 2 (two) times a day. 10.7 g 11    busPIRone (BUSPAR) 5 MG Tab Take 1 tablet by mouth twice daily as needed for anxiety 60 tablet 0    cholecalciferol, vitamin D3, 2,000 unit Cap Take 2,000 Units by mouth Daily.      cyanocobalamin (VITAMIN B-12) 1000 MCG tablet Take 1 tablet (1,000 mcg total) by mouth once daily. 30 tablet 12     fluticasone furoate-vilanteroL (BREO ELLIPTA) 200-25 mcg/dose DsDv diskus inhaler Inhale 1 puff into the lungs once daily. 60 each 0    fluticasone furoate-vilanteroL (BREO ELLIPTA) 200-25 mcg/dose DsDv diskus inhaler Inhale 1 puff into the lungs once daily. 60 each 11    gabapentin (NEURONTIN) 300 MG capsule Take 1 capsule (300 mg total) by mouth 2 (two) times daily. 180 capsule 3    levalbuterol (XOPENEX HFA) 45 mcg/actuation inhaler Inhale 1-2 puffs into the lungs every 4 (four) hours as needed for Wheezing or Shortness of Breath. 3 Inhaler 3    levalbuterol (XOPENEX HFA) 45 mcg/actuation inhaler Inhale 1-2 puffs into the lungs every 4 (four) hours as needed for Wheezing. This is a rescue inhaler medication and should be used as needed 15 g 11    levothyroxine (SYNTHROID) 75 MCG tablet Take 1 tablet by mouth once daily 90 tablet 3    montelukast (SINGULAIR) 10 mg tablet Take 1 tablet (10 mg total) by mouth every evening. 90 tablet 3    predniSONE (DELTASONE) 10 MG tablet Take one daily for 5 days for short breath and repeat as needed. 25 tablet 1    simvastatin (ZOCOR) 40 MG tablet TAKE 1 TABLET BY MOUTH ONCE DAILY IN THE EVENING 90 tablet 3    traMADoL (ULTRAM) 50 mg tablet TAKE 1 TABLET BY MOUTH THREE TIMES DAILY AS NEEDED FOR PAIN 90 tablet 2    triamterene-hydrochlorothiazide 37.5-25 mg (DYAZIDE) 37.5-25 mg per capsule Take 1 capsule by mouth once daily in the morning 90 capsule 3    zolpidem (AMBIEN) 5 MG Tab TAKE 1 TABLET BY MOUTH ONCE DAILY IN THE EVENING 30 tablet 0    glimepiride (AMARYL) 1 MG tablet Take 1 tablet (1 mg total) by mouth before breakfast. 90 tablet 3    [DISCONTINUED] zolpidem (AMBIEN) 5 MG Tab TAKE 1 TABLET BY MOUTH ONCE DAILY IN THE EVENING 30 tablet 0     No current facility-administered medications on file prior to visit.       Allergies: Review of patient's allergies indicates:  No Known Allergies    Social History:   Social History     Socioeconomic History    Marital status:   "  Tobacco Use    Smoking status: Former     Packs/day: 1.00     Years: 18.00     Pack years: 18.00     Types: Cigarettes     Start date: 1952     Quit date: 1970     Years since quittin.6    Smokeless tobacco: Never   Substance and Sexual Activity    Alcohol use: Yes     Comment: rare    Drug use: No    Sexual activity: Not Currently     Social Determinants of Health     Financial Resource Strain: Low Risk     Difficulty of Paying Living Expenses: Not hard at all   Food Insecurity: No Food Insecurity    Worried About Running Out of Food in the Last Year: Never true    Ran Out of Food in the Last Year: Never true   Transportation Needs: No Transportation Needs    Lack of Transportation (Medical): No    Lack of Transportation (Non-Medical): No   Physical Activity: Inactive    Days of Exercise per Week: 0 days    Minutes of Exercise per Session: 0 min   Stress: No Stress Concern Present    Feeling of Stress : Not at all   Social Connections: Unknown    Frequency of Communication with Friends and Family: More than three times a week    Frequency of Social Gatherings with Friends and Family: Once a week    Active Member of Clubs or Organizations: Yes    Attends Club or Organization Meetings: More than 4 times per year    Marital Status:    Housing Stability: Low Risk     Unable to Pay for Housing in the Last Year: No    Number of Places Lived in the Last Year: 1    Unstable Housing in the Last Year: No       PHYSICAL EXAMINATION:   Vitals:    03/15/23 1329   BP: (!) 155/78   Pulse: 69   Weight: 66.6 kg (146 lb 11.5 oz)   Height: 5' 3" (1.6 m)     Constitutional: No apparent distress. Pleasant.  HENT: Trachea midline.  Head: Normocephalic and atraumatic. Head and neck revealed no asymmetry, no masses to be present.  Eyes: Right eye exhibits no discharge. Left eye exhibits no discharge. No scleral icterus.   CV: Well perfused.   Pulmonary/Chest: Effort normal. No respiratory distress.   Abdominal: " There is no guarding.   Neurological: Awake, alert and cooperative.  SKIN: Intact no apparent lesions, cut, ulcers or abrasions  EXT:  No cyanosis, clubbing, or edema.  SENSORY: Intact to light touch in the bilateral upper extremities.  MUSCKULOSKELETAL:   Muscle Strength:(0-5)                              Right     Left  Shoulder abduction:               5  5  Shoulder external rotation:     5  5  Elbow flexion:              5  5  Elbow extension:          5  5  Wrist extension:             5  5  Wrist flexion:             5  5  Finger extension:        5  5  Finger flexion:   5  5  Finger abduction:  5  5    Reflexes: 0-4+/4   Right     Left  Biceps (C5):  2+  2+  Brachioradialis (C6): 2+  2+  Triceps (C7):  2+  2+    INSPECTION:         Right     Left   Localized/Generalized swelling:   -  -  Muscle contours normal and symmetrical: +  +  Ecchymoses:      -  -  Erythema:      -  -  Gross deformity:    -  -  Subluxation/Dislocation:   -  -    SHOULDER DEFORMITY:            Right      Left  Normal:          +  +  Other:    RANGE OF MOTION:           Right      Left  Flexion:  Full  Full   Extension:  Full  Full  Internal rotation:  Full  Full  External rotation:  Full  Full  Abduction:  Full  Full  Adduction:  Full  Full  Other:     TENDERNESS:                 Right      Left  AC joint:        -  ++  Subacromial bursa:  -  +  Biceps tendon:  -  -  Supraspinatus tendon: -  -  Other:     SOFT TISSUE PALPATION:      Right  Left   Effusion:  -  -  Other:        SPECIAL TESTS:         Right     Left  Drop arm:  -  -  Painful arc:  -  +  Freeman:  -  +  Neer:   -  +  Yergason:  -  -  Speed:   -  -  Cross-arm:  -  -  Lares's:  -  -  Empty can:  -  -  Spurling's:  -  -  Wrist clonus:  -  -  Huerta's:  -  -    IMAGING:  XR left shoulder from 03/15/2023 with noted ostearthritis with joint space narrowing to glenohumeral and acromioclavicular joints.     Data Reviewed: X-ray  Supportive Actions: Independent visualization of  images or test specimens.    ASSESSMENT:   1. Arthritis of left acromioclavicular joint    2. Chronic left shoulder pain        PLAN:   1. Time was spent reviewing the above diagnosis in depth with Olga today, including acute management and rehabilitation.     2. Physical examination most consistent with left acromioclavicular joint pain. We discussed options for management of her pain would be to consider injection of steroid. The use, benefits, risks, and expectations of all of these types of injections was discussed at length with her today. At this time, she elects to proceed with ultrasound-guided left acromioclavicular joint steroid injection. This procedure was completed today in clinic. Please see procedure note for further details.  We can repeat this every 3 months if appropriate.    3. If she gets less than ideal response from this injection may consider need for further evaluation and intervention to subacromial bursa versus left glenohumeral joint.      RTC as needed.

## 2023-03-15 ENCOUNTER — OFFICE VISIT (OUTPATIENT)
Dept: PHYSICAL MEDICINE AND REHAB | Facility: CLINIC | Age: 88
End: 2023-03-15
Payer: MEDICARE

## 2023-03-15 ENCOUNTER — HOSPITAL ENCOUNTER (OUTPATIENT)
Dept: RADIOLOGY | Facility: HOSPITAL | Age: 88
Discharge: HOME | End: 2023-03-15
Attending: PHYSICAL MEDICINE & REHABILITATION
Payer: MEDICARE

## 2023-03-15 VITALS
DIASTOLIC BLOOD PRESSURE: 78 MMHG | HEART RATE: 69 BPM | BODY MASS INDEX: 25.99 KG/M2 | HEIGHT: 63 IN | WEIGHT: 146.69 LBS | SYSTOLIC BLOOD PRESSURE: 155 MMHG

## 2023-03-15 DIAGNOSIS — M25.512 LEFT SHOULDER PAIN, UNSPECIFIED CHRONICITY: ICD-10-CM

## 2023-03-15 DIAGNOSIS — M25.512 CHRONIC LEFT SHOULDER PAIN: ICD-10-CM

## 2023-03-15 DIAGNOSIS — M19.012 ARTHRITIS OF LEFT ACROMIOCLAVICULAR JOINT: Primary | ICD-10-CM

## 2023-03-15 DIAGNOSIS — G89.29 CHRONIC LEFT SHOULDER PAIN: ICD-10-CM

## 2023-03-15 PROCEDURE — 3072F LOW RISK FOR RETINOPATHY: CPT | Mod: CPTII,S$GLB,, | Performed by: PHYSICAL MEDICINE & REHABILITATION

## 2023-03-15 PROCEDURE — 3288F FALL RISK ASSESSMENT DOCD: CPT | Mod: CPTII,S$GLB,, | Performed by: PHYSICAL MEDICINE & REHABILITATION

## 2023-03-15 PROCEDURE — 1159F PR MEDICATION LIST DOCUMENTED IN MEDICAL RECORD: ICD-10-PCS | Mod: CPTII,S$GLB,, | Performed by: PHYSICAL MEDICINE & REHABILITATION

## 2023-03-15 PROCEDURE — 1101F PT FALLS ASSESS-DOCD LE1/YR: CPT | Mod: CPTII,S$GLB,, | Performed by: PHYSICAL MEDICINE & REHABILITATION

## 2023-03-15 PROCEDURE — 99999 PR PBB SHADOW E&M-EST. PATIENT-LVL IV: ICD-10-PCS | Mod: PBBFAC,,, | Performed by: PHYSICAL MEDICINE & REHABILITATION

## 2023-03-15 PROCEDURE — 99999 PR PBB SHADOW E&M-EST. PATIENT-LVL IV: CPT | Mod: PBBFAC,,, | Performed by: PHYSICAL MEDICINE & REHABILITATION

## 2023-03-15 PROCEDURE — 73030 XR SHOULDER COMPLETE 2 OR MORE VIEWS LEFT: ICD-10-PCS | Mod: 26,LT,, | Performed by: RADIOLOGY

## 2023-03-15 PROCEDURE — 99214 PR OFFICE/OUTPT VISIT, EST, LEVL IV, 30-39 MIN: ICD-10-PCS | Mod: 25,S$GLB,, | Performed by: PHYSICAL MEDICINE & REHABILITATION

## 2023-03-15 PROCEDURE — 1101F PR PT FALLS ASSESS DOC 0-1 FALLS W/OUT INJ PAST YR: ICD-10-PCS | Mod: CPTII,S$GLB,, | Performed by: PHYSICAL MEDICINE & REHABILITATION

## 2023-03-15 PROCEDURE — 20604: ICD-10-PCS | Mod: LT,S$GLB,, | Performed by: PHYSICAL MEDICINE & REHABILITATION

## 2023-03-15 PROCEDURE — 1125F PR PAIN SEVERITY QUANTIFIED, PAIN PRESENT: ICD-10-PCS | Mod: CPTII,S$GLB,, | Performed by: PHYSICAL MEDICINE & REHABILITATION

## 2023-03-15 PROCEDURE — 3288F PR FALLS RISK ASSESSMENT DOCUMENTED: ICD-10-PCS | Mod: CPTII,S$GLB,, | Performed by: PHYSICAL MEDICINE & REHABILITATION

## 2023-03-15 PROCEDURE — 20604 DRAIN/INJ JOINT/BURSA W/US: CPT | Mod: LT,S$GLB,, | Performed by: PHYSICAL MEDICINE & REHABILITATION

## 2023-03-15 PROCEDURE — 1125F AMNT PAIN NOTED PAIN PRSNT: CPT | Mod: CPTII,S$GLB,, | Performed by: PHYSICAL MEDICINE & REHABILITATION

## 2023-03-15 PROCEDURE — 1160F PR REVIEW ALL MEDS BY PRESCRIBER/CLIN PHARMACIST DOCUMENTED: ICD-10-PCS | Mod: CPTII,S$GLB,, | Performed by: PHYSICAL MEDICINE & REHABILITATION

## 2023-03-15 PROCEDURE — 99214 OFFICE O/P EST MOD 30 MIN: CPT | Mod: 25,S$GLB,, | Performed by: PHYSICAL MEDICINE & REHABILITATION

## 2023-03-15 PROCEDURE — 1159F MED LIST DOCD IN RCRD: CPT | Mod: CPTII,S$GLB,, | Performed by: PHYSICAL MEDICINE & REHABILITATION

## 2023-03-15 PROCEDURE — 73030 X-RAY EXAM OF SHOULDER: CPT | Mod: TC,PO,LT

## 2023-03-15 PROCEDURE — 73030 X-RAY EXAM OF SHOULDER: CPT | Mod: 26,LT,, | Performed by: RADIOLOGY

## 2023-03-15 PROCEDURE — 3072F PR LOW RISK FOR RETINOPATHY: ICD-10-PCS | Mod: CPTII,S$GLB,, | Performed by: PHYSICAL MEDICINE & REHABILITATION

## 2023-03-15 PROCEDURE — 1160F RVW MEDS BY RX/DR IN RCRD: CPT | Mod: CPTII,S$GLB,, | Performed by: PHYSICAL MEDICINE & REHABILITATION

## 2023-03-15 RX ORDER — TRIAMCINOLONE ACETONIDE 40 MG/ML
40 INJECTION, SUSPENSION INTRA-ARTICULAR; INTRAMUSCULAR
Status: DISCONTINUED | OUTPATIENT
Start: 2023-03-15 | End: 2023-03-15 | Stop reason: HOSPADM

## 2023-03-15 RX ORDER — LIDOCAINE HYDROCHLORIDE 10 MG/ML
1 INJECTION INFILTRATION; PERINEURAL
Status: DISCONTINUED | OUTPATIENT
Start: 2023-03-15 | End: 2023-03-15 | Stop reason: HOSPADM

## 2023-03-15 RX ADMIN — LIDOCAINE HYDROCHLORIDE 1 ML: 10 INJECTION INFILTRATION; PERINEURAL at 01:03

## 2023-03-15 RX ADMIN — TRIAMCINOLONE ACETONIDE 40 MG: 40 INJECTION, SUSPENSION INTRA-ARTICULAR; INTRAMUSCULAR at 01:03

## 2023-03-15 NOTE — PROCEDURES
Small Joint Aspiration/Injection:  Left acromioclavicular joint    Date/Time: 3/15/2023 1:30 PM  Performed by: Stacy Nicholas, DO  Authorized by: Stacy Nicholas, DO     Consent Done?:  Yes (Verbal)  Indications:  Arthritis, diagnostic evaluation and pain  Site marked: the procedure site was marked    Timeout: prior to procedure the correct patient, procedure, and site was verified    Prep: patient was prepped and draped in usual sterile fashion      Local anesthesia used?: No (Ethyl chloride spray)    Location: Left acromioclavicular joint.  Ultrasonic guidance for needle placement?: Yes    Needle size:  25 G  Medications:  1 mL LIDOcaine HCL 10 mg/ml (1%) 10 mg/mL (1 %); 40 mg triamcinolone acetonide 40 mg/mL  Patient tolerance:  Patient tolerated the procedure well with no immediate complications    Additional Comments: Ultrasound guidance was used for correct needle placement, the images were saved will be uploaded to EMR.

## 2023-03-22 ENCOUNTER — CLINICAL SUPPORT (OUTPATIENT)
Dept: CARDIOLOGY | Facility: HOSPITAL | Age: 88
End: 2023-03-22
Attending: INTERNAL MEDICINE
Payer: MEDICARE

## 2023-03-22 VITALS — WEIGHT: 146 LBS | BODY MASS INDEX: 25.87 KG/M2 | HEIGHT: 63 IN

## 2023-03-22 DIAGNOSIS — I35.0 NONRHEUMATIC AORTIC VALVE STENOSIS: ICD-10-CM

## 2023-03-22 PROCEDURE — 93306 TTE W/DOPPLER COMPLETE: CPT | Mod: PO

## 2023-03-22 PROCEDURE — 93306 ECHO (CUPID ONLY): ICD-10-PCS | Mod: 26,,, | Performed by: INTERNAL MEDICINE

## 2023-03-22 PROCEDURE — 93306 TTE W/DOPPLER COMPLETE: CPT | Mod: 26,,, | Performed by: INTERNAL MEDICINE

## 2023-03-23 LAB
ASCENDING AORTA: 3.17 CM
AV INDEX (PROSTH): 0.18
AV MEAN GRADIENT: 48 MMHG
AV PEAK GRADIENT: 72 MMHG
AV REGURGITATION PRESSURE HALF TIME: 719.36 MS
AV VALVE AREA: 0.68 CM2
AV VELOCITY RATIO: 0.2
BSA FOR ECHO PROCEDURE: 1.72 M2
CV ECHO LV RWT: 0.48 CM
DOP CALC AO PEAK VEL: 4.23 M/S
DOP CALC AO VTI: 110.8 CM
DOP CALC LVOT AREA: 3.8 CM2
DOP CALC LVOT DIAMETER: 2.2 CM
DOP CALC LVOT PEAK VEL: 0.84 M/S
DOP CALC LVOT STROKE VOLUME: 75.23 CM3
DOP CALCLVOT PEAK VEL VTI: 19.8 CM
E WAVE DECELERATION TIME: 422.51 MSEC
E/A RATIO: 0.49
E/E' RATIO: 14.67 M/S
ECHO LV POSTERIOR WALL: 0.93 CM (ref 0.6–1.1)
EJECTION FRACTION: 65 %
FRACTIONAL SHORTENING: 36 % (ref 28–44)
INTERVENTRICULAR SEPTUM: 0.75 CM (ref 0.6–1.1)
IVRT: 137.01 MSEC
LA MAJOR: 4.22 CM
LA MINOR: 4.94 CM
LA WIDTH: 3.2 CM
LEFT ATRIUM SIZE: 3.73 CM
LEFT ATRIUM VOLUME INDEX: 27.3 ML/M2
LEFT ATRIUM VOLUME: 46.18 CM3
LEFT INTERNAL DIMENSION IN SYSTOLE: 2.48 CM (ref 2.1–4)
LEFT VENTRICLE DIASTOLIC VOLUME INDEX: 38.27 ML/M2
LEFT VENTRICLE DIASTOLIC VOLUME: 64.67 ML
LEFT VENTRICLE MASS INDEX: 56 G/M2
LEFT VENTRICLE SYSTOLIC VOLUME INDEX: 13 ML/M2
LEFT VENTRICLE SYSTOLIC VOLUME: 21.98 ML
LEFT VENTRICULAR INTERNAL DIMENSION IN DIASTOLE: 3.87 CM (ref 3.5–6)
LEFT VENTRICULAR MASS: 94.61 G
LV LATERAL E/E' RATIO: 13.2 M/S
LV SEPTAL E/E' RATIO: 16.5 M/S
LVOT MG: 1.66 MMHG
LVOT MV: 0.62 CM/S
MV PEAK A VEL: 1.35 M/S
MV PEAK E VEL: 0.66 M/S
MV STENOSIS PRESSURE HALF TIME: 122.53 MS
MV VALVE AREA P 1/2 METHOD: 1.8 CM2
PISA AR MAX VEL: 4.41 M/S
PULM VEIN S/D RATIO: 1.49
PV PEAK D VEL: 0.35 M/S
PV PEAK S VEL: 0.52 M/S
RA MAJOR: 4.62 CM
RA WIDTH: 2.1 CM
RIGHT VENTRICULAR END-DIASTOLIC DIMENSION: 3.39 CM
RIGHT VENTRICULAR LENGTH IN DIASTOLE (APICAL 4-CHAMBER VIEW): 58.7 CM
RV MID DIAMA: 19.97 CM
RV TISSUE DOPPLER FREE WALL SYSTOLIC VELOCITY 1 (APICAL 4 CHAMBER VIEW): 0.01 CM/S
SINUS: 2.52 CM
STJ: 2.17 CM
TDI LATERAL: 0.05 M/S
TDI SEPTAL: 0.04 M/S
TDI: 0.05 M/S
TRICUSPID ANNULAR PLANE SYSTOLIC EXCURSION: 1.78 CM

## 2023-04-03 ENCOUNTER — OFFICE VISIT (OUTPATIENT)
Dept: INTERNAL MEDICINE | Facility: CLINIC | Age: 88
End: 2023-04-03
Payer: MEDICARE

## 2023-04-03 VITALS
WEIGHT: 145 LBS | DIASTOLIC BLOOD PRESSURE: 86 MMHG | SYSTOLIC BLOOD PRESSURE: 136 MMHG | BODY MASS INDEX: 25.69 KG/M2 | HEIGHT: 63 IN | HEART RATE: 76 BPM

## 2023-04-03 DIAGNOSIS — F13.20 AMBIEN USE DISORDER, MODERATE, DEPENDENCE: ICD-10-CM

## 2023-04-03 DIAGNOSIS — I35.0 NONRHEUMATIC AORTIC VALVE STENOSIS: ICD-10-CM

## 2023-04-03 DIAGNOSIS — N28.1 RENAL CYSTS, ACQUIRED, BILATERAL: ICD-10-CM

## 2023-04-03 DIAGNOSIS — D35.00 ADRENAL ADENOMA, UNSPECIFIED LATERALITY: ICD-10-CM

## 2023-04-03 DIAGNOSIS — N18.31 STAGE 3A CHRONIC KIDNEY DISEASE: ICD-10-CM

## 2023-04-03 DIAGNOSIS — E78.2 MIXED HYPERLIPIDEMIA: ICD-10-CM

## 2023-04-03 DIAGNOSIS — F51.01 PRIMARY INSOMNIA: ICD-10-CM

## 2023-04-03 DIAGNOSIS — Z85.3 HISTORY OF BREAST CANCER: ICD-10-CM

## 2023-04-03 DIAGNOSIS — I10 ESSENTIAL HYPERTENSION: ICD-10-CM

## 2023-04-03 DIAGNOSIS — I70.0 AORTIC ATHEROSCLEROSIS: ICD-10-CM

## 2023-04-03 DIAGNOSIS — E27.8 OTHER SPECIFIED DISORDERS OF ADRENAL GLAND: ICD-10-CM

## 2023-04-03 DIAGNOSIS — K86.2 PANCREATIC CYST: ICD-10-CM

## 2023-04-03 DIAGNOSIS — I77.811 ECTATIC ABDOMINAL AORTA: ICD-10-CM

## 2023-04-03 DIAGNOSIS — J47.9 BRONCHIECTASIS WITHOUT COMPLICATION: ICD-10-CM

## 2023-04-03 DIAGNOSIS — M10.00 IDIOPATHIC GOUT, UNSPECIFIED CHRONICITY, UNSPECIFIED SITE: ICD-10-CM

## 2023-04-03 DIAGNOSIS — M46.99 UNSPECIFIED INFLAMMATORY SPONDYLOPATHY, MULTIPLE SITES IN SPINE: ICD-10-CM

## 2023-04-03 DIAGNOSIS — J44.89 COPD (CHRONIC OBSTRUCTIVE PULMONARY DISEASE) WITH CHRONIC BRONCHITIS: ICD-10-CM

## 2023-04-03 DIAGNOSIS — R91.8 MULTIPLE LUNG NODULES: ICD-10-CM

## 2023-04-03 DIAGNOSIS — E11.8 CONTROLLED TYPE 2 DIABETES MELLITUS WITH COMPLICATION, WITHOUT LONG-TERM CURRENT USE OF INSULIN: Primary | ICD-10-CM

## 2023-04-03 DIAGNOSIS — E03.9 ACQUIRED HYPOTHYROIDISM: ICD-10-CM

## 2023-04-03 PROCEDURE — 99999 PR PBB SHADOW E&M-EST. PATIENT-LVL V: ICD-10-PCS | Mod: PBBFAC,,, | Performed by: INTERNAL MEDICINE

## 2023-04-03 PROCEDURE — 99214 OFFICE O/P EST MOD 30 MIN: CPT | Mod: S$GLB,,, | Performed by: INTERNAL MEDICINE

## 2023-04-03 PROCEDURE — 3288F FALL RISK ASSESSMENT DOCD: CPT | Mod: CPTII,S$GLB,, | Performed by: INTERNAL MEDICINE

## 2023-04-03 PROCEDURE — 1101F PT FALLS ASSESS-DOCD LE1/YR: CPT | Mod: CPTII,S$GLB,, | Performed by: INTERNAL MEDICINE

## 2023-04-03 PROCEDURE — 1159F PR MEDICATION LIST DOCUMENTED IN MEDICAL RECORD: ICD-10-PCS | Mod: CPTII,S$GLB,, | Performed by: INTERNAL MEDICINE

## 2023-04-03 PROCEDURE — 99214 PR OFFICE/OUTPT VISIT, EST, LEVL IV, 30-39 MIN: ICD-10-PCS | Mod: S$GLB,,, | Performed by: INTERNAL MEDICINE

## 2023-04-03 PROCEDURE — 3288F PR FALLS RISK ASSESSMENT DOCUMENTED: ICD-10-PCS | Mod: CPTII,S$GLB,, | Performed by: INTERNAL MEDICINE

## 2023-04-03 PROCEDURE — 99999 PR PBB SHADOW E&M-EST. PATIENT-LVL V: CPT | Mod: PBBFAC,,, | Performed by: INTERNAL MEDICINE

## 2023-04-03 PROCEDURE — 1101F PR PT FALLS ASSESS DOC 0-1 FALLS W/OUT INJ PAST YR: ICD-10-PCS | Mod: CPTII,S$GLB,, | Performed by: INTERNAL MEDICINE

## 2023-04-03 PROCEDURE — 1159F MED LIST DOCD IN RCRD: CPT | Mod: CPTII,S$GLB,, | Performed by: INTERNAL MEDICINE

## 2023-04-03 PROCEDURE — 1126F PR PAIN SEVERITY QUANTIFIED, NO PAIN PRESENT: ICD-10-PCS | Mod: CPTII,S$GLB,, | Performed by: INTERNAL MEDICINE

## 2023-04-03 PROCEDURE — 1126F AMNT PAIN NOTED NONE PRSNT: CPT | Mod: CPTII,S$GLB,, | Performed by: INTERNAL MEDICINE

## 2023-04-03 PROCEDURE — 3072F LOW RISK FOR RETINOPATHY: CPT | Mod: CPTII,S$GLB,, | Performed by: INTERNAL MEDICINE

## 2023-04-03 PROCEDURE — 3072F PR LOW RISK FOR RETINOPATHY: ICD-10-PCS | Mod: CPTII,S$GLB,, | Performed by: INTERNAL MEDICINE

## 2023-04-03 RX ORDER — LANOLIN ALCOHOL/MO/W.PET/CERES
1000 CREAM (GRAM) TOPICAL DAILY
Qty: 30 TABLET | Refills: 12 | Status: SHIPPED | OUTPATIENT
Start: 2023-04-03

## 2023-04-03 NOTE — PROGRESS NOTES
Patient ID: Olga Aguiar is a 88 y.o. female.    Chief Complaint: Follow-up      Assessment:       1. Controlled type 2 diabetes mellitus with complication, without long-term current use of insulin    2. Bronchiectasis without complication    3. Other specified disorders of adrenal gland    4. 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 Ambien use disorder, moderate, dependence    6. Aortic atherosclerosis    7. COPD (chronic obstructive pulmonary disease) with chronic bronchitis    8. Stage 3a chronic kidney disease    9. Pancreatic cyst: stable on MRI 11/16 also 2018, due again 2020 (patient declined)    10. Idiopathic gout, unspecified chronicity, unspecified site    11. Primary insomnia    12. Adrenal adenoma, unspecified laterality    13. History of breast cancer    14. Acquired hypothyroidism    15. Nonrheumatic aortic valve stenosis: SEVERE 3/23    16. Mixed hyperlipidemia    17. Essential hypertension    18. Ectatic abdominal aorta: see CT scan 10/16; no aneurysm 10/17    19. Multiple lung nodules: stable CT 10/2016, also 2019    20. Renal cysts, acquired, bilateral          Plan:         1. Controlled type 2 diabetes mellitus with complication, without long-term current use of insulin  -     Cancel: Microalbumin/Creatinine Ratio, Urine  -     Microalbumin/Creatinine Ratio, Urine; Future; Expected date: 04/03/2023    2. Bronchiectasis without complication    3. Other specified disorders of adrenal gland    4. 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 Ambien use disorder, moderate, dependence    6. Aortic atherosclerosis    7. COPD (chronic obstructive pulmonary disease) with chronic bronchitis    8. Stage 3a chronic kidney disease    9. Pancreatic cyst: stable on MRI 11/16 also 2018, due again 2020 (patient declined)    10. Idiopathic gout, unspecified chronicity, unspecified site    11. Primary insomnia    12. Adrenal adenoma, unspecified  "laterality    13. History of breast cancer    14. Acquired hypothyroidism    15. Nonrheumatic aortic valve stenosis: SEVERE 3/23  Overview:  There is moderate-to-severe aortic valve stenosis.  Aortic valve area is 0.92 cm2; peak velocity is 3.76 m/s; mean gradient is 39 mmHg.          16. Mixed hyperlipidemia    17. Essential hypertension    18. Ectatic abdominal aorta: see CT scan 10/16; no aneurysm 10/17    19. Multiple lung nodules: stable CT 10/2016, also 2019    20. Renal cysts, acquired, bilateral    Other orders  -     cyanocobalamin (VITAMIN B-12) 1000 MCG tablet; Take 1 tablet (1,000 mcg total) by mouth once daily.  Dispense: 30 tablet; Refill: 12     COVID booster discussed, she declines   Keep Cardiology and Pulmonary follow-ups   She declines pancreas MRI   Labs reviewed, continue to work on diabetic eating plan, exercise as tolerated, current medical regimen   Mammogram at the appropriate interval   Resume B12   Urine and review   Recommend six-month follow-up, sooner with problems in the interim; she [place]    Subjective:   Follow up multiple issues, "annual."      with her     Recent labs show low B12.  Otherwise acceptable.  A1c 7.9.    Follows closely with outside cardiologist, now has severe aortic stenosis.  Denies angina, edema or syncope.    CT of the neck February 2019 showed some soft tissue changes.  CT of the lungs could be considered if patient is high risk.  This was reviewed.  Does have some shortness of breath, sees her pulmonary doctor annually in the fall.  "Pulmonary Functions, including spirometry and bronchodilator response and lung volumes and diffusion, study was done oct 7, 2016.  Spirometry shows severe obstruction, loss vital capacity and obstruction and bronchodilator response.   FEV1 is 42% irma 0.81 liters liters.  Lung volumes show  loss of TLC with restriction 63% .  Diffusion shows reduced to 38% " "uncorrected for anemia if present..     Pulmonary functions show severe restriction and good bronchodilator response and low dlco. Clinical correlation recommended."    She feels stable with regard to her breathing.      Recall pancreas lesion, had MRI 2018, recommended repeat in 2 years- discussed again and she declines currently.  Rarely nauseated.   Weight stable.  No GERD.  No blood in the stool.  No diarrhea.  No urinary sx.     Recall that she has had some slight tremor, stay.  She denies shuffling or bradykinesia.  She does not want to see neurology for this.    Renal stone, nonobstructing seen on ultrasound January 2019.    Lots of orthopedic issues, shoulder and back.       Physical medicine March 2023  Pain clinic February 2023  Ortho January 2023  Ophtho November 2022  Mammogram November 2022  Cardiology September 2022  Pulmonary September 2022  Renal ultrasound January 2019 MRI abdomen 2018      Review of Systems   Constitutional:  Negative for fatigue.   HENT:  Negative for hearing loss.    Eyes:  Negative for visual disturbance.   Respiratory:  Negative for cough and shortness of breath.    Cardiovascular:  Negative for chest pain.   Gastrointestinal:  Negative for abdominal pain, constipation and diarrhea.   Genitourinary:  Negative for dysuria, frequency and vaginal bleeding.   Musculoskeletal:  Positive for arthralgias and back pain. Negative for joint swelling.        Chronic, stable symptoms  Chronic arm lymphedema, right side, from breast cancer surgery   Skin:  Negative for rash.   Neurological:  Positive for tremors. Negative for weakness, light-headedness and headaches.   Psychiatric/Behavioral:  Negative for confusion, decreased concentration, dysphoric mood and sleep disturbance.        Objective:      Physical Exam  Vitals and nursing note reviewed.   Constitutional:       Appearance: She is well-developed.   HENT:      Head: Normocephalic and atraumatic.      Right Ear: External ear " normal.      Left Ear: External ear normal.      Nose: Nose normal.      Mouth/Throat:      Pharynx: No oropharyngeal exudate.   Eyes:      General: No scleral icterus.     Extraocular Movements: Extraocular movements intact.      Conjunctiva/sclera: Conjunctivae normal.   Neck:      Thyroid: No thyromegaly.      Vascular: No JVD.   Cardiovascular:      Rate and Rhythm: Normal rate and regular rhythm.      Heart sounds: Murmur heard.     No gallop.   Pulmonary:      Effort: Pulmonary effort is normal. No respiratory distress.      Breath sounds: Normal breath sounds. No wheezing.   Abdominal:      General: Bowel sounds are normal. There is no distension.      Palpations: Abdomen is soft. There is no mass.      Tenderness: There is no abdominal tenderness. There is no guarding or rebound.   Musculoskeletal:         General: No tenderness. Normal range of motion.      Cervical back: Normal range of motion and neck supple.   Lymphadenopathy:      Cervical: No cervical adenopathy.   Skin:     General: Skin is warm.      Findings: No erythema or rash.   Neurological:      General: No focal deficit present.      Mental Status: She is alert and oriented to person, place, and time.      Cranial Nerves: No cranial nerve deficit.      Coordination: Coordination normal.      Comments: Slight tremor   Psychiatric:         Behavior: Behavior normal.         Thought Content: Thought content normal.         Judgment: Judgment normal.           Health Maintenance Due   Topic Date Due    Diabetes Urine Screening  08/24/2022

## 2023-04-10 NOTE — PROGRESS NOTES
"Subjective:    Patient ID:  Olga Aguiar is a 88 y.o. female who presents for follow-up of Hypertension      Problem List Items Addressed This Visit          Cardiac/Vascular    Aortic atherosclerosis - Primary    Ectatic abdominal aorta: see CT scan 10/16; no aneurysm 10/17    Essential hypertension    LVH (left ventricular hypertrophy)    Mixed hyperlipidemia    Nonrheumatic aortic valve stenosis: SEVERE 3/23    Overview     3/2023  There is severe aortic valve stenosis.  Aortic valve area is 0.68 cm2; peak velocity is 4.23 m/s; mean gradient is 48 mmHg.                HPI    Patient was last seen on 09/26/2022 at which time she was doing okay from a cardiac standpoint.  Had recent echocardiogram that showed progression to severe aortic stenosis.    On assessment today, the patient states that she is feeling OK in general.    No chest pain.  Chronic shortness of breath from radiation on the chest in the past  Back limits her activity    Symptoms from aortic stenosis -   No syncope  Occasional left sided discomfort  No orthopnea  Some limitation in activity     Objective:     Vitals:    04/11/23 1441   BP: (!) 162/84   BP Location: Left arm   Patient Position: Sitting   BP Method: Medium (Manual)   Pulse: 67   Weight: 66.7 kg (147 lb 0.8 oz)   Height: 5' 3" (1.6 m)        Physical Exam  Vitals and nursing note reviewed.   Constitutional:       General: She is not in acute distress.     Appearance: She is well-developed.   HENT:      Head: Normocephalic and atraumatic.   Neck:      Vascular: No JVD.   Cardiovascular:      Rate and Rhythm: Normal rate and regular rhythm.      Heart sounds: Murmur (AS murmur) heard.     No friction rub. No gallop.   Pulmonary:      Effort: Pulmonary effort is normal. No respiratory distress.      Breath sounds: Normal breath sounds. No wheezing or rales.   Abdominal:      General: Bowel sounds are normal.      Palpations: Abdomen is soft.      Tenderness: There is no abdominal " tenderness. There is no guarding or rebound.   Musculoskeletal:         General: No tenderness.      Cervical back: Neck supple.   Skin:     General: Skin is warm and dry.   Neurological:      Mental Status: She is alert and oriented to person, place, and time.   Psychiatric:         Behavior: Behavior normal.           Current Outpatient Medications   Medication Instructions    acetaminophen (TYLENOL) 500 mg, Oral, Every 6 hours PRN    acyclovir (ZOVIRAX) 400 mg, Oral, 2 times daily PRN    albuterol (PROAIR HFA) 90 mcg/actuation inhaler 2 puffs, Inhalation, Every 6 hours PRN, Rescue    allopurinoL (ZYLOPRIM) 100 MG tablet Take 1 tablet by mouth once daily    aspirin (ECOTRIN) 81 mg, Oral, Daily    budesonide-glycopyr-formoterol (BREZTRI AEROSPHERE) 160-9-4.8 mcg/actuation HFAA 2 puffs, Inhalation, 2 times daily    busPIRone (BUSPAR) 5 MG Tab Take 1 tablet by mouth twice daily as needed for anxiety    cholecalciferol (vitamin D3) (VITAMIN D3) 2,000 Units, Oral, Daily    cyanocobalamin (VITAMIN B-12) 1,000 mcg, Oral, Daily    fluticasone furoate-vilanteroL (BREO ELLIPTA) 200-25 mcg/dose DsDv diskus inhaler 1 puff, Inhalation, Daily    gabapentin (NEURONTIN) 300 mg, Oral, 2 times daily    levalbuterol (XOPENEX HFA) 45 mcg/actuation inhaler 1-2 puffs, Inhalation, Every 4 hours PRN, This is a rescue inhaler medication and should be used as needed<BR>    levothyroxine (SYNTHROID) 75 MCG tablet Take 1 tablet by mouth once daily    montelukast (SINGULAIR) 10 mg, Oral, Nightly    predniSONE (DELTASONE) 10 MG tablet Take one daily for 5 days for short breath and repeat as needed.    simvastatin (ZOCOR) 40 MG tablet TAKE 1 TABLET BY MOUTH ONCE DAILY IN THE EVENING    traMADoL (ULTRAM) 50 mg tablet TAKE 1 TABLET BY MOUTH THREE TIMES DAILY AS NEEDED FOR PAIN    triamterene-hydrochlorothiazide 37.5-25 mg (DYAZIDE) 37.5-25 mg per capsule Take 1 capsule by mouth once daily in the morning    zolpidem (AMBIEN) 5 MG Tab TAKE 1 TABLET  BY MOUTH ONCE DAILY IN THE EVENING       Lipid Panel:   Lab Results   Component Value Date    CHOL 177 03/01/2023    HDL 52 03/01/2023    LDLCALC 98.6 03/01/2023    TRIG 132 03/01/2023    CHOLHDL 29.4 03/01/2023       The ASCVD Risk score (Anaid RANDALL, et al., 2019) failed to calculate for the following reasons:    The 2019 ASCVD risk score is only valid for ages 40 to 79    All pertinent labs, imaging, and EKGs reviewed.  Patient's most recent EKG tracing was personally interpreted by this provider.    Most Recent EKG Results  Results for orders placed or performed in visit on 06/16/20   EKG 12-lead    Collection Time: 06/16/20  3:06 PM    Narrative    Test Reason : Z00.00,    Vent. Rate : 073 BPM     Atrial Rate : 073 BPM     P-R Int : 188 ms          QRS Dur : 080 ms      QT Int : 418 ms       P-R-T Axes : 064 -01 059 degrees     QTc Int : 460 ms    Normal sinus rhythm  Low voltage QRS  Cannot rule out Anterior infarct ,age undetermined  Abnormal ECG  When compared with ECG of 18-DEC-2018 16:00,  Minimal criteria for Anterior infarct are now Present  T wave inversion no longer evident in Inferior leads  Confirmed by William Lucas MD ([id number]) on 6/19/2020 7:53:25 PM    Referred By: WILLIAM LUCAS           Confirmed By:William Lucas MD       Most Recent Echocardiogram Results  Results for orders placed in visit on 03/22/23    Echo    Interpretation Summary  · The left ventricle is normal in size with concentric remodeling and normal systolic function.  · The estimated ejection fraction is 65%.  · Indeterminate left ventricular diastolic function.  · Normal right ventricular size with normal right ventricular systolic function.  · Mild aortic regurgitation.  · There is severe aortic valve stenosis.  · Aortic valve area is 0.68 cm2; peak velocity is 4.23 m/s; mean gradient is 48 mmHg.  · IVC not well visualized.      Most Recent Nuclear Stress Test Results  No results found for this or any previous visit.      Most Recent Cardiac PET  Stress Test Results  No results found for this or any previous visit.      Most Recent Cardiovascular Angiogram results  No results found for this or any previous visit.      Other Most Recent Cardiology Results  Results for orders placed during the hospital encounter of 08/15/18    CARDIAC MONITORING STRIPS        Assessment:       1. Aortic atherosclerosis    2. Ectatic abdominal aorta: see CT scan 10/16; no aneurysm 10/17    3. Essential hypertension    4. LVH (left ventricular hypertrophy)    5. Mixed hyperlipidemia    6. Nonrheumatic aortic valve stenosis: SEVERE 3/23         Plan:     Symptoms of occasional left sided discomfort  Severe aortic stenosis  BP elevated today  Most recent echocardiogram reviewed personally   No critical symptoms of severe AS, but would like to start the process for TAVR evaluation considering rapid progression of her valve    Continue aspirin 81 mg PO Daily   Continue statin   Referred for TAVR evaluation with Dr. King's team     Continue other cardiac medications  Mediterranean Diet/Cardiovascular Exercise Program    Greater than 40 minutes of total time was spent for this patient on the date of the encounter including chart review, interview, and medical decision making.    Patient queried and all questions were answered.    F/u in 6-9 months to reassess      Signed:    Christiano Herrera MD  4/11/2023 2:42 PM

## 2023-04-11 ENCOUNTER — OFFICE VISIT (OUTPATIENT)
Dept: CARDIOLOGY | Facility: CLINIC | Age: 88
End: 2023-04-11
Attending: INTERNAL MEDICINE
Payer: MEDICARE

## 2023-04-11 VITALS
WEIGHT: 147.06 LBS | SYSTOLIC BLOOD PRESSURE: 162 MMHG | BODY MASS INDEX: 26.06 KG/M2 | HEART RATE: 67 BPM | HEIGHT: 63 IN | DIASTOLIC BLOOD PRESSURE: 84 MMHG

## 2023-04-11 DIAGNOSIS — I51.7 LVH (LEFT VENTRICULAR HYPERTROPHY): ICD-10-CM

## 2023-04-11 DIAGNOSIS — I70.0 AORTIC ATHEROSCLEROSIS: ICD-10-CM

## 2023-04-11 DIAGNOSIS — I77.811 ECTATIC ABDOMINAL AORTA: ICD-10-CM

## 2023-04-11 DIAGNOSIS — I70.0 AORTIC ATHEROSCLEROSIS: Primary | ICD-10-CM

## 2023-04-11 DIAGNOSIS — E78.2 MIXED HYPERLIPIDEMIA: ICD-10-CM

## 2023-04-11 DIAGNOSIS — I10 ESSENTIAL HYPERTENSION: ICD-10-CM

## 2023-04-11 DIAGNOSIS — I35.0 NONRHEUMATIC AORTIC VALVE STENOSIS: ICD-10-CM

## 2023-04-11 DIAGNOSIS — I35.0 AORTIC VALVE STENOSIS, MODERATE: Primary | ICD-10-CM

## 2023-04-11 PROCEDURE — 3288F FALL RISK ASSESSMENT DOCD: CPT | Mod: CPTII,S$GLB,, | Performed by: INTERNAL MEDICINE

## 2023-04-11 PROCEDURE — 1159F PR MEDICATION LIST DOCUMENTED IN MEDICAL RECORD: ICD-10-PCS | Mod: CPTII,S$GLB,, | Performed by: INTERNAL MEDICINE

## 2023-04-11 PROCEDURE — 3288F PR FALLS RISK ASSESSMENT DOCUMENTED: ICD-10-PCS | Mod: CPTII,S$GLB,, | Performed by: INTERNAL MEDICINE

## 2023-04-11 PROCEDURE — 1125F AMNT PAIN NOTED PAIN PRSNT: CPT | Mod: CPTII,S$GLB,, | Performed by: INTERNAL MEDICINE

## 2023-04-11 PROCEDURE — 1125F PR PAIN SEVERITY QUANTIFIED, PAIN PRESENT: ICD-10-PCS | Mod: CPTII,S$GLB,, | Performed by: INTERNAL MEDICINE

## 2023-04-11 PROCEDURE — 99999 PR PBB SHADOW E&M-EST. PATIENT-LVL IV: CPT | Mod: PBBFAC,,, | Performed by: INTERNAL MEDICINE

## 2023-04-11 PROCEDURE — 99215 OFFICE O/P EST HI 40 MIN: CPT | Mod: S$GLB,,, | Performed by: INTERNAL MEDICINE

## 2023-04-11 PROCEDURE — 1160F PR REVIEW ALL MEDS BY PRESCRIBER/CLIN PHARMACIST DOCUMENTED: ICD-10-PCS | Mod: CPTII,S$GLB,, | Performed by: INTERNAL MEDICINE

## 2023-04-11 PROCEDURE — 3072F LOW RISK FOR RETINOPATHY: CPT | Mod: CPTII,S$GLB,, | Performed by: INTERNAL MEDICINE

## 2023-04-11 PROCEDURE — 3072F PR LOW RISK FOR RETINOPATHY: ICD-10-PCS | Mod: CPTII,S$GLB,, | Performed by: INTERNAL MEDICINE

## 2023-04-11 PROCEDURE — 1160F RVW MEDS BY RX/DR IN RCRD: CPT | Mod: CPTII,S$GLB,, | Performed by: INTERNAL MEDICINE

## 2023-04-11 PROCEDURE — 1101F PR PT FALLS ASSESS DOC 0-1 FALLS W/OUT INJ PAST YR: ICD-10-PCS | Mod: CPTII,S$GLB,, | Performed by: INTERNAL MEDICINE

## 2023-04-11 PROCEDURE — 99999 PR PBB SHADOW E&M-EST. PATIENT-LVL IV: ICD-10-PCS | Mod: PBBFAC,,, | Performed by: INTERNAL MEDICINE

## 2023-04-11 PROCEDURE — 1101F PT FALLS ASSESS-DOCD LE1/YR: CPT | Mod: CPTII,S$GLB,, | Performed by: INTERNAL MEDICINE

## 2023-04-11 PROCEDURE — 99215 PR OFFICE/OUTPT VISIT, EST, LEVL V, 40-54 MIN: ICD-10-PCS | Mod: S$GLB,,, | Performed by: INTERNAL MEDICINE

## 2023-04-11 PROCEDURE — 1159F MED LIST DOCD IN RCRD: CPT | Mod: CPTII,S$GLB,, | Performed by: INTERNAL MEDICINE

## 2023-04-15 DIAGNOSIS — F13.20 AMBIEN USE DISORDER, MODERATE, DEPENDENCE: ICD-10-CM

## 2023-04-15 DIAGNOSIS — F51.01 PRIMARY INSOMNIA: ICD-10-CM

## 2023-04-15 NOTE — TELEPHONE ENCOUNTER
No new care gaps identified.  [facility] Embedded Care Gaps. Reference number: [id number]. 4/15/2023   1:11:52 PM CDT

## 2023-04-17 RX ORDER — ZOLPIDEM TARTRATE 5 MG/1
TABLET ORAL
Qty: 30 TABLET | Refills: 0 | Status: SHIPPED | OUTPATIENT
Start: 2023-04-17 | End: 2023-05-16

## 2023-04-19 ENCOUNTER — PATIENT MESSAGE (OUTPATIENT)
Dept: CARDIOLOGY | Facility: CLINIC | Age: 88
End: 2023-04-19
Payer: MEDICARE

## 2023-04-29 DIAGNOSIS — E78.5 HYPERLIPIDEMIA, UNSPECIFIED HYPERLIPIDEMIA TYPE: ICD-10-CM

## 2023-04-29 NOTE — TELEPHONE ENCOUNTER
No care due was identified.  [facility] Embedded Care Due Messages. Reference number: [id number].   4/29/2023 3:09:28 PM CDT

## 2023-04-30 RX ORDER — ALLOPURINOL 100 MG/1
TABLET ORAL
Qty: 90 TABLET | Refills: 3 | Status: SHIPPED | OUTPATIENT
Start: 2023-04-30 | End: 2023-12-26 | Stop reason: SDUPTHER

## 2023-04-30 RX ORDER — TRIAMTERENE AND HYDROCHLOROTHIAZIDE 37.5; 25 MG/1; MG/1
CAPSULE ORAL
Qty: 90 CAPSULE | Refills: 3 | Status: ON HOLD | OUTPATIENT
Start: 2023-04-30 | End: 2023-08-08 | Stop reason: HOSPADM

## 2023-04-30 RX ORDER — SIMVASTATIN 40 MG/1
TABLET, FILM COATED ORAL
Qty: 90 TABLET | Refills: 3 | Status: ON HOLD | OUTPATIENT
Start: 2023-04-30 | End: 2023-11-30 | Stop reason: HOSPADM

## 2023-04-30 RX ORDER — LEVOTHYROXINE SODIUM 75 UG/1
TABLET ORAL
Qty: 90 TABLET | Refills: 3 | Status: SHIPPED | OUTPATIENT
Start: 2023-04-30 | End: 2023-12-26 | Stop reason: SDUPTHER

## 2023-04-30 NOTE — TELEPHONE ENCOUNTER
Refill Routing Note   Medication(s) are not appropriate for processing by [facility] for the following reason(s):      Medication outside of protocol    [facility] action(s):  Approve  Route              Appointments  past 12m or future 3m with PCP    Date Provider   Last Visit   4/3/2023 Sera Barger MD   Next Visit   Visit date not found Sera Barger MD   ED visits in past 90 days: 0        Note composed:4:18 PM 04/30/2023

## 2023-05-01 ENCOUNTER — PATIENT MESSAGE (OUTPATIENT)
Dept: INTERNAL MEDICINE | Facility: CLINIC | Age: 88
End: 2023-05-01
Payer: MEDICARE

## 2023-05-01 RX ORDER — BUSPIRONE HYDROCHLORIDE 5 MG/1
TABLET ORAL
Qty: 60 TABLET | Refills: 0 | Status: SHIPPED | OUTPATIENT
Start: 2023-05-01 | End: 2023-07-10

## 2023-05-03 PROBLEM — R41.82 ALTERED MENTAL STATUS: Status: ACTIVE | Noted: 2023-05-03

## 2023-05-04 PROBLEM — I25.10 CORONARY ARTERY DISEASE INVOLVING NATIVE CORONARY ARTERY OF NATIVE HEART: Status: ACTIVE | Noted: 2023-05-04

## 2023-05-05 PROBLEM — G93.41 ACUTE METABOLIC ENCEPHALOPATHY: Status: ACTIVE | Noted: 2023-05-03

## 2023-05-06 ENCOUNTER — PATIENT MESSAGE (OUTPATIENT)
Dept: INTERNAL MEDICINE | Facility: CLINIC | Age: 88
End: 2023-05-06
Payer: MEDICARE

## 2023-05-08 ENCOUNTER — TELEPHONE (OUTPATIENT)
Dept: CARDIOLOGY | Facility: CLINIC | Age: 88
End: 2023-05-08
Payer: MEDICARE

## 2023-05-08 NOTE — TELEPHONE ENCOUNTER
----- Message from Anne Marie Benavidez sent at 5/8/2023  1:07 PM CDT -----  Type:  Sooner Appointment Request    Caller is requesting a sooner appointment.  Caller declined first available appointment listed below.  Caller will not accept being placed on the waitlist and is requesting a message be sent to doctor.    Name of Caller:  pt  ray   When is the first available appointment?  2/12/24  Symptoms:  sx f/u   Best Call Back Number:  [phone] or [phone]    Additional Information:  requesting a appt on 6/3 afternoon appt and requesting a call back please advise thank you

## 2023-05-08 NOTE — TELEPHONE ENCOUNTER
Please switch her to me, she should not have to see Dr. Harris from hospital follow-up    Okay to work her in with me tomorrow

## 2023-05-08 NOTE — TELEPHONE ENCOUNTER
Please let me know is it okay for pt to do virtual visit for hospital follow up if so I will get pt schedule.

## 2023-05-15 ENCOUNTER — LAB VISIT (OUTPATIENT)
Dept: LAB | Facility: HOSPITAL | Age: 88
End: 2023-05-15
Attending: INTERNAL MEDICINE
Payer: MEDICARE

## 2023-05-15 ENCOUNTER — OFFICE VISIT (OUTPATIENT)
Dept: INTERNAL MEDICINE | Facility: CLINIC | Age: 88
End: 2023-05-15
Payer: MEDICARE

## 2023-05-15 VITALS — DIASTOLIC BLOOD PRESSURE: 80 MMHG | SYSTOLIC BLOOD PRESSURE: 125 MMHG

## 2023-05-15 DIAGNOSIS — I35.0 NONRHEUMATIC AORTIC VALVE STENOSIS: ICD-10-CM

## 2023-05-15 DIAGNOSIS — R29.818 TRANSIENT NEUROLOGIC DEFICIT: ICD-10-CM

## 2023-05-15 DIAGNOSIS — Z98.890 HISTORY OF ATHERECTOMY: ICD-10-CM

## 2023-05-15 DIAGNOSIS — G93.41 ACUTE METABOLIC ENCEPHALOPATHY: ICD-10-CM

## 2023-05-15 DIAGNOSIS — G93.41 ACUTE METABOLIC ENCEPHALOPATHY: Primary | ICD-10-CM

## 2023-05-15 DIAGNOSIS — E83.42 HYPOMAGNESEMIA: ICD-10-CM

## 2023-05-15 DIAGNOSIS — Z95.5 S/P DRUG ELUTING CORONARY STENT PLACEMENT: ICD-10-CM

## 2023-05-15 LAB
ALBUMIN SERPL BCP-MCNC: 3.2 G/DL (ref 3.5–5.2)
ALP SERPL-CCNC: 74 U/L (ref 55–135)
ALT SERPL W/O P-5'-P-CCNC: 26 U/L (ref 10–44)
ANION GAP SERPL CALC-SCNC: 11 MMOL/L (ref 8–16)
AST SERPL-CCNC: 28 U/L (ref 10–40)
BASOPHILS # BLD AUTO: 0.05 K/UL (ref 0–0.2)
BASOPHILS NFR BLD: 0.7 % (ref 0–1.9)
BILIRUB SERPL-MCNC: 0.4 MG/DL (ref 0.1–1)
BUN SERPL-MCNC: 18 MG/DL (ref 8–23)
CALCIUM SERPL-MCNC: 9.8 MG/DL (ref 8.7–10.5)
CHLORIDE SERPL-SCNC: 95 MMOL/L (ref 95–110)
CO2 SERPL-SCNC: 29 MMOL/L (ref 23–29)
CREAT SERPL-MCNC: 0.9 MG/DL (ref 0.5–1.4)
DIFFERENTIAL METHOD: ABNORMAL
EOSINOPHIL # BLD AUTO: 0 K/UL (ref 0–0.5)
EOSINOPHIL NFR BLD: 0.3 % (ref 0–8)
ERYTHROCYTE [DISTWIDTH] IN BLOOD BY AUTOMATED COUNT: 13.7 % (ref 11.5–14.5)
EST. GFR  (NO RACE VARIABLE): >60 ML/MIN/1.73 M^2
GLUCOSE SERPL-MCNC: 139 MG/DL (ref 70–110)
HCT VFR BLD AUTO: 44.5 % (ref 37–48.5)
HGB BLD-MCNC: 14.2 G/DL (ref 12–16)
IMM GRANULOCYTES # BLD AUTO: 0.03 K/UL (ref 0–0.04)
IMM GRANULOCYTES NFR BLD AUTO: 0.4 % (ref 0–0.5)
LYMPHOCYTES # BLD AUTO: 1.1 K/UL (ref 1–4.8)
LYMPHOCYTES NFR BLD: 17.1 % (ref 18–48)
MAGNESIUM SERPL-MCNC: 1.7 MG/DL (ref 1.6–2.6)
MCH RBC QN AUTO: 28.6 PG (ref 27–31)
MCHC RBC AUTO-ENTMCNC: 31.9 G/DL (ref 32–36)
MCV RBC AUTO: 90 FL (ref 82–98)
MONOCYTES # BLD AUTO: 0.5 K/UL (ref 0.3–1)
MONOCYTES NFR BLD: 6.7 % (ref 4–15)
NEUTROPHILS # BLD AUTO: 5 K/UL (ref 1.8–7.7)
NEUTROPHILS NFR BLD: 74.8 % (ref 38–73)
NRBC BLD-RTO: 0 /100 WBC
PLATELET # BLD AUTO: 293 K/UL (ref 150–450)
PMV BLD AUTO: 9.8 FL (ref 9.2–12.9)
POTASSIUM SERPL-SCNC: 3.9 MMOL/L (ref 3.5–5.1)
PROT SERPL-MCNC: 6.5 G/DL (ref 6–8.4)
RBC # BLD AUTO: 4.97 M/UL (ref 4–5.4)
SODIUM SERPL-SCNC: 135 MMOL/L (ref 136–145)
WBC # BLD AUTO: 6.67 K/UL (ref 3.9–12.7)

## 2023-05-15 PROCEDURE — 83735 ASSAY OF MAGNESIUM: CPT | Performed by: INTERNAL MEDICINE

## 2023-05-15 PROCEDURE — 99215 PR OFFICE/OUTPT VISIT, EST, LEVL V, 40-54 MIN: ICD-10-PCS | Mod: S$GLB,,, | Performed by: INTERNAL MEDICINE

## 2023-05-15 PROCEDURE — 1125F AMNT PAIN NOTED PAIN PRSNT: CPT | Mod: CPTII,S$GLB,, | Performed by: INTERNAL MEDICINE

## 2023-05-15 PROCEDURE — 36415 COLL VENOUS BLD VENIPUNCTURE: CPT | Performed by: INTERNAL MEDICINE

## 2023-05-15 PROCEDURE — 1159F MED LIST DOCD IN RCRD: CPT | Mod: CPTII,S$GLB,, | Performed by: INTERNAL MEDICINE

## 2023-05-15 PROCEDURE — 85025 COMPLETE CBC W/AUTO DIFF WBC: CPT | Performed by: INTERNAL MEDICINE

## 2023-05-15 PROCEDURE — 3072F PR LOW RISK FOR RETINOPATHY: ICD-10-PCS | Mod: CPTII,S$GLB,, | Performed by: INTERNAL MEDICINE

## 2023-05-15 PROCEDURE — 99999 PR PBB SHADOW E&M-EST. PATIENT-LVL III: CPT | Mod: PBBFAC,,, | Performed by: INTERNAL MEDICINE

## 2023-05-15 PROCEDURE — 1125F PR PAIN SEVERITY QUANTIFIED, PAIN PRESENT: ICD-10-PCS | Mod: CPTII,S$GLB,, | Performed by: INTERNAL MEDICINE

## 2023-05-15 PROCEDURE — 1159F PR MEDICATION LIST DOCUMENTED IN MEDICAL RECORD: ICD-10-PCS | Mod: CPTII,S$GLB,, | Performed by: INTERNAL MEDICINE

## 2023-05-15 PROCEDURE — 99215 OFFICE O/P EST HI 40 MIN: CPT | Mod: S$GLB,,, | Performed by: INTERNAL MEDICINE

## 2023-05-15 PROCEDURE — 80053 COMPREHEN METABOLIC PANEL: CPT | Performed by: INTERNAL MEDICINE

## 2023-05-15 PROCEDURE — 3072F LOW RISK FOR RETINOPATHY: CPT | Mod: CPTII,S$GLB,, | Performed by: INTERNAL MEDICINE

## 2023-05-15 PROCEDURE — 99999 PR PBB SHADOW E&M-EST. PATIENT-LVL III: ICD-10-PCS | Mod: PBBFAC,,, | Performed by: INTERNAL MEDICINE

## 2023-05-15 NOTE — PROGRESS NOTES
"Transitional Care Note  Subjective:       Patient ID: Olga Aguiar is a 88 y.o. female.  Chief Complaint: Hospital Follow Up    Family and/or Caretaker present at visit?  Yes.   and daughter Kiki Aguiar  Diagnostic tests reviewed/disposition: I have reviewed all completed as well as pending diagnostic tests at the time of discharge.  Disease/illness education: yes  Home health/community services discussion/referrals: Patient does not have home health established from hospital visit.  They do not need home health.  If needed, we will set up home health for the patient.   Establishment or re-establishment of referral orders for community resources: No other necessary community resources.   Discussion with other health care providers: No discussion with other health care providers necessary.     Earlier this month admitted to be evaluated for assessment of AS for TAVR consult.    " Here to establish/transfer care from Dr. Herrera concerning AS/TAVR w/u.  Limited activity due to dyspnea on exertion chronic lower extremity edema.  Status post right breast CA with radiation therapy/chemotherapy 23 years ago.  Chronic right arm lymphedema.  Now CCS class 3 dyspnea on exertion.     Procedure(s):  Left heart cath  ANGIOGRAM, CORONARY ARTERY  Percutaneous coronary intervention  Atherectomy-coronary  Insertion, Pacemaker, Temporary Transvenous  PTCA, Brachytherapy, Coronary      Indwelling Lines/Drains at time of discharge:  Lines/Drains/Airways             Hospital Course (synopsis of major diagnoses, care, treatment, and services provided during the course of the hospital stay): Mrs. Aguiar underwent coronary angiography with findings of critical stenosis in mid RCA successfully treated with rotational atherectomy followed by drug-eluting stent followed by shockwave angioplasty. Mild to moderate disease in the LAD. Post procedure she became confused and was no longer able to correctly answer " "orientation questions or follow commands. A head CT was obtained and found to be negative for acute intracranial abnormality. Medications with sedating side effects were held and neurology, ST, PT, and OT were consulted. Neurology recommended holding off on MRI as patient was already neurologically improving and would have required sedation medication to remain still on MRI table. She continued to improve and today she is able to answer all orientation questions correctly and follow commands in bilateral upper and lower extremities. She is being discharged on DAPT for minimum 6 months, post angiogram instructions and follow up appointments as below."    Unclear cause for acute confusion.  As listed above, head CT was negative or normal.    Neurology consult:  "Given description of events it does appear more likely that this is encephalopathy 2/2 to medications rather than a new stroke. While a small stroke is not excluded at this point it would not .         Treatment Plan:     -No further inpatient neurological intervention   -Would patient time for continued recovery as she is already improving mentally today   -If we need additionally imaging we can defer this to outpatient and she will need neurology follow up.  -Can follow up with me at my [place] location if she already is not established with neurology  "    Family states that since discharge, she is better, almost to baseline.  Eating and drinking well.  No falls.  No instability.  No fevers, chills or sweats.  No confusion.  No focal neurologic signs.    On DA PT, will be getting TAVR within the next 2 months.  Interestingly, her AS went from over 1 cm to .6cm recently.    They have questions about anesthesia and pain medication.  She tolerates tramadol well.    Labs showed slight hyponatremia and hypomagnesemia.  Will be repeated.    Patient Active Problem List   Diagnosis    Essential hypertension    Chronic pain syndrome    Lumbosacral " spondylosis without myelopathy    Adrenal adenoma: 2.8 X 2.1CM 2010; stable 2016, also stable 2018    Lymph edema, right arm    Gallbladder polyp    CKD (chronic kidney disease) stage 3, GFR 30-59 ml/min    Nonrheumatic aortic valve stenosis: SEVERE 3/23    LVH (left ventricular hypertrophy)    Pancreatic cyst: stable on MRI 11/16 also 2018, due again 2020 (patient declined)    Cervical spinal stenosis    Controlled type 2 diabetes mellitus with complication, without long-term current use of insulin    Acquired hypothyroidism    Transient cerebral ischemia: 2012    Primary insomnia    Gastroesophageal reflux disease without esophagitis    Multiple lung nodules: stable CT 10/2016, also 2019    Former smoker: 1 pack daily for < 20 years, quit 1973    COPD (chronic obstructive pulmonary disease) with chronic bronchitis, 2013    Facet hypertrophy of lumbar region    History of breast cancer    Intermittent asthma    Radiation fibrosis of lung    Bronchiectasis without complication    Ectatic abdominal aorta: see CT scan 10/16; no aneurysm 10/17    Renal cysts, acquired, bilateral    Lumbar radiculopathy    Encounter for monitoring proton pump inhibitor therapy    Diverticulosis of large intestine without hemorrhage: see CT 10/14    Idiopathic gout    Liver cysts: see MRI 2018    Odynophagia    Ambien use disorder, moderate, dependence, start 2010    Lumbar spondylosis    Unspecified inflammatory spondylopathy, 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   HENT:  Negative for postnasal drip.    Eyes: Negative.    Respiratory:  Negative for cough and shortness of breath.    Cardiovascular:  Negative.    Gastrointestinal: Negative.         Some alternating diarrhea and constipation, stable currently   Musculoskeletal:  Positive for arthralgias.        Chronic stable arthralgias   Neurological:  Negative for syncope and headaches.     Objective:      Physical Exam  Constitutional:       Appearance: She is well-developed.   HENT:      Head: Normocephalic and atraumatic.      Right Ear: External ear normal.      Left Ear: External ear normal.   Eyes:      Extraocular Movements: Extraocular movements intact.      Conjunctiva/sclera: Conjunctivae normal.   Neck:      Thyroid: No thyromegaly.   Cardiovascular:      Rate and Rhythm: Normal rate and regular rhythm.      Heart sounds: Murmur heard.   Pulmonary:      Effort: No respiratory distress.      Breath sounds: No wheezing or rales.   Abdominal:      General: Bowel sounds are normal.      Palpations: Abdomen is soft.   Musculoskeletal:      Cervical back: Normal range of motion and neck supple.   Lymphadenopathy:      Cervical: No cervical adenopathy.   Skin:     General: Skin is warm and dry.      Findings: No erythema or rash.   Neurological:      General: No focal deficit present.      Mental Status: She is alert and oriented to person, place, and time.   Psychiatric:         Mood and Affect: Mood normal.         Behavior: Behavior normal.         Thought Content: Thought content normal.         Judgment: Judgment normal.       Assessment:       1. Acute metabolic encephalopathy    2. Transient neurologic deficit    3. Nonrheumatic aortic valve stenosis: SEVERE 3/23    4. History of atherectomy    5. S/P drug eluting coronary stent placement    6. Hypomagnesemia        Plan:       1. Acute metabolic encephalopathy  -     CBC Auto Differential; Future; Expected date: 05/15/2023  -     Comprehensive Metabolic Panel; Future; Expected date: 05/15/2023    2. Transient neurologic deficit    3. Nonrheumatic aortic valve stenosis: SEVERE  3/23  Overview:  3/2023  There is severe aortic valve stenosis.  Aortic valve area is 0.68 cm2; peak velocity is 4.23 m/s; mean gradient is 48 mmHg.          4. History of atherectomy    5. S/P drug eluting coronary stent placement    6. Hypomagnesemia  -     Magnesium; Future; Expected date: 05/15/2023         Stable at home stable at home with strong family support, family does not think that she needs home health or any additional assistance    Gentle hydration, continue current regimen    Alarm symptoms and ED cautions reviewed    Labs today and review    Nothing to suggest focal neurologic symptoms, this appears to have been a reaction to pain medication, long discussion and cautions reviewed    Keep Cardiology follow-up

## 2023-05-16 ENCOUNTER — TELEPHONE (OUTPATIENT)
Dept: INTERNAL MEDICINE | Facility: CLINIC | Age: 88
End: 2023-05-16
Payer: MEDICARE

## 2023-06-05 ENCOUNTER — OFFICE VISIT (OUTPATIENT)
Dept: CARDIOLOGY | Facility: CLINIC | Age: 88
End: 2023-06-05
Payer: MEDICARE

## 2023-06-05 VITALS
SYSTOLIC BLOOD PRESSURE: 137 MMHG | HEIGHT: 63 IN | BODY MASS INDEX: 25.2 KG/M2 | DIASTOLIC BLOOD PRESSURE: 88 MMHG | HEART RATE: 78 BPM | WEIGHT: 142.19 LBS

## 2023-06-05 DIAGNOSIS — E78.2 MIXED HYPERLIPIDEMIA: ICD-10-CM

## 2023-06-05 DIAGNOSIS — I10 ESSENTIAL HYPERTENSION: ICD-10-CM

## 2023-06-05 DIAGNOSIS — I25.118 CORONARY ARTERY DISEASE OF NATIVE ARTERY OF NATIVE HEART WITH STABLE ANGINA PECTORIS: ICD-10-CM

## 2023-06-05 DIAGNOSIS — I35.0 NONRHEUMATIC AORTIC VALVE STENOSIS: ICD-10-CM

## 2023-06-05 PROCEDURE — 1126F AMNT PAIN NOTED NONE PRSNT: CPT | Mod: CPTII,S$GLB,,

## 2023-06-05 PROCEDURE — 3288F PR FALLS RISK ASSESSMENT DOCUMENTED: ICD-10-PCS | Mod: CPTII,S$GLB,,

## 2023-06-05 PROCEDURE — 3288F FALL RISK ASSESSMENT DOCD: CPT | Mod: CPTII,S$GLB,,

## 2023-06-05 PROCEDURE — 1101F PT FALLS ASSESS-DOCD LE1/YR: CPT | Mod: CPTII,S$GLB,,

## 2023-06-05 PROCEDURE — 99999 PR PBB SHADOW E&M-EST. PATIENT-LVL III: ICD-10-PCS | Mod: PBBFAC,,,

## 2023-06-05 PROCEDURE — 1159F PR MEDICATION LIST DOCUMENTED IN MEDICAL RECORD: ICD-10-PCS | Mod: CPTII,S$GLB,,

## 2023-06-05 PROCEDURE — 99214 PR OFFICE/OUTPT VISIT, EST, LEVL IV, 30-39 MIN: ICD-10-PCS | Mod: S$GLB,,,

## 2023-06-05 PROCEDURE — 99214 OFFICE O/P EST MOD 30 MIN: CPT | Mod: S$GLB,,,

## 2023-06-05 PROCEDURE — 1101F PR PT FALLS ASSESS DOC 0-1 FALLS W/OUT INJ PAST YR: ICD-10-PCS | Mod: CPTII,S$GLB,,

## 2023-06-05 PROCEDURE — 99999 PR PBB SHADOW E&M-EST. PATIENT-LVL III: CPT | Mod: PBBFAC,,,

## 2023-06-05 PROCEDURE — 3072F LOW RISK FOR RETINOPATHY: CPT | Mod: CPTII,S$GLB,,

## 2023-06-05 PROCEDURE — 3072F PR LOW RISK FOR RETINOPATHY: ICD-10-PCS | Mod: CPTII,S$GLB,,

## 2023-06-05 PROCEDURE — 1159F MED LIST DOCD IN RCRD: CPT | Mod: CPTII,S$GLB,,

## 2023-06-05 PROCEDURE — 1126F PR PAIN SEVERITY QUANTIFIED, NO PAIN PRESENT: ICD-10-PCS | Mod: CPTII,S$GLB,,

## 2023-06-05 NOTE — PROGRESS NOTES
Subjective:    Patient ID:  Olga Aguiar is a 88 y.o. female patient here for evaluation No chief complaint on file.    History of Present Illness:       Mrs. Lopez is a 88 year old F who follows with Dr. Herrera and is currently seeing Dr. King right now for a TAVR workup here today for a follow up. She recently underwent PCI to the mid RCA in May. No bleeding tendencies on DAPT. She continues to feel slightly short of breath with exertion, but does feel stronger as she recovers from her PCI. Radial and femoral access sites healed well. She has a TAVR CTA scheduled for 6/14. BP has been well controlled at home. Some dizziness if she stands too quickly.         Most Recent Echocardiogram Results  Results for orders placed in visit on 03/22/23    Echo    Interpretation Summary  · The left ventricle is normal in size with concentric remodeling and normal systolic function.  · The estimated ejection fraction is 65%.  · Indeterminate left ventricular diastolic function.  · Normal right ventricular size with normal right ventricular systolic function.  · Mild aortic regurgitation.  · There is severe aortic valve stenosis.  · Aortic valve area is 0.68 cm2; peak velocity is 4.23 m/s; mean gradient is 48 mmHg.  · IVC not well visualized.    Most Recent Cardiovascular Angiogram results  Results for orders placed during the hospital encounter of 05/03/23    Cardiac catheterization    Conclusion    The estimated blood loss was none.    Procedures performed:  Coronary angiography  Right radial artery access and right common femoral artery access  Right common femoral vein access for temporary transvenous pacemaker  Rotational atherectomy of the right coronary artery  Angioplasty and stenting of the right coronary artery  Shockwave angioplasty of the right coronary artery.    Angiographic findings:  Left main coronary artery-medium size vessel moderately calcified less than 20% stenosis.  Left anterior descending small  to medium-sized vessel diffuse calcific disease seen throughout its entire course all less than 40%.  The 2 diagonals diffuse disease all less than 40%.  Ramus intermedius-small to medium-sized vessel mild disease less than 30%.  Circumflex small to medium-sized vessel gives rise to obtuse marginals mild disease less than 30%.  Right coronary artery-medium size dominant vessel heavily calcified proximal to distal portion with a 60-70% proximal stenosis followed by 95% mid lesion.    Intervention:  Critical mid RCA lesion was crossed with a wire attempted to cross with several balloons including 1.25 with GuideLiner support would not cross thus rotation atherectomy was planned.  Temporary transvenous pacemaker placed via the right common femoral vein loss of capture less than 0.5 mA.  Could not cross microcatheter [place] past the mid RCA lesion in order placed wire in the distal RCA after multiple attempts and GuideLiner for support procedure was abandoned.  The right common femoral artery is accessed into which a hockey-stick guide was placed.  Again Rotablator wire with the care of the microcatheter was used to cross the mid RCA lesion.  Rotational atherectomy using 1.25 bur x4 passes were performed reducing 95% stenosis to proximal 80%.  Angioplasty performed with a 3 mm balloon with continue 80% stenosis thus a 3.0 x 48 synergy stent was placed from the mid RCA back to the ostial RCA and deployed with up to 50% residual stenosis in the midportion 0% remainder of vessel.  Post stent angioplasty performed using a 3 mm noncompliant balloon with continue 50% stenosis.  Thus a shockwave balloon was brought down to the mid RCA and total 8 treatments given here now reducing the lesion to less than 10% with EL 3 flow.  There was EL 2 flow initially prior to above intervention in the distal RCA.    REVIEW OF SYSTEMS: As noted in HPI   CARDIOVASCULAR: No recent chest pain, palpitations, arm, neck, or jaw  pain.  RESPIRATORY: +FISCHER. No recent fever, cough chills.  : No blood in the urine  GI: No nausea, vomiting, or blood in stool.   MUSCULOSKELETAL: No myalgias or falls.   NEURO: No syncope, lightheadedness, or dizziness.  EYES: No sudden changes in vision.     Past Medical History:   Diagnosis Date    Abnormal ECG 2/6/2017    Adrenal adenoma: 2.8 X 2.1CM 2010 5/16/2014    Anticoagulant long-term use      mg    Aortic atherosclerosis: see CT scan 2016 10/28/2016    Aortic stenosis 8/19/2014    Asthma     Breast cancer     Cancer     breast, both sides, right arm restriction    Cataracts, both eyes     Chronic back pain     Chronic bronchitis     CKD (chronic kidney disease) stage 3, GFR 30-59 ml/min 8/19/2014    no dialysis    COPD (chronic obstructive pulmonary disease)     occasional inhaler use, no oxygen    DDD (degenerative disc disease), lumbar 7/30/2013    Diabetes mellitus     diet controlled    Diverticulosis     Ectatic abdominal aorta: see CT scan 10/16 10/28/2016    Gallbladder polyp 8/19/2014    GERD (gastroesophageal reflux disease)     Gout     Herpes simplex     Fever Blisters and Styes in right eye    High blood cholesterol     Hypertension     holding medication when B/P low    Hypertension associated with diabetes 2/7/2012    Insomnia     Lumbar spinal stenosis     Lymphedema of arm     Right    Multiple lung nodules 9/21/2015    Renal cyst, right: stable per CT 2016 10/28/2016    Stroke 2/2012    TIA, no residual effects    Stye 12/5/2013    Thyroid disease     hypothyroidism    Type 2 diabetes mellitus without complication, without long-term current use of insulin 7/20/2015    Unspecified hypothyroidism 7/20/2015     Past Surgical History:   Procedure Laterality Date    ATHERECTOMY, CORONARY  5/3/2023    Procedure: Atherectomy-coronary;  Surgeon: Joseluis King MD;  Location: [facility];  Service: Cardiology;;    AXILLARY NODE DISSECTION      both sides    BREAST BIOPSY      BREAST  LUMPECTOMY Bilateral     lymph nodes on right    CATARACT EXTRACTION      bilateral PHACO with IOL    [facility]    CORONARY ANGIOGRAPHY  5/3/2023    Procedure: ANGIOGRAM, CORONARY ARTERY;  Surgeon: Joseluis King MD;  Location: [facility];  Service: Cardiology;;    Epidural steroid injection      Pain mangement    EPIDURAL STEROID INJECTION INTO LUMBAR SPINE N/A 06/04/2018    Procedure: Injection-steroid-epidural-lumbar;  Surgeon: Rohan Ware MD;  Location: [facility] OR;  Service: Pain Management;  Laterality: N/A;  L5/S1 interlaminar    EPIDURAL STEROID INJECTION INTO LUMBAR SPINE N/A 09/04/2018    Procedure: Injection-steroid-epidural-lumbar L5/S1;  Surgeon: Rohan Ware MD;  Location: [facility] OR;  Service: Pain Management;  Laterality: N/A;    EPIDURAL STEROID INJECTION INTO LUMBAR SPINE N/A 12/20/2018    Procedure: Injection-steroid-epidural-lumbar;  Surgeon: Rohan Ware MD;  Location: [facility] OR;  Service: Pain Management;  Laterality: N/A;  L5/S1 interlaminar    EPIDURAL STEROID INJECTION INTO LUMBAR SPINE N/A 05/07/2019    Procedure: Injection-steroid-epidural-lumbar L5/S1;  Surgeon: Rohan Ware MD;  Location: [facility] OR;  Service: Pain Management;  Laterality: N/A;    EPIDURAL STEROID INJECTION INTO LUMBAR SPINE N/A 09/16/2019    Procedure: Injection-steroid-epidural-lumbar;  Surgeon: Rohan Ware MD;  Location: [facility] OR;  Service: Pain Management;  Laterality: N/A;  L5/S1 interlaminar     EPIDURAL STEROID INJECTION INTO LUMBAR SPINE Right 12/16/2019    Procedure: Injection-steroid-epidural-lumbar L5, S1 Right;  Surgeon: Rohan Ware MD;  Location: [facility] OR;  Service: Pain Management;  Laterality: Right;    EPIDURAL STEROID INJECTION INTO LUMBAR SPINE N/A 07/01/2020    Procedure: Injection-steroid-epidural-lumbar;  Surgeon: Rohan Ware MD;  Location: [facility] OR;  Service: Pain Management;  Laterality: N/A;  L5/S1 to RIGHT     ESOPHAGOGASTRODUODENOSCOPY N/A 11/12/2018    Procedure: EGD (ESOPHAGOGASTRODUODENOSCOPY);  Surgeon: Braydon Becerra MD;  Location: [facility] ENDO;  Service: Endoscopy;  Laterality: N/A;    HYSTERECTOMY      HYSTERECTOMY      INSERTION, PACEMAKER, TEMPORARY TRANSVENOUS  5/3/2023    Procedure: Insertion, Pacemaker, Temporary Transvenous;  Surgeon: Joseluis King MD;  Location: [facility] CATH;  Service: Cardiology;;    LEFT HEART CATHETERIZATION  5/3/2023    Procedure: Left heart cath;  Surgeon: Joseluis King MD;  Location: [facility] CATH;  Service: Cardiology;;    Nerve Block Injection      Pain management, Times 2    PERCUTANEOUS CORONARY INTERVENTION, ARTERY  5/3/2023    Procedure: Percutaneous coronary intervention;  Surgeon: Joseluis King MD;  Location: [facility] CATH;  Service: Cardiology;;    PTCA, BRACHYTHERAPY, CORONARY  5/3/2023    Procedure: PTCA, Brachytherapy, Coronary;  Surgeon: Joseluis King MD;  Location: [facility] CATH;  Service: Cardiology;;    RADIOFREQUENCY ABLATION      Nerve, Pain management    RADIOFREQUENCY ABLATION Right 11/12/2020    Procedure: Radiofrequency Ablation of the right shoulder.;  Surgeon: David Rome MD;  Location: [facility] OR;  Service: Orthopedics;  Laterality: Right;    RADIOFREQUENCY ABLATION OF LUMBAR MEDIAL BRANCH NERVE AT SINGLE LEVEL Bilateral 10/29/2019    Procedure: Radiofrequency Ablation, Nerve, Spinal, Lumbar, Medial Branch, L3, L4, L5;  Surgeon: Rohan Ware MD;  Location: [facility] OR;  Service: Pain Management;  Laterality: Bilateral;    RECTAL SURGERY      Fissure repair    TONSILLECTOMY      TRANSFORAMINAL EPIDURAL INJECTION OF STEROID Right 04/16/2021    Procedure: Injection,steroid,epidural,transforaminal approach L3/4 and L4/5;  Surgeon: Soham Vann MD;  Location: [facility] OR;  Service: Pain Management;  Laterality: Right;    TRANSFORAMINAL EPIDURAL INJECTION OF STEROID Right 12/30/2022    Procedure: Injection,steroid,epidural,transforaminal approach L3/4, L4/5;   Surgeon: Rohan Ware MD;  Location: [facility];  Service: Pain Management;  Laterality: Right;    UPPER GASTROINTESTINAL ENDOSCOPY  2014    Dr. Castro     Social History     Tobacco Use    Smoking status: Former     Packs/day: 1.00     Years: 18.00     Pack years: 18.00     Types: Cigarettes     Start date: 1952     Quit date: 1970     Years since quittin.8    Smokeless tobacco: Never   Substance Use Topics    Alcohol use: Yes     Comment: rare    Drug use: No         Objective      Vitals:    23 1308   BP: 137/88   Pulse: 78       LAST EKG  Results for orders placed or performed during the hospital encounter of 23   EKG 12-lead    Collection Time: 23  4:25 PM    Narrative    Test Reason : Z95.9,    Vent. Rate : 060 BPM     Atrial Rate : 060 BPM     P-R Int : 228 ms          QRS Dur : 092 ms      QT Int : 482 ms       P-R-T Axes : 002 -19 045 degrees     QTc Int : 482 ms    Sinus rhythm with 1st degree A-V block  Inferior infarct ,age undetermined  Abnormal ECG  When compared with ECG of 03-MAY-2023 11:03,  Premature ventricular complexes are no longer Present  Confirmed by Jaun Jose MD ([id number]) on 2023 6:52:05 PM    Referred By:  BENJAMIN           Confirmed By:Jaun Jose MD     LIPIDS - LAST 2   Lab Results   Component Value Date    CHOL 177 2023    CHOL 182 2022    HDL 52 2023    HDL 48 2022    LDLCALC 98.6 2023    LDLCALC 108.8 2022    TRIG 132 2023    TRIG 126 2022    CHOLHDL 29.4 2023    CHOLHDL 26.4 2022     CARDIAC PROFILE - LAST 2  Lab Results   Component Value Date    BNP 21 04/15/2016    TROPONINI <0.012 08/15/2018    TROPONINI 0.006 04/15/2016      CBC - LAST 2  Lab Results   Component Value Date    WBC 6.67 05/15/2023    WBC 9.60 2023    WBC 9.70 2023    RBC 4.97 05/15/2023    RBC 5.27 2023    RBC 5.47 (H) 2023    HGB 14.2 05/15/2023    HGB 15.6 2023    HGB 16.0  05/04/2023    HCT 44.5 05/15/2023    HCT 45.3 05/04/2023    HCT 47.1 05/04/2023     05/15/2023     05/04/2023     05/04/2023     Lab Results   Component Value Date    INR 1.0 04/15/2016    INR 1.10 02/07/2012    APTT 25.0 02/07/2012     CHEMISTRY - LAST 2  Lab Results   Component Value Date     (L) 05/15/2023     (L) 05/05/2023    K 3.9 05/15/2023    K 4.1 05/05/2023    CL 95 05/15/2023     05/05/2023    CO2 29 05/15/2023    CO2 22 05/05/2023    ANIONGAP 11 05/15/2023    ANIONGAP 11 05/05/2023    BUN 18 05/15/2023    BUN 15 05/05/2023    CREATININE 0.9 05/15/2023    CREATININE 0.71 05/05/2023     (H) 05/15/2023     (H) 05/05/2023    CALCIUM 9.8 05/15/2023    CALCIUM 8.7 05/05/2023    MG 1.7 05/15/2023    MG 1.8 05/05/2023    ALBUMIN 3.2 (L) 05/15/2023    ALBUMIN 4.2 05/03/2023    PROT 6.5 05/15/2023    PROT 7.5 05/03/2023    ALKPHOS 74 05/15/2023    ALKPHOS 89 05/03/2023    ALT 26 05/15/2023    ALT 31 05/03/2023    AST 28 05/15/2023    AST 36 05/03/2023    BILITOT 0.4 05/15/2023    BILITOT 0.8 05/03/2023      ENDOCRINE - LAST 2  Lab Results   Component Value Date    HGBA1C 7.9 (H) 03/01/2023    HGBA1C 7.7 (H) 09/12/2022    TSH 1.682 03/01/2023    TSH 1.262 03/14/2022        PHYSICAL EXAM  CONSTITUTIONAL: Well built, well nourished in no apparent distress  NECK: no carotid bruit, no JVD  LUNGS: CTA  CHEST WALL: no tenderness  HEART: regular rate and rhythm, S1, S2 normal, systolic murmur present   ABDOMEN: soft, non-tender; bowel sounds normal; no masses,  no organomegaly  EXTREMITIES: Femoral/radial access CDI. Extremities normal, no edema, no calf tenderness noted  NEURO: AAO X 3    I HAVE REVIEWED :    The vital signs, most recent cardiac testing, and most recent pertinent non-cardiology provider notes.    Current Outpatient Medications   Medication Instructions    acyclovir (ZOVIRAX) 400 mg, Oral, 2 times daily PRN    albuterol (PROAIR HFA) 90 mcg/actuation  inhaler 2 puffs, Inhalation, Every 6 hours PRN, Rescue    ALBUTEROL INHL Inhalation    allopurinoL (ZYLOPRIM) 100 MG tablet Take 1 tablet by mouth once daily    aspirin (ECOTRIN) 81 mg, Oral, Daily    busPIRone (BUSPAR) 5 MG Tab Take 1 tablet by mouth twice daily as needed for anxiety    cholecalciferol (vitamin D3) (VITAMIN D3) 2,000 Units, Oral, Daily    clopidogreL (PLAVIX) 75 mg, Oral, Daily    cyanocobalamin (VITAMIN B-12) 1,000 mcg, Oral, Daily    fluticasone furoate-vilanteroL (BREO ELLIPTA) 200-25 mcg/dose DsDv diskus inhaler 1 puff, Inhalation, Daily    gabapentin (NEURONTIN) 300 mg, Oral, 2 times daily    levothyroxine (SYNTHROID) 75 MCG tablet Take 1 tablet by mouth once daily    montelukast (SINGULAIR) 10 mg, Oral, Nightly    predniSONE (DELTASONE) 10 MG tablet Take one daily for 5 days for short breath and repeat as needed.    simvastatin (ZOCOR) 40 MG tablet TAKE 1 TABLET BY MOUTH ONCE DAILY IN THE EVENING    traMADoL (ULTRAM) 50 mg tablet TAKE 1 TABLET BY MOUTH THREE TIMES DAILY AS NEEDED FOR PAIN    triamterene-hydrochlorothiazide 37.5-25 mg (DYAZIDE) 37.5-25 mg per capsule Take 1 capsule by mouth once daily in the morning    zolpidem (AMBIEN) 5 MG Tab TAKE 1 TABLET BY MOUTH ONCE DAILY IN THE EVENING      Assessment & Plan     Nonrheumatic aortic valve stenosis: SEVERE 3/23  Cardiac CTA TAVR planning scheduled 6/14   Seeing Dr. Dill for planning   S/p PCI RCA     Mixed hyperlipidemia  Continue statin medication     Coronary artery disease involving native coronary artery of native heart  S/p PCI RCA Dr. dill   Continue DAPT -- no bleeding tendencies noted   Continue statin       Essential hypertension  BP stable   Not on any anti-HTN           F/u in 8 weeks or after TAVR.     Anabela Moreno PA-C   [facility]   Office: [phone]

## 2023-06-14 RX ORDER — GABAPENTIN 300 MG/1
CAPSULE ORAL
Qty: 90 CAPSULE | Refills: 0 | Status: ON HOLD | OUTPATIENT
Start: 2023-06-14 | End: 2023-08-18 | Stop reason: HOSPADM

## 2023-06-30 ENCOUNTER — PATIENT MESSAGE (OUTPATIENT)
Dept: VASCULAR SURGERY | Facility: CLINIC | Age: 88
End: 2023-06-30
Payer: MEDICARE

## 2023-07-05 ENCOUNTER — PATIENT MESSAGE (OUTPATIENT)
Dept: PAIN MEDICINE | Facility: CLINIC | Age: 88
End: 2023-07-05
Payer: MEDICARE

## 2023-07-07 ENCOUNTER — PATIENT MESSAGE (OUTPATIENT)
Dept: VASCULAR SURGERY | Facility: CLINIC | Age: 88
End: 2023-07-07
Payer: MEDICARE

## 2023-07-10 RX ORDER — BUSPIRONE HYDROCHLORIDE 5 MG/1
TABLET ORAL
Qty: 60 TABLET | Refills: 0 | Status: ON HOLD | OUTPATIENT
Start: 2023-07-10 | End: 2023-08-18 | Stop reason: HOSPADM

## 2023-07-12 ENCOUNTER — OFFICE VISIT (OUTPATIENT)
Dept: VASCULAR SURGERY | Facility: CLINIC | Age: 88
End: 2023-07-12
Payer: MEDICARE

## 2023-07-12 VITALS
HEIGHT: 63 IN | SYSTOLIC BLOOD PRESSURE: 135 MMHG | BODY MASS INDEX: 24.98 KG/M2 | DIASTOLIC BLOOD PRESSURE: 76 MMHG | WEIGHT: 141 LBS | HEART RATE: 87 BPM

## 2023-07-12 DIAGNOSIS — I35.0 NONRHEUMATIC AORTIC VALVE STENOSIS: Primary | ICD-10-CM

## 2023-07-12 PROCEDURE — 99205 PR OFFICE/OUTPT VISIT, NEW, LEVL V, 60-74 MIN: ICD-10-PCS | Mod: S$GLB,,, | Performed by: THORACIC SURGERY (CARDIOTHORACIC VASCULAR SURGERY)

## 2023-07-12 PROCEDURE — 3072F PR LOW RISK FOR RETINOPATHY: ICD-10-PCS | Mod: CPTII,S$GLB,, | Performed by: THORACIC SURGERY (CARDIOTHORACIC VASCULAR SURGERY)

## 2023-07-12 PROCEDURE — 1125F PR PAIN SEVERITY QUANTIFIED, PAIN PRESENT: ICD-10-PCS | Mod: CPTII,S$GLB,, | Performed by: THORACIC SURGERY (CARDIOTHORACIC VASCULAR SURGERY)

## 2023-07-12 PROCEDURE — 99205 OFFICE O/P NEW HI 60 MIN: CPT | Mod: S$GLB,,, | Performed by: THORACIC SURGERY (CARDIOTHORACIC VASCULAR SURGERY)

## 2023-07-12 PROCEDURE — 1125F AMNT PAIN NOTED PAIN PRSNT: CPT | Mod: CPTII,S$GLB,, | Performed by: THORACIC SURGERY (CARDIOTHORACIC VASCULAR SURGERY)

## 2023-07-12 PROCEDURE — 99999 PR PBB SHADOW E&M-EST. PATIENT-LVL III: ICD-10-PCS | Mod: PBBFAC,,, | Performed by: THORACIC SURGERY (CARDIOTHORACIC VASCULAR SURGERY)

## 2023-07-12 PROCEDURE — 1159F MED LIST DOCD IN RCRD: CPT | Mod: CPTII,S$GLB,, | Performed by: THORACIC SURGERY (CARDIOTHORACIC VASCULAR SURGERY)

## 2023-07-12 PROCEDURE — 99999 PR PBB SHADOW E&M-EST. PATIENT-LVL III: CPT | Mod: PBBFAC,,, | Performed by: THORACIC SURGERY (CARDIOTHORACIC VASCULAR SURGERY)

## 2023-07-12 PROCEDURE — 1159F PR MEDICATION LIST DOCUMENTED IN MEDICAL RECORD: ICD-10-PCS | Mod: CPTII,S$GLB,, | Performed by: THORACIC SURGERY (CARDIOTHORACIC VASCULAR SURGERY)

## 2023-07-12 PROCEDURE — 3072F LOW RISK FOR RETINOPATHY: CPT | Mod: CPTII,S$GLB,, | Performed by: THORACIC SURGERY (CARDIOTHORACIC VASCULAR SURGERY)

## 2023-07-12 NOTE — PROGRESS NOTES
DATE OF CONSULTATION: 7/12/2023     CONSULT REQUESTED BY:  Dr. Joseluis King - cardiology     REASON FOR CONSULTATION:  Severe, symptomatic aortic valve stenosis     HPI:   The patient is an 88-year-old  female with known aortic valve stenosis.  She has been followed with serial echocardiography.      The patient admits to slowly progressive exertional dyspnea and chronic bilateral (right worse than left) lower extremity edema.    Echocardiography 03/22/2023 demonstrates an ejection fraction of 65%.  There is mild aortic valve regurgitation, and trivial MR/TR.  The transvalvular velocities are 423 centimeters/second, the mean gradient is 48, and the valve area is 0.7 cm2.    The patient was referred to Dr. King for transcatheter aortic valve replacement.    In preparation for TAVR, the patient had a left heart catheterization and coronary angiogram 05/03/2023.  She underwent PCI/stent/atherectomy of a severe, mid-RCA stenosis.  She has mild to moderate stenosis in the LAD.    I tried to reviewed the CTA TAVR done 06/14/2023-I am unable to see images.    The patient visits the office today accompanied by her  of 70 years and her daughter.    Today, the patient has no complaints.  She has some right lower extremity edema which she says is chronic and at baseline.  The exertional dyspnea is stable although she is significantly dyspneic with minimal activity.    Over the last several months, the patient has become noticeably more sedentary.    The patient has significant medical comorbidity making her surgical risk nearly prohibitive.  She has longstanding diabetes, she has had bilateral breast cancer with chest irradiation and chemotherapy, she has mild chronic kidney disease, and has had a stroke.  Her advanced age, chronic back pain (spinal stenosis) and overall frail and fragile state make her surgical risk very high.    She ambulates using a rolling walker.     Data Review:  In preparation for this  consultation, I have reviewed the patient's entire [facility] medical record.  I personally reviewed images of her coronary angiogram and echocardiogram.  I am not able to view images from the CTA TAVR done 06/14/2023.      Past Medical History:   Diagnosis Date    Abnormal ECG 2/6/2017    Adrenal adenoma: 2.8 X 2.1CM 2010 5/16/2014    Anticoagulant long-term use      mg    Aortic atherosclerosis: see CT scan 2016 10/28/2016    Aortic stenosis 8/19/2014    Asthma     Breast cancer     Cancer     breast, both sides, right arm restriction    Cataracts, both eyes     Chronic back pain     Chronic bronchitis     CKD (chronic kidney disease) stage 3, GFR 30-59 ml/min 8/19/2014    no dialysis    COPD (chronic obstructive pulmonary disease)     occasional inhaler use, no oxygen    DDD (degenerative disc disease), lumbar 7/30/2013    Diabetes mellitus     diet controlled    Diverticulosis     Ectatic abdominal aorta: see CT scan 10/16 10/28/2016    Gallbladder polyp 8/19/2014    GERD (gastroesophageal reflux disease)     Gout     Herpes simplex     Fever Blisters and Styes in right eye    High blood cholesterol     Hypertension     holding medication when B/P low    Hypertension associated with diabetes 2/7/2012    Insomnia     Lumbar spinal stenosis     Lymphedema of arm     Right    Multiple lung nodules 9/21/2015    Renal cyst, right: stable per CT 2016 10/28/2016    Stroke 2/2012    TIA, no residual effects    Stye 12/5/2013    Thyroid disease     hypothyroidism    Type 2 diabetes mellitus without complication, without long-term current use of insulin 7/20/2015    Unspecified hypothyroidism 7/20/2015        Hypertension, hyperlipidemia, long-standing diabetes, coronary artery disease, bilateral breast cancer-X RT, chemotherapy-2000, chronic back pain, right upper extremity lymph edema, chronic kidney disease, stroke, hypothyroidism     Past Surgical History:   Procedure Laterality Date    ATHERECTOMY, CORONARY   5/3/2023    Procedure: Atherectomy-coronary;  Surgeon: Joseluis King MD;  [facility];  Service: Cardiology;;    AXILLARY NODE DISSECTION      both sides    BREAST BIOPSY      BREAST LUMPECTOMY Bilateral     lymph nodes on right    CATARACT EXTRACTION      bilateral PHACO with IOL    COLONOSCOPY  01/12/2010    in legacy    CORONARY ANGIOGRAPHY  5/3/2023    Procedure: ANGIOGRAM, CORONARY ARTERY;  Surgeon: Joseluis King MD;  Location: [facility] C[facility];  Service: Cardiology;;    Epidural steroid injection      Pain mangement    EPIDURAL STEROID INJECTION INTO LUMBAR SPINE N/A 06/04/2018    Procedure: Injection-steroid-epidural-lumbar;  Surgeon: Rohan Ware MD;  Location: [facility] OR;  Service: Pain Management;  Laterality: N/A;  L5/S1 interlaminar    EPIDURAL STEROID INJECTION INTO LUMBAR SPINE N/A 09/04/2018    Procedure: Injection-steroid-epidural-lumbar L5/S1;  Surgeon: Rohan Ware MD;  Location: [facility] OR;  Service: Pain Management;  Laterality: N/A;    EPIDURAL STEROID INJECTION INTO LUMBAR SPINE N/A 12/20/2018    Procedure: Injection-steroid-epidural-lumbar;  Surgeon: Rohan Ware MD;  Location: [facility] OR;  Service: Pain Management;  Laterality: N/A;  L5/S1 interlaminar    EPIDURAL STEROID INJECTION INTO LUMBAR SPINE N/A 05/07/2019    Procedure: Injection-steroid-epidural-lumbar L5/S1;  Surgeon: Rohan Ware MD;  Location: [facility] OR;  Service: Pain Management;  Laterality: N/A;    EPIDURAL STEROID INJECTION INTO LUMBAR SPINE N/A 09/16/2019    Procedure: Injection-steroid-epidural-lumbar;  Surgeon: Rohan Ware MD;  Location: [facility] OR;  Service: Pain Management;  Laterality: N/A;  L5/S1 interlaminar     EPIDURAL STEROID INJECTION INTO LUMBAR SPINE Right 12/16/2019    Procedure: Injection-steroid-epidural-lumbar L5, S1 Right;  Surgeon: Rohan Ware MD;  Location: [facility] OR;  Service: Pain Management;  Laterality: Right;    EPIDURAL STEROID INJECTION INTO LUMBAR SPINE N/A 07/01/2020     Procedure: Injection-steroid-epidural-lumbar;  Surgeon: Rohan Ware MD;  Location: [facility] OR;  Service: Pain Management;  Laterality: N/A;  L5/S1 to RIGHT    ESOPHAGOGASTRODUODENOSCOPY N/A 11/12/2018    Procedure: EGD (ESOPHAGOGASTRODUODENOSCOPY);  Surgeon: Braydon Becerra MD;  Location: [facility] ENDO;  Service: Endoscopy;  Laterality: N/A;    HYSTERECTOMY      HYSTERECTOMY      INSERTION, PACEMAKER, TEMPORARY TRANSVENOUS  5/3/2023    Procedure: Insertion, Pacemaker, Temporary Transvenous;  Surgeon: Joseluis King MD;  Location: [facility];  Service: Cardiology;;    LEFT HEART CATHETERIZATION  5/3/2023    Procedure: Left heart cath;  Surgeon: Joseluis King MD;  Location: [facility];  Service: Cardiology;;    Nerve Block Injection      Pain management, Times 2    PERCUTANEOUS CORONARY INTERVENTION, ARTERY  5/3/2023    Procedure: Percutaneous coronary intervention;  Surgeon: Joseluis King MD;  Location: [facility];  Service: Cardiology;;    PTCA, BRACHYTHERAPY, CORONARY  5/3/2023    Procedure: PTCA, Brachytherapy, Coronary;  Surgeon: Joseluis King MD;  Location: [facility];  Service: Cardiology;;    RADIOFREQUENCY ABLATION      Nerve, Pain management    RADIOFREQUENCY ABLATION Right 11/12/2020    Procedure: Radiofrequency Ablation of the right shoulder.;  Surgeon: David Rome MD;  Location: [facility] OR;  Service: Orthopedics;  Laterality: Right;    RADIOFREQUENCY ABLATION OF LUMBAR MEDIAL BRANCH NERVE AT SINGLE LEVEL Bilateral 10/29/2019    Procedure: Radiofrequency Ablation, Nerve, Spinal, Lumbar, Medial Branch, L3, L4, L5;  Surgeon: Rohan Ware MD;  Location: [facility] OR;  Service: Pain Management;  Laterality: Bilateral;    RECTAL SURGERY      Fissure repair    TONSILLECTOMY      TRANSFORAMINAL EPIDURAL INJECTION OF STEROID Right 04/16/2021    Procedure: Injection,steroid,epidural,transforaminal approach L3/4 and L4/5;  Surgeon: Soham Vann MD;  Location: [facility] OR;  Service: Pain Management;   Laterality: Right;    TRANSFORAMINAL EPIDURAL INJECTION OF STEROID Right 2022    Procedure: Injection,steroid,epidural,transforaminal approach L3/4, L4/5;  Surgeon: Rohan Ware MD;  Location: [facility];  Service: Pain Management;  Laterality: Right;    UPPER GASTROINTESTINAL ENDOSCOPY  2014    Dr. Castro        Anal fissure, hysterectomy, bilateral breast lumpectomy with XRT, cataract repair, PCI/stent-     Social History     Socioeconomic History    Marital status:    Tobacco Use    Smoking status: Former     Packs/day: 1.00     Years: 18.00     Pack years: 18.00     Types: Cigarettes     Start date: 1952     Quit date: 1970     Years since quittin.9    Smokeless tobacco: Never   Substance and Sexual Activity    Alcohol use: Yes     Comment: rare    Drug use: No    Sexual activity: Not Currently     Social Determinants of Health     Financial Resource Strain: Low Risk     Difficulty of Paying Living Expenses: Not hard at all   Food Insecurity: No Food Insecurity    Worried About Running Out of Food in the Last Year: Never true    Ran Out of Food in the Last Year: Never true   Transportation Needs: No Transportation Needs    Lack of Transportation (Medical): No    Lack of Transportation (Non-Medical): No   Physical Activity: Inactive    Days of Exercise per Week: 0 days    Minutes of Exercise per Session: 0 min   Stress: No Stress Concern Present    Feeling of Stress : Not at all   Social Connections: Unknown    Frequency of Communication with Friends and Family: More than three times a week    Frequency of Social Gatherings with Friends and Family: Twice a week    Active Member of Clubs or Organizations: No    Attends Club or Organization Meetings: Never    Marital Status:    Housing Stability: Low Risk     Unable to Pay for Housing in the Last Year: No    Number of Places Lived in the Last Year: 1    Unstable Housing in the Last Year: No        The patient has a  15 pack-year history of tobacco abuse.  She quit smoking cigarettes in 1967.  She does not consume alcohol.  She has been  for 70 years.  She has 2 adult children both of whom are in good health.  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  The patient does not receive regular dental care.  She has no current dental complaints.      Today, I educated the patient and her family about the importance of dental antibiotic prophylaxis-Augmentin 875/125-iii PO one hour prior to any dental intervention.       Prior to Admission Meds (Last known outpatient meds at time of note signature)   Prior to Admission medications    Medication Sig Start Date End Date Taking? Authorizing Provider   albuterol (PROAIR HFA) 90 mcg/actuation inhaler Inhale 2 puffs into the lungs every 6 (six) hours as needed for Wheezing. Rescue 9/13/22  Yes Saul Heller MD   ALBUTEROL INHL Inhale into the lungs.   Yes Historical Provider   allopurinoL (ZYLOPRIM) 100 MG tablet Take 1 tablet by mouth once daily 4/30/23  Yes Sera Barger MD   aspirin (ECOTRIN) 81 MG EC tablet Take 81 mg by mouth once daily.   Yes Historical Provider   busPIRone (BUSPAR) 5 MG Tab Take 1 tablet by mouth twice daily as needed for anxiety 7/10/23  Yes Sera Barger MD   cholecalciferol, vitamin D3, 2,000 unit Cap Take 2,000 Units by mouth Daily. 3/26/12  Yes Historical Provider   clopidogreL (PLAVIX) 75 mg tablet Take 1 tablet (75 mg total) by mouth once daily. 5/3/23  Yes Joseluis King MD   cyanocobalamin (VITAMIN B-12) 1000 MCG tablet Take 1 tablet (1,000 mcg total) by mouth once daily. 4/3/23  Yes Sera Barger MD   fluticasone furoate-vilanteroL (BREO ELLIPTA) 200-25 mcg/dose DsDv diskus inhaler Inhale 1 puff into the lungs once daily. 9/13/22  Yes Saul Heller MD   gabapentin (NEURONTIN) 300 MG capsule TAKE 1 CAPSULE BY MOUTH THREE TIMES DAILY 6/14/23  Yes EVERETT Lott   levothyroxine  (SYNTHROID) 75 MCG tablet Take 1 tablet by mouth once daily 4/30/23  Yes Sera Barger MD   simvastatin (ZOCOR) 40 MG tablet TAKE 1 TABLET BY MOUTH ONCE DAILY IN THE EVENING 4/30/23  Yes Sera Barger MD   traMADoL (ULTRAM) 50 mg tablet TAKE 1 TABLET BY MOUTH THREE TIMES DAILY AS NEEDED FOR PAIN 4/17/23  Yes Rohan Ware MD   triamterene-hydrochlorothiazide 37.5-25 mg (DYAZIDE) 37.5-25 mg per capsule Take 1 capsule by mouth once daily in the morning 4/30/23  Yes Sera Barger MD   zolpidem (AMBIEN) 5 MG Tab TAKE 1 TABLET BY MOUTH ONCE DAILY IN THE EVENING 5/16/23  Yes Sera Barger MD   acyclovir (ZOVIRAX) 400 MG tablet Take 400 mg by mouth 2 (two) times daily as needed.    Historical Provider   montelukast (SINGULAIR) 10 mg tablet Take 1 tablet (10 mg total) by mouth every evening. 4/2/20   Saul Heller MD   predniSONE (DELTASONE) 10 MG tablet Take one daily for 5 days for short breath and repeat as needed. 9/13/22   Saul Heller MD        Review of Systems:   Constitutional: no fever, no chills, no appetite change, no unexpected weight change   Dermatological: no jaundice, no rash, no nodules, no ulcers, no pruritis   HEENT: no vision change, no hearing change, no nasal discharge, no sore throat   Neck: no unusual stiffness, no swollen glands, no goiter   Respiratory: (+) dyspnea, no cough, no hemoptysis, no wheezing,  Cardiovascular: no chest pain, no palpitations, (+)edema   Gastrointestinal: no abdominal discomfort   Musculoskeletal: no new joint pain, no joint swelling, no myalgia   : no dysuria, no frequency, no gross hematuria   HEME: no prolonged bleeding, no excessive bruising, no blood clots, no adenopathy   Endocrine: no excessive thirst, no unusual intolerance of heat or cold   Neurological: no confusion, no seizures, no syncope, no falls   Psychological: no anxiety, no depression     Objective:   Vitals:    07/12/23 1016   BP: 135/76   Pulse: 87       Physical Exam:    Constitutional: Alert, appears stated age, cooperative and no distress.  Normal body habitus, no obvious deformities, good attention to grooming.   Head: Normocephalic, without obvious abnormality, atraumatic   Eyes: Conjunctivae/corneas clear. PERRL, EOM's intact. No exudate.  Nose: Nares normal. Septum midline. Mucosa normal. No drainage or sinus tenderness.   Throat: Lips, mucosa, and tongue normal; fair dentition   Neck: No adenopathy, (+)carotid bruit vs transmitted murmur, no JVD, supple, symmetrical, trachea midline, and thyroid not enlarged, symmetric, no tenderness/mass/nodules.   Back: Symmetric, no curvature ROM normal No CVA tenderness.   Lungs: Clear to auscultation bilaterally and good air exchange. No tachypnea. No use of accessory muscles.   Heart: Regular rate and rhythm, S1, S2 normal, loud, systolic murmur, no click, rub or gallop. PMI nondisplaced.   Abdomen: Soft, non-tender, bowel sounds normal; No hepatosplenomegaly. No hernias   Aorta: no evidence of aneurysm   Musculoskeletal:  Significant kyphosis and scoliosis Normal gait. Normal muscle strength and tone. No evidence of limb atrophy or abnormal movements.   Extremities: Normal, atraumatic, no clubbing, cyanosis.  2+ right lower extremity edema. There are no venous varicosities   Pulses: 2+ and symmetric in both radial and femoral regions.  Skin: Skin color, texture, turgor normal.  No rashes or lesions.  Lymph nodes: Cervical, supraclavicular, and axillary nodes normal   Neurologic/psychiatric: Cranial nerves 2-12 are grossly intact No obvious abnormality. Oriented to person, place, and time. No depression, anxiety or agitation.     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 02/21/2020    Lumbar spondylosis 10/29/2019    Ambien use disorder, moderate, dependence, start 2010 06/18/2019    Odynophagia 01/29/2019    Liver cysts: see MRI 2018 10/24/2018    Idiopathic gout 10/17/2017    Diverticulosis of large intestine without hemorrhage: see CT 10/14 08/01/2017    Encounter for monitoring proton pump inhibitor therapy 05/18/2017    Lumbar radiculopathy 11/11/2016    Ectatic abdominal aorta: see CT scan 10/16; no aneurysm 10/17 10/28/2016    Renal cysts, acquired, bilateral 10/28/2016    Intermittent asthma 09/28/2016    Radiation fibrosis of lung 09/28/2016    Bronchiectasis without complication 09/28/2016    History of breast cancer 09/21/2016    Facet hypertrophy of lumbar region 07/29/2016    COPD (chronic obstructive pulmonary disease) with chronic bronchitis, 2013 11/04/2015    Transient cerebral ischemia: 2012 09/21/2015    Primary insomnia 09/21/2015    Gastroesophageal reflux disease without esophagitis 09/21/2015    Multiple lung nodules: stable CT 10/2016, also 2019 09/21/2015    Former smoker: 1 pack daily for < 20 years, quit 1973 09/21/2015    Controlled type 2 diabetes mellitus with complication, without long-term current use of insulin 07/20/2015    Acquired hypothyroidism 07/20/2015    Cervical spinal stenosis 03/25/2015    Pancreatic cyst: stable on MRI 11/16 also 2018, due again 2020 (patient declined) 10/23/2014    Nonrheumatic aortic valve stenosis: SEVERE 3/23 08/22/2014    LVH (left ventricular hypertrophy) 08/22/2014    Gallbladder polyp 08/19/2014    CKD (chronic kidney disease) stage 3, GFR 30-59 ml/min 08/19/2014    Lymph edema, right arm 08/18/2014    Adrenal adenoma: 2.8 X 2.1CM 2010; stable 2016, also stable 2018 05/16/2014    Lumbosacral spondylosis without myelopathy 04/23/2014    Chronic  pain syndrome 11/18/2013    Essential hypertension 06/29/2012          Plan:  The patient has severe, symptomatic aortic valve stenosis.    She will benefit from aortic valve replacement.  I believe transcatheter delivery of the aortic valve prosthesis is the patient's best option.  She is a near-prohibitive surgical risk in light of her many medical comorbidities, advanced aged, and overall frailty and fragility.    I had a long (> 1 hour) discussion with the patient, her , and her daughter in the office today.  I reviewed the indications and potential risks associated with aortic valve replacement.  Many questions were asked and answered.  I reviewed, in detail, the steps involved in a transcatheter aortic valve replacement.    I also calculated and discussed the 30-day STS risk for morbidity or mortality with them.     [facility] Database  RISK SCORES  Procedure: Isolated AVR  Risk of Mortality:  10.629%  Renal Failure:  2.684%  Permanent Stroke:  2.424%  Prolonged Ventilation:  17.676%  DSW Infection:  0.053%  Reoperation:  3.953%  Morbidity or Mortality:  21.150%  Short Length of Stay:  16.494%  Long Length of Stay:  12.322%     The patient and her family seem to understand and wish to proceed as advised.    The patient has been scheduled for transcatheter aortic valve replacement 07/26/2023.      Thank you, Dr. King, for allowing me to participate in the care of this patient.

## 2023-07-26 PROBLEM — I50.30 NYHA CLASS 3 HEART FAILURE WITH PRESERVED EJECTION FRACTION: Status: ACTIVE | Noted: 2023-07-26

## 2023-07-26 PROBLEM — R53.83 FATIGUE: Status: ACTIVE | Noted: 2023-07-26

## 2023-07-27 PROBLEM — Z95.2 S/P TAVR (TRANSCATHETER AORTIC VALVE REPLACEMENT): Status: ACTIVE | Noted: 2023-07-27

## 2023-07-27 PROBLEM — R41.82 ALTERED MENTAL STATUS: Status: ACTIVE | Noted: 2023-07-27

## 2023-07-27 PROBLEM — Z95.3 S/P TAVR (TRANSCATHETER AORTIC VALVE REPLACEMENT): Status: ACTIVE | Noted: 2023-07-27

## 2023-07-28 PROBLEM — G93.40 ENCEPHALOPATHY: Status: ACTIVE | Noted: 2023-07-28

## 2023-08-09 PROBLEM — I63.40: Status: ACTIVE | Noted: 2023-08-09

## 2023-08-09 PROBLEM — N30.00 ACUTE CYSTITIS WITHOUT HEMATURIA: Status: ACTIVE | Noted: 2023-08-09

## 2023-08-18 ENCOUNTER — PATIENT MESSAGE (OUTPATIENT)
Dept: INTERNAL MEDICINE | Facility: CLINIC | Age: 88
End: 2023-08-18
Payer: MEDICARE

## 2023-08-18 NOTE — TELEPHONE ENCOUNTER
Okay to work her in to see me in the next week or so    [place] but whatever they prefers fine with me

## 2023-08-19 ENCOUNTER — PATIENT MESSAGE (OUTPATIENT)
Dept: INTERNAL MEDICINE | Facility: CLINIC | Age: 88
End: 2023-08-19
Payer: MEDICARE

## 2023-08-23 ENCOUNTER — OFFICE VISIT (OUTPATIENT)
Dept: INTERNAL MEDICINE | Facility: CLINIC | Age: 88
End: 2023-08-23
Payer: MEDICARE

## 2023-08-23 VITALS — BODY MASS INDEX: 24.29 KG/M2 | HEIGHT: 62 IN | WEIGHT: 132 LBS

## 2023-08-23 DIAGNOSIS — M10.00 IDIOPATHIC GOUT, UNSPECIFIED CHRONICITY, UNSPECIFIED SITE: ICD-10-CM

## 2023-08-23 DIAGNOSIS — F51.01 PRIMARY INSOMNIA: ICD-10-CM

## 2023-08-23 DIAGNOSIS — G47.00 INSOMNIA: ICD-10-CM

## 2023-08-23 DIAGNOSIS — E53.8 LOW SERUM VITAMIN B12: ICD-10-CM

## 2023-08-23 DIAGNOSIS — N30.00 ACUTE CYSTITIS WITHOUT HEMATURIA: ICD-10-CM

## 2023-08-23 DIAGNOSIS — G93.41 METABOLIC ENCEPHALOPATHY: Primary | ICD-10-CM

## 2023-08-23 DIAGNOSIS — Z95.2 S/P TAVR (TRANSCATHETER AORTIC VALVE REPLACEMENT): ICD-10-CM

## 2023-08-23 DIAGNOSIS — E11.8 CONTROLLED TYPE 2 DIABETES MELLITUS WITH COMPLICATION, WITHOUT LONG-TERM CURRENT USE OF INSULIN: ICD-10-CM

## 2023-08-23 DIAGNOSIS — E03.9 ACQUIRED HYPOTHYROIDISM: ICD-10-CM

## 2023-08-23 PROBLEM — R41.82 ALTERED MENTAL STATUS: Status: RESOLVED | Noted: 2023-07-27 | Resolved: 2023-08-23

## 2023-08-23 PROBLEM — G93.40 ENCEPHALOPATHY: Status: RESOLVED | Noted: 2023-07-28 | Resolved: 2023-08-23

## 2023-08-23 PROBLEM — R53.83 FATIGUE: Status: RESOLVED | Noted: 2023-07-26 | Resolved: 2023-08-23

## 2023-08-23 PROBLEM — F13.20: Status: RESOLVED | Noted: 2019-06-18 | Resolved: 2023-08-23

## 2023-08-23 PROCEDURE — 1111F PR DISCHARGE MEDS RECONCILED W/ CURRENT OUTPATIENT MED LIST: ICD-10-PCS | Mod: CPTII,95,, | Performed by: INTERNAL MEDICINE

## 2023-08-23 PROCEDURE — 1159F MED LIST DOCD IN RCRD: CPT | Mod: CPTII,95,, | Performed by: INTERNAL MEDICINE

## 2023-08-23 PROCEDURE — 3072F LOW RISK FOR RETINOPATHY: CPT | Mod: CPTII,95,, | Performed by: INTERNAL MEDICINE

## 2023-08-23 PROCEDURE — 1160F RVW MEDS BY RX/DR IN RCRD: CPT | Mod: CPTII,95,, | Performed by: INTERNAL MEDICINE

## 2023-08-23 PROCEDURE — 3072F PR LOW RISK FOR RETINOPATHY: ICD-10-PCS | Mod: CPTII,95,, | Performed by: INTERNAL MEDICINE

## 2023-08-23 PROCEDURE — 1111F DSCHRG MED/CURRENT MED MERGE: CPT | Mod: CPTII,95,, | Performed by: INTERNAL MEDICINE

## 2023-08-23 PROCEDURE — 1160F PR REVIEW ALL MEDS BY PRESCRIBER/CLIN PHARMACIST DOCUMENTED: ICD-10-PCS | Mod: CPTII,95,, | Performed by: INTERNAL MEDICINE

## 2023-08-23 PROCEDURE — 99214 PR OFFICE/OUTPT VISIT, EST, LEVL IV, 30-39 MIN: ICD-10-PCS | Mod: 95,,, | Performed by: INTERNAL MEDICINE

## 2023-08-23 PROCEDURE — 1159F PR MEDICATION LIST DOCUMENTED IN MEDICAL RECORD: ICD-10-PCS | Mod: CPTII,95,, | Performed by: INTERNAL MEDICINE

## 2023-08-23 PROCEDURE — 99214 OFFICE O/P EST MOD 30 MIN: CPT | Mod: 95,,, | Performed by: INTERNAL MEDICINE

## 2023-08-23 RX ORDER — ZOLPIDEM TARTRATE 5 MG/1
5 TABLET ORAL NIGHTLY
Qty: 30 TABLET | Refills: 0 | Status: SHIPPED | OUTPATIENT
Start: 2023-08-23 | End: 2023-10-24

## 2023-08-23 RX ORDER — SPIRONOLACTONE 25 MG/1
12.5 TABLET ORAL DAILY
Qty: 15 TABLET | Refills: 11
Start: 2023-08-23 | End: 2024-08-22

## 2023-08-23 NOTE — PROGRESS NOTES
Telemedicine Video Visit    The patient location is:  Patient Home   The chief complaint leading to consultation is: hospital stay  Visit type: Virtual visit with synchronous audio and video  Total time spent with patient: 30 minutes  Each patient to whom he or she provides medical services by telemedicine is:  (1) informed of the relationship between the physician and patient and the respective role of any other health care provider with respect to management of the patient; and (2) notified that he or she may decline to receive medical services by telemedicine and may withdraw from such care at any time.     Hospital follow up    First stay:  Hospital Course (synopsis of major diagnoses, care, treatment, and services provided during the course of the hospital stay): Patient electively admitted for TAVR via CVO on 7/26/23,  procedure was performed via transfemoral access. A temp pacer was inserted through rt IJV. The Metronic valve 26 mm Evolut PRO fx was inserted and deployed. Procedure went well with no complications. Received Versed in PACU for agitation per anesthesia, regardless of known,  recent,  similar episode of metabolic encephalopathy, following procedure and medication. Confusion and agitation continued, remained hemodynamically stable, received reversal with Romazicon, with improvement in agitation but remained confused and non verbal. A CT head was obtained with no acute findings. Symptoms persisted, neurology was consulted ( see neuro consult and notes for details), and she underwent MRI of the brain 07/29/2023 with findings of small left cerebellar acute or early subacute lacunar infarct and bilateral tiny MCA-PCA watershed territory infarcts, per neurology not the cause of her neuro status changes but consistent with metabolic encephalopathy due to sedation agents. 8/3/23 had spike in temp over the night, up to 101.0 F axillary, chest x-ray normal, blood cultures no growth to date, urine analysis  and culture done revealed Pseudomonas, Cipro x 5 days. Received Provigil x 3 days with mental status improvement back to baseline. Remained markedly deconditioned. PT recs for SNF, pt accepred at TCU in [place], cleared for discharge. Continue meds as bellow. Follow up with dr Herrera in 3 weeks, F/up in Valve clinic as scheduled.        She then presented here post TAVR for aortic stenosis with deconditioning and UTI. Patient underwent TAVR recently for severe aortic stenosis. Patient tolerated procedure well. Post procedure patient did have noted AMS. Patient diagnosed with acute metabolic encephalopathy and found to have UTI. IV antibiotics ordered at that time. Patient also with noted small cerebellar infarct not clinically effecting mental status. Patient was followed by neurology, CT surgery and cardiology. Patient made slow recovery. Patient eventually transitioned to TCU for continued PT and OT, continued antibiotics, close physician monitoring as well as management of chronic issues including DM, HTN, CAD, hypothyroid, COPD.         * No surgery found *       Hospital Course:   Patient admitted for continued treatment of acute metabolic encephalopathy, deconditioning status post TAVR and UTI. PT and OT consulted. Mental status closely monitored and slowly improved. Strength and endurance improved with significant improvement in ambulation. Patient eventually discharged home with close follow up.     Meds adjusted    Taken off pain meds and off sleep meds    Not sleeping well- really feels she cannot do without Ambien.  Long discussion with daughter Kiki and  Pino.  Prior to TAVR and recent episodes she had been on Ambien for decades with no issues.  Safety concerns reviewed.    acyclovir 400 mg as needed  Albuterol 90mcg 2 puffs/6 hr  allopurinol 100mg  1/day  amlodipine 5mg 1/day  Aspirin 81mg  1/day  Brel Ellipta 200/25 inhl  1 puff/day  clopidogel 75mg PLAVIX 1/day  culturelle   Probiotic  levothyroxine  synthroid  melatonin 3mg  simvastatin  40mg 1/day evenings  spironolactone 25mg 1/2 tab 1/day  Tylenol as needed  vitamin B-12  units  vitamin D-3  2000 units     Patient Active Problem List   Diagnosis    Essential hypertension    Chronic pain syndrome    Lumbosacral spondylosis without myelopathy    Adrenal adenoma: 2.8 X 2.1CM 2010; stable 2016, also stable 2018    Lymph edema, right arm    Gallbladder polyp    CKD (chronic kidney disease) stage 3, GFR 30-59 ml/min    Severe aortic stenosis    LVH (left ventricular hypertrophy)    Pancreatic cyst: stable on MRI 11/16 also 2018, due again 2020 (patient declined)    Cervical spinal stenosis    Controlled type 2 diabetes mellitus with complication, without long-term current use of insulin    Acquired hypothyroidism    Transient cerebral ischemia: 2012    Primary insomnia    Gastroesophageal reflux disease without esophagitis    Multiple lung nodules: stable CT 10/2016, also 2019    Former smoker: 1 pack daily for < 20 years, quit 1973    COPD (chronic obstructive pulmonary disease) with chronic bronchitis, 2013    Facet hypertrophy of lumbar region    History of breast cancer    Intermittent asthma    Radiation fibrosis of lung    Bronchiectasis without complication    Ectatic abdominal aorta: see CT scan 10/16; no aneurysm 10/17    Renal cysts, acquired, bilateral    Lumbar radiculopathy    Encounter for monitoring proton pump inhibitor therapy    Diverticulosis of large intestine without hemorrhage: see CT 10/14    Idiopathic gout    Liver cysts: see MRI 2018    Odynophagia    Lumbar spondylosis    Unspecified inflammatory spondylopathy, 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    Chronic right shoulder pain    Mixed hyperlipidemia    Aortic atherosclerosis    Low serum vitamin B12    Other specified disorders of adrenal gland    Coronary artery disease involving native coronary artery of native heart    NYHA class 3 heart failure with preserved ejection  fraction    S/P TAVR (transcatheter aortic valve replacement)    Acute cystitis without hematuria    Cerebral infarction due to embolism        Answers submitted by the patient for this visit:  Review of Systems Questionnaire (Submitted on 8/23/2023)  activity change: No  unexpected weight change: No  neck pain: No  hearing loss: No  rhinorrhea: No  trouble swallowing: No  eye discharge: No  visual disturbance: No  chest tightness: No  wheezing: No  chest pain: No  palpitations: No  blood in stool: No  constipation: No  vomiting: No  diarrhea: No  polydipsia: No  polyuria: No  difficulty urinating: No  hematuria: No  menstrual problem: No  dysuria: No  joint swelling: No  arthralgias: No  headaches: No  weakness: No  confusion: No  dysphoric mood: No    Physical Exam  Constitutional:       Appearance: She is well-developed.   HENT:      Head: Normocephalic and atraumatic.   Eyes:      Extraocular Movements: Extraocular movements intact.      Conjunctiva/sclera: Conjunctivae normal.   Neck:      Thyroid: No thyromegaly.   Pulmonary:      Effort: No respiratory distress.   Neurological:      General: No focal deficit present.      Mental Status: She is alert and oriented to person, place, and time.   Psychiatric:         Mood and Affect: Mood normal.         Behavior: Behavior normal.         Thought Content: Thought content normal.         Judgment: Judgment normal.       1. Metabolic encephalopathy    2. S/P TAVR (transcatheter aortic valve replacement)  Overview:  7/26/23 - Christine  Medtronic FX  26 mm valve        3. Acute cystitis without hematuria    4. Primary insomnia    5. Idiopathic gout, unspecified chronicity, unspecified site    6. Insomnia  -     zolpidem (AMBIEN) 5 MG Tab; Take 1 tablet (5 mg total) by mouth every evening.  Dispense: 30 tablet; Refill: 0    7. Controlled type 2 diabetes mellitus with complication, without long-term current use of insulin    8. Acquired hypothyroidism    9. Low serum vitamin  B12    Other orders  -     spironolactone (ALDACTONE) 25 MG tablet; Take 0.5 tablets (12.5 mg total) by mouth once daily.  Dispense: 15 tablet; Refill: 11      reviewed  Long discussion about safety of meds  Fall prevention  Mental status concerns  Family very involved  Eating and drinking well  Keep Cardiology follow up for next week

## 2023-08-25 ENCOUNTER — PATIENT MESSAGE (OUTPATIENT)
Dept: INTERNAL MEDICINE | Facility: CLINIC | Age: 88
End: 2023-08-25
Payer: MEDICARE

## 2023-08-30 ENCOUNTER — PATIENT MESSAGE (OUTPATIENT)
Dept: INTERNAL MEDICINE | Facility: CLINIC | Age: 88
End: 2023-08-30
Payer: MEDICARE

## 2023-09-11 ENCOUNTER — PATIENT MESSAGE (OUTPATIENT)
Dept: PAIN MEDICINE | Facility: CLINIC | Age: 88
End: 2023-09-11
Payer: MEDICARE

## 2023-09-13 ENCOUNTER — PATIENT MESSAGE (OUTPATIENT)
Dept: CARDIOLOGY | Facility: CLINIC | Age: 88
End: 2023-09-13
Payer: MEDICARE

## 2023-09-13 DIAGNOSIS — I10 ESSENTIAL HYPERTENSION: Primary | ICD-10-CM

## 2023-09-14 ENCOUNTER — PATIENT MESSAGE (OUTPATIENT)
Dept: CARDIOLOGY | Facility: CLINIC | Age: 88
End: 2023-09-14
Payer: MEDICARE

## 2023-09-14 ENCOUNTER — PATIENT MESSAGE (OUTPATIENT)
Dept: PAIN MEDICINE | Facility: CLINIC | Age: 88
End: 2023-09-14
Payer: MEDICARE

## 2023-09-14 DIAGNOSIS — M51.36 DDD (DEGENERATIVE DISC DISEASE), LUMBAR: ICD-10-CM

## 2023-09-14 RX ORDER — AMLODIPINE BESYLATE 5 MG/1
5 TABLET ORAL DAILY
Qty: 90 TABLET | Refills: 3 | Status: ON HOLD | OUTPATIENT
Start: 2023-09-14 | End: 2023-11-30 | Stop reason: SDUPTHER

## 2023-09-15 RX ORDER — TRAMADOL HYDROCHLORIDE 50 MG/1
50 TABLET ORAL EVERY 8 HOURS PRN
Qty: 90 TABLET | Refills: 2 | Status: SHIPPED | OUTPATIENT
Start: 2023-09-15 | End: 2023-12-15

## 2023-10-16 DIAGNOSIS — J44.89 COPD (CHRONIC OBSTRUCTIVE PULMONARY DISEASE) WITH CHRONIC BRONCHITIS: ICD-10-CM

## 2023-10-16 DIAGNOSIS — J47.9 BRONCHIECTASIS WITHOUT COMPLICATION: ICD-10-CM

## 2023-10-16 DIAGNOSIS — J45.20 INTERMITTENT ASTHMA, UNCOMPLICATED: ICD-10-CM

## 2023-10-16 RX ORDER — FLUTICASONE FUROATE AND VILANTEROL 200; 25 UG/1; UG/1
1 POWDER RESPIRATORY (INHALATION) DAILY
Qty: 60 EACH | Refills: 11 | Status: SHIPPED | OUTPATIENT
Start: 2023-10-16

## 2023-10-24 DIAGNOSIS — G47.00 INSOMNIA: ICD-10-CM

## 2023-10-24 RX ORDER — ZOLPIDEM TARTRATE 5 MG/1
5 TABLET ORAL NIGHTLY
Qty: 30 TABLET | Refills: 0 | Status: SHIPPED | OUTPATIENT
Start: 2023-10-24 | End: 2023-11-20

## 2023-10-24 NOTE — TELEPHONE ENCOUNTER
No care due was identified.  [facility] Embedded Care Due Messages. Reference number: [id number].   10/24/2023 12:23:55 PM CDT

## 2023-11-20 DIAGNOSIS — G47.00 INSOMNIA: ICD-10-CM

## 2023-11-20 PROBLEM — I63.40: Status: RESOLVED | Noted: 2023-08-09 | Resolved: 2023-11-20

## 2023-11-20 RX ORDER — ZOLPIDEM TARTRATE 5 MG/1
5 TABLET ORAL NIGHTLY
Qty: 30 TABLET | Refills: 0 | Status: SHIPPED | OUTPATIENT
Start: 2023-11-20 | End: 2023-12-17

## 2023-11-20 NOTE — TELEPHONE ENCOUNTER
No care due was identified.  [facility] Embedded Care Due Messages. Reference number: [id number].   11/20/2023 2:10:13 PM CST

## 2023-11-28 PROBLEM — I50.32 CHRONIC DIASTOLIC CHF (CONGESTIVE HEART FAILURE): Status: ACTIVE | Noted: 2023-11-28

## 2023-11-28 PROBLEM — R41.0 ACUTE CONFUSION: Status: ACTIVE | Noted: 2023-11-28

## 2023-11-28 PROBLEM — E11.9 DIABETES MELLITUS TYPE 2, DIET-CONTROLLED: Status: ACTIVE | Noted: 2023-11-28

## 2023-11-28 PROBLEM — E03.9 HYPOTHYROIDISM: Status: ACTIVE | Noted: 2023-11-28

## 2023-11-28 PROBLEM — Z71.89 ACP (ADVANCE CARE PLANNING): Status: ACTIVE | Noted: 2017-05-18

## 2023-11-29 PROBLEM — I63.9 ACUTE CVA (CEREBROVASCULAR ACCIDENT): Status: ACTIVE | Noted: 2023-11-29

## 2023-11-30 PROBLEM — E03.9 HYPOTHYROIDISM: Chronic | Status: ACTIVE | Noted: 2023-11-28

## 2023-12-01 ENCOUNTER — TELEPHONE (OUTPATIENT)
Dept: INTERNAL MEDICINE | Facility: CLINIC | Age: 88
End: 2023-12-01
Payer: MEDICARE

## 2023-12-01 NOTE — TELEPHONE ENCOUNTER
----- Message from Valentine Briones sent at 11/30/2023  9:51 AM CST -----  Contact: Olga   [facility] called to schedule a hospital follow up appt  I was not able to schedule. She would  a call back to Olga to get her worked in

## 2023-12-07 ENCOUNTER — TELEPHONE (OUTPATIENT)
Dept: NEUROLOGY | Facility: CLINIC | Age: 88
End: 2023-12-07
Payer: MEDICARE

## 2023-12-07 NOTE — TELEPHONE ENCOUNTER
----- Message from Alma Rosa Tej sent at 12/7/2023  2:48 PM CST -----  Regarding: Hosp F/U  Contact: pt's Pino  Type:  Hosp F/U Appointment Request    Caller is requesting a hosp F/U appointment.      Name of Caller:pt's Pino    When is the first available appointment? None in the timeframe needed    Date of discharge. 11/30/23    When is appt needed? Within 4 weeks of discharge    Symptoms:stroke    Would the patient rather a call back or a response via [facility]? Call back     Best Call Back Number:[phone]    Additional Information: need appt.  Virtual appt would be preferable.   Please advise.  Thank you.

## 2023-12-08 NOTE — TELEPHONE ENCOUNTER
Spoke to the pt's , appt scheduled on 12/15/23 at 1300 with Ruthie Kaufman for hosp f/u CVA.  60 min virtual appt./ok'd per AG.   Date, time and location discussed.

## 2023-12-14 DIAGNOSIS — G47.00 INSOMNIA: ICD-10-CM

## 2023-12-14 NOTE — TELEPHONE ENCOUNTER
Care Due:                  Date            Visit Type   Department     Provider  --------------------------------------------------------------------------------                                ESTABLISHED                              PATIENT -    [facility]  Last Visit: 08-      VIRTUAL      MEDICINE       Sera Barger                              ESTABLISHED                              PATIENT -    [facility]  Next Visit: 12-      VIRTUAL      MEDICINE       Sera Barger                                                            Last  Test          Frequency    Reason                     Performed    Due Date  --------------------------------------------------------------------------------    Uric Acid...  12 months..  allopurinoL..............  03- 02-    Health [facility] Embedded Care Due Messages. Reference number: [id number].   12/14/2023 1:22:26 PM CST

## 2023-12-15 ENCOUNTER — OFFICE VISIT (OUTPATIENT)
Dept: NEUROLOGY | Facility: CLINIC | Age: 88
End: 2023-12-15
Payer: MEDICARE

## 2023-12-15 DIAGNOSIS — R41.0 INTERMITTENT CONFUSION: ICD-10-CM

## 2023-12-15 DIAGNOSIS — I63.9 CEREBROVASCULAR ACCIDENT (CVA), UNSPECIFIED MECHANISM: Primary | ICD-10-CM

## 2023-12-15 PROCEDURE — 1159F MED LIST DOCD IN RCRD: CPT | Mod: CPTII,95,, | Performed by: NURSE PRACTITIONER

## 2023-12-15 PROCEDURE — 1159F PR MEDICATION LIST DOCUMENTED IN MEDICAL RECORD: ICD-10-PCS | Mod: CPTII,95,, | Performed by: NURSE PRACTITIONER

## 2023-12-15 PROCEDURE — 3072F PR LOW RISK FOR RETINOPATHY: ICD-10-PCS | Mod: CPTII,95,, | Performed by: NURSE PRACTITIONER

## 2023-12-15 PROCEDURE — 1111F PR DISCHARGE MEDS RECONCILED W/ CURRENT OUTPATIENT MED LIST: ICD-10-PCS | Mod: CPTII,95,, | Performed by: NURSE PRACTITIONER

## 2023-12-15 PROCEDURE — 1160F PR REVIEW ALL MEDS BY PRESCRIBER/CLIN PHARMACIST DOCUMENTED: ICD-10-PCS | Mod: CPTII,95,, | Performed by: NURSE PRACTITIONER

## 2023-12-15 PROCEDURE — 99215 PR OFFICE/OUTPT VISIT, EST, LEVL V, 40-54 MIN: ICD-10-PCS | Mod: 95,,, | Performed by: NURSE PRACTITIONER

## 2023-12-15 PROCEDURE — 99215 OFFICE O/P EST HI 40 MIN: CPT | Mod: 95,,, | Performed by: NURSE PRACTITIONER

## 2023-12-15 PROCEDURE — 1111F DSCHRG MED/CURRENT MED MERGE: CPT | Mod: CPTII,95,, | Performed by: NURSE PRACTITIONER

## 2023-12-15 PROCEDURE — 1160F RVW MEDS BY RX/DR IN RCRD: CPT | Mod: CPTII,95,, | Performed by: NURSE PRACTITIONER

## 2023-12-15 PROCEDURE — 3072F LOW RISK FOR RETINOPATHY: CPT | Mod: CPTII,95,, | Performed by: NURSE PRACTITIONER

## 2023-12-15 NOTE — ASSESSMENT & PLAN NOTE
Patient is a 87 y/o female that presents following a recent hospital visit.    - presented to ED with acute confusion  MRI brain scan reported with subacute thalamic lacune   - small vessel etiology  Would not expect subacute infarct to impact her mental status  Pt also diagnosed with UTI which is likely the source of acute confusion    Cont DAPT indefinitely for secondary stroke prevention  Control vascular risk factors:   - BP management as per PCP; rec family to check and track Bps daily   - A1c elevated; goal <7.0   - LDL elevated; Agree with HI statin for LDL goal < 70   - diet modification.     Stroke education provided

## 2023-12-15 NOTE — ASSESSMENT & PLAN NOTE
Started after TAVR in July as per family report   - this reportedly improved over time but worsened prior to recent admit in November   - visual hallucinations reported intermittently   Likely metabolic related   - cannot rule out the fact that pt may have baseline cognitive deficits which intensify when metabolic issues arise   - neurocognitive disorders would be more progressive rather than change abruptly.   Recommend healthy sleep habits, diversion tactics and delirium precautions   Once metabolic issues have been addressed, she can follow up with me for formal memory evaluation if interested.

## 2023-12-15 NOTE — PROGRESS NOTES
"  NEUROLOGY  Outpatient Follow Up    [facility]  [address], [place] [address]  [phone] (office) / [phone] (fax)    Patient Name:  Olga Aguiar  :  1935  MR #:  [id number]  Acct #:  [id number]    Date of Neurology Visit: 12/15/2023  Name of Provider: MADELYN Boyer    Other Physicians:  Sera Barger MD (Primary Care Physician); Sera Barger MD (Referring)      Chief Complaint: Hospital Follow Up      History of Present Illness (HPI):  As per EMR  2023  "This is an 88-year-old female with past medical history particularly significant for hypertension, dyslipidemia, type 2 diabetes mellitus, diet-controlled, chronic diastolic CHF, asthma/COPD, chronic back pain, hypothyroidism, CKD 3, AS status post TAVR in 2023, course complicated by altered mental status felt to be toxic/metabolic encephalopathy secondary to sedative medications, UTI, with brain MRI showing multiple infarcts that were not felt to be the cause of her altered mental status, who presented to our ED for further evaluation of altered mental status.    In fact, per family, patient was last known well yesterday 2023 around 2:00 p.m. when her  went to a doctor's appointment.  When he returned around 5:00 p.m., the patient was angry and developed altered mental status described as confusion.       At the time of my evaluation, the patient was awake, alert and oriented to name only, otherwise confused.  She was not answering questions appropriately.  She was following commands.    Subsequently further history is unable to be obtained from her.       In ED, workup showed a glucose of 197.    Will place her under medical observation for further management.     * No surgery found *       Hospital Course:   Discharge diagnosis: Acute CVA L posterior thalamic lacunar     Hospital Course:   Admitted overnight with acute confusion.  Underwent MRI brain, which reported " "acute or early subacute left posterior thalamic lacunar infarct.  Mild-moderate chronic microvascular ischemic chane.  Stroke orders placed.  Neurology consulted.  Recs for  mg and plavix 75 mg. Amlodipine increased to 10mg. LDL not at goal, inc to high intensity statin, atorvastatin 40mg daily.  Noted with UTI, prior Pseudomonas in urine cx- started on Cipro. Mental status returned back to baseline. Discharged on 11/30."        Interval Hx 12/15/2023:   Patient is new to me  Patient is a 87 y/o female with a significant PMHX of breast cancer 23 years ago with radiation and chemotherapy, chronic right arm lymphedema, COPD, diabetes type 2 hypertension, TIA, severe valve stenosis, CAD, s/p TAVR 7/2023.   Patient is being seen virtually for hospital follow up. She is accompanied by her spouse and daughter.     She has been having visual hallucinations since her TAVR. Spouse states it took her a while to recover mentally from the anesthesia post TAVR.  She was slowly improving until this recent episode of confusion. Daughter does report her to have frequent UTIs.   She did complete prescribed antibiotics. Family denies previous history of memory deficits.   All recent Neuro testing discussed with the patient/family at length.       Past Medical, Surgical, Family & Social History:   Reviewed and updated.    Home Medications:     Current Outpatient Medications:     acetaminophen (TYLENOL) 325 MG tablet, Take 2 tablets (650 mg total) by mouth every 4 (four) hours as needed., Disp: , Rfl: 0    albuterol (PROAIR HFA) 90 mcg/actuation inhaler, Inhale 2 puffs into the lungs every 6 (six) hours as needed for Wheezing. Rescue, Disp: 18 g, Rfl: 11    allopurinoL (ZYLOPRIM) 100 MG tablet, Take 1 tablet by mouth once daily (Patient taking differently: Take 100 mg by mouth once daily.), Disp: 90 tablet, Rfl: 3    amLODIPine (NORVASC) 10 MG tablet, Take 1 tablet (10 mg total) by mouth once daily., Disp: 30 tablet, Rfl: 1    " aspirin (ECOTRIN) 325 MG EC tablet, Take 1 tablet (325 mg total) by mouth once daily., Disp: 30 tablet, Rfl: 1    atorvastatin (LIPITOR) 40 MG tablet, Take 1 tablet (40 mg total) by mouth every evening., Disp: 30 tablet, Rfl: 1    cholecalciferol, vitamin D3, 2,000 unit Cap, Take 2,000 Units by mouth Daily., Disp: , Rfl:     clopidogreL (PLAVIX) 75 mg tablet, Take 1 tablet (75 mg total) by mouth once daily., Disp: 90 tablet, Rfl: 3    cyanocobalamin (VITAMIN B-12) 1000 MCG tablet, Take 1 tablet (1,000 mcg total) by mouth once daily., Disp: 30 tablet, Rfl: 12    fluticasone furoate-vilanteroL (BREO ELLIPTA) 200-25 mcg/dose DsDv diskus inhaler, Inhale 1 puff into the lungs once daily., Disp: 60 each, Rfl: 11    Lactobacillus rhamnosus GG (CULTURELLE) 10 billion cell capsule, Take 1 capsule by mouth once daily., Disp: , Rfl:     levothyroxine (SYNTHROID) 75 MCG tablet, Take 1 tablet by mouth once daily (Patient taking differently: Take 75 mcg by mouth before breakfast.), Disp: 90 tablet, Rfl: 3    potassium citrate 99 mg Cap, Take 99 mg by mouth every evening., Disp: , Rfl:     spironolactone (ALDACTONE) 25 MG tablet, Take 0.5 tablets (12.5 mg total) by mouth once daily., Disp: 15 tablet, Rfl: 11    traMADoL (ULTRAM) 50 mg tablet, Take 1 tablet (50 mg total) by mouth every 8 (eight) hours as needed for Pain. (Patient taking differently: Take 50 mg by mouth every 8 (eight) hours as needed for Pain. Takes HS), Disp: 90 tablet, Rfl: 2    zolpidem (AMBIEN) 5 MG Tab, TAKE 1 TABLET BY MOUTH ONCE DAILY IN THE EVENING, Disp: 30 tablet, Rfl: 0    Physical Examination:  Limited exam due to being virtual  There were no vitals taken for this visit.    GENERAL:  General appearance: Well, non-toxic appearing.  No apparent distress.  Neck: supple.    MENTAL STATUS:  Alertness, attention span & concentration: normal.  Language: normal.  Orientation to self, place & time:  normal.  Memory, recent & remote: limited  Fund of knowledge:  normal.    SPEECH:  Clear and fluent.  Follows complex commands.          Diagnostic Data Reviewed:   I have personally reviewed provider notes, labs and imaging made available to me today.     Imaging:    MRI Brain Without Contrast 11/2023    Narrative  EXAMINATION:  MRI BRAIN WITHOUT CONTRAST    CLINICAL HISTORY:  AMS/confusion; Pt is here for evaluation of altered mental status.  Patient's  went to a doctor's appointment yesterday at 2:00 p.m..  He reports patient was at her normal baseline at 2:00 p.m.  We returned at 5:00 p.m. he reports she was confused.  He reports a prior episode similar to this a month ago after having a TAVR.  He reports it was secondary to metabolic encephalopathy/UTI.  Patient's daughter is present she reports the TAVR was in July.  She reports her mother has had mild deficits in her mental function since the episode of metabolic encephalopathy but has had an acute change to her mental status yesterday afternoon.    TECHNIQUE:  Multiplanar multisequence MR imaging of the brain was performed without contrast.    COMPARISON:  MRI brain without contrast, 07/29/2023.    FINDINGS:  INTRACRANIAL: Mild global volume loss.  Subtle restricted diffusion in the left posterior thalamus with corresponding T2 FLAIR hyperintense signal. Scattered and confluent T2 FLAIR hyperintense white matter foci are present, likely related to chronic microvascular ischemic change.  No parenchymal restricted diffusion.  Scattered punctate foci of susceptibility, likely related to chronic microhemorrhage and similar to prior study.  No evidence of acute intracranial hemorrhage.  No extra-axial fluid collection or mass.  No intracranial mass effect.  No hydrocephalus.  Midline structures have a normal configuration.  Visualized pituitary gland and infundibulum are normal.  Visualized major intracranial vascular structures demonstrate normal flow voids and are normal in course and caliber.    SINUSES: Small  right maxillary sinus mucous retention cyst versus polyp.  Trace right ethmoid sinus mucosal thickening.  Mastoid air cells are clear.    ORBITS: Bilateral lens replacements.    Impression  1. Suspect acute or early subacute left posterior thalamic lacunar infarct.  2. Mild-moderate chronic microvascular ischemic change.  Epic message sent to Sera Freeman and Hussain at 10:27 on 11/28/2023.      CT Head Without Contrast 11/2023    Narrative  EXAMINATION:  CT HEAD WITHOUT CONTRAST    CLINICAL HISTORY:  Altered Mental Status (Last known well by  at 1400, arrived home at 1700 finding patient confused, clear speech, no facial droop, moves all extremities well, )    TECHNIQUE:  Low dose axial images were obtained through the head.  Coronal and sagittal reformations were also performed. Contrast was not administered.  Dose reduction techniques including automatic exposure control (AEC) were utilized.    Dose (DLP): 541 mGycm    COMPARISON:  MRI brain without contrast, 07/29/2023.  CT head without contrast, 07/27/2023.    FINDINGS:  INTRACRANIAL: Mild global volume loss.  Scattered white matter hypodensities, likely related to chronic microvascular ischemic change.  Gray-white differentiation is preserved.  No acute intracranial hemorrhage.  No hydrocephalus.  No intracranial mass effect.    SINUSES: Trace bilateral ethmoid sinus mucosal thickening.  Mucous debris in the left sphenoid sinus.  Mastoid air cells are clear.    SKULL/SCALP: Visualized osseous structures are normal.    ORBITS: Bilateral lens replacements.    Impression  Nighthawk concordant.  No acute intracranial abnormality.      CTA Head and Neck (xpd) 11/2023    Narrative  EXAMINATION:  CTA HEAD AND NECK (XPD)    CLINICAL HISTORY:  Neuro deficit, acute, stroke suspected.    TECHNIQUE:  CT angiogram was performed from the level of the zhao to the top of the head following the IV administration of 80mL of Omnipaque 350.   Sagittal and coronal  and maximum intensity projection reconstructions were performed. One additional phase of immediate post-contrast CT images was performed through the head alone. Arterial stenosis percentages are based on NASCET measurement criteria.  Dose reduction techniques including automatic exposure control (AEC) were utilized.    Dose (DLP): 794 mGycm    COMPARISON:  CT head without contrast, 11/27/2023, 07/27/2023.  MRI brain without contrast, 11/28/2023, 07/29/2023.    FINDINGS:  CTA HEAD:    INTRACRANIAL: No acute intracranial hemorrhage.  No hydrocephalus.  No intracranial mass effect.  No abnormal intracranial enhancement.    SINUSES: Mucous retention cyst versus polyp in the right maxillary sinus.  Patchy mucosal opacity in the left sphenoid sinus.  Mastoid air cells are clear.    SKULL/SCALP: Visualized osseous structures are normal.    ORBITS: Bilateral lens replacements.    VASCULATURE: Focal high-grade stenosis or occlusion of the left A3 BUNNY segment (series 13, image 35).  Moderate bilateral carotid siphon calcifications with mild right supraclinoid ICA stenosis.  Mild left terminal ICA and proximal M1 MCA stenosis (series 13, image 41).  Minimal fusiform ectasia of the proximal basilar artery (image 43).  Minor atherosclerotic calcifications in the left V4 and proximal basilar arteries.  No vascular malformation.  Deep cerebral veins and dural venous sinuses enhance normally.    CTA NECK:    AORTIC ARCH: 4 vessel arch with left vertebral artery originating from the arch.    LEFT CAROTID    COMMON CAROTID: No occlusion, hemodynamically significant stenosis, dissection or aneurysm.    INTERNAL CAROTID: Mild atherosclerotic calcifications at the origin.  No occlusion, hemodynamically significant stenosis, dissection or aneurysm.    RIGHT CAROTID    COMMON CAROTID: No occlusion, hemodynamically significant stenosis, dissection or aneurysm.    INTERNAL CAROTID: Mild atherosclerotic plaque at the origin.  No occlusion,  hemodynamically significant stenosis, dissection or aneurysm.    VERTEBRAL ARTERIES    LEFT VERTEBRAL: Originates from the aortic arch.  No occlusion, hemodynamically significant stenosis, dissection or aneurysm.    RIGHT VERTEBRAL: No occlusion, hemodynamically significant stenosis, dissection or aneurysm.    OTHER: Patchy anterior right upper lobe opacities and bronchiectasis.  Multilevel cervical spondylosis without high-grade spinal canal stenosis.  No acute fracture or aggressive osseous lesion.  No significant pathology in the visualized cervical soft tissues.    Impression  1. Focal high-grade stenosis or occlusion in the left A3 BUNNY segment.  2. Mild right supraclinoid ICA stenosis.  3. Mild left terminal ICA and proximal M1 MCA stenosis.  4. Additional findings as above.      Cardiac:  TTE 11/29/2023:      Left Ventricle: There is normal systolic function. Ejection fraction by visual approximation is 55%. There is normal diastolic function.    Right Ventricle: Normal right ventricular cavity size. Systolic function is normal.    Left Atrium: Agitated saline study of the atrial septum is negative after vasalva maneuver, suggesting absence of intracardiac shunt at the atrial level.    Aortic Valve: There is a transcatheter valve replacement in the aortic position that is appropriately positioned.    IVC/SVC: Normal venous pressure at 3 mmHg.      Labs:  Lab Results   Component Value Date    WBC 7.07 11/30/2023    HGB 13.7 11/30/2023    HCT 42.4 11/30/2023     11/30/2023    MCV 85 11/30/2023    RDW 14.1 11/30/2023     Lab Results   Component Value Date     11/30/2023    K 4.4 11/30/2023     11/30/2023    CO2 22 11/30/2023    BUN 20 (H) 11/30/2023    CREATININE 0.75 11/30/2023     (H) 11/30/2023    CALCIUM 9.2 11/30/2023    MG 1.9 11/29/2023    PHOS 3.5 11/29/2023     Lab Results   Component Value Date    PROT 6.7 11/30/2023    ALBUMIN 3.8 11/30/2023    BILITOT 0.6 11/30/2023    AST 33  11/30/2023    ALKPHOS 86 11/30/2023    ALT 20 11/30/2023     Lab Results   Component Value Date    INR 1.1 11/29/2023     Lab Results   Component Value Date    CHOL 177 11/28/2023    HDL 44 11/28/2023    LDLCALC 111.6 11/28/2023    TRIG 107 11/28/2023    CHOLHDL 24.9 11/28/2023     Lab Results   Component Value Date    HGBA1C 7.6 (H) 11/28/2023      Lab Results   Component Value Date    TSTUXWDD20 >1000 (H) 11/28/2023     Lab Results   Component Value Date    FOLATE 7.1 11/28/2023     Lab Results   Component Value Date    TSH 0.646 11/28/2023     EEG 7/2023:  This is an abnormal, technically limited EEG.  Diffuse, generalized   slowing is consistent with a diffuse encephalopathy, however this is   non-specific with respect to etiology.  Mild superimposed focal slowing   suggests a more severe degree of cerebral dysfunction involving the   posterior left hemisphere.  Clinical correlation is advised.                   Assessment and Plan:      Problem List Items Addressed This Visit          Neuro    Intermittent confusion    Current Assessment & Plan     Started after TAVR in July as per family report   - this reportedly improved over time but worsened prior to recent admit in November   - visual hallucinations reported intermittently   Likely metabolic related   - cannot rule out the fact that pt may have baseline cognitive deficits which intensify when metabolic issues arise   - neurocognitive disorders would be more progressive rather than change abruptly.   Recommend healthy sleep habits, diversion tactics and delirium precautions   Once metabolic issues have been addressed, she can follow up with me for formal memory evaluation if interested.          CVA (cerebral vascular accident) - Primary    Current Assessment & Plan     Patient is a 87 y/o female that presents following a recent hospital visit.    - presented to ED with acute confusion  MRI brain scan reported with subacute thalamic lacune   - small vessel  etiology  Would not expect subacute infarct to impact her mental status  Pt also diagnosed with UTI which is likely the source of acute confusion    Cont DAPT indefinitely for secondary stroke prevention  Control vascular risk factors:   - BP management as per PCP; rec family to check and track Bps daily   - A1c elevated; goal <7.0   - LDL elevated; Agree with HI statin for LDL goal < 70   - diet modification.     Stroke education provided                              Important to note, also  has a past medical history of Abnormal ECG (02/06/2017), Adrenal adenoma: 2.8 X 2.1CM 2010 (05/16/2014), Aortic atherosclerosis: see CT scan 2016 (10/28/2016), Aortic stenosis (08/19/2014), Asthma, Breast cancer, Cancer, Cataracts, both eyes, Cerebellar infarct, Cerebral infarct, Chronic back pain, Chronic bronchitis, Chronic diastolic CHF (congestive heart failure), CKD (chronic kidney disease) stage 3, GFR 30-59 ml/min (08/19/2014), COPD (chronic obstructive pulmonary disease), DDD (degenerative disc disease), lumbar (07/30/2013), Diabetes mellitus, Diastolic dysfunction, Diverticulosis, Dyslipidemia, Ectatic abdominal aorta: see CT scan 10/16 (10/28/2016), Gallbladder polyp (08/19/2014), General anesthetics causing adverse effect in therapeutic use, GERD (gastroesophageal reflux disease), Gout, Herpes simplex, High blood cholesterol, Hypertension, Hypertension associated with diabetes (02/07/2012), Insomnia, Lumbar spinal stenosis, Lymphedema of arm, Multiple lung nodules (09/21/2015), Radiation fibrosis of lung, Renal cyst, right: stable per CT 2016 (10/28/2016), Stroke (02/2012), Stye (12/05/2013), Thyroid disease, TIA (transient ischemic attack), Toxic metabolic encephalopathy, Type 2 diabetes mellitus without complication, without long-term current use of insulin (07/20/2015), Unspecified hypothyroidism (07/20/2015), and UTI (urinary tract infection).          The patient will return to clinic as needed    All questions  were answered and patient is comfortable with the plan.         Thank you very much for the opportunity to assist in this patient's care.    If you have any questions or concerns, please do not hesitate to contact me at any time.      Sincerely,     MADELYN Boyer  [facility] [place]       The patient location is: [place], [place]  The chief complaint leading to consultation is: hospital f/u    Visit type: audiovisual    Face to Face time with patient: 60 minutes of total time spent on the encounter, which includes face to face time and non-face to face time preparing to see the patient (eg, review of tests), Obtaining and/or reviewing separately obtained history, Documenting clinical information in the electronic or other health record, Independently interpreting results (not separately reported) and communicating results to the patient/family/caregiver, or Care coordination (not separately reported).         Each patient to whom he or she provides medical services by telemedicine is:  (1) informed of the relationship between the physician and patient and the respective role of any other health care provider with respect to management of the patient; and (2) notified that he or she may decline to receive medical services by telemedicine and may withdraw from such care at any time.    Notes:

## 2023-12-17 RX ORDER — ZOLPIDEM TARTRATE 5 MG/1
5 TABLET ORAL NIGHTLY
Qty: 30 TABLET | Refills: 0 | Status: SHIPPED | OUTPATIENT
Start: 2023-12-17 | End: 2024-01-10

## 2023-12-18 ENCOUNTER — PATIENT MESSAGE (OUTPATIENT)
Dept: INTERNAL MEDICINE | Facility: CLINIC | Age: 88
End: 2023-12-18
Payer: MEDICARE

## 2023-12-18 DIAGNOSIS — N30.00 ACUTE CYSTITIS WITHOUT HEMATURIA: Primary | ICD-10-CM

## 2023-12-19 NOTE — TELEPHONE ENCOUNTER
Okay to let  know that I did review his message.  I have ordered a urine that they can do as a home collect prior to the office visit with me, orders in thanks

## 2023-12-21 ENCOUNTER — LAB VISIT (OUTPATIENT)
Dept: LAB | Facility: HOSPITAL | Age: 88
End: 2023-12-21
Attending: INTERNAL MEDICINE
Payer: MEDICARE

## 2023-12-21 DIAGNOSIS — N30.00 ACUTE CYSTITIS WITHOUT HEMATURIA: ICD-10-CM

## 2023-12-21 LAB
BILIRUB UR QL STRIP: NEGATIVE
CLARITY UR: CLEAR
COLOR UR: YELLOW
GLUCOSE UR QL STRIP: NEGATIVE
HGB UR QL STRIP: NEGATIVE
KETONES UR QL STRIP: NEGATIVE
LEUKOCYTE ESTERASE UR QL STRIP: NEGATIVE
NITRITE UR QL STRIP: NEGATIVE
PH UR STRIP: 7 [PH] (ref 5–8)
PROT UR QL STRIP: NEGATIVE
SP GR UR STRIP: 1.01 (ref 1–1.03)
URN SPEC COLLECT METH UR: NORMAL

## 2023-12-21 PROCEDURE — 81003 URINALYSIS AUTO W/O SCOPE: CPT | Mod: PO | Performed by: INTERNAL MEDICINE

## 2023-12-26 ENCOUNTER — OFFICE VISIT (OUTPATIENT)
Dept: INTERNAL MEDICINE | Facility: CLINIC | Age: 88
End: 2023-12-26
Payer: MEDICARE

## 2023-12-26 ENCOUNTER — TELEPHONE (OUTPATIENT)
Dept: INTERNAL MEDICINE | Facility: CLINIC | Age: 88
End: 2023-12-26

## 2023-12-26 VITALS — SYSTOLIC BLOOD PRESSURE: 132 MMHG | DIASTOLIC BLOOD PRESSURE: 76 MMHG

## 2023-12-26 DIAGNOSIS — E78.2 MIXED HYPERLIPIDEMIA: ICD-10-CM

## 2023-12-26 DIAGNOSIS — F33.9 DEPRESSION, RECURRENT: ICD-10-CM

## 2023-12-26 DIAGNOSIS — R41.0 INTERMITTENT CONFUSION: ICD-10-CM

## 2023-12-26 DIAGNOSIS — Z95.2 HISTORY OF TRANSCATHETER AORTIC VALVE REPLACEMENT (TAVR): ICD-10-CM

## 2023-12-26 DIAGNOSIS — I63.9 CEREBROVASCULAR ACCIDENT (CVA), UNSPECIFIED MECHANISM: Primary | ICD-10-CM

## 2023-12-26 PROCEDURE — 3072F PR LOW RISK FOR RETINOPATHY: ICD-10-PCS | Mod: CPTII,95,, | Performed by: INTERNAL MEDICINE

## 2023-12-26 PROCEDURE — 1111F DSCHRG MED/CURRENT MED MERGE: CPT | Mod: CPTII,95,, | Performed by: INTERNAL MEDICINE

## 2023-12-26 PROCEDURE — 99214 OFFICE O/P EST MOD 30 MIN: CPT | Mod: 95,,, | Performed by: INTERNAL MEDICINE

## 2023-12-26 PROCEDURE — 1111F PR DISCHARGE MEDS RECONCILED W/ CURRENT OUTPATIENT MED LIST: ICD-10-PCS | Mod: CPTII,95,, | Performed by: INTERNAL MEDICINE

## 2023-12-26 PROCEDURE — 99214 PR OFFICE/OUTPT VISIT, EST, LEVL IV, 30-39 MIN: ICD-10-PCS | Mod: 95,,, | Performed by: INTERNAL MEDICINE

## 2023-12-26 PROCEDURE — 3072F LOW RISK FOR RETINOPATHY: CPT | Mod: CPTII,95,, | Performed by: INTERNAL MEDICINE

## 2023-12-26 RX ORDER — LEVOTHYROXINE SODIUM 75 UG/1
75 TABLET ORAL
Qty: 90 TABLET | Refills: 3
Start: 2023-12-26 | End: 2024-02-17 | Stop reason: SDUPTHER

## 2023-12-26 RX ORDER — ALLOPURINOL 100 MG/1
100 TABLET ORAL DAILY
Qty: 90 TABLET | Refills: 1
Start: 2023-12-26 | End: 2024-03-30 | Stop reason: SDUPTHER

## 2023-12-26 NOTE — PROGRESS NOTES
"Telemedicine Video Visit    The patient location is:  Patient Home   The chief complaint leading to consultation is: follow up  Visit type: Virtual visit with synchronous audio and video  Total time spent with patient: 30 minutes  Each patient to whom he or she provides medical services by telemedicine is:  (1) informed of the relationship between the physician and patient and the respective role of any other health care provider with respect to management of the patient; and (2) notified that he or she may decline to receive medical services by telemedicine and may withdraw from such care at any time.     Hospital follow up    Very tired.  Sleeping about 7 hours but sometimes interrupted. No real apnea sx.  Some napping.  Afraid of falling- uses a walker.  Ongoing DJD sx.  Does not have HH and does not desire right now.    Recent UTI- appears to have cleared based on recent labs.    Seen by Neurology about 10 days ago:   She has been having visual hallucinations since her TAVR. Spouse states it took her a while to recover mentally from the anesthesia post TAVR.  She was slowly improving until this recent episode of confusion. Daughter does report her to have frequent UTIs.   She did complete prescribed antibiotics. Family denies previous history of memory deficits.   All recent Neuro testing discussed with the patient/family at length.     Hallucinations worse at night in the evening/night when she is tired.    Labs discussed, overall stable.  Lipids not quite at target range.  Atorvastatin has been increased to 40, will recheck labs in the next couple of months.    Per Neurology:  "  Intermittent confusion      Current Assessment & Plan       Started after TAVR in July as per family report   - this reportedly improved over time but worsened prior to recent admit in November   - visual hallucinations reported intermittently   Likely metabolic related   - cannot rule out the fact that pt may have baseline cognitive " "deficits which intensify when metabolic issues arise   - neurocognitive disorders would be more progressive rather than change abruptly.   Recommend healthy sleep habits, diversion tactics and delirium precautions   Once metabolic issues have been addressed, she can follow up with me for formal memory evaluation if interested.            CVA (cerebral vascular accident) - Primary     Current Assessment & Plan       Patient is a 89 y/o female that presents following a recent hospital visit.    - presented to ED with acute confusion  MRI brain scan reported with subacute thalamic lacune   - small vessel etiology  Would not expect subacute infarct to impact her mental status  Pt also diagnosed with UTI which is likely the source of acute confusion     Cont DAPT indefinitely for secondary stroke prevention  Control vascular risk factors:   - BP management as per PCP; rec family to check and track Bps daily   - A1c elevated; goal <7.0   - LDL elevated; Agree with HI statin for LDL goal < 70   - diet modification.     Stroke education provided"                               Patient Active Problem List   Diagnosis    Essential hypertension    Chronic pain syndrome    Lumbosacral spondylosis without myelopathy    Adrenal adenoma: 2.8 X 2.1CM 2010; stable 2016, also stable 2018    Lymph edema, right arm    Gallbladder polyp    CKD (chronic kidney disease) stage 3, GFR 30-59 ml/min    Severe aortic stenosis    LVH (left ventricular hypertrophy)    Pancreatic cyst: stable on MRI 11/16 also 2018, due again 2020 (patient declined)    Cervical spinal stenosis    Controlled type 2 diabetes mellitus with complication, without long-term current use of insulin    Transient cerebral ischemia: 2012    Primary insomnia    Gastroesophageal reflux disease without esophagitis    Multiple lung nodules: stable CT 10/2016, also 2019    Former smoker: 1 pack daily for < 20 years, quit 1973    Asthma with COPD    Facet hypertrophy of lumbar " region    History of breast cancer    Intermittent asthma    Radiation fibrosis of lung    Bronchiectasis without complication    Ectatic abdominal aorta: see CT scan 10/16; no aneurysm 10/17    Renal cysts, acquired, bilateral    Lumbar radiculopathy    Abnormal EKG    ACP (advance care planning)    Diverticulosis of large intestine without hemorrhage: see CT 10/14    Idiopathic gout    Liver cysts: see MRI 2018    Odynophagia    Lumbar spondylosis    Unspecified inflammatory spondylopathy, 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    Chronic right shoulder pain    Mixed hyperlipidemia    Aortic atherosclerosis    Low serum vitamin B12    Other specified disorders of adrenal gland    Coronary artery disease involving native coronary artery of native heart    NYHA class 3 heart failure with preserved ejection fraction    History of transcatheter aortic valve replacement (TAVR)    Acute cystitis without hematuria    Intermittent confusion    Diabetes mellitus type 2, diet-controlled    Hypothyroidism    Chronic diastolic CHF (congestive heart failure)    CVA (cerebral vascular accident)    Depression, recurrent      Review of Systems   HENT:  Negative for hearing loss.    Eyes:  Negative for discharge.   Respiratory:  Negative for wheezing.    Cardiovascular:  Negative for chest pain and palpitations.   Gastrointestinal:  Positive for constipation. Negative for blood in stool, diarrhea and vomiting.   Genitourinary:  Negative for dysuria and hematuria.   Musculoskeletal:  Negative for neck pain.   Neurological:  Positive for weakness. Negative for headaches.   Endo/Heme/Allergies:  Negative for polydipsia.      Physical Exam  Constitutional:       Appearance: She is well-developed.   HENT:      Head: Normocephalic and atraumatic.   Eyes:      Extraocular Movements: Extraocular movements intact.      Conjunctiva/sclera: Conjunctivae normal.   Neck:      Thyroid: No thyromegaly.   Pulmonary:      Effort: No respiratory distress.    Neurological:      General: No focal deficit present.      Mental Status: She is alert and oriented to person, place, and time.   Psychiatric:         Mood and Affect: Mood normal.         Behavior: Behavior normal.         Thought Content: Thought content normal.         Judgment: Judgment normal.        1. Cerebrovascular accident (CVA), unspecified mechanism    2. Intermittent confusion    3. Mixed hyperlipidemia  -     AST (SGOT); Future; Expected date: 12/26/2023  -     Lipid Panel; Future; Expected date: 12/26/2023    4. Depression, recurrent    5. History of transcatheter aortic valve replacement (TAVR)  Overview:  7/26/23 - Christine  Medtronic FX  26 mm valve        Other orders  -     allopurinoL (ZYLOPRIM) 100 MG tablet; Take 1 tablet (100 mg total) by mouth once daily.  Dispense: 90 tablet; Refill: 1  -     levothyroxine (SYNTHROID) 75 MCG tablet; Take 1 tablet (75 mcg total) by mouth before breakfast.  Dispense: 90 tablet; Refill: 3       Alarm symptoms and ED cautions reviewed with patient,  and daughter who is present  Fall prevention reviewed  Consideration of home health, home PT, she currently declines  Labs within the next month, LDL targets reviewed  Sleep hygiene and safety issues discussed  No evidence of UTI  Keep Neurology and Cardiology follow-ups as previously scheduled    Answers submitted by the patient for this visit:  Review of Systems Questionnaire (Submitted on 12/19/2023)  activity change: No  unexpected weight change: No  rhinorrhea: No  trouble swallowing: No  visual disturbance: No  chest tightness: No  polyuria: No  difficulty urinating: No  menstrual problem: No  joint swelling: No  arthralgias: No  confusion: Yes  dysphoric mood: Yes

## 2024-01-03 ENCOUNTER — PATIENT MESSAGE (OUTPATIENT)
Dept: INTERNAL MEDICINE | Facility: CLINIC | Age: 89
End: 2024-01-03
Payer: MEDICARE

## 2024-01-05 ENCOUNTER — OFFICE VISIT (OUTPATIENT)
Dept: CARDIOLOGY | Facility: CLINIC | Age: 89
End: 2024-01-05
Payer: MEDICARE

## 2024-01-05 ENCOUNTER — PATIENT MESSAGE (OUTPATIENT)
Dept: CARDIOLOGY | Facility: CLINIC | Age: 89
End: 2024-01-05

## 2024-01-05 DIAGNOSIS — Z95.2 HISTORY OF TRANSCATHETER AORTIC VALVE REPLACEMENT (TAVR): ICD-10-CM

## 2024-01-05 DIAGNOSIS — I51.7 LVH (LEFT VENTRICULAR HYPERTROPHY): ICD-10-CM

## 2024-01-05 DIAGNOSIS — I10 ESSENTIAL HYPERTENSION: ICD-10-CM

## 2024-01-05 DIAGNOSIS — I50.32 CHRONIC DIASTOLIC CHF (CONGESTIVE HEART FAILURE): ICD-10-CM

## 2024-01-05 DIAGNOSIS — I77.811 ECTATIC ABDOMINAL AORTA: ICD-10-CM

## 2024-01-05 DIAGNOSIS — I50.30 NYHA CLASS 3 HEART FAILURE WITH PRESERVED EJECTION FRACTION: ICD-10-CM

## 2024-01-05 DIAGNOSIS — I35.0 SEVERE AORTIC STENOSIS: ICD-10-CM

## 2024-01-05 DIAGNOSIS — I25.118 CORONARY ARTERY DISEASE OF NATIVE ARTERY OF NATIVE HEART WITH STABLE ANGINA PECTORIS: ICD-10-CM

## 2024-01-05 DIAGNOSIS — E78.2 MIXED HYPERLIPIDEMIA: ICD-10-CM

## 2024-01-05 DIAGNOSIS — I70.0 AORTIC ATHEROSCLEROSIS: Primary | ICD-10-CM

## 2024-01-05 PROCEDURE — 1160F RVW MEDS BY RX/DR IN RCRD: CPT | Mod: CPTII,95,, | Performed by: INTERNAL MEDICINE

## 2024-01-05 PROCEDURE — 1159F MED LIST DOCD IN RCRD: CPT | Mod: CPTII,95,, | Performed by: INTERNAL MEDICINE

## 2024-01-05 PROCEDURE — 99214 OFFICE O/P EST MOD 30 MIN: CPT | Mod: 95,,, | Performed by: INTERNAL MEDICINE

## 2024-01-10 DIAGNOSIS — G47.00 INSOMNIA: ICD-10-CM

## 2024-01-10 RX ORDER — ZOLPIDEM TARTRATE 5 MG/1
5 TABLET ORAL NIGHTLY
Qty: 30 TABLET | Refills: 0 | Status: SHIPPED | OUTPATIENT
Start: 2024-01-10 | End: 2024-02-09

## 2024-01-10 NOTE — TELEPHONE ENCOUNTER
No care due was identified.  Health [facility] Embedded Care Due Messages. Reference number: [id number].   1/10/2024 11:46:27 AM CST

## 2024-01-25 ENCOUNTER — PATIENT MESSAGE (OUTPATIENT)
Dept: INTERNAL MEDICINE | Facility: CLINIC | Age: 89
End: 2024-01-25
Payer: MEDICARE

## 2024-01-25 DIAGNOSIS — I10 ESSENTIAL HYPERTENSION: ICD-10-CM

## 2024-01-25 RX ORDER — AMLODIPINE BESYLATE 10 MG/1
10 TABLET ORAL DAILY
Qty: 90 TABLET | Refills: 3 | Status: SHIPPED | OUTPATIENT
Start: 2024-01-25 | End: 2024-05-14

## 2024-01-25 RX ORDER — ATORVASTATIN CALCIUM 40 MG/1
40 TABLET, FILM COATED ORAL NIGHTLY
Qty: 90 TABLET | Refills: 3 | Status: SHIPPED | OUTPATIENT
Start: 2024-01-25 | End: 2024-02-09 | Stop reason: SDUPTHER

## 2024-01-25 RX ORDER — ATORVASTATIN CALCIUM 40 MG/1
40 TABLET, FILM COATED ORAL NIGHTLY
Qty: 30 TABLET | Refills: 1 | Status: SHIPPED | OUTPATIENT
Start: 2024-01-25 | End: 2024-02-09

## 2024-01-25 NOTE — TELEPHONE ENCOUNTER
----- Message from Alma Rosa Hopkins sent at 1/25/2024 10:29 AM CST -----  Contact: [facility]  Requesting an RX refill or new RX.  Is this a refill or new RX: refill 1  RX name and strength (copy/paste from chart):  atorvastatin (LIPITOR) 40 MG tablet  Is this a 30 day or 90 day RX:   Pharmacy name and phone # (copy/paste from chart):  [facility] [address] [place] [address] [id number] [place] [address]  Phone: [phone]  Fax: [phone]      Requesting an RX refill or new RX.  Is this a refill or new RX: refill 2  RX name and strength (copy/paste from chart):  amLODIPine (NORVASC) 10 MG tablet  Is this a 30 day or 90 day RX:   Pharmacy name and phone # (copy/paste from chart):  [facility] [address] [place] [id number] [facility]   Phone: [phone]  Fax: [phone]              The doctors have asked that we provide their patients with the following 2 reminders -- prescription refills can take up to 72 hours, and a friendly reminder that in the future you can use your [facility] account to request refills:

## 2024-01-25 NOTE — TELEPHONE ENCOUNTER
Refill Routing Note   Medication(s) are not appropriate for processing by [facility] for the following reason(s):        No active prescription written by provider    [facility] action(s):  Defer               Appointments  past 12m or future 3m with PCP    Date Provider   Last Visit   12/26/2023 Sera Barger MD   Next Visit   Visit date not found Sera Barger MD   ED visits in past 90 days: 0        Note composed:12:09 PM 01/25/2024

## 2024-01-25 NOTE — TELEPHONE ENCOUNTER
No care due was identified.  Health [facility] Embedded Care Due Messages. Reference number: [id number].   1/25/2024 10:34:01 AM CST

## 2024-02-08 ENCOUNTER — TELEPHONE (OUTPATIENT)
Dept: INTERNAL MEDICINE | Facility: CLINIC | Age: 89
End: 2024-02-08
Payer: MEDICARE

## 2024-02-08 DIAGNOSIS — E53.8 LOW SERUM VITAMIN B12: ICD-10-CM

## 2024-02-08 DIAGNOSIS — E11.9 DIABETES MELLITUS TYPE 2, DIET-CONTROLLED: ICD-10-CM

## 2024-02-08 DIAGNOSIS — Z12.31 SCREENING MAMMOGRAM, ENCOUNTER FOR: ICD-10-CM

## 2024-02-08 DIAGNOSIS — E03.9 HYPOTHYROIDISM, UNSPECIFIED TYPE: ICD-10-CM

## 2024-02-08 DIAGNOSIS — I10 ESSENTIAL HYPERTENSION: Primary | ICD-10-CM

## 2024-02-08 DIAGNOSIS — E78.2 MIXED HYPERLIPIDEMIA: ICD-10-CM

## 2024-02-08 DIAGNOSIS — E11.8 CONTROLLED TYPE 2 DIABETES MELLITUS WITH COMPLICATION, WITHOUT LONG-TERM CURRENT USE OF INSULIN: ICD-10-CM

## 2024-02-08 NOTE — TELEPHONE ENCOUNTER
Please call, she has not read her [facility] message.  Cholesterol is excessively high.  Has she not been taking her atorvastatin?

## 2024-02-09 DIAGNOSIS — G47.00 INSOMNIA: ICD-10-CM

## 2024-02-09 RX ORDER — ATORVASTATIN CALCIUM 80 MG/1
80 TABLET, FILM COATED ORAL NIGHTLY
Qty: 90 TABLET | Refills: 3 | Status: SHIPPED | OUTPATIENT
Start: 2024-02-09

## 2024-02-09 RX ORDER — ZOLPIDEM TARTRATE 5 MG/1
5 TABLET ORAL NIGHTLY
Qty: 30 TABLET | Refills: 0 | Status: SHIPPED | OUTPATIENT
Start: 2024-02-09 | End: 2024-03-25

## 2024-02-09 NOTE — TELEPHONE ENCOUNTER
No care due was identified.  Health [facility] Embedded Care Due Messages. Reference number: [id number].   2/09/2024 12:30:38 PM CST

## 2024-02-09 NOTE — TELEPHONE ENCOUNTER
Called and spoke to pt's , pt is sleeping states his wife takes her medicine everyday. Informed him I'd call back with anymore information

## 2024-02-09 NOTE — TELEPHONE ENCOUNTER
Called and spoke with pt, informed her to increase her atorvastatin to 80 mg. Pt expressed understanding. She states she will come on a good day to bring a specimen but can't schedule because she doesn't get out much due to her age and mobility. She told me to make her mammo and eye exam and she will try her best to get to the appts. All appts scheduled

## 2024-02-16 ENCOUNTER — PATIENT MESSAGE (OUTPATIENT)
Dept: INTERNAL MEDICINE | Facility: CLINIC | Age: 89
End: 2024-02-16
Payer: MEDICARE

## 2024-02-17 RX ORDER — LEVOTHYROXINE SODIUM 75 UG/1
75 TABLET ORAL
Qty: 90 TABLET | Refills: 3 | Status: SHIPPED | OUTPATIENT
Start: 2024-02-17 | End: 2024-02-20 | Stop reason: SDUPTHER

## 2024-02-17 NOTE — TELEPHONE ENCOUNTER
Pt requesting med refill, med pended. Last prescription was set as no print.     LOV with Sera Barger MD , 12/26/2023

## 2024-02-17 NOTE — TELEPHONE ENCOUNTER
Provider Staff:  Action required for this patient    Requires labs      Please see care gap opportunities below in Care Due Message.    Thanks!  [facility]     Appointments      Date Provider   Last Visit   12/26/2023 Sera Barger MD   Next Visit   Visit date not found Sera Barger MD     Refill Decision Note   Olga Aguiar  is requesting a refill authorization.  Brief Assessment and Rationale for Refill:  Approve     Medication Therapy Plan:        Comments:     Note composed:11:35 AM 02/17/2024

## 2024-02-17 NOTE — TELEPHONE ENCOUNTER
Care Due:                  Date            Visit Type   Department     Provider  --------------------------------------------------------------------------------                                ESTABLISHED                              PATIENT -    [facility]  Last Visit: 12-      [facility]      MEDICINE       Sera Barger  Next Visit: None Scheduled  None         None Found                                                            Last  Test          Frequency    Reason                     Performed    Due Date  --------------------------------------------------------------------------------    Uric Acid...  12 months..  allopurinoL..............  03- 02-    [facility] Embedded Care Due Messages. Reference number: [id number].   2/17/2024 11:23:24 AM CST

## 2024-02-20 RX ORDER — LEVOTHYROXINE SODIUM 75 UG/1
75 TABLET ORAL
Qty: 90 TABLET | Refills: 3 | Status: SHIPPED | OUTPATIENT
Start: 2024-02-20

## 2024-02-20 NOTE — TELEPHONE ENCOUNTER
No care due was identified.  [facility] Embedded Care Due Messages. Reference number: [id number].   2/20/2024 12:32:11 PM CST

## 2024-02-22 ENCOUNTER — OFFICE VISIT (OUTPATIENT)
Dept: OPTOMETRY | Facility: CLINIC | Age: 89
End: 2024-02-22
Payer: MEDICARE

## 2024-02-22 DIAGNOSIS — E11.9 TYPE 2 DIABETES MELLITUS WITHOUT RETINOPATHY: Primary | ICD-10-CM

## 2024-02-22 DIAGNOSIS — Z96.1 PSEUDOPHAKIA OF BOTH EYES: ICD-10-CM

## 2024-02-22 DIAGNOSIS — H52.7 REFRACTIVE ERROR: ICD-10-CM

## 2024-02-22 DIAGNOSIS — E11.8 CONTROLLED TYPE 2 DIABETES MELLITUS WITH COMPLICATION, WITHOUT LONG-TERM CURRENT USE OF INSULIN: ICD-10-CM

## 2024-02-22 PROCEDURE — 3288F FALL RISK ASSESSMENT DOCD: CPT | Mod: CPTII,S$GLB,, | Performed by: OPTOMETRIST

## 2024-02-22 PROCEDURE — 1159F MED LIST DOCD IN RCRD: CPT | Mod: CPTII,S$GLB,, | Performed by: OPTOMETRIST

## 2024-02-22 PROCEDURE — 92015 DETERMINE REFRACTIVE STATE: CPT | Mod: S$GLB,,, | Performed by: OPTOMETRIST

## 2024-02-22 PROCEDURE — 2023F DILAT RTA XM W/O RTNOPTHY: CPT | Mod: CPTII,S$GLB,, | Performed by: OPTOMETRIST

## 2024-02-22 PROCEDURE — 1126F AMNT PAIN NOTED NONE PRSNT: CPT | Mod: CPTII,S$GLB,, | Performed by: OPTOMETRIST

## 2024-02-22 PROCEDURE — 1160F RVW MEDS BY RX/DR IN RCRD: CPT | Mod: CPTII,S$GLB,, | Performed by: OPTOMETRIST

## 2024-02-22 PROCEDURE — 1101F PT FALLS ASSESS-DOCD LE1/YR: CPT | Mod: CPTII,S$GLB,, | Performed by: OPTOMETRIST

## 2024-02-22 PROCEDURE — 99999 PR PBB SHADOW E&M-EST. PATIENT-LVL III: CPT | Mod: PBBFAC,,, | Performed by: OPTOMETRIST

## 2024-02-22 PROCEDURE — 92014 COMPRE OPH EXAM EST PT 1/>: CPT | Mod: S$GLB,,, | Performed by: OPTOMETRIST

## 2024-02-22 NOTE — PROGRESS NOTES
HPI    Pt here for annual eye exam DLS-11/09/22    Pt states her vision in OD has decreased , thinks it started decreasing   after her heart surgery.   DM is controlled by diet, HTN is well controlled on meds.  Occasional floaters, denies FOL.   Denies GTTS.    Hemoglobin A1C       Date                     Value               Ref Range             Status                11/28/2023               7.6 (H)             0.0 - 5.6 %           Final              Comment:    Reference Interval:  5.0 - 5.6 Normal   5.7 - 6.4 High Risk   > 6.5   Diabetic      Hgb A1c results are standardized based on the (NGSP)   National   Glycohemoglobin Standardization Program.      Hemoglobin A1C   levels are related to mean serum/plasma glucose   during the preceding 2-3   months.            07/28/2023               6.2 (H)             0.0 - 5.6 %           Final              Comment:    Reference Interval:  5.0 - 5.6 Normal   5.7 - 6.4 High Risk   > 6.5   Diabetic      Hgb A1c results are standardized based on the (NGSP)   National   Glycohemoglobin Standardization Program.      Hemoglobin A1C   levels are related to mean serum/plasma glucose   during the preceding 2-3   months.            03/01/2023               7.9 (H)             4.0 - 5.6 %           Final              Comment:    ADA Screening Guidelines:  5.7-6.4%  Consistent with   prediabetes  >or=6.5%  Consistent with diabetes    High levels of fetal   hemoglobin interfere with the HbA1C  assay. Heterozygous hemoglobin   variants (HbS, HgC, etc)do  not significantly interfere with this assay.     However, presence of multiple variants may affect accuracy.    ----------    Last edited by Leydi Luke on 2/22/2024  1:44 PM.            Assessment /Plan     For exam results, see Encounter Report.    Type 2 diabetes mellitus without retinopathy    Controlled type 2 diabetes mellitus with complication, without long-term current use of insulin  -     Ambulatory referral/consult  to Optometry    Pseudophakia of both eyes    Refractive error      1,2. No diabetic retinopathy, no csme. Return in 1 year for dilated eye exam.  3. Monitor condition. Patient to report any changes. RTC 1 year recheck.  4. New Spectacle Rx given, discussed different options for glasses. RTC 1 year routine eye exam.

## 2024-03-02 PROBLEM — R93.89 ABNORMAL CXR: Status: ACTIVE | Noted: 2024-03-02

## 2024-03-02 PROBLEM — R73.9 HYPERGLYCEMIA: Status: ACTIVE | Noted: 2024-03-02

## 2024-03-02 PROBLEM — G93.40 ACUTE ENCEPHALOPATHY: Status: ACTIVE | Noted: 2024-03-02

## 2024-03-02 PROBLEM — R47.01 APHASIA: Status: ACTIVE | Noted: 2024-03-02

## 2024-03-03 PROBLEM — R29.818 ACUTE FOCAL NEUROLOGICAL DEFICIT: Status: ACTIVE | Noted: 2023-11-29

## 2024-03-03 PROBLEM — J69.0 ASPIRATION PNEUMONITIS: Status: ACTIVE | Noted: 2024-03-03

## 2024-03-04 ENCOUNTER — PATIENT MESSAGE (OUTPATIENT)
Dept: INTERNAL MEDICINE | Facility: CLINIC | Age: 89
End: 2024-03-04
Payer: MEDICARE

## 2024-03-04 PROBLEM — R56.9 FOCAL SEIZURES: Status: ACTIVE | Noted: 2024-03-04

## 2024-03-07 ENCOUNTER — PATIENT MESSAGE (OUTPATIENT)
Dept: INTERNAL MEDICINE | Facility: CLINIC | Age: 89
End: 2024-03-07
Payer: MEDICARE

## 2024-03-11 PROBLEM — G93.40 ACUTE ENCEPHALOPATHY: Status: RESOLVED | Noted: 2024-03-02 | Resolved: 2024-03-11

## 2024-03-11 PROBLEM — R29.818 ACUTE FOCAL NEUROLOGICAL DEFICIT: Status: RESOLVED | Noted: 2023-11-29 | Resolved: 2024-03-11

## 2024-03-11 PROBLEM — R47.01 APHASIA: Status: RESOLVED | Noted: 2024-03-02 | Resolved: 2024-03-11

## 2024-03-11 PROBLEM — Z71.89 ACP (ADVANCE CARE PLANNING): Status: RESOLVED | Noted: 2017-05-18 | Resolved: 2024-03-11

## 2024-03-12 ENCOUNTER — OFFICE VISIT (OUTPATIENT)
Dept: INTERNAL MEDICINE | Facility: CLINIC | Age: 89
End: 2024-03-12
Payer: MEDICARE

## 2024-03-12 ENCOUNTER — TELEPHONE (OUTPATIENT)
Dept: INTERNAL MEDICINE | Facility: CLINIC | Age: 89
End: 2024-03-12
Payer: MEDICARE

## 2024-03-12 DIAGNOSIS — R41.0 INTERMITTENT CONFUSION: ICD-10-CM

## 2024-03-12 DIAGNOSIS — E11.8 CONTROLLED TYPE 2 DIABETES MELLITUS WITH COMPLICATION, WITHOUT LONG-TERM CURRENT USE OF INSULIN: ICD-10-CM

## 2024-03-12 DIAGNOSIS — D35.00 ADRENAL ADENOMA, UNSPECIFIED LATERALITY: ICD-10-CM

## 2024-03-12 DIAGNOSIS — I35.0 SEVERE AORTIC STENOSIS: ICD-10-CM

## 2024-03-12 DIAGNOSIS — E03.9 ACQUIRED HYPOTHYROIDISM: ICD-10-CM

## 2024-03-12 DIAGNOSIS — Z86.73 HISTORY OF CVA (CEREBROVASCULAR ACCIDENT): ICD-10-CM

## 2024-03-12 DIAGNOSIS — R56.9 FOCAL SEIZURES: Primary | ICD-10-CM

## 2024-03-12 DIAGNOSIS — J69.0 ASPIRATION PNEUMONITIS: ICD-10-CM

## 2024-03-12 DIAGNOSIS — I10 ESSENTIAL HYPERTENSION: ICD-10-CM

## 2024-03-12 DIAGNOSIS — J44.9 CHRONIC OBSTRUCTIVE PULMONARY DISEASE, UNSPECIFIED COPD TYPE: ICD-10-CM

## 2024-03-12 DIAGNOSIS — J18.9 PNEUMONIA DUE TO INFECTIOUS ORGANISM, UNSPECIFIED LATERALITY, UNSPECIFIED PART OF LUNG: Primary | ICD-10-CM

## 2024-03-12 DIAGNOSIS — R93.89 ABNORMAL CXR: ICD-10-CM

## 2024-03-12 PROBLEM — N30.00 ACUTE CYSTITIS WITHOUT HEMATURIA: Status: RESOLVED | Noted: 2023-08-09 | Resolved: 2024-03-12

## 2024-03-12 PROBLEM — M46.99 UNSPECIFIED INFLAMMATORY SPONDYLOPATHY, MULTIPLE SITES IN SPINE: Status: RESOLVED | Noted: 2020-02-21 | Resolved: 2024-03-12

## 2024-03-12 PROBLEM — E27.8 OTHER SPECIFIED DISORDERS OF ADRENAL GLAND: Status: RESOLVED | Noted: 2023-04-03 | Resolved: 2024-03-12

## 2024-03-12 PROBLEM — G89.29 CHRONIC RIGHT SHOULDER PAIN: Status: RESOLVED | Noted: 2020-11-12 | Resolved: 2024-03-12

## 2024-03-12 PROBLEM — M25.511 CHRONIC RIGHT SHOULDER PAIN: Status: RESOLVED | Noted: 2020-11-12 | Resolved: 2024-03-12

## 2024-03-12 PROBLEM — F33.9 DEPRESSION, RECURRENT: Status: RESOLVED | Noted: 2023-12-26 | Resolved: 2024-03-12

## 2024-03-12 PROCEDURE — 99214 OFFICE O/P EST MOD 30 MIN: CPT | Mod: 95,,, | Performed by: INTERNAL MEDICINE

## 2024-03-12 PROCEDURE — 1160F RVW MEDS BY RX/DR IN RCRD: CPT | Mod: CPTII,95,, | Performed by: INTERNAL MEDICINE

## 2024-03-12 PROCEDURE — 1159F MED LIST DOCD IN RCRD: CPT | Mod: CPTII,95,, | Performed by: INTERNAL MEDICINE

## 2024-03-12 PROCEDURE — 1111F DSCHRG MED/CURRENT MED MERGE: CPT | Mod: CPTII,95,, | Performed by: INTERNAL MEDICINE

## 2024-03-12 RX ORDER — VIT C/E/ZN/COPPR/LUTEIN/ZEAXAN 250MG-90MG
2000 CAPSULE ORAL DAILY
Qty: 60 CAPSULE | Refills: 12 | Status: SHIPPED | OUTPATIENT
Start: 2024-03-12

## 2024-03-12 RX ORDER — QUETIAPINE FUMARATE 25 MG/1
25 TABLET, FILM COATED ORAL NIGHTLY
Qty: 30 TABLET | Refills: 3 | Status: SHIPPED | OUTPATIENT
Start: 2024-03-12 | End: 2025-03-12

## 2024-03-12 NOTE — Clinical Note
Ten Hendricks-  You had seen her in December after she had her hospitalizations for stroke.  She was recently hospitalized again and there was concern about a possible seizure scenario; she was given Keppra but then developed a rash so the hospital neurology team took her off of the Keppra.  She has not had any additional seizures that we are aware of.  However, she is having some hallucinations (this predates this hospital stay).  She does need urgent neurology follow-up- can you help to coordinate?  Thanks  Sera Cordon

## 2024-03-12 NOTE — TELEPHONE ENCOUNTER
Called and spoke with pt's . States he will get his daughter to schedule with the FNP that pt recently seen and will let us know when done. Labs and xray scheduled

## 2024-03-12 NOTE — PROGRESS NOTES
Telemedicine Video Visit    The patient location is:  Patient Home   The chief complaint leading to consultation is: hospital follow up  Visit type: Virtual visit with synchronous audio and video  Total time spent with patient: 30 minutes  Each patient to whom he or she provides medical services by telemedicine is:  (1) informed of the relationship between the physician and patient and the respective role of any other health care provider with respect to management of the patient; and (2) notified that he or she may decline to receive medical services by telemedicine and may withdraw from such care at any time.     history of CVA (left posterior thalamic lacunar infarct) on 11/23 on  mg and Plavix 75mg, HTN, AS s/p TAVR , CAD, breast ca with RUext lymphedema, was brought to the ER for evaluation of aphasia (which started around 8 pm last night). Per ERMD and daughter, patient received extra dose of  mg last night and went to bed. This am, patient remained aphasic and noted to be weaker (RUEx usually weak due to lymphedema).  In the ER, stroke alert done -  not a candidate for tPA since symptoms started last night. Patient was given  mg suppository. CT head, showed remote ischemic changes with advanced cerebral atrophy. No acute intracranial abnormality. CTA showed No proximal vessel occlusion or aneurysm formation. Advanced intracranial atherosclerosis. No detrimental interval change from the previous exam .  UA neg infection.  CXR ? Wedge-shaped area of opacification within the right upper lung zone. Pulmonary infiltrate could have this appearance. Elsewhere, chronic interstitial changes present bilaterally.  Patient is aphasic and cannot answer or follow commands.  No cough noted. Patient afebrile.  [facility] called for admission.              Hospital Course:   The patient was admitted for evaluation of acute aphasia, unable to follow commands with R sided neglect.  CVA workup done  (CT head, MRI  "brain, CTA stroke protocol) did not show acute abnormalities.  Patient continued on ASA suppository and kept npo as patient was lethargic. UA neg for infection. CXR - RUL infiltrate and received empiric abx in ER. Procal wnl - likely aspiration . Neurology consulted and started on empiric IV keppra.  EEG done  - "abnormal EEG due to the presence left temporal epileptiform discharges. This supports a diagnoses of focal seizures."  Patient mental status improved after initiation of anti eleptics. ST eval - cleared to start regular diet and restarted on oral medications, PPN discontinued. PT/OT cleared her for HH. CM was able to arrange this.       Goals of Care Treatment Preferences:  Code Status: DNR    Most of the history is provided by the patient's  and daughter who are present during this interview.     She was discharged to follow-up with Neurology and me.  No Neurology appointment is scheduled, however.  There seems to be an issue with the neurologist she saw in the hospital, who apparently is not available through her insurance at this time.  She had seen a prior neurologist through [facility] in December.    She was given Keppra for seizures.  However a week ago, she developed a rash.  Neurology recommended stopping Keppra due to rash and sedation.  Since then has trouble sleeping at night.    Medications were adjusted; she is off tramadol.  She is still on Ambien.    Eating and drinking well.  Tired during the day.  Sleeps most of the day and that has trouble sleeping at night.  No fever, chills or sweats.  No falls.  No cough or shortness a breath.    Discussed that she might have had a slight pneumonitis, she states in the hospital she was told she did not have pneumonia.  She had been treated empirically.    Patient Active Problem List   Diagnosis    Essential hypertension    Chronic pain syndrome    Lumbosacral spondylosis without myelopathy    Adrenal adenoma: 2.8 X 2.1CM 2010; stable 2016, also " stable 2018    Lymph edema, right arm    Gallbladder polyp    CKD (chronic kidney disease) stage 3, GFR 30-59 ml/min    Severe aortic stenosis    LVH (left ventricular hypertrophy)    Pancreatic cyst: stable on MRI 11/16 also 2018, due again 2020 (patient declined)    Cervical spinal stenosis    Controlled type 2 diabetes mellitus with complication, without long-term current use of insulin    Transient cerebral ischemia: 2012    Primary insomnia    Gastroesophageal reflux disease without esophagitis    Multiple lung nodules: stable CT 10/2016, also 2019    Former smoker: 1 pack daily for < 20 years, quit 1973    Asthma with COPD    Facet hypertrophy of lumbar region    History of breast cancer    Intermittent asthma    Radiation fibrosis of lung    Bronchiectasis without complication    Ectatic abdominal aorta: see CT scan 10/16; no aneurysm 10/17    Renal cysts, acquired, bilateral    Lumbar radiculopathy    Abnormal EKG    Diverticulosis of large intestine without hemorrhage: see CT 10/14    Idiopathic gout    Liver cysts: see MRI 2018    Odynophagia    Lumbar spondylosis    Mixed hyperlipidemia    Aortic atherosclerosis    Low serum vitamin B12    Coronary artery disease involving native coronary artery of native heart    NYHA class 3 heart failure with preserved ejection fraction    History of transcatheter aortic valve replacement (TAVR)    Intermittent confusion    Diabetes mellitus type 2, diet-controlled    Acquired hypothyroidism    Chronic diastolic CHF (congestive heart failure)    Abnormal CXR    Aspiration pneumonitis    Focal seizures    Chronic obstructive pulmonary disease, unspecified COPD type        Answers submitted by the patient for this visit:  Review of Systems Questionnaire (Submitted on 3/12/2024)  activity change: No  unexpected weight change: No  neck pain: No  hearing loss: No  rhinorrhea: No  trouble swallowing: No  eye discharge: No  visual disturbance: No  chest tightness:  No  wheezing: No  chest pain: No  palpitations: No  blood in stool: No  constipation: No  vomiting: No  diarrhea: No  polydipsia: No  polyuria: No  difficulty urinating: No  hematuria: No  menstrual problem: No  dysuria: No  joint swelling: No  arthralgias: No  headaches: No  weakness: No  dysphoric mood: No    Physical Exam  Constitutional:       Appearance: She is well-developed.   HENT:      Head: Normocephalic and atraumatic.   Eyes:      Extraocular Movements: Extraocular movements intact.      Conjunctiva/sclera: Conjunctivae normal.   Neck:      Thyroid: No thyromegaly.   Pulmonary:      Effort: No respiratory distress.   Neurological:      General: No focal deficit present.      Mental Status: She is alert and oriented to person, place, and time.   Psychiatric:         Mood and Affect: Mood normal.         Behavior: Behavior normal.         Thought Content: Thought content normal.         Judgment: Judgment normal.        1. Focal seizures  -     Ambulatory referral/consult to Neurology; Future; Expected date: 03/19/2024  -     Ambulatory referral/consult to Home Health; Future; Expected date: 03/13/2024    2. Aspiration pneumonitis  -     Ambulatory referral/consult to Home Health; Future; Expected date: 03/13/2024    3. Severe aortic stenosis  Overview:  3/2023  There is severe aortic valve stenosis.  Aortic valve area is 0.68 cm2; peak velocity is 4.23 m/s; mean gradient is 48 mmHg.          4. Essential hypertension    5. Acquired hypothyroidism    6. Adrenal adenoma, unspecified laterality    7. Chronic obstructive pulmonary disease, unspecified COPD type    8. Abnormal CXR    9. Intermittent confusion  -     Ambulatory referral/consult to Neurology; Future; Expected date: 03/19/2024    10. History of CVA (cerebrovascular accident)  -     Ambulatory referral/consult to Neurology; Future; Expected date: 03/19/2024  -     Ambulatory referral/consult to Home Health; Future; Expected date:  03/13/2024    Other orders  -     cholecalciferol, vitamin D3, (VITAMIN D3) 25 mcg (1,000 unit) capsule; Take 2 capsules (2,000 Units total) by mouth once daily.  Dispense: 60 capsule; Refill: 12  -     QUEtiapine (SEROQUEL) 25 MG Tab; Take 1 tablet (25 mg total) by mouth nightly.  Dispense: 30 tablet; Refill: 3       She is having some sundowning and apparently has had some hallucinations off and on for several months, worse in the evening.  Discussed at length the family.  Hygienic measures, orientation techniques reviewed.  Needs neurology follow-up sooner rather than later.  Trial of low-dose Seroquel, cautions and side effects reviewed.    Home health    Continue current regimen for the time being    Keep scheduled follow-up for cardiac testing    Alarm symptoms and ED cautions reviewed

## 2024-03-12 NOTE — TELEPHONE ENCOUNTER
Please call to schedule neurology appointment as soon as possible, [place]    [place]erral is in    Labs and chest x-ray in 1 month, orders in thank you

## 2024-03-13 ENCOUNTER — TELEPHONE (OUTPATIENT)
Dept: NEUROLOGY | Facility: CLINIC | Age: 89
End: 2024-03-13
Payer: MEDICARE

## 2024-03-13 ENCOUNTER — TELEPHONE (OUTPATIENT)
Dept: INTERNAL MEDICINE | Facility: CLINIC | Age: 89
End: 2024-03-13
Payer: MEDICARE

## 2024-03-13 NOTE — TELEPHONE ENCOUNTER
----- Message from NAUN Ogden sent at 3/13/2024  1:02 PM CDT -----  We will get her scheduled.   Thanks for the communication   ----- Message -----  From: Sera Barger MD  Sent: 3/13/2024  10:14 AM CDT  To: NAUN Ogden    Hi Ruthie-    You had seen her in December after she had her hospitalizations for stroke.  She was recently hospitalized again and there was concern about a possible seizure scenario; she was given Keppra but then developed a rash so the hospital neurology team took her off of the Keppra.  She has not had any additional seizures that we are aware of.  However, she is having some hallucinations (this predates this hospital stay).  She does need urgent neurology follow-up- can you help to coordinate?  Thanks    Sera Cordon

## 2024-03-13 NOTE — TELEPHONE ENCOUNTER
Spoke to patient and scheduled a hospital follow up for 2pm march 18 ----- Message from NAUN Ogden sent at 3/13/2024  1:01 PM CDT -----  Regarding: appt  Id like to see her in clinic for memory/hosp f/u.  I need 1 hr with her.   ----- Message -----  From: Sera Barger MD  Sent: 3/13/2024  10:14 AM CDT  To: NAUN Ogden-    You had seen her in December after she had her hospitalizations for stroke.  She was recently hospitalized again and there was concern about a possible seizure scenario; she was given Keppra but then developed a rash so the hospital neurology team took her off of the Keppra.  She has not had any additional seizures that we are aware of.  However, she is having some hallucinations (this predates this hospital stay).  She does need urgent neurology follow-up- can you help to coordinate?  Thanks    Sera Cordon

## 2024-03-15 ENCOUNTER — TELEPHONE (OUTPATIENT)
Dept: NEUROLOGY | Facility: CLINIC | Age: 89
End: 2024-03-15
Payer: MEDICARE

## 2024-03-15 ENCOUNTER — PATIENT MESSAGE (OUTPATIENT)
Dept: NEUROLOGY | Facility: CLINIC | Age: 89
End: 2024-03-15
Payer: MEDICARE

## 2024-03-18 NOTE — PROGRESS NOTES
"  NEUROLOGY  Outpatient Follow Up    [facility]  [address] [facility], [place] [address]  [phone] (office) / [phone] (fax)    Patient Name:  Olga Aguiar  :  1935  MR #:  [id number]  Acct #:  [id number]    Date of Neurology Visit: 2024  Name of Provider: MADELYN Boyer    Other Physicians:  Sera Barger MD (Primary Care Physician); No ref. provider found (Referring)      Chief Complaint: hospital f/u      History of Present Illness (HPI):  As per EMR  2023  "This is an 88-year-old female with past medical history particularly significant for hypertension, dyslipidemia, type 2 diabetes mellitus, diet-controlled, chronic diastolic CHF, asthma/COPD, chronic back pain, hypothyroidism, CKD 3, AS status post TAVR in 2023, course complicated by altered mental status felt to be toxic/metabolic encephalopathy secondary to sedative medications, UTI, with brain MRI showing multiple infarcts that were not felt to be the cause of her altered mental status, who presented to our ED for further evaluation of altered mental status.    In fact, per family, patient was last known well yesterday 2023 around 2:00 p.m. when her  went to a doctor's appointment.  When he returned around 5:00 p.m., the patient was angry and developed altered mental status described as confusion.       At the time of my evaluation, the patient was awake, alert and oriented to name only, otherwise confused.  She was not answering questions appropriately.  She was following commands.    Subsequently further history is unable to be obtained from her.       In ED, workup showed a glucose of 197.    Will place her under medical observation for further management.     * No surgery found *       Hospital Course:   Discharge diagnosis: Acute CVA L posterior thalamic lacunar     Hospital Course:   Admitted overnight with acute confusion.  Underwent MRI brain, which reported acute " "or early subacute left posterior thalamic lacunar infarct.  Mild-moderate chronic microvascular ischemic chane.  Stroke orders placed.  Neurology consulted.  Recs for  mg and plavix 75 mg. Amlodipine increased to 10mg. LDL not at goal, inc to high intensity statin, atorvastatin 40mg daily.  Noted with UTI, prior Pseudomonas in urine cx- started on Cipro. Mental status returned back to baseline. Discharged on 11/30."        Interval Hx 12/15/2023:   Patient is new to me  Patient is a 87 y/o female with a significant PMHX of breast cancer 23 years ago with radiation and chemotherapy, chronic right arm lymphedema, COPD, diabetes type 2 hypertension, TIA, severe valve stenosis, CAD, s/p TAVR 7/2023.   Patient is being seen virtually for hospital follow up. She is accompanied by her spouse and daughter.     She has been having visual hallucinations since her TAVR. Spouse states it took her a while to recover mentally from the anesthesia post TAVR.  She was slowly improving until this recent episode of confusion. Daughter does report her to have frequent UTIs.   She did complete prescribed antibiotics. Family denies previous history of memory deficits.   All recent Neuro testing discussed with the patient/family at length.       Interval Hx 3/22/2024:  Patient is being seen virtually for hospital follow up. She is accompanied by her spouse and daughter who helps supply information.   It was suspected she was having focal seizures and was placed Keppra while in the hospital Family states about 3-4 days after discharge she was noticed to have a rash and was very fatigued. She was very hard to arouse.  They tried reaching out to Dr. Nixon but realized he is not in the [facility] system.  She then saw her PCP who discontinued her Keppra and started her on Seroquel for hallucinations. She has been taking 25 mg nightly. Her tramadol was also discontinued as it can provoke seizures. Spouse and daughter have noticed an " "improvement in the patient in that she is more alert.  She is nearly back to baseline. The hallucinations stopped for a while but are now back. She is currently receiving home health therapy.       EMR reviewed  D/C summary 3/2/2024  "The patient is an 88 year old female, with history of CVA (left posterior thalamic lacunar infarct) on 11/23 on  mg and Plavix 75mg, HTN, AS s/p TAVR , CAD, breast ca with RUext lymphedema, was brought to the ER for evaluation of aphasia (which started around 8 pm last night). Per ERMD and daughter, patient received extra dose of  mg last night and went to bed. This am, patient remained aphasic and noted to be weaker (RUEx usually weak due to lymphedema).  In the ER, stroke alert done -  not a candidate for tPA since symptoms started last night. Patient was given  mg suppository. CT head, showed remote ischemic changes with advanced cerebral atrophy. No acute intracranial abnormality. CTA showed No proximal vessel occlusion or aneurysm formation. Advanced intracranial atherosclerosis. No detrimental interval change from the previous exam .  UA neg infection.  CXR ? Wedge-shaped area of opacification within the right upper lung zone. Pulmonary infiltrate could have this appearance. Elsewhere, chronic interstitial changes present bilaterally.  Patient is aphasic and cannot answer or follow commands.  No cough noted. Patient afebrile.  [facility] called for admission.     Hospital Course:   The patient was admitted for evaluation of acute aphasia, unable to follow commands with R sided neglect.  CVA workup done  (CT head, MRI brain, CTA stroke protocol) did not show acute abnormalities.  Patient continued on ASA suppository and kept npo as patient was lethargic. UA neg for infection. CXR - RUL infiltrate and received empiric abx in ER. Procal wnl - likely aspiration . Neurology consulted and started on empiric IV keppra.  EEG done  - "abnormal EEG due to the presence left " "temporal epileptiform discharges. This supports a diagnoses of focal seizures."  Patient mental status improved after initiation of anti eleptics. ST eval - cleared to start regular diet and restarted on oral medications, PPN discontinued. PT/OT cleared her for HH. CM was able to arrange this."        Past Medical, Surgical, Family & Social History:   Reviewed and updated.    Home Medications:     Current Outpatient Medications:     allopurinoL (ZYLOPRIM) 100 MG tablet, Take 1 tablet (100 mg total) by mouth once daily., Disp: 90 tablet, Rfl: 1    amLODIPine (NORVASC) 10 MG tablet, Take 1 tablet (10 mg total) by mouth once daily. (Patient taking differently: Take 10 mg by mouth every evening.), Disp: 90 tablet, Rfl: 3    aspirin (ECOTRIN) 325 MG EC tablet, Take 1 tablet (325 mg total) by mouth once daily., Disp: 30 tablet, Rfl: 1    atorvastatin (LIPITOR) 80 MG tablet, Take 1 tablet (80 mg total) by mouth every evening., Disp: 90 tablet, Rfl: 3    cholecalciferol, vitamin D3, (VITAMIN D3) 25 mcg (1,000 unit) capsule, Take 2 capsules (2,000 Units total) by mouth once daily., Disp: 60 capsule, Rfl: 12    clopidogreL (PLAVIX) 75 mg tablet, Take 1 tablet (75 mg total) by mouth once daily., Disp: 90 tablet, Rfl: 3    cyanocobalamin (VITAMIN B-12) 1000 MCG tablet, Take 1 tablet (1,000 mcg total) by mouth once daily., Disp: 30 tablet, Rfl: 12    fluticasone furoate-vilanteroL (BREO ELLIPTA) 200-25 mcg/dose DsDv diskus inhaler, Inhale 1 puff into the lungs once daily., Disp: 60 each, Rfl: 11    lacosamide (VIMPAT) 100 mg Tab, Take 0.5 tablets (50 mg total) by mouth every 12 (twelve) hours., Disp: 90 tablet, Rfl: 3    Lactobacillus rhamnosus GG (CULTURELLE) 10 billion cell capsule, Take 1 capsule by mouth once daily., Disp: , Rfl:     levothyroxine (SYNTHROID) 75 MCG tablet, Take 1 tablet (75 mcg total) by mouth before breakfast., Disp: 90 tablet, Rfl: 3    potassium citrate 99 mg Cap, Take 99 mg by mouth every evening., " Disp: , Rfl:     QUEtiapine (SEROQUEL) 25 MG Tab, Take 1 tablet (25 mg total) by mouth nightly. (Patient taking differently: Take 25 mg by mouth nightly as needed (agitation).), Disp: 30 tablet, Rfl: 3    spironolactone (ALDACTONE) 25 MG tablet, Take 0.5 tablets (12.5 mg total) by mouth once daily., Disp: 15 tablet, Rfl: 11    zolpidem (AMBIEN) 5 MG Tab, TAKE 1 TABLET BY MOUTH ONCE DAILY IN THE EVENING, Disp: 30 tablet, Rfl: 0    Physical Examination:  Limited exam due to being virtual  There were no vitals taken for this visit.    GENERAL:  General appearance: Well, non-toxic appearing.  No apparent distress.  Neck: supple.    MENTAL STATUS:  Alertness, attention span & concentration: normal.  Language: normal.  Orientation to self, place & time:  normal.  Memory, recent & remote: limited  Fund of knowledge: normal.    SPEECH:  Clear and fluent.  Follows complex commands.          Diagnostic Data Reviewed:   I have personally reviewed provider notes, labs and imaging made available to me today.     Imaging:  MRI brain 3/2/2024  FINDINGS:  Ventricles of normal size and shape there is no shift of the midline noted.  There are no extra-axial fluid collections are areas consistent with hemorrhage noted.  There is increased signal in the periventricular white matter most consistent with chronic ischemia.  No masses is seen no acute infarcts are noted.  There are normal flow voids in the carotid siphons.  The pituitary appears normal.  There is scattered punctate foci of susceptibility likely related to chronic microhemorrhage.  This is stable from the previous study     Impression:     No acute abnormalities are seen    CTA 3/2024:  mpression:     1. No proximal vessel occlusion or aneurysm formation.  Advanced intracranial atherosclerosis.  No detrimental interval change from the previous exam.      MRI Brain Without Contrast 11/2023    Narrative  EXAMINATION:  MRI BRAIN WITHOUT CONTRAST    CLINICAL  HISTORY:  AMS/confusion; Pt is here for evaluation of altered mental status.  Patient's  went to a doctor's appointment yesterday at 2:00 p.m..  He reports patient was at her normal baseline at 2:00 p.m.  We returned at 5:00 p.m. he reports she was confused.  He reports a prior episode similar to this a month ago after having a TAVR.  He reports it was secondary to metabolic encephalopathy/UTI.  Patient's daughter is present she reports the TAVR was in July.  She reports her mother has had mild deficits in her mental function since the episode of metabolic encephalopathy but has had an acute change to her mental status yesterday afternoon.    TECHNIQUE:  Multiplanar multisequence MR imaging of the brain was performed without contrast.    COMPARISON:  MRI brain without contrast, 07/29/2023.    FINDINGS:  INTRACRANIAL: Mild global volume loss.  Subtle restricted diffusion in the left posterior thalamus with corresponding T2 FLAIR hyperintense signal. Scattered and confluent T2 FLAIR hyperintense white matter foci are present, likely related to chronic microvascular ischemic change.  No parenchymal restricted diffusion.  Scattered punctate foci of susceptibility, likely related to chronic microhemorrhage and similar to prior study.  No evidence of acute intracranial hemorrhage.  No extra-axial fluid collection or mass.  No intracranial mass effect.  No hydrocephalus.  Midline structures have a normal configuration.  Visualized pituitary gland and infundibulum are normal.  Visualized major intracranial vascular structures demonstrate normal flow voids and are normal in course and caliber.    SINUSES: Small right maxillary sinus mucous retention cyst versus polyp.  Trace right ethmoid sinus mucosal thickening.  Mastoid air cells are clear.    ORBITS: Bilateral lens replacements.    Impression  1. Suspect acute or early subacute left posterior thalamic lacunar infarct.  2. Mild-moderate chronic microvascular  ischemic change.  Epic message sent to Sera Freeman and Hussain at 10:27 on 11/28/2023.      CT Head Without Contrast 11/2023    Narrative  EXAMINATION:  CT HEAD WITHOUT CONTRAST    CLINICAL HISTORY:  Altered Mental Status (Last known well by  at 1400, arrived home at 1700 finding patient confused, clear speech, no facial droop, moves all extremities well, )    TECHNIQUE:  Low dose axial images were obtained through the head.  Coronal and sagittal reformations were also performed. Contrast was not administered.  Dose reduction techniques including automatic exposure control (AEC) were utilized.    Dose (DLP): 541 mGycm    COMPARISON:  MRI brain without contrast, 07/29/2023.  CT head without contrast, 07/27/2023.    FINDINGS:  INTRACRANIAL: Mild global volume loss.  Scattered white matter hypodensities, likely related to chronic microvascular ischemic change.  Gray-white differentiation is preserved.  No acute intracranial hemorrhage.  No hydrocephalus.  No intracranial mass effect.    SINUSES: Trace bilateral ethmoid sinus mucosal thickening.  Mucous debris in the left sphenoid sinus.  Mastoid air cells are clear.    SKULL/SCALP: Visualized osseous structures are normal.    ORBITS: Bilateral lens replacements.    Impression  Nighthawk concordant.  No acute intracranial abnormality.      CTA Head and Neck (xpd) 11/2023    Narrative  EXAMINATION:  CTA HEAD AND NECK (XPD)    CLINICAL HISTORY:  Neuro deficit, acute, stroke suspected.    TECHNIQUE:  CT angiogram was performed from the level of the zhao to the top of the head following the IV administration of 80mL of Omnipaque 350.   Sagittal and coronal and maximum intensity projection reconstructions were performed. One additional phase of immediate post-contrast CT images was performed through the head alone. Arterial stenosis percentages are based on NASCET measurement criteria.  Dose reduction techniques including automatic exposure control (AEC)  were utilized.    Dose (DLP): 794 mGycm    COMPARISON:  CT head without contrast, 11/27/2023, 07/27/2023.  MRI brain without contrast, 11/28/2023, 07/29/2023.    FINDINGS:  CTA HEAD:    INTRACRANIAL: No acute intracranial hemorrhage.  No hydrocephalus.  No intracranial mass effect.  No abnormal intracranial enhancement.    SINUSES: Mucous retention cyst versus polyp in the right maxillary sinus.  Patchy mucosal opacity in the left sphenoid sinus.  Mastoid air cells are clear.    SKULL/SCALP: Visualized osseous structures are normal.    ORBITS: Bilateral lens replacements.    VASCULATURE: Focal high-grade stenosis or occlusion of the left A3 BUNNY segment (series 13, image 35).  Moderate bilateral carotid siphon calcifications with mild right supraclinoid ICA stenosis.  Mild left terminal ICA and proximal M1 MCA stenosis (series 13, image 41).  Minimal fusiform ectasia of the proximal basilar artery (image 43).  Minor atherosclerotic calcifications in the left V4 and proximal basilar arteries.  No vascular malformation.  Deep cerebral veins and dural venous sinuses enhance normally.    CTA NECK:    AORTIC ARCH: 4 vessel arch with left vertebral artery originating from the arch.    LEFT CAROTID    COMMON CAROTID: No occlusion, hemodynamically significant stenosis, dissection or aneurysm.    INTERNAL CAROTID: Mild atherosclerotic calcifications at the origin.  No occlusion, hemodynamically significant stenosis, dissection or aneurysm.    RIGHT CAROTID    COMMON CAROTID: No occlusion, hemodynamically significant stenosis, dissection or aneurysm.    INTERNAL CAROTID: Mild atherosclerotic plaque at the origin.  No occlusion, hemodynamically significant stenosis, dissection or aneurysm.    VERTEBRAL ARTERIES    LEFT VERTEBRAL: Originates from the aortic arch.  No occlusion, hemodynamically significant stenosis, dissection or aneurysm.    RIGHT VERTEBRAL: No occlusion, hemodynamically significant stenosis, dissection or  aneurysm.    OTHER: Patchy anterior right upper lobe opacities and bronchiectasis.  Multilevel cervical spondylosis without high-grade spinal canal stenosis.  No acute fracture or aggressive osseous lesion.  No significant pathology in the visualized cervical soft tissues.    Impression  1. Focal high-grade stenosis or occlusion in the left A3 BUNNY segment.  2. Mild right supraclinoid ICA stenosis.  3. Mild left terminal ICA and proximal M1 MCA stenosis.  4. Additional findings as above.      Cardiac:  TTE 11/29/2023:      Left Ventricle: There is normal systolic function. Ejection fraction by visual approximation is 55%. There is normal diastolic function.    Right Ventricle: Normal right ventricular cavity size. Systolic function is normal.    Left Atrium: Agitated saline study of the atrial septum is negative after vasalva maneuver, suggesting absence of intracardiac shunt at the atrial level.    Aortic Valve: There is a transcatheter valve replacement in the aortic position that is appropriately positioned.    IVC/SVC: Normal venous pressure at 3 mmHg.      Labs:  Lab Results   Component Value Date    WBC 6.49 03/04/2024    HGB 13.9 03/04/2024    HCT 42.6 03/04/2024     03/04/2024    MCV 87 03/04/2024    RDW 14.3 03/04/2024     Lab Results   Component Value Date     03/04/2024    K 3.9 03/04/2024    CL 99 03/04/2024    CO2 32 (H) 03/04/2024    BUN 27 (H) 03/04/2024    CREATININE 0.79 03/04/2024     (H) 03/04/2024    CALCIUM 9.5 03/04/2024    MG 1.7 03/03/2024    PHOS 4.1 03/03/2024     Lab Results   Component Value Date    PROT 6.9 03/04/2024    ALBUMIN 3.9 03/04/2024    ALBUMIN 4.1 03/04/2024    BILITOT 0.8 03/04/2024    AST 37 (H) 03/04/2024    ALKPHOS 99 03/04/2024    ALT 29 03/04/2024     Lab Results   Component Value Date    INR 1.1 03/04/2024     Lab Results   Component Value Date    CHOL 209 (H) 02/05/2024    HDL 61 02/05/2024    LDLCALC 125.6 02/05/2024    TRIG 112 02/05/2024     CHOLHDL 29.2 02/05/2024     Lab Results   Component Value Date    HGBA1C 7.7 (H) 03/02/2024      Lab Results   Component Value Date    WNWDZTBP89 >1000 (H) 11/28/2023     Lab Results   Component Value Date    FOLATE 7.1 11/28/2023     Lab Results   Component Value Date    TSH 1.480 03/02/2024     EEG 7/2023:  This is an abnormal, technically limited EEG.  Diffuse, generalized   slowing is consistent with a diffuse encephalopathy, however this is   non-specific with respect to etiology.  Mild superimposed focal slowing   suggests a more severe degree of cerebral dysfunction involving the   posterior left hemisphere.  Clinical correlation is advised.       EEG 3/2024:    Interpretation  This is an abnormal EEG due to the presence left temporal epileptiform discharges. This supports a diagnoses of focal seizures.             Assessment and Plan:    Problem List Items Addressed This Visit          Neuro    Intermittent confusion    Current Assessment & Plan     Likely mistaken for underlying neurocognitive disorder  Family reports hallucinations and memory decline since TAVR in 7/2023.   - likely related to medical hx, anesthesia and medications as well as vascular history  Quetiapine black box warning discussed with pt and family   - OK to use as needed only  Perform formal memory eval in several months in person         Focal seizures - Primary    Current Assessment & Plan     Presented to ED with new onset aphasia, generalized weakness and increased confusion.   NIHSS 18  Out of window for TNK  Neuro imaging unrevealing for acute stroke  EEG reported with left temporal epileptiform discharges   - started on Keppra 500 mg BID inpt   - pt did develop a rash and increased lethargy with this drug   - Keppra discontinued but also started on Quetiapine for reported hallucinations.   It is likely that the pt has an underlying neurocognitive disorder that correlates with the hallucinations. Family & pt do report poor short  term recall. She does require AED as noted by stereotypical events and + EEG   - start Vimpat 50 mg BID; S/E discussed   - obtain aEEG at next visit to make sure she is not having subclinical seizures   - use Quetiapine as needed only. Black box warning discussed at length   Will need alternative pain control as tramadol can provoke seizures   - PCP to manage  Seizure precautions discussed                     Important to note, also  has a past medical history of Abnormal ECG (02/06/2017), Adrenal adenoma: 2.8 X 2.1CM 2010 (05/16/2014), Aortic atherosclerosis: see CT scan 2016 (10/28/2016), Aortic stenosis (08/19/2014), Asthma, Breast cancer, Cancer, Cataracts, both eyes, Cerebellar infarct, Cerebral infarct, Chronic back pain, Chronic bronchitis, Chronic diastolic CHF (congestive heart failure), CKD (chronic kidney disease) stage 3, GFR 30-59 ml/min (08/19/2014), COPD (chronic obstructive pulmonary disease), DDD (degenerative disc disease), lumbar (07/30/2013), Diabetes mellitus, Diastolic dysfunction, Diverticulosis, Dyslipidemia, Ectatic abdominal aorta: see CT scan 10/16 (10/28/2016), Gallbladder polyp (08/19/2014), General anesthetics causing adverse effect in therapeutic use, GERD (gastroesophageal reflux disease), Gout, Herpes simplex, High blood cholesterol, Hypertension, Hypertension associated with diabetes (02/07/2012), Insomnia, Lumbar spinal stenosis, Lymphedema of arm, Multiple lung nodules (09/21/2015), Radiation fibrosis of lung, Renal cyst, right: stable per CT 2016 (10/28/2016), Stroke (02/2012), Stye (12/05/2013), Thyroid disease, TIA (transient ischemic attack), Toxic metabolic encephalopathy, Type 2 diabetes mellitus without complication, without long-term current use of insulin (07/20/2015), Unspecified hypothyroidism (07/20/2015), and UTI (urinary tract infection).          The patient will return to clinic in 2 weeks for med check and 3 mos for memory eval    All questions were answered and  patient is comfortable with the plan.         Thank you very much for the opportunity to assist in this patient's care.    If you have any questions or concerns, please do not hesitate to contact me at any time.      Sincerely,     MADELYN Boyer  [facility] [place]       The patient location is: [place], [place]  The chief complaint leading to consultation is: hospital f/u    Visit type: audiovisual    Face to Face time with patient: 40 minutes of total time spent on the encounter, which includes face to face time and non-face to face time preparing to see the patient (eg, review of tests), Obtaining and/or reviewing separately obtained history, Documenting clinical information in the electronic or other health record, Independently interpreting results (not separately reported) and communicating results to the patient/family/caregiver, or Care coordination (not separately reported).         Each patient to whom he or she provides medical services by telemedicine is:  (1) informed of the relationship between the physician and patient and the respective role of any other health care provider with respect to management of the patient; and (2) notified that he or she may decline to receive medical services by telemedicine and may withdraw from such care at any time.    Notes:

## 2024-03-19 PROCEDURE — G0180 MD CERTIFICATION HHA PATIENT: HCPCS | Mod: ,,, | Performed by: INTERNAL MEDICINE

## 2024-03-22 ENCOUNTER — PATIENT MESSAGE (OUTPATIENT)
Dept: NEUROLOGY | Facility: CLINIC | Age: 89
End: 2024-03-22

## 2024-03-22 ENCOUNTER — OFFICE VISIT (OUTPATIENT)
Dept: NEUROLOGY | Facility: CLINIC | Age: 89
End: 2024-03-22
Payer: MEDICARE

## 2024-03-22 DIAGNOSIS — R56.9 FOCAL SEIZURES: Primary | ICD-10-CM

## 2024-03-22 DIAGNOSIS — R41.0 INTERMITTENT CONFUSION: ICD-10-CM

## 2024-03-22 PROCEDURE — 1160F RVW MEDS BY RX/DR IN RCRD: CPT | Mod: CPTII,95,, | Performed by: NURSE PRACTITIONER

## 2024-03-22 PROCEDURE — 1159F MED LIST DOCD IN RCRD: CPT | Mod: CPTII,95,, | Performed by: NURSE PRACTITIONER

## 2024-03-22 PROCEDURE — 99215 OFFICE O/P EST HI 40 MIN: CPT | Mod: 95,,, | Performed by: NURSE PRACTITIONER

## 2024-03-22 PROCEDURE — 1111F DSCHRG MED/CURRENT MED MERGE: CPT | Mod: CPTII,95,, | Performed by: NURSE PRACTITIONER

## 2024-03-22 RX ORDER — LACOSAMIDE 100 MG/1
50 TABLET ORAL EVERY 12 HOURS
Qty: 90 TABLET | Refills: 3 | Status: SHIPPED | OUTPATIENT
Start: 2024-03-22 | End: 2024-03-25 | Stop reason: SDUPTHER

## 2024-03-22 NOTE — ASSESSMENT & PLAN NOTE
Presented to ED with new onset aphasia, generalized weakness and increased confusion.   NIHSS 18  Out of window for TNK  Neuro imaging unrevealing for acute stroke  EEG reported with left temporal epileptiform discharges   - started on Keppra 500 mg BID inpt   - pt did develop a rash and increased lethargy with this drug   - Keppra discontinued but also started on Quetiapine for reported hallucinations.   It is likely that the pt has an underlying neurocognitive disorder that correlates with the hallucinations. Family & pt do report poor short term recall. She does require AED as noted by stereotypical events and + EEG   - start Vimpat 50 mg BID; S/E discussed   - obtain aEEG at next visit to make sure she is not having subclinical seizures   - use Quetiapine as needed only. Black box warning discussed at length   Will need alternative pain control as tramadol can provoke seizures   - PCP to manage  Seizure precautions discussed

## 2024-03-22 NOTE — ASSESSMENT & PLAN NOTE
Likely mistaken for underlying neurocognitive disorder  Family reports hallucinations and memory decline since TAVR in 7/2023.   - likely related to medical hx, anesthesia and medications as well as vascular history  Quetiapine black box warning discussed with pt and family   - OK to use as needed only  Perform formal memory eval in several months in person

## 2024-03-23 ENCOUNTER — PATIENT MESSAGE (OUTPATIENT)
Dept: NEUROLOGY | Facility: CLINIC | Age: 89
End: 2024-03-23
Payer: MEDICARE

## 2024-03-23 DIAGNOSIS — R56.9 FOCAL SEIZURES: ICD-10-CM

## 2024-03-23 DIAGNOSIS — G47.00 INSOMNIA: ICD-10-CM

## 2024-03-24 NOTE — TELEPHONE ENCOUNTER
Care Due:                  Date            Visit Type   Department     Provider  --------------------------------------------------------------------------------                                ESTABLISHED                              PATIENT -    [facility]  Last Visit: 03-      [facility]      MEDICINE       Sera Barger  Next Visit: None Scheduled  None         None Found                                                            Last  Test          Frequency    Reason                     Performed    Due Date  --------------------------------------------------------------------------------    Vitamin D...  12 months..  cholecalciferol,.........  03- 02-    [facility] Embedded Care Due Messages. Reference number: [id number].   3/23/2024 9:41:43 PM CDT

## 2024-03-25 ENCOUNTER — TELEPHONE (OUTPATIENT)
Dept: NEUROLOGY | Facility: CLINIC | Age: 89
End: 2024-03-25
Payer: MEDICARE

## 2024-03-25 RX ORDER — LACOSAMIDE 100 MG/1
50 TABLET ORAL EVERY 12 HOURS
Qty: 90 TABLET | Refills: 3 | Status: SHIPPED | OUTPATIENT
Start: 2024-03-25 | End: 2024-06-08 | Stop reason: SDUPTHER

## 2024-03-25 RX ORDER — ZOLPIDEM TARTRATE 5 MG/1
5 TABLET ORAL NIGHTLY
Qty: 30 TABLET | Refills: 0 | Status: SHIPPED | OUTPATIENT
Start: 2024-03-25 | End: 2024-04-23

## 2024-03-25 NOTE — TELEPHONE ENCOUNTER
Called patient's  to inform them that the vimpat medication refill was sent into [facility]

## 2024-03-26 ENCOUNTER — PATIENT MESSAGE (OUTPATIENT)
Dept: INTERNAL MEDICINE | Facility: CLINIC | Age: 89
End: 2024-03-26
Payer: MEDICARE

## 2024-03-30 NOTE — TELEPHONE ENCOUNTER
No care due was identified.  [facility] Embedded Care Due Messages. Reference number: [id number].   3/30/2024 11:59:10 AM CDT

## 2024-04-01 RX ORDER — ALLOPURINOL 100 MG/1
100 TABLET ORAL DAILY
Qty: 90 TABLET | Refills: 1
Start: 2024-04-01 | End: 2024-04-19 | Stop reason: SDUPTHER

## 2024-04-08 ENCOUNTER — PATIENT MESSAGE (OUTPATIENT)
Dept: PAIN MEDICINE | Facility: CLINIC | Age: 89
End: 2024-04-08
Payer: MEDICARE

## 2024-04-08 ENCOUNTER — PATIENT MESSAGE (OUTPATIENT)
Dept: INTERNAL MEDICINE | Facility: CLINIC | Age: 89
End: 2024-04-08
Payer: MEDICARE

## 2024-04-08 ENCOUNTER — OFFICE VISIT (OUTPATIENT)
Dept: NEUROLOGY | Facility: CLINIC | Age: 89
End: 2024-04-08
Payer: MEDICARE

## 2024-04-08 DIAGNOSIS — R41.0 INTERMITTENT CONFUSION: ICD-10-CM

## 2024-04-08 DIAGNOSIS — R56.9 FOCAL SEIZURES: Primary | ICD-10-CM

## 2024-04-08 DIAGNOSIS — Z86.73 HISTORY OF CVA (CEREBROVASCULAR ACCIDENT): ICD-10-CM

## 2024-04-08 PROCEDURE — 1160F RVW MEDS BY RX/DR IN RCRD: CPT | Mod: CPTII,95,, | Performed by: NURSE PRACTITIONER

## 2024-04-08 PROCEDURE — 1159F MED LIST DOCD IN RCRD: CPT | Mod: CPTII,95,, | Performed by: NURSE PRACTITIONER

## 2024-04-08 PROCEDURE — 99214 OFFICE O/P EST MOD 30 MIN: CPT | Mod: 95,,, | Performed by: NURSE PRACTITIONER

## 2024-04-08 NOTE — ASSESSMENT & PLAN NOTE
Presented to ED in early March with new onset aphasia, generalized weakness and increased confusion.    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   It is likely that the pt has an underlying neurocognitive disorder that correlates with the hallucinations. Family & pt do report poor short term recall.   She does require AED as noted by stereotypical events and + EEG   - doing well on Vimpat 50 mg BID; continue!  - obtain aEEG to make sure she is not having subclinical seizures   - use Quetiapine as needed only. Black box warning discussed at length   Will need alternative pain control as tramadol can provoke seizures. She is also interested in increasing her Ambien   - PCP to manage  Seizure precautions discussed

## 2024-04-08 NOTE — PROGRESS NOTES
"  NEUROLOGY  Outpatient Follow Up    [facility]  [address] [facility], [place] [address]  [phone] (office) / [phone] (fax)    Patient Name:  Olga Aguiar  :  1935  MR #:  [id number]  Acct #:  [id number]    Date of Neurology Visit: 2024  Name of Provider: MADELYN Boyer    Other Physicians:  Sera Barger MD (Primary Care Physician); Sera Barger MD (Referring)      Chief Complaint: No chief complaint on file.      History of Present Illness (HPI):  As per EMR  2023  "This is an 88-year-old female with past medical history particularly significant for hypertension, dyslipidemia, type 2 diabetes mellitus, diet-controlled, chronic diastolic CHF, asthma/COPD, chronic back pain, hypothyroidism, CKD 3, AS status post TAVR in 2023, course complicated by altered mental status felt to be toxic/metabolic encephalopathy secondary to sedative medications, UTI, with brain MRI showing multiple infarcts that were not felt to be the cause of her altered mental status, who presented to our ED for further evaluation of altered mental status.    In fact, per family, patient was last known well yesterday 2023 around 2:00 p.m. when her  went to a doctor's appointment.  When he returned around 5:00 p.m., the patient was angry and developed altered mental status described as confusion.       At the time of my evaluation, the patient was awake, alert and oriented to name only, otherwise confused.  She was not answering questions appropriately.  She was following commands.    Subsequently further history is unable to be obtained from her.       In ED, workup showed a glucose of 197.    Will place her under medical observation for further management.     * No surgery found *       Hospital Course:   Discharge diagnosis: Acute CVA L posterior thalamic lacunar     Hospital Course:   Admitted overnight with acute confusion.  Underwent MRI brain, which " "reported acute or early subacute left posterior thalamic lacunar infarct.  Mild-moderate chronic microvascular ischemic chane.  Stroke orders placed.  Neurology consulted.  Recs for  mg and plavix 75 mg. Amlodipine increased to 10mg. LDL not at goal, inc to high intensity statin, atorvastatin 40mg daily.  Noted with UTI, prior Pseudomonas in urine cx- started on Cipro. Mental status returned back to baseline. Discharged on 11/30."        Interval Hx 12/15/2023:   Patient is new to me  Patient is a 89 y/o female with a significant PMHX of breast cancer 23 years ago with radiation and chemotherapy, chronic right arm lymphedema, COPD, diabetes type 2 hypertension, TIA, severe valve stenosis, CAD, s/p TAVR 7/2023.   Patient is being seen virtually for hospital follow up. She is accompanied by her spouse and daughter.     She has been having visual hallucinations since her TAVR. Spouse states it took her a while to recover mentally from the anesthesia post TAVR.  She was slowly improving until this recent episode of confusion. Daughter does report her to have frequent UTIs.   She did complete prescribed antibiotics. Family denies previous history of memory deficits.   All recent Neuro testing discussed with the patient/family at length.       Interval Hx 3/22/2024:  Patient is being seen virtually for hospital follow up. She is accompanied by her spouse and daughter who helps supply information.   It was suspected she was having focal seizures and was placed Keppra while in the hospital Family states about 3-4 days after discharge she was noticed to have a rash and was very fatigued. She was very hard to arouse.  They tried reaching out to Dr. Nixon but realized he is not in the [facility] system.  She then saw her PCP who discontinued her Keppra and started her on Seroquel for hallucinations. She has been taking 25 mg nightly. Her tramadol was also discontinued as it can provoke seizures. Spouse and daughter have " "noticed an improvement in the patient in that she is more alert.  She is nearly back to baseline. The hallucinations stopped for a while but are now back. She is currently receiving home health therapy.       EMR reviewed  D/C summary 3/2/2024  "The patient is an 88 year old female, with history of CVA (left posterior thalamic lacunar infarct) on 11/23 on  mg and Plavix 75mg, HTN, AS s/p TAVR , CAD, breast ca with RUext lymphedema, was brought to the ER for evaluation of aphasia (which started around 8 pm last night). Per ERMD and daughter, patient received extra dose of  mg last night and went to bed. This am, patient remained aphasic and noted to be weaker (RUEx usually weak due to lymphedema).  In the ER, stroke alert done -  not a candidate for tPA since symptoms started last night. Patient was given  mg suppository. CT head, showed remote ischemic changes with advanced cerebral atrophy. No acute intracranial abnormality. CTA showed No proximal vessel occlusion or aneurysm formation. Advanced intracranial atherosclerosis. No detrimental interval change from the previous exam .  UA neg infection.  CXR ? Wedge-shaped area of opacification within the right upper lung zone. Pulmonary infiltrate could have this appearance. Elsewhere, chronic interstitial changes present bilaterally.  Patient is aphasic and cannot answer or follow commands.  No cough noted. Patient afebrile.  [facility] called for admission.     Hospital Course:   The patient was admitted for evaluation of acute aphasia, unable to follow commands with R sided neglect.  CVA workup done  (CT head, MRI brain, CTA stroke protocol) did not show acute abnormalities.  Patient continued on ASA suppository and kept npo as patient was lethargic. UA neg for infection. CXR - RUL infiltrate and received empiric abx in ER. Procal wnl - likely aspiration . Neurology consulted and started on empiric IV keppra.  EEG done  - "abnormal EEG due to the " "presence left temporal epileptiform discharges. This supports a diagnoses of focal seizures."  Patient mental status improved after initiation of anti eleptics. ST eval - cleared to start regular diet and restarted on oral medications, PPN discontinued. PT/OT cleared her for HH. CM was able to arrange this."    Interval Hx 4/8/2024:  Patient presents virtually today for medication check. She is doing well on Vimapt 50 mg BID. She and family deny any episodes of aphasia or acute confusion.   She now complains of poor sleep. She was previously on Ambien since 2008. This was decreased to 5 mg at one point as she was taking with Tramadol. Since no longer on Tramadol she now has trouble sleeping at night.       Past Medical, Surgical, Family & Social History:   Reviewed and updated.    Home Medications:     Current Outpatient Medications:     allopurinoL (ZYLOPRIM) 100 MG tablet, Take 1 tablet (100 mg total) by mouth once daily., Disp: 90 tablet, Rfl: 1    amLODIPine (NORVASC) 10 MG tablet, Take 1 tablet (10 mg total) by mouth once daily. (Patient taking differently: Take 10 mg by mouth every evening.), Disp: 90 tablet, Rfl: 3    aspirin (ECOTRIN) 325 MG EC tablet, Take 1 tablet (325 mg total) by mouth once daily., Disp: 30 tablet, Rfl: 1    atorvastatin (LIPITOR) 80 MG tablet, Take 1 tablet (80 mg total) by mouth every evening., Disp: 90 tablet, Rfl: 3    cholecalciferol, vitamin D3, (VITAMIN D3) 25 mcg (1,000 unit) capsule, Take 2 capsules (2,000 Units total) by mouth once daily., Disp: 60 capsule, Rfl: 12    clopidogreL (PLAVIX) 75 mg tablet, Take 1 tablet (75 mg total) by mouth once daily., Disp: 90 tablet, Rfl: 3    cyanocobalamin (VITAMIN B-12) 1000 MCG tablet, Take 1 tablet (1,000 mcg total) by mouth once daily., Disp: 30 tablet, Rfl: 12    fluticasone furoate-vilanteroL (BREO ELLIPTA) 200-25 mcg/dose DsDv diskus inhaler, Inhale 1 puff into the lungs once daily., Disp: 60 each, Rfl: 11    lacosamide (VIMPAT) 100 " mg Tab, Take 0.5 tablets (50 mg total) by mouth every 12 (twelve) hours., Disp: 90 tablet, Rfl: 3    Lactobacillus rhamnosus GG (CULTURELLE) 10 billion cell capsule, Take 1 capsule by mouth once daily., Disp: , Rfl:     levothyroxine (SYNTHROID) 75 MCG tablet, Take 1 tablet (75 mcg total) by mouth before breakfast., Disp: 90 tablet, Rfl: 3    potassium citrate 99 mg Cap, Take 99 mg by mouth every evening., Disp: , Rfl:     QUEtiapine (SEROQUEL) 25 MG Tab, Take 1 tablet (25 mg total) by mouth nightly. (Patient taking differently: Take 25 mg by mouth nightly as needed (agitation).), Disp: 30 tablet, Rfl: 3    spironolactone (ALDACTONE) 25 MG tablet, Take 0.5 tablets (12.5 mg total) by mouth once daily., Disp: 15 tablet, Rfl: 11    zolpidem (AMBIEN) 5 MG Tab, TAKE 1 TABLET BY MOUTH ONCE DAILY IN THE EVENING, Disp: 30 tablet, Rfl: 0    Physical Examination:  Limited exam due to being virtual  There were no vitals taken for this visit.    GENERAL:  General appearance: Well, non-toxic appearing.  No apparent distress.  Neck: supple.    MENTAL STATUS:  Alertness, attention span & concentration: normal.  Language: normal.  Orientation to self, place & time:  normal.  Memory, recent & remote: limited  Fund of knowledge: normal.    SPEECH:  Clear and fluent.  Follows complex commands.          Diagnostic Data Reviewed:   I have personally reviewed provider notes, labs and imaging made available to me today.     Imaging:  MRI brain 3/2/2024  FINDINGS:  Ventricles of normal size and shape there is no shift of the midline noted.  There are no extra-axial fluid collections are areas consistent with hemorrhage noted.  There is increased signal in the periventricular white matter most consistent with chronic ischemia.  No masses is seen no acute infarcts are noted.  There are normal flow voids in the carotid siphons.  The pituitary appears normal.  There is scattered punctate foci of susceptibility likely related to chronic  microhemorrhage.  This is stable from the previous study     Impression:     No acute abnormalities are seen    CTA 3/2024:  mpression:     1. No proximal vessel occlusion or aneurysm formation.  Advanced intracranial atherosclerosis.  No detrimental interval change from the previous exam.      MRI Brain Without Contrast 11/2023    Narrative  EXAMINATION:  MRI BRAIN WITHOUT CONTRAST    CLINICAL HISTORY:  AMS/confusion; Pt is here for evaluation of altered mental status.  Patient's  went to a doctor's appointment yesterday at 2:00 p.m..  He reports patient was at her normal baseline at 2:00 p.m.  We returned at 5:00 p.m. he reports she was confused.  He reports a prior episode similar to this a month ago after having a TAVR.  He reports it was secondary to metabolic encephalopathy/UTI.  Patient's daughter is present she reports the TAVR was in July.  She reports her mother has had mild deficits in her mental function since the episode of metabolic encephalopathy but has had an acute change to her mental status yesterday afternoon.    TECHNIQUE:  Multiplanar multisequence MR imaging of the brain was performed without contrast.    COMPARISON:  MRI brain without contrast, 07/29/2023.    FINDINGS:  INTRACRANIAL: Mild global volume loss.  Subtle restricted diffusion in the left posterior thalamus with corresponding T2 FLAIR hyperintense signal. Scattered and confluent T2 FLAIR hyperintense white matter foci are present, likely related to chronic microvascular ischemic change.  No parenchymal restricted diffusion.  Scattered punctate foci of susceptibility, likely related to chronic microhemorrhage and similar to prior study.  No evidence of acute intracranial hemorrhage.  No extra-axial fluid collection or mass.  No intracranial mass effect.  No hydrocephalus.  Midline structures have a normal configuration.  Visualized pituitary gland and infundibulum are normal.  Visualized major intracranial vascular structures  demonstrate normal flow voids and are normal in course and caliber.    SINUSES: Small right maxillary sinus mucous retention cyst versus polyp.  Trace right ethmoid sinus mucosal thickening.  Mastoid air cells are clear.    ORBITS: Bilateral lens replacements.    Impression  1. Suspect acute or early subacute left posterior thalamic lacunar infarct.  2. Mild-moderate chronic microvascular ischemic change.  Epic message sent to Sera Freeman and Hussain at 10:27 on 11/28/2023.      CT Head Without Contrast 11/2023    Narrative  EXAMINATION:  CT HEAD WITHOUT CONTRAST    CLINICAL HISTORY:  Altered Mental Status (Last known well by  at 1400, arrived home at 1700 finding patient confused, clear speech, no facial droop, moves all extremities well, )    TECHNIQUE:  Low dose axial images were obtained through the head.  Coronal and sagittal reformations were also performed. Contrast was not administered.  Dose reduction techniques including automatic exposure control (AEC) were utilized.    Dose (DLP): 541 mGycm    COMPARISON:  MRI brain without contrast, 07/29/2023.  CT head without contrast, 07/27/2023.    FINDINGS:  INTRACRANIAL: Mild global volume loss.  Scattered white matter hypodensities, likely related to chronic microvascular ischemic change.  Gray-white differentiation is preserved.  No acute intracranial hemorrhage.  No hydrocephalus.  No intracranial mass effect.    SINUSES: Trace bilateral ethmoid sinus mucosal thickening.  Mucous debris in the left sphenoid sinus.  Mastoid air cells are clear.    SKULL/SCALP: Visualized osseous structures are normal.    ORBITS: Bilateral lens replacements.    Impression  Nighthawk concordant.  No acute intracranial abnormality.      CTA Head and Neck (xpd) 11/2023    Narrative  EXAMINATION:  CTA HEAD AND NECK (XPD)    CLINICAL HISTORY:  Neuro deficit, acute, stroke suspected.    TECHNIQUE:  CT angiogram was performed from the level of the zhao to the top of the  head following the IV administration of 80mL of Omnipaque 350.   Sagittal and coronal and maximum intensity projection reconstructions were performed. One additional phase of immediate post-contrast CT images was performed through the head alone. Arterial stenosis percentages are based on NASCET measurement criteria.  Dose reduction techniques including automatic exposure control (AEC) were utilized.    Dose (DLP): 794 mGycm    COMPARISON:  CT head without contrast, 11/27/2023, 07/27/2023.  MRI brain without contrast, 11/28/2023, 07/29/2023.    FINDINGS:  CTA HEAD:    INTRACRANIAL: No acute intracranial hemorrhage.  No hydrocephalus.  No intracranial mass effect.  No abnormal intracranial enhancement.    SINUSES: Mucous retention cyst versus polyp in the right maxillary sinus.  Patchy mucosal opacity in the left sphenoid sinus.  Mastoid air cells are clear.    SKULL/SCALP: Visualized osseous structures are normal.    ORBITS: Bilateral lens replacements.    VASCULATURE: Focal high-grade stenosis or occlusion of the left A3 BUNNY segment (series 13, image 35).  Moderate bilateral carotid siphon calcifications with mild right supraclinoid ICA stenosis.  Mild left terminal ICA and proximal M1 MCA stenosis (series 13, image 41).  Minimal fusiform ectasia of the proximal basilar artery (image 43).  Minor atherosclerotic calcifications in the left V4 and proximal basilar arteries.  No vascular malformation.  Deep cerebral veins and dural venous sinuses enhance normally.    CTA NECK:    AORTIC ARCH: 4 vessel arch with left vertebral artery originating from the arch.    LEFT CAROTID    COMMON CAROTID: No occlusion, hemodynamically significant stenosis, dissection or aneurysm.    INTERNAL CAROTID: Mild atherosclerotic calcifications at the origin.  No occlusion, hemodynamically significant stenosis, dissection or aneurysm.    RIGHT CAROTID    COMMON CAROTID: No occlusion, hemodynamically significant stenosis, dissection or  aneurysm.    INTERNAL CAROTID: Mild atherosclerotic plaque at the origin.  No occlusion, hemodynamically significant stenosis, dissection or aneurysm.    VERTEBRAL ARTERIES    LEFT VERTEBRAL: Originates from the aortic arch.  No occlusion, hemodynamically significant stenosis, dissection or aneurysm.    RIGHT VERTEBRAL: No occlusion, hemodynamically significant stenosis, dissection or aneurysm.    OTHER: Patchy anterior right upper lobe opacities and bronchiectasis.  Multilevel cervical spondylosis without high-grade spinal canal stenosis.  No acute fracture or aggressive osseous lesion.  No significant pathology in the visualized cervical soft tissues.    Impression  1. Focal high-grade stenosis or occlusion in the left A3 BUNNY segment.  2. Mild right supraclinoid ICA stenosis.  3. Mild left terminal ICA and proximal M1 MCA stenosis.  4. Additional findings as above.      Cardiac:  TTE 11/29/2023:      Left Ventricle: There is normal systolic function. Ejection fraction by visual approximation is 55%. There is normal diastolic function.    Right Ventricle: Normal right ventricular cavity size. Systolic function is normal.    Left Atrium: Agitated saline study of the atrial septum is negative after vasalva maneuver, suggesting absence of intracardiac shunt at the atrial level.    Aortic Valve: There is a transcatheter valve replacement in the aortic position that is appropriately positioned.    IVC/SVC: Normal venous pressure at 3 mmHg.      Labs:  Lab Results   Component Value Date    WBC 6.49 03/04/2024    HGB 13.9 03/04/2024    HCT 42.6 03/04/2024     03/04/2024    MCV 87 03/04/2024    RDW 14.3 03/04/2024     Lab Results   Component Value Date     03/04/2024    K 3.9 03/04/2024    CL 99 03/04/2024    CO2 32 (H) 03/04/2024    BUN 27 (H) 03/04/2024    CREATININE 0.79 03/04/2024     (H) 03/04/2024    CALCIUM 9.5 03/04/2024    MG 1.7 03/03/2024    PHOS 4.1 03/03/2024     Lab Results   Component  Value Date    PROT 6.9 03/04/2024    ALBUMIN 3.9 03/04/2024    ALBUMIN 4.1 03/04/2024    BILITOT 0.8 03/04/2024    AST 37 (H) 03/04/2024    ALKPHOS 99 03/04/2024    ALT 29 03/04/2024     Lab Results   Component Value Date    INR 1.1 03/04/2024     Lab Results   Component Value Date    CHOL 209 (H) 02/05/2024    HDL 61 02/05/2024    LDLCALC 125.6 02/05/2024    TRIG 112 02/05/2024    CHOLHDL 29.2 02/05/2024     Lab Results   Component Value Date    HGBA1C 7.7 (H) 03/02/2024      Lab Results   Component Value Date    ELNSWPOY48 >1000 (H) 11/28/2023     Lab Results   Component Value Date    FOLATE 7.1 11/28/2023     Lab Results   Component Value Date    TSH 1.480 03/02/2024     EEG 7/2023:  This is an abnormal, technically limited EEG.  Diffuse, generalized   slowing is consistent with a diffuse encephalopathy, however this is   non-specific with respect to etiology.  Mild superimposed focal slowing   suggests a more severe degree of cerebral dysfunction involving the   posterior left hemisphere.  Clinical correlation is advised.       EEG 3/2024:    Interpretation  This is an abnormal EEG due to the presence left temporal epileptiform discharges. This supports a diagnoses of focal seizures.             Assessment and Plan:    Problem List Items Addressed This Visit          Neuro    Intermittent confusion    Current Assessment & Plan     Likely mistaken for underlying neurocognitive disorder  Family reports hallucinations and memory decline since TAVR in 7/2023.   - likely related to medical hx, anesthesia and medications as well as vascular history  Quetiapine black box warning discussed with pt and family   - OK to use as needed only  Perform formal memory eval in several months in person         Focal seizures - Primary    Current Assessment & Plan     Presented to ED in early March with new onset aphasia, generalized weakness and increased confusion.    - NIHSS 18   - Out of window for TNK  Neuro imaging  unrevealing for acute stroke  EEG reported with left temporal epileptiform discharges   - started on Keppra 500 mg BID inpt   - pt did develop a rash and increased lethargy with this drug   - Keppra discontinued but also started on Quetiapine for reported hallucinations.   It is likely that the pt has an underlying neurocognitive disorder that correlates with the hallucinations. Family & pt do report poor short term recall.   She does require AED as noted by stereotypical events and + EEG   - doing well on Vimpat 50 mg BID; continue!  - obtain aEEG to make sure she is not having subclinical seizures   - use Quetiapine as needed only. Black box warning discussed at length   Will need alternative pain control as tramadol can provoke seizures. She is also interested in increasing her Ambien   - PCP to manage  Seizure precautions discussed           Other Visit Diagnoses       History of CVA (cerebrovascular accident)                            Important to note, also  has a past medical history of Abnormal ECG (02/06/2017), Adrenal adenoma: 2.8 X 2.1CM 2010 (05/16/2014), Aortic atherosclerosis: see CT scan 2016 (10/28/2016), Aortic stenosis (08/19/2014), Asthma, Breast cancer, Cancer, Cataracts, both eyes, Cerebellar infarct, Cerebral infarct, Chronic back pain, Chronic bronchitis, Chronic diastolic CHF (congestive heart failure), CKD (chronic kidney disease) stage 3, GFR 30-59 ml/min (08/19/2014), COPD (chronic obstructive pulmonary disease), DDD (degenerative disc disease), lumbar (07/30/2013), Diabetes mellitus, Diastolic dysfunction, Diverticulosis, Dyslipidemia, Ectatic abdominal aorta: see CT scan 10/16 (10/28/2016), Gallbladder polyp (08/19/2014), General anesthetics causing adverse effect in therapeutic use, GERD (gastroesophageal reflux disease), Gout, Herpes simplex, High blood cholesterol, Hypertension, Hypertension associated with diabetes (02/07/2012), Insomnia, Lumbar spinal stenosis, Lymphedema of arm,  Multiple lung nodules (09/21/2015), Radiation fibrosis of lung, Renal cyst, right: stable per CT 2016 (10/28/2016), Stroke (02/2012), Stye (12/05/2013), Thyroid disease, TIA (transient ischemic attack), Toxic metabolic encephalopathy, Type 2 diabetes mellitus without complication, without long-term current use of insulin (07/20/2015), Unspecified hypothyroidism (07/20/2015), and UTI (urinary tract infection).          The patient will return to clinic in 4-6 weeks for test results and 3 mos for memory eval    All questions were answered and patient is comfortable with the plan.         Thank you very much for the opportunity to assist in this patient's care.    If you have any questions or concerns, please do not hesitate to contact me at any time.      Sincerely,     MADELYN Boyer  [facility] [place]       The patient location is: [place], [place]  The chief complaint leading to consultation is: hospital f/u    Visit type: audiovisual

## 2024-04-09 RX ORDER — ZOLPIDEM TARTRATE 10 MG/1
10 TABLET ORAL NIGHTLY PRN
Qty: 90 TABLET | Refills: 0 | Status: SHIPPED | OUTPATIENT
Start: 2024-04-09 | End: 2024-10-08

## 2024-04-09 NOTE — TELEPHONE ENCOUNTER
No care due was identified.  [facility] [place] Embedded Care Due Messages. Reference number: [id number].   4/08/2024 9:45:54 PM CDT

## 2024-04-12 ENCOUNTER — HOSPITAL ENCOUNTER (OUTPATIENT)
Dept: RADIOLOGY | Facility: HOSPITAL | Age: 89
Discharge: HOME | End: 2024-04-12
Attending: INTERNAL MEDICINE
Payer: MEDICARE

## 2024-04-12 DIAGNOSIS — J18.9 PNEUMONIA DUE TO INFECTIOUS ORGANISM, UNSPECIFIED LATERALITY, UNSPECIFIED PART OF LUNG: ICD-10-CM

## 2024-04-12 PROCEDURE — 71046 X-RAY EXAM CHEST 2 VIEWS: CPT | Mod: TC,FY,PO

## 2024-04-12 PROCEDURE — 71046 X-RAY EXAM CHEST 2 VIEWS: CPT | Mod: 26,,, | Performed by: RADIOLOGY

## 2024-04-17 ENCOUNTER — TELEPHONE (OUTPATIENT)
Dept: INTERNAL MEDICINE | Facility: CLINIC | Age: 89
End: 2024-04-17
Payer: MEDICARE

## 2024-04-17 NOTE — TELEPHONE ENCOUNTER
Please call, labs are out of range, kidney function impaired.  This could be from dehydration and some of the medications that she is taking    Also looks like her diabetes is doing somewhat poorly, I know she has not on any meds for diabetes    Please schedule for an office visit with me within the next week, it could be virtual, thanks

## 2024-04-18 ENCOUNTER — PATIENT MESSAGE (OUTPATIENT)
Dept: INTERNAL MEDICINE | Facility: CLINIC | Age: 89
End: 2024-04-18
Payer: MEDICARE

## 2024-04-19 ENCOUNTER — EXTERNAL HOME HEALTH (OUTPATIENT)
Dept: HOME HEALTH SERVICES | Facility: HOSPITAL | Age: 89
End: 2024-04-19
Payer: MEDICARE

## 2024-04-19 NOTE — TELEPHONE ENCOUNTER
Refill Routing Note   Medication(s) are not appropriate for processing by [facility] for the following reason(s):        Required labs outdated    [facility] action(s):  Defer   Requires labs : Yes             Appointments  past 12m or future 3m with PCP    Date Provider   Last Visit   3/12/2024 Sera Barger MD   Next Visit   4/23/2024 Sera Barger MD   ED visits in past 90 days: 0        Note composed:3:29 PM 04/19/2024

## 2024-04-19 NOTE — TELEPHONE ENCOUNTER
"Rx was sent as a "No print", updated rx to normal, needs to go to [facility]. Pended  " 22

## 2024-04-19 NOTE — TELEPHONE ENCOUNTER
Care Due:                  Date            Visit Type   Department     Provider  --------------------------------------------------------------------------------                                ESTABLISHED                              PATIENT -    [facility]  Last Visit: 03-      VIRTUAL      MEDICINE       Sera Barger                              ESTABLISHED                              PATIENT -    [facility]  Next Visit: 04-      VIRTUAL      MEDICINE       Sera Barger                                                            Last  Test          Frequency    Reason                     Performed    Due Date  --------------------------------------------------------------------------------    Uric Acid...  12 months..  allopurinoL..............  03- 02-    [facility] Embedded Care Due Messages. Reference number: [id number].   4/19/2024 8:48:49 AM CDT

## 2024-04-22 RX ORDER — ALLOPURINOL 100 MG/1
100 TABLET ORAL DAILY
Qty: 90 TABLET | Refills: 1 | Status: SHIPPED | OUTPATIENT
Start: 2024-04-22

## 2024-04-23 ENCOUNTER — OFFICE VISIT (OUTPATIENT)
Dept: INTERNAL MEDICINE | Facility: CLINIC | Age: 89
End: 2024-04-23
Payer: MEDICARE

## 2024-04-23 ENCOUNTER — TELEPHONE (OUTPATIENT)
Dept: INTERNAL MEDICINE | Facility: CLINIC | Age: 89
End: 2024-04-23

## 2024-04-23 DIAGNOSIS — M47.817 LUMBOSACRAL SPONDYLOSIS WITHOUT MYELOPATHY: ICD-10-CM

## 2024-04-23 DIAGNOSIS — E11.65 UNCONTROLLED TYPE 2 DIABETES MELLITUS WITH HYPERGLYCEMIA: Primary | ICD-10-CM

## 2024-04-23 DIAGNOSIS — N18.31 STAGE 3A CHRONIC KIDNEY DISEASE: ICD-10-CM

## 2024-04-23 PROBLEM — E11.9 DIABETES MELLITUS TYPE 2, DIET-CONTROLLED: Status: RESOLVED | Noted: 2023-11-28 | Resolved: 2024-04-23

## 2024-04-23 PROBLEM — R93.89 ABNORMAL CXR: Status: RESOLVED | Noted: 2024-03-02 | Resolved: 2024-04-23

## 2024-04-23 PROCEDURE — 99214 OFFICE O/P EST MOD 30 MIN: CPT | Mod: 95,,, | Performed by: INTERNAL MEDICINE

## 2024-04-23 RX ORDER — GLIMEPIRIDE 1 MG/1
1 TABLET ORAL
Qty: 90 TABLET | Refills: 3 | Status: SHIPPED | OUTPATIENT
Start: 2024-04-23 | End: 2025-04-23

## 2024-04-23 NOTE — TELEPHONE ENCOUNTER
Labs in 3 months- per home health.    She has [facility] home [facility], please call them to ask them to do those labs that I ordered today, thank you

## 2024-04-23 NOTE — PROGRESS NOTES
Telemedicine Video Visit    The patient location is:  Patient Home   The chief complaint leading to consultation is: diabetes  Visit type: Virtual visit with synchronous audio and video  Total time spent with patient: 25 minutes  Each patient to whom he or she provides medical services by telemedicine is:  (1) informed of the relationship between the physician and patient and the respective role of any other health care provider with respect to management of the patient; and (2) notified that he or she may decline to receive medical services by telemedicine and may withdraw from such care at any time.     Lot of pain  Back and leg pain; R > L  Sleep not great due to pain  No falls    Home health coming out, also aides to help with bathing  Eating and drinking OK, daughter fixing food    A1c up over 8 range.  Has been off of diabetic medication for some years now, most recently was on Amaryl and tolerated this well.  Some polyuria and polydipsia.  No unexplained weight loss.    Diet reviewed.    .  Patient Active Problem List   Diagnosis    Essential hypertension    Chronic pain syndrome    Lumbosacral spondylosis without myelopathy    Adrenal adenoma: 2.8 X 2.1CM 2010; stable 2016, also stable 2018    Lymph edema, right arm    Gallbladder polyp    Stage 3a chronic kidney disease    Severe aortic stenosis    LVH (left ventricular hypertrophy)    Pancreatic cyst: stable on MRI 11/16 also 2018, due again 2020 (patient declined)    Cervical spinal stenosis    Transient cerebral ischemia: 2012    Primary insomnia    Gastroesophageal reflux disease without esophagitis    Multiple lung nodules: stable CT 10/2016, also 2019    Former smoker: 1 pack daily for < 20 years, quit 1973    Asthma with COPD    Facet hypertrophy of lumbar region    History of breast cancer    Intermittent asthma    Radiation fibrosis of lung    Bronchiectasis without complication    Ectatic abdominal aorta: see CT scan 10/16; no aneurysm 10/17     Renal cysts, acquired, bilateral    Lumbar radiculopathy    Abnormal EKG    Diverticulosis of large intestine without hemorrhage: see CT 10/14    Idiopathic gout    Liver cysts: see MRI 2018    Odynophagia    Lumbar spondylosis    Mixed hyperlipidemia    Aortic atherosclerosis    Low serum vitamin B12    Coronary artery disease involving native coronary artery of native heart    NYHA class 3 heart failure with preserved ejection fraction    History of transcatheter aortic valve replacement (TAVR)    Intermittent confusion    Acquired hypothyroidism    Chronic diastolic CHF (congestive heart failure)    Aspiration pneumonitis    Focal seizures    Chronic obstructive pulmonary disease, unspecified COPD type          Answers submitted by the patient for this visit:  Review of Systems Questionnaire (Submitted on 4/23/2024)  activity change: No  unexpected weight change: No  neck pain: No  hearing loss: No  rhinorrhea: No  trouble swallowing: No  eye discharge: No  visual disturbance: No  chest tightness: No  wheezing: No  chest pain: No  palpitations: No  blood in stool: No  constipation: No  vomiting: No  diarrhea: No  polydipsia: No  polyuria: No  difficulty urinating: No  hematuria: No  menstrual problem: No  dysuria: No  joint swelling: No  arthralgias: No  headaches: No  weakness: No  confusion: No  dysphoric mood: No    Physical Exam  Constitutional:       Appearance: She is well-developed.   HENT:      Head: Normocephalic and atraumatic.   Eyes:      Extraocular Movements: Extraocular movements intact.      Conjunctiva/sclera: Conjunctivae normal.   Neck:      Thyroid: No thyromegaly.   Pulmonary:      Effort: No respiratory distress.   Neurological:      General: No focal deficit present.      Mental Status: She is alert and oriented to person, place, and time.   Psychiatric:         Mood and Affect: Mood normal.         Behavior: Behavior normal.         Thought Content: Thought content normal.          Judgment: Judgment normal.          1. Lumbosacral spondylosis without myelopathy    2. Uncontrolled type 2 diabetes mellitus with hyperglycemia  -     Hemoglobin A1C; Future; Expected date: 04/23/2024    3. Stage 3a chronic kidney disease  -     Renal Function Panel; Future; Expected date: 04/23/2024    Other orders  -     glimepiride (AMARYL) 1 MG tablet; Take 1 tablet (1 mg total) by mouth before breakfast.  Dispense: 90 tablet; Refill: 3       Diabetic eating plan reviewed  Gentle hydration  Resume Amaryl 1 mg, cautions and side effects reviewed  Labs within the next 3 months  Otherwise continue current regimen

## 2024-04-24 ENCOUNTER — PATIENT MESSAGE (OUTPATIENT)
Dept: NEUROLOGY | Facility: CLINIC | Age: 89
End: 2024-04-24
Payer: MEDICARE

## 2024-04-24 NOTE — TELEPHONE ENCOUNTER
Spoke with home health and they told me to fax the orders, done, also wrote labs in 3 month on it . [facility] fax number [phone]

## 2024-04-30 ENCOUNTER — DOCUMENT SCAN (OUTPATIENT)
Dept: HOME HEALTH SERVICES | Facility: HOSPITAL | Age: 89
End: 2024-04-30
Payer: MEDICARE

## 2024-05-03 ENCOUNTER — PATIENT MESSAGE (OUTPATIENT)
Dept: INTERNAL MEDICINE | Facility: CLINIC | Age: 89
End: 2024-05-03
Payer: MEDICARE

## 2024-05-09 ENCOUNTER — OFFICE VISIT (OUTPATIENT)
Dept: NEUROLOGY | Facility: CLINIC | Age: 89
End: 2024-05-09
Payer: MEDICARE

## 2024-05-09 VITALS
SYSTOLIC BLOOD PRESSURE: 127 MMHG | HEIGHT: 62 IN | WEIGHT: 129 LBS | DIASTOLIC BLOOD PRESSURE: 69 MMHG | HEART RATE: 65 BPM | BODY MASS INDEX: 23.74 KG/M2

## 2024-05-09 DIAGNOSIS — R41.0 INTERMITTENT CONFUSION: ICD-10-CM

## 2024-05-09 DIAGNOSIS — R56.9 FOCAL SEIZURES: ICD-10-CM

## 2024-05-09 DIAGNOSIS — R41.3 MEMORY LOSS: Primary | ICD-10-CM

## 2024-05-09 PROCEDURE — 99999 PR PBB SHADOW E&M-EST. PATIENT-LVL IV: CPT | Mod: PBBFAC,,, | Performed by: NURSE PRACTITIONER

## 2024-05-09 PROCEDURE — 99483 ASSMT & CARE PLN PT COG IMP: CPT | Mod: S$GLB,,, | Performed by: NURSE PRACTITIONER

## 2024-05-09 PROCEDURE — 99499 UNLISTED E&M SERVICE: CPT | Mod: S$GLB,,, | Performed by: NURSE PRACTITIONER

## 2024-05-09 NOTE — PROGRESS NOTES
Caregiver name:Ray  Relationship to the patient:  Does the patient have a living will? Yes  Does the patient have a designated healthcare POA? Yes  Does the patient have a designated general POA? Yes    Have educational materials/resources been provided? No      Activities of Daily Living    Bathing: Needs Help  Dressing: Needs Help  Grooming: Independent  Mouth Care: Independent  Toileting: Needs Help  Transferring Bed/Chair: Independent  Walking: Needs Help  Climbing: Cannot Do  Eating: Needs Help      Instrumental Activities of Daily Living    Shopping: Dependent  Cooking: Dependent  Managing Medications: needs dependent  Using the phone and looking up numbers: Independent  Doing Housework: Dependent  Doing Laundry: Needs Help  Driving or using public transportation: Cannot Do  Managing finances: Dependent    Functional Assessment Staging  4   Decreased ability to perform complex tasks, e.g. planning dinner for guests, handling personal finances (such as forgetting to pay bills), difficulty marketing, etc.*             5/9/2024     2:31 PM 8/9/2023    10:00 AM 4/3/2023     1:55 PM 8/12/2019     1:25 PM 10/24/2018    12:06 PM   Depression Patient Health Questionnaire   Over the last two weeks how often have you been bothered by little interest or pleasure in doing things Not at all Not at all Not at all Not at all Not at all   Over the last two weeks how often have you been bothered by feeling down, depressed or hopeless More than half the days Not at all Not at all Not at all Not at all   PHQ-2 Total Score 2 0 0 0 0   Over the last two weeks how often have you been bothered by trouble falling or staying asleep, or sleeping too much  Several days      Over the last two weeks how often have you been bothered by feeling tired or having little energy  Several days      Over the last two weeks how often have you been bothered by a poor appetite or overeating  Several days      Over the last two weeks how  often have you been bothered by feeling bad about yourself - or that you are a failure or have let yourself or your family down  Several days      Over the last two weeks how often have you been bothered by trouble concentrating on things, such as reading the newspaper or watching television  Not at all      Over the last two weeks how often have you been bothered by moving or speaking so slowly that other people could have noticed. Or the opposite - being so fidgety or restless that you have been moving around a lot more than usual.  Not at all      Over the last two weeks how often have you been bothered by thoughts that you would be better off dead, or of hurting yourself  Several days      If you checked off any problems, how difficult have these problems made it for you to do your work, take care of things at home or get along with other people?  Somewhat difficult      PHQ-9 Score  5      PHQ-9 Interpretation  Mild

## 2024-05-09 NOTE — ASSESSMENT & PLAN NOTE
Reports of intermittent confusion post TAVR. Family with previous concerns about underlying cognitive disorder.   Since other medical issues have been addressed there are no further significant concerns for memory decline.   There are no further reports of hallucinations. Also no depression, sleep disturbances or recent falls. H/o chronic pain does interfere with ADLs  MMSE 29/30   - suspect MCI vs normal age changes; possibly vascular etiology   MRI brain scan noted with lacunar infarct and microvascular changes  Serologies noted  Hold on cognitive enhancing medications for now  Can consider NP testing in the future  Discussed importance of proper sleep hygiene and staying active.   Great family support

## 2024-05-09 NOTE — PROGRESS NOTES
"  NEUROLOGY  Outpatient Follow Up    [facility]  [address], [place] [address]  [phone] (office) / [phone] (fax)    Patient Name:  Olga Aguiar  :  1935  MR #:  [id number]  Acct #:  [id number]    Date of Neurology Visit: 2024  Name of Provider: MADELYN Boyer    Other Physicians:  Sera Barger MD (Primary Care Physician); No ref. provider found (Referring)      Chief Complaint: Memory Loss      History of Present Illness (HPI):  As per EMR  2023  "This is an 88-year-old female with past medical history particularly significant for hypertension, dyslipidemia, type 2 diabetes mellitus, diet-controlled, chronic diastolic CHF, asthma/COPD, chronic back pain, hypothyroidism, CKD 3, AS status post TAVR in 2023, course complicated by altered mental status felt to be toxic/metabolic encephalopathy secondary to sedative medications, UTI, with brain MRI showing multiple infarcts that were not felt to be the cause of her altered mental status, who presented to our ED for further evaluation of altered mental status.    In fact, per family, patient was last known well yesterday 2023 around 2:00 p.m. when her  went to a doctor's appointment.  When he returned around 5:00 p.m., the patient was angry and developed altered mental status described as confusion.       At the time of my evaluation, the patient was awake, alert and oriented to name only, otherwise confused.  She was not answering questions appropriately.  She was following commands.    Subsequently further history is unable to be obtained from her.       In ED, workup showed a glucose of 197.    Will place her under medical observation for further management.     * No surgery found *       Hospital Course:   Discharge diagnosis: Acute CVA L posterior thalamic lacunar     Hospital Course:   Admitted overnight with acute confusion.  Underwent MRI brain, which reported acute " "or early subacute left posterior thalamic lacunar infarct.  Mild-moderate chronic microvascular ischemic chane.  Stroke orders placed.  Neurology consulted.  Recs for  mg and plavix 75 mg. Amlodipine increased to 10mg. LDL not at goal, inc to high intensity statin, atorvastatin 40mg daily.  Noted with UTI, prior Pseudomonas in urine cx- started on Cipro. Mental status returned back to baseline. Discharged on 11/30."        Interval Hx 12/15/2023:   Patient is new to me  Patient is a 89 y/o female with a significant PMHX of breast cancer 23 years ago with radiation and chemotherapy, chronic right arm lymphedema, COPD, diabetes type 2 hypertension, TIA, severe valve stenosis, CAD, s/p TAVR 7/2023.   Patient is being seen virtually for hospital follow up. She is accompanied by her spouse and daughter.     She has been having visual hallucinations since her TAVR. Spouse states it took her a while to recover mentally from the anesthesia post TAVR.  She was slowly improving until this recent episode of confusion. Daughter does report her to have frequent UTIs.   She did complete prescribed antibiotics. Family denies previous history of memory deficits.   All recent Neuro testing discussed with the patient/family at length.       Interval Hx 3/22/2024:  Patient is being seen virtually for hospital follow up. She is accompanied by her spouse and daughter who helps supply information.   It was suspected she was having focal seizures and was placed Keppra while in the hospital Family states about 3-4 days after discharge she was noticed to have a rash and was very fatigued. She was very hard to arouse.  They tried reaching out to Dr. Nixon but realized he is not in the [facility] system.  She then saw her PCP who discontinued her Keppra and started her on Seroquel for hallucinations. She has been taking 25 mg nightly. Her tramadol was also discontinued as it can provoke seizures. Spouse and daughter have noticed an " "improvement in the patient in that she is more alert.  She is nearly back to baseline. The hallucinations stopped for a while but are now back. She is currently receiving home health therapy.       EMR reviewed  D/C summary 3/2/2024  "The patient is an 88 year old female, with history of CVA (left posterior thalamic lacunar infarct) on 11/23 on  mg and Plavix 75mg, HTN, AS s/p TAVR , CAD, breast ca with RUext lymphedema, was brought to the ER for evaluation of aphasia (which started around 8 pm last night). Per ERMD and daughter, patient received extra dose of  mg last night and went to bed. This am, patient remained aphasic and noted to be weaker (RUEx usually weak due to lymphedema).  In the ER, stroke alert done -  not a candidate for tPA since symptoms started last night. Patient was given  mg suppository. CT head, showed remote ischemic changes with advanced cerebral atrophy. No acute intracranial abnormality. CTA showed No proximal vessel occlusion or aneurysm formation. Advanced intracranial atherosclerosis. No detrimental interval change from the previous exam .  UA neg infection.  CXR ? Wedge-shaped area of opacification within the right upper lung zone. Pulmonary infiltrate could have this appearance. Elsewhere, chronic interstitial changes present bilaterally.  Patient is aphasic and cannot answer or follow commands.  No cough noted. Patient afebrile.  [facility] called for admission.     Hospital Course:   The patient was admitted for evaluation of acute aphasia, unable to follow commands with R sided neglect.  CVA workup done  (CT head, MRI brain, CTA stroke protocol) did not show acute abnormalities.  Patient continued on ASA suppository and kept npo as patient was lethargic. UA neg for infection. CXR - RUL infiltrate and received empiric abx in ER. Procal wnl - likely aspiration . Neurology consulted and started on empiric IV keppra.  EEG done  - "abnormal EEG due to the presence left " "temporal epileptiform discharges. This supports a diagnoses of focal seizures."  Patient mental status improved after initiation of anti eleptics. ST eval - cleared to start regular diet and restarted on oral medications, PPN discontinued. PT/OT cleared her for HH. CM was able to arrange this."    Interval Hx 4/8/2024:  Patient presents virtually today for medication check. She is doing well on Vimapt 50 mg BID. She and family deny any episodes of aphasia or acute confusion.   She now complains of poor sleep. She was previously on Ambien since 2008. This was decreased to 5 mg at one point as she was taking with Tramadol. Since no longer on Tramadol she now has trouble sleeping at night.     Interval Hx 5/9/2024:  Patient presents today for memory evaluation. She is accompanied by her spouse who helps supply information. Overall she has been doing well mentally. She is now sleeping better on Ambien 10 mg. Her sleep routine is pretty consistent. Her back pain continues as she can no longer take Tramadol. Spouse does not have a big concern about her memory. Overall her hallucinations have resolved. Every now and then she may think an object is moving. She admits to sadness but not significant depression. She requires assistance with ADLs. Her spouse is managing the finances and her medications. She uses a walker around the house. She denies recent falls. She has not needed to take Seroquel. She no longer drives.           Past Medical, Surgical, Family & Social History:   Reviewed and updated.    Home Medications:     Current Outpatient Medications:     allopurinoL (ZYLOPRIM) 100 MG tablet, Take 1 tablet (100 mg total) by mouth once daily., Disp: 90 tablet, Rfl: 1    amLODIPine (NORVASC) 10 MG tablet, Take 1 tablet (10 mg total) by mouth once daily. (Patient taking differently: Take 10 mg by mouth every evening.), Disp: 90 tablet, Rfl: 3    aspirin (ECOTRIN) 325 MG EC tablet, Take 1 tablet (325 mg total) by mouth once " "daily., Disp: 30 tablet, Rfl: 1    atorvastatin (LIPITOR) 80 MG tablet, Take 1 tablet (80 mg total) by mouth every evening., Disp: 90 tablet, Rfl: 3    cholecalciferol, vitamin D3, (VITAMIN D3) 25 mcg (1,000 unit) capsule, Take 2 capsules (2,000 Units total) by mouth once daily., Disp: 60 capsule, Rfl: 12    clopidogreL (PLAVIX) 75 mg tablet, Take 1 tablet (75 mg total) by mouth once daily., Disp: 90 tablet, Rfl: 3    cyanocobalamin (VITAMIN B-12) 1000 MCG tablet, Take 1 tablet (1,000 mcg total) by mouth once daily., Disp: 30 tablet, Rfl: 12    fluticasone furoate-vilanteroL (BREO ELLIPTA) 200-25 mcg/dose DsDv diskus inhaler, Inhale 1 puff into the lungs once daily., Disp: 60 each, Rfl: 11    glimepiride (AMARYL) 1 MG tablet, Take 1 tablet (1 mg total) by mouth before breakfast., Disp: 90 tablet, Rfl: 3    lacosamide (VIMPAT) 100 mg Tab, Take 0.5 tablets (50 mg total) by mouth every 12 (twelve) hours., Disp: 90 tablet, Rfl: 3    Lactobacillus rhamnosus GG (CULTURELLE) 10 billion cell capsule, Take 1 capsule by mouth once daily., Disp: , Rfl:     levothyroxine (SYNTHROID) 75 MCG tablet, Take 1 tablet (75 mcg total) by mouth before breakfast., Disp: 90 tablet, Rfl: 3    potassium citrate 99 mg Cap, Take 99 mg by mouth every evening., Disp: , Rfl:     QUEtiapine (SEROQUEL) 25 MG Tab, Take 1 tablet (25 mg total) by mouth nightly. (Patient taking differently: Take 25 mg by mouth nightly as needed (agitation).), Disp: 30 tablet, Rfl: 3    spironolactone (ALDACTONE) 25 MG tablet, Take 0.5 tablets (12.5 mg total) by mouth once daily., Disp: 15 tablet, Rfl: 11    zolpidem (AMBIEN) 10 mg Tab, Take 1 tablet (10 mg total) by mouth nightly as needed., Disp: 90 tablet, Rfl: 0    Physical Examination:  /69 (BP Location: Left arm, Patient Position: Sitting, BP Method: Small (Automatic))   Pulse 65   Ht 5' 2" (1.575 m)   Wt 58.5 kg (129 lb)   BMI 23.59 kg/m²               GENERAL:  General appearance: Well, non-toxic " appearing.  No apparent distress.  Neck: supple.  Right arm lymphedema        MENTAL STATUS:  Alertness, attention span & concentration: normal.  Language: normal.  Orientation to self, place & time:  normal.  Memory, recent & remote: normal.  Fund of knowledge: normal.  MMSE: 29/30    SPEECH:  Clear and fluent.  Follows complex commands.    CRANIAL NERVES:  Cranial Nerves II-XII were examined.  II - Visual fields: normal.  III, IV, VI: PERRL, EOMI, No ptosis, No nystagmus.  V - Facial sensation: normal.  VII - Face symmetry & mobility: normal.  VIII - Hearing: normal  IX, X - Palate: mobile & midline.  XI - Shoulder shrug: normal.  XII - Tongue protrusion: normal.      GROSS MOTOR:  Gait & station: did not assess; in WC  Tone: normal.  Abnormal movements: none.   - action tremor to bilateral legs  Finger-nose: ET bilaterally (R>L)  Rapid alternating movements: normal.  Pronator drift: normal      MUSCLE STRENGTH:     RIGHT    LEFT   5 Deltoids 5   5 Biceps 5   5 Triceps 5   5 Forearm.Pr. 5        5 Iliopsoas flex    5   5 Hip Abduct 5   4 Hip Adduct 4   4+ Quads 4+   4+ Hams 4+   5 Dorsiflex 5   5 Plantar Flex 5          REFLEXES:    RIGHT Reflex   LEFT   2+ Biceps 2+   2+ Brachiorad. 2+        2+ Patellar 2+         Diagnostic Data Reviewed:   I have personally reviewed provider notes, labs and imaging made available to me today.     Imaging:  MRI brain 3/2/2024  FINDINGS:  Ventricles of normal size and shape there is no shift of the midline noted.  There are no extra-axial fluid collections are areas consistent with hemorrhage noted.  There is increased signal in the periventricular white matter most consistent with chronic ischemia.  No masses is seen no acute infarcts are noted.  There are normal flow voids in the carotid siphons.  The pituitary appears normal.  There is scattered punctate foci of susceptibility likely related to chronic microhemorrhage.  This is stable from the previous study     Impression:      No acute abnormalities are seen    CTA 3/2024:  mpression:     1. No proximal vessel occlusion or aneurysm formation.  Advanced intracranial atherosclerosis.  No detrimental interval change from the previous exam.      MRI Brain Without Contrast 11/2023    Narrative  EXAMINATION:  MRI BRAIN WITHOUT CONTRAST    CLINICAL HISTORY:  AMS/confusion; Pt is here for evaluation of altered mental status.  Patient's  went to a doctor's appointment yesterday at 2:00 p.m..  He reports patient was at her normal baseline at 2:00 p.m.  We returned at 5:00 p.m. he reports she was confused.  He reports a prior episode similar to this a month ago after having a TAVR.  He reports it was secondary to metabolic encephalopathy/UTI.  Patient's daughter is present she reports the TAVR was in July.  She reports her mother has had mild deficits in her mental function since the episode of metabolic encephalopathy but has had an acute change to her mental status yesterday afternoon.    TECHNIQUE:  Multiplanar multisequence MR imaging of the brain was performed without contrast.    COMPARISON:  MRI brain without contrast, 07/29/2023.    FINDINGS:  INTRACRANIAL: Mild global volume loss.  Subtle restricted diffusion in the left posterior thalamus with corresponding T2 FLAIR hyperintense signal. Scattered and confluent T2 FLAIR hyperintense white matter foci are present, likely related to chronic microvascular ischemic change.  No parenchymal restricted diffusion.  Scattered punctate foci of susceptibility, likely related to chronic microhemorrhage and similar to prior study.  No evidence of acute intracranial hemorrhage.  No extra-axial fluid collection or mass.  No intracranial mass effect.  No hydrocephalus.  Midline structures have a normal configuration.  Visualized pituitary gland and infundibulum are normal.  Visualized major intracranial vascular structures demonstrate normal flow voids and are normal in course and  caliber.    SINUSES: Small right maxillary sinus mucous retention cyst versus polyp.  Trace right ethmoid sinus mucosal thickening.  Mastoid air cells are clear.    ORBITS: Bilateral lens replacements.    Impression  1. Suspect acute or early subacute left posterior thalamic lacunar infarct.  2. Mild-moderate chronic microvascular ischemic change.  Epic message sent to Sera Freeman and Hussain at 10:27 on 11/28/2023.      CT Head Without Contrast 11/2023    Narrative  EXAMINATION:  CT HEAD WITHOUT CONTRAST    CLINICAL HISTORY:  Altered Mental Status (Last known well by  at 1400, arrived home at 1700 finding patient confused, clear speech, no facial droop, moves all extremities well, )    TECHNIQUE:  Low dose axial images were obtained through the head.  Coronal and sagittal reformations were also performed. Contrast was not administered.  Dose reduction techniques including automatic exposure control (AEC) were utilized.    Dose (DLP): 541 mGycm    COMPARISON:  MRI brain without contrast, 07/29/2023.  CT head without contrast, 07/27/2023.    FINDINGS:  INTRACRANIAL: Mild global volume loss.  Scattered white matter hypodensities, likely related to chronic microvascular ischemic change.  Gray-white differentiation is preserved.  No acute intracranial hemorrhage.  No hydrocephalus.  No intracranial mass effect.    SINUSES: Trace bilateral ethmoid sinus mucosal thickening.  Mucous debris in the left sphenoid sinus.  Mastoid air cells are clear.    SKULL/SCALP: Visualized osseous structures are normal.    ORBITS: Bilateral lens replacements.    Impression  Nighthawk concordant.  No acute intracranial abnormality.      CTA Head and Neck (xpd) 11/2023    Narrative  EXAMINATION:  CTA HEAD AND NECK (XPD)    CLINICAL HISTORY:  Neuro deficit, acute, stroke suspected.    TECHNIQUE:  CT angiogram was performed from the level of the zhao to the top of the head following the IV administration of 80mL of Omnipaque  350.   Sagittal and coronal and maximum intensity projection reconstructions were performed. One additional phase of immediate post-contrast CT images was performed through the head alone. Arterial stenosis percentages are based on NASCET measurement criteria.  Dose reduction techniques including automatic exposure control (AEC) were utilized.    Dose (DLP): 794 mGycm    COMPARISON:  CT head without contrast, 11/27/2023, 07/27/2023.  MRI brain without contrast, 11/28/2023, 07/29/2023.    FINDINGS:  CTA HEAD:    INTRACRANIAL: No acute intracranial hemorrhage.  No hydrocephalus.  No intracranial mass effect.  No abnormal intracranial enhancement.    SINUSES: Mucous retention cyst versus polyp in the right maxillary sinus.  Patchy mucosal opacity in the left sphenoid sinus.  Mastoid air cells are clear.    SKULL/SCALP: Visualized osseous structures are normal.    ORBITS: Bilateral lens replacements.    VASCULATURE: Focal high-grade stenosis or occlusion of the left A3 BUNNY segment (series 13, image 35).  Moderate bilateral carotid siphon calcifications with mild right supraclinoid ICA stenosis.  Mild left terminal ICA and proximal M1 MCA stenosis (series 13, image 41).  Minimal fusiform ectasia of the proximal basilar artery (image 43).  Minor atherosclerotic calcifications in the left V4 and proximal basilar arteries.  No vascular malformation.  Deep cerebral veins and dural venous sinuses enhance normally.    CTA NECK:    AORTIC ARCH: 4 vessel arch with left vertebral artery originating from the arch.    LEFT CAROTID    COMMON CAROTID: No occlusion, hemodynamically significant stenosis, dissection or aneurysm.    INTERNAL CAROTID: Mild atherosclerotic calcifications at the origin.  No occlusion, hemodynamically significant stenosis, dissection or aneurysm.    RIGHT CAROTID    COMMON CAROTID: No occlusion, hemodynamically significant stenosis, dissection or aneurysm.    INTERNAL CAROTID: Mild atherosclerotic plaque at  the origin.  No occlusion, hemodynamically significant stenosis, dissection or aneurysm.    VERTEBRAL ARTERIES    LEFT VERTEBRAL: Originates from the aortic arch.  No occlusion, hemodynamically significant stenosis, dissection or aneurysm.    RIGHT VERTEBRAL: No occlusion, hemodynamically significant stenosis, dissection or aneurysm.    OTHER: Patchy anterior right upper lobe opacities and bronchiectasis.  Multilevel cervical spondylosis without high-grade spinal canal stenosis.  No acute fracture or aggressive osseous lesion.  No significant pathology in the visualized cervical soft tissues.    Impression  1. Focal high-grade stenosis or occlusion in the left A3 BUNNY segment.  2. Mild right supraclinoid ICA stenosis.  3. Mild left terminal ICA and proximal M1 MCA stenosis.  4. Additional findings as above.      Cardiac:  TTE 11/29/2023:      Left Ventricle: There is normal systolic function. Ejection fraction by visual approximation is 55%. There is normal diastolic function.    Right Ventricle: Normal right ventricular cavity size. Systolic function is normal.    Left Atrium: Agitated saline study of the atrial septum is negative after vasalva maneuver, suggesting absence of intracardiac shunt at the atrial level.    Aortic Valve: There is a transcatheter valve replacement in the aortic position that is appropriately positioned.    IVC/SVC: Normal venous pressure at 3 mmHg.      Labs:  Lab Results   Component Value Date    WBC 7.59 04/12/2024    HGB 14.5 04/12/2024    HCT 44.9 04/12/2024     04/12/2024    MCV 89 04/12/2024    RDW 14.8 (H) 04/12/2024     Lab Results   Component Value Date     04/12/2024    K 3.9 04/12/2024     04/12/2024    CO2 25 04/12/2024    BUN 17 04/12/2024    CREATININE 1.1 04/12/2024     (H) 04/12/2024    CALCIUM 9.8 04/12/2024    MG 1.7 03/03/2024    PHOS 4.1 03/03/2024     Lab Results   Component Value Date    PROT 7.0 04/12/2024    ALBUMIN 3.4 (L) 04/12/2024     BILITOT 0.5 04/12/2024    AST 21 04/12/2024    ALKPHOS 124 04/12/2024    ALT 23 04/12/2024     Lab Results   Component Value Date    INR 1.1 03/04/2024     Lab Results   Component Value Date    CHOL 209 (H) 02/05/2024    HDL 61 02/05/2024    LDLCALC 125.6 02/05/2024    TRIG 112 02/05/2024    CHOLHDL 29.2 02/05/2024     Lab Results   Component Value Date    HGBA1C 8.7 (H) 04/12/2024      Lab Results   Component Value Date    TSXGZJYG68 >1000 (H) 11/28/2023     Lab Results   Component Value Date    FOLATE 7.1 11/28/2023     Lab Results   Component Value Date    TSH 1.455 04/12/2024     EEG 7/2023:  This is an abnormal, technically limited EEG.  Diffuse, generalized   slowing is consistent with a diffuse encephalopathy, however this is   non-specific with respect to etiology.  Mild superimposed focal slowing   suggests a more severe degree of cerebral dysfunction involving the   posterior left hemisphere.  Clinical correlation is advised.       EEG 3/2024:    Interpretation  This is an abnormal EEG due to the presence left temporal epileptiform discharges. This supports a diagnoses of focal seizures.             Assessment and Plan:        Problem List Items Addressed This Visit          Neuro    Intermittent confusion    Current Assessment & Plan     Resolved   Previous reports of hallucinations post TAVR         Focal seizures    Current Assessment & Plan     Presented to ED in early March with new onset aphasia, generalized weakness and increased confusion.    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   It is likely that the pt has an underlying neurocognitive disorder that correlates with the hallucinations. Family & pt do report poor  short term recall.   She does require AED as noted by stereotypical events and + EEG   - doing well on Vimpat 50 mg BID; continue!  - consider aEEG to make sure she is not having subclinical seizures   - use Quetiapine as needed only. Black box warning discussed at length   Will need alternative pain control as tramadol can provoke seizures.   Seizure precautions discussed          Memory loss - Primary    Current Assessment & Plan     Reports of intermittent confusion post TAVR. Family with previous concerns about underlying cognitive disorder.   Since other medical issues have been addressed there are no further significant concerns for memory decline.   There are no further reports of hallucinations. Also no depression, sleep disturbances or recent falls. H/o chronic pain does interfere with ADLs  MMSE 29/30   - suspect MCI vs normal age changes; possibly vascular etiology   MRI brain scan noted with lacunar infarct and microvascular changes  Serologies noted  Hold on cognitive enhancing medications for now  Can consider NP testing in the future  Discussed importance of proper sleep hygiene and staying active.   Great family support                          Important to note, also  has a past medical history of Abnormal ECG (02/06/2017), Adrenal adenoma: 2.8 X 2.1CM 2010 (05/16/2014), Aortic atherosclerosis: see CT scan 2016 (10/28/2016), Aortic stenosis (08/19/2014), Asthma, Breast cancer, Cancer, Cataracts, both eyes, Cerebellar infarct, Cerebral infarct, Chronic back pain, Chronic bronchitis, Chronic diastolic CHF (congestive heart failure), CKD (chronic kidney disease) stage 3, GFR 30-59 ml/min (08/19/2014), COPD (chronic obstructive pulmonary disease), DDD (degenerative disc disease), lumbar (07/30/2013), Diabetes mellitus, Diastolic dysfunction, Diverticulosis, Dyslipidemia, Ectatic abdominal aorta: see CT scan 10/16 (10/28/2016), Gallbladder polyp (08/19/2014), General anesthetics causing adverse effect in  therapeutic use, GERD (gastroesophageal reflux disease), Gout, Herpes simplex, High blood cholesterol, Hypertension, Hypertension associated with diabetes (02/07/2012), Insomnia, Lumbar spinal stenosis, Lymphedema of arm, Multiple lung nodules (09/21/2015), Radiation fibrosis of lung, Renal cyst, right: stable per CT 2016 (10/28/2016), Stroke (02/2012), Stye (12/05/2013), Thyroid disease, TIA (transient ischemic attack), Toxic metabolic encephalopathy, Type 2 diabetes mellitus without complication, without long-term current use of insulin (07/20/2015), Unspecified hypothyroidism (07/20/2015), and UTI (urinary tract infection).          The patient will return tin 6 mos virtually     All questions were answered and patient is comfortable with the plan.         Thank you very much for the opportunity to assist in this patient's care.    If you have any questions or concerns, please do not hesitate to contact me at any time.      Sincerely,     MADELYN Boyer  [facility] [place]

## 2024-05-09 NOTE — ASSESSMENT & PLAN NOTE
Presented to ED in early March with new onset aphasia, generalized weakness and increased confusion.    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   It is likely that the pt has an underlying neurocognitive disorder that correlates with the hallucinations. Family & pt do report poor short term recall.   She does require AED as noted by stereotypical events and + EEG   - doing well on Vimpat 50 mg BID; continue!  - consider aEEG to make sure she is not having subclinical seizures   - use Quetiapine as needed only. Black box warning discussed at length   Will need alternative pain control as tramadol can provoke seizures.   Seizure precautions discussed

## 2024-05-14 ENCOUNTER — OFFICE VISIT (OUTPATIENT)
Dept: INTERNAL MEDICINE | Facility: CLINIC | Age: 89
End: 2024-05-14
Payer: MEDICARE

## 2024-05-14 ENCOUNTER — DOCUMENT SCAN (OUTPATIENT)
Dept: HOME HEALTH SERVICES | Facility: HOSPITAL | Age: 89
End: 2024-05-14
Payer: MEDICARE

## 2024-05-14 VITALS — HEIGHT: 62 IN | BODY MASS INDEX: 23.15 KG/M2 | WEIGHT: 125.81 LBS

## 2024-05-14 DIAGNOSIS — R60.9 EDEMA, UNSPECIFIED TYPE: Primary | ICD-10-CM

## 2024-05-14 DIAGNOSIS — J31.0 RHINITIS, UNSPECIFIED TYPE: ICD-10-CM

## 2024-05-14 DIAGNOSIS — I10 ESSENTIAL HYPERTENSION: ICD-10-CM

## 2024-05-14 PROCEDURE — 1159F MED LIST DOCD IN RCRD: CPT | Mod: CPTII,95,, | Performed by: INTERNAL MEDICINE

## 2024-05-14 PROCEDURE — 1160F RVW MEDS BY RX/DR IN RCRD: CPT | Mod: CPTII,95,, | Performed by: INTERNAL MEDICINE

## 2024-05-14 PROCEDURE — 99214 OFFICE O/P EST MOD 30 MIN: CPT | Mod: 95,,, | Performed by: INTERNAL MEDICINE

## 2024-05-14 RX ORDER — AMLODIPINE BESYLATE 10 MG/1
5 TABLET ORAL DAILY
Start: 2024-05-14

## 2024-05-14 NOTE — PROGRESS NOTES
Telemedicine Video Visit    The patient location is:  Patient Home   The chief complaint leading to consultation is: edema  Visit type: Virtual visit with synchronous audio and video  Total time spent with patient: 25 minutes  Each patient to whom he or she provides medical services by telemedicine is:  (1) informed of the relationship between the physician and patient and the respective role of any other health care provider with respect to management of the patient; and (2) notified that he or she may decline to receive medical services by telemedicine and may withdraw from such care at any time.     Swelling of feet for a few weeks.  No change in temperature or mottling.  No foot ulcers.    Daughter noticed when she was cutting her nails.    Not sure if her diet has been a little bit higher in salt in the last couple of weeks but she has made an effort to improve it and symptoms have actually gotten better in the last few days.    Gets worse as the day goes on.  No PND or orthopnea.    Echo in November showed acceptable cardiac function.    Some slight congestion and postnasal drip at night.  No fever, chills or sweats.  No discolored mucus.    Patient Active Problem List   Diagnosis    Essential hypertension    Chronic pain syndrome    Lumbosacral spondylosis without myelopathy    Adrenal adenoma: 2.8 X 2.1CM 2010; stable 2016, also stable 2018    Lymph edema, right arm    Gallbladder polyp    Stage 3a chronic kidney disease    Severe aortic stenosis    LVH (left ventricular hypertrophy)    Pancreatic cyst: stable on MRI 11/16 also 2018, due again 2020 (patient declined)    Cervical spinal stenosis    Transient cerebral ischemia: 2012    Primary insomnia    Gastroesophageal reflux disease without esophagitis    Multiple lung nodules: stable CT 10/2016, also 2019    Former smoker: 1 pack daily for < 20 years, quit 1973    Asthma with COPD    Facet hypertrophy of lumbar region    History of breast cancer     Intermittent asthma    Radiation fibrosis of lung    Bronchiectasis without complication    Ectatic abdominal aorta: see CT scan 10/16; no aneurysm 10/17    Renal cysts, acquired, bilateral    Lumbar radiculopathy    Abnormal EKG    Diverticulosis of large intestine without hemorrhage: see CT 10/14    Idiopathic gout    Liver cysts: see MRI 2018    Odynophagia    Lumbar spondylosis    Mixed hyperlipidemia    Aortic atherosclerosis    Low serum vitamin B12    Coronary artery disease involving native coronary artery of native heart    NYHA class 3 heart failure with preserved ejection fraction    History of transcatheter aortic valve replacement (TAVR)    Intermittent confusion    Acquired hypothyroidism    Chronic diastolic CHF (congestive heart failure)    Aspiration pneumonitis    Focal seizures    Chronic obstructive pulmonary disease, unspecified COPD type    Memory loss      Review of Systems   HENT:  Negative for hearing loss.    Eyes:  Negative for discharge.   Respiratory:  Negative for wheezing.    Cardiovascular:  Negative for chest pain and palpitations.        See HPI   Gastrointestinal:  Negative for blood in stool, constipation, diarrhea and vomiting.   Genitourinary:  Negative for dysuria and hematuria.   Musculoskeletal:  Negative for neck pain.   Neurological:  Negative for weakness and headaches.   Endo/Heme/Allergies:  Negative for polydipsia.      Physical Exam  Constitutional:       Appearance: She is well-developed.   HENT:      Head: Normocephalic and atraumatic.   Eyes:      Extraocular Movements: Extraocular movements intact.      Conjunctiva/sclera: Conjunctivae normal.   Neck:      Thyroid: No thyromegaly.   Pulmonary:      Effort: No respiratory distress.   Neurological:      General: No focal deficit present.      Mental Status: She is alert and oriented to person, place, and time.   Psychiatric:         Mood and Affect: Mood normal.         Behavior: Behavior normal.         Thought  Content: Thought content normal.         Judgment: Judgment normal.        1. Edema, unspecified type    2. Essential hypertension  -     amLODIPine (NORVASC) 10 MG tablet; Take 0.5 tablets (5 mg total) by mouth once daily.    3. Rhinitis, unspecified type       Alarm symptoms reviewed with patient and , she is having none  Low-sodium eating  Hygienic measures reviewed  Reduce amlodipine to 5 mg daily and monitor BP which has been doing very well  Follow-up in 1 week, sooner with problems in the interim  May use Flonase OTC for rhinitis, follow-up poor results  Keep Cardiology and echocardiogram appointments which are scheduled for this summer; if necessary will arrange for sooner evaluation        Answers submitted by the patient for this visit:  Review of Systems Questionnaire (Submitted on 5/8/2024)  activity change: No  unexpected weight change: No  rhinorrhea: No  trouble swallowing: No  visual disturbance: No  chest tightness: No  polyuria: No  difficulty urinating: No  menstrual problem: No  joint swelling: No  arthralgias: No  confusion: No  dysphoric mood: No

## 2024-05-18 PROCEDURE — G0179 MD RECERTIFICATION HHA PT: HCPCS | Mod: ,,, | Performed by: INTERNAL MEDICINE

## 2024-05-20 ENCOUNTER — PATIENT MESSAGE (OUTPATIENT)
Dept: ADMINISTRATIVE | Facility: HOSPITAL | Age: 89
End: 2024-05-20
Payer: MEDICARE

## 2024-06-06 ENCOUNTER — DOCUMENT SCAN (OUTPATIENT)
Dept: HOME HEALTH SERVICES | Facility: HOSPITAL | Age: 89
End: 2024-06-06
Payer: MEDICARE

## 2024-06-06 ENCOUNTER — PATIENT MESSAGE (OUTPATIENT)
Dept: PAIN MEDICINE | Facility: CLINIC | Age: 89
End: 2024-06-06
Payer: MEDICARE

## 2024-06-06 ENCOUNTER — EXTERNAL HOME HEALTH (OUTPATIENT)
Dept: HOME HEALTH SERVICES | Facility: HOSPITAL | Age: 89
End: 2024-06-06
Payer: MEDICARE

## 2024-06-06 NOTE — TELEPHONE ENCOUNTER
Please contact the patient and if truly 10/10 I suggest ED. Otherwise I suggest getting her in with Leonardo Monday, he has availability. Thanks

## 2024-06-08 DIAGNOSIS — R56.9 FOCAL SEIZURES: ICD-10-CM

## 2024-06-10 ENCOUNTER — PATIENT MESSAGE (OUTPATIENT)
Dept: INTERNAL MEDICINE | Facility: CLINIC | Age: 89
End: 2024-06-10
Payer: MEDICARE

## 2024-06-11 ENCOUNTER — PATIENT MESSAGE (OUTPATIENT)
Dept: INTERNAL MEDICINE | Facility: CLINIC | Age: 89
End: 2024-06-11
Payer: MEDICARE

## 2024-06-11 DIAGNOSIS — G89.29 CHRONIC RIGHT HIP PAIN: ICD-10-CM

## 2024-06-11 DIAGNOSIS — M25.551 CHRONIC RIGHT HIP PAIN: ICD-10-CM

## 2024-06-11 RX ORDER — LACOSAMIDE 100 MG/1
50 TABLET ORAL EVERY 12 HOURS
Qty: 90 TABLET | Refills: 3 | Status: SHIPPED | OUTPATIENT
Start: 2024-06-11 | End: 2025-06-11

## 2024-06-12 ENCOUNTER — TELEPHONE (OUTPATIENT)
Dept: INTERNAL MEDICINE | Facility: CLINIC | Age: 89
End: 2024-06-12
Payer: MEDICARE

## 2024-06-12 RX ORDER — HYDROCODONE BITARTRATE AND ACETAMINOPHEN 7.5; 325 MG/15ML; MG/15ML
5 SOLUTION ORAL EVERY 8 HOURS PRN
Qty: 120 ML | Refills: 0 | Status: SHIPPED | OUTPATIENT
Start: 2024-06-12 | End: 2024-06-13 | Stop reason: SDUPTHER

## 2024-06-12 NOTE — TELEPHONE ENCOUNTER
----- Message from Anitha Enciso sent at 6/12/2024  2:57 PM CDT -----  Contact: [facility]   Pharmacy is calling to clarify an RX.  RX name:  hydrocodone-acetaminophen (HYCET) solution 7.5-325 mg/15mL  What do they need to clarify:  not able to get; change to something else or send Rx to another pharmacy  Comments:

## 2024-06-12 NOTE — TELEPHONE ENCOUNTER
Pt requesting rx refill/order from you on Hycet solution prescribed by ED    Med pended for your review

## 2024-06-12 NOTE — TELEPHONE ENCOUNTER
Care Due:                  Date            Visit Type   Department     Provider  --------------------------------------------------------------------------------                                ESTABLISHED                              PATIENT -    [facility] INTER[facility]  Last Visit: 05-      [facility]      MEDICINE       Sera Barger  Next Visit: None Scheduled  None         None Found                                                            Last  Test          Frequency    Reason                     Performed    Due Date  --------------------------------------------------------------------------------    Vitamin D...  12 months..  cholecalciferol,.........  03- 02-    [facility] Embedded Care Due Messages. Reference number: [id number].   6/12/2024 12:27:37 PM CDT

## 2024-06-12 NOTE — TELEPHONE ENCOUNTER
Please call the pharmacy to ask them what the problem is exactly, do they not have the liquid, do they not have any hydrocodone?      Then please call the patient and let them know what is going on and we can make a change if necessary, thank you

## 2024-06-13 DIAGNOSIS — M25.551 CHRONIC RIGHT HIP PAIN: ICD-10-CM

## 2024-06-13 DIAGNOSIS — G89.29 CHRONIC RIGHT HIP PAIN: ICD-10-CM

## 2024-06-13 RX ORDER — HYDROCODONE BITARTRATE AND ACETAMINOPHEN 7.5; 325 MG/15ML; MG/15ML
5 SOLUTION ORAL EVERY 8 HOURS PRN
Qty: 120 ML | Refills: 0 | Status: SHIPPED | OUTPATIENT
Start: 2024-06-13

## 2024-06-13 NOTE — TELEPHONE ENCOUNTER
"FYI:Pharmacy did not need clarification on hydrocodone-acetaminophen (HYCET) solution 7.5-325 mg/15mL. They are unable to get the medication. Spoke with patient's   and ask if he preferred another pharmacy? He stated that "patient has enough medication at present and if he has a problem with getting a refill he would call the office"   "

## 2024-07-03 RX ORDER — ZOLPIDEM TARTRATE 10 MG/1
10 TABLET ORAL NIGHTLY PRN
Qty: 90 TABLET | Refills: 0 | Status: SHIPPED | OUTPATIENT
Start: 2024-07-03 | End: 2025-01-01

## 2024-07-03 RX ORDER — QUETIAPINE FUMARATE 25 MG/1
25 TABLET, FILM COATED ORAL NIGHTLY
Qty: 30 TABLET | Refills: 3 | Status: SHIPPED | OUTPATIENT
Start: 2024-07-03 | End: 2025-07-03

## 2024-07-03 NOTE — TELEPHONE ENCOUNTER
Care Due:                  Date            Visit Type   Department     Provider  --------------------------------------------------------------------------------                                ESTABLISHED                              PATIENT -    [facility] INTERNAL  Last Visit: 05-      [facility]       Sera Barger  Next Visit: None Scheduled  None         None Found                                                            Last  Test          Frequency    Reason                     Performed    Due Date  --------------------------------------------------------------------------------    Uric Acid...  12 months..  allopurinoL..............  03- 02-    [facility] Embedded Care Due Messages. Reference number: [id number].   7/03/2024 4:06:57 PM CDT

## 2024-07-22 DIAGNOSIS — M25.551 CHRONIC RIGHT HIP PAIN: ICD-10-CM

## 2024-07-22 DIAGNOSIS — G89.29 CHRONIC RIGHT HIP PAIN: ICD-10-CM

## 2024-07-22 NOTE — TELEPHONE ENCOUNTER
Care Due:                  Date            Visit Type   Department     Provider  --------------------------------------------------------------------------------                                ESTABLISHED                              PATIENT -    [facility] INTERNAL  Last Visit: 05-      [facility]      MEDICINE       Sera Barger  Next Visit: None Scheduled  None         None Found                                                            Last  Test          Frequency    Reason                     Performed    Due Date  --------------------------------------------------------------------------------    HBA1C.......  6 months...  glimepiride..............  04-   10-    [facility] Embedded Care Due Messages. Reference number: [id number].   7/22/2024 4:00:24 PM CDT

## 2024-07-23 RX ORDER — HYDROCODONE BITARTRATE AND ACETAMINOPHEN 7.5; 325 MG/15ML; MG/15ML
5 SOLUTION ORAL EVERY 8 HOURS PRN
Qty: 120 ML | Refills: 0 | Status: SHIPPED | OUTPATIENT
Start: 2024-07-23

## 2024-08-09 ENCOUNTER — TELEPHONE (OUTPATIENT)
Dept: INTERNAL MEDICINE | Facility: CLINIC | Age: 89
End: 2024-08-09

## 2024-08-09 NOTE — TELEPHONE ENCOUNTER
"  Provider Staff - Action is required     If a refill request needs assessment, please pend appropriate medication(s) for patient utilizing either Order Search or the "reorder" symbol next to the appropriate medication on the Medication Management folder within the encounter. If the medication is not pended as a Refill Request (Rx Request), information essential to ensuring the refill is appropriate will not appear. This can cause significant delay in processing refills. This request will be forwarded back to the staff pool. Please pend the requested medication(s) and route the Rx Request to the Centralized Refill Staff Pool.        If medication is already pended in a previous encounter, please mich the initial request as High Priority and send both to the Centralized Refill Staff Pool.       Thank you for your assistance!   [facility]     Note composed: 10:52 AM 08/09/2024          "

## 2024-08-09 NOTE — TELEPHONE ENCOUNTER
----- Message from Ying Thomas sent at 8/9/2024  9:55 AM CDT -----  Contact: [phone]  Requesting an RX refill or new RX.    Is this a refill or new RX: refill     RX name and strength spironolactone (ALDACTONE) 25 MG tablet  Is this a 30 day or 90 day RX: 90    Pharmacy name and phone #       [facility] [address], [place] - [address] [facility]  [address] [facility] [address]  Phone: [phone] Fax: [phone]

## 2024-08-15 RX ORDER — SPIRONOLACTONE 25 MG/1
12.5 TABLET ORAL DAILY
Qty: 45 TABLET | Refills: 3 | Status: SHIPPED | OUTPATIENT
Start: 2024-08-15

## 2024-08-15 NOTE — TELEPHONE ENCOUNTER
Care Due:                  Date            Visit Type   Department     Provider  --------------------------------------------------------------------------------                                ESTABLISHED                              PATIENT -    [facility] INTERNAL  Last Visit: 05-      [facility]      MEDICINE       Sera Barger  Next Visit: None Scheduled  None         None Found                                                            Last  Test          Frequency    Reason                     Performed    Due Date  --------------------------------------------------------------------------------    Vitamin D...  12 months..  cholecalciferol,.........  03- 02-    [facility] Embedded Care Due Messages. Reference number: [id number].   8/15/2024 11:24:16 AM CDT

## 2024-08-16 NOTE — TELEPHONE ENCOUNTER
Provider Staff:  Action required for this patient    Requires labs      Please see care gap opportunities below in Care Due Message.    Thanks!  [facility]     Appointments      Date Provider   Last Visit   5/14/2024 Sera Barger MD   Next Visit   Visit date not found Sera Barger MD     Refill Decision Note   Olga Aguiar  is requesting a refill authorization.  Brief Assessment and Rationale for Refill:  Approve     Medication Therapy Plan:  ED visit for hip pain. No FOVS. Will approve 90 with 3      Comments:     Note composed:9:10 PM 08/15/2024             Appointments     Last Visit   5/14/2024 Sera Barger MD   Next Visit   Visit date not found Sera Barger MD

## 2024-08-18 DIAGNOSIS — G89.29 CHRONIC RIGHT HIP PAIN: ICD-10-CM

## 2024-08-18 DIAGNOSIS — M25.551 CHRONIC RIGHT HIP PAIN: ICD-10-CM

## 2024-08-19 RX ORDER — HYDROCODONE BITARTRATE AND ACETAMINOPHEN 7.5; 325 MG/15ML; MG/15ML
5 SOLUTION ORAL EVERY 8 HOURS PRN
Qty: 120 ML | Refills: 0 | Status: SHIPPED | OUTPATIENT
Start: 2024-08-19

## 2024-08-19 NOTE — TELEPHONE ENCOUNTER
No care due was identified.  [facility] Embedded Care Due Messages. Reference number: [id number].   8/18/2024 8:05:07 PM CDT

## 2024-09-03 ENCOUNTER — TELEPHONE (OUTPATIENT)
Dept: CARDIOLOGY | Facility: CLINIC | Age: 89
End: 2024-09-03
Payer: MEDICARE

## 2024-09-05 ENCOUNTER — PATIENT MESSAGE (OUTPATIENT)
Dept: INTERNAL MEDICINE | Facility: CLINIC | Age: 89
End: 2024-09-05
Payer: MEDICARE

## 2024-09-06 ENCOUNTER — OFFICE VISIT (OUTPATIENT)
Dept: INTERNAL MEDICINE | Facility: CLINIC | Age: 89
End: 2024-09-06
Payer: MEDICARE

## 2024-09-06 ENCOUNTER — TELEPHONE (OUTPATIENT)
Dept: INTERNAL MEDICINE | Facility: CLINIC | Age: 89
End: 2024-09-06

## 2024-09-06 DIAGNOSIS — Z23 NEED FOR PROPHYLACTIC VACCINATION WITH UNSPECIFIED COMBINED VACCINE: Primary | ICD-10-CM

## 2024-09-06 DIAGNOSIS — N18.31 STAGE 3A CHRONIC KIDNEY DISEASE: ICD-10-CM

## 2024-09-06 DIAGNOSIS — M10.00 IDIOPATHIC GOUT, UNSPECIFIED CHRONICITY, UNSPECIFIED SITE: Primary | ICD-10-CM

## 2024-09-06 RX ORDER — HYDROCODONE BITARTRATE AND ACETAMINOPHEN 7.5; 325 MG/15ML; MG/15ML
SOLUTION ORAL
COMMUNITY
Start: 2024-06-06

## 2024-09-06 RX ORDER — METHYLPREDNISOLONE 4 MG/1
TABLET ORAL
Qty: 21 EACH | Refills: 0 | Status: SHIPPED | OUTPATIENT
Start: 2024-09-06 | End: 2024-09-27

## 2024-09-06 NOTE — PROGRESS NOTES
Telemedicine Video Visit    The patient location is:  Patient Home   The chief complaint leading to consultation is: gout  Visit type: Virtual visit with synchronous audio and video  Total time spent with patient: 15 minutes  Each patient to whom he or she provides medical services by telemedicine is:  (1) informed of the relationship between the physician and patient and the respective role of any other health care provider with respect to management of the patient; and (2) notified that he or she may decline to receive medical services by telemedicine and may withdraw from such care at any time.     Patient Active Problem List   Diagnosis    Essential hypertension    Chronic pain syndrome    Lumbosacral spondylosis without myelopathy    Adrenal adenoma: 2.8 X 2.1CM 2010; stable 2016, also stable 2018    Lymph edema, right arm    Gallbladder polyp    Stage 3a chronic kidney disease    Severe aortic stenosis    LVH (left ventricular hypertrophy)    Pancreatic cyst: stable on MRI 11/16 also 2018, due again 2020 (patient declined)    Cervical spinal stenosis    Transient cerebral ischemia: 2012    Primary insomnia    Gastroesophageal reflux disease without esophagitis    Multiple lung nodules: stable CT 10/2016, also 2019    Former smoker: 1 pack daily for < 20 years, quit 1973    Asthma with COPD    Facet hypertrophy of lumbar region    History of breast cancer    Intermittent asthma    Radiation fibrosis of lung    Bronchiectasis without complication    Ectatic abdominal aorta: see CT scan 10/16; no aneurysm 10/17    Renal cysts, acquired, bilateral    Lumbar radiculopathy    Abnormal EKG    Diverticulosis of large intestine without hemorrhage: see CT 10/14    Idiopathic gout    Liver cysts: see MRI 2018    Odynophagia    Lumbar spondylosis    Mixed hyperlipidemia    Aortic atherosclerosis    Low serum vitamin B12    Coronary artery disease involving native coronary artery of native heart    NYHA class 3 heart  failure with preserved ejection fraction    History of transcatheter aortic valve replacement (TAVR)    Intermittent confusion    Acquired hypothyroidism    Chronic diastolic CHF (congestive heart failure)    Aspiration pneumonitis    Focal seizures    Chronic obstructive pulmonary disease, unspecified COPD type    Memory loss      Pain in L great toe, severe a week ago.  It also involved both feet, most toes.  Red, swollen and hot.  Used a cream and this helped a little.  Wonders about gout.  Has been on allopurinol.  Wonders whether she needs to have this dose increase.  Her last uric acid levels have been entirely normal.  I am reluctant to increase allopurinol in the context of normal uric acid levels, particularly with her kidney function.  She is unable to go get blood work done at this time given transportation issues.    In the past, she has not been able to tolerate colchicine, it caused a great deal of stomach upset.    Review of Systems   HENT:  Negative for hearing loss.    Eyes:  Negative for discharge.   Respiratory:  Negative for wheezing.    Cardiovascular:  Negative for chest pain and palpitations.   Gastrointestinal:  Negative for blood in stool, constipation, diarrhea and vomiting.   Genitourinary:  Negative for dysuria and hematuria.   Musculoskeletal:  Negative for neck pain.        Toe pain, see HPI   Neurological:  Negative for weakness and headaches.   Endo/Heme/Allergies:  Negative for polydipsia.        Physical Exam  Constitutional:       Appearance: She is well-developed.   HENT:      Head: Normocephalic and atraumatic.   Eyes:      Extraocular Movements: Extraocular movements intact.      Conjunctiva/sclera: Conjunctivae normal.   Neck:      Thyroid: No thyromegaly.   Pulmonary:      Effort: No respiratory distress.   Neurological:      General: No focal deficit present.      Mental Status: She is alert and oriented to person, place, and time.   Psychiatric:         Mood and Affect: Mood  normal.         Behavior: Behavior normal.         Thought Content: Thought content normal.         Judgment: Judgment normal.          1. Idiopathic gout, unspecified chronicity, unspecified site    2. Stage 3a chronic kidney disease    Other orders  -     methylPREDNISolone (MEDROL DOSEPACK) 4 mg tablet; use as directed  Dispense: 21 each; Refill: 0       Gout issues reviewed, she admits to some dietary indiscretion, ate some pork and fried shrimp  Will work on gentle hydration  Gout diet sent to patient  Will leave allopurinol dose as it is currently, will need additional labs to increase given her CKD  Empirically will treat with methylprednisolone, cautions and side effects reviewed  Alarm symptoms reviewed, follow-up poor results  She asks about getting flu shot at home, have sent an order for ready responders to go to the home    Visit today manifests increased complexity associated with the care of the multiple chronic and episodic problems I addressed.  I am managing a longitudinal care plan of the patient due to the serious and complex problems listed above.    Answers submitted by the patient for this visit:  Review of Systems Questionnaire (Submitted on 9/5/2024)  activity change: No  unexpected weight change: No  rhinorrhea: No  trouble swallowing: No  visual disturbance: No  chest tightness: No  polyuria: No  difficulty urinating: No  menstrual problem: No  joint swelling: No  arthralgias: No  confusion: No  dysphoric mood: No

## 2024-09-06 NOTE — TELEPHONE ENCOUNTER
"I just ordered "[employer]" for her so they could go out and give her her flu shot at home.  Can you check and see if there is anything else that I need to do for this?    Is there any way they can also give her RSV vaccine and the COVID booster later as well?  "

## 2024-09-12 DIAGNOSIS — G89.29 CHRONIC RIGHT HIP PAIN: ICD-10-CM

## 2024-09-12 DIAGNOSIS — M25.551 CHRONIC RIGHT HIP PAIN: ICD-10-CM

## 2024-09-12 NOTE — TELEPHONE ENCOUNTER
Care Due:                  Date            Visit Type   Department     Provider  --------------------------------------------------------------------------------                                ESTABLISHED                              PATIENT -    [facility] INTERNAL  Last Visit: 09-      [facility]      MEDICINE       Sera Barger  Next Visit: None Scheduled  None         None Found                                                            Last  Test          Frequency    Reason                     Performed    Due Date  --------------------------------------------------------------------------------    Uric Acid...  12 months..  allopurinoL..............  03- 02-    [facility] Embedded Care Due Messages. Reference number: [id number].   9/12/2024 6:09:16 PM CDT

## 2024-09-13 RX ORDER — HYDROCODONE BITARTRATE AND ACETAMINOPHEN 7.5; 325 MG/15ML; MG/15ML
5 SOLUTION ORAL EVERY 8 HOURS PRN
Qty: 120 ML | Refills: 0 | Status: SHIPPED | OUTPATIENT
Start: 2024-09-13

## 2024-10-04 NOTE — TELEPHONE ENCOUNTER
Care Due:                  Date            Visit Type   Department     Provider  --------------------------------------------------------------------------------                                ESTABLISHED                              PATIENT -    [facility] INTERNAL  Last Visit: 09-      [facility]      MEDICINE       Sera Barger  Next Visit: None Scheduled  None         None Found                                                            Last  Test          Frequency    Reason                     Performed    Due Date  --------------------------------------------------------------------------------    HBA1C.......  6 months...  glimepiride..............  04-   10-    [facility] Embedded Care Due Messages. Reference number: [id number].   10/04/2024 1:23:41 PM CDT

## 2024-10-05 NOTE — TELEPHONE ENCOUNTER
Refill Routing Note   Medication(s) are not appropriate for processing by [facility] for the following reason(s):        Required labs outdated    [facility] action(s):  Defer     Requires labs : Yes             Appointments  past 12m or future 3m with PCP    Date Provider   Last Visit   9/6/2024 Sera Barger MD   Next Visit   Visit date not found Sera Barger MD   ED visits in past 90 days: 0        Note composed:4:33 PM 10/05/2024

## 2024-10-07 RX ORDER — ALLOPURINOL 100 MG/1
100 TABLET ORAL
Qty: 90 TABLET | Refills: 0 | Status: SHIPPED | OUTPATIENT
Start: 2024-10-07

## 2024-10-10 ENCOUNTER — PATIENT MESSAGE (OUTPATIENT)
Dept: ADMINISTRATIVE | Facility: HOSPITAL | Age: 89
End: 2024-10-10
Payer: MEDICARE

## 2024-10-11 ENCOUNTER — PATIENT MESSAGE (OUTPATIENT)
Dept: CARDIOLOGY | Facility: CLINIC | Age: 89
End: 2024-10-11
Payer: MEDICARE

## 2024-10-11 NOTE — TELEPHONE ENCOUNTER
No care due was identified.  [facility] Embedded Care Due Messages. Reference number: [id number].   10/11/2024 4:44:03 PM CDT

## 2024-10-14 RX ORDER — ZOLPIDEM TARTRATE 10 MG/1
10 TABLET ORAL NIGHTLY PRN
Qty: 90 TABLET | Refills: 0 | Status: SHIPPED | OUTPATIENT
Start: 2024-10-14

## 2024-10-20 DIAGNOSIS — G89.29 CHRONIC RIGHT HIP PAIN: ICD-10-CM

## 2024-10-20 DIAGNOSIS — M25.551 CHRONIC RIGHT HIP PAIN: ICD-10-CM

## 2024-10-20 NOTE — TELEPHONE ENCOUNTER
Care Due:                  Date            Visit Type   Department     Provider  --------------------------------------------------------------------------------                                ESTABLISHED                              PATIENT -    [facility] INTERNAL  Last Visit: 09-      [facility]      MEDICINE       Sera Barger  Next Visit: None Scheduled  None         None Found                                                            Last  Test          Frequency    Reason                     Performed    Due Date  --------------------------------------------------------------------------------    Vitamin D...  12 months..  cholecalciferol,.........  03- 02-    [facility] Embedded Care Due Messages. Reference number: [id number].   10/20/2024 12:46:34 PM CDT

## 2024-10-21 RX ORDER — HYDROCODONE BITARTRATE AND ACETAMINOPHEN 7.5; 325 MG/15ML; MG/15ML
5 SOLUTION ORAL EVERY 8 HOURS PRN
Qty: 120 ML | Refills: 0 | Status: SHIPPED | OUTPATIENT
Start: 2024-10-21

## 2024-11-13 ENCOUNTER — TELEPHONE (OUTPATIENT)
Dept: PULMONOLOGY | Facility: CLINIC | Age: 89
End: 2024-11-13
Payer: MEDICARE

## 2024-11-14 ENCOUNTER — OFFICE VISIT (OUTPATIENT)
Dept: NEUROLOGY | Facility: CLINIC | Age: 89
End: 2024-11-14
Payer: MEDICARE

## 2024-11-14 DIAGNOSIS — R41.3 MEMORY LOSS: Primary | ICD-10-CM

## 2024-11-14 DIAGNOSIS — R56.9 FOCAL SEIZURES: ICD-10-CM

## 2024-11-14 NOTE — ASSESSMENT & PLAN NOTE
Presented to ED in early March with new onset aphasia, generalized weakness and increased confusion.    - NIHSS 18   - Out of window for TNK  Neuro imaging unrevealing for acute stroke  EEG reported with left temporal epileptiform discharges   - started on Keppra 500 mg BID inpt. pt did develop a rash and increased lethargy with this drug. Keppra discontinued but also started on Quetiapine for reported hallucinations.   It is likely that the pt has an underlying neurocognitive disorder that correlates with the hallucinations. Family & pt do report poor short term recall.   She does require AED as noted by stereotypical events and + EEG   - doing well on Vimpat 50 mg BID; continue!  - consider aEEG to make sure she is not having subclinical seizures   - use Quetiapine as needed only. Black box warning discussed at length   Will need alternative pain control as tramadol can provoke seizures.   Seizure precautions discussed

## 2024-11-14 NOTE — ASSESSMENT & PLAN NOTE
Reports of intermittent confusion post TAVR. Family with previous concerns about underlying cognitive disorder.   Since other medical issues have been addressed there are no further significant concerns for memory decline.   There are no further reports of hallucinations. Mild, intermittent, depression, no sleep disturbances or recent falls. H/o chronic pain does interfere with ADLs  Last MMSE 29/30   - suspect MCI vs normal age changes; possibly vascular etiology   MRI brain scan noted with lacunar infarct and microvascular changes  Serologies noted  Hold on cognitive enhancing medications for now  Can consider NP testing in the future  Discussed importance of proper sleep hygiene and staying active.   Great family support

## 2024-11-14 NOTE — PROGRESS NOTES
"  NEUROLOGY  Outpatient Follow Up    [facility]  [address], [place] [address]  [phone] (office) / [phone] (fax)    Patient Name:  Ogla Aguiar  :  1935  MR #:  [id number]  Acct #:  [id number]    Date of Neurology Visit: 2024  Name of Provider: MADELYN Boyer    Other Physicians:  Sera Barger MD (Primary Care Physician); No ref. provider found (Referring)      Chief Complaint: Follow-up      History of Present Illness (HPI):  As per EMR  2023  "This is an 88-year-old female with past medical history particularly significant for hypertension, dyslipidemia, type 2 diabetes mellitus, diet-controlled, chronic diastolic CHF, asthma/COPD, chronic back pain, hypothyroidism, CKD 3, AS status post TAVR in 2023, course complicated by altered mental status felt to be toxic/metabolic encephalopathy secondary to sedative medications, UTI, with brain MRI showing multiple infarcts that were not felt to be the cause of her altered mental status, who presented to our ED for further evaluation of altered mental status.    In fact, per family, patient was last known well yesterday 2023 around 2:00 p.m. when her  went to a doctor's appointment.  When he returned around 5:00 p.m., the patient was angry and developed altered mental status described as confusion.       At the time of my evaluation, the patient was awake, alert and oriented to name only, otherwise confused.  She was not answering questions appropriately.  She was following commands.    Subsequently further history is unable to be obtained from her.       In ED, workup showed a glucose of 197.    Will place her under medical observation for further management.     * No surgery found *       Hospital Course:   Discharge diagnosis: Acute CVA L posterior thalamic lacunar     Hospital Course:   Admitted overnight with acute confusion.  Underwent MRI brain, which reported acute or " "early subacute left posterior thalamic lacunar infarct.  Mild-moderate chronic microvascular ischemic chane.  Stroke orders placed.  Neurology consulted.  Recs for  mg and plavix 75 mg. Amlodipine increased to 10mg. LDL not at goal, inc to high intensity statin, atorvastatin 40mg daily.  Noted with UTI, prior Pseudomonas in urine cx- started on Cipro. Mental status returned back to baseline. Discharged on 11/30."        Interval Hx 12/15/2023:   Patient is new to me  Patient is a 89 y/o female with a significant PMHX of breast cancer 23 years ago with radiation and chemotherapy, chronic right arm lymphedema, COPD, diabetes type 2 hypertension, TIA, severe valve stenosis, CAD, s/p TAVR 7/2023.   Patient is being seen virtually for hospital follow up. She is accompanied by her spouse and daughter.     She has been having visual hallucinations since her TAVR. Spouse states it took her a while to recover mentally from the anesthesia post TAVR.  She was slowly improving until this recent episode of confusion. Daughter does report her to have frequent UTIs.   She did complete prescribed antibiotics. Family denies previous history of memory deficits.   All recent Neuro testing discussed with the patient/family at length.       Interval Hx 3/22/2024:  Patient is being seen virtually for hospital follow up. She is accompanied by her spouse and daughter who helps supply information.   It was suspected she was having focal seizures and was placed Keppra while in the hospital Family states about 3-4 days after discharge she was noticed to have a rash and was very fatigued. She was very hard to arouse.  They tried reaching out to Dr. Nixon but realized he is not in the [facility] system.  She then saw her PCP who discontinued her Keppra and started her on Seroquel for hallucinations. She has been taking 25 mg nightly. Her tramadol was also discontinued as it can provoke seizures. Spouse and daughter have noticed an " "improvement in the patient in that she is more alert.  She is nearly back to baseline. The hallucinations stopped for a while but are now back. She is currently receiving home health therapy.       EMR reviewed  D/C summary 3/2/2024  "The patient is an 88 year old female, with history of CVA (left posterior thalamic lacunar infarct) on 11/23 on  mg and Plavix 75mg, HTN, AS s/p TAVR , CAD, breast ca with RUext lymphedema, was brought to the ER for evaluation of aphasia (which started around 8 pm last night). Per ERMD and daughter, patient received extra dose of  mg last night and went to bed. This am, patient remained aphasic and noted to be weaker (RUEx usually weak due to lymphedema).  In the ER, stroke alert done -  not a candidate for tPA since symptoms started last night. Patient was given  mg suppository. CT head, showed remote ischemic changes with advanced cerebral atrophy. No acute intracranial abnormality. CTA showed No proximal vessel occlusion or aneurysm formation. Advanced intracranial atherosclerosis. No detrimental interval change from the previous exam .  UA neg infection.  CXR ? Wedge-shaped area of opacification within the right upper lung zone. Pulmonary infiltrate could have this appearance. Elsewhere, chronic interstitial changes present bilaterally.  Patient is aphasic and cannot answer or follow commands.  No cough noted. Patient afebrile.  [facility] called for admission.     Hospital Course:   The patient was admitted for evaluation of acute aphasia, unable to follow commands with R sided neglect.  CVA workup done  (CT head, MRI brain, CTA stroke protocol) did not show acute abnormalities.  Patient continued on ASA suppository and kept npo as patient was lethargic. UA neg for infection. CXR - RUL infiltrate and received empiric abx in ER. Procal wnl - likely aspiration . Neurology consulted and started on empiric IV keppra.  EEG done  - "abnormal EEG due to the presence left " "temporal epileptiform discharges. This supports a diagnoses of focal seizures."  Patient mental status improved after initiation of anti eleptics. ST eval - cleared to start regular diet and restarted on oral medications, PPN discontinued. PT/OT cleared her for HH. CM was able to arrange this."    Interval Hx 4/8/2024:  Patient presents virtually today for medication check. She is doing well on Vimapt 50 mg BID. She and family deny any episodes of aphasia or acute confusion.   She now complains of poor sleep. She was previously on Ambien since 2008. This was decreased to 5 mg at one point as she was taking with Tramadol. Since no longer on Tramadol she now has trouble sleeping at night.     Interval Hx 5/9/2024:  Patient presents today for memory evaluation. She is accompanied by her spouse who helps supply information. Overall she has been doing well mentally. She is now sleeping better on Ambien 10 mg. Her sleep routine is pretty consistent. Her back pain continues as she can no longer take Tramadol. Spouse does not have a big concern about her memory. Overall her hallucinations have resolved. Every now and then she may think an object is moving. She admits to sadness but not significant depression. She requires assistance with ADLs. Her spouse is managing the finances and her medications. She uses a walker around the house. She denies recent falls. She has not needed to take Seroquel. She no longer drives.     Interval Hx 11/14/2024:  Patient presents virtually today for follow up. She is accompanied by her spouse and daughter. She is doing well overall. Spouse states she is improving week to week. She is sleeping well at night but takes pain med and sleeping med as she has chronic back pain. She is taking Hycet. There are no hallucinations. At times she feels depressed. It was suggested she take the Seroquel as needed but family as been giving it to her every night. She requires assistance with ADLs. Her spouse " is managing the finances and her medications. She uses a walker around the house. She slipped off the bed very gently about 3 weeks ago.     She has not had any further seizures. She continues to take Vimpat.         Past Medical, Surgical, Family & Social History:   Reviewed and updated.    Home Medications:     Current Outpatient Medications:     allopurinoL (ZYLOPRIM) 100 MG tablet, Take 1 tablet by mouth once daily, Disp: 90 tablet, Rfl: 0    amLODIPine (NORVASC) 10 MG tablet, Take 0.5 tablets (5 mg total) by mouth once daily., Disp: , Rfl:     aspirin (ECOTRIN) 325 MG EC tablet, Take 1 tablet (325 mg total) by mouth once daily., Disp: 30 tablet, Rfl: 1    atorvastatin (LIPITOR) 80 MG tablet, Take 1 tablet (80 mg total) by mouth every evening., Disp: 90 tablet, Rfl: 3    cholecalciferol, vitamin D3, (VITAMIN D3) 25 mcg (1,000 unit) capsule, Take 2 capsules (2,000 Units total) by mouth once daily., Disp: 60 capsule, Rfl: 12    clopidogreL (PLAVIX) 75 mg tablet, Take 1 tablet by mouth once daily, Disp: 90 tablet, Rfl: 4    cyanocobalamin (VITAMIN B-12) 1000 MCG tablet, Take 1 tablet (1,000 mcg total) by mouth once daily., Disp: 30 tablet, Rfl: 12    fluticasone furoate-vilanteroL (BREO ELLIPTA) 200-25 mcg/dose DsDv diskus inhaler, Inhale 1 puff into the lungs once daily., Disp: 60 each, Rfl: 11    glimepiride (AMARYL) 1 MG tablet, Take 1 tablet (1 mg total) by mouth before breakfast., Disp: 90 tablet, Rfl: 3    hydrocodone-acetaminophen (HYCET) solution 7.5-325 mg/15mL, Take 5 mLs by mouth every 8 (eight) hours as needed for Pain., Disp: 120 mL, Rfl: 0    lacosamide (VIMPAT) 100 mg Tab, Take 0.5 tablets (50 mg total) by mouth every 12 (twelve) hours., Disp: 90 tablet, Rfl: 3    Lactobacillus rhamnosus GG (CULTURELLE) 10 billion cell capsule, Take 1 capsule by mouth once daily., Disp: , Rfl:     levothyroxine (SYNTHROID) 75 MCG tablet, Take 1 tablet (75 mcg total) by mouth before breakfast., Disp: 90 tablet, Rfl:  3    potassium citrate 99 mg Cap, Take 99 mg by mouth every evening., Disp: , Rfl:     QUEtiapine (SEROQUEL) 25 MG Tab, Take 1 tablet (25 mg total) by mouth nightly., Disp: 30 tablet, Rfl: 3    spironolactone (ALDACTONE) 25 MG tablet, Take 0.5 tablets (12.5 mg total) by mouth once daily., Disp: 45 tablet, Rfl: 3    zolpidem (AMBIEN) 10 mg Tab, TAKE 1 TABLET BY MOUTH NIGHTLY AS NEEDED, Disp: 90 tablet, Rfl: 0    Physical Examination: limited due to being virtual   There were no vitals taken for this visit.    GENERAL:  General appearance: Well, non-toxic appearing.  No apparent distress.  Neck: supple.  Right arm lymphedema        MENTAL STATUS:  Alertness, attention span & concentration: normal.  Language: normal.  Orientation to self, place & time:  normal.  Memory, recent & remote: normal.  Fund of knowledge: normal.  MMSE: 29/30    SPEECH:  Clear and fluent.  Follows complex commands.    PRIOR FACE TO FACE EXAM   CRANIAL NERVES:  Cranial Nerves II-XII were examined.  II - Visual fields: normal.  III, IV, VI: PERRL, EOMI, No ptosis, No nystagmus.  V - Facial sensation: normal.  VII - Face symmetry & mobility: normal.  VIII - Hearing: normal  IX, X - Palate: mobile & midline.  XI - Shoulder shrug: normal.  XII - Tongue protrusion: normal.      GROSS MOTOR:  Gait & station: did not assess; in WC  Tone: normal.  Abnormal movements: none.   - action tremor to bilateral legs  Finger-nose: ET bilaterally (R>L)  Rapid alternating movements: normal.  Pronator drift: normal      MUSCLE STRENGTH:     RIGHT    LEFT   5 Deltoids 5   5 Biceps 5   5 Triceps 5   5 Forearm.Pr. 5        5 Iliopsoas flex    5   5 Hip Abduct 5   4 Hip Adduct 4   4+ Quads 4+   4+ Hams 4+   5 Dorsiflex 5   5 Plantar Flex 5          REFLEXES:    RIGHT Reflex   LEFT   2+ Biceps 2+   2+ Brachiorad. 2+        2+ Patellar 2+         Diagnostic Data Reviewed:   I have personally reviewed provider notes, labs and imaging made available to me today.      Imaging:  MRI brain 3/2/2024  FINDINGS:  Ventricles of normal size and shape there is no shift of the midline noted.  There are no extra-axial fluid collections are areas consistent with hemorrhage noted.  There is increased signal in the periventricular white matter most consistent with chronic ischemia.  No masses is seen no acute infarcts are noted.  There are normal flow voids in the carotid siphons.  The pituitary appears normal.  There is scattered punctate foci of susceptibility likely related to chronic microhemorrhage.  This is stable from the previous study     Impression:     No acute abnormalities are seen    CTA 3/2024:  mpression:     1. No proximal vessel occlusion or aneurysm formation.  Advanced intracranial atherosclerosis.  No detrimental interval change from the previous exam.      MRI Brain Without Contrast 11/2023    Narrative  EXAMINATION:  MRI BRAIN WITHOUT CONTRAST    CLINICAL HISTORY:  AMS/confusion; Pt is here for evaluation of altered mental status.  Patient's  went to a doctor's appointment yesterday at 2:00 p.m..  He reports patient was at her normal baseline at 2:00 p.m.  We returned at 5:00 p.m. he reports she was confused.  He reports a prior episode similar to this a month ago after having a TAVR.  He reports it was secondary to metabolic encephalopathy/UTI.  Patient's daughter is present she reports the TAVR was in July.  She reports her mother has had mild deficits in her mental function since the episode of metabolic encephalopathy but has had an acute change to her mental status yesterday afternoon.    TECHNIQUE:  Multiplanar multisequence MR imaging of the brain was performed without contrast.    COMPARISON:  MRI brain without contrast, 07/29/2023.    FINDINGS:  INTRACRANIAL: Mild global volume loss.  Subtle restricted diffusion in the left posterior thalamus with corresponding T2 FLAIR hyperintense signal. Scattered and confluent T2 FLAIR hyperintense white matter  foci are present, likely related to chronic microvascular ischemic change.  No parenchymal restricted diffusion.  Scattered punctate foci of susceptibility, likely related to chronic microhemorrhage and similar to prior study.  No evidence of acute intracranial hemorrhage.  No extra-axial fluid collection or mass.  No intracranial mass effect.  No hydrocephalus.  Midline structures have a normal configuration.  Visualized pituitary gland and infundibulum are normal.  Visualized major intracranial vascular structures demonstrate normal flow voids and are normal in course and caliber.    SINUSES: Small right maxillary sinus mucous retention cyst versus polyp.  Trace right ethmoid sinus mucosal thickening.  Mastoid air cells are clear.    ORBITS: Bilateral lens replacements.    Impression  1. Suspect acute or early subacute left posterior thalamic lacunar infarct.  2. Mild-moderate chronic microvascular ischemic change.  Epic message sent to Sera Freeman and Hussain at 10:27 on 11/28/2023.      CT Head Without Contrast 11/2023    Narrative  EXAMINATION:  CT HEAD WITHOUT CONTRAST    CLINICAL HISTORY:  Altered Mental Status (Last known well by  at 1400, arrived home at 1700 finding patient confused, clear speech, no facial droop, moves all extremities well, )    TECHNIQUE:  Low dose axial images were obtained through the head.  Coronal and sagittal reformations were also performed. Contrast was not administered.  Dose reduction techniques including automatic exposure control (AEC) were utilized.    Dose (DLP): 541 mGycm    COMPARISON:  MRI brain without contrast, 07/29/2023.  CT head without contrast, 07/27/2023.    FINDINGS:  INTRACRANIAL: Mild global volume loss.  Scattered white matter hypodensities, likely related to chronic microvascular ischemic change.  Gray-white differentiation is preserved.  No acute intracranial hemorrhage.  No hydrocephalus.  No intracranial mass effect.    SINUSES: Trace  bilateral ethmoid sinus mucosal thickening.  Mucous debris in the left sphenoid sinus.  Mastoid air cells are clear.    SKULL/SCALP: Visualized osseous structures are normal.    ORBITS: Bilateral lens replacements.    Impression  Nighthawk concordant.  No acute intracranial abnormality.      CTA Head and Neck (xpd) 11/2023    Narrative  EXAMINATION:  CTA HEAD AND NECK (XPD)    CLINICAL HISTORY:  Neuro deficit, acute, stroke suspected.    TECHNIQUE:  CT angiogram was performed from the level of the zhao to the top of the head following the IV administration of 80mL of Omnipaque 350.   Sagittal and coronal and maximum intensity projection reconstructions were performed. One additional phase of immediate post-contrast CT images was performed through the head alone. Arterial stenosis percentages are based on NASCET measurement criteria.  Dose reduction techniques including automatic exposure control (AEC) were utilized.    Dose (DLP): 794 mGycm    COMPARISON:  CT head without contrast, 11/27/2023, 07/27/2023.  MRI brain without contrast, 11/28/2023, 07/29/2023.    FINDINGS:  CTA HEAD:    INTRACRANIAL: No acute intracranial hemorrhage.  No hydrocephalus.  No intracranial mass effect.  No abnormal intracranial enhancement.    SINUSES: Mucous retention cyst versus polyp in the right maxillary sinus.  Patchy mucosal opacity in the left sphenoid sinus.  Mastoid air cells are clear.    SKULL/SCALP: Visualized osseous structures are normal.    ORBITS: Bilateral lens replacements.    VASCULATURE: Focal high-grade stenosis or occlusion of the left A3 BUNNY segment (series 13, image 35).  Moderate bilateral carotid siphon calcifications with mild right supraclinoid ICA stenosis.  Mild left terminal ICA and proximal M1 MCA stenosis (series 13, image 41).  Minimal fusiform ectasia of the proximal basilar artery (image 43).  Minor atherosclerotic calcifications in the left V4 and proximal basilar arteries.  No vascular malformation.   Deep cerebral veins and dural venous sinuses enhance normally.    CTA NECK:    AORTIC ARCH: 4 vessel arch with left vertebral artery originating from the arch.    LEFT CAROTID    COMMON CAROTID: No occlusion, hemodynamically significant stenosis, dissection or aneurysm.    INTERNAL CAROTID: Mild atherosclerotic calcifications at the origin.  No occlusion, hemodynamically significant stenosis, dissection or aneurysm.    RIGHT CAROTID    COMMON CAROTID: No occlusion, hemodynamically significant stenosis, dissection or aneurysm.    INTERNAL CAROTID: Mild atherosclerotic plaque at the origin.  No occlusion, hemodynamically significant stenosis, dissection or aneurysm.    VERTEBRAL ARTERIES    LEFT VERTEBRAL: Originates from the aortic arch.  No occlusion, hemodynamically significant stenosis, dissection or aneurysm.    RIGHT VERTEBRAL: No occlusion, hemodynamically significant stenosis, dissection or aneurysm.    OTHER: Patchy anterior right upper lobe opacities and bronchiectasis.  Multilevel cervical spondylosis without high-grade spinal canal stenosis.  No acute fracture or aggressive osseous lesion.  No significant pathology in the visualized cervical soft tissues.    Impression  1. Focal high-grade stenosis or occlusion in the left A3 BUNNY segment.  2. Mild right supraclinoid ICA stenosis.  3. Mild left terminal ICA and proximal M1 MCA stenosis.  4. Additional findings as above.      Cardiac:  TTE 11/29/2023:      Left Ventricle: There is normal systolic function. Ejection fraction by visual approximation is 55%. There is normal diastolic function.    Right Ventricle: Normal right ventricular cavity size. Systolic function is normal.    Left Atrium: Agitated saline study of the atrial septum is negative after vasalva maneuver, suggesting absence of intracardiac shunt at the atrial level.    Aortic Valve: There is a transcatheter valve replacement in the aortic position that is appropriately positioned.    IVC/SVC:  Normal venous pressure at 3 mmHg.      Labs:  Lab Results   Component Value Date    WBC 6.17 07/22/2024    HGB 14.2 07/22/2024    HCT 44.0 07/22/2024     07/22/2024    MCV 90 07/22/2024    RDW 14.6 (H) 07/22/2024     Lab Results   Component Value Date     07/22/2024    K 5.1 07/22/2024     07/22/2024    CO2 28 07/22/2024    BUN 22 (H) 07/22/2024    CREATININE 1.19 07/22/2024     07/22/2024    CALCIUM 10.2 07/22/2024    MG 1.8 06/06/2024    PHOS 4.1 03/03/2024     Lab Results   Component Value Date    PROT 6.8 07/22/2024    ALBUMIN 4.1 07/22/2024    BILITOT 0.7 07/22/2024    AST 39 (H) 07/22/2024    ALKPHOS 113 07/22/2024    ALT 34 07/22/2024     Lab Results   Component Value Date    INR 1.0 06/06/2024     Lab Results   Component Value Date    CHOL 209 (H) 02/05/2024    HDL 61 02/05/2024    LDLCALC 125.6 02/05/2024    TRIG 112 02/05/2024    CHOLHDL 29.2 02/05/2024     Lab Results   Component Value Date    HGBA1C 8.7 (H) 04/12/2024      Lab Results   Component Value Date    ICHOBBGN27 >1000 (H) 11/28/2023     Lab Results   Component Value Date    FOLATE 7.1 11/28/2023     Lab Results   Component Value Date    TSH 1.455 04/12/2024     EEG 7/2023:  This is an abnormal, technically limited EEG.  Diffuse, generalized   slowing is consistent with a diffuse encephalopathy, however this is   non-specific with respect to etiology.  Mild superimposed focal slowing   suggests a more severe degree of cerebral dysfunction involving the   posterior left hemisphere.  Clinical correlation is advised.       EEG 3/2024:    Interpretation  This is an abnormal EEG due to the presence left temporal epileptiform discharges. This supports a diagnoses of focal seizures.             Assessment and Plan:      Problem List Items Addressed This Visit          Neuro    Focal seizures    Current Assessment & Plan     Presented to ED in early March with new onset aphasia, generalized weakness and increased confusion.    -  NIHSS 18   - Out of window for TNK  Neuro imaging unrevealing for acute stroke  EEG reported with left temporal epileptiform discharges   - started on Keppra 500 mg BID inpt. pt did develop a rash and increased lethargy with this drug. Keppra discontinued but also started on Quetiapine for reported hallucinations.   It is likely that the pt has an underlying neurocognitive disorder that correlates with the hallucinations. Family & pt do report poor short term recall.   She does require AED as noted by stereotypical events and + EEG   - doing well on Vimpat 50 mg BID; continue!  - consider aEEG to make sure she is not having subclinical seizures   - use Quetiapine as needed only. Black box warning discussed at length   Will need alternative pain control as tramadol can provoke seizures.   Seizure precautions discussed          Memory loss - Primary    Current Assessment & Plan     Reports of intermittent confusion post TAVR. Family with previous concerns about underlying cognitive disorder.   Since other medical issues have been addressed there are no further significant concerns for memory decline.   There are no further reports of hallucinations. Mild, intermittent, depression, no sleep disturbances or recent falls. H/o chronic pain does interfere with ADLs  Last MMSE 29/30   - suspect MCI vs normal age changes; possibly vascular etiology   MRI brain scan noted with lacunar infarct and microvascular changes  Serologies noted  Hold on cognitive enhancing medications for now  Can consider NP testing in the future  Discussed importance of proper sleep hygiene and staying active.   Great family support                              Important to note, also  has a past medical history of Abnormal ECG (02/06/2017), Adrenal adenoma: 2.8 X 2.1CM 2010 (05/16/2014), Aortic atherosclerosis: see CT scan 2016 (10/28/2016), Aortic stenosis (08/19/2014), Asthma, Breast cancer, Cancer, Cataracts, both eyes, Cerebellar infarct,  Cerebral infarct, Chronic back pain, Chronic bronchitis, Chronic diastolic CHF (congestive heart failure), CKD (chronic kidney disease) stage 3, GFR 30-59 ml/min (08/19/2014), COPD (chronic obstructive pulmonary disease), DDD (degenerative disc disease), lumbar (07/30/2013), Diabetes mellitus, Diastolic dysfunction, Diverticulosis, Dyslipidemia, Ectatic abdominal aorta: see CT scan 10/16 (10/28/2016), Gallbladder polyp (08/19/2014), General anesthetics causing adverse effect in therapeutic use, GERD (gastroesophageal reflux disease), Gout, Herpes simplex, High blood cholesterol, Hypertension, Hypertension associated with diabetes (02/07/2012), Insomnia, Lumbar spinal stenosis, Lymphedema of arm, Multiple lung nodules (09/21/2015), Radiation fibrosis of lung, Renal cyst, right: stable per CT 2016 (10/28/2016), Stroke (02/2012), Stye (12/05/2013), Thyroid disease, TIA (transient ischemic attack), Toxic metabolic encephalopathy, Type 2 diabetes mellitus without complication, without long-term current use of insulin (07/20/2015), Unspecified hypothyroidism (07/20/2015), and UTI (urinary tract infection).          The patient will return in 5- 6 mos in person for memory check    All questions were answered and patient is comfortable with the plan.         Thank you very much for the opportunity to assist in this patient's care.    If you have any questions or concerns, please do not hesitate to contact me at any time.      Sincerely,     MADELYN Boyer  [facility]       The patient location is: [place], [place]  The chief complaint leading to consultation is: memory and seizure f/u    Visit type: audiovisual    Face to Face time with patient:     Each patient to whom he or she provides medical services by telemedicine is:  (1) informed of the relationship between the physician and patient and the respective role of any other health care provider with respect to management of the  patient; and (2) notified that he or she may decline to receive medical services by telemedicine and may withdraw from such care at any time.    Notes:

## 2024-11-18 ENCOUNTER — PATIENT MESSAGE (OUTPATIENT)
Dept: PULMONOLOGY | Facility: CLINIC | Age: 89
End: 2024-11-18
Payer: MEDICARE

## 2024-11-19 ENCOUNTER — NURSE TRIAGE (OUTPATIENT)
Dept: ADMINISTRATIVE | Facility: CLINIC | Age: 89
End: 2024-11-19
Payer: MEDICARE

## 2024-11-19 ENCOUNTER — OFFICE VISIT (OUTPATIENT)
Dept: FAMILY MEDICINE | Facility: CLINIC | Age: 89
End: 2024-11-19
Payer: MEDICARE

## 2024-11-19 ENCOUNTER — PATIENT MESSAGE (OUTPATIENT)
Dept: INTERNAL MEDICINE | Facility: CLINIC | Age: 89
End: 2024-11-19
Payer: MEDICARE

## 2024-11-19 VITALS
HEIGHT: 62 IN | DIASTOLIC BLOOD PRESSURE: 70 MMHG | OXYGEN SATURATION: 95 % | HEART RATE: 60 BPM | WEIGHT: 118.38 LBS | BODY MASS INDEX: 21.79 KG/M2 | SYSTOLIC BLOOD PRESSURE: 138 MMHG

## 2024-11-19 DIAGNOSIS — G89.4 CHRONIC PAIN SYNDROME: Primary | ICD-10-CM

## 2024-11-19 DIAGNOSIS — J44.89 COPD WITH ASTHMA: ICD-10-CM

## 2024-11-19 DIAGNOSIS — M25.551 CHRONIC RIGHT HIP PAIN: ICD-10-CM

## 2024-11-19 DIAGNOSIS — G89.29 CHRONIC RIGHT HIP PAIN: ICD-10-CM

## 2024-11-19 PROCEDURE — 99999 PR PBB SHADOW E&M-EST. PATIENT-LVL IV: CPT | Mod: PBBFAC,,, | Performed by: NURSE PRACTITIONER

## 2024-11-19 RX ORDER — FLUTICASONE FUROATE AND VILANTEROL 200; 25 UG/1; UG/1
1 POWDER RESPIRATORY (INHALATION) DAILY
Qty: 60 EACH | Refills: 11 | Status: SHIPPED | OUTPATIENT
Start: 2024-11-19

## 2024-11-19 RX ORDER — HYDROCODONE BITARTRATE AND ACETAMINOPHEN 7.5; 325 MG/15ML; MG/15ML
5 SOLUTION ORAL EVERY 8 HOURS PRN
Qty: 120 ML | Refills: 0 | Status: SHIPPED | OUTPATIENT
Start: 2024-11-19

## 2024-11-19 NOTE — PROGRESS NOTES
"Subjective     Patient ID: Olga Aguiar is a 89 y.o. female.    Chief Complaint: Medication Refill and Shortness of Breath      HPI      Patient is new to me. PCP is Dr. Barger.     89 y/o F with HTN, chronic pain syndrome, adrenal adenoma, CKD3, severe aortic stenosis s/p TAVR 2023, LVH, GERD, multiple lung nodules, asthma with COPD, HLD, CAD, chronic diastolic CHF, hypothyroidism presents to clinic for medication refills. She has been unable to get in contact with her PCP for her medications, has been out of her Breo inhaler and feels her breathing has become worse. She was denied refills by pulmonary due to not being seen since 2022- she is unable to go to [place] due to pain from long car rides. Her PCP is on the [facility] and typically sees her virtually. She denies fever, chills, Chest pain, lower extremity swelling. SOB is mild, she is not in distress but does feel like it gets hard to get air in and out. She has an occasional cough, clear mucous, usually non-productive.     Review of Systems   All other systems reviewed and are negative.         Objective   Vitals:    11/19/24 1439   BP: 138/70   Pulse: 60   SpO2: 95%   Weight: 53.7 kg (118 lb 6.2 oz)   Height: 5' 2" (1.575 m)      Physical Exam  Constitutional:       General: She is not in acute distress.     Appearance: Normal appearance. She is obese. She is not ill-appearing, toxic-appearing or diaphoretic.   HENT:      Head: Normocephalic and atraumatic.   Cardiovascular:      Rate and Rhythm: Normal rate and regular rhythm.      Heart sounds: Normal heart sounds.      No friction rub. No gallop.   Pulmonary:      Effort: Pulmonary effort is normal. No respiratory distress.      Breath sounds: Normal breath sounds. No stridor. No wheezing, rhonchi or rales.   Chest:      Chest wall: No tenderness.   Musculoskeletal:      Right lower leg: No edema.      Left lower leg: No edema.   Neurological:      General: No focal deficit present.      Mental " Status: She is alert and oriented to person, place, and time. Mental status is at baseline.      Motor: Weakness (in wheelchair) present.   Psychiatric:         Mood and Affect: Mood normal.         Behavior: Behavior normal.         Thought Content: Thought content normal.         Judgment: Judgment normal.         1. COPD with asthma  - fluticasone furoate-vilanteroL (BREO ELLIPTA) 200-25 mcg/dose DsDv diskus inhaler; Inhale 1 puff into the lungs once daily.  Dispense: 60 each; Refill: 11    2. Chronic pain syndrome       RTC/ER precautions given. F/U with PCP as scheduled. Discussed with patient that she may be eligible for the care at home program and recommended discussing with her PCP since getting to the clinic is difficult for her.     Counseled on regular exercise, maintenance of a healthy weight, balanced diet rich in fruits/vegetables and lean protein, and avoidance of unhealthy habits like smoking and excessive alcohol intake.    TIM ValdezC

## 2024-11-19 NOTE — TELEPHONE ENCOUNTER
"Patient is SOB with walking and requesting a refill for her inhaler. Advised per protocol to be seen in the office today. An appointment was made for the patient. Advised the patient to call back with any further questions or if symptoms worsen.      Reason for Disposition   MILD difficulty breathing (e.g., minimal/no SOB at rest, SOB with walking, pulse < 100) of new-onset or worse than normal    Additional Information   Negative: SEVERE difficulty breathing (e.g., struggling for each breath, speaks in single words, pulse > 120)   Negative: Breathing stopped and hasn't returned   Negative: Choking on something   Negative: Bluish (or gray) lips or face   Negative: Difficult to awaken or acting confused (e.g., disoriented, slurred speech)   Negative: Passed out (e.g., fainted, lost consciousness, blacked out and was not responding)   Negative: Wheezing started suddenly after medicine, an allergic food, or bee sting   Negative: Stridor (harsh sound while breathing in)   Negative: Slow, shallow and weak breathing   Negative: Sounds like a life-threatening emergency to the triager   Negative: MODERATE difficulty breathing (e.g., speaks in phrases, SOB even at rest, pulse 100-120) of new-onset or worse than normal   Negative: Oxygen level (e.g., pulse oximetry) 90% or lower   Negative: Wheezing can be heard across the room   Negative: Drooling or spitting out saliva (because can't swallow)   Negative: Any history of prior "blood clot" in leg or lungs   Negative: Illness requiring prolonged bedrest in past month (e.g., immobilization, long hospital stay)   Negative: Hip or leg fracture (broken bone) in past month (or had cast on leg or ankle in past month)   Negative: Long-distance travel in past month (e.g., car, bus, train, plane; with trip lasting 6 or more hours)   Negative: Major surgery in the past month   Negative: Cancer treatment in past six months (or has cancer now)   Negative: Extra heartbeats, irregular heart " beating, or heart is beating very fast (i.e., 'palpitations')   Negative: Fever > 103 F (39.4 C)   Negative: Fever > 101 F (38.3 C) and over 60 years of age   Negative: Fever > 100 F (37.8 C) and bedridden (e.g., nursing home patient, stroke, chronic illness, recovering from surgery)   Negative: Fever > 100 F (37.8 C) and diabetes mellitus or weak immune system (e.g., HIV positive, cancer chemo, splenectomy, organ transplant, chronic steroids)   Negative: Periods where breathing stops and then resumes normally and bedridden (e.g., nursing home patient, CVA)   Negative: Pregnant or postpartum (from 0 to 6 weeks after delivery)   Negative: Patient sounds very sick or weak to the triager    Protocols used: Breathing Difficulty-A-OH

## 2024-12-16 DIAGNOSIS — G89.29 CHRONIC RIGHT HIP PAIN: ICD-10-CM

## 2024-12-16 DIAGNOSIS — M25.551 CHRONIC RIGHT HIP PAIN: ICD-10-CM

## 2024-12-16 RX ORDER — HYDROCODONE BITARTRATE AND ACETAMINOPHEN 7.5; 325 MG/15ML; MG/15ML
5 SOLUTION ORAL EVERY 8 HOURS PRN
Qty: 120 ML | Refills: 0 | Status: SHIPPED | OUTPATIENT
Start: 2024-12-16

## 2024-12-16 NOTE — TELEPHONE ENCOUNTER
Care Due:                  Date            Visit Type   Department     Provider  --------------------------------------------------------------------------------                                ESTABLISHED                              PATIENT -    [facility] INTERNAL  Last Visit: 09-      [facility]      MEDICINE       Sera Barger  Next Visit: None Scheduled  None         None Found                                                            Last  Test          Frequency    Reason                     Performed    Due Date  --------------------------------------------------------------------------------    HBA1C.......  6 months...  glimepiride..............  04-   10-    [facility] Embedded Care Due Messages. Reference number: [id number].   12/16/2024 12:56:40 PM CST

## 2024-12-17 DIAGNOSIS — R56.9 FOCAL SEIZURES: ICD-10-CM

## 2024-12-17 RX ORDER — LACOSAMIDE 100 MG/1
TABLET ORAL
Qty: 90 TABLET | Refills: 0 | Status: SHIPPED | OUTPATIENT
Start: 2024-12-17

## 2024-12-28 DIAGNOSIS — F51.01 PRIMARY INSOMNIA: Primary | ICD-10-CM

## 2024-12-29 DIAGNOSIS — I10 ESSENTIAL HYPERTENSION: ICD-10-CM

## 2024-12-29 RX ORDER — ZOLPIDEM TARTRATE 10 MG/1
10 TABLET ORAL NIGHTLY PRN
Qty: 90 TABLET | Refills: 0 | Status: CANCELLED | OUTPATIENT
Start: 2024-12-29

## 2024-12-29 NOTE — TELEPHONE ENCOUNTER
Care Due:                  Date            Visit Type   Department     Provider  --------------------------------------------------------------------------------                                ESTABLISHED                              PATIENT -    NOM INTERNAL  Last Visit: 09-      [facility]      MEDICINE       Sera Barger  Next Visit: None Scheduled  None         None Found                                                            Last  Test          Frequency    Reason                     Performed    Due Date  --------------------------------------------------------------------------------    Vitamin D...  12 months..  cholecalciferol,.........  03- 02-    [facility] Embedded Care Due Messages. Reference number: [id number].   12/28/2024 6:15:30 PM CST

## 2024-12-30 RX ORDER — ZOLPIDEM TARTRATE 10 MG/1
10 TABLET ORAL NIGHTLY PRN
Qty: 90 TABLET | Refills: 0 | Status: SHIPPED | OUTPATIENT
Start: 2024-12-30

## 2024-12-30 RX ORDER — SPIRONOLACTONE 25 MG/1
12.5 TABLET ORAL DAILY
Qty: 45 TABLET | Refills: 1 | Status: SHIPPED | OUTPATIENT
Start: 2024-12-30

## 2024-12-30 RX ORDER — QUETIAPINE FUMARATE 25 MG/1
25 TABLET, FILM COATED ORAL NIGHTLY
Qty: 30 TABLET | Refills: 4 | Status: SHIPPED | OUTPATIENT
Start: 2024-12-30 | End: 2025-12-30

## 2024-12-30 RX ORDER — ALLOPURINOL 100 MG/1
100 TABLET ORAL DAILY
Qty: 90 TABLET | Refills: 0 | Status: SHIPPED | OUTPATIENT
Start: 2024-12-30

## 2024-12-30 RX ORDER — AMLODIPINE BESYLATE 10 MG/1
5 TABLET ORAL DAILY
Qty: 45 TABLET | Refills: 2 | Status: SHIPPED | OUTPATIENT
Start: 2024-12-30

## 2024-12-30 RX ORDER — GLIMEPIRIDE 1 MG/1
1 TABLET ORAL
Qty: 90 TABLET | Refills: 3 | Status: SHIPPED | OUTPATIENT
Start: 2024-12-30 | End: 2025-12-30

## 2024-12-30 NOTE — TELEPHONE ENCOUNTER
Refill Routing Note   Medication(s) are not appropriate for processing by [facility] for the following reason(s):        Required labs outdated  Required labs abnormal    [facility] action(s):  Defer  Approve             Appointments  past 12m or future 3m with PCP    Date Provider   Last Visit   9/6/2024 Sera Barger MD   Next Visit   Visit date not found Sera Barger MD   ED visits in past 90 days: 0        Note composed:2:17 PM 12/30/2024

## 2024-12-30 NOTE — TELEPHONE ENCOUNTER
No care due was identified.  [facility] Embedded Care Due Messages. Reference number: [id number].   12/29/2024 9:06:14 PM CST

## 2025-01-09 ENCOUNTER — PATIENT MESSAGE (OUTPATIENT)
Dept: ADMINISTRATIVE | Facility: HOSPITAL | Age: OVER 89
End: 2025-01-09
Payer: MEDICARE

## 2025-01-09 ENCOUNTER — PATIENT MESSAGE (OUTPATIENT)
Dept: FAMILY MEDICINE | Facility: CLINIC | Age: OVER 89
End: 2025-01-09
Payer: MEDICARE

## 2025-01-09 RX ORDER — ALLOPURINOL 100 MG/1
100 TABLET ORAL DAILY
Qty: 90 TABLET | Refills: 0 | Status: SHIPPED | OUTPATIENT
Start: 2025-01-09

## 2025-01-09 NOTE — TELEPHONE ENCOUNTER
Care Due:                  Date            Visit Type   Department     Provider  --------------------------------------------------------------------------------                                ESTABLISHED                              PATIENT -    N[facility] INTERNAL  Last Visit: 09-      [facility]      MEDICINE       Sera Barger  Next Visit: None Scheduled  None         None Found                                                            Last  Test          Frequency    Reason                     Performed    Due Date  --------------------------------------------------------------------------------    TSH.........  12 months..  levothyroxine............  04- 04-    [facility] Embedded Care Due Messages. Reference number: [id number].   1/09/2025 12:31:23 PM CST

## 2025-01-09 NOTE — TELEPHONE ENCOUNTER
Refill Routing Note   Medication(s) are not appropriate for processing by [facility] for the following reason(s):        Required labs outdated    [facility] action(s):  Defer     Requires labs : Yes             Appointments  past 12m or future 3m with PCP    Date Provider   Last Visit   9/6/2024 Sera Barger MD   Next Visit   Visit date not found Sera Barger MD   ED visits in past 90 days: 0        Note composed:1:04 PM 01/09/2025

## 2025-01-19 DIAGNOSIS — G89.29 CHRONIC RIGHT HIP PAIN: ICD-10-CM

## 2025-01-19 DIAGNOSIS — M25.551 CHRONIC RIGHT HIP PAIN: ICD-10-CM

## 2025-01-20 NOTE — TELEPHONE ENCOUNTER
Care Due:                  Date            Visit Type   Department     Provider  --------------------------------------------------------------------------------                                ESTABLISHED                              PATIENT -    [facility] INTERNAL  Last Visit: 09-      [facility]      MEDICINE       Sera Barger  Next Visit: None Scheduled  None         None Found                                                            Last  Test          Frequency    Reason                     Performed    Due Date  --------------------------------------------------------------------------------    Lipid Panel.  12 months..  atorvastatin.............  02- 01-    Uric Acid...  12 months..  allopurinoL..............  03- 02-    [facility] Embedded Care Due Messages. Reference number: [id number].   1/19/2025 9:41:33 PM CST

## 2025-01-21 RX ORDER — HYDROCODONE BITARTRATE AND ACETAMINOPHEN 7.5; 325 MG/15ML; MG/15ML
5 SOLUTION ORAL EVERY 8 HOURS PRN
Qty: 120 ML | Refills: 0 | Status: SHIPPED | OUTPATIENT
Start: 2025-01-21

## 2025-01-22 ENCOUNTER — TELEPHONE (OUTPATIENT)
Dept: FAMILY MEDICINE | Facility: CLINIC | Age: OVER 89
End: 2025-01-22
Payer: MEDICARE

## 2025-01-22 NOTE — TELEPHONE ENCOUNTER
----- Message from Nurse Danielle sent at 1/20/2025  3:17 PM CST -----  Good afternoon. This patient needs her pain medicine refilled. She only has enough for tonight. She also is having a gout flare up and is requesting medication for this as well.     Thanks,  Danielle

## 2025-01-23 ENCOUNTER — OFFICE VISIT (OUTPATIENT)
Dept: FAMILY MEDICINE | Facility: CLINIC | Age: OVER 89
End: 2025-01-23
Payer: MEDICARE

## 2025-01-23 DIAGNOSIS — I50.30 NYHA CLASS 3 HEART FAILURE WITH PRESERVED EJECTION FRACTION: ICD-10-CM

## 2025-01-23 DIAGNOSIS — M10.072 ACUTE IDIOPATHIC GOUT INVOLVING TOE OF LEFT FOOT: Primary | ICD-10-CM

## 2025-01-23 DIAGNOSIS — E11.22 TYPE 2 DIABETES MELLITUS WITH STAGE 3 CHRONIC KIDNEY DISEASE, WITHOUT LONG-TERM CURRENT USE OF INSULIN, UNSPECIFIED WHETHER STAGE 3A OR 3B CKD: ICD-10-CM

## 2025-01-23 DIAGNOSIS — R56.9 FOCAL SEIZURES: ICD-10-CM

## 2025-01-23 DIAGNOSIS — D32.9 MENINGIOMA: ICD-10-CM

## 2025-01-23 DIAGNOSIS — R54 FRAILTY SYNDROME IN GERIATRIC PATIENT: ICD-10-CM

## 2025-01-23 DIAGNOSIS — N18.31 STAGE 3A CHRONIC KIDNEY DISEASE: ICD-10-CM

## 2025-01-23 DIAGNOSIS — N18.30 TYPE 2 DIABETES MELLITUS WITH STAGE 3 CHRONIC KIDNEY DISEASE, WITHOUT LONG-TERM CURRENT USE OF INSULIN, UNSPECIFIED WHETHER STAGE 3A OR 3B CKD: ICD-10-CM

## 2025-01-23 PROBLEM — J44.9 CHRONIC OBSTRUCTIVE PULMONARY DISEASE, UNSPECIFIED COPD TYPE: Status: RESOLVED | Noted: 2024-03-12 | Resolved: 2025-01-23

## 2025-01-23 PROBLEM — J44.89 COPD (CHRONIC OBSTRUCTIVE PULMONARY DISEASE) WITH CHRONIC BRONCHITIS: Status: RESOLVED | Noted: 2024-11-19 | Resolved: 2025-01-23

## 2025-01-23 RX ORDER — METHYLPREDNISOLONE 4 MG/1
TABLET ORAL
Qty: 21 EACH | Refills: 0 | Status: SHIPPED | OUTPATIENT
Start: 2025-01-23 | End: 2025-02-13

## 2025-01-23 NOTE — PROGRESS NOTES
Telemedicine Video Visit    The patient location is:  Patient Home   The chief complaint leading to consultation is: gout  Visit type: Virtual visit with synchronous audio and video  Total time spent with patient: 30 minutes  Each patient to whom he or she provides medical services by telemedicine is:  (1) informed of the relationship between the physician and patient and the respective role of any other health care provider with respect to management of the patient; and (2) notified that he or she may decline to receive medical services by telemedicine and may withdraw from such care at any time.     Patient Active Problem List   Diagnosis    Essential hypertension    Chronic pain syndrome    Lumbosacral spondylosis without myelopathy    Adrenal adenoma: 2.8 X 2.1CM 2010; stable 2016, also stable 2018    Lymph edema, right arm    Gallbladder polyp    Stage 3a chronic kidney disease    Severe aortic stenosis    LVH (left ventricular hypertrophy)    Pancreatic cyst: stable on MRI 11/16 also 2018, due again 2020 (patient declined)    Cervical spinal stenosis    Transient cerebral ischemia: 2012    Primary insomnia    Gastroesophageal reflux disease without esophagitis    Multiple lung nodules: stable CT 10/2016, also 2019    Former smoker: 1 pack daily for < 20 years, quit 1973    Facet hypertrophy of lumbar region    History of breast cancer    Intermittent asthma    Radiation fibrosis of lung    Ectatic abdominal aorta: see CT scan 10/16; no aneurysm 10/17    Renal cysts, acquired, bilateral    Lumbar radiculopathy    Abnormal EKG    Diverticulosis of large intestine without hemorrhage: see CT 10/14    Idiopathic gout    Liver cysts: see MRI 2018    Odynophagia    Lumbar spondylosis    Mixed hyperlipidemia    Aortic atherosclerosis    Low serum vitamin B12    Coronary artery disease involving native coronary artery of native heart    NYHA class 3 heart failure with preserved ejection fraction    History of  transcatheter aortic valve replacement (TAVR)    Intermittent confusion    Acquired hypothyroidism    Chronic diastolic CHF (congestive heart failure)    Aspiration pneumonitis    Focal seizures    Memory loss    Type 2 diabetes mellitus with stage 3 chronic kidney disease, without long-term current use of insulin, unspecified whether stage 3a or 3b CKD    Meningioma    Frailty syndrome in geriatric patient      Main issue is gout L great toe, for several days.  Has had before.  On allopurinol.  Last labs acceptable, but she is overdue.  She is unable to leave the house to get labs or pursue appointments.  Discussed limitations, frailty.    She would like to have NP at home- will order.    Very weak at baseline.  Feels unstable and at risk for falls, so does very little. No syncope or chest pain.  No seizures.        Review of Systems   HENT:  Negative for hearing loss.    Eyes:  Negative for discharge.   Respiratory:  Negative for wheezing.    Cardiovascular:  Negative for chest pain and palpitations.   Gastrointestinal:  Negative for blood in stool, constipation, diarrhea and vomiting.   Genitourinary:  Negative for dysuria and hematuria.   Musculoskeletal:  Negative for neck pain.        See HPI   Neurological:  Positive for weakness. Negative for headaches.   Endo/Heme/Allergies:  Negative for polydipsia.        Physical Exam  Constitutional:       Appearance: She is well-developed.   HENT:      Head: Normocephalic and atraumatic.   Eyes:      Extraocular Movements: Extraocular movements intact.      Conjunctiva/sclera: Conjunctivae normal.   Neck:      Thyroid: No thyromegaly.   Pulmonary:      Effort: No respiratory distress.   Neurological:      General: No focal deficit present.      Mental Status: She is alert and oriented to person, place, and time.   Psychiatric:         Mood and Affect: Mood normal.         Behavior: Behavior normal.         Thought Content: Thought content normal.         Judgment:  Judgment normal.          1. Acute idiopathic gout involving toe of left foot: hydration, diet reviewed.  Will need labs.  NP at home  -     methylPREDNISolone (MEDROL DOSEPACK) 4 mg tablet; use as directed  Dispense: 21 each; Refill: 0    2. Stage 3a chronic kidney disease: Will need labs.  NP at home    3. NYHA class 3 heart failure with preserved ejection fraction: Will need labs.  NP at home; does npt feel she can venture out for Cardiology assessment    4. Type 2 diabetes mellitus with stage 3 chronic kidney disease, without long-term current use of insulin, unspecified whether stage 3a or 3b CKD- Will need labs.  NP at home    5. Meningioma: stable on CT 6/24    6. Focal seizures: none recently. CT acceptable 6/24.  Neurology assessment November 2024    7. Frailty syndrome in geriatric patient: NP at home    Patient counseled for over 30 minute appt, all questions answered,  chart reviewed and care coordinated.    Visit today manifests increased complexity associated with the care of the multiple chronic and episodic problems I addressed.  I am managing a longitudinal care plan of the patient due to the serious and complex problems listed above.               Answers submitted by the patient for this visit:  Review of Systems Questionnaire (Submitted on 1/23/2025)  activity change: No  unexpected weight change: No  rhinorrhea: No  trouble swallowing: No  visual disturbance: No  chest tightness: No  polyuria: No  difficulty urinating: No  menstrual problem: No  joint swelling: Yes  arthralgias: Yes  confusion: No  dysphoric mood: No

## 2025-01-28 ENCOUNTER — TELEPHONE (OUTPATIENT)
Dept: HOME HEALTH SERVICES | Facility: CLINIC | Age: OVER 89
End: 2025-01-28
Payer: MEDICARE

## 2025-01-28 NOTE — TELEPHONE ENCOUNTER
Spoke with patient spouse Pino to schedule an appointment regarding a referral placed for a medical home visit with a nurse practitioner. Patient has been scheduled.

## 2025-01-29 ENCOUNTER — CARE AT HOME (OUTPATIENT)
Dept: HOME HEALTH SERVICES | Facility: CLINIC | Age: OVER 89
End: 2025-01-29
Payer: MEDICARE

## 2025-01-29 VITALS — DIASTOLIC BLOOD PRESSURE: 70 MMHG | HEART RATE: 71 BPM | SYSTOLIC BLOOD PRESSURE: 142 MMHG | OXYGEN SATURATION: 97 %

## 2025-01-29 DIAGNOSIS — R54 FRAILTY SYNDROME IN GERIATRIC PATIENT: Primary | ICD-10-CM

## 2025-01-29 DIAGNOSIS — N18.30 TYPE 2 DIABETES MELLITUS WITH STAGE 3 CHRONIC KIDNEY DISEASE, WITHOUT LONG-TERM CURRENT USE OF INSULIN, UNSPECIFIED WHETHER STAGE 3A OR 3B CKD: ICD-10-CM

## 2025-01-29 DIAGNOSIS — E11.22 TYPE 2 DIABETES MELLITUS WITH STAGE 3 CHRONIC KIDNEY DISEASE, WITHOUT LONG-TERM CURRENT USE OF INSULIN, UNSPECIFIED WHETHER STAGE 3A OR 3B CKD: ICD-10-CM

## 2025-01-29 DIAGNOSIS — N18.31 STAGE 3A CHRONIC KIDNEY DISEASE: ICD-10-CM

## 2025-01-29 PROCEDURE — 99350 HOME/RES VST EST HIGH MDM 60: CPT | Mod: S$GLB,,,

## 2025-01-29 PROCEDURE — 1160F RVW MEDS BY RX/DR IN RCRD: CPT | Mod: CPTII,S$GLB,,

## 2025-01-29 PROCEDURE — 1159F MED LIST DOCD IN RCRD: CPT | Mod: CPTII,S$GLB,,

## 2025-01-29 PROCEDURE — 99497 ADVNCD CARE PLAN 30 MIN: CPT | Mod: S$GLB,,,

## 2025-01-29 PROCEDURE — 3072F LOW RISK FOR RETINOPATHY: CPT | Mod: CPTII,S$GLB,,

## 2025-01-29 NOTE — PROGRESS NOTES
[facility]  Medical Chronic Care Home Visit    Encounter Provider: JORDAN HARMON,   PCP: Sear Barger MD  Consult Requested By: Dr. Sera Barger    HISTORY OF PRESENT ILLNESS      Patient ID: Olga Aguiar is a 89 y.o. female is being seen in the home due to physical debility that presents a taxing effort to leave the home, to mitigate high risk of hospital readmission and/or due to the limited availability of reliable or safe options for transportation to the point of access to the provider. Prior to treatment on this visit the chart was reviewed and patient verbal consent was obtained.      Chronic medical conditions synopsis:    Ms. Aguiar is a 89 y.o. female who has a past medical history significant for lumbar spondylosis, cervical spinal stenosis, chronic back pain, lung nodules, h/o right sided breast cancer with subsequent lymphedema, lung fibrosis, HTN, severe aortic stenosis, LVH, HLD, CAD, HF pEF, CKD3a, meningioma with seizures, T2DM, hypothyroidism,       Reason for consult and visit   Establish with [facility] for chronic care medical management within the home.       Recent developments  Referred by PCP for lab draw. Explained to patient that [facility] services to patient and . Patient elects to have outside phlebotomy come to house for lab collect. Will reach out to PCP to ensure no further lab orders need to be placed before collection.     Recent gout flare to left great toe. She reports adequate relief after steroid dose pack. Minimal pain and redness remain. Taking allopurinol daily.     Ms. Lopez has severe back pain and has a hard time walking any further than to bathroom and back or living room to bedroom. She has a very hard time walking to car, getting in, then sitting in car/appointment for an extended timeframe.    Last fall 2 months ago (mechanical slip on rug getting out of bed). She endorses weakness, especially during walking. We  discussed possibly of HH. She is not interested in PT/HH as she feels as though any exercise at this point hurts back more than helps.     ADL functionality - lives [place]   Mobility- via walker. Does minimal walking.  Appetite is fair -2 small meals per day.   Elimination - adequate, utilizes stool softener. Discussed miralax PRN.     Sleep- adequate, takes pain medication and Seroquel. Has been taking these medications for a very long time.   Medication Management - . Medication needs at this time - none  Transportation to and from appointments via      I discussed with her long term goals for healthcare today. She and  are very happy with the virtual care they receive form PCP, Dr. Barger. They have a long term relationship of over 20 years and would like to maintain this care for as long as possible. She expresses desire to stay in her home for as long as possible, potentially having someone come and care for her and  if/when the time comes.   I explained palliative care and hospice programs and what they offer when the time comes. She expresses interest in hospice once eligible, understands she is doing too well to qualify for these services at this time. She would like to maintain care with PCP for now. Discussed care at home visits as supplementation to PCP. Will follow up PRN. Care at home information provided and left inside home.    DECISION MAKING TODAY       Assessment & Plan: SEE HPI    1. Frailty syndrome in geriatric patient  Assessment & Plan:  Increasing frailty. See HPI  Open to hospice when eligible.    Orders:  -     Ambulatory referral/consult to [facility]    2. Stage 3a chronic kidney disease  Assessment & Plan:  Chronic and stable.   Continue current management.   Control  HTN, DM  Recommend avoid nsaids and other nephrotoxic medications.   Follow up with PCP and/or nephrologist.      Latest Reference Range & Units 04/12/24 11:21 06/06/24  12:38 07/22/24 10:54   BUN 7 - 18 mg/dL 17 21 (H) 22 (H)   Creatinine 0.50 - 1.40 mg/dL 1.1 0.81 1.19   eGFR >60 mL/min/1.73 m^2 48.0 ! >60 44 !   (H): Data is abnormally high  !: Data is abnormal      Orders:  -     Ambulatory referral/consult to [facility]    3. Type 2 diabetes mellitus with stage 3 chronic kidney disease, without long-term current use of insulin, unspecified whether stage 3a or 3b CKD  Assessment & Plan:  Chronic and stable on current management.   See med list below.  Reviewed diabetic diet and preferred BS levels.   Upcoming labs to be drawn.    Follow up with PCP as instructed.    Diabetes Medications               glimepiride (AMARYL) 1 MG tablet Take 1 tablet (1 mg total) by mouth before breakfast.             Lab Results   Component Value Date    HGBA1C 8.7 (H) 04/12/2024    HGBA1C 7.7 (H) 03/02/2024    HGBA1C 7.6 (H) 11/28/2023          Orders:  -     Ambulatory referral/consult to [facility]           ENVIRONMENT OF CARE      Family and/or Caregiver present at visit?  Yes  Name of Caregiver:   Does Caregiver have HCPoA: Yes  History provided by: patient and caregiver    Impression upon entering the home:  Physical Dwelling: single family home   Appearance of home environment: cleanliness: clean, walking pathways: clear, lighting: adequate, and home structure: sound structure  Functional Status: moderate assistance  Mobility: ambulatory with device  Nutritional access: available food but inadequate intake  Home Health: No, and does not need it at this time   DME/Supplies: Amy lift, Toilet bars, High toilets, Walker, Wheelchair, Bedside commode         Disease/illness education:  gout, frailty, chronic pain, long term goals  Establishment or re-establishment of referral orders for community resources: No other necessary community resources.   Discussion with other health care providers: No discussion with other health care providers  necessary.   Does patient have a PCP at [facility]? Yes   Repatriation plan with PCP? follow-up with PCP within 90d   Does patient have an ostomy (ileostomy, colostomy, suprapubic catheter, nephrostomy tube, tracheostomy, PEG tube, pleurex catheter, cholecystostomy, etc)? No  Were BPAs reviewed? Yes      Social History     Socioeconomic History    Marital status:    Tobacco Use    Smoking status: Former     Current packs/day: 0.00     Average packs/day: 1 pack/day for 18.5 years (18.5 ttl pk-yrs)     Types: Cigarettes     Start date: 1952     Quit date: 1970     Years since quittin.5    Smokeless tobacco: Never   Substance and Sexual Activity    Alcohol use: Not Currently    Drug use: No    Sexual activity: Not Currently     Partners: Male     Social Drivers of Health     Financial Resource Strain: Low Risk  (2024)    Overall Financial Resource Strain (CARDIA)     Difficulty of Paying Living Expenses: Not hard at all   Food Insecurity: No Food Insecurity (2024)    Hunger Vital Sign     Worried About Running Out of Food in the Last Year: Never true     Ran Out of Food in the Last Year: Never true   Transportation Needs: No Transportation Needs (3/3/2024)    PRAPARE - Transportation     Lack of Transportation (Medical): No     Lack of Transportation (Non-Medical): No   Physical Activity: Inactive (2024)    Exercise Vital Sign     Days of Exercise per Week: 0 days     Minutes of Exercise per Session: 0 min   Stress: No Stress Concern Present (2024)    [employer] - Occupational Stress Questionnaire     Feeling of Stress : Not at all   Housing Stability: Low Risk  (3/3/2024)    Housing Stability Vital Sign     Unable to Pay for Housing in the Last Year: No     Number of Places Lived in the Last Year: 1     Unstable Housing in the Last Year: No         OBJECTIVE:     Vital Signs:  Vital Signs  Pulse: 71  SpO2: 97 %  BP: (!) 142/70]      Review of Systems    Constitutional:  Positive for fatigue. Negative for activity change, appetite change and fever.   HENT: Negative.     Eyes: Negative.    Respiratory:  Negative for cough and shortness of breath.    Cardiovascular:  Negative for chest pain and leg swelling.   Gastrointestinal:  Negative for abdominal pain, constipation, diarrhea and nausea.   Genitourinary:  Negative for difficulty urinating and hematuria.   Musculoskeletal:  Positive for arthralgias and back pain. Negative for joint swelling and neck stiffness.   Skin:  Negative for color change and wound.   Neurological:  Positive for weakness. Negative for dizziness, numbness and headaches.       Physical Exam:  Physical Exam  Constitutional:       Appearance: Normal appearance.   HENT:      Head: Normocephalic and atraumatic.   Cardiovascular:      Rate and Rhythm: Normal rate.      Pulses: Normal pulses.      Heart sounds: Normal heart sounds.   Pulmonary:      Effort: Pulmonary effort is normal.      Breath sounds: Normal breath sounds.   Abdominal:      General: Bowel sounds are normal.      Palpations: Abdomen is soft.   Musculoskeletal:      Cervical back: Normal range of motion and neck supple.      Comments: Ambulates short distances with walker. Antalgic gait.    Skin:     General: Skin is warm and dry.      Comments: Minimal redness to medial aspect of left great toe.    Neurological:      General: No focal deficit present.      Mental Status: She is alert and oriented to person, place, and time. Mental status is at baseline.   Psychiatric:         Mood and Affect: Mood normal.         Behavior: Behavior normal.         INSTRUCTIONS FOR PATIENT:     Scheduled Follow-up, Appts Reviewed with Modifications if Needed: Yes  No future appointments.      Current Outpatient Medications:     allopurinoL (ZYLOPRIM) 100 MG tablet, Take 1 tablet (100 mg total) by mouth once daily., Disp: 90 tablet, Rfl: 0    amLODIPine (NORVASC) 10 MG tablet, Take 0.5 tablets (5  mg total) by mouth once daily., Disp: 45 tablet, Rfl: 2    aspirin (ECOTRIN) 325 MG EC tablet, Take 1 tablet (325 mg total) by mouth once daily., Disp: 30 tablet, Rfl: 1    atorvastatin (LIPITOR) 80 MG tablet, Take 1 tablet (80 mg total) by mouth every evening., Disp: 90 tablet, Rfl: 3    cholecalciferol, vitamin D3, (VITAMIN D3) 25 mcg (1,000 unit) capsule, Take 2 capsules (2,000 Units total) by mouth once daily., Disp: 60 capsule, Rfl: 12    clopidogreL (PLAVIX) 75 mg tablet, Take 1 tablet by mouth once daily, Disp: 90 tablet, Rfl: 4    cyanocobalamin (VITAMIN B-12) 1000 MCG tablet, Take 1 tablet (1,000 mcg total) by mouth once daily., Disp: 30 tablet, Rfl: 12    fluticasone furoate-vilanteroL (BREO ELLIPTA) 200-25 mcg/dose DsDv diskus inhaler, Inhale 1 puff into the lungs once daily., Disp: 60 each, Rfl: 11    glimepiride (AMARYL) 1 MG tablet, Take 1 tablet (1 mg total) by mouth before breakfast., Disp: 90 tablet, Rfl: 3    hydrocodone-acetaminophen (HYCET) solution 7.5-325 mg/15mL, Take 5 mLs by mouth every 8 (eight) hours as needed for Pain., Disp: 120 mL, Rfl: 0    lacosamide (VIMPAT) 100 mg Tab, TAKE 1/2 (ONE-HALF) TABLET BY MOUTH EVERY 12 HOURS, Disp: 90 tablet, Rfl: 0    Lactobacillus rhamnosus GG (CULTURELLE) 10 billion cell capsule, Take 1 capsule by mouth once daily., Disp: , Rfl:     levothyroxine (SYNTHROID) 75 MCG tablet, Take 1 tablet (75 mcg total) by mouth before breakfast., Disp: 90 tablet, Rfl: 3    methylPREDNISolone (MEDROL DOSEPACK) 4 mg tablet, use as directed, Disp: 21 each, Rfl: 0    potassium citrate 99 mg Cap, Take 99 mg by mouth every evening., Disp: , Rfl:     QUEtiapine (SEROQUEL) 25 MG Tab, Take 1 tablet (25 mg total) by mouth nightly. PRN, Disp: 30 tablet, Rfl: 4    spironolactone (ALDACTONE) 25 MG tablet, Take 0.5 tablets (12.5 mg total) by mouth once daily., Disp: 45 tablet, Rfl: 1    zolpidem (AMBIEN) 10 mg Tab, Take 1 tablet (10 mg total) by mouth nightly as needed., Disp: 90  tablet, Rfl: 0    Medication Reconciliation:  Were medications changed during this appointment? No  Were routine medications in the home? Yes  Is the patient taking the medications as directed? Yes  Does the patient/caregiver understand the medications and changes if any? Yes  Does updated med list accurately reflects meds patient is currently taking? Yes    Signature:      JORDAN HARMON, NP      Time: I spent a total of 95 minutes on the day of the visit.This includes face to face time and non-face to face time preparing to see the patient (eg, review of tests), obtaining and/or reviewing separately obtained history, documenting clinical information in the electronic or other health record, independently interpreting results and communicating results to the patient/family/caregiver, or care coordinator.    Discussion held with pt and/or family related to advanced care planning.  Discussed end of life goals and pt's wishes regarding end of care. See HPI  30 minutes spent in discussion of these services.

## 2025-01-30 DIAGNOSIS — Z00.00 ENCOUNTER FOR MEDICARE ANNUAL WELLNESS EXAM: ICD-10-CM

## 2025-01-30 NOTE — ASSESSMENT & PLAN NOTE
Chronic and stable on current management.   See med list below.  Reviewed diabetic diet and preferred BS levels.   Upcoming labs to be drawn.    Follow up with PCP as instructed.    Diabetes Medications               glimepiride (AMARYL) 1 MG tablet Take 1 tablet (1 mg total) by mouth before breakfast.             Lab Results   Component Value Date    HGBA1C 8.7 (H) 04/12/2024    HGBA1C 7.7 (H) 03/02/2024    HGBA1C 7.6 (H) 11/28/2023

## 2025-01-30 NOTE — ASSESSMENT & PLAN NOTE
Chronic and stable.   Continue current management.   Control  HTN, DM  Recommend avoid nsaids and other nephrotoxic medications.   Follow up with PCP and/or nephrologist.      Latest Reference Range & Units 04/12/24 11:21 06/06/24 12:38 07/22/24 10:54   BUN 7 - 18 mg/dL 17 21 (H) 22 (H)   Creatinine 0.50 - 1.40 mg/dL 1.1 0.81 1.19   eGFR >60 mL/min/1.73 m^2 48.0 ! >60 44 !   (H): Data is abnormally high  !: Data is abnormal

## 2025-02-01 DIAGNOSIS — I10 ESSENTIAL HYPERTENSION: Primary | ICD-10-CM

## 2025-02-03 ENCOUNTER — TELEPHONE (OUTPATIENT)
Dept: HOME HEALTH SERVICES | Facility: CLINIC | Age: OVER 89
End: 2025-02-03
Payer: MEDICARE

## 2025-02-03 NOTE — TELEPHONE ENCOUNTER
Faxed orders and demographics to [facility] for faxed labs-CBC, CMP, BMP, Renal Function Panel, and Hemoglobin A1C per NP request.  Fax confirmation received.

## 2025-02-04 ENCOUNTER — LAB VISIT (OUTPATIENT)
Dept: LAB | Facility: HOSPITAL | Age: OVER 89
End: 2025-02-04
Attending: INTERNAL MEDICINE
Payer: MEDICARE

## 2025-02-04 DIAGNOSIS — I10 ESSENTIAL HYPERTENSION: ICD-10-CM

## 2025-02-04 DIAGNOSIS — N18.31 STAGE 3A CHRONIC KIDNEY DISEASE: ICD-10-CM

## 2025-02-04 DIAGNOSIS — E11.65 UNCONTROLLED TYPE 2 DIABETES MELLITUS WITH HYPERGLYCEMIA: ICD-10-CM

## 2025-02-04 LAB
ALBUMIN SERPL BCP-MCNC: 2.7 G/DL (ref 3.5–5.2)
ALBUMIN SERPL BCP-MCNC: 2.7 G/DL (ref 3.5–5.2)
ALP SERPL-CCNC: 87 U/L (ref 40–150)
ALT SERPL W/O P-5'-P-CCNC: 20 U/L (ref 10–44)
ANION GAP SERPL CALC-SCNC: 12 MMOL/L (ref 8–16)
ANION GAP SERPL CALC-SCNC: 9 MMOL/L (ref 8–16)
AST SERPL-CCNC: 22 U/L (ref 10–40)
BASOPHILS # BLD AUTO: 0.02 K/UL (ref 0–0.2)
BASOPHILS NFR BLD: 0.3 % (ref 0–1.9)
BILIRUB SERPL-MCNC: 0.4 MG/DL (ref 0.1–1)
BUN SERPL-MCNC: 21 MG/DL (ref 8–23)
BUN SERPL-MCNC: 21 MG/DL (ref 8–23)
CALCIUM SERPL-MCNC: 8.7 MG/DL (ref 8.7–10.5)
CALCIUM SERPL-MCNC: 8.8 MG/DL (ref 8.7–10.5)
CHLORIDE SERPL-SCNC: 105 MMOL/L (ref 95–110)
CHLORIDE SERPL-SCNC: 106 MMOL/L (ref 95–110)
CO2 SERPL-SCNC: 21 MMOL/L (ref 23–29)
CO2 SERPL-SCNC: 24 MMOL/L (ref 23–29)
CREAT SERPL-MCNC: 0.8 MG/DL (ref 0.5–1.4)
CREAT SERPL-MCNC: 0.8 MG/DL (ref 0.5–1.4)
DIFFERENTIAL METHOD BLD: ABNORMAL
EOSINOPHIL # BLD AUTO: 0.1 K/UL (ref 0–0.5)
EOSINOPHIL NFR BLD: 0.9 % (ref 0–8)
ERYTHROCYTE [DISTWIDTH] IN BLOOD BY AUTOMATED COUNT: 15.9 % (ref 11.5–14.5)
EST. GFR  (NO RACE VARIABLE): >60 ML/MIN/1.73 M^2
EST. GFR  (NO RACE VARIABLE): >60 ML/MIN/1.73 M^2
ESTIMATED AVG GLUCOSE: 128 MG/DL (ref 68–131)
GLUCOSE SERPL-MCNC: 68 MG/DL (ref 70–110)
GLUCOSE SERPL-MCNC: 71 MG/DL (ref 70–110)
HBA1C MFR BLD: 6.1 % (ref 4–5.6)
HCT VFR BLD AUTO: 41.5 % (ref 37–48.5)
HGB BLD-MCNC: 13.3 G/DL (ref 12–16)
IMM GRANULOCYTES # BLD AUTO: 0.04 K/UL (ref 0–0.04)
IMM GRANULOCYTES NFR BLD AUTO: 0.6 % (ref 0–0.5)
LYMPHOCYTES # BLD AUTO: 1.3 K/UL (ref 1–4.8)
LYMPHOCYTES NFR BLD: 19.3 % (ref 18–48)
MCH RBC QN AUTO: 28.8 PG (ref 27–31)
MCHC RBC AUTO-ENTMCNC: 32 G/DL (ref 32–36)
MCV RBC AUTO: 90 FL (ref 82–98)
MONOCYTES # BLD AUTO: 0.5 K/UL (ref 0.3–1)
MONOCYTES NFR BLD: 7.6 % (ref 4–15)
NEUTROPHILS # BLD AUTO: 5 K/UL (ref 1.8–7.7)
NEUTROPHILS NFR BLD: 71.3 % (ref 38–73)
NRBC BLD-RTO: 0 /100 WBC
PHOSPHATE SERPL-MCNC: 3.1 MG/DL (ref 2.7–4.5)
PLATELET # BLD AUTO: 202 K/UL (ref 150–450)
PMV BLD AUTO: 10.5 FL (ref 9.2–12.9)
POTASSIUM SERPL-SCNC: 4.1 MMOL/L (ref 3.5–5.1)
POTASSIUM SERPL-SCNC: 4.3 MMOL/L (ref 3.5–5.1)
PROT SERPL-MCNC: 6.1 G/DL (ref 6–8.4)
RBC # BLD AUTO: 4.62 M/UL (ref 4–5.4)
SODIUM SERPL-SCNC: 138 MMOL/L (ref 136–145)
SODIUM SERPL-SCNC: 139 MMOL/L (ref 136–145)
WBC # BLD AUTO: 6.96 K/UL (ref 3.9–12.7)

## 2025-02-04 PROCEDURE — 80069 RENAL FUNCTION PANEL: CPT | Performed by: INTERNAL MEDICINE

## 2025-02-04 PROCEDURE — 85025 COMPLETE CBC W/AUTO DIFF WBC: CPT | Performed by: INTERNAL MEDICINE

## 2025-02-04 PROCEDURE — 83036 HEMOGLOBIN GLYCOSYLATED A1C: CPT | Performed by: INTERNAL MEDICINE

## 2025-02-04 PROCEDURE — 36415 COLL VENOUS BLD VENIPUNCTURE: CPT | Mod: PO | Performed by: INTERNAL MEDICINE

## 2025-02-04 PROCEDURE — 80053 COMPREHEN METABOLIC PANEL: CPT | Performed by: INTERNAL MEDICINE

## 2025-02-08 NOTE — TELEPHONE ENCOUNTER
No care due was identified.  [facility] Embedded Care Due Messages. Reference number: [id number].   2/08/2025 1:13:26 PM CST

## 2025-02-10 RX ORDER — ATORVASTATIN CALCIUM 80 MG/1
80 TABLET, FILM COATED ORAL NIGHTLY
Qty: 90 TABLET | Refills: 0 | Status: SHIPPED | OUTPATIENT
Start: 2025-02-10

## 2025-02-10 NOTE — TELEPHONE ENCOUNTER
Refill Routing Note   Medication(s) are not appropriate for processing by [facility] for the following reason(s):        Required labs outdated    [facility] action(s):  Defer               Appointments  past 12m or future 3m with PCP    Date Provider   Last Visit   1/23/2025 Sera Barger MD   Next Visit   Visit date not found Sera Barger MD   ED visits in past 90 days: 0        Note composed:8:03 PM 02/09/2025

## 2025-02-11 DIAGNOSIS — J44.89 COPD WITH ASTHMA: ICD-10-CM

## 2025-02-11 DIAGNOSIS — M25.551 CHRONIC RIGHT HIP PAIN: ICD-10-CM

## 2025-02-11 DIAGNOSIS — G89.29 CHRONIC RIGHT HIP PAIN: ICD-10-CM

## 2025-02-11 RX ORDER — FLUTICASONE FUROATE AND VILANTEROL 200; 25 UG/1; UG/1
1 POWDER RESPIRATORY (INHALATION) DAILY
Qty: 60 EACH | Refills: 11 | OUTPATIENT
Start: 2025-02-11

## 2025-02-11 RX ORDER — HYDROCODONE BITARTRATE AND ACETAMINOPHEN 7.5; 325 MG/15ML; MG/15ML
5 SOLUTION ORAL EVERY 8 HOURS PRN
Qty: 120 ML | Refills: 0 | Status: SHIPPED | OUTPATIENT
Start: 2025-02-11

## 2025-02-11 NOTE — TELEPHONE ENCOUNTER
Called pt and , kyung, responded saying that he accidentally tried to refill his wife's rx on portal after accidentally hanging up on the pharmacy that called him for refill. I informed pt  to call the pharmacy to get a refill.

## 2025-02-11 NOTE — TELEPHONE ENCOUNTER
No care due was identified.  [facility] Embedded Care Due Messages. Reference number: [id number].   2/11/2025 12:49:17 PM CST

## 2025-02-13 ENCOUNTER — PATIENT MESSAGE (OUTPATIENT)
Dept: FAMILY MEDICINE | Facility: CLINIC | Age: OVER 89
End: 2025-02-13
Payer: MEDICARE

## 2025-02-18 ENCOUNTER — OFFICE VISIT (OUTPATIENT)
Dept: FAMILY MEDICINE | Facility: CLINIC | Age: OVER 89
End: 2025-02-18
Payer: MEDICARE

## 2025-02-18 DIAGNOSIS — F41.9 ANXIETY: Primary | ICD-10-CM

## 2025-02-18 DIAGNOSIS — R54 FRAILTY SYNDROME IN GERIATRIC PATIENT: ICD-10-CM

## 2025-02-18 DIAGNOSIS — F51.01 PRIMARY INSOMNIA: ICD-10-CM

## 2025-02-18 DIAGNOSIS — E11.22 TYPE 2 DIABETES MELLITUS WITH STAGE 3 CHRONIC KIDNEY DISEASE, WITHOUT LONG-TERM CURRENT USE OF INSULIN, UNSPECIFIED WHETHER STAGE 3A OR 3B CKD: ICD-10-CM

## 2025-02-18 DIAGNOSIS — N18.30 TYPE 2 DIABETES MELLITUS WITH STAGE 3 CHRONIC KIDNEY DISEASE, WITHOUT LONG-TERM CURRENT USE OF INSULIN, UNSPECIFIED WHETHER STAGE 3A OR 3B CKD: ICD-10-CM

## 2025-02-18 DIAGNOSIS — E03.9 ACQUIRED HYPOTHYROIDISM: ICD-10-CM

## 2025-02-18 DIAGNOSIS — R56.9 FOCAL SEIZURES: ICD-10-CM

## 2025-02-18 PROBLEM — J69.0 ASPIRATION PNEUMONITIS: Status: RESOLVED | Noted: 2024-03-03 | Resolved: 2025-02-18

## 2025-02-18 RX ORDER — AMOXICILLIN 500 MG/1
1000 TABLET, FILM COATED ORAL 2 TIMES DAILY
COMMUNITY
Start: 2025-02-17

## 2025-02-18 RX ORDER — CITALOPRAM 10 MG/1
10 TABLET ORAL DAILY
Qty: 30 TABLET | Refills: 11 | Status: SHIPPED | OUTPATIENT
Start: 2025-02-18 | End: 2026-02-18

## 2025-02-18 NOTE — PROGRESS NOTES
Telemedicine Video Visit    The patient location is:  Patient Home   The chief complaint leading to consultation is: anxiety  Visit type: Virtual visit with synchronous audio and video  Total time spent with patient: 30 minutes  Each patient to whom he or she provides medical services by telemedicine is:  (1) informed of the relationship between the physician and patient and the respective role of any other health care provider with respect to management of the patient; and (2) notified that he or she may decline to receive medical services by telemedicine and may withdraw from such care at any time.     Patient Active Problem List  Diagnosis    Essential hypertension    Chronic pain syndrome    Lumbosacral spondylosis without myelopathy    Adrenal adenoma: 2.8 X 2.1CM 2010; stable 2016, also stable 2018    Lymph edema, right arm    Gallbladder polyp    Stage 3a chronic kidney disease    Severe aortic stenosis    LVH (left ventricular hypertrophy)    Pancreatic cyst: stable on MRI 11/16 also 2018, due again 2020 (patient declined)    Cervical spinal stenosis    Transient cerebral ischemia: 2012    Primary insomnia    Gastroesophageal reflux disease without esophagitis    Multiple lung nodules: stable CT 10/2016, also 2019    Former smoker: 1 pack daily for < 20 years, quit 1973    Facet hypertrophy of lumbar region    History of breast cancer    Intermittent asthma    Radiation fibrosis of lung    Ectatic abdominal aorta: see CT scan 10/16; no aneurysm 10/17    Renal cysts, acquired, bilateral    Lumbar radiculopathy    Abnormal EKG    Diverticulosis of large intestine without hemorrhage: see CT 10/14    Idiopathic gout    Liver cysts: see MRI 2018    Odynophagia    Lumbar spondylosis    Mixed hyperlipidemia    Aortic atherosclerosis    Low serum vitamin B12    Coronary artery disease involving native coronary artery of native heart    NYHA class 3 heart failure with preserved ejection fraction    History of  transcatheter aortic valve replacement (TAVR)    Intermittent confusion    Acquired hypothyroidism    Chronic diastolic CHF (congestive heart failure)    Aspiration pneumonitis    Focal seizures    Memory loss    Type 2 diabetes mellitus with stage 3 chronic kidney disease, without long-term current use of insulin, unspecified whether stage 3a or 3b CKD    Meningioma    Frailty syndrome in geriatric patient      ER follow up    Was feeling very anxious when her  passed out.  Went to ER.  Other labs aside from thyroid acceptable.    She felt shaky on the inside.  Would like something for anxiety.  In past took Buspar but this is contraindicated with Vimpat (seizures).      Also daughter Kiki here    Looking into senior living centers- lots of changes in their lives adding to stress, packing, downsizing.        Review of Systems   HENT:  Negative for hearing loss.    Eyes:  Negative for discharge.   Respiratory:  Negative for wheezing.    Cardiovascular:  Negative for chest pain and palpitations.   Gastrointestinal:  Negative for blood in stool, constipation, diarrhea and vomiting.   Genitourinary:  Negative for dysuria and hematuria.   Musculoskeletal:  Negative for neck pain.   Neurological:  Negative for weakness and headaches.   Endo/Heme/Allergies:  Negative for polydipsia.   Psychiatric/Behavioral:  The patient is nervous/anxious.         Physical Exam  Constitutional:       Appearance: She is well-developed.   HENT:      Head: Normocephalic and atraumatic.   Eyes:      Extraocular Movements: Extraocular movements intact.      Conjunctiva/sclera: Conjunctivae normal.   Neck:      Thyroid: No thyromegaly.   Pulmonary:      Effort: No respiratory distress.   Neurological:      General: No focal deficit present.      Mental Status: She is alert and oriented to person, place, and time.   Psychiatric:         Mood and Affect: Mood normal.         Behavior: Behavior normal.         Thought Content: Thought  content normal.         Judgment: Judgment normal.          1. Anxiety  -     citalopram (CELEXA) 10 MG tablet; Take 1 tablet (10 mg total) by mouth once daily.  Dispense: 30 tablet; Refill: 11    2. Primary insomnia    3. Type 2 diabetes mellitus with stage 3 chronic kidney disease, without long-term current use of insulin, unspecified whether stage 3a or 3b CKD    4. Acquired hypothyroidism    5. Focal seizures       Will route chart to Neurology to ascertain if they agree with low dose citalopram and/or hydroxyzine  Fall prevention reviewed  Labs recently acceptable  Has strong family support in place    Visit today manifests increased complexity associated with the care of the multiple chronic and episodic problems I addressed.  I am managing a longitudinal care plan of the patient due to the serious and complex problems listed above.    Answers submitted by the patient for this visit:  Review of Systems Questionnaire (Submitted on 2/15/2025)  activity change: No  unexpected weight change: No  rhinorrhea: No  trouble swallowing: No  visual disturbance: No  chest tightness: No  polyuria: No  difficulty urinating: No  menstrual problem: No  joint swelling: No  arthralgias: No  confusion: No  dysphoric mood: No

## 2025-02-18 NOTE — Clinical Note
Thinking about either low dose citalopram or hydroxyzine for anxiety.  Do you have a preference?  Lots of life changes- moving to a senior citizen home, etc.  Used to take Buspar but that was stopped when she got on Vimpat  Thanks  Sera Barger MD, FACP

## 2025-02-19 ENCOUNTER — PATIENT MESSAGE (OUTPATIENT)
Dept: FAMILY MEDICINE | Facility: CLINIC | Age: OVER 89
End: 2025-02-19
Payer: MEDICARE

## 2025-02-26 ENCOUNTER — TELEPHONE (OUTPATIENT)
Dept: FAMILY MEDICINE | Facility: CLINIC | Age: OVER 89
End: 2025-02-26
Payer: MEDICARE

## 2025-02-26 DIAGNOSIS — Z11.1 SCREENING-PULMONARY TB: ICD-10-CM

## 2025-02-26 DIAGNOSIS — E78.2 MIXED HYPERLIPIDEMIA: Primary | ICD-10-CM

## 2025-02-27 ENCOUNTER — OFFICE VISIT (OUTPATIENT)
Dept: FAMILY MEDICINE | Facility: CLINIC | Age: OVER 89
End: 2025-02-27
Payer: MEDICARE

## 2025-02-27 ENCOUNTER — TELEPHONE (OUTPATIENT)
Dept: FAMILY MEDICINE | Facility: CLINIC | Age: OVER 89
End: 2025-02-27

## 2025-02-27 VITALS — SYSTOLIC BLOOD PRESSURE: 138 MMHG | DIASTOLIC BLOOD PRESSURE: 76 MMHG

## 2025-02-27 DIAGNOSIS — I50.30 NYHA CLASS 3 HEART FAILURE WITH PRESERVED EJECTION FRACTION: ICD-10-CM

## 2025-02-27 DIAGNOSIS — M10.072 ACUTE IDIOPATHIC GOUT INVOLVING TOE OF LEFT FOOT: ICD-10-CM

## 2025-02-27 DIAGNOSIS — G89.4 CHRONIC PAIN SYNDROME: ICD-10-CM

## 2025-02-27 DIAGNOSIS — K21.9 GASTROESOPHAGEAL REFLUX DISEASE WITHOUT ESOPHAGITIS: ICD-10-CM

## 2025-02-27 DIAGNOSIS — J70.1 RADIATION FIBROSIS OF LUNG: ICD-10-CM

## 2025-02-27 DIAGNOSIS — E11.22 TYPE 2 DIABETES MELLITUS WITH STAGE 3 CHRONIC KIDNEY DISEASE, WITHOUT LONG-TERM CURRENT USE OF INSULIN, UNSPECIFIED WHETHER STAGE 3A OR 3B CKD: ICD-10-CM

## 2025-02-27 DIAGNOSIS — D32.9 MENINGIOMA: ICD-10-CM

## 2025-02-27 DIAGNOSIS — N18.31 STAGE 3A CHRONIC KIDNEY DISEASE: ICD-10-CM

## 2025-02-27 DIAGNOSIS — N18.30 TYPE 2 DIABETES MELLITUS WITH STAGE 3 CHRONIC KIDNEY DISEASE, WITHOUT LONG-TERM CURRENT USE OF INSULIN, UNSPECIFIED WHETHER STAGE 3A OR 3B CKD: ICD-10-CM

## 2025-02-27 DIAGNOSIS — F41.9 ANXIETY: ICD-10-CM

## 2025-02-27 DIAGNOSIS — Z85.3 HISTORY OF BREAST CANCER: ICD-10-CM

## 2025-02-27 DIAGNOSIS — E03.9 ACQUIRED HYPOTHYROIDISM: ICD-10-CM

## 2025-02-27 DIAGNOSIS — R54 FRAILTY SYNDROME IN GERIATRIC PATIENT: Primary | ICD-10-CM

## 2025-02-27 DIAGNOSIS — F51.01 PRIMARY INSOMNIA: ICD-10-CM

## 2025-02-27 DIAGNOSIS — I25.10 CORONARY ARTERY DISEASE INVOLVING NATIVE CORONARY ARTERY OF NATIVE HEART WITHOUT ANGINA PECTORIS: ICD-10-CM

## 2025-02-27 DIAGNOSIS — I10 ESSENTIAL HYPERTENSION: ICD-10-CM

## 2025-02-27 DIAGNOSIS — I50.32 CHRONIC DIASTOLIC CHF (CONGESTIVE HEART FAILURE): ICD-10-CM

## 2025-02-27 DIAGNOSIS — Z87.891 FORMER SMOKER: ICD-10-CM

## 2025-02-27 DIAGNOSIS — R56.9 FOCAL SEIZURES: ICD-10-CM

## 2025-02-27 DIAGNOSIS — Z11.1 SCREENING-PULMONARY TB: ICD-10-CM

## 2025-02-27 DIAGNOSIS — E78.2 MIXED HYPERLIPIDEMIA: ICD-10-CM

## 2025-02-27 DIAGNOSIS — M54.16 LUMBAR RADICULOPATHY: ICD-10-CM

## 2025-02-27 DIAGNOSIS — E78.2 MIXED HYPERLIPIDEMIA: Primary | ICD-10-CM

## 2025-02-27 DIAGNOSIS — J45.20 MILD INTERMITTENT ASTHMA WITHOUT COMPLICATION: ICD-10-CM

## 2025-02-27 NOTE — TELEPHONE ENCOUNTER
Spoke to [facility] and pt was discharged  7/15/2024       Spoke to pt  and he states that he paid out of pocket last time for at Home care company to come to their house and draw the labs but he doesn't have  phone number or remember who he spoke to about it last time

## 2025-02-27 NOTE — PROGRESS NOTES
Telemedicine Video Visit    The patient location is:  Patient Home   The chief complaint leading to consultation is: follow up  Visit type: Virtual visit with synchronous audio and video  Total time spent with patient: 40 minutes.  Extensive chart review and documentation with patient,  and daughter.  8 page form completed for [facility]  Each patient to whom he or she provides medical services by telemedicine is:  (1) informed of the relationship between the physician and patient and the respective role of any other health care provider with respect to management of the patient; and (2) notified that he or she may decline to receive medical services by telemedicine and may withdraw from such care at any time.     Patient Active Problem List  Diagnosis    Essential hypertension    Chronic pain syndrome    Lumbosacral spondylosis without myelopathy    Adrenal adenoma: 2.8 X 2.1CM 2010; stable 2016, also stable 2018    Lymph edema, right arm    Gallbladder polyp    Stage 3a chronic kidney disease    Severe aortic stenosis    LVH (left ventricular hypertrophy)    Pancreatic cyst: stable on MRI 11/16 also 2018, due again 2020 (patient declined)    Cervical spinal stenosis    Transient cerebral ischemia: 2012    Primary insomnia    Gastroesophageal reflux disease without esophagitis    Multiple lung nodules: stable CT 10/2016, also 2019    Former smoker: 1 pack daily for < 20 years, quit 1973    Facet hypertrophy of lumbar region    History of breast cancer    Intermittent asthma    Radiation fibrosis of lung    Ectatic abdominal aorta: see CT scan 10/16; no aneurysm 10/17    Renal cysts, acquired, bilateral    Lumbar radiculopathy    Abnormal EKG    Diverticulosis of large intestine without hemorrhage: see CT 10/14    Idiopathic gout    Liver cysts: see MRI 2018    Odynophagia    Lumbar spondylosis    Mixed hyperlipidemia    Aortic atherosclerosis    Low serum vitamin B12    Coronary artery  disease involving native coronary artery of native heart    NYHA class 3 heart failure with preserved ejection fraction    History of transcatheter aortic valve replacement (TAVR)    Intermittent confusion    Acquired hypothyroidism    Chronic diastolic CHF (congestive heart failure)    Focal seizures    Memory loss    Type 2 diabetes mellitus with stage 3 chronic kidney disease, without long-term current use of insulin, unspecified whether stage 3a or 3b CKD    Meningioma    Frailty syndrome in geriatric patient      8 page form for [facility]    Needs labs- discussed- TB testing.  They evidently have had a phlebotomy company come and do labs.  Home Health was d/c last summer but she did have [facility] as well.    Anxiety and sleep better since starting Citalopram.    Messages reviewed.   also needs identical 8 page from completed as well.      Review of Systems   HENT:  Negative for hearing loss.    Eyes:  Negative for discharge.   Respiratory:  Negative for wheezing.    Cardiovascular:  Negative for chest pain and palpitations.   Gastrointestinal:  Negative for blood in stool, constipation, diarrhea and vomiting.   Genitourinary:  Negative for dysuria and hematuria.   Musculoskeletal:  Positive for back pain. Negative for neck pain.   Neurological:  Negative for weakness and headaches.   Endo/Heme/Allergies:  Negative for polydipsia.   Psychiatric/Behavioral:          Mood better        Physical Exam  Constitutional:       Appearance: She is well-developed.   HENT:      Head: Normocephalic and atraumatic.   Eyes:      Extraocular Movements: Extraocular movements intact.      Conjunctiva/sclera: Conjunctivae normal.   Neck:      Thyroid: No thyromegaly.   Pulmonary:      Effort: No respiratory distress.   Neurological:      General: No focal deficit present.      Mental Status: She is alert and oriented to person, place, and time.   Psychiatric:         Mood and Affect: Mood normal.          Behavior: Behavior normal.         Thought Content: Thought content normal.         Judgment: Judgment normal.          1. Frailty syndrome in geriatric patient    2. Mild intermittent asthma without complication    3. Chronic diastolic CHF (congestive heart failure)    4. Essential hypertension    5. Chronic pain syndrome    6. Focal seizures    7. Lumbar radiculopathy    8. Radiation fibrosis of lung    9. Coronary artery disease involving native coronary artery of native heart without angina pectoris    10. Mixed hyperlipidemia    11. NYHA class 3 heart failure with preserved ejection fraction    12. Stage 3a chronic kidney disease    13. History of breast cancer    14. Acquired hypothyroidism    15. Type 2 diabetes mellitus with stage 3 chronic kidney disease, without long-term current use of insulin, unspecified whether stage 3a or 3b CKD    16. Gastroesophageal reflux disease without esophagitis    17. Acute idiopathic gout involving toe of left foot    18. Former smoker: 1 pack daily for < 20 years, quit 1973    19. Primary insomnia    20. Anxiety    21. Meningioma: stable 6/24       Medical issues and chart reviewed  Medications reviewed  8 page form completed- discussed with patient,  and daughter  Needs TB testing- Quantiferon ordered again.  Recent CXR acceptable    I will review all studies and determine further tx depending on findings    Patient evaluated for over 45 minutes with this appoinment, including diagnostic testing and treatment.  All questions answered,  chart reviewed and care coordinated.    Visit today manifests increased complexity associated with the care of the multiple chronic and episodic problems I addressed.  I am managing a longitudinal care plan of the patient due to the serious and complex problems listed above.        Answers submitted by the patient for this visit:  Review of Systems Questionnaire (Submitted on 2/27/2025)  activity change: No  unexpected weight  change: No  rhinorrhea: No  trouble swallowing: No  visual disturbance: No  chest tightness: No  polyuria: No  difficulty urinating: No  menstrual problem: No  joint swelling: No  arthralgias: No  confusion: No  dysphoric mood: No

## 2025-02-27 NOTE — TELEPHONE ENCOUNTER
Apparently she cannot go to lab to get blood work    Please call [facility] (they are going out to their house) to do labs for her    I ordered today    Quantiferon  Lipids     Thanks    This is for home placement

## 2025-03-05 ENCOUNTER — LAB VISIT (OUTPATIENT)
Dept: LAB | Facility: HOSPITAL | Age: OVER 89
End: 2025-03-05
Attending: INTERNAL MEDICINE
Payer: MEDICARE

## 2025-03-05 DIAGNOSIS — Z11.1 SCREENING-PULMONARY TB: ICD-10-CM

## 2025-03-05 DIAGNOSIS — E78.2 MIXED HYPERLIPIDEMIA: ICD-10-CM

## 2025-03-05 LAB
CHOLEST SERPL-MCNC: 142 MG/DL (ref 120–199)
CHOLEST/HDLC SERPL: 3.4 {RATIO} (ref 2–5)
HDLC SERPL-MCNC: 42 MG/DL (ref 40–75)
HDLC SERPL: 29.6 % (ref 20–50)
LDLC SERPL CALC-MCNC: 83.8 MG/DL (ref 63–159)
NONHDLC SERPL-MCNC: 100 MG/DL
TRIGL SERPL-MCNC: 81 MG/DL (ref 30–150)

## 2025-03-05 PROCEDURE — 86480 TB TEST CELL IMMUN MEASURE: CPT | Performed by: INTERNAL MEDICINE

## 2025-03-05 PROCEDURE — 80061 LIPID PANEL: CPT | Performed by: INTERNAL MEDICINE

## 2025-03-06 DIAGNOSIS — G89.29 CHRONIC RIGHT HIP PAIN: ICD-10-CM

## 2025-03-06 DIAGNOSIS — M25.551 CHRONIC RIGHT HIP PAIN: ICD-10-CM

## 2025-03-07 ENCOUNTER — PATIENT MESSAGE (OUTPATIENT)
Dept: HOME HEALTH SERVICES | Facility: CLINIC | Age: OVER 89
End: 2025-03-07
Payer: MEDICARE

## 2025-03-07 LAB
GAMMA INTERFERON BACKGROUND BLD IA-ACNC: 0.02 IU/ML
M TB IFN-G CD4+ BCKGRND COR BLD-ACNC: 0.01 IU/ML
M TB IFN-G CD4+ BCKGRND COR BLD-ACNC: 0.01 IU/ML
MITOGEN IGNF BCKGRD COR BLD-ACNC: 8.05 IU/ML
TB GOLD PLUS: NEGATIVE

## 2025-03-07 NOTE — TELEPHONE ENCOUNTER
Care Due:                  Date            Visit Type   Department     Provider  --------------------------------------------------------------------------------                                ESTABLISHED                              PATIENT -    [facility]  Last Visit: 02-      [employer]       Sera Barger  Next Visit: None Scheduled  None         None Found                                                            Last  Test          Frequency    Reason                     Performed    Due Date  --------------------------------------------------------------------------------    Uric Acid...  12 months..  allopurinoL..............  Not Found    Overdue    Vitamin D...  12 months..  cholecalciferol,.........  Not Found    Overdue    [employer] Emb[employer]ded Care Due Messages. Reference number: [id number].   3/06/2025 8:26:43 PM CST

## 2025-03-08 ENCOUNTER — RESULTS FOLLOW-UP (OUTPATIENT)
Dept: FAMILY MEDICINE | Facility: CLINIC | Age: OVER 89
End: 2025-03-08

## 2025-03-08 ENCOUNTER — TELEPHONE (OUTPATIENT)
Dept: FAMILY MEDICINE | Facility: CLINIC | Age: OVER 89
End: 2025-03-08
Payer: MEDICARE

## 2025-03-08 RX ORDER — HYDROCODONE BITARTRATE AND ACETAMINOPHEN 7.5; 325 MG/15ML; MG/15ML
5 SOLUTION ORAL EVERY 8 HOURS PRN
Qty: 120 ML | Refills: 0 | Status: ON HOLD | OUTPATIENT
Start: 2025-03-08

## 2025-03-11 ENCOUNTER — PATIENT MESSAGE (OUTPATIENT)
Dept: FAMILY MEDICINE | Facility: CLINIC | Age: OVER 89
End: 2025-03-11
Payer: MEDICARE

## 2025-03-14 ENCOUNTER — PATIENT MESSAGE (OUTPATIENT)
Dept: FAMILY MEDICINE | Facility: CLINIC | Age: OVER 89
End: 2025-03-14
Payer: MEDICARE

## 2025-03-15 ENCOUNTER — PATIENT MESSAGE (OUTPATIENT)
Dept: FAMILY MEDICINE | Facility: CLINIC | Age: OVER 89
End: 2025-03-15
Payer: MEDICARE

## 2025-03-15 PROBLEM — G93.41 ENCEPHALOPATHY, METABOLIC: Status: ACTIVE | Noted: 2023-07-28

## 2025-03-15 PROBLEM — G93.40 ENCEPHALOPATHY ACUTE: Status: ACTIVE | Noted: 2021-04-26

## 2025-03-15 PROBLEM — E78.5 HYPERLIPEMIA: Status: ACTIVE | Noted: 2021-04-26

## 2025-03-15 NOTE — TELEPHONE ENCOUNTER
Called and spoke to pt , Dario. States pt fell last night, states she is experiencing gout pain and went to the living room in the meddle of the night. She went to sit down on couch and fell.  states she slid down the couch and fell on to carpet, denies hitting head or injury. However, states pt has now having trouble speaking, is confused and will not eat. I explained to  that we recommend he call 911 or drive her to the ED.  stated she doesn't need to go to the ED, she is improving. Explained that I still think it is important to go to ED for an evaluation,  states he will call for help if pt sx become worse. Pt asking to forward to Dr Barger. Explained that she is out of the office for the weekend so there may be a delay,  verbalized understanding.

## 2025-03-16 NOTE — TELEPHONE ENCOUNTER
Admitted, discharged    Neuro notes reviewed    *Encephalopathy acute     Type 2 diabetes mellitus with stage 3 chronic kidney disease, without long-term current use of insulin, unspecified whether stage 3a or 3b CKD    Acquired hypothyroidism    Hyperlipemia    ACP (advance care planning)    Stage 3a chronic kidney disease    Essential hypertension    It is possible that the complaints of aphasia and AMS could be related to seizure vs stroke but given rapid recovery and no other focal signs these possibilities are less likely. More likely is that she is suffering from depression        Treatment Plan:     -At this time do not recommend any further aggressive neurological care and daughter would like a palliative consult.   -Ok to discharge home from my stand point   -She can continue on her DAPT and vimpat 50 mg BID and will let her outpatient neurology team adjust medications as necessary.

## 2025-03-17 ENCOUNTER — PATIENT MESSAGE (OUTPATIENT)
Dept: NEUROLOGY | Facility: CLINIC | Age: OVER 89
End: 2025-03-17
Payer: MEDICARE

## 2025-03-17 DIAGNOSIS — R41.82 ALTERED MENTAL STATUS: ICD-10-CM

## 2025-03-17 RX ORDER — LACOSAMIDE 50 MG/1
50 TABLET ORAL EVERY 12 HOURS
Qty: 60 TABLET | Refills: 1 | Status: SHIPPED | OUTPATIENT
Start: 2025-03-17 | End: 2025-05-16

## 2025-03-18 ENCOUNTER — TELEPHONE (OUTPATIENT)
Dept: NEUROLOGY | Facility: CLINIC | Age: OVER 89
End: 2025-03-18
Payer: MEDICARE

## 2025-03-20 ENCOUNTER — OFFICE VISIT (OUTPATIENT)
Dept: FAMILY MEDICINE | Facility: CLINIC | Age: OVER 89
End: 2025-03-20
Payer: MEDICARE

## 2025-03-20 DIAGNOSIS — M10.9 ACUTE GOUT INVOLVING TOE OF LEFT FOOT, UNSPECIFIED CAUSE: ICD-10-CM

## 2025-03-20 DIAGNOSIS — E03.9 ACQUIRED HYPOTHYROIDISM: ICD-10-CM

## 2025-03-20 DIAGNOSIS — N18.30 TYPE 2 DIABETES MELLITUS WITH STAGE 3 CHRONIC KIDNEY DISEASE, WITHOUT LONG-TERM CURRENT USE OF INSULIN, UNSPECIFIED WHETHER STAGE 3A OR 3B CKD: ICD-10-CM

## 2025-03-20 DIAGNOSIS — N18.31 STAGE 3A CHRONIC KIDNEY DISEASE: ICD-10-CM

## 2025-03-20 DIAGNOSIS — R54 FRAILTY SYNDROME IN GERIATRIC PATIENT: Primary | ICD-10-CM

## 2025-03-20 DIAGNOSIS — D32.9 MENINGIOMA: ICD-10-CM

## 2025-03-20 DIAGNOSIS — R56.9 FOCAL SEIZURES: ICD-10-CM

## 2025-03-20 DIAGNOSIS — F41.9 ANXIETY: ICD-10-CM

## 2025-03-20 DIAGNOSIS — I10 ESSENTIAL HYPERTENSION: ICD-10-CM

## 2025-03-20 DIAGNOSIS — E11.22 TYPE 2 DIABETES MELLITUS WITH STAGE 3 CHRONIC KIDNEY DISEASE, WITHOUT LONG-TERM CURRENT USE OF INSULIN, UNSPECIFIED WHETHER STAGE 3A OR 3B CKD: ICD-10-CM

## 2025-03-20 DIAGNOSIS — R41.0 INTERMITTENT CONFUSION: ICD-10-CM

## 2025-03-20 PROBLEM — G93.40 ENCEPHALOPATHY ACUTE: Status: RESOLVED | Noted: 2023-07-28 | Resolved: 2025-03-20

## 2025-03-20 PROCEDURE — 3072F LOW RISK FOR RETINOPATHY: CPT | Mod: CPTII,95,, | Performed by: INTERNAL MEDICINE

## 2025-03-20 PROCEDURE — 1159F MED LIST DOCD IN RCRD: CPT | Mod: CPTII,95,, | Performed by: INTERNAL MEDICINE

## 2025-03-20 PROCEDURE — 98006 SYNCH AUDIO-VIDEO EST MOD 30: CPT | Mod: 95,,, | Performed by: INTERNAL MEDICINE

## 2025-03-20 PROCEDURE — G2211 COMPLEX E/M VISIT ADD ON: HCPCS | Mod: 95,,, | Performed by: INTERNAL MEDICINE

## 2025-03-20 PROCEDURE — 1160F RVW MEDS BY RX/DR IN RCRD: CPT | Mod: CPTII,95,, | Performed by: INTERNAL MEDICINE

## 2025-03-20 RX ORDER — METHYLPREDNISOLONE 4 MG/1
TABLET ORAL
Qty: 21 EACH | Refills: 0 | Status: SHIPPED | OUTPATIENT
Start: 2025-03-20 | End: 2025-04-10

## 2025-03-20 NOTE — PROGRESS NOTES
"Telemedicine Video Visit    The patient location is:  Patient Home   The chief complaint leading to consultation is: hospital follow up  Visit type: Virtual visit with synchronous audio and video  Total time spent with patient: 30 minutes  Each patient to whom he or she provides medical services by telemedicine is:  (1) informed of the relationship between the physician and patient and the respective role of any other health care provider with respect to management of the patient; and (2) notified that he or she may decline to receive medical services by telemedicine and may withdraw from such care at any time.     Another hospital stay 3/15/25:  "89 year old female with diabetes, HTN, CKD, who presents to the ED for evaluation fo altered mental status. Patient is accompanied by  and son who provide the patient history.  reports last known well at 3 am this morning when he found the patient found on the ground, leaning on the couch. Patient was lucid and able to speak appropriate at the time and reassured her  that she was not in any pain.  got patient off floor and both went to bed. This morning at 7 am,  noticed aphasia and eventually patient became nonverbal. History of metabolic encephalopathy and past seizures.      * No surgery found *       Hospital Course:   Patient presented with concerns for altered mental status with aphasia and nonverbal episode.  Tele neuro consult through emergency department was performed and patient recommended to have brain MRI however she was unable to perform procedure with agitation.  CT head without acute findings, CTA head and neck showed new focal high-grade stenosis at left PCA P2 segment, similar focal high-grade stenosis at left BUNNY 3 segment and similar less than 50% stenosis of the bilateral intracranial internal carotid arteries.  Patient was re-initiated on Vimpat due to her history of seizures.  She was seen by Neurology in " "consultation who discussed patient is history and plan of care with  and the daughter.  Had patient has been depressed due to loss of independence where she likely has to move to a facility for assistance in ADLs.  Patient is now verbal.  No further workup recommended.  Continuation of Vimpat recommended along with outpatient neurology follow-up.  Outpatient referral to palliative care placed.  Family requested sitter list and patient referred to home health on discharge."    Hospitalized for weakness 2/12/25    Vimpat- she had been taking regularly, no dose change    Fatigue continues to be an issue.  Somewhat dehydrated in the hospital.  They kept her NPO for a swallow assessment and she did all right with this before discharge    Eating and drinking about 3 times daily.    Egg, grits, turkey villalta for breakfast.  Lunch is a snack.  Regular dinner    Not going to alf home- feels the stress of this precipitated.   and daughter concur and feel that she will do significantly better if she is able to stay in her own home and have additional home services such as home health and palliative care and sitters.    Palliative will be coming out to the house next week.    The last 2 episodes where she has got to the emergency room with fatigue and light-headedness have not shown any abnormalities and she has not required any treatment.    Her A1c is 5.9.  She has not had any obvious episodes of hypoglycemia but given her age and her improved diabetes control and weight loss, we will discontinue her diabetes treatment at present and monitor closely.        Patient Active Problem List  Diagnosis    Essential hypertension    Chronic pain syndrome    Lumbosacral spondylosis without myelopathy    Adrenal adenoma: 2.8 X 2.1CM 2010; stable 2016, also stable 2018    Lymph edema, right arm    Gallbladder polyp    Stage 3a chronic kidney disease    Severe aortic stenosis    LVH (left ventricular hypertrophy)    " Pancreatic cyst: stable on MRI 11/16 also 2018, due again 2020 (patient declined)    Cervical spinal stenosis    Transient cerebral ischemia: 2012    Primary insomnia    Gastroesophageal reflux disease without esophagitis    Multiple lung nodules: stable CT 10/2016, also 2019    Former smoker: 1 pack daily for < 20 years, quit 1973    Facet hypertrophy of lumbar region    History of breast cancer    Intermittent asthma    Radiation fibrosis of lung    Ectatic abdominal aorta: see CT scan 10/16; no aneurysm 10/17    Renal cysts, acquired, bilateral    Lumbar radiculopathy    Abnormal EKG    ACP (advance care planning)    Diverticulosis of large intestine without hemorrhage: see CT 10/14    Idiopathic gout    Liver cysts: see MRI 2018    Odynophagia    Lumbar spondylosis    Hyperlipemia    Aortic atherosclerosis    Low serum vitamin B12    Coronary artery disease involving native coronary artery of native heart    NYHA class 3 heart failure with preserved ejection fraction    History of transcatheter aortic valve replacement (TAVR)    Encephalopathy acute    Intermittent confusion    Acquired hypothyroidism    Chronic diastolic CHF (congestive heart failure)    Focal seizures    Memory loss    Type 2 diabetes mellitus with stage 3 chronic kidney disease, without long-term current use of insulin, unspecified whether stage 3a or 3b CKD    Meningioma: stable 6/24    Frailty syndrome in geriatric patient        Review of Systems   Constitutional:         Feeling better since being at home   Respiratory:  Negative for cough and shortness of breath.    Cardiovascular:  Negative for chest pain.   Gastrointestinal:  Negative for abdominal pain.   Genitourinary:  Negative for dysuria and frequency.   Musculoskeletal:  Positive for joint pain.        Having a gout flare left great toe   Neurological:  Negative for tremors and focal weakness.   Psychiatric/Behavioral:          Mood is better.  Sleep is improved        Physical  Exam  Constitutional:       Appearance: She is well-developed.   HENT:      Head: Normocephalic and atraumatic.   Eyes:      Extraocular Movements: Extraocular movements intact.      Conjunctiva/sclera: Conjunctivae normal.   Neck:      Thyroid: No thyromegaly.   Pulmonary:      Effort: No respiratory distress.   Neurological:      General: No focal deficit present.      Mental Status: She is alert and oriented to person, place, and time.   Psychiatric:         Mood and Affect: Mood normal.         Behavior: Behavior normal.         Thought Content: Thought content normal.         Judgment: Judgment normal.          1. Frailty syndrome in geriatric patient:  Long discussion with patient,  and daughter.  Family is pleased with the idea of having her remain at home with sitters and additional home health and palliative care.  Strong family support.    2. Type 2 diabetes mellitus with stage 3 chronic kidney disease, without long-term current use of insulin, unspecified whether stage 3a or 3b CKD:  Improved with weight loss.  Will have her discontinue meds and will monitor closely    3. Acquired hypothyroidism:  Stable on regimen    4. Stage 3a chronic kidney disease:  Stable, seems to be improved with hydration    5. Essential hypertension:  Stable on regimen    6. Intermittent confusion:  Discussed with family.  Etiology unclear.  No evidence of any obvious seizure activity.  Keep Neurology follow-up.  Gentle hydration, other hygienic measures reviewed, diet discussed.    7. Focal seizures:  Stable on regimen    8. Anxiety:  Stable on regimen    9. Acute gout involving toe of left foot, unspecified cause:  In the next several weeks will determine at what point to follow-up on labs.  Last uric acid level was acceptable.  Will continue to monitor.  Alarm symptoms reviewed.  Medication cautions and side effects discussed.  -     methylPREDNISolone (MEDROL DOSEPACK) 4 mg tablet; use as directed  Dispense: 21 each;  Refill: 0    10. Meningioma: stable 6/24.  Also 3/25       Anticipate follow up in the next 2-3 weeks, sooner with problems in the interim  Visit today manifests increased complexity associated with the care of the multiple chronic and episodic problems I addressed.  I am managing a longitudinal care plan of the patient due to the serious and complex problems listed above.

## 2025-03-28 ENCOUNTER — OFFICE VISIT (OUTPATIENT)
Dept: NEUROLOGY | Facility: CLINIC | Age: OVER 89
End: 2025-03-28
Payer: MEDICARE

## 2025-03-28 DIAGNOSIS — F41.9 ANXIETY: ICD-10-CM

## 2025-03-28 DIAGNOSIS — R56.9 FOCAL SEIZURES: Primary | ICD-10-CM

## 2025-03-28 DIAGNOSIS — I67.9 INTRACRANIAL VASCULAR STENOSIS: ICD-10-CM

## 2025-03-28 NOTE — ASSESSMENT & PLAN NOTE
Focal high-grade stenosis L PCA P2 segment  Focal high-grade stenosis L BUNNY A3 segment  Cont DAPT

## 2025-03-28 NOTE — ASSESSMENT & PLAN NOTE
Presented to ED in early March 2024 and again 2 weeks ago with aphasia, generalized weakness and increased confusion.   Neuro imaging unrevealing for acute stroke on both admissions  EEG from 2024 reported with left temporal epileptiform discharges   - initially started on Keppra 500 mg BID inpt. pt did develop a rash and increased lethargy with this drug. Keppra discontinued but also started on Quetiapine for reported hallucinations.   It is likely that the pt has an underlying neurocognitive disorder that correlates with the hallucinations. Family & pt do report poor short term recall.   She does require AED as noted by stereotypical events and + EEG   - she has done well on Vimpat 50 mg BID; continue!   - suspect recent episode was stress-induced. Obtain aEEG to make sure she is not having subclinical seizures; check AED level   PCP to manage mood meds  Quetiapine as needed only. Black box warning discussed at length   Will need alternative pain control as tramadol can provoke seizures.   Seizure precautions discussed

## 2025-03-28 NOTE — PROGRESS NOTES
"  NEUROLOGY  Outpatient Follow Up    [facility]  [address], [place] [address]  [phone] (office) / [phone] (fax)    Patient Name:  Olga Aguiar  :  1935  MR #:  [id number]  Acct #:  [id number]    Date of Neurology Visit: 2025  Name of Provider: MADELYN Boyer    Other Physicians:  Sera Barger MD (Primary Care Physician); No ref. provider found (Referring)      Chief Complaint: Follow-up      History of Present Illness (HPI):  As per EMR  2023  "This is an 88-year-old female with past medical history particularly significant for hypertension, dyslipidemia, type 2 diabetes mellitus, diet-controlled, chronic diastolic CHF, asthma/COPD, chronic back pain, hypothyroidism, CKD 3, AS status post TAVR in 2023, course complicated by altered mental status felt to be toxic/metabolic encephalopathy secondary to sedative medications, UTI, with brain MRI showing multiple infarcts that were not felt to be the cause of her altered mental status, who presented to our ED for further evaluation of altered mental status.    In fact, per family, patient was last known well yesterday 2023 around 2:00 p.m. when her  went to a doctor's appointment.  When he returned around 5:00 p.m., the patient was angry and developed altered mental status described as confusion.       At the time of my evaluation, the patient was awake, alert and oriented to name only, otherwise confused.  She was not answering questions appropriately.  She was following commands.    Subsequently further history is unable to be obtained from her.       In ED, workup showed a glucose of 197.    Will place her under medical observation for further management.     * No surgery found *       Hospital Course:   Discharge diagnosis: Acute CVA L posterior thalamic lacunar     Hospital Course:   Admitted overnight with acute confusion.  Underwent MRI brain, which reported acute or " "early subacute left posterior thalamic lacunar infarct.  Mild-moderate chronic microvascular ischemic chane.  Stroke orders placed.  Neurology consulted.  Recs for  mg and plavix 75 mg. Amlodipine increased to 10mg. LDL not at goal, inc to high intensity statin, atorvastatin 40mg daily.  Noted with UTI, prior Pseudomonas in urine cx- started on Cipro. Mental status returned back to baseline. Discharged on 11/30."        Interval Hx 12/15/2023:   Patient is new to me  Patient is a 89 y/o female with a significant PMHX of breast cancer 23 years ago with radiation and chemotherapy, chronic right arm lymphedema, COPD, diabetes type 2 hypertension, TIA, severe valve stenosis, CAD, s/p TAVR 7/2023.   Patient is being seen virtually for hospital follow up. She is accompanied by her spouse and daughter.     She has been having visual hallucinations since her TAVR. Spouse states it took her a while to recover mentally from the anesthesia post TAVR.  She was slowly improving until this recent episode of confusion. Daughter does report her to have frequent UTIs.   She did complete prescribed antibiotics. Family denies previous history of memory deficits.   All recent Neuro testing discussed with the patient/family at length.       Interval Hx 3/22/2024:  Patient is being seen virtually for hospital follow up. She is accompanied by her spouse and daughter who helps supply information.   It was suspected she was having focal seizures and was placed Keppra while in the hospital Family states about 3-4 days after discharge she was noticed to have a rash and was very fatigued. She was very hard to arouse.  They tried reaching out to Dr. Nixon but realized he is not in the [facility] system.  She then saw her PCP who discontinued her Keppra and started her on Seroquel for hallucinations. She has been taking 25 mg nightly. Her tramadol was also discontinued as it can provoke seizures. Spouse and daughter have noticed an " "improvement in the patient in that she is more alert.  She is nearly back to baseline. The hallucinations stopped for a while but are now back. She is currently receiving home health therapy.       EMR reviewed  D/C summary 3/2/2024  "The patient is an 88 year old female, with history of CVA (left posterior thalamic lacunar infarct) on 11/23 on  mg and Plavix 75mg, HTN, AS s/p TAVR , CAD, breast ca with RUext lymphedema, was brought to the ER for evaluation of aphasia (which started around 8 pm last night). Per ERMD and daughter, patient received extra dose of  mg last night and went to bed. This am, patient remained aphasic and noted to be weaker (RUEx usually weak due to lymphedema).  In the ER, stroke alert done -  not a candidate for tPA since symptoms started last night. Patient was given  mg suppository. CT head, showed remote ischemic changes with advanced cerebral atrophy. No acute intracranial abnormality. CTA showed No proximal vessel occlusion or aneurysm formation. Advanced intracranial atherosclerosis. No detrimental interval change from the previous exam .  UA neg infection.  CXR ? Wedge-shaped area of opacification within the right upper lung zone. Pulmonary infiltrate could have this appearance. Elsewhere, chronic interstitial changes present bilaterally.  Patient is aphasic and cannot answer or follow commands.  No cough noted. Patient afebrile.  [facility] called for admission.     Hospital Course:   The patient was admitted for evaluation of acute aphasia, unable to follow commands with R sided neglect.  CVA workup done  (CT head, MRI brain, CTA stroke protocol) did not show acute abnormalities.  Patient continued on ASA suppository and kept npo as patient was lethargic. UA neg for infection. CXR - RUL infiltrate and received empiric abx in ER. Procal wnl - likely aspiration . Neurology consulted and started on empiric IV keppra.  EEG done  - "abnormal EEG due to the presence left " "temporal epileptiform discharges. This supports a diagnoses of focal seizures."  Patient mental status improved after initiation of anti eleptics. ST eval - cleared to start regular diet and restarted on oral medications, PPN discontinued. PT/OT cleared her for HH. CM was able to arrange this."    Interval Hx 4/8/2024:  Patient presents virtually today for medication check. She is doing well on Vimapt 50 mg BID. She and family deny any episodes of aphasia or acute confusion.   She now complains of poor sleep. She was previously on Ambien since 2008. This was decreased to 5 mg at one point as she was taking with Tramadol. Since no longer on Tramadol she now has trouble sleeping at night.     Interval Hx 5/9/2024:  Patient presents today for memory evaluation. She is accompanied by her spouse who helps supply information. Overall she has been doing well mentally. She is now sleeping better on Ambien 10 mg. Her sleep routine is pretty consistent. Her back pain continues as she can no longer take Tramadol. Spouse does not have a big concern about her memory. Overall her hallucinations have resolved. Every now and then she may think an object is moving. She admits to sadness but not significant depression. She requires assistance with ADLs. Her spouse is managing the finances and her medications. She uses a walker around the house. She denies recent falls. She has not needed to take Seroquel. She no longer drives.     Interval Hx 11/14/2024:  Patient presents virtually today for follow up. She is accompanied by her spouse and daughter. She is doing well overall. Spouse states she is improving week to week. She is sleeping well at night but takes pain med and sleeping med as she has chronic back pain. She is taking Hycet. There are no hallucinations. At times she feels depressed. It was suggested she take the Seroquel as needed but family as been giving it to her every night. She requires assistance with ADLs. Her spouse " is managing the finances and her medications. She uses a walker around the house. She slipped off the bed very gently about 3 weeks ago.     She has not had any further seizures. She continues to take Vimpat.     Interval Hx 3/28/2025:  Patient presents virtually today for follow up. She is accompanied by her spouse and daughter.  She reports not doing well overall.   Daughter states about 2 weeks ago she suddenly became confused and had trouble speaking. This was similar to past events. Family states she was very stressed with the upcoming move into a nursing home. She was taken to the ED. Urinarlysis was negative for UTI. Neuro workup negative acute.     She has been maintained on Vimpat 50 mg BID. This dose was not changed while inpatient.   She sleeps well at night and continues to take Seroquel.   Patient admits to feeling very nervous a lot of the time.  She was prescribed Celexa 10 mg by her PCP but she reported feeling jittery. She is interested in trying other medications.             Past Medical, Surgical, Family & Social History:   Reviewed and updated.    Home Medications:     Current Outpatient Medications:     allopurinoL (ZYLOPRIM) 100 MG tablet, Take 1 tablet (100 mg total) by mouth once daily., Disp: 90 tablet, Rfl: 0    amLODIPine (NORVASC) 10 MG tablet, Take 0.5 tablets (5 mg total) by mouth once daily., Disp: 45 tablet, Rfl: 2    aspirin (ECOTRIN) 325 MG EC tablet, Take 1 tablet (325 mg total) by mouth once daily., Disp: 30 tablet, Rfl: 1    atorvastatin (LIPITOR) 80 MG tablet, Take 1 tablet by mouth in the evening, Disp: 90 tablet, Rfl: 0    cholecalciferol, vitamin D3, (VITAMIN D3) 25 mcg (1,000 unit) capsule, Take 2 capsules (2,000 Units total) by mouth once daily., Disp: 60 capsule, Rfl: 12    clopidogreL (PLAVIX) 75 mg tablet, Take 1 tablet by mouth once daily (Patient taking differently: Take 75 mg by mouth once daily.), Disp: 90 tablet, Rfl: 4    cyanocobalamin (VITAMIN B-12) 1000 MCG  tablet, Take 1 tablet (1,000 mcg total) by mouth once daily., Disp: 30 tablet, Rfl: 12    fluticasone furoate-vilanteroL (BREO ELLIPTA) 200-25 mcg/dose DsDv diskus inhaler, Inhale 1 puff into the lungs once daily., Disp: 60 each, Rfl: 11    hydrocodone-acetaminophen (HYCET) solution 7.5-325 mg/15mL, Take 5 mLs by mouth every 8 (eight) hours as needed for Pain., Disp: 120 mL, Rfl: 0    lacosamide (VIMPAT) 50 mg Tab, Take 1 tablet (50 mg total) by mouth every 12 (twelve) hours., Disp: 60 tablet, Rfl: 1    Lactobacillus rhamnosus GG (CULTURELLE) 10 billion cell capsule, Take 1 capsule by mouth once daily., Disp: , Rfl:     levothyroxine (SYNTHROID) 75 MCG tablet, Take 1 tablet (75 mcg total) by mouth before breakfast., Disp: 90 tablet, Rfl: 3    methylPREDNISolone (MEDROL DOSEPACK) 4 mg tablet, use as directed, Disp: 21 each, Rfl: 0    potassium citrate 99 mg Cap, Take 99 mg by mouth every evening., Disp: , Rfl:     QUEtiapine (SEROQUEL) 25 MG Tab, Take 1 tablet (25 mg total) by mouth nightly. PRN, Disp: 30 tablet, Rfl: 4    spironolactone (ALDACTONE) 25 MG tablet, Take 0.5 tablets (12.5 mg total) by mouth once daily. (Patient taking differently: Take 12.5 mg by mouth every evening.), Disp: 45 tablet, Rfl: 1    zolpidem (AMBIEN) 10 mg Tab, Take 1 tablet (10 mg total) by mouth nightly as needed., Disp: 90 tablet, Rfl: 0    Physical Examination: limited due to being virtual   There were no vitals taken for this visit.    GENERAL:  General appearance: Well, non-toxic appearing.  No apparent distress.  Neck: supple.  Right arm lymphedema        MENTAL STATUS:  Alertness, attention span & concentration: normal.  Language: normal.  Orientation to self, place & time:  normal.  Memory, recent & remote: normal.  Fund of knowledge: normal.  MMSE: 29/30 (5/2024)    SPEECH:  Clear and fluent.  Follows complex commands.    PRIOR FACE TO FACE EXAM   CRANIAL NERVES:  Cranial Nerves II-XII were examined.  II - Visual fields:  normal.  III, IV, VI: PERRL, EOMI, No ptosis, No nystagmus.  V - Facial sensation: normal.  VII - Face symmetry & mobility: normal.  VIII - Hearing: normal  IX, X - Palate: mobile & midline.  XI - Shoulder shrug: normal.  XII - Tongue protrusion: normal.      GROSS MOTOR:  Gait & station: did not assess; in WC  Tone: normal.  Abnormal movements: none.   - action tremor to bilateral legs  Finger-nose: ET bilaterally (R>L)  Rapid alternating movements: normal.  Pronator drift: normal      MUSCLE STRENGTH:     RIGHT    LEFT   5 Deltoids 5   5 Biceps 5   5 Triceps 5   5 Forearm.Pr. 5        5 Iliopsoas flex    5   5 Hip Abduct 5   4 Hip Adduct 4   4+ Quads 4+   4+ Hams 4+   5 Dorsiflex 5   5 Plantar Flex 5          REFLEXES:    RIGHT Reflex   LEFT   2+ Biceps 2+   2+ Brachiorad. 2+        2+ Patellar 2+         Diagnostic Data Reviewed:   I have personally reviewed provider notes, labs and imaging made available to me today.     Imaging:    CTA 3/2025:  Impression:     1. New focal high-grade stenosis left PCA P2 segment.  2. Similar focal high-grade stenosis left BUNNY A3 segment.  3. Similar less than 50% stenosis of the bilateral intracranial internal carotid arteries.  4. Similar ectasia of the proximal basilar artery.      MRI brain 3/2/2024  FINDINGS:  Ventricles of normal size and shape there is no shift of the midline noted.  There are no extra-axial fluid collections are areas consistent with hemorrhage noted.  There is increased signal in the periventricular white matter most consistent with chronic ischemia.  No masses is seen no acute infarcts are noted.  There are normal flow voids in the carotid siphons.  The pituitary appears normal.  There is scattered punctate foci of susceptibility likely related to chronic microhemorrhage.  This is stable from the previous study     Impression:     No acute abnormalities are seen    CTA 3/2024:  mpression:     1. No proximal vessel occlusion or aneurysm formation.   Advanced intracranial atherosclerosis.  No detrimental interval change from the previous exam.      MRI Brain Without Contrast 11/2023    Narrative  EXAMINATION:  MRI BRAIN WITHOUT CONTRAST    CLINICAL HISTORY:  AMS/confusion; Pt is here for evaluation of altered mental status.  Patient's  went to a doctor's appointment yesterday at 2:00 p.m..  He reports patient was at her normal baseline at 2:00 p.m.  We returned at 5:00 p.m. he reports she was confused.  He reports a prior episode similar to this a month ago after having a TAVR.  He reports it was secondary to metabolic encephalopathy/UTI.  Patient's daughter is present she reports the TAVR was in July.  She reports her mother has had mild deficits in her mental function since the episode of metabolic encephalopathy but has had an acute change to her mental status yesterday afternoon.    TECHNIQUE:  Multiplanar multisequence MR imaging of the brain was performed without contrast.    COMPARISON:  MRI brain without contrast, 07/29/2023.    FINDINGS:  INTRACRANIAL: Mild global volume loss.  Subtle restricted diffusion in the left posterior thalamus with corresponding T2 FLAIR hyperintense signal. Scattered and confluent T2 FLAIR hyperintense white matter foci are present, likely related to chronic microvascular ischemic change.  No parenchymal restricted diffusion.  Scattered punctate foci of susceptibility, likely related to chronic microhemorrhage and similar to prior study.  No evidence of acute intracranial hemorrhage.  No extra-axial fluid collection or mass.  No intracranial mass effect.  No hydrocephalus.  Midline structures have a normal configuration.  Visualized pituitary gland and infundibulum are normal.  Visualized major intracranial vascular structures demonstrate normal flow voids and are normal in course and caliber.    SINUSES: Small right maxillary sinus mucous retention cyst versus polyp.  Trace right ethmoid sinus mucosal thickening.   Mastoid air cells are clear.    ORBITS: Bilateral lens replacements.    Impression  1. Suspect acute or early subacute left posterior thalamic lacunar infarct.  2. Mild-moderate chronic microvascular ischemic change.  Epic message sent to Sera Freeman and Hussain at 10:27 on 11/28/2023.      CT Head Without Contrast 11/2023    Narrative  EXAMINATION:  CT HEAD WITHOUT CONTRAST    CLINICAL HISTORY:  Altered Mental Status (Last known well by  at 1400, arrived home at 1700 finding patient confused, clear speech, no facial droop, moves all extremities well, )    TECHNIQUE:  Low dose axial images were obtained through the head.  Coronal and sagittal reformations were also performed. Contrast was not administered.  Dose reduction techniques including automatic exposure control (AEC) were utilized.    Dose (DLP): 541 mGycm    COMPARISON:  MRI brain without contrast, 07/29/2023.  CT head without contrast, 07/27/2023.    FINDINGS:  INTRACRANIAL: Mild global volume loss.  Scattered white matter hypodensities, likely related to chronic microvascular ischemic change.  Gray-white differentiation is preserved.  No acute intracranial hemorrhage.  No hydrocephalus.  No intracranial mass effect.    SINUSES: Trace bilateral ethmoid sinus mucosal thickening.  Mucous debris in the left sphenoid sinus.  Mastoid air cells are clear.    SKULL/SCALP: Visualized osseous structures are normal.    ORBITS: Bilateral lens replacements.    Impression  Nighthawk concordant.  No acute intracranial abnormality.      CTA Head and Neck (xpd) 11/2023    Narrative  EXAMINATION:  CTA HEAD AND NECK (XPD)    CLINICAL HISTORY:  Neuro deficit, acute, stroke suspected.    TECHNIQUE:  CT angiogram was performed from the level of the zhao to the top of the head following the IV administration of 80mL of Omnipaque 350.   Sagittal and coronal and maximum intensity projection reconstructions were performed. One additional phase of immediate  post-contrast CT images was performed through the head alone. Arterial stenosis percentages are based on NASCET measurement criteria.  Dose reduction techniques including automatic exposure control (AEC) were utilized.    Dose (DLP): 794 mGycm    COMPARISON:  CT head without contrast, 11/27/2023, 07/27/2023.  MRI brain without contrast, 11/28/2023, 07/29/2023.    FINDINGS:  CTA HEAD:    INTRACRANIAL: No acute intracranial hemorrhage.  No hydrocephalus.  No intracranial mass effect.  No abnormal intracranial enhancement.    SINUSES: Mucous retention cyst versus polyp in the right maxillary sinus.  Patchy mucosal opacity in the left sphenoid sinus.  Mastoid air cells are clear.    SKULL/SCALP: Visualized osseous structures are normal.    ORBITS: Bilateral lens replacements.    VASCULATURE: Focal high-grade stenosis or occlusion of the left A3 BUNNY segment (series 13, image 35).  Moderate bilateral carotid siphon calcifications with mild right supraclinoid ICA stenosis.  Mild left terminal ICA and proximal M1 MCA stenosis (series 13, image 41).  Minimal fusiform ectasia of the proximal basilar artery (image 43).  Minor atherosclerotic calcifications in the left V4 and proximal basilar arteries.  No vascular malformation.  Deep cerebral veins and dural venous sinuses enhance normally.    CTA NECK:    AORTIC ARCH: 4 vessel arch with left vertebral artery originating from the arch.    LEFT CAROTID    COMMON CAROTID: No occlusion, hemodynamically significant stenosis, dissection or aneurysm.    INTERNAL CAROTID: Mild atherosclerotic calcifications at the origin.  No occlusion, hemodynamically significant stenosis, dissection or aneurysm.    RIGHT CAROTID    COMMON CAROTID: No occlusion, hemodynamically significant stenosis, dissection or aneurysm.    INTERNAL CAROTID: Mild atherosclerotic plaque at the origin.  No occlusion, hemodynamically significant stenosis, dissection or aneurysm.    VERTEBRAL ARTERIES    LEFT  VERTEBRAL: Originates from the aortic arch.  No occlusion, hemodynamically significant stenosis, dissection or aneurysm.    RIGHT VERTEBRAL: No occlusion, hemodynamically significant stenosis, dissection or aneurysm.    OTHER: Patchy anterior right upper lobe opacities and bronchiectasis.  Multilevel cervical spondylosis without high-grade spinal canal stenosis.  No acute fracture or aggressive osseous lesion.  No significant pathology in the visualized cervical soft tissues.    Impression  1. Focal high-grade stenosis or occlusion in the left A3 BUNNY segment.  2. Mild right supraclinoid ICA stenosis.  3. Mild left terminal ICA and proximal M1 MCA stenosis.  4. Additional findings as above.      Cardiac:  TTE 11/29/2023:      Left Ventricle: There is normal systolic function. Ejection fraction by visual approximation is 55%. There is normal diastolic function.    Right Ventricle: Normal right ventricular cavity size. Systolic function is normal.    Left Atrium: Agitated saline study of the atrial septum is negative after vasalva maneuver, suggesting absence of intracardiac shunt at the atrial level.    Aortic Valve: There is a transcatheter valve replacement in the aortic position that is appropriately positioned.    IVC/SVC: Normal venous pressure at 3 mmHg.      Labs:  Lab Results   Component Value Date    WBC 6.85 03/16/2025    HGB 13.7 03/16/2025    HCT 41.8 03/16/2025     03/16/2025    MCV 86 03/16/2025    RDW 14.5 03/16/2025     Lab Results   Component Value Date     03/16/2025    K 3.8 03/16/2025    CL 99 03/16/2025    CO2 27 03/16/2025    BUN 12 03/16/2025    CREATININE 0.73 03/16/2025    GLU 71 03/16/2025    CALCIUM 9.5 03/16/2025    MG 1.7 03/16/2025    PHOS 3.3 03/16/2025     Lab Results   Component Value Date    PROT 6.6 03/16/2025    ALBUMIN 3.1 (L) 03/16/2025    BILITOT 0.6 03/16/2025    AST 23 03/16/2025    ALKPHOS 113 03/16/2025    ALT 18 03/16/2025     Lab Results   Component Value Date     INR 1.0 03/16/2025     Lab Results   Component Value Date    CHOL 151 03/15/2025    HDL 40 03/15/2025    LDLCALC 96.4 03/15/2025    TRIG 73 03/15/2025    CHOLHDL 26.5 03/15/2025     Lab Results   Component Value Date    HGBA1C 5.9 (H) 03/15/2025      Lab Results   Component Value Date    VCQMXTKL68 >1000 (H) 11/28/2023     Lab Results   Component Value Date    FOLATE 7.1 11/28/2023     Lab Results   Component Value Date    TSH 0.964 03/15/2025     EEG 7/2023:  This is an abnormal, technically limited EEG.  Diffuse, generalized   slowing is consistent with a diffuse encephalopathy, however this is   non-specific with respect to etiology.  Mild superimposed focal slowing   suggests a more severe degree of cerebral dysfunction involving the   posterior left hemisphere.  Clinical correlation is advised.       EEG 3/2024:  Interpretation  This is an abnormal EEG due to the presence left temporal epileptiform discharges. This supports a diagnoses of focal seizures.             Assessment and Plan:    DAPT                      Important to note, also  has a past medical history of Abnormal ECG (02/06/2017), Adrenal adenoma: 2.8 X 2.1CM 2010 (05/16/2014), Aortic atherosclerosis: see CT scan 2016 (10/28/2016), Aortic stenosis (08/19/2014), Asthma, Breast cancer, Cancer, Cataracts, both eyes, Cerebellar infarct, Cerebral infarct, Chronic back pain, Chronic bronchitis, Chronic diastolic CHF (congestive heart failure), CKD (chronic kidney disease) stage 3, GFR 30-59 ml/min (08/19/2014), COPD (chronic obstructive pulmonary disease), DDD (degenerative disc disease), lumbar (07/30/2013), Diabetes mellitus, Diastolic dysfunction, Diverticulosis, Dyslipidemia, Ectatic abdominal aorta: see CT scan 10/16 (10/28/2016), Gallbladder polyp (08/19/2014), General anesthetics causing adverse effect in therapeutic use, GERD (gastroesophageal reflux disease), Gout, Herpes simplex, High blood cholesterol, Hypertension, Hypertension  associated with diabetes (02/07/2012), Insomnia, Lumbar spinal stenosis, Lymphedema of arm, Multiple lung nodules (09/21/2015), Radiation fibrosis of lung, Renal cyst, right: stable per CT 2016 (10/28/2016), Stroke (02/2012), Stye (12/05/2013), Thyroid disease, TIA (transient ischemic attack), Toxic metabolic encephalopathy, Type 2 diabetes mellitus without complication, without long-term current use of insulin (07/20/2015), Unspecified hypothyroidism (07/20/2015), and UTI (urinary tract infection).          The patient will return in 1-2 mos after testing    All questions were answered and patient is comfortable with the plan.         Thank you very much for the opportunity to assist in this patient's care.    If you have any questions or concerns, please do not hesitate to contact me at any time.      Sincerely,     MADELYN Boyer  [facility]     The patient location is: [place], [place]  The chief complaint leading to consultation is: hosp f/u    Visit type: audiovisual    Face to Face time with patient: 31 mins  45 minutes of total time spent on the encounter, which includes face to face time and non-face to face time preparing to see the patient (eg, review of tests), Obtaining and/or reviewing separately obtained history, Documenting clinical information in the electronic or other health record, Independently interpreting results (not separately reported) and communicating results to the patient/family/caregiver, or Care coordination (not separately reported).         Each patient to whom he or she provides medical services by telemedicine is:  (1) informed of the relationship between the physician and patient and the respective role of any other health care provider with respect to management of the patient; and (2) notified that he or she may decline to receive medical services by telemedicine and may withdraw from such care at any time.    Notes:

## 2025-03-29 ENCOUNTER — PATIENT MESSAGE (OUTPATIENT)
Dept: FAMILY MEDICINE | Facility: CLINIC | Age: OVER 89
End: 2025-03-29
Payer: MEDICARE

## 2025-03-29 DIAGNOSIS — M10.9 GOUT, UNSPECIFIED CAUSE, UNSPECIFIED CHRONICITY, UNSPECIFIED SITE: Primary | ICD-10-CM

## 2025-03-31 NOTE — TELEPHONE ENCOUNTER
In terms of gout, she really needs to have a uric acid level checked to make sure we have the correct diagnosis.   Last time the uric acid level was checked it was normal so want to make sure this is the correct diagnosis.  I know it is very hard for her to be seen in clinic or urgent care- we could see about having somebody come out to the house to do blood work again  I will ask her neurologist whether she has any recommendations for medications for anxiety   I would strongly urge them to take advantage of the palliative care assessment- are they willing to schedule this?

## 2025-03-31 NOTE — TELEPHONE ENCOUNTER
LOV with Sera Barger MD , 3/20/2025  Was given medrol back at recent visit for gout. States still very painful. Asking if anything else can help.     Also states anxiety is high. Asking if there is another medication she can try. Was prev on Cleca 10mg which appears to have been d/c at recent hospitalization.

## 2025-04-01 DIAGNOSIS — G89.29 CHRONIC RIGHT HIP PAIN: ICD-10-CM

## 2025-04-01 DIAGNOSIS — M25.551 CHRONIC RIGHT HIP PAIN: ICD-10-CM

## 2025-04-01 NOTE — TELEPHONE ENCOUNTER
No care due was identified.  [facility] Embedded Care Due Messages. Reference number: [id number].   4/01/2025 6:09:39 PM CDT

## 2025-04-02 ENCOUNTER — PATIENT MESSAGE (OUTPATIENT)
Dept: FAMILY MEDICINE | Facility: CLINIC | Age: OVER 89
End: 2025-04-02
Payer: MEDICARE

## 2025-04-02 DIAGNOSIS — M10.072 ACUTE IDIOPATHIC GOUT INVOLVING TOE OF LEFT FOOT: Primary | ICD-10-CM

## 2025-04-02 RX ORDER — BUSPIRONE HYDROCHLORIDE 5 MG/1
5 TABLET ORAL 2 TIMES DAILY
Qty: 60 TABLET | Refills: 11 | Status: SHIPPED | OUTPATIENT
Start: 2025-04-02 | End: 2025-04-15

## 2025-04-02 RX ORDER — HYDROCODONE BITARTRATE AND ACETAMINOPHEN 7.5; 325 MG/15ML; MG/15ML
5 SOLUTION ORAL EVERY 8 HOURS PRN
Qty: 120 ML | Refills: 0 | Status: SHIPPED | OUTPATIENT
Start: 2025-04-02

## 2025-04-02 RX ORDER — GLIMEPIRIDE 1 MG/1
1 TABLET ORAL
COMMUNITY
Start: 2025-03-31

## 2025-04-02 NOTE — TELEPHONE ENCOUNTER
I ordered it to be done by [facility] or by whatever home health agency they prefer.  I am not sure why somebody scheduled her an office visit for lab    At any rate, I just reordered it for home health to collect so they are now to orders in place but they are both for home health    Can you please contact [facility] to let them know that they have to come out to do the uric acid level, thank you

## 2025-04-02 NOTE — TELEPHONE ENCOUNTER
Pt f/u on 3/29 pt message in which uric acid order was requested and placed    States that the order needs to be resent

## 2025-04-08 RX ORDER — ALLOPURINOL 100 MG/1
100 TABLET ORAL
Qty: 90 TABLET | Refills: 0 | Status: SHIPPED | OUTPATIENT
Start: 2025-04-08

## 2025-04-08 NOTE — TELEPHONE ENCOUNTER
No care due was identified.  [facility] Embedded Care Due Messages. Reference number: [id number].   4/08/2025 1:21:21 PM CDT

## 2025-04-08 NOTE — TELEPHONE ENCOUNTER
Refill Routing Note   Medication(s) are not appropriate for processing by [facility] for the following reason(s):        Required labs outdated    [facility] action(s):  Defer             Appointments  past 12m or future 3m with PCP    Date Provider   Last Visit   3/20/2025 Sera Barger MD   Next Visit   Visit date not found Sera Barger MD   ED visits in past 90 days: 1        Note composed:1:30 PM 04/08/2025

## 2025-04-14 ENCOUNTER — TELEPHONE (OUTPATIENT)
Dept: FAMILY MEDICINE | Facility: CLINIC | Age: OVER 89
End: 2025-04-14
Payer: MEDICARE

## 2025-04-14 DIAGNOSIS — F51.01 PRIMARY INSOMNIA: ICD-10-CM

## 2025-04-14 NOTE — TELEPHONE ENCOUNTER
----- Message from Alanna sent at 4/14/2025 12:02 PM CDT -----  Contact: Marysol ROBLEDO   Type: Home Health (orders, updates, clarifications, etc.)Home Health Agency/Nurse: Marysol ROBLEDOPhone number: [phone] for call: Pt needs an order for miralax please for her constipation. Pts  said that the miralax works for the pt. Please call and advise. Thank you.Comments:

## 2025-04-15 RX ORDER — ZOLPIDEM TARTRATE 10 MG/1
10 TABLET ORAL NIGHTLY PRN
Qty: 90 TABLET | Refills: 0 | Status: SHIPPED | OUTPATIENT
Start: 2025-04-15

## 2025-04-15 NOTE — TELEPHONE ENCOUNTER
No care due was identified.  [facility] Embedded Care Due Messages. Reference number: [id number].   4/14/2025 9:33:04 PM CDT

## 2025-04-21 ENCOUNTER — TELEPHONE (OUTPATIENT)
Dept: FAMILY MEDICINE | Facility: CLINIC | Age: OVER 89
End: 2025-04-21
Payer: MEDICARE

## 2025-04-21 NOTE — TELEPHONE ENCOUNTER
----- Message from Berkley sent at 4/21/2025  3:01 PM CDT -----  Contact: KIKE/ Marysol/[phone]  Type: Home Health (orders, updates, clarifications, etc.)Home Health Agency/Nurse:RICH GreggPhone number:[phone] for call:update on patient Comments: Marysol ramos that she is calling in regards to the Gout in  pt's left foot and her  anxiety. Please advise

## 2025-04-22 ENCOUNTER — TELEPHONE (OUTPATIENT)
Dept: FAMILY MEDICINE | Facility: CLINIC | Age: OVER 89
End: 2025-04-22
Payer: MEDICARE

## 2025-04-22 NOTE — TELEPHONE ENCOUNTER
----- Message from Angelica sent at 4/21/2025  3:27 PM CDT -----  Contact: Marysol   Type: Returning a callWho left a message? No voicemailWhen did the practice call?todayDoes patient know what this is regarding:home health nurse called about changing dosage of medicationWould the patient rather a call back or a response via [facility]? Call Comments:

## 2025-05-05 ENCOUNTER — TELEPHONE (OUTPATIENT)
Dept: FAMILY MEDICINE | Facility: CLINIC | Age: OVER 89
End: 2025-05-05
Payer: MEDICARE

## 2025-05-05 DIAGNOSIS — M25.551 CHRONIC RIGHT HIP PAIN: ICD-10-CM

## 2025-05-05 DIAGNOSIS — G89.29 CHRONIC RIGHT HIP PAIN: ICD-10-CM

## 2025-05-05 RX ORDER — HYDROCODONE BITARTRATE AND ACETAMINOPHEN 7.5; 325 MG/15ML; MG/15ML
5 SOLUTION ORAL EVERY 8 HOURS PRN
Qty: 120 ML | Refills: 0 | Status: SHIPPED | OUTPATIENT
Start: 2025-05-05

## 2025-05-05 NOTE — TELEPHONE ENCOUNTER
No care due was identified.  [facility] Embedded Care Due Messages. Reference number: [id number].   5/05/2025 12:03:18 PM CDT

## 2025-05-05 NOTE — TELEPHONE ENCOUNTER
----- Message from Sofia sent at 5/2/2025 11:38 AM CDT -----  Regarding: Sandy Calling from [facility] [phone]  Contact: Sandy Calling from [facility] [phone]  Who Called: Sandy Calling from [facility] [phone] to talk to nurse in regards to see if Dr. Barger uses her in basket message since there is an order there for her to sign.

## 2025-05-19 ENCOUNTER — PATIENT MESSAGE (OUTPATIENT)
Dept: NEUROLOGY | Facility: CLINIC | Age: OVER 89
End: 2025-05-19
Payer: MEDICARE

## 2025-05-19 DIAGNOSIS — R41.82 ALTERED MENTAL STATUS: ICD-10-CM

## 2025-05-19 RX ORDER — LACOSAMIDE 50 MG/1
50 TABLET ORAL EVERY 12 HOURS
Qty: 180 TABLET | Refills: 1 | Status: SHIPPED | OUTPATIENT
Start: 2025-05-19 | End: 2026-05-19

## 2025-05-22 NOTE — TELEPHONE ENCOUNTER
No care due was identified.  [facility] Embedded Care Due Messages. Reference number: [id number].   5/22/2025 6:32:30 PM CDT

## 2025-05-23 RX ORDER — LEVOTHYROXINE SODIUM 75 UG/1
75 TABLET ORAL
Qty: 90 TABLET | Refills: 3 | Status: SHIPPED | OUTPATIENT
Start: 2025-05-23

## 2025-05-23 NOTE — TELEPHONE ENCOUNTER
Refill Decision Note   Olga Stefania  is requesting a refill authorization.  Brief Assessment and Rationale for Refill:  Approve     Medication Therapy Plan:         Comments:     Note composed:10:49 AM 05/23/2025

## 2025-05-30 ENCOUNTER — EXTERNAL HOME HEALTH (OUTPATIENT)
Dept: HOME HEALTH SERVICES | Facility: HOSPITAL | Age: OVER 89
End: 2025-05-30
Payer: MEDICARE

## 2025-05-30 NOTE — TELEPHONE ENCOUNTER
Care Due:                  Date            Visit Type   Department     Provider  --------------------------------------------------------------------------------                                ESTABLISHED                              PATIENT -    [facility]  Last Visit: 09-      [facility]      MEDICINE       Sera Barger  Next Visit: None Scheduled  None         None Found                                                            Last  Test          Frequency    Reason                     Performed    Due Date  --------------------------------------------------------------------------------    Lipid Panel.  12 months..  atorvastatin.............  02- 01-    Uric Acid...  12 months..  allopurinoL..............  03- 02-    [facility] Embedded Care Due Messages. Reference number: [id number].   11/19/2024 12:31:47 PM CST   <-- Click to add NO significant Past Surgical History

## (undated) DEVICE — SYR GLASS 5CC LUER LOK

## (undated) DEVICE — TRAY NERVE BLOCK

## (undated) DEVICE — APPLICATOR CHLORAPREP CLR 10.5

## (undated) DEVICE — CANNULA CVD 100MM X 20G

## (undated) DEVICE — MARKER SKIN STND TIP BLUE BARR

## (undated) DEVICE — GLOVE SURGICAL LATEX SZ 7

## (undated) DEVICE — SEE MEDLINE ITEM 152622

## (undated) DEVICE — NDL TUOHY EPIDURAL 20G X 3.5

## (undated) DEVICE — SOL SOD CHLORIDE 0.9% 10ML

## (undated) DEVICE — NDL 18GA X1 1/2 REG BEVEL

## (undated) DEVICE — GLOVE 7.5 PROTEXIS PI MICRO

## (undated) DEVICE — NDL SPINAL SPINOCAN 22GX3.5

## (undated) DEVICE — TOWEL OR DISP STRL BLUE 4/PK

## (undated) DEVICE — PAD ELECTROSURGICAL PAT PLATE